# Patient Record
Sex: MALE | Race: WHITE | NOT HISPANIC OR LATINO | Employment: OTHER | URBAN - NONMETROPOLITAN AREA
[De-identification: names, ages, dates, MRNs, and addresses within clinical notes are randomized per-mention and may not be internally consistent; named-entity substitution may affect disease eponyms.]

---

## 2019-07-05 ENCOUNTER — OFFICE VISIT (OUTPATIENT)
Dept: CARDIOLOGY | Facility: CLINIC | Age: 84
End: 2019-07-05

## 2019-07-05 ENCOUNTER — DOCUMENTATION (OUTPATIENT)
Dept: CARDIOLOGY | Facility: CLINIC | Age: 84
End: 2019-07-05

## 2019-07-05 VITALS
SYSTOLIC BLOOD PRESSURE: 126 MMHG | HEART RATE: 82 BPM | WEIGHT: 154 LBS | DIASTOLIC BLOOD PRESSURE: 78 MMHG | HEIGHT: 72 IN | BODY MASS INDEX: 20.86 KG/M2

## 2019-07-05 DIAGNOSIS — I25.10 CORONARY ARTERY DISEASE INVOLVING NATIVE CORONARY ARTERY OF NATIVE HEART WITHOUT ANGINA PECTORIS: Primary | ICD-10-CM

## 2019-07-05 DIAGNOSIS — Z82.49 FAMILY HISTORY OF CABG: ICD-10-CM

## 2019-07-05 DIAGNOSIS — R55 VASOVAGAL SYNCOPE: ICD-10-CM

## 2019-07-05 DIAGNOSIS — I45.2 RBBB (RIGHT BUNDLE BRANCH BLOCK WITH LEFT ANTERIOR FASCICULAR BLOCK): ICD-10-CM

## 2019-07-05 DIAGNOSIS — R94.31 ABNORMAL EKG: Primary | ICD-10-CM

## 2019-07-05 DIAGNOSIS — I10 ESSENTIAL HYPERTENSION: ICD-10-CM

## 2019-07-05 PROCEDURE — 93000 ELECTROCARDIOGRAM COMPLETE: CPT | Performed by: INTERNAL MEDICINE

## 2019-07-05 PROCEDURE — 99204 OFFICE O/P NEW MOD 45 MIN: CPT | Performed by: INTERNAL MEDICINE

## 2019-07-05 RX ORDER — GABAPENTIN 600 MG/1
TABLET ORAL
Refills: 2 | COMMUNITY
Start: 2019-06-27 | End: 2020-07-23 | Stop reason: SDUPTHER

## 2019-07-05 RX ORDER — ASPIRIN 81 MG/1
81 TABLET ORAL DAILY
Qty: 30 TABLET | Refills: 3 | Status: SHIPPED | OUTPATIENT
Start: 2019-07-05

## 2019-07-05 RX ORDER — DICLOFENAC SODIUM 75 MG/1
1 TABLET, DELAYED RELEASE ORAL EVERY 12 HOURS
COMMUNITY
End: 2019-07-05 | Stop reason: ALTCHOICE

## 2019-07-05 RX ORDER — ROPINIROLE 0.5 MG/1
0.5 TABLET, FILM COATED ORAL DAILY
Refills: 2 | COMMUNITY
Start: 2019-05-03

## 2019-07-05 RX ORDER — ALBUTEROL SULFATE 90 UG/1
AEROSOL, METERED RESPIRATORY (INHALATION)
Refills: 3 | COMMUNITY
Start: 2019-04-08

## 2019-07-05 RX ORDER — MELOXICAM 15 MG/1
15 TABLET ORAL DAILY
Refills: 1 | COMMUNITY
Start: 2019-06-27 | End: 2020-07-22 | Stop reason: HOSPADM

## 2019-07-05 RX ORDER — ISOSORBIDE MONONITRATE 30 MG/1
30 TABLET, EXTENDED RELEASE ORAL EVERY MORNING
COMMUNITY
Start: 2016-07-19 | End: 2019-07-05 | Stop reason: ALTCHOICE

## 2019-07-05 RX ORDER — TOLTERODINE TARTRATE 2 MG/1
2 TABLET, EXTENDED RELEASE ORAL 2 TIMES DAILY
COMMUNITY

## 2019-07-05 RX ORDER — AMLODIPINE BESYLATE 10 MG/1
5 TABLET ORAL DAILY
Refills: 11 | COMMUNITY
Start: 2019-06-17 | End: 2019-07-16

## 2019-07-05 RX ORDER — ATORVASTATIN CALCIUM 20 MG/1
1 TABLET, FILM COATED ORAL
COMMUNITY

## 2019-07-05 RX ORDER — AMITRIPTYLINE HYDROCHLORIDE 100 MG/1
100 TABLET, FILM COATED ORAL
Refills: 3 | COMMUNITY
Start: 2019-04-08 | End: 2021-03-30

## 2019-07-05 RX ORDER — OMEPRAZOLE 40 MG/1
40 CAPSULE, DELAYED RELEASE ORAL EVERY MORNING
Refills: 2 | COMMUNITY
Start: 2019-06-27

## 2019-07-05 RX ORDER — ATORVASTATIN CALCIUM 20 MG/1
20 TABLET, FILM COATED ORAL DAILY
Refills: 3 | COMMUNITY
Start: 2019-04-19 | End: 2019-07-05 | Stop reason: SDUPTHER

## 2019-07-05 RX ORDER — COLCHICINE 0.6 MG/1
0.6 TABLET ORAL DAILY
COMMUNITY
Start: 2019-06-03 | End: 2019-07-05 | Stop reason: ALTCHOICE

## 2019-07-05 NOTE — PROGRESS NOTES
Jose Dacosta  84 y.o. male    2019  1. Coronary artery disease involving native coronary artery of native heart without angina pectoris    2. Vasovagal syncope    3. Essential hypertension    4. RBBB (right bundle branch block with left anterior fascicular block)    5. Family history of CABG        History of Present Illness:  Patient's Body mass index is 20.89 kg/m². BMI is within normal parameters. No follow-up required.   .  84 years old patient hard of hearing with a long-standing history of postural dizziness documented right bundle branch and left anterior axis by EKG in 2013, history of CAD and CABG more than 10 years ago, right carotid endarterectomy, mild abdominal aortic aneurysm infrarenal on abdominal ultrasound in 2018, hypertension with hypertensive heart disease, hyperlipidemia, history of BPH who referred for evaluations of syncope happened more than a month ago when he stood up from the chair with a long-standing history of postural dizziness.  No chest pain no orthopnea no PND no palpitation reported prior to the event.  No recent cardiac evaluation noted in the medical record.  Record of bypass surgery is not available.  He is a physically active and fortunately stopped smoking more than 30 years ago        SUBJECTIVE:    Allergies   Allergen Reactions   • Hydrochlorothiazide Swelling     TONGUE SWELLING         Past Medical History:   Diagnosis Date   • Abnormal ECG          Past Surgical History:   Procedure Laterality Date   • CORONARY STENT PLACEMENT           Family History   Problem Relation Age of Onset   • Heart failure Mother    • Heart failure Father          Social History     Socioeconomic History   • Marital status:      Spouse name: Not on file   • Number of children: Not on file   • Years of education: Not on file   • Highest education level: Not on file   Tobacco Use   • Smoking status: Former Smoker     Last attempt to quit: 1990     Years since quittin.5    • Smokeless tobacco: Never Used   Substance and Sexual Activity   • Alcohol use: No     Frequency: Never   • Drug use: No         Current Outpatient Medications   Medication Sig Dispense Refill   • atorvastatin (LIPITOR) 20 MG tablet Take 1 tablet by mouth.     • gabapentin (NEURONTIN) 600 MG tablet TAKE 1 TABLET BY MOUTH 3 (THREE) TIMES DAILY  2   • meloxicam (MOBIC) 15 MG tablet Take 15 mg by mouth Daily.  1   • omeprazole (priLOSEC) 40 MG capsule Take 40 mg by mouth Every Morning.  2   • rOPINIRole (REQUIP) 0.5 MG tablet Take 0.5 mg by mouth Daily.  2   • tolterodine (DETROL) 2 MG tablet Take 2 mg by mouth 2 (Two) Times a Day.     • albuterol sulfate  (90 Base) MCG/ACT inhaler INHALE 2 PUFFS INTO THE LUNGS EVERY 6 (SIX) HOURS AS NEEDED FOR WHEEZING  3   • amitriptyline (ELAVIL) 100 MG tablet Take 100 mg by mouth every night at bedtime.  3   • amLODIPine (NORVASC) 10 MG tablet Take 10 mg by mouth Daily.  11   • aspirin 81 MG EC tablet Take 1 tablet by mouth Daily. 30 tablet 3     No current facility-administered medications for this visit.            Review of Systems:     Constitutional:  Denies recent weight loss, weight gain, fever or chills, no change in exercise tolerance.     HENT:  Denies any hearing loss, epistaxis, hoarseness, or difficulty speaking.     Eyes: No blurring    Respiratory:  Denies dyspnea with exertion,no cough, wheezing, or hemoptysis.     Cardiovascular: See H&P.     Gastrointestinal:  Denies change in bowel habits, dyspepsia, ulcer disease, hematochezia, or melena.     Endocrine: Negative for cold intolerance, heat intolerance, polydipsia, polyphagia and polyuria. Denies any history of weight change, polydipsia, polyuria.     Genitourinary: Negative.      Musculoskeletal: Osteoarthritis    Skin:  Denies any change in hair or nails, rashes, or skin lesions.     Allergic/Immunologic: Negative.  Negative for environmental allergies, food allergies and immunocompromised state.  "    Neurological:  Denies any history of recurrent headaches, strokes, TIA, or seizure disorder.     Hematological: Denies any food allergies, seasonal allergies, bleeding disorders, or lymphadenopathy.     Psychiatric/Behavioral: Denies any history of depression, substance abuse, or change in cognitive function.       OBJECTIVE:    /78   Pulse 82   Ht 182.9 cm (72\")   Wt 69.9 kg (154 lb)   BMI 20.89 kg/m²       Physical Exam:     Constitutional: Cooperative, alert and oriented, well-developed, well-nourished, in no acute distress.     HENT:   Head: Normocephalic, normal hair patterns, no masses or tenderness.  Ears, Nose, and Throat: No gross abnormalities. No pallor or cyanosis. Dentition good.   Eyes: EOMS intact, PERRL, conjunctivae and lids unremarkable. Fundoscopic exam and visual fields not performed.   Neck: No palpable masses or adenopathy, no thyromegaly, no JVD, carotid pulses are full and equal bilaterally and without  Bruits.     Cardiovascular: Regular rhythm, S1 and S2 normal, no S3 or S4. Apical impulse not displaced. No murmurs, gallops, or rubs detected.     Pulmonary/Chest: Chest: normal symmetry, no tenderness to palpation, normal respiratory excursion, no intercostal retraction, no use of accessory muscles. Pulmonary: Normal breath sounds. No rales or rhonchi.    Abdominal: Abdomen soft, bowel sounds normoactive, no masses, no hepatosplenomegaly, non-tender, no bruits.     Musculoskeletal: No deformities, clubbing, cyanosis, erythema, or edema observed. There are no spinal abnormalities noted. Normal muscle strength and tone. Pulses full and equal in all extremities, no bruits auscultated.     Neurological: No gross motor or sensory deficits noted, affect appropriate, oriented to time, person, place.     Skin: Warm and dry to the touch, no apparent skin lesions or masses noted.     Psychiatric: He has a normal mood and affect. His behavior is normal. Judgment and thought content " normal.         Procedures      Lab Results   Component Value Date    WBC 9.6 06/03/2019    HGB 11.6 (L) 06/03/2019    HCT 35.6 (L) 06/03/2019    MCV 96 (H) 06/03/2019     06/03/2019     Lab Results   Component Value Date    BUN 19 (H) 06/03/2019    CREATININE 1.9 (H) 06/03/2019    CALCIUM 9.3 06/03/2019    ALBUMIN 3.8 06/03/2019    AST 21 06/03/2019    ALT 19 06/03/2019     No results found for: CHOL  No results found for: TRIG  No results found for: HDL  No components found for: LDLCALC  No results found for: LDL  No results found for: HDLLDLRATIO  No components found for: CHOLHDL  No results found for: HGBA1C  No results found for: TSH, C7UFYVS, F9DLOSS, THYROIDAB        ASSESSMENT AND PLAN:  #1 postural syncope with history of postural dizziness #2 CAD status post CABG more than 10 years ago #3 peripheral vascular disease status post carotid endarterectomy #4 hyperlipidemia #5 hypertension #6 history of right bundle leftward axis previously documented in 2013    Clinically, no sign of cardiac decompensation based on the clinical history physical findings.  Patient a background history of CABG, carotid endarterectomy, hypertension, hyperlipidemia, BPH and long-standing history of postural dizziness.  The patient had an episode of syncope from clinical's descriptions and circumstances surrounding the event most likely mechanism seem to be orthostasis or vasovagal.  Right bundle branch block morphology is not a new and present in the EKG in 2013.  Given the history of CAD and CABG more than 10 years ago, hyperlipidemia, carotid under endarterectomy seemed appropriate to risk stratify with Lexiscan Cardiolite to rule out ischemia, 24 to 48-hour monitor to rule out any significant arrhythmia, echocardiographic study to assess the biventricular function and if cardiac work-up remain negative head up tilt test can be contemplated.  The significance of water intake along with the salt palatable to taste discussed  with the patient.  Patient was instructed to take a time in changing the posture and some exercises were demonstrated prior to and in posture to improve the tone and the pressures.  Currently the patient is getting BPH medication, the blood pressure medicine needed if systolic blood pressure on the higher side more than 130 or 140, aspirin with history of CAD and CABG, statin for hyperlipidemia and Prilosec for gastritis.  Further recommendation based on patient clinical condition and cardiac evaluations.  We will see him back in 4-month    Jose was seen today for new patient.    Diagnoses and all orders for this visit:    Coronary artery disease involving native coronary artery of native heart without angina pectoris  -     Adult Transthoracic Echo Complete W/ Cont if Necessary Per Protocol; Future  -     Tilt Table; Future  -     Holter Monitor - 48 Hour; Future  -     Stress Test With Myocardial Perfusion One Day; Future    Vasovagal syncope  -     Adult Transthoracic Echo Complete W/ Cont if Necessary Per Protocol; Future  -     Tilt Table; Future  -     Holter Monitor - 48 Hour; Future  -     Stress Test With Myocardial Perfusion One Day; Future    Essential hypertension    RBBB (right bundle branch block with left anterior fascicular block)    Family history of CABG  -     Adult Transthoracic Echo Complete W/ Cont if Necessary Per Protocol; Future  -     Tilt Table; Future  -     Holter Monitor - 48 Hour; Future  -     Stress Test With Myocardial Perfusion One Day; Future    Other orders  -     aspirin 81 MG EC tablet; Take 1 tablet by mouth Daily.        Júnior Licona MD  7/5/2019  9:30 AM

## 2019-07-08 ENCOUNTER — HOSPITAL ENCOUNTER (OUTPATIENT)
Dept: NUCLEAR MEDICINE | Facility: HOSPITAL | Age: 84
Discharge: HOME | End: 2019-07-08

## 2019-07-08 ENCOUNTER — HOSPITAL ENCOUNTER (OUTPATIENT)
Dept: CARDIOLOGY | Facility: HOSPITAL | Age: 84
Discharge: HOME | End: 2019-07-08

## 2019-07-08 DIAGNOSIS — Z82.49 FAMILY HISTORY OF CABG: ICD-10-CM

## 2019-07-08 DIAGNOSIS — R55 VASOVAGAL SYNCOPE: ICD-10-CM

## 2019-07-08 DIAGNOSIS — I25.10 CORONARY ARTERY DISEASE INVOLVING NATIVE CORONARY ARTERY OF NATIVE HEART WITHOUT ANGINA PECTORIS: ICD-10-CM

## 2019-07-08 LAB
BH CV STRESS BP STAGE 1: NORMAL
BH CV STRESS COMMENTS STAGE 1: NORMAL
BH CV STRESS DOSE REGADENOSON STAGE 1: 0.4
BH CV STRESS DURATION MIN STAGE 1: 0
BH CV STRESS DURATION SEC STAGE 1: 10
BH CV STRESS HR STAGE 1: 77
BH CV STRESS PROTOCOL 1: NORMAL
BH CV STRESS RECOVERY BP: NORMAL MMHG
BH CV STRESS RECOVERY HR: 81 BPM
BH CV STRESS STAGE 1: 1
LV EF NUC BP: 26 %
MAXIMAL PREDICTED HEART RATE: 137 BPM
PERCENT MAX PREDICTED HR: 64.96 %
STRESS BASELINE BP: NORMAL MMHG
STRESS BASELINE HR: 77 BPM
STRESS PERCENT HR: 76 %
STRESS POST ESTIMATED WORKLOAD: 1 METS
STRESS POST PEAK BP: NORMAL MMHG
STRESS POST PEAK HR: 89 BPM
STRESS TARGET HR: 116 BPM

## 2019-07-08 PROCEDURE — 0 TECHNETIUM SESTAMIBI: Performed by: INTERNAL MEDICINE

## 2019-07-08 PROCEDURE — A9500 TC99M SESTAMIBI: HCPCS | Performed by: INTERNAL MEDICINE

## 2019-07-08 PROCEDURE — 78452 HT MUSCLE IMAGE SPECT MULT: CPT

## 2019-07-08 PROCEDURE — 93016 CV STRESS TEST SUPVJ ONLY: CPT | Performed by: INTERNAL MEDICINE

## 2019-07-08 PROCEDURE — 25010000002 REGADENOSON 0.4 MG/5ML SOLUTION: Performed by: INTERNAL MEDICINE

## 2019-07-08 PROCEDURE — 78452 HT MUSCLE IMAGE SPECT MULT: CPT | Performed by: INTERNAL MEDICINE

## 2019-07-08 PROCEDURE — 93018 CV STRESS TEST I&R ONLY: CPT | Performed by: INTERNAL MEDICINE

## 2019-07-08 PROCEDURE — 93017 CV STRESS TEST TRACING ONLY: CPT

## 2019-07-08 RX ORDER — 0.9 % SODIUM CHLORIDE 0.9 %
10 VIAL (ML) INJECTION ONCE
Status: COMPLETED | OUTPATIENT
Start: 2019-07-08 | End: 2019-07-08

## 2019-07-08 RX ADMIN — REGADENOSON 0.4 MG: 0.08 INJECTION, SOLUTION INTRAVENOUS at 09:19

## 2019-07-08 RX ADMIN — TECHNETIUM TC 99M SESTAMIBI 1 DOSE: 1 INJECTION INTRAVENOUS at 07:45

## 2019-07-08 RX ADMIN — SODIUM CHLORIDE 10 ML: 9 INJECTION INTRAMUSCULAR; INTRAVENOUS; SUBCUTANEOUS at 09:20

## 2019-07-08 RX ADMIN — TECHNETIUM TC 99M SESTAMIBI 1 DOSE: 1 INJECTION INTRAVENOUS at 09:20

## 2019-07-12 ENCOUNTER — DOCUMENTATION (OUTPATIENT)
Dept: CARDIOLOGY | Facility: CLINIC | Age: 84
End: 2019-07-12

## 2019-07-12 NOTE — PROGRESS NOTES
Spoke with patient daughter about echo and stress results. Daughter is aware of need to see Dr Licona on Tuesday.

## 2019-07-16 ENCOUNTER — OFFICE VISIT (OUTPATIENT)
Dept: CARDIOLOGY | Facility: CLINIC | Age: 84
End: 2019-07-16

## 2019-07-16 VITALS
DIASTOLIC BLOOD PRESSURE: 74 MMHG | HEART RATE: 74 BPM | BODY MASS INDEX: 20.99 KG/M2 | HEIGHT: 72 IN | OXYGEN SATURATION: 97 % | WEIGHT: 155 LBS | SYSTOLIC BLOOD PRESSURE: 112 MMHG

## 2019-07-16 DIAGNOSIS — I25.10 CORONARY ARTERY DISEASE INVOLVING NATIVE CORONARY ARTERY OF NATIVE HEART WITHOUT ANGINA PECTORIS: ICD-10-CM

## 2019-07-16 DIAGNOSIS — R55 VASOVAGAL SYNCOPE: ICD-10-CM

## 2019-07-16 DIAGNOSIS — I25.5 ISCHEMIC CARDIOMYOPATHY: Primary | ICD-10-CM

## 2019-07-16 DIAGNOSIS — I25.2 CORONARY ARTERY DISEASE WITH HISTORY OF MYOCARDIAL INFARCTION WITHOUT HISTORY OF CABG: ICD-10-CM

## 2019-07-16 DIAGNOSIS — I45.2 RBBB (RIGHT BUNDLE BRANCH BLOCK WITH LEFT ANTERIOR FASCICULAR BLOCK): ICD-10-CM

## 2019-07-16 DIAGNOSIS — I25.10 CORONARY ARTERY DISEASE WITH HISTORY OF MYOCARDIAL INFARCTION WITHOUT HISTORY OF CABG: ICD-10-CM

## 2019-07-16 DIAGNOSIS — I10 ESSENTIAL HYPERTENSION: ICD-10-CM

## 2019-07-16 PROCEDURE — 99214 OFFICE O/P EST MOD 30 MIN: CPT | Performed by: INTERNAL MEDICINE

## 2019-07-16 RX ORDER — LOSARTAN POTASSIUM 25 MG/1
25 TABLET ORAL DAILY
Qty: 30 TABLET | Refills: 5 | Status: SHIPPED | OUTPATIENT
Start: 2019-07-16 | End: 2019-12-30

## 2019-07-16 RX ORDER — COLCHICINE 0.6 MG/1
0.6 TABLET, FILM COATED ORAL DAILY
Refills: 11 | COMMUNITY
Start: 2019-07-05 | End: 2020-11-03

## 2019-07-16 RX ORDER — METOPROLOL SUCCINATE 25 MG/1
25 TABLET, EXTENDED RELEASE ORAL DAILY
Qty: 30 TABLET | Refills: 5 | Status: SHIPPED | OUTPATIENT
Start: 2019-07-16 | End: 2019-12-14 | Stop reason: SDUPTHER

## 2019-07-16 NOTE — PROGRESS NOTES
Jose Dacosta  85 y.o. male    07/16/2019  1. Ischemic cardiomyopathy    2. Essential hypertension    3. RBBB (right bundle branch block with left anterior fascicular block)    4. Vasovagal syncope    5. Coronary artery disease involving native coronary artery of native heart without angina pectoris    6. Coronary artery disease with history of myocardial infarction without history of CABG        History of Present Illness:      84 years old patient recently evaluated with echo stress test and Holter monitor.  Echo and stress test ejection fraction range of 20% fixed defect with no evidence of reversible ischemia and monitor sinus rhythm without any significant bradyarrhythmia and runs of nonsustained ventricular tachycardia and supraventricular arrhythmia.  Patient presented to discuss the finding of cardiac evaluation.  The mechanism of syncope from clinical description possibility vasovagal orthostasis with arrhythmias underlying mechanism cannot be excluded and patient was scheduled to have had a tilt test but given the history of ischemia, LV dysfunction, congestive heart failure and documented nonsustained ventricular tachycardia ICD was recommended.  Patient initially interested in ICD at the time of evaluation and if he changes his mind he will  let us know if he want to proceed forward.  Hard of hearing with a long-standing history of postural dizziness documented right bundle branch and left anterior axis by EKG in 2013, history of CAD and CABG more than 10 years ago, right carotid endarterectomy, mild abdominal aortic aneurysm infrarenal on abdominal ultrasound in 2018, hypertension with hypertensive heart disease, hyperlipidemia, history of BPH who referred for evaluations of syncope happened more than a month ago when he stood up from the chair with a long-standing history of postural dizziness.  No chest pain no orthopnea no PND no palpitation reported prior to the event.  No recent cardiac  evaluation noted in the medical record.  Record of bypass surgery is not available.  He is a physically active and fortunately stopped smoking more than 30 years ago      HOLTER  #1 sinus rhythm with average heart rate of 75 minimum 55 and maximum 110 bpm with underlying intraventricular conduction delay  2 frequent premature ventricular complex with full runs of nonsustained wide QRS complex tachycardia longest is 3 beat with heart rate range from 130 to 150 bpm  #3 rare premature supraventricular ectopic beat with rare nonsustained supraventricular tachycardia longest is 4 beat with maximum heart rate 160 but beats per minute   #4 no significant bradyarrhythmia or pause noted    Echo  7/2019  · The left ventricular cavity is moderately dilated.  · Estimated EF = 25%.  · Left ventricular diastolic dysfunction (grade I) consistent with impaired relaxation.  · Mild mitral valve regurgitation is present  · Mild tricuspid valve regurgitation is present.  · Mild pulmonic valve regurgitation is present.  · Estimated EF appears to be in the range of 21 - 25%. Estimated EF = 25%. Normal left ventricular wall thickness noted. There is left ventricular global hypokinesis noted. The left ventricular cavity is moderately dilated. Left ventricular diastolic dysfunction is noted (grade I) consistent with impaired relaxation.    STRESS TEST 7/2019  · Findings consistent with an equivocal ECG stress test.  · Left ventricular ejection fraction is moderately reduced (Calculated EF = 26%).  · Moderately fixed inferolateral defect with no reversibility, LV dilated  SUBJECTIVE:    Allergies   Allergen Reactions   • Hydrochlorothiazide Swelling     TONGUE SWELLING         Past Medical History:   Diagnosis Date   • Abnormal ECG          Past Surgical History:   Procedure Laterality Date   • CORONARY STENT PLACEMENT           Family History   Problem Relation Age of Onset   • Heart failure Mother    • Heart failure Father          Social  History     Socioeconomic History   • Marital status:      Spouse name: Not on file   • Number of children: Not on file   • Years of education: Not on file   • Highest education level: Not on file   Tobacco Use   • Smoking status: Former Smoker     Last attempt to quit: 1990     Years since quittin.5   • Smokeless tobacco: Never Used   Substance and Sexual Activity   • Alcohol use: No     Frequency: Never   • Drug use: No         No current facility-administered medications for this visit.      No current outpatient medications on file.     Facility-Administered Medications Ordered in Other Visits   Medication Dose Route Frequency Provider Last Rate Last Dose   • acetaminophen (TYLENOL) tablet 1,000 mg  1,000 mg Oral 4x Daily Lakhwinder Kumar APRN       • albuterol (PROVENTIL) nebulizer solution 0.083% 2.5 mg/3mL  2.5 mg Nebulization Q4H PRN Misha Parada MD       • aspirin EC tablet 81 mg  81 mg Oral Daily Misha Parada MD       • atorvastatin (LIPITOR) tablet 20 mg  20 mg Oral Daily Misha Parada MD       • heparin (porcine) 5000 UNIT/ML injection 5,000 Units  5,000 Units Subcutaneous Q8H Misha Parada MD   5,000 Units at 20 0600   • ipratropium-albuterol (DUO-NEB) nebulizer solution 3 mL  3 mL Nebulization Once Esequiel Sweeney MD       • ketorolac (TORADOL) injection 15 mg  15 mg Intravenous Q6H PRN Lakhwinder Kumar APRN       • losartan (COZAAR) tablet 25 mg  25 mg Oral Daily Misha Parada MD       • meloxicam (MOBIC) tablet 15 mg  15 mg Oral Daily Misha Parada MD       • metoprolol succinate XL (TOPROL-XL) 24 hr tablet 25 mg  25 mg Oral Daily Misha Parada MD       • naloxone (NARCAN) injection 0.4 mg  0.4 mg Intravenous Q5 Min PRN Lakhwinder Kumar APRN   0.4 mg at 20 0841   • ondansetron (ZOFRAN) injection 4 mg  4 mg Intravenous Q6H PRN Misha Parada MD       • oxybutynin (DITROPAN) half tablet 2.5 mg  2.5 mg Oral BID  "Misha Parada MD   2.5 mg at 07/08/20 2333   • pantoprazole (PROTONIX) EC tablet 40 mg  40 mg Oral QAM Misha Parada MD       • rOPINIRole (REQUIP) tablet 0.5 mg  0.5 mg Oral Daily iMsha Parada MD       • sodium chloride 0.9 % flush 10 mL  10 mL Intravenous PRN Ozor, Jordan Khan MD       • sodium chloride 0.9 % flush 10 mL  10 mL Intravenous Q12H Misha Parada MD   10 mL at 07/09/20 0815   • sodium chloride 0.9 % flush 10 mL  10 mL Intravenous PRN Misha Parada MD               Review of Systems:     Constitutional:  Denies recent weight loss, weight gain, fever or chills, no change in exercise tolerance.     HENT:  Denies any hearing loss, epistaxis, hoarseness, or difficulty speaking.     Eyes: Wears eyeglasses or contact lenses.    Respiratory:  Denies dyspnea with exertion,no cough, wheezing, or hemoptysis.     Cardiovascular: Negative for palpations, chest pain, orthopnea, PND, peripheral edema, syncope, or claudication.     Gastrointestinal:  Denies change in bowel habits, dyspepsia, ulcer disease, hematochezia, or melena.     Endocrine: Negative for cold intolerance, heat intolerance, polydipsia, polyphagia and polyuria. Denies any history of weight change, polydipsia, polyuria.     Genitourinary: Negative.      Musculoskeletal: Denies any history of arthritic symptoms or back problems.     Skin:  Denies any change in hair or nails, rashes, or skin lesions.     Allergic/Immunologic: Negative.  Negative for environmental allergies, food allergies and immunocompromised state.     Neurological:  Denies any history of recurrent headaches, strokes, TIA, or seizure disorder.     Hematological: Denies any food allergies, seasonal allergies, bleeding disorders, or lymphadenopathy.     Psychiatric/Behavioral: Denies any history of depression, substance abuse, or change in cognitive function.       OBJECTIVE:    /74   Pulse 74   Ht 182.9 cm (72\")   Wt 70.3 kg (155 lb)   SpO2 97% "   BMI 21.02 kg/m²       Physical Exam:     Constitutional: Cooperative, alert and oriented, well-developed, well-nourished, in no acute distress.     HENT:   Head: Normocephalic, normal hair patterns, no masses or tenderness.  Ears, Nose, and Throat: No gross abnormalities. No pallor or cyanosis. Dentition good.   Eyes: EOMS intact, PERRL, conjunctivae and lids unremarkable. Fundoscopic exam and visual fields not performed.   Neck: No palpable masses or adenopathy, no thyromegaly, no JVD, carotid pulses are full and equal bilaterally and without  Bruits.     Cardiovascular: Regular rhythm, S1 and S2 normal, no S3 or S4. Apical impulse not displaced. No murmurs, gallops, or rubs detected.     Pulmonary/Chest: Chest: normal symmetry, no tenderness to palpation, normal respiratory excursion, no intercostal retraction, no use of accessory muscles. Pulmonary: Normal breath sounds. No rales or rhonchi.    Abdominal: Abdomen soft, bowel sounds normoactive, no masses, no hepatosplenomegaly, non-tender, no bruits.     Musculoskeletal: No deformities, clubbing, cyanosis, erythema, or edema observed. There are no spinal abnormalities noted. Normal muscle strength and tone. Pulses full and equal in all extremities, no bruits auscultated.     Neurological: No gross motor or sensory deficits noted, affect appropriate, oriented to time, person, place.     Skin: Warm and dry to the touch, no apparent skin lesions or masses noted.     Psychiatric: He has a normal mood and affect. His behavior is normal. Judgment and thought content normal.         Procedures      Lab Results   Component Value Date    WBC 11.28 (H) 07/09/2020    HGB 12.2 (L) 07/09/2020    HCT 35.9 (L) 07/09/2020    MCV 92.5 07/09/2020     07/09/2020     Lab Results   Component Value Date    GLUCOSE 109 (H) 07/09/2020    BUN 16 07/09/2020    CREATININE 0.89 07/09/2020    EGFRIFNONA 81 07/09/2020    BCR 18.0 07/09/2020    CO2 24.0 07/09/2020    CALCIUM 9.1  07/09/2020    ALBUMIN 4.10 07/08/2020    AST 32 07/08/2020    ALT 22 07/08/2020     No results found for: CHOL  No results found for: TRIG  No results found for: HDL  No components found for: LDLCALC  No results found for: LDL  No results found for: HDLLDLRATIO  No components found for: CHOLHDL  No results found for: HGBA1C  No results found for: TSH, K4VERDG, O6SECGM, THYROIDAB        ASSESSMENT AND PLAN:  #1 postural syncope with history of postural dizziness #2 CAD status post CABG more than 10 years ago #3 peripheral vascular disease status post carotid endarterectomy #4 hyperlipidemia #5 hypertension #6 history of right bundle leftward axis previously documented in 2013    84 years old gentleman with a background history of hypertension, hyperlipidemia, CAD status post CABG had episode of postural dizziness and syncope no further recurrence evaluated by his stress test echo and Holter monitor.  Significant LV dysfunction with a fixed defect clinically not in heart failure and monitor revealed nonsustained ventricular tachycardia and nonsustained supraventricular arrhythmia.  Findings discussed with the patient and family.  ICD was recommended given the ischemic cardiomyopathy nonsustained ventricular arrhythmia.  However they decided to wait.  I will discontinue amlodipine and start the patient on metoprolol starting a dose of 25 mg and low-dose losartan.  He will continue aspirin and atorvastatin.  They will let us know if they decided to proceed .    Jose was seen today for follow-up.    Diagnoses and all orders for this visit:    Ischemic cardiomyopathy    Essential hypertension    RBBB (right bundle branch block with left anterior fascicular block)    Vasovagal syncope    Coronary artery disease involving native coronary artery of native heart without angina pectoris    Coronary artery disease with history of myocardial infarction without history of CABG    Other orders  -     Discontinue: metoprolol  succinate XL (TOPROL-XL) 25 MG 24 hr tablet; Take 1 tablet by mouth Daily.  -     Discontinue: losartan (COZAAR) 25 MG tablet; Take 1 tablet by mouth Daily.        Júnior Licona MD  7/9/2020  09:01

## 2019-07-24 ENCOUNTER — APPOINTMENT (OUTPATIENT)
Dept: CARDIOLOGY | Facility: HOSPITAL | Age: 84
End: 2019-07-24

## 2019-12-16 RX ORDER — METOPROLOL SUCCINATE 25 MG/1
TABLET, EXTENDED RELEASE ORAL
Qty: 30 TABLET | Refills: 5 | Status: SHIPPED | OUTPATIENT
Start: 2019-12-16

## 2019-12-30 RX ORDER — LOSARTAN POTASSIUM 25 MG/1
TABLET ORAL
Qty: 30 TABLET | Refills: 5 | Status: SHIPPED | OUTPATIENT
Start: 2019-12-30

## 2020-07-08 ENCOUNTER — APPOINTMENT (OUTPATIENT)
Dept: CT IMAGING | Facility: HOSPITAL | Age: 85
End: 2020-07-08

## 2020-07-08 ENCOUNTER — APPOINTMENT (OUTPATIENT)
Dept: GENERAL RADIOLOGY | Facility: HOSPITAL | Age: 85
End: 2020-07-08

## 2020-07-08 ENCOUNTER — HOSPITAL ENCOUNTER (INPATIENT)
Facility: HOSPITAL | Age: 85
LOS: 14 days | Discharge: NURSING, REHAB OR LONG-TERM CARE | End: 2020-07-22
Attending: FAMILY MEDICINE | Admitting: INTERNAL MEDICINE

## 2020-07-08 DIAGNOSIS — Z78.9 IMPAIRED MOBILITY AND ADLS: ICD-10-CM

## 2020-07-08 DIAGNOSIS — S72.002A CLOSED FRACTURE OF NECK OF LEFT FEMUR, INITIAL ENCOUNTER (HCC): ICD-10-CM

## 2020-07-08 DIAGNOSIS — S72.002A CLOSED FRACTURE OF LEFT HIP, INITIAL ENCOUNTER (HCC): ICD-10-CM

## 2020-07-08 DIAGNOSIS — Z74.09 IMPAIRED FUNCTIONAL MOBILITY, BALANCE, GAIT, AND ENDURANCE: ICD-10-CM

## 2020-07-08 DIAGNOSIS — R55 SYNCOPE AND COLLAPSE: Primary | ICD-10-CM

## 2020-07-08 DIAGNOSIS — Z74.09 IMPAIRED MOBILITY AND ADLS: ICD-10-CM

## 2020-07-08 DIAGNOSIS — S09.90XA CLOSED HEAD INJURY, INITIAL ENCOUNTER: ICD-10-CM
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
## 2020-07-08 DIAGNOSIS — S01.81XA FACIAL LACERATION, INITIAL ENCOUNTER: ICD-10-CM

## 2020-07-08 LAB
ALBUMIN SERPL-MCNC: 4.1 G/DL (ref 3.5–5.2)
ALBUMIN/GLOB SERPL: 1.4 G/DL
ALP SERPL-CCNC: 108 U/L (ref 39–117)
ALT SERPL W P-5'-P-CCNC: 22 U/L (ref 1–41)
ANION GAP SERPL CALCULATED.3IONS-SCNC: 12 MMOL/L (ref 5–15)
AST SERPL-CCNC: 32 U/L (ref 1–40)
BASOPHILS # BLD AUTO: 0.07 10*3/MM3 (ref 0–0.2)
BASOPHILS NFR BLD AUTO: 0.7 % (ref 0–1.5)
BILIRUB SERPL-MCNC: 0.2 MG/DL (ref 0–1.2)
BUN SERPL-MCNC: 17 MG/DL (ref 8–23)
BUN/CREAT SERPL: 15.9 (ref 7–25)
CALCIUM SPEC-SCNC: 9.3 MG/DL (ref 8.6–10.5)
CHLORIDE SERPL-SCNC: 104 MMOL/L (ref 98–107)
CO2 SERPL-SCNC: 23 MMOL/L (ref 22–29)
CREAT SERPL-MCNC: 1.07 MG/DL (ref 0.76–1.27)
DEPRECATED RDW RBC AUTO: 50.3 FL (ref 37–54)
EOSINOPHIL # BLD AUTO: 0.29 10*3/MM3 (ref 0–0.4)
EOSINOPHIL NFR BLD AUTO: 2.8 % (ref 0.3–6.2)
ERYTHROCYTE [DISTWIDTH] IN BLOOD BY AUTOMATED COUNT: 14.6 % (ref 12.3–15.4)
GFR SERPL CREATININE-BSD FRML MDRD: 66 ML/MIN/1.73
GLOBULIN UR ELPH-MCNC: 3 GM/DL
GLUCOSE SERPL-MCNC: 89 MG/DL (ref 65–99)
HCT VFR BLD AUTO: 36.8 % (ref 37.5–51)
HGB BLD-MCNC: 12.4 G/DL (ref 13–17.7)
HOLD SPECIMEN: NORMAL
HOLD SPECIMEN: NORMAL
IMM GRANULOCYTES # BLD AUTO: 0.04 10*3/MM3 (ref 0–0.05)
IMM GRANULOCYTES NFR BLD AUTO: 0.4 % (ref 0–0.5)
LYMPHOCYTES # BLD AUTO: 1.86 10*3/MM3 (ref 0.7–3.1)
LYMPHOCYTES NFR BLD AUTO: 17.9 % (ref 19.6–45.3)
MCH RBC QN AUTO: 31.3 PG (ref 26.6–33)
MCHC RBC AUTO-ENTMCNC: 33.7 G/DL (ref 31.5–35.7)
MCV RBC AUTO: 92.9 FL (ref 79–97)
MONOCYTES # BLD AUTO: 0.41 10*3/MM3 (ref 0.1–0.9)
MONOCYTES NFR BLD AUTO: 3.9 % (ref 5–12)
NEUTROPHILS NFR BLD AUTO: 7.73 10*3/MM3 (ref 1.7–7)
NEUTROPHILS NFR BLD AUTO: 74.3 % (ref 42.7–76)
NRBC BLD AUTO-RTO: 0 /100 WBC (ref 0–0.2)
NT-PROBNP SERPL-MCNC: 1741 PG/ML (ref 0–1800)
PLATELET # BLD AUTO: 203 10*3/MM3 (ref 140–450)
PMV BLD AUTO: 10 FL (ref 6–12)
POTASSIUM SERPL-SCNC: 3.5 MMOL/L (ref 3.5–5.2)
PROT SERPL-MCNC: 7.1 G/DL (ref 6–8.5)
RBC # BLD AUTO: 3.96 10*6/MM3 (ref 4.14–5.8)
SODIUM SERPL-SCNC: 139 MMOL/L (ref 136–145)
TROPONIN T SERPL-MCNC: <0.01 NG/ML (ref 0–0.03)
WBC # BLD AUTO: 10.4 10*3/MM3 (ref 3.4–10.8)
WHOLE BLOOD HOLD SPECIMEN: NORMAL
WHOLE BLOOD HOLD SPECIMEN: NORMAL

## 2020-07-08 PROCEDURE — 93005 ELECTROCARDIOGRAM TRACING: CPT | Performed by: FAMILY MEDICINE

## 2020-07-08 PROCEDURE — 71045 X-RAY EXAM CHEST 1 VIEW: CPT

## 2020-07-08 PROCEDURE — 25010000002 MORPHINE PER 10 MG: Performed by: INTERNAL MEDICINE

## 2020-07-08 PROCEDURE — 73502 X-RAY EXAM HIP UNI 2-3 VIEWS: CPT

## 2020-07-08 PROCEDURE — 72125 CT NECK SPINE W/O DYE: CPT

## 2020-07-08 PROCEDURE — 94799 UNLISTED PULMONARY SVC/PX: CPT

## 2020-07-08 PROCEDURE — 25010000002 HEPARIN (PORCINE) PER 1000 UNITS: Performed by: INTERNAL MEDICINE

## 2020-07-08 PROCEDURE — 0HQ1XZZ REPAIR FACE SKIN, EXTERNAL APPROACH: ICD-10-PCS | Performed by: EMERGENCY MEDICINE

## 2020-07-08 PROCEDURE — 94760 N-INVAS EAR/PLS OXIMETRY 1: CPT

## 2020-07-08 PROCEDURE — 80053 COMPREHEN METABOLIC PANEL: CPT | Performed by: FAMILY MEDICINE

## 2020-07-08 PROCEDURE — 93010 ELECTROCARDIOGRAM REPORT: CPT | Performed by: INTERNAL MEDICINE

## 2020-07-08 PROCEDURE — 25010000002 MORPHINE PER 10 MG: Performed by: EMERGENCY MEDICINE

## 2020-07-08 PROCEDURE — 85025 COMPLETE CBC W/AUTO DIFF WBC: CPT | Performed by: FAMILY MEDICINE

## 2020-07-08 PROCEDURE — 70450 CT HEAD/BRAIN W/O DYE: CPT

## 2020-07-08 PROCEDURE — 83880 ASSAY OF NATRIURETIC PEPTIDE: CPT | Performed by: FAMILY MEDICINE

## 2020-07-08 PROCEDURE — 99285 EMERGENCY DEPT VISIT HI MDM: CPT

## 2020-07-08 PROCEDURE — 84484 ASSAY OF TROPONIN QUANT: CPT | Performed by: FAMILY MEDICINE

## 2020-07-08 RX ORDER — SODIUM CHLORIDE 0.9 % (FLUSH) 0.9 %
10 SYRINGE (ML) INJECTION AS NEEDED
Status: DISCONTINUED | OUTPATIENT
Start: 2020-07-08 | End: 2020-07-22 | Stop reason: HOSPADM

## 2020-07-08 RX ORDER — ALBUTEROL SULFATE 2.5 MG/3ML
2.5 SOLUTION RESPIRATORY (INHALATION) EVERY 4 HOURS PRN
Status: DISCONTINUED | OUTPATIENT
Start: 2020-07-08 | End: 2020-07-22 | Stop reason: HOSPADM

## 2020-07-08 RX ORDER — ONDANSETRON 2 MG/ML
4 INJECTION INTRAMUSCULAR; INTRAVENOUS EVERY 6 HOURS PRN
Status: DISCONTINUED | OUTPATIENT
Start: 2020-07-08 | End: 2020-07-22 | Stop reason: HOSPADM

## 2020-07-08 RX ORDER — NALOXONE HCL 0.4 MG/ML
0.4 VIAL (ML) INJECTION
Status: DISCONTINUED | OUTPATIENT
Start: 2020-07-08 | End: 2020-07-09

## 2020-07-08 RX ORDER — OXYBUTYNIN CHLORIDE 5 MG/1
2.5 TABLET ORAL 2 TIMES DAILY
Status: DISCONTINUED | OUTPATIENT
Start: 2020-07-08 | End: 2020-07-22 | Stop reason: HOSPADM

## 2020-07-08 RX ORDER — HEPARIN SODIUM 5000 [USP'U]/ML
5000 INJECTION, SOLUTION INTRAVENOUS; SUBCUTANEOUS EVERY 8 HOURS SCHEDULED
Status: DISCONTINUED | OUTPATIENT
Start: 2020-07-08 | End: 2020-07-09

## 2020-07-08 RX ORDER — ROPINIROLE 0.5 MG/1
0.5 TABLET, FILM COATED ORAL DAILY
Status: DISCONTINUED | OUTPATIENT
Start: 2020-07-09 | End: 2020-07-22 | Stop reason: HOSPADM

## 2020-07-08 RX ORDER — SODIUM CHLORIDE 0.9 % (FLUSH) 0.9 %
10 SYRINGE (ML) INJECTION EVERY 12 HOURS SCHEDULED
Status: DISCONTINUED | OUTPATIENT
Start: 2020-07-08 | End: 2020-07-22 | Stop reason: HOSPADM

## 2020-07-08 RX ORDER — MORPHINE SULFATE 2 MG/ML
1 INJECTION, SOLUTION INTRAMUSCULAR; INTRAVENOUS EVERY 4 HOURS PRN
Status: DISCONTINUED | OUTPATIENT
Start: 2020-07-08 | End: 2020-07-09

## 2020-07-08 RX ORDER — NALOXONE HCL 0.4 MG/ML
0.4 VIAL (ML) INJECTION
Status: DISCONTINUED | OUTPATIENT
Start: 2020-07-08 | End: 2020-07-08 | Stop reason: SDUPTHER

## 2020-07-08 RX ORDER — LIDOCAINE HYDROCHLORIDE AND EPINEPHRINE 10; 10 MG/ML; UG/ML
10 INJECTION, SOLUTION INFILTRATION; PERINEURAL ONCE
Status: COMPLETED | OUTPATIENT
Start: 2020-07-08 | End: 2020-07-08

## 2020-07-08 RX ORDER — MORPHINE SULFATE 2 MG/ML
1 INJECTION, SOLUTION INTRAMUSCULAR; INTRAVENOUS EVERY 4 HOURS PRN
Status: DISCONTINUED | OUTPATIENT
Start: 2020-07-08 | End: 2020-07-08 | Stop reason: SDUPTHER

## 2020-07-08 RX ORDER — ASPIRIN 81 MG/1
81 TABLET ORAL DAILY
Status: DISCONTINUED | OUTPATIENT
Start: 2020-07-09 | End: 2020-07-22 | Stop reason: HOSPADM

## 2020-07-08 RX ORDER — METOPROLOL SUCCINATE 25 MG/1
25 TABLET, EXTENDED RELEASE ORAL DAILY
Status: DISCONTINUED | OUTPATIENT
Start: 2020-07-09 | End: 2020-07-22 | Stop reason: HOSPADM

## 2020-07-08 RX ORDER — MELOXICAM 15 MG/1
15 TABLET ORAL DAILY
Status: DISCONTINUED | OUTPATIENT
Start: 2020-07-09 | End: 2020-07-09

## 2020-07-08 RX ORDER — AMITRIPTYLINE HYDROCHLORIDE 50 MG/1
100 TABLET, FILM COATED ORAL NIGHTLY
Status: DISCONTINUED | OUTPATIENT
Start: 2020-07-08 | End: 2020-07-09

## 2020-07-08 RX ORDER — PANTOPRAZOLE SODIUM 40 MG/1
40 TABLET, DELAYED RELEASE ORAL EVERY MORNING
Status: DISCONTINUED | OUTPATIENT
Start: 2020-07-09 | End: 2020-07-22 | Stop reason: HOSPADM

## 2020-07-08 RX ORDER — IPRATROPIUM BROMIDE AND ALBUTEROL SULFATE 2.5; .5 MG/3ML; MG/3ML
3 SOLUTION RESPIRATORY (INHALATION) ONCE
Status: DISCONTINUED | OUTPATIENT
Start: 2020-07-08 | End: 2020-07-22 | Stop reason: HOSPADM

## 2020-07-08 RX ORDER — GABAPENTIN 400 MG/1
400 CAPSULE ORAL EVERY 8 HOURS SCHEDULED
Status: DISCONTINUED | OUTPATIENT
Start: 2020-07-08 | End: 2020-07-09

## 2020-07-08 RX ORDER — MORPHINE SULFATE 2 MG/ML
1 INJECTION, SOLUTION INTRAMUSCULAR; INTRAVENOUS EVERY 4 HOURS PRN
Status: DISCONTINUED | OUTPATIENT
Start: 2020-07-08 | End: 2020-07-08

## 2020-07-08 RX ORDER — LOSARTAN POTASSIUM 25 MG/1
25 TABLET ORAL DAILY
Status: DISCONTINUED | OUTPATIENT
Start: 2020-07-09 | End: 2020-07-22 | Stop reason: HOSPADM

## 2020-07-08 RX ORDER — ATORVASTATIN CALCIUM 20 MG/1
20 TABLET, FILM COATED ORAL DAILY
Status: DISCONTINUED | OUTPATIENT
Start: 2020-07-09 | End: 2020-07-22 | Stop reason: HOSPADM

## 2020-07-08 RX ORDER — NALOXONE HCL 0.4 MG/ML
0.4 VIAL (ML) INJECTION
Status: DISCONTINUED | OUTPATIENT
Start: 2020-07-08 | End: 2020-07-08

## 2020-07-08 RX ADMIN — SODIUM CHLORIDE, PRESERVATIVE FREE 10 ML: 5 INJECTION INTRAVENOUS at 23:34

## 2020-07-08 RX ADMIN — MORPHINE SULFATE 1 MG: 2 INJECTION, SOLUTION INTRAMUSCULAR; INTRAVENOUS at 23:33

## 2020-07-08 RX ADMIN — Medication 2.5 MG: at 23:33

## 2020-07-08 RX ADMIN — GABAPENTIN 400 MG: 400 CAPSULE ORAL at 23:33

## 2020-07-08 RX ADMIN — AMITRIPTYLINE HYDROCHLORIDE 100 MG: 50 TABLET, FILM COATED ORAL at 23:33

## 2020-07-08 RX ADMIN — LIDOCAINE HYDROCHLORIDE,EPINEPHRINE BITARTRATE 10 ML: 10; .01 INJECTION, SOLUTION INFILTRATION; PERINEURAL at 18:30

## 2020-07-08 RX ADMIN — HEPARIN SODIUM 5000 UNITS: 5000 INJECTION INTRAVENOUS; SUBCUTANEOUS at 23:33

## 2020-07-08 RX ADMIN — MORPHINE SULFATE 4 MG: 4 INJECTION, SOLUTION INTRAMUSCULAR; INTRAVENOUS at 20:51

## 2020-07-08 NOTE — ED PROVIDER NOTES
Subjective     History provided by:  Patient   used: No    Patient is 85 years old male who has past medical history of coronary artery disease present today because of syncope episode which happened about 1 hour ago.  Patient fell and hit his head.  Also complained having some left hip pain.  Denies any chest pain or shortness of breath.    Review of Systems   All other systems reviewed and are negative.      Past Medical History:   Diagnosis Date   • Abnormal ECG        Allergies   Allergen Reactions   • Hydrochlorothiazide Swelling     TONGUE SWELLING       Past Surgical History:   Procedure Laterality Date   • CORONARY STENT PLACEMENT         Family History   Problem Relation Age of Onset   • Heart failure Mother    • Heart failure Father        Social History     Socioeconomic History   • Marital status:      Spouse name: Not on file   • Number of children: Not on file   • Years of education: Not on file   • Highest education level: Not on file   Tobacco Use   • Smoking status: Former Smoker     Last attempt to quit:      Years since quittin.5   • Smokeless tobacco: Never Used   Substance and Sexual Activity   • Alcohol use: No     Frequency: Never   • Drug use: No           Objective   Physical Exam   Constitutional: He is oriented to person, place, and time. He appears well-developed and well-nourished.   HENT:   Head: Normocephalic and atraumatic.   Right Ear: External ear normal.   Left Ear: External ear normal.   Nose: Nose normal.   Mouth/Throat: Oropharynx is clear and moist.   Neck: Normal range of motion. Neck supple.   Cardiovascular: Normal rate, regular rhythm, normal heart sounds and intact distal pulses.   Pulmonary/Chest: Effort normal and breath sounds normal.   Abdominal: Soft. Bowel sounds are normal.   Neurological: He is alert and oriented to person, place, and time.   Skin: Skin is warm. Capillary refill takes less than 2 seconds. Laceration noted.         Nursing note and vitals reviewed.      Laceration Repair  Date/Time: 7/8/2020 6:27 PM  Performed by: Jordan Aguero MD  Authorized by: Jordan Aguero MD     Consent:     Consent obtained:  Verbal    Consent given by:  Patient    Risks discussed:  Pain, retained foreign body, poor cosmetic result, tendon damage and vascular damage    Alternatives discussed:  Delayed treatment  Anesthesia (see MAR for exact dosages):     Anesthesia method:  Local infiltration    Local anesthetic:  Lidocaine 1% WITH epi  Laceration details:     Location:  Face    Face location:  L eyebrow    Length (cm):  2    Depth (mm):  0.5  Pre-procedure details:     Preparation:  Patient was prepped and draped in usual sterile fashion  Exploration:     Hemostasis achieved with:  Direct pressure    Wound exploration: entire depth of wound probed and visualized      Contaminated: no    Treatment:     Area cleansed with:  Hibiclens and saline    Amount of cleaning:  Extensive    Irrigation method:  Syringe    Visualized foreign bodies/material removed: no    Skin repair:     Repair method:  Sutures    Suture size:  5-0    Suture technique:  Simple interrupted    Number of sutures:  5  Approximation:     Approximation:  Close  Post-procedure details:     Dressing:  Antibiotic ointment    Patient tolerance of procedure:  Tolerated well, no immediate complications               ED Course                                 Labs Reviewed   CBC WITH AUTO DIFFERENTIAL - Abnormal; Notable for the following components:       Result Value    RBC 3.96 (*)     Hemoglobin 12.4 (*)     Hematocrit 36.8 (*)     Lymphocyte % 17.9 (*)     Monocyte % 3.9 (*)     Neutrophils, Absolute 7.73 (*)     All other components within normal limits   TROPONIN (IN-HOUSE) - Normal    Narrative:     Troponin T Reference Range:  <= 0.03 ng/mL-   Negative for AMI  >0.03 ng/mL-     Abnormal for myocardial necrosis.  Clinicians would have to utilize clinical acumen, EKG,  Troponin and serial changes to determine if it is an Acute Myocardial Infarction or myocardial injury due to an underlying chronic condition.       Results may be falsely decreased if patient taking Biotin.     BNP (IN-HOUSE) - Normal    Narrative:     Among patients with dyspnea, NT-proBNP is highly sensitive for the detection of acute congestive heart failure. In addition NT-proBNP of <300 pg/ml effectively rules out acute congestive heart failure with 99% negative predictive value.    Results may be falsely decreased if patient taking Biotin.     COMPREHENSIVE METABOLIC PANEL    Narrative:     GFR Normal >60  Chronic Kidney Disease <60  Kidney Failure <15     RAINBOW DRAW    Narrative:     The following orders were created for panel order [place] Draw.  Procedure                               Abnormality         Status                     ---------                               -----------         ------                     Light Blue Top[[id number]]                                   In process                 Green Top (Gel)[[id number]]                                  In process                 Lavender Top[[id number]]                                     In process                 Gold Top - SST[[id number]]                                   In process                   Please view results for these tests on the individual orders.   TROPONIN (IN-HOUSE)   CBC AND DIFFERENTIAL    Narrative:     The following orders were created for panel order CBC & Differential.  Procedure                               Abnormality         Status                     ---------                               -----------         ------                     CBC Auto Differential[[id number]]        Abnormal            Final result                 Please view results for these tests on the individual orders.   LIGHT BLUE TOP   GREEN TOP   LAVENDER TOP   GOLD TOP - SST       CT Head Without Contrast   Final Result   CONCLUSION:   No  intracranial injury or acute process.   Cerebral and cerebellar atrophy.   Old lacunar infarcts in the basal ganglia.   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   Moderate small vessel disease.      [id number]      Electronically signed by:  Emmanuel Burris MD  7/8/2020 7:30 PM CDT   Workstation: [id number]      CT Cervical Spine Without Contrast   Final Result   CONCLUSION:   No cervical fracture.   Multilevel facet arthropathy with resultant minimal   retrolisthesis of C3 on C4 and minimal anterolisthesis of C4 on   C5 and C5 on C6.   Degenerative disc disease C6-7.   Chronic obstructive pulmonary disease.   5 mm right upper lobe pulmonary nodule.   No follow-up recommended as below.   Sternotomy.         2017 Fleischner Society Recommendations for Single Solid Lung   Nodule Follow-Up based on size (average of long- and short-axis   diameters)      <6 mm Low-Risk Patient: No routine follow-up    <6 mm High-Risk Patient: Optional CT at 12 months      6-8 mm Low-Risk Patient: CT at 6-12 months then consider CT at   18-24 months   6-8 mm High-Risk Patient: CT at 6-12 months then CT at 18-24   months      >8 mm Low-Risk Patient: Consider CT, PET/CT or tissue sampling at   3 months   >8 mm High-Risk Patient: Same as for low-risk patient       [id number]      Electronically signed by:  Emmanuel Burris MD  7/8/2020 7:36 PM CDT   Workstation: [id number]      XR Chest 1 View   Final Result   CONCLUSION:   Bilateral linear atelectasis or scar.   Sternotomy.      [id number]      Electronically signed by:  Emmanuel Burris MD  7/8/2020 7:12 PM CDT   Workstation: [id number]      XR Hip With or Without Pelvis 2 - 3 View Left   Final Result   CONCLUSION:   Comminuted minimally displaced transcervical fracture left hip.      [id number]      Electronically signed by:  Emmanuel Burris MD  7/8/2020 7:14 PM CDT   Workstation: [id number]        Patient with EKG unchanged from July 2019.  No bradycardia or acute dysrhythmia noted.  Patient with significant history of syncope  lately, was recommended purportedly for pacemaker placement by his cardiologist.  Patient now with a left transcervical hip fracture.  Case discussed with orthopedics.  Patient be admitted for cardiology consultation preop and surgical management.          MDM    Final diagnoses:   Syncope and collapse   Facial laceration, initial encounter   Closed fracture of left hip, initial encounter (CMS/[facility])   Fall, initial encounter   Closed head injury, initial encounter            Esequiel Sweeney MD  07/08/20 2008

## 2020-07-08 NOTE — ED NOTES
Pt presents to ED via EMS from home after a syncopal episode when standing. Pt C/O left hip pain when moving and has laceration above left eyebrow.     Josephine Elias RN  07/08/20 8773

## 2020-07-09 ENCOUNTER — ANESTHESIA (OUTPATIENT)
Dept: PERIOP | Facility: HOSPITAL | Age: 85
End: 2020-07-09

## 2020-07-09 ENCOUNTER — APPOINTMENT (OUTPATIENT)
Dept: CARDIOLOGY | Facility: HOSPITAL | Age: 85
End: 2020-07-09

## 2020-07-09 ENCOUNTER — ANESTHESIA EVENT (OUTPATIENT)
Dept: PERIOP | Facility: HOSPITAL | Age: 85
End: 2020-07-09

## 2020-07-09 PROBLEM — Z82.49 FAMILY HISTORY OF CABG: Status: RESOLVED | Noted: 2019-07-05 | Resolved: 2020-07-09

## 2020-07-09 PROBLEM — S72.002A CLOSED FRACTURE OF NECK OF LEFT FEMUR (HCC): Status: ACTIVE | Noted: 2020-07-09

## 2020-07-09 PROBLEM — I50.20 HFREF (HEART FAILURE WITH REDUCED EJECTION FRACTION) (HCC): Status: ACTIVE | Noted: 2020-07-09

## 2020-07-09 PROBLEM — S01.81XA FACIAL LACERATION: Status: ACTIVE | Noted: 2020-07-09

## 2020-07-09 PROBLEM — I25.10 CORONARY ARTERY DISEASE WITH HISTORY OF MYOCARDIAL INFARCTION WITHOUT HISTORY OF CABG: Status: ACTIVE | Noted: 2020-07-09

## 2020-07-09 PROBLEM — I25.2 CORONARY ARTERY DISEASE WITH HISTORY OF MYOCARDIAL INFARCTION WITHOUT HISTORY OF CABG: Status: ACTIVE | Noted: 2020-07-09

## 2020-07-09 LAB
ANION GAP SERPL CALCULATED.3IONS-SCNC: 11 MMOL/L (ref 5–15)
ARTERIAL PATENCY WRIST A: NEGATIVE
ATMOSPHERIC PRESS: 745 MMHG
BASE EXCESS BLDA CALC-SCNC: 0 MMOL/L (ref 0–2)
BASOPHILS # BLD AUTO: 0.11 10*3/MM3 (ref 0–0.2)
BASOPHILS NFR BLD AUTO: 1 % (ref 0–1.5)
BDY SITE: ABNORMAL
BH CV ECHO MEAS - ACS: 1.8 CM
BH CV ECHO MEAS - AI DEC SLOPE: 108 CM/SEC^2
BH CV ECHO MEAS - AI MAX PG: 46.8 MMHG
BH CV ECHO MEAS - AI MAX VEL: 342 CM/SEC
BH CV ECHO MEAS - AI P1/2T: 927.5 MSEC
BH CV ECHO MEAS - AO MAX PG (FULL): 2.9 MMHG
BH CV ECHO MEAS - AO MAX PG: 8.6 MMHG
BH CV ECHO MEAS - AO MEAN PG (FULL): 2 MMHG
BH CV ECHO MEAS - AO MEAN PG: 5 MMHG
BH CV ECHO MEAS - AO ROOT AREA (BSA CORRECTED): 1.8
BH CV ECHO MEAS - AO ROOT AREA: 8.6 CM^2
BH CV ECHO MEAS - AO ROOT DIAM: 3.3 CM
BH CV ECHO MEAS - AO V2 MAX: 147 CM/SEC
BH CV ECHO MEAS - AO V2 MEAN: 110 CM/SEC
BH CV ECHO MEAS - AO V2 VTI: 24.7 CM
BH CV ECHO MEAS - AVA(I,A): 2.7 CM^2
BH CV ECHO MEAS - AVA(I,D): 2.7 CM^2
BH CV ECHO MEAS - AVA(V,A): 3.1 CM^2
BH CV ECHO MEAS - AVA(V,D): 3.1 CM^2
BH CV ECHO MEAS - BSA(HAYCOCK): 1.8 M^2
BH CV ECHO MEAS - BSA: 1.9 M^2
BH CV ECHO MEAS - BZI_BMI: 19.7 KILOGRAMS/M^2
BH CV ECHO MEAS - BZI_METRIC_HEIGHT: 182.9 CM
BH CV ECHO MEAS - BZI_METRIC_WEIGHT: 65.8 KG
BH CV ECHO MEAS - EDV(CUBED): 130.3 ML
BH CV ECHO MEAS - EDV(MOD-SP2): 92.9 ML
BH CV ECHO MEAS - EDV(MOD-SP4): 81.3 ML
BH CV ECHO MEAS - EDV(TEICH): 122.1 ML
BH CV ECHO MEAS - EF(CUBED): 31.9 %
BH CV ECHO MEAS - EF(MOD-SP2): 14.7 %
BH CV ECHO MEAS - EF(MOD-SP4): 23.9 %
BH CV ECHO MEAS - EF(TEICH): 25.9 %
BH CV ECHO MEAS - ESV(CUBED): 88.7 ML
BH CV ECHO MEAS - ESV(MOD-SP2): 79.2 ML
BH CV ECHO MEAS - ESV(MOD-SP4): 61.9 ML
BH CV ECHO MEAS - ESV(TEICH): 90.5 ML
BH CV ECHO MEAS - FS: 12 %
BH CV ECHO MEAS - IVS/LVPW: 1.3
BH CV ECHO MEAS - IVSD: 1.2 CM
BH CV ECHO MEAS - LA DIMENSION: 3.8 CM
BH CV ECHO MEAS - LA/AO: 1.2
BH CV ECHO MEAS - LV DIASTOLIC VOL/BSA (35-75): 43.8 ML/M^2
BH CV ECHO MEAS - LV MASS(C)D: 199.4 GRAMS
BH CV ECHO MEAS - LV MASS(C)DI: 107.3 GRAMS/M^2
BH CV ECHO MEAS - LV MAX PG: 5.8 MMHG
BH CV ECHO MEAS - LV MEAN PG: 3 MMHG
BH CV ECHO MEAS - LV SYSTOLIC VOL/BSA (12-30): 33.3 ML/M^2
BH CV ECHO MEAS - LV V1 MAX: 120 CM/SEC
BH CV ECHO MEAS - LV V1 MEAN: 72.9 CM/SEC
BH CV ECHO MEAS - LV V1 VTI: 17.6 CM
BH CV ECHO MEAS - LVIDD: 5.1 CM
BH CV ECHO MEAS - LVIDS: 4.5 CM
BH CV ECHO MEAS - LVLD AP2: 7.9 CM
BH CV ECHO MEAS - LVLD AP4: 8 CM
BH CV ECHO MEAS - LVLS AP2: 7.3 CM
BH CV ECHO MEAS - LVLS AP4: 7.3 CM
BH CV ECHO MEAS - LVOT AREA (M): 3.8 CM^2
BH CV ECHO MEAS - LVOT AREA: 3.8 CM^2
BH CV ECHO MEAS - LVOT DIAM: 2.2 CM
BH CV ECHO MEAS - LVPWD: 0.92 CM
BH CV ECHO MEAS - MR MAX PG: 106.1 MMHG
BH CV ECHO MEAS - MR MAX VEL: 515 CM/SEC
BH CV ECHO MEAS - MV DEC SLOPE: 1050 CM/SEC^2
BH CV ECHO MEAS - MV E MAX VEL: 145 CM/SEC
BH CV ECHO MEAS - MV P1/2T MAX VEL: 181 CM/SEC
BH CV ECHO MEAS - MV P1/2T: 50.5 MSEC
BH CV ECHO MEAS - MVA P1/2T LCG: 1.2 CM^2
BH CV ECHO MEAS - MVA(P1/2T): 4.4 CM^2
BH CV ECHO MEAS - PA MAX PG (FULL): 2.3 MMHG
BH CV ECHO MEAS - PA MAX PG: 4.4 MMHG
BH CV ECHO MEAS - PA V2 MAX: 105 CM/SEC
BH CV ECHO MEAS - RAP SYSTOLE: 5 MMHG
BH CV ECHO MEAS - RV MAX PG: 2.1 MMHG
BH CV ECHO MEAS - RV MEAN PG: 1 MMHG
BH CV ECHO MEAS - RV V1 MAX: 73.2 CM/SEC
BH CV ECHO MEAS - RV V1 MEAN: 49.2 CM/SEC
BH CV ECHO MEAS - RV V1 VTI: 14.3 CM
BH CV ECHO MEAS - RVDD: 3 CM
BH CV ECHO MEAS - RVSP: 40.8 MMHG
BH CV ECHO MEAS - SI(AO): 113.7 ML/M^2
BH CV ECHO MEAS - SI(CUBED): 22.4 ML/M^2
BH CV ECHO MEAS - SI(LVOT): 36 ML/M^2
BH CV ECHO MEAS - SI(MOD-SP2): 7.4 ML/M^2
BH CV ECHO MEAS - SI(MOD-SP4): 10.4 ML/M^2
BH CV ECHO MEAS - SI(TEICH): 17 ML/M^2
BH CV ECHO MEAS - SV(AO): 211.3 ML
BH CV ECHO MEAS - SV(CUBED): 41.6 ML
BH CV ECHO MEAS - SV(LVOT): 66.9 ML
BH CV ECHO MEAS - SV(MOD-SP2): 13.7 ML
BH CV ECHO MEAS - SV(MOD-SP4): 19.4 ML
BH CV ECHO MEAS - SV(TEICH): 31.6 ML
BH CV ECHO MEAS - TR MAX VEL: 299 CM/SEC
BUN SERPL-MCNC: 16 MG/DL (ref 8–23)
BUN/CREAT SERPL: 18 (ref 7–25)
CALCIUM SPEC-SCNC: 9.1 MG/DL (ref 8.6–10.5)
CHLORIDE SERPL-SCNC: 103 MMOL/L (ref 98–107)
CO2 SERPL-SCNC: 24 MMOL/L (ref 22–29)
CREAT SERPL-MCNC: 0.89 MG/DL (ref 0.76–1.27)
DEPRECATED RDW RBC AUTO: 49.1 FL (ref 37–54)
EOSINOPHIL # BLD AUTO: 0.42 10*3/MM3 (ref 0–0.4)
EOSINOPHIL NFR BLD AUTO: 3.7 % (ref 0.3–6.2)
ERYTHROCYTE [DISTWIDTH] IN BLOOD BY AUTOMATED COUNT: 14.5 % (ref 12.3–15.4)
GAS FLOW AIRWAY: 2 LPM
GFR SERPL CREATININE-BSD FRML MDRD: 81 ML/MIN/1.73
GLUCOSE BLDC GLUCOMTR-MCNC: 119 MG/DL (ref 70–130)
GLUCOSE SERPL-MCNC: 109 MG/DL (ref 65–99)
HCO3 BLDA-SCNC: 23.8 MMOL/L (ref 20–26)
HCT VFR BLD AUTO: 35.9 % (ref 37.5–51)
HGB BLD-MCNC: 12.2 G/DL (ref 13–17.7)
IMM GRANULOCYTES # BLD AUTO: 0.05 10*3/MM3 (ref 0–0.05)
IMM GRANULOCYTES NFR BLD AUTO: 0.4 % (ref 0–0.5)
LV EF 2D ECHO EST: 27 %
LYMPHOCYTES # BLD AUTO: 1.23 10*3/MM3 (ref 0.7–3.1)
LYMPHOCYTES NFR BLD AUTO: 10.9 % (ref 19.6–45.3)
Lab: ABNORMAL
MAXIMAL PREDICTED HEART RATE: 136 BPM
MCH RBC QN AUTO: 31.4 PG (ref 26.6–33)
MCHC RBC AUTO-ENTMCNC: 34 G/DL (ref 31.5–35.7)
MCV RBC AUTO: 92.5 FL (ref 79–97)
MODALITY: ABNORMAL
MONOCYTES # BLD AUTO: 0.68 10*3/MM3 (ref 0.1–0.9)
MONOCYTES NFR BLD AUTO: 6 % (ref 5–12)
NEUTROPHILS NFR BLD AUTO: 78 % (ref 42.7–76)
NEUTROPHILS NFR BLD AUTO: 8.79 10*3/MM3 (ref 1.7–7)
NRBC BLD AUTO-RTO: 0 /100 WBC (ref 0–0.2)
PCO2 BLDA: 35 MM HG (ref 35–45)
PH BLDA: 7.44 PH UNITS (ref 7.35–7.45)
PLATELET # BLD AUTO: 175 10*3/MM3 (ref 140–450)
PMV BLD AUTO: 10.3 FL (ref 6–12)
PO2 BLDA: 60.1 MM HG (ref 83–108)
POTASSIUM SERPL-SCNC: 3.6 MMOL/L (ref 3.5–5.2)
RBC # BLD AUTO: 3.88 10*6/MM3 (ref 4.14–5.8)
SAO2 % BLDCOA: 92.3 % (ref 94–99)
SARS-COV-2 N GENE RESP QL NAA+PROBE: NOT DETECTED
SODIUM SERPL-SCNC: 138 MMOL/L (ref 136–145)
STRESS TARGET HR: 116 BPM
TROPONIN T SERPL-MCNC: <0.01 NG/ML (ref 0–0.03)
TROPONIN T SERPL-MCNC: <0.01 NG/ML (ref 0–0.03)
VENTILATOR MODE: ABNORMAL
WBC # BLD AUTO: 11.28 10*3/MM3 (ref 3.4–10.8)

## 2020-07-09 PROCEDURE — 36415 COLL VENOUS BLD VENIPUNCTURE: CPT | Performed by: EMERGENCY MEDICINE

## 2020-07-09 PROCEDURE — 25010000002 MORPHINE PER 10 MG: Performed by: INTERNAL MEDICINE

## 2020-07-09 PROCEDURE — 87635 SARS-COV-2 COVID-19 AMP PRB: CPT | Performed by: ORTHOPAEDIC SURGERY

## 2020-07-09 PROCEDURE — 84484 ASSAY OF TROPONIN QUANT: CPT | Performed by: EMERGENCY MEDICINE

## 2020-07-09 PROCEDURE — 93306 TTE W/DOPPLER COMPLETE: CPT

## 2020-07-09 PROCEDURE — 99222 1ST HOSP IP/OBS MODERATE 55: CPT | Performed by: INTERNAL MEDICINE

## 2020-07-09 PROCEDURE — 25010000002 PROPOFOL 10 MG/ML EMULSION: Performed by: NURSE ANESTHETIST, CERTIFIED REGISTERED

## 2020-07-09 PROCEDURE — L1830 KO IMMOB CANVAS LONG PRE OTS: HCPCS | Performed by: ORTHOPAEDIC SURGERY

## 2020-07-09 PROCEDURE — 93306 TTE W/DOPPLER COMPLETE: CPT | Performed by: INTERNAL MEDICINE

## 2020-07-09 PROCEDURE — C1776 JOINT DEVICE (IMPLANTABLE): HCPCS | Performed by: ORTHOPAEDIC SURGERY

## 2020-07-09 PROCEDURE — 25010000002 NALOXONE PER 1 MG: Performed by: NURSE PRACTITIONER

## 2020-07-09 PROCEDURE — 0SRS0JA REPLACEMENT OF LEFT HIP JOINT, FEMORAL SURFACE WITH SYNTHETIC SUBSTITUTE, UNCEMENTED, OPEN APPROACH: ICD-10-PCS | Performed by: ORTHOPAEDIC SURGERY

## 2020-07-09 PROCEDURE — 25010000002 HEPARIN (PORCINE) PER 1000 UNITS: Performed by: INTERNAL MEDICINE

## 2020-07-09 PROCEDURE — 88311 DECALCIFY TISSUE: CPT

## 2020-07-09 PROCEDURE — 80048 BASIC METABOLIC PNL TOTAL CA: CPT | Performed by: INTERNAL MEDICINE

## 2020-07-09 PROCEDURE — 88305 TISSUE EXAM BY PATHOLOGIST: CPT

## 2020-07-09 PROCEDURE — 36600 WITHDRAWAL OF ARTERIAL BLOOD: CPT

## 2020-07-09 PROCEDURE — C9803 HOPD COVID-19 SPEC COLLECT: HCPCS | Performed by: ORTHOPAEDIC SURGERY

## 2020-07-09 PROCEDURE — 27236 TREAT THIGH FRACTURE: CPT | Performed by: ORTHOPAEDIC SURGERY

## 2020-07-09 PROCEDURE — 94799 UNLISTED PULMONARY SVC/PX: CPT

## 2020-07-09 PROCEDURE — 99221 1ST HOSP IP/OBS SF/LOW 40: CPT | Performed by: ORTHOPAEDIC SURGERY

## 2020-07-09 PROCEDURE — 82803 BLOOD GASES ANY COMBINATION: CPT

## 2020-07-09 PROCEDURE — 85025 COMPLETE CBC W/AUTO DIFF WBC: CPT | Performed by: INTERNAL MEDICINE

## 2020-07-09 PROCEDURE — 82962 GLUCOSE BLOOD TEST: CPT

## 2020-07-09 PROCEDURE — 25010000002 NALOXONE PER 1 MG: Performed by: INTERNAL MEDICINE

## 2020-07-09 DEVICE — SELF CENTERING BI-POLAR HEAD 28MM ID 52MM OD
Type: IMPLANTABLE DEVICE | Site: HIP | Status: FUNCTIONAL
Brand: SELF CENTERING

## 2020-07-09 DEVICE — IMPLANTABLE DEVICE: Type: IMPLANTABLE DEVICE | Status: FUNCTIONAL

## 2020-07-09 DEVICE — SUMMIT FEMORAL STEM 12/14 TAPER TAPERED W/DUOFIX HA SIZE 6 STD 150MM
Type: IMPLANTABLE DEVICE | Site: HIP | Status: FUNCTIONAL
Brand: SUMMIT

## 2020-07-09 DEVICE — ARTICUL/EZE FEMORAL HEAD DIAMETER 28MM +1.5 12/14 TAPER
Type: IMPLANTABLE DEVICE | Site: HIP | Status: FUNCTIONAL
Brand: ARTICUL/EZE

## 2020-07-09 RX ORDER — MORPHINE SULFATE 2 MG/ML
1 INJECTION, SOLUTION INTRAMUSCULAR; INTRAVENOUS EVERY 4 HOURS PRN
Status: ACTIVE | OUTPATIENT
Start: 2020-07-09 | End: 2020-07-19

## 2020-07-09 RX ORDER — ONDANSETRON 2 MG/ML
4 INJECTION INTRAMUSCULAR; INTRAVENOUS ONCE AS NEEDED
Status: DISCONTINUED | OUTPATIENT
Start: 2020-07-09 | End: 2020-07-09 | Stop reason: HOSPADM

## 2020-07-09 RX ORDER — BUPIVACAINE HCL/0.9 % NACL/PF 0.1 %
2 PLASTIC BAG, INJECTION (ML) EPIDURAL ONCE
Status: DISCONTINUED | OUTPATIENT
Start: 2020-07-09 | End: 2020-07-09 | Stop reason: HOSPADM

## 2020-07-09 RX ORDER — TRAMADOL HYDROCHLORIDE 50 MG/1
25 TABLET ORAL EVERY 6 HOURS PRN
Status: DISPENSED | OUTPATIENT
Start: 2020-07-09 | End: 2020-07-19

## 2020-07-09 RX ORDER — PROMETHAZINE HYDROCHLORIDE 25 MG/1
25 SUPPOSITORY RECTAL ONCE AS NEEDED
Status: DISCONTINUED | OUTPATIENT
Start: 2020-07-09 | End: 2020-07-09 | Stop reason: HOSPADM

## 2020-07-09 RX ORDER — PROMETHAZINE HYDROCHLORIDE 25 MG/ML
12.5 INJECTION, SOLUTION INTRAMUSCULAR; INTRAVENOUS ONCE AS NEEDED
Status: DISCONTINUED | OUTPATIENT
Start: 2020-07-09 | End: 2020-07-09 | Stop reason: HOSPADM

## 2020-07-09 RX ORDER — SODIUM CHLORIDE 9 MG/ML
1000 INJECTION, SOLUTION INTRAVENOUS CONTINUOUS
Status: DISPENSED | OUTPATIENT
Start: 2020-07-09 | End: 2020-07-11

## 2020-07-09 RX ORDER — NALOXONE HCL 0.4 MG/ML
0.4 VIAL (ML) INJECTION
Status: DISCONTINUED | OUTPATIENT
Start: 2020-07-09 | End: 2020-07-22 | Stop reason: HOSPADM

## 2020-07-09 RX ORDER — ACETAMINOPHEN 500 MG
1000 TABLET ORAL 4 TIMES DAILY
Status: DISCONTINUED | OUTPATIENT
Start: 2020-07-09 | End: 2020-07-22 | Stop reason: HOSPADM

## 2020-07-09 RX ORDER — SODIUM CHLORIDE, SODIUM GLUCONATE, SODIUM ACETATE, POTASSIUM CHLORIDE, AND MAGNESIUM CHLORIDE 526; 502; 368; 37; 30 MG/100ML; MG/100ML; MG/100ML; MG/100ML; MG/100ML
INJECTION, SOLUTION INTRAVENOUS CONTINUOUS PRN
Status: DISCONTINUED | OUTPATIENT
Start: 2020-07-09 | End: 2020-07-09 | Stop reason: SURG

## 2020-07-09 RX ORDER — BUPIVACAINE HYDROCHLORIDE 7.5 MG/ML
INJECTION, SOLUTION EPIDURAL; RETROBULBAR
Status: COMPLETED | OUTPATIENT
Start: 2020-07-09 | End: 2020-07-09

## 2020-07-09 RX ORDER — MEPERIDINE HYDROCHLORIDE 25 MG/ML
12.5 INJECTION INTRAMUSCULAR; INTRAVENOUS; SUBCUTANEOUS
Status: DISCONTINUED | OUTPATIENT
Start: 2020-07-09 | End: 2020-07-09 | Stop reason: HOSPADM

## 2020-07-09 RX ORDER — KETOROLAC TROMETHAMINE 15 MG/ML
15 INJECTION, SOLUTION INTRAMUSCULAR; INTRAVENOUS EVERY 6 HOURS PRN
Status: DISCONTINUED | OUTPATIENT
Start: 2020-07-09 | End: 2020-07-09

## 2020-07-09 RX ORDER — SODIUM CHLORIDE 0.9 % (FLUSH) 0.9 %
10 SYRINGE (ML) INJECTION AS NEEDED
Status: DISCONTINUED | OUTPATIENT
Start: 2020-07-09 | End: 2020-07-09 | Stop reason: HOSPADM

## 2020-07-09 RX ORDER — PROMETHAZINE HYDROCHLORIDE 25 MG/1
25 TABLET ORAL ONCE AS NEEDED
Status: DISCONTINUED | OUTPATIENT
Start: 2020-07-09 | End: 2020-07-09 | Stop reason: HOSPADM

## 2020-07-09 RX ADMIN — SODIUM CHLORIDE 1000 ML: 900 INJECTION, SOLUTION INTRAVENOUS at 13:39

## 2020-07-09 RX ADMIN — PROPOFOL 30 MCG/KG/MIN: 10 INJECTION, EMULSION INTRAVENOUS at 14:18

## 2020-07-09 RX ADMIN — ACETAMINOPHEN 1000 MG: 500 TABLET ORAL at 18:26

## 2020-07-09 RX ADMIN — BUPIVACAINE HYDROCHLORIDE 2 ML: 7.5 INJECTION, SOLUTION EPIDURAL; RETROBULBAR at 14:51

## 2020-07-09 RX ADMIN — SODIUM CHLORIDE 1000 ML: 900 INJECTION, SOLUTION INTRAVENOUS at 21:01

## 2020-07-09 RX ADMIN — NALOXONE HYDROCHLORIDE 0.4 MG: 0.4 INJECTION, SOLUTION INTRAMUSCULAR; INTRAVENOUS; SUBCUTANEOUS at 06:20

## 2020-07-09 RX ADMIN — MORPHINE SULFATE 1 MG: 2 INJECTION, SOLUTION INTRAMUSCULAR; INTRAVENOUS at 04:27

## 2020-07-09 RX ADMIN — ACETAMINOPHEN 1000 MG: 500 TABLET ORAL at 20:57

## 2020-07-09 RX ADMIN — SODIUM CHLORIDE, SODIUM GLUCONATE, SODIUM ACETATE, POTASSIUM CHLORIDE, AND MAGNESIUM CHLORIDE: 526; 502; 368; 37; 30 INJECTION, SOLUTION INTRAVENOUS at 15:09

## 2020-07-09 RX ADMIN — Medication 2.5 MG: at 20:57

## 2020-07-09 RX ADMIN — NALOXONE HYDROCHLORIDE 0.4 MG: 0.4 INJECTION, SOLUTION INTRAMUSCULAR; INTRAVENOUS; SUBCUTANEOUS at 08:34

## 2020-07-09 RX ADMIN — HEPARIN SODIUM 5000 UNITS: 5000 INJECTION INTRAVENOUS; SUBCUTANEOUS at 06:00

## 2020-07-09 RX ADMIN — NALOXONE HYDROCHLORIDE 0.4 MG: 0.4 INJECTION, SOLUTION INTRAMUSCULAR; INTRAVENOUS; SUBCUTANEOUS at 08:41

## 2020-07-09 RX ADMIN — SODIUM CHLORIDE, PRESERVATIVE FREE 10 ML: 5 INJECTION INTRAVENOUS at 20:57

## 2020-07-09 RX ADMIN — SODIUM CHLORIDE, PRESERVATIVE FREE 10 ML: 5 INJECTION INTRAVENOUS at 08:15

## 2020-07-09 NOTE — ANESTHESIA POSTPROCEDURE EVALUATION
Patient: Jose Dacosta    Procedure Summary     Date:  07/09/20 Room / Location:  [facility] OR  / [facility] OR    Anesthesia Start:  1353 Anesthesia Stop:  1546    Procedure:  LEFT HIP HEMIARTHROPLASTY (Left Hip) Diagnosis:       Closed fracture of neck of left femur, initial encounter (CMS/[facility])      (Closed fracture of neck of left femur, initial encounter (CMS/[facility]) [S72.002A])    Surgeon:  Jitendra Solis MD Provider:  Misha Reveles MD    Anesthesia Type:  general, spinal, MAC ASA Status:  4 - Emergent          Anesthesia Type: general, spinal, MAC    Vitals  No vitals data found for the desired time range.          Post Anesthesia Care and Evaluation    Patient location during evaluation: PACU  Patient participation: waiting for patient participation  Level of consciousness: responsive to light touch, responsive to noxious stimuli and sleepy but conscious  Pain management: adequate  Airway patency: patent  Anesthetic complications: No anesthetic complications  PONV Status: none  Cardiovascular status: acceptable  Respiratory status: acceptable  Hydration status: acceptable

## 2020-07-09 NOTE — NURSING NOTE
Called to ER for breathing treatment. Upon arrival patients sats had improved and O2 was turned down to 2lpm with sats remaining high. Pt stated his breathing was fine. RN will call if anything changes and Patient needs treatment.

## 2020-07-09 NOTE — PLAN OF CARE
Problem: Patient Care Overview  Goal: Plan of Care Review  Outcome: Ongoing (interventions implemented as appropriate)  Flowsheets (Taken 7/9/2020 0003)  Progress: no change  Plan of Care Reviewed With: patient  Outcome Summary: New admit to the floor. Complains of hip pain, treated with PRN morphine. Vitals stable, will continue to monitor.

## 2020-07-09 NOTE — PROGRESS NOTES
Adult Nutrition  Assessment    Patient Name:  Jose Dacosta  YOB: 1935  MRN: [id number]  Admit Date:  7/8/2020    Assessment Date:  7/9/2020    Comments:  84yo male s/p syncope episode w/ collapse resulting in left hip fracture. H/o CAD. Nurse notes pt received morphine and has been lethargic and difficult to arouse. Ortho has been consulted. Reg diet, no intakes. Per Epic wt 7/2019 155# and currently 145#, BMI 19.6- noted 10# loss/ 1 year. RD to follow hospital course.    Reason for Assessment     Row Name 07/09/20 0940          Reason for Assessment    Reason For Assessment  identified at risk by screening criteria     Diagnosis  trauma     Identified At Risk by Screening Criteria  -- BMI 19.6 w. 10# wt /yr per EPIC         Nutrition/Diet History     Row Name 07/09/20 0940          Nutrition/Diet History    Typical Food/Fluid Intake  Pt not able to awaken to answer RD ?. Nurse states he had dose of morphine and has been lethergic, difficult to arouse.         Anthropometrics     Row Name 07/09/20 0422          Anthropometrics    Weight  65.9 kg (145 lb 3.2 oz)         Labs/Tests/Procedures/Meds     Row Name 07/09/20 0941          Labs/Procedures/Meds    Lab Results Reviewed  reviewed     Lab Results Comments  Glu 89/119, Alb 4.1        Diagnostic Tests/Procedures    Diagnostic Test/Procedure Reviewed  reviewed     Diagnostic Test/Procedures Comments  lt hio fracture- XR, CT head/cervical spince, CXR        Medications    Pertinent Medications Reviewed  reviewed           Estimated/Assessed Needs     Row Name 07/09/20 0918          Calculation Measurements    Weight Used For Calculations  65.8 kg (145 lb) 145#        Estimated/Assessed Needs    Additional Documentation  Fluid Requirements (Group);Protein Requirements (Group);Calorie Requirements (Group);KCAL/KG (Group)        Calorie Requirements    Estimated Calorie Requirement (kcal/day)  1900        KCAL/KG    KCAL/KG  25 Kcal/Kg (kcal);30  Kcal/Kg (kcal)     25 Kcal/Kg (kcal)  1644.3     30 Kcal/Kg (kcal)  1973.16        Protein Requirements    Weight Used For Protein Calculations  65.8 kg (145 lb)     Est Protein Requirement Amount (gms/kg)  1.0 gm protein     Estimated Protein Requirements (gms/day)  65.77        Fluid Requirements    Estimated Fluid Requirements (mL/day)  1800     RDA Method (mL)  1800         Nutrition Prescription Ordered     Row Name 07/09/20 0942          Nutrition Prescription PO    Current PO Diet  Regular         Evaluation of Received Nutrient/Fluid Intake     Row Name 07/09/20 0942          PO Evaluation    Number of Days PO Intake Evaluated  Insufficient Data               Electronically signed by:  Viri Bentley RD  07/09/20 09:43

## 2020-07-09 NOTE — H&P
"      [facility] Medicine Admission      Date of Admission: 7/8/2020      Primary Care Physician: Terry Peterson MD      Chief Complaint: I fell and messed up my face    HPI: Briefly this is a 85-year-old gentleman who presents the ED today trauma apparent with patient presenting caring the following problem    1.  Hypertension  2.  Dyslipidemia  3.  History of gout  4.  Chronic pain  5.  Note anaphylaxis from hydrochlorothiazide with tongue swelling  6.  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  He had no loss of bowel or bladder.  He had no post event confusion and most likely a is a vagal event however patient was told in the past he needs to have a pacemaker is deferred in the past will certainly need to monito.  Regardless with patient's fall he had a trauma event triggered with patient cleared the head until hands reveal a hip fracture therefore we are asked to admit and have medical management.  Of note as above patient was told need to have a pacemaker in the past certainly will monitor.     Concurrent Medical History:  has a past medical history of Abnormal ECG.    Past Surgical History:  has a past surgical history that includes Coronary stent placement.    Family History: family history includes Heart failure in his father and mother.    Social History:  reports that he quit smoking about 30 years ago. He has never used smokeless tobacco. He reports that he does not drink alcohol or use drugs.    Allergies:   Allergies   Allergen Reactions   • Hydrochlorothiazide Swelling     TONGUE SWELLING       Medications:   Prior to Admission medications    Medication Sig Start Date End Date Taking? Authorizing Provider   albuterol sulfate  (90 Base) MCG/ACT inhaler INHALE 2 PUFFS INTO THE " LUNGS EVERY 6 (SIX) HOURS AS NEEDED FOR WHEEZING 4/8/19   Marcial Crain MD   amitriptyline (ELAVIL) 100 MG tablet Take 100 mg by mouth every night at bedtime. 4/8/19   Marcial Crain MD   aspirin 81 MG EC tablet Take 1 tablet by mouth Daily. 7/5/19   Júnior Licona MD   atorvastatin (LIPITOR) 20 MG tablet Take 1 tablet by mouth.    Marcial Crain MD   COLCRYS 0.6 MG tablet Take 0.6 mg by mouth Daily. 7/5/19   Marcial Crain MD   gabapentin (NEURONTIN) 600 MG tablet TAKE 1 TABLET BY MOUTH 3 (THREE) TIMES DAILY 6/27/19   Marcial Crain MD   losartan (COZAAR) 25 MG tablet TAKE 1 TABLET BY MOUTH EVERY DAY 12/30/19   Bessy Duval MD   meloxicam (MOBIC) 15 MG tablet Take 15 mg by mouth Daily. 6/27/19   Marcial Crain MD   metoprolol succinate XL (TOPROL-XL) 25 MG 24 hr tablet TAKE 1 TABLET BY MOUTH EVERY DAY 12/16/19   Júnior Licona MD   omeprazole (priLOSEC) 40 MG capsule Take 40 mg by mouth Every Morning. 6/27/19   Marcial Crain MD   rOPINIRole (REQUIP) 0.5 MG tablet Take 0.5 mg by mouth Daily. 5/3/19   Marcial Crain MD   tolterodine (DETROL) 2 MG tablet Take 2 mg by mouth 2 (Two) Times a Day.    Marcial Crain MD       Review of Systems:  Review of Systems   Constitutional: Positive for activity change.   HENT: Negative.    Eyes: Negative.    Respiratory: Negative.    Cardiovascular: Negative.    Gastrointestinal: Negative.    Endocrine: Negative.    Genitourinary: Negative.    Musculoskeletal: Positive for back pain.   Skin: Negative.    Allergic/Immunologic: Negative.    Neurological: Negative.         Has chronic nystagmus   Hematological: Negative.    Psychiatric/Behavioral: Negative.       Otherwise complete ROS is negative except as mentioned above.    Physical Exam:   Temp:  [98 °F (36.7 °C)] 98 °F (36.7 °C)  Heart Rate:  [] 104  Resp:  [16-18] 18  BP: (140-194)/(83-95) 140/83  Physical Exam   Constitutional: He is  oriented to person, place, and time. He appears well-developed and well-nourished.   HENT:   Head: Normocephalic and atraumatic.   Eyes: Pupils are equal, round, and reactive to light. EOM are normal.   Neck: Normal range of motion. Neck supple.   Cardiovascular: Normal rate and regular rhythm.   Pulmonary/Chest: Effort normal.   Abdominal: Bowel sounds are normal.   Musculoskeletal: Normal range of motion.   Neurological: He is alert and oriented to person, place, and time.   Has chronic horizontal nystagmus   Skin: Capillary refill takes less than 2 seconds.   Psychiatric: He has a normal mood and affect.   Nursing note and vitals reviewed.        Results Reviewed:  I have personally reviewed current lab, radiology, and data and agree with results.  Lab Results (last 24 hours)     Procedure Component Value Units Date/Time    [place] Draw [[id number]] Collected:  07/08/20 1924    Specimen:  Blood Updated:  07/08/20 2030    Narrative:       The following orders were created for panel order [place] Draw.  Procedure                               Abnormality         Status                     ---------                               -----------         ------                     Light Blue Top[[id number]]                                   Final result               Green Top (Gel)[[id number]]                                  Final result               Lavender Top[[id number]]                                     Final result               Gold Top - SST[[id number]]                                   Final result                 Please view results for these tests on the individual orders.    Light Blue Top [[id number]] Collected:  07/08/20 1924    Specimen:  Blood Updated:  07/08/20 2030     Extra Tube hold for add-on     Comment: Auto resulted       Green Top (Gel) [[id number]] Collected:  07/08/20 1924    Specimen:  Blood Updated:  07/08/20 2030     Extra Tube Hold for add-ons.     Comment: Auto resulted.       Lavender Top  [[id number]] Collected:  07/08/20 1924    Specimen:  Blood Updated:  07/08/20 2030     Extra Tube hold for add-on     Comment: Auto resulted       Gold Top - SST [[id number]] Collected:  07/08/20 1924    Specimen:  Blood Updated:  07/08/20 2030     Extra Tube Hold for add-ons.     Comment: Auto resulted.       Comprehensive Metabolic Panel [[id number]] Collected:  07/08/20 1924    Specimen:  Blood Updated:  07/08/20 1952     Glucose 89 mg/dL      BUN 17 mg/dL      Creatinine 1.07 mg/dL      Sodium 139 mmol/L      Potassium 3.5 mmol/L      Chloride 104 mmol/L      CO2 23.0 mmol/L      Calcium 9.3 mg/dL      Total Protein 7.1 g/dL      Albumin 4.10 g/dL      ALT (SGPT) 22 U/L      AST (SGOT) 32 U/L      Alkaline Phosphatase 108 U/L      Total Bilirubin 0.2 mg/dL      eGFR Non African Amer 66 mL/min/1.73      Globulin 3.0 gm/dL      A/G Ratio 1.4 g/dL      BUN/Creatinine Ratio 15.9     Anion Gap 12.0 mmol/L     Narrative:       GFR Normal >60  Chronic Kidney Disease <60  Kidney Failure <15      Troponin [[id number]]  (Normal) Collected:  07/08/20 1924    Specimen:  Blood Updated:  07/08/20 1949     Troponin T <0.010 ng/mL     Narrative:       Troponin T Reference Range:  <= 0.03 ng/mL-   Negative for AMI  >0.03 ng/mL-     Abnormal for myocardial necrosis.  Clinicians would have to utilize clinical acumen, EKG, Troponin and serial changes to determine if it is an Acute Myocardial Infarction or myocardial injury due to an underlying chronic condition.       Results may be falsely decreased if patient taking Biotin.      BNP [[id number]]  (Normal) Collected:  07/08/20 1924    Specimen:  Blood Updated:  07/08/20 1949     proBNP 1,741.0 pg/mL     Narrative:       Among patients with dyspnea, NT-proBNP is highly sensitive for the detection of acute congestive heart failure. In addition NT-proBNP of <300 pg/ml effectively rules out acute congestive heart failure with 99% negative predictive value.    Results may be falsely  decreased if patient taking Biotin.      CBC & Differential [[id number]] Collected:  07/08/20 1924    Specimen:  Blood Updated:  07/08/20 1930    Narrative:       The following orders were created for panel order CBC & Differential.  Procedure                               Abnormality         Status                     ---------                               -----------         ------                     CBC Auto Differential[[id number]]        Abnormal            Final result                 Please view results for these tests on the individual orders.    CBC Auto Differential [[id number]]  (Abnormal) Collected:  07/08/20 1924    Specimen:  Blood Updated:  07/08/20 1930     WBC 10.40 10*3/mm3      RBC 3.96 10*6/mm3      Hemoglobin 12.4 g/dL      Hematocrit 36.8 %      MCV 92.9 fL      MCH 31.3 pg      MCHC 33.7 g/dL      RDW 14.6 %      RDW-SD 50.3 fl      MPV 10.0 fL      Platelets 203 10*3/mm3      Neutrophil % 74.3 %      Lymphocyte % 17.9 %      Monocyte % 3.9 %      Eosinophil % 2.8 %      Basophil % 0.7 %      Immature Grans % 0.4 %      Neutrophils, Absolute 7.73 10*3/mm3      Lymphocytes, Absolute 1.86 10*3/mm3      Monocytes, Absolute 0.41 10*3/mm3      Eosinophils, Absolute 0.29 10*3/mm3      Basophils, Absolute 0.07 10*3/mm3      Immature Grans, Absolute 0.04 10*3/mm3      nRBC 0.0 /100 WBC         Imaging Results (Last 24 Hours)     Procedure Component Value Units Date/Time    CT Cervical Spine Without Contrast [[id number]] Collected:  07/08/20 1850     Updated:  07/08/20 1937    Narrative:       CT cervical spine without contrast    HISTORY: Fell    Nonenhanced axial scans of the cervical spine were obtained.  Sagittal and coronal reconstructions were performed.    This exam was performed according to our departmental  dose-optimization program, which includes automated exposure  control, adjustment of the mA and/or kV according to patient size  and/or use of iterative reconstruction technique.    CT DLP:  454.30    Findings:  Normal cervical lordosis.   Vertebral height maintained.   No fracture identified.   Multilevel facet arthropathy with resultant minimal  retrolisthesis of C3 on C4 and minimal anterolisthesis of C4 on  C5 and C5 on C6.  Degenerative disc disease C6-7.    Chronic obstructive pulmonary disease.  5 mm right upper lobe pulmonary nodule.  Sternotomy.      Impression:       CONCLUSION:  No cervical fracture.  Multilevel facet arthropathy with resultant minimal  retrolisthesis of C3 on C4 and minimal anterolisthesis of C4 on  C5 and C5 on C6.  Degenerative disc disease C6-7.  Chronic obstructive pulmonary disease.  5 mm right upper lobe pulmonary nodule.  No follow-up recommended as below.  Sternotomy.      2017 Fleischner Society Recommendations for Single Solid Lung  Nodule Follow-Up based on size (average of long- and short-axis  diameters)    <6 mm Low-Risk Patient: No routine follow-up   <6 mm High-Risk Patient: Optional CT at 12 months    6-8 mm Low-Risk Patient: CT at 6-12 months then consider CT at  18-24 months  6-8 mm High-Risk Patient: CT at 6-12 months then CT at 18-24  months    >8 mm Low-Risk Patient: Consider CT, PET/CT or tissue sampling at  3 months  >8 mm High-Risk Patient: Same as for low-risk patient     [id number]    Electronically signed by:  Emmanuel Burris MD  7/8/2020 7:36 PM CDT  Workstation: [id number]    CT Head Without Contrast [[id number]] Collected:  07/08/20 1850     Updated:  07/08/20 1931    Narrative:         CT Head Without Contrast    History: Fell. Laceration left eyebrow.    Axial scans of the brain were obtained without intravenous  contrast.  Coronal and sagital reconstructions were preformed.    This exam was performed according to our departmental  dose-optimization program, which includes automated exposure  control, adjustment of the mA and/or kV according to patient size  and/or use of iterative reconstruction technique.    DLP: 964.00    Comparison:  None    Findings:  Bone windows are unremarkable.  The visualized paranasal sinuses are unremarkable.    No intracranial injury or acute process.  Cerebral and cerebellar atrophy.  Old lacunar infarcts in the basal ganglia.  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  Moderate small vessel disease.   No hemorrhage.  No mass.  No abnormal areas of increased attenuation.  No midline shift.  No abnormal extra-axial fluid collections.      Impression:       CONCLUSION:  No intracranial injury or acute process.  Cerebral and cerebellar atrophy.  Old lacunar infarcts in the basal ganglia.  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  Moderate small vessel disease.    [id number]    Electronically signed by:  Emmanuel Burris MD  7/8/2020 7:30 PM CDT  Workstation: [id number]    XR Hip With or Without Pelvis 2 - 3 View Left [[id number]] Collected:  07/08/20 1840     Updated:  07/08/20 1915    Narrative:         Two view left hip with single view pelvis    HISTORY: Pain after falling. Syncope and collapse.    AP and crosstable lateral lateral views of the left hip and AP  film of the pelvis obtained.    COMPARISON: None    FINDINGS:   Comminuted minimally displaced transcervical fracture left hip.  No dislocation of the femoral head.  Dextroscoliosis lumbar spine.  Multilevel degenerative disc disease lumbar spine..  No other osseous or articular abnormality.    Pelvic phleboliths.  Atherosclerotic calcification iliac and femoral arteries.      Impression:       CONCLUSION:  Comminuted minimally displaced transcervical fracture left hip.    [id number]    Electronically signed by:  Emmanuel Burris MD  7/8/2020 7:14 PM CDT  Workstation: [id number]    XR Chest 1 View [[id number]] Collected:  07/08/20 1840     Updated:  07/08/20 1913    Narrative:         PORTABLE CHEST    HISTORY: Chest pain. Syncope and collapse.    Portable AP supine film of the chest was obtained at 6:45 PM.  COMPARISON: None    FINDINGS:   EKG leads.  Bilateral linear atelectasis or  scar.  Sternotomy.  The heart is not enlarged.  The pulmonary vasculature is not increased.  No pleural effusion.  No pneumothorax.  No acute osseous abnormality.  Degenerative changes are present in the thoracic spine.      Impression:       CONCLUSION:  Bilateral linear atelectasis or scar.  Sternotomy.    [id number]    Electronically signed by:  Emmanuel Burris MD  7/8/2020 7:12 PM CDT  Workstation: [id number]            Assessment:    Active Hospital Problems    Diagnosis   • Syncope and collapse         1.  Syncope and collapse  - Negative trauma work-up except for hip fracture  - Monitor with history of pacemaker deferral  -Negative head CT    2.  Reported arrhythmia versus sick sinus syndrome  -Monitor    3.  Dyslipidemia  -Continue meds    4.  Hypertension  -Check orthostatics    5.  Elevated random blood sugar  -Check A1c      CODE STATUS patient is full code    Prophylaxis  - Subcu Lovenox    Patient with very minimal daily activities not able to tell if he is able to do 3 METS he certainly will need to have cardiology consult with syncopal event and told in the past he needs to have pacemaker certainly cannot rule out bradycardia etc.    Discussed with ED physician as well as ED nursing please see orders cardiology consult in the a.m.            Misha Parada MD

## 2020-07-09 NOTE — OP NOTE
Procedure(s):  LEFT HIP HEMIARTHROPLASTY  Procedure Note    Jose Dacosta  7/9/2020    Pre-op Diagnosis:   Closed fracture of neck of left femur, initial encounter (CMS/[facility]) [S72.002A]    Post-op Diagnosis:     Post-Op Diagnosis Codes:     * Closed fracture of neck of left femur, initial encounter (CMS/[facility]) [S72.002A]    Procedure/CPT® Codes:      Procedure(s):  LEFT HIP HEMIARTHROPLASTY/6 standard stem press-fit/52 mm bipolar head/+1.5 mm 28 mm head    Surgeon(s):  Jitendra Solis MD    Anesthesia: Spinal    Staff:   Circulator: Cathy Cano RN  Scrub Person: Nilo Gudino; Mayela Pereira  Assistant: Mehul Diaz; Margaret Mosquera MA    Estimated Blood Loss: 100ml    Specimens:                ID Type Source Tests Collected by Time   A (Not marked as sent) : Femoral head from left hip Bone Hip, Left TISSUE PATHOLOGY EXAM Jitendra Solis MD 7/9/2020 1519         Drains: * No LDAs found *    Findings: Femoral neck fracture    Complications: None    Dictation: Indications: 85-year-old household ambulator with multiple medical problems including significant cardiovascular issues and bradycardia apparently fainted fracture his left hip was admitted medicine service.  He is cleared by Dr. Licona, his cardiologist as well as the medicine team he is for hemiarthroplasty this time.  I discussed with the patient as well as his daughter the risk benefits of surgery including but not limited to bleeding, blood clot, infection, dislocation, limb length inequality, need for further surgery, and neurologic injury, fracture, medical anesthetic complications, etc.  The understand we will go ahead and proceed as planned.    Procedure:After adequate anesthesia was obtained the patient was placed with the appropriate side up, in a well-padded position.  The affected hip was then prepped and draped usual sterile fashion.  A surgical timeout was performed prior to procedure.    A curvilinear  incision was made over the trochanter, sharp dissection was carried through the skin into the soft tissue, electrocautery was used to control hemostasis. The tensor fascia was then split in line with the skin incision and a Charnley retractor was inserted.     Bleeding points controlled with electrocautery.  Care was taken to avoid neurovascular structures.  The external rotators were identified with the piriformis was taken off of the femoral neck revealing the capsule.  The capsule was then T'd with care to avoid injury to the labrum.  The hip was dislocated and the fracture was identified.  A femoral neck cut was then made 1 fingerbreadth above the lesser trochanter with a saw, the excess bone was removed.  The femur was retracted and the femoral head was removed.  The femoral head was sized and measured on the back table for the appropriate sized bipolar head.    The contents of the fovea were resected and bleeding points controlled with electrocautery.  Trial head ball was then placed into the socket and the appropriate head ball was chosen.    Next attention was paid to the femur, a De Leon retractor was inserted and the soft tissue was taken off the proximal femur and a rongeur was used to lateralize the bone the proximal femur.  Reaming was then taken up 1 mm at a time until we felt chatter in the canal.     Broaching was then taken up to appropriate size, with the broach we obtained good rotational stability.  We did a trial reduction with an appropriate neck and head ball and it was reduced, good stability was noted, good range of motion and length were noted as well.    At that point the prosthesis were chosen and opened on the back table.  The canal was irrigated copious amounts of saline solution and the prosthesis was impacted down to the appropriate length for the broach sat.    The calcar was inspected for fracture no fracture is noted.    The socket was examined  and again the head ball trial was  placed and trial reduction was performed.  Good stability and length were noted.  The head ball was then constructed back table and impacted onto the shaft.  Reduction was then at this point,good range of motion was again obtained.  After copious amounts of irrigation throughout the wound with Pulsavac.  The capsule was then repaired with #1 Ethibond suture interrupted fashion, the tensor fascia was repaired fashion #1 Vicryl, subcutaneous tissues closed interrupted 0 Vicryl and 2-0 Vicryl stitches, skin was closed with staples and a sterile dressing was applied, a knee immobilizer was applied, counts were correct. The patient tolerated the procedure well.    Jitendra Solis MD  07/09/20  15:32

## 2020-07-09 NOTE — CONSULTS
Cardiology at [facility]  Cardiovascular Consultation Note      Jose MCDANIEL OR/NONE  [id number]  1935    DATE OF ADMISSION: 7/8/2020  DATE OF CONSULTATION:  7/9/2020    Terry Peterson MD  Treatment Team:   Attending Provider: Misha Parada MD  Consulting Physician: Jitendra Solis MD  Consulting Physician: Júnior Licona MD  Nurse Practitioner: Lakhwinder Kumar APRN  Admitting Provider: Misha Parada MD    Chief Complaint   Patient presents with   • Syncope   • Head Laceration   • Hip Pain       Chief complaint/ Reason for Consultation; preop evaluations waiting for hip surgery with multiple comorbid condition ischemic cardiomyopathy, severe LV dysfunction with history of congestive heart failure and postural syncope      History of Present Illness    Patient's Body mass index is 19.69 kg/m². BMI is within normal parameters. No follow-up required.     85 years old patient admitted for evaluations of syncope postural mechanism from clinical description with a similar episode in the past.  The fall was complicated with hip fracture echocardiogram study was arranged reported ejection fraction 25 to 30% with global hypokinesis and waiting for hip surgery.  The patient was driving drowsy second to narcotics received Narcan able to communicate and unable to maintain arousal state the nurse was present in the room at the time of evaluation try to contact the family over the phone, unsuccessful the family friend who is a  was contacted through the phone which was listed in his medical record with a background history of echo stress test and Holter monitor.  Echo and stress test ejection fraction range of 20% fixed defect with no evidence of reversible ischemia and monitor sinus rhythm without any significant bradyarrhythmia and runs of nonsustained ventricular tachycardia and supraventricular arrhythmia.  Patient presented to discuss the finding  of cardiac evaluation.  The mechanism of syncope from clinical description possibility vasovagal orthostasis with arrhythmias underlying mechanism cannot be excluded and patient was scheduled to have had a tilt test but given the history of ischemia, LV dysfunction, congestive heart failure and documented nonsustained ventricular tachycardia ICD was recommended.  Patient initially interested in ICD at the time of evaluation and if he changes his mind he will  let us know if he want to proceed forward.  Hard of hearing with a long-standing history of postural dizziness documented right bundle branch and left anterior axis by EKG in 2013, history of CAD and CABG more than 10 years ago, right carotid endarterectomy, mild abdominal aortic aneurysm infrarenal on abdominal ultrasound in 2018, hypertension with hypertensive heart disease, hyperlipidemia, previously evaluated for postural syncope.  No chest pain no orthopnea no PND no palpitation reported prior to the event.  No recent cardiac evaluation noted in the medical record.  Record of bypass surgery is not available.  He is a physically active and fortunately stopped smoking more than 30 years ago        HOLTER  #1 sinus rhythm with average heart rate of 75 minimum 55 and maximum 110 bpm with underlying intraventricular conduction delay  2 frequent premature ventricular complex with full runs of nonsustained wide QRS complex tachycardia longest is 3 beat with heart rate range from 130 to 150 bpm  #3 rare premature supraventricular ectopic beat with rare nonsustained supraventricular tachycardia longest is 4 beat with maximum heart rate 160 but beats per minute   #4 no significant bradyarrhythmia or pause noted     Echo  7/2019  · The left ventricular cavity is moderately dilated.  · Estimated EF = 25%.  · Left ventricular diastolic dysfunction (grade I) consistent with impaired relaxation.  · Mild mitral valve regurgitation is present  · Mild tricuspid valve  regurgitation is present.  · Mild pulmonic valve regurgitation is present.  · Estimated EF appears to be in the range of 21 - 25%. Estimated EF = 25%. Normal left ventricular wall thickness noted. There is left ventricular global hypokinesis noted. The left ventricular cavity is moderately dilated. Left ventricular diastolic dysfunction is noted (grade I) consistent with impaired relaxation.     STRESS TEST 7/2019  · Findings consistent with an equivocal ECG stress test.  · Left ventricular ejection fraction is moderately reduced (Calculated EF = 26%).  Moderately fixed inferolateral defect with no reversibility, LV dilated  Past Medical History:   Diagnosis Date   • Abnormal ECG        Past Surgical History:   Procedure Laterality Date   • CORONARY STENT PLACEMENT         Allergies   Allergen Reactions   • Hydrochlorothiazide Swelling     TONGUE SWELLING       No current facility-administered medications on file prior to encounter.      Current Outpatient Medications on File Prior to Encounter   Medication Sig Dispense Refill   • amitriptyline (ELAVIL) 100 MG tablet Take 100 mg by mouth every night at bedtime.  3   • aspirin 81 MG EC tablet Take 1 tablet by mouth Daily. 30 tablet 3   • atorvastatin (LIPITOR) 20 MG tablet Take 1 tablet by mouth.     • COLCRYS 0.6 MG tablet Take 0.6 mg by mouth Daily.  11   • gabapentin (NEURONTIN) 600 MG tablet TAKE 1 TABLET BY MOUTH 3 (THREE) TIMES DAILY  2   • losartan (COZAAR) 25 MG tablet TAKE 1 TABLET BY MOUTH EVERY DAY 30 tablet 5   • meloxicam (MOBIC) 15 MG tablet Take 15 mg by mouth Daily.  1   • metoprolol succinate XL (TOPROL-XL) 25 MG 24 hr tablet TAKE 1 TABLET BY MOUTH EVERY DAY 30 tablet 5   • omeprazole (priLOSEC) 40 MG capsule Take 40 mg by mouth Every Morning.  2   • rOPINIRole (REQUIP) 0.5 MG tablet Take 0.5 mg by mouth Daily.  2   • tolterodine (DETROL) 2 MG tablet Take 2 mg by mouth 2 (Two) Times a Day.     • albuterol sulfate  (90 Base) MCG/ACT inhaler  "INHALE 2 PUFFS INTO THE LUNGS EVERY 6 (SIX) HOURS AS NEEDED FOR WHEEZING  3       Social History     Socioeconomic History   • Marital status:      Spouse name: Not on file   • Number of children: Not on file   • Years of education: Not on file   • Highest education level: Not on file   Tobacco Use   • Smoking status: Former Smoker     Last attempt to quit:      Years since quittin.5   • Smokeless tobacco: Never Used   Substance and Sexual Activity   • Alcohol use: No     Frequency: Never   • Drug use: No           REVIEW OF SYSTEMS:   ROS  Review of Systems   Constitutional: Positive for activity change. Negative for chills and fever.   HENT: Negative for facial swelling.    Eyes: Negative for photophobia.   Respiratory: Negative for apnea and shortness of breath.    Cardiovascular: Negative for chest pain and leg swelling.  Negative palpitation negative orthopnea or PND  Gastrointestinal: Negative for abdominal pain, nausea and vomiting.   Genitourinary: Negative for dysuria.   Musculoskeletal: Positive for arthralgias and gait problem.   Skin: Negative for color change and rash.   Neurological: Positive for syncope postural mechanism from clinical descriptions with a similar episode in the past  Psychiatric/Behavioral: Negative for behavioral problems and dysphoric mood.      Otherwise complete ROS is negative except as mentioned above     Objective:     Vitals:    20 0741 20 0811 20 0845 20 1116   BP:  115/68 149/83 134/79   BP Location:  Right arm Right arm Right arm   Patient Position:  Lying Lying Lying   Pulse: 95 94 98 100   Resp:  22 25 20   Temp:  97.9 °F (36.6 °C)  97.6 °F (36.4 °C)   TempSrc:  Axillary  Axillary   SpO2:  92% 95% 94%   Weight:       Height:         Body mass index is 19.69 kg/m².  Flowsheet Rows      First Filed Value   Admission Height  182.9 cm (72\") Documented at 2020 1807   Admission Weight  81.6 kg (179 lb 14.4 oz) Documented at " 07/08/2020 1807          Intake/Output Summary (Last 24 hours) at 7/9/2020 1320  Last data filed at 7/9/2020 0422  Gross per 24 hour   Intake --   Output 100 ml   Net -100 ml         Physical Exam   Physical Exam  Constitutional: He is oriented to person, place, and time. He appears well-developed and well-nourished.   HENT:   Head: Normocephalic and atraumatic.   Eyes: Pupils are equal, round, and reactive to light. EOM are normal.   Neck: Normal range of motion. Neck supple.   Cardiovascular: Normal rate and regular rhythm.  No S3 pericardial rub  Pulmonary/Chest: Effort normal.  No rales or wheezing  Abdominal: Bowel sounds are normal.   Musculoskeletal: Normal range of motion.   Neurological: He is alert and oriented to person, place, and time.   Drowsy secondary sedation but arousable    Skin: Capillary refill takes less than 2 seconds.   Psychiatric: He has a normal mood and affect.   Nursing note and vitals reviewed.  Radiology Review    CT Head Without Contrast   Final Result   CONCLUSION:   No intracranial injury or acute process.   Cerebral and cerebellar atrophy.   Old lacunar infarcts in the basal ganglia.   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   Moderate small vessel disease.      [id number]      Electronically signed by:  Emmanuel Burris MD  7/8/2020 7:30 PM CDT   Workstation: [id number]      CT Cervical Spine Without Contrast   Final Result   CONCLUSION:   No cervical fracture.   Multilevel facet arthropathy with resultant minimal   retrolisthesis of C3 on C4 and minimal anterolisthesis of C4 on   C5 and C5 on C6.   Degenerative disc disease C6-7.   Chronic obstructive pulmonary disease.   5 mm right upper lobe pulmonary nodule.   No follow-up recommended as below.   Sternotomy.         2017 Fleischner Society Recommendations for Single Solid Lung   Nodule Follow-Up based on size (average of long- and short-axis   diameters)      <6 mm Low-Risk Patient: No routine follow-up    <6 mm High-Risk Patient: Optional CT  at 12 months      6-8 mm Low-Risk Patient: CT at 6-12 months then consider CT at   18-24 months   6-8 mm High-Risk Patient: CT at 6-12 months then CT at 18-24   months      >8 mm Low-Risk Patient: Consider CT, PET/CT or tissue sampling at   3 months   >8 mm High-Risk Patient: Same as for low-risk patient       [id number]      Electronically signed by:  Emmanuel Burris MD  7/8/2020 7:36 PM CDT   Workstation: [id number]      XR Chest 1 View   Final Result   CONCLUSION:   Bilateral linear atelectasis or scar.   Sternotomy.      [id number]      Electronically signed by:  Emmanuel Burris MD  7/8/2020 7:12 PM CDT   Workstation: [id number]      XR Hip With or Without Pelvis 2 - 3 View Left   Final Result   CONCLUSION:   Comminuted minimally displaced transcervical fracture left hip.      [id number]      Electronically signed by:  Emmanuel Burris MD  7/8/2020 7:14 PM CDT   Workstation: [email or web address]          Lab Results:  Lab Results (last 24 hours)     Procedure Component Value Units Date/Time    Blood Gas, Arterial [[id number]]  (Abnormal) Collected:  07/09/20 0900    Specimen:  Arterial Blood Updated:  07/09/20 0912     Site Left Radial     Anmol's Test Negative     pH, Arterial 7.440 pH units      pCO2, Arterial 35.0 mm Hg      pO2, Arterial 60.1 mm Hg      Comment: 84 Value below reference range        HCO3, Arterial 23.8 mmol/L      Base Excess, Arterial 0.0 mmol/L      O2 Saturation, Arterial 92.3 %      Comment: 84 Value below reference range        Barometric Pressure for Blood Gas 745 mmHg      Modality Nasal Cannula     Flow Rate 2.0 lpm      Ventilator Mode NA     Collected by EJ     Comment: Meter: [id number]     :  [id number]       POC Glucose Once [[id number]]  (Normal) Collected:  07/09/20 0612    Specimen:  Blood Updated:  07/09/20 0724     Glucose 119 mg/dL      Comment: RN NotifiedOperator: [id number] NIKI REBAMeter ID: [id number]       Basic Metabolic Panel [[id number]]  (Abnormal) Collected:  07/09/20 0546    Specimen:   Blood Updated:  07/09/20 0618     Glucose 109 mg/dL      BUN 16 mg/dL      Creatinine 0.89 mg/dL      Sodium 138 mmol/L      Potassium 3.6 mmol/L      Chloride 103 mmol/L      CO2 24.0 mmol/L      Calcium 9.1 mg/dL      eGFR Non African Amer 81 mL/min/1.73      BUN/Creatinine Ratio 18.0     Anion Gap 11.0 mmol/L     Narrative:       GFR Normal >60  Chronic Kidney Disease <60  Kidney Failure <15      Troponin [[id number]]  (Normal) Collected:  07/09/20 0546    Specimen:  Blood Updated:  07/09/20 0617     Troponin T <0.010 ng/mL     Narrative:       Troponin T Reference Range:  <= 0.03 ng/mL-   Negative for AMI  >0.03 ng/mL-     Abnormal for myocardial necrosis.  Clinicians would have to utilize clinical acumen, EKG, Troponin and serial changes to determine if it is an Acute Myocardial Infarction or myocardial injury due to an underlying chronic condition.       Results may be falsely decreased if patient taking Biotin.      CBC & Differential [[id number]] Collected:  07/09/20 0546    Specimen:  Blood Updated:  07/09/20 0600    Narrative:       The following orders were created for panel order CBC & Differential.  Procedure                               Abnormality         Status                     ---------                               -----------         ------                     CBC Auto Differential[[id number]]        Abnormal            Final result                 Please view results for these tests on the individual orders.    CBC Auto Differential [[id number]]  (Abnormal) Collected:  07/09/20 0546    Specimen:  Blood Updated:  07/09/20 0600     WBC 11.28 10*3/mm3      RBC 3.88 10*6/mm3      Hemoglobin 12.2 g/dL      Hematocrit 35.9 %      MCV 92.5 fL      MCH 31.4 pg      MCHC 34.0 g/dL      RDW 14.5 %      RDW-SD 49.1 fl      MPV 10.3 fL      Platelets 175 10*3/mm3      Neutrophil % 78.0 %      Lymphocyte % 10.9 %      Monocyte % 6.0 %      Eosinophil % 3.7 %      Basophil % 1.0 %      Immature Grans % 0.4 %       Neutrophils, Absolute 8.79 10*3/mm3      Lymphocytes, Absolute 1.23 10*3/mm3      Monocytes, Absolute 0.68 10*3/mm3      Eosinophils, Absolute 0.42 10*3/mm3      Basophils, Absolute 0.11 10*3/mm3      Immature Grans, Absolute 0.05 10*3/mm3      nRBC 0.0 /100 WBC     Troponin [[id number]]  (Normal) Collected:  07/09/20 0115    Specimen:  Blood Updated:  07/09/20 0208     Troponin T <0.010 ng/mL     Narrative:       Troponin T Reference Range:  <= 0.03 ng/mL-   Negative for AMI  >0.03 ng/mL-     Abnormal for myocardial necrosis.  Clinicians would have to utilize clinical acumen, EKG, Troponin and serial changes to determine if it is an Acute Myocardial Infarction or myocardial injury due to an underlying chronic condition.       Results may be falsely decreased if patient taking Biotin.      Amite Draw [[id number]] Collected:  07/08/20 1924    Specimen:  Blood Updated:  07/08/20 2030    Narrative:       The following orders were created for panel order Amite Draw.  Procedure                               Abnormality         Status                     ---------                               -----------         ------                     Light Blue Top[[id number]]                                   Final result               Green Top (Gel)[[id number]]                                  Final result               Lavender Top[[id number]]                                     Final result               Gold Top - SST[[id number]]                                   Final result                 Please view results for these tests on the individual orders.    Light Blue Top [[id number]] Collected:  07/08/20 1924    Specimen:  Blood Updated:  07/08/20 2030     Extra Tube hold for add-on     Comment: Auto resulted       Green Top (Gel) [[id number]] Collected:  07/08/20 1924    Specimen:  Blood Updated:  07/08/20 2030     Extra Tube Hold for add-ons.     Comment: Auto resulted.       Lavender Top [[id number]] Collected:  07/08/20 1924     Specimen:  Blood Updated:  07/08/20 2030     Extra Tube hold for add-on     Comment: Auto resulted       Gold Top - SST [[id number]] Collected:  07/08/20 1924    Specimen:  Blood Updated:  07/08/20 2030     Extra Tube Hold for add-ons.     Comment: Auto resulted.       Comprehensive Metabolic Panel [[id number]] Collected:  07/08/20 1924    Specimen:  Blood Updated:  07/08/20 1952     Glucose 89 mg/dL      BUN 17 mg/dL      Creatinine 1.07 mg/dL      Sodium 139 mmol/L      Potassium 3.5 mmol/L      Chloride 104 mmol/L      CO2 23.0 mmol/L      Calcium 9.3 mg/dL      Total Protein 7.1 g/dL      Albumin 4.10 g/dL      ALT (SGPT) 22 U/L      AST (SGOT) 32 U/L      Alkaline Phosphatase 108 U/L      Total Bilirubin 0.2 mg/dL      eGFR Non African Amer 66 mL/min/1.73      Globulin 3.0 gm/dL      A/G Ratio 1.4 g/dL      BUN/Creatinine Ratio 15.9     Anion Gap 12.0 mmol/L     Narrative:       GFR Normal >60  Chronic Kidney Disease <60  Kidney Failure <15      Troponin [[id number]]  (Normal) Collected:  07/08/20 1924    Specimen:  Blood Updated:  07/08/20 1949     Troponin T <0.010 ng/mL     Narrative:       Troponin T Reference Range:  <= 0.03 ng/mL-   Negative for AMI  >0.03 ng/mL-     Abnormal for myocardial necrosis.  Clinicians would have to utilize clinical acumen, EKG, Troponin and serial changes to determine if it is an Acute Myocardial Infarction or myocardial injury due to an underlying chronic condition.       Results may be falsely decreased if patient taking Biotin.      BNP [[id number]]  (Normal) Collected:  07/08/20 1924    Specimen:  Blood Updated:  07/08/20 1949     proBNP 1,741.0 pg/mL     Narrative:       Among patients with dyspnea, NT-proBNP is highly sensitive for the detection of acute congestive heart failure. In addition NT-proBNP of <300 pg/ml effectively rules out acute congestive heart failure with 99% negative predictive value.    Results may be falsely decreased if patient taking Biotin.      CBC  & Differential [[id number]] Collected:  07/08/20 1924    Specimen:  Blood Updated:  07/08/20 1930    Narrative:       The following orders were created for panel order CBC & Differential.  Procedure                               Abnormality         Status                     ---------                               -----------         ------                     CBC Auto Differential[[id number]]        Abnormal            Final result                 Please view results for these tests on the individual orders.    CBC Auto Differential [[id number]]  (Abnormal) Collected:  07/08/20 1924    Specimen:  Blood Updated:  07/08/20 1930     WBC 10.40 10*3/mm3      RBC 3.96 10*6/mm3      Hemoglobin 12.4 g/dL      Hematocrit 36.8 %      MCV 92.9 fL      MCH 31.3 pg      MCHC 33.7 g/dL      RDW 14.6 %      RDW-SD 50.3 fl      MPV 10.0 fL      Platelets 203 10*3/mm3      Neutrophil % 74.3 %      Lymphocyte % 17.9 %      Monocyte % 3.9 %      Eosinophil % 2.8 %      Basophil % 0.7 %      Immature Grans % 0.4 %      Neutrophils, Absolute 7.73 10*3/mm3      Lymphocytes, Absolute 1.86 10*3/mm3      Monocytes, Absolute 0.41 10*3/mm3      Eosinophils, Absolute 0.29 10*3/mm3      Basophils, Absolute 0.07 10*3/mm3      Immature Grans, Absolute 0.04 10*3/mm3      nRBC 0.0 /100 WBC           I personally viewed and interpreted the patient's EKG/Telemetry data       Assessment/Plan:     #1 syncope with history of similar episode in the past and postural dizziness complicated by hip fracture  #2 CAD status post CABG more than 10 years ago   #3 peripheral vascular disease status post carotid endarterectomy   #4 hyperlipidemia   #5 hypertension   #6 preop evaluation waiting for hip surgery    84 years old patient with a background history of ischemic cardiomyopathy, postural dizziness and syncope in the past, nonsustained ventricular tachycardia refused ICD implantation with history of congestive heart failure severe LV dysfunction compensated  and euvolemic state admitted for evaluation after syncopal episode in upright posture.  From clinical description most likely mechanism simply vasovagal orthostasis.  The episode complicated by hip fracture EKG no acute ST-T wave changes.  The patient is drowsy secondary not narcotics but arousable and nurse present in the room.  I discussed with the patient whenever he was awake and the nurse regarding further risk stratification and management.  Given the asymptomatic cardiac status and previous normal stress test no further risk stratification needed at this may not change the outcome of the hip surgery.  The patient moderate to high risk for the procedure post procedure.  Try to contact the family over the phone was unsuccessful.  Of the family member working the Cath Lab and condition was discussed.  Midodrine can be contemplated given the patient previous history of postural dizziness.  The pain is being managed with narcotics and recommend to continue low-dose beta-blocker, losartan and statin.      Thank you for allowing me to participate in the care of Jose Dacosta. Feel free to contact me directly with any further questions or concerns.    Júnior Licona MD  07/09/20  13:20.      Part of this note may be an electronic transcription/translation of spoken language to printed text using the Dragon Dictation system.

## 2020-07-09 NOTE — SIGNIFICANT NOTE
07/09/20 0956   Rehab Time/Intention   Evaluation Not Performed unable to evaluate, medical status change   Rehab Treatment   Discipline physical therapist;occupational therapist   Recommendation   PT - Next Appointment 07/10/20   Recommendation   OT - Next Appointment 07/10/20     Initial PT/OT evaluations attempted.  However, awaiting cardiac consult and surgery for hip fracture.  Evaluations deferred, nurse notified.

## 2020-07-09 NOTE — PLAN OF CARE
Problem: Patient Care Overview  Goal: Plan of Care Review  Outcome: Ongoing (interventions implemented as appropriate)  Flowsheets (Taken 7/9/2020 9862)  Progress: no change  Plan of Care Reviewed With: patient; family  Outcome Summary: vss, two doses of narcan administered at 3409-1966, patient became more arousable, surgery today, stable after surgery, will cont to monitor

## 2020-07-09 NOTE — NURSING NOTE
Upon entering the patients room he was very lethargic and hard to arouse even when doing a sternal rub. Vitals were stable. Patient had received morphine around 0427. Narcan administered. Patient became more alert, still groggy but arouses to voice. MD aware, morphine discontinued.

## 2020-07-09 NOTE — ANESTHESIA PREPROCEDURE EVALUATION
Anesthesia Evaluation     no history of anesthetic complications:  NPO Solid Status: > 8 hours  NPO Liquid Status: > 2 hours           Airway   Mallampati: II  TM distance: >3 FB  Neck ROM: full  No difficulty expected  Dental    (+) edentulous    Pulmonary    (+) asthma,decreased breath sounds,   (-) sleep apnea, not a smoker    ROS comment: snores  Cardiovascular   Exercise tolerance: poor (<4 METS)    ECG reviewed  Patient on routine beta blocker  Rhythm: regular  Rate: normal    (+) hypertension well controlled less than 2 medications, valvular problems/murmurs AI, CAD, dysrhythmias, CHF Systolic <55%, murmur, hyperlipidemia,   (-) angina, cardiac stents, DVT    ROS comment: · The left ventricular cavity is mildly dilated.  · Estimated EF = 27%.  · Left ventricular diastolic dysfunction (grade II) consistent with   pseudonormalization.  · Left atrial cavity size is mildly dilated.  · Mild aortic valve regurgitation is present.  · Mild tricuspid valve regurgitation is present.    Widened QRS, RBBB, LAFB    Cardiac clearance from [facility]    Neuro/Psych  (+) psychiatric history Anxiety,     (-) seizures, TIA, CVA, headaches    ROS Comment: RLS  GI/Hepatic/Renal/Endo    (+)  GERD,    (-) hepatitis, liver disease, no renal disease, diabetes, no thyroid disorder    Musculoskeletal         ROS comment: Left femur fracture  Abdominal    Substance History   (+) alcohol use (beer occ),   (-) drug use     OB/GYN          Other   blood dyscrasia anemia,     (-) history of cancer                  Anesthesia Plan    ASA 4 - emergent     general, spinal and MAC   ([place], patient daughter, aware of risks)  intravenous induction     Anesthetic plan, all risks, benefits, and alternatives have been provided, discussed and informed consent has been obtained with: patient and child.

## 2020-07-09 NOTE — CONSULTS
ORTHOPAEDIC CONSULT     Patient Name:  Jose Dacosta  Admit Date:  7/8/2020  Consult Date:  7/9/2020      Referring Provider: Dr. Parada  Reason for Consultation: Left hip fracture    Patient Care Team:  Terry Peterson MD as PCP - General    Chief complaint: Left hip pain    Subjective .     History of present illness: 85-year-old apparently fell at his home yesterday injuring his left hip.  He is a limited household ambulator at best.  He is admitted by the medicine service.  Does have a fairly significant cardiac history.  Medicine is recommended cardiac clearance prior to surgery.    Review of Systems:  Review of Systems   Constitutional: Positive for activity change. Negative for chills and fever.   HENT: Negative for facial swelling.    Eyes: Negative for photophobia.   Respiratory: Negative for apnea and shortness of breath.    Cardiovascular: Negative for chest pain and leg swelling.   Gastrointestinal: Negative for abdominal pain, nausea and vomiting.   Genitourinary: Negative for dysuria.   Musculoskeletal: Positive for arthralgias and gait problem.   Skin: Negative for color change and rash.   Neurological: Negative for seizures and syncope.   Psychiatric/Behavioral: Negative for behavioral problems and dysphoric mood.      Otherwise complete ROS is negative except as mentioned above.    History:  Allergies   Allergen Reactions   • Hydrochlorothiazide Swelling     TONGUE SWELLING       Prior to Admission medications    Medication Sig Start Date End Date Taking? Authorizing Provider   amitriptyline (ELAVIL) 100 MG tablet Take 100 mg by mouth every night at bedtime. 4/8/19  Yes Marcial Crain MD   aspirin 81 MG EC tablet Take 1 tablet by mouth Daily. 7/5/19  Yes Júnior Licona MD   atorvastatin (LIPITOR) 20 MG tablet Take 1 tablet by mouth.   Yes Marcial Crain MD   COLCRYS 0.6 MG tablet Take 0.6 mg by mouth Daily. 7/5/19  Yes Marcial Crain MD   gabapentin  (NEURONTIN) 600 MG tablet TAKE 1 TABLET BY MOUTH 3 (THREE) TIMES DAILY 19  Yes Marcial Crain MD   losartan (COZAAR) 25 MG tablet TAKE 1 TABLET BY MOUTH EVERY DAY 19  Yes Bessy Duval MD   meloxicam (MOBIC) 15 MG tablet Take 15 mg by mouth Daily. 19  Yes Marcial Crain MD   metoprolol succinate XL (TOPROL-XL) 25 MG 24 hr tablet TAKE 1 TABLET BY MOUTH EVERY DAY 19  Yes Júnior Licona MD   omeprazole (priLOSEC) 40 MG capsule Take 40 mg by mouth Every Morning. 19  Yes Marcial Crain MD   rOPINIRole (REQUIP) 0.5 MG tablet Take 0.5 mg by mouth Daily. 5/3/19  Yes Marcial Crain MD   tolterodine (DETROL) 2 MG tablet Take 2 mg by mouth 2 (Two) Times a Day.   Yes Marcial Crain MD   albuterol sulfate  (90 Base) MCG/ACT inhaler INHALE 2 PUFFS INTO THE LUNGS EVERY 6 (SIX) HOURS AS NEEDED FOR WHEEZING 19   Marcial Crain MD       Past Medical History:   Diagnosis Date   • Abnormal ECG        Past Surgical History:   Procedure Laterality Date   • CORONARY STENT PLACEMENT         Social History     Socioeconomic History   • Marital status:      Spouse name: Not on file   • Number of children: Not on file   • Years of education: Not on file   • Highest education level: Not on file   Tobacco Use   • Smoking status: Former Smoker     Last attempt to quit: 1990     Years since quittin.5   • Smokeless tobacco: Never Used   Substance and Sexual Activity   • Alcohol use: No     Frequency: Never   • Drug use: No       Family History   Problem Relation Age of Onset   • Heart failure Mother    • Heart failure Father        Objective     Vital Signs   Temp:  [98 °F (36.7 °C)-98.6 °F (37 °C)] 98.6 °F (37 °C)  Heart Rate:  [] 90  Resp:  [16-26] 19  BP: (120-194)/(60-95) 127/80    Physical Exam:  Physical Exam   Constitutional: He is oriented to person, place, and time. He appears well-developed. No distress.   HENT:   Head:  Normocephalic and atraumatic.   Eyes: Pupils are equal, round, and reactive to light. EOM are normal.   Neck: Neck supple. No tracheal deviation present.   Pulmonary/Chest: Effort normal.   Musculoskeletal: He exhibits tenderness and deformity. He exhibits no edema.   Neurological: He is alert and oriented to person, place, and time.   Skin: Skin is warm and dry. No erythema.   Psychiatric: He has a normal mood and affect.   Externally rotated hip is flexed.  Moves toes well.  Center exam is intact.    Results Review:    Imaging Results (Last 24 Hours)     Procedure Component Value Units Date/Time    CT Cervical Spine Without Contrast [[id number]] Collected:  07/08/20 1850     Updated:  07/08/20 1937    Narrative:       CT cervical spine without contrast    HISTORY: Fell    Nonenhanced axial scans of the cervical spine were obtained.  Sagittal and coronal reconstructions were performed.    This exam was performed according to our departmental  dose-optimization program, which includes automated exposure  control, adjustment of the mA and/or kV according to patient size  and/or use of iterative reconstruction technique.    CT DLP: 454.30    Findings:  Normal cervical lordosis.   Vertebral height maintained.   No fracture identified.   Multilevel facet arthropathy with resultant minimal  retrolisthesis of C3 on C4 and minimal anterolisthesis of C4 on  C5 and C5 on C6.  Degenerative disc disease C6-7.    Chronic obstructive pulmonary disease.  5 mm right upper lobe pulmonary nodule.  Sternotomy.      Impression:       CONCLUSION:  No cervical fracture.  Multilevel facet arthropathy with resultant minimal  retrolisthesis of C3 on C4 and minimal anterolisthesis of C4 on  C5 and C5 on C6.  Degenerative disc disease C6-7.  Chronic obstructive pulmonary disease.  5 mm right upper lobe pulmonary nodule.  No follow-up recommended as below.  Sternotomy.      2017 Fleischner Society Recommendations for Single Solid Lung  Nodule  Follow-Up based on size (average of long- and short-axis  diameters)    <6 mm Low-Risk Patient: No routine follow-up   <6 mm High-Risk Patient: Optional CT at 12 months    6-8 mm Low-Risk Patient: CT at 6-12 months then consider CT at  18-24 months  6-8 mm High-Risk Patient: CT at 6-12 months then CT at 18-24  months    >8 mm Low-Risk Patient: Consider CT, PET/CT or tissue sampling at  3 months  >8 mm High-Risk Patient: Same as for low-risk patient     [id number]    Electronically signed by:  Emmanuel Burris MD  7/8/2020 7:36 PM CDT  Workstation: [id number]    CT Head Without Contrast [[id number]] Collected:  07/08/20 1850     Updated:  07/08/20 1931    Narrative:         CT Head Without Contrast    History: Fell. Laceration left eyebrow.    Axial scans of the brain were obtained without intravenous  contrast.  Coronal and sagital reconstructions were preformed.    This exam was performed according to our departmental  dose-optimization program, which includes automated exposure  control, adjustment of the mA and/or kV according to patient size  and/or use of iterative reconstruction technique.    DLP: 964.00    Comparison: None    Findings:  Bone windows are unremarkable.  The visualized paranasal sinuses are unremarkable.    No intracranial injury or acute process.  Cerebral and cerebellar atrophy.  Old lacunar infarcts in the basal ganglia.  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  Moderate small vessel disease.   No hemorrhage.  No mass.  No abnormal areas of increased attenuation.  No midline shift.  No abnormal extra-axial fluid collections.      Impression:       CONCLUSION:  No intracranial injury or acute process.  Cerebral and cerebellar atrophy.  Old lacunar infarcts in the basal ganglia.  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  Moderate small vessel disease.    [id number]    Electronically signed by:  Emmanuel Burris MD  7/8/2020 7:30 PM CDT  Workstation: [id number]    XR Hip With or Without Pelvis 2 - 3 View Left [[id number]]  Collected:  07/08/20 1840     Updated:  07/08/20 1915    Narrative:         Two view left hip with single view pelvis    HISTORY: Pain after falling. Syncope and collapse.    AP and crosstable lateral lateral views of the left hip and AP  film of the pelvis obtained.    COMPARISON: None    FINDINGS:   Comminuted minimally displaced transcervical fracture left hip.  No dislocation of the femoral head.  Dextroscoliosis lumbar spine.  Multilevel degenerative disc disease lumbar spine..  No other osseous or articular abnormality.    Pelvic phleboliths.  Atherosclerotic calcification iliac and femoral arteries.      Impression:       CONCLUSION:  Comminuted minimally displaced transcervical fracture left hip.    [id number]    Electronically signed by:  Emmanuel Burris MD  7/8/2020 7:14 PM CDT  Workstation: [id number]    XR Chest 1 View [[id number]] Collected:  07/08/20 1840     Updated:  07/08/20 1913    Narrative:         PORTABLE CHEST    HISTORY: Chest pain. Syncope and collapse.    Portable AP supine film of the chest was obtained at 6:45 PM.  COMPARISON: None    FINDINGS:   EKG leads.  Bilateral linear atelectasis or scar.  Sternotomy.  The heart is not enlarged.  The pulmonary vasculature is not increased.  No pleural effusion.  No pneumothorax.  No acute osseous abnormality.  Degenerative changes are present in the thoracic spine.      Impression:       CONCLUSION:  Bilateral linear atelectasis or scar.  Sternotomy.    [id number]    Electronically signed by:  Emmanuel Burris MD  7/8/2020 7:12 PM CDT  Workstation: [id number]          Lab Results (last 24 hours)     Procedure Component Value Units Date/Time    Basic Metabolic Panel [[id number]]  (Abnormal) Collected:  07/09/20 0546    Specimen:  Blood Updated:  07/09/20 0618     Glucose 109 mg/dL      BUN 16 mg/dL      Creatinine 0.89 mg/dL      Sodium 138 mmol/L      Potassium 3.6 mmol/L      Chloride 103 mmol/L      CO2 24.0 mmol/L      Calcium 9.1 mg/dL      eGFR Non  Amer 81  mL/min/1.73      BUN/Creatinine Ratio 18.0     Anion Gap 11.0 mmol/L     Narrative:       GFR Normal >60  Chronic Kidney Disease <60  Kidney Failure <15      Troponin [[id number]]  (Normal) Collected:  07/09/20 0546    Specimen:  Blood Updated:  07/09/20 0617     Troponin T <0.010 ng/mL     Narrative:       Troponin T Reference Range:  <= 0.03 ng/mL-   Negative for AMI  >0.03 ng/mL-     Abnormal for myocardial necrosis.  Clinicians would have to utilize clinical acumen, EKG, Troponin and serial changes to determine if it is an Acute Myocardial Infarction or myocardial injury due to an underlying chronic condition.       Results may be falsely decreased if patient taking Biotin.      CBC & Differential [[id number]] Collected:  07/09/20 0546    Specimen:  Blood Updated:  07/09/20 0600    Narrative:       The following orders were created for panel order CBC & Differential.  Procedure                               Abnormality         Status                     ---------                               -----------         ------                     CBC Auto Differential[[id number]]        Abnormal            Final result                 Please view results for these tests on the individual orders.    CBC Auto Differential [[id number]]  (Abnormal) Collected:  07/09/20 0546    Specimen:  Blood Updated:  07/09/20 0600     WBC 11.28 10*3/mm3      RBC 3.88 10*6/mm3      Hemoglobin 12.2 g/dL      Hematocrit 35.9 %      MCV 92.5 fL      MCH 31.4 pg      MCHC 34.0 g/dL      RDW 14.5 %      RDW-SD 49.1 fl      MPV 10.3 fL      Platelets 175 10*3/mm3      Neutrophil % 78.0 %      Lymphocyte % 10.9 %      Monocyte % 6.0 %      Eosinophil % 3.7 %      Basophil % 1.0 %      Immature Grans % 0.4 %      Neutrophils, Absolute 8.79 10*3/mm3      Lymphocytes, Absolute 1.23 10*3/mm3      Monocytes, Absolute 0.68 10*3/mm3      Eosinophils, Absolute 0.42 10*3/mm3      Basophils, Absolute 0.11 10*3/mm3      Immature Grans, Absolute 0.05 10*3/mm3       nRBC 0.0 /100 WBC     Troponin [[id number]]  (Normal) Collected:  07/09/20 0115    Specimen:  Blood Updated:  07/09/20 0208     Troponin T <0.010 ng/mL     Narrative:       Troponin T Reference Range:  <= 0.03 ng/mL-   Negative for AMI  >0.03 ng/mL-     Abnormal for myocardial necrosis.  Clinicians would have to utilize clinical acumen, EKG, Troponin and serial changes to determine if it is an Acute Myocardial Infarction or myocardial injury due to an underlying chronic condition.       Results may be falsely decreased if patient taking Biotin.      [place] Draw [[id number]] Collected:  07/08/20 1924    Specimen:  Blood Updated:  07/08/20 2030    Narrative:       The following orders were created for panel order [place] Draw.  Procedure                               Abnormality         Status                     ---------                               -----------         ------                     Light Blue Top[[id number]]                                   Final result               Green Top (Gel)[[id number]]                                  Final result               Lavender Top[[id number]]                                     Final result               Gold Top - SST[[id number]]                                   Final result                 Please view results for these tests on the individual orders.    Light Blue Top [[id number]] Collected:  07/08/20 1924    Specimen:  Blood Updated:  07/08/20 2030     Extra Tube hold for add-on     Comment: Auto resulted       Green Top (Gel) [[id number]] Collected:  07/08/20 1924    Specimen:  Blood Updated:  07/08/20 2030     Extra Tube Hold for add-ons.     Comment: Auto resulted.       Lavender Top [[id number]] Collected:  07/08/20 1924    Specimen:  Blood Updated:  07/08/20 2030     Extra Tube hold for add-on     Comment: Auto resulted       Gold Top - SST [[id number]] Collected:  07/08/20 1924    Specimen:  Blood Updated:  07/08/20 2030     Extra Tube Hold for add-ons.      Comment: Auto resulted.       Comprehensive Metabolic Panel [[id number]] Collected:  07/08/20 1924    Specimen:  Blood Updated:  07/08/20 1952     Glucose 89 mg/dL      BUN 17 mg/dL      Creatinine 1.07 mg/dL      Sodium 139 mmol/L      Potassium 3.5 mmol/L      Chloride 104 mmol/L      CO2 23.0 mmol/L      Calcium 9.3 mg/dL      Total Protein 7.1 g/dL      Albumin 4.10 g/dL      ALT (SGPT) 22 U/L      AST (SGOT) 32 U/L      Alkaline Phosphatase 108 U/L      Total Bilirubin 0.2 mg/dL      eGFR Non African Amer 66 mL/min/1.73      Globulin 3.0 gm/dL      A/G Ratio 1.4 g/dL      BUN/Creatinine Ratio 15.9     Anion Gap 12.0 mmol/L     Narrative:       GFR Normal >60  Chronic Kidney Disease <60  Kidney Failure <15      Troponin [[id number]]  (Normal) Collected:  07/08/20 1924    Specimen:  Blood Updated:  07/08/20 1949     Troponin T <0.010 ng/mL     Narrative:       Troponin T Reference Range:  <= 0.03 ng/mL-   Negative for AMI  >0.03 ng/mL-     Abnormal for myocardial necrosis.  Clinicians would have to utilize clinical acumen, EKG, Troponin and serial changes to determine if it is an Acute Myocardial Infarction or myocardial injury due to an underlying chronic condition.       Results may be falsely decreased if patient taking Biotin.      BNP [[id number]]  (Normal) Collected:  07/08/20 1924    Specimen:  Blood Updated:  07/08/20 1949     proBNP 1,741.0 pg/mL     Narrative:       Among patients with dyspnea, NT-proBNP is highly sensitive for the detection of acute congestive heart failure. In addition NT-proBNP of <300 pg/ml effectively rules out acute congestive heart failure with 99% negative predictive value.    Results may be falsely decreased if patient taking Biotin.      CBC & Differential [[id number]] Collected:  07/08/20 1924    Specimen:  Blood Updated:  07/08/20 1930    Narrative:       The following orders were created for panel order CBC & Differential.  Procedure                               Abnormality          Status                     ---------                               -----------         ------                     CBC Auto Differential[[id number]]        Abnormal            Final result                 Please view results for these tests on the individual orders.    CBC Auto Differential [[id number]]  (Abnormal) Collected:  07/08/20 1924    Specimen:  Blood Updated:  07/08/20 1930     WBC 10.40 10*3/mm3      RBC 3.96 10*6/mm3      Hemoglobin 12.4 g/dL      Hematocrit 36.8 %      MCV 92.9 fL      MCH 31.3 pg      MCHC 33.7 g/dL      RDW 14.6 %      RDW-SD 50.3 fl      MPV 10.0 fL      Platelets 203 10*3/mm3      Neutrophil % 74.3 %      Lymphocyte % 17.9 %      Monocyte % 3.9 %      Eosinophil % 2.8 %      Basophil % 0.7 %      Immature Grans % 0.4 %      Neutrophils, Absolute 7.73 10*3/mm3      Lymphocytes, Absolute 1.86 10*3/mm3      Monocytes, Absolute 0.41 10*3/mm3      Eosinophils, Absolute 0.29 10*3/mm3      Basophils, Absolute 0.07 10*3/mm3      Immature Grans, Absolute 0.04 10*3/mm3      nRBC 0.0 /100 WBC            I reviewed the patient's new clinical results.      Syncope and collapse    Closed fracture of neck of left femur (CMS/[facility])      Assessment:  Assessment/Plan       Plan:  This point patient is been seen by medicine.  They are recommending a cardiac consultation.  Patient will need to have bipolar endoprosthesis.  He is somewhat of a limited ambulator at home.  Certainly is a little medical risk.  He is a little obtunded this morning he received Narcan and is stimulated a responds and answers questions reasonably well this morning.  I have discussed the risk benefits of surgery including but not limited to bleeding, blood clot, infection, dislocation, fracture, need for further surgery, medical anesthetic complications, limb length inequality, prolonged rehab, etc.  Detailed informed consent is given we will go and proceed as planned presuming cardiac clearance is complete.    I discussed the  patients findings and my recommendations with: Nursing staff, patient    Jitendra Solis MD  07/09/20  06:35    Time: 30 minutes

## 2020-07-09 NOTE — ANESTHESIA PROCEDURE NOTES
Spinal Block      Patient reassessed immediately prior to procedure    Patient location during procedure: OR  Indication:at surgeon's request and procedure for pain  Performed By  CRNA: Osiel Ramos CRNA  Preanesthetic Checklist  Completed: patient identified, site marked, surgical consent, pre-op evaluation, timeout performed, IV checked, risks and benefits discussed and monitors and equipment checked  Spinal Block Prep:  Patient Position:left lateral decubitus  Sterile Tech:cap, gloves, mask and sterile barriers  Prep:Betadine  Patient Monitoring:blood pressure monitoring and continuous pulse oximetry  Spinal Block Procedure  Approach:midline  Guidance:landmark technique and palpation technique  Needle Type:Pencan  Needle Gauge:22 G  Placement of Spinal needle event:cerebrospinal fluid aspirated  Paresthesia: no  Fluid Appearance:clear  Medications: bupivacaine PF (MARCAINE) 0.75 % injection, 2 mL   Post Assessment  Patient Tolerance:patient tolerated the procedure well with no apparent complications  Complications no

## 2020-07-09 NOTE — PAYOR COMM NOTE
"Cathy Noyola   [facility]  phone [phone]  fax [phone]    REF#[id number]    Sylvia Dacosta (85 y.o. Male)     Date of Birth Social Security Number Address Home Phone MRN    [id number]  [id number]  [facility] [address] [phone] [id number]    [place] Marital Status          Oriental orthodox        Admission Date Admission Type Admitting Provider Attending Provider Department, Room/Bed    7/8/20 Emergency Misha Parada MD Haigler, Stuart S, MD [address], 411/1    Discharge Date Discharge Disposition Discharge Destination                       Attending Provider:  Misha Parada MD    Allergies:  Hydrochlorothiazide    Isolation:  None   Infection:  None   Code Status:  CPR    Ht:  182.9 cm (72\")   Wt:  65.9 kg (145 lb 3.2 oz)    Admission Cmt:  None   Principal Problem:  None                Active Insurance as of 7/8/2020     Primary Coverage     Payor Plan Insurance Group Employer/Plan Group    AETNA MEDICARE REPLACEMENT AETNA MEDICARE REPLACEMENT [id number]     Payor Plan Address Payor Plan Phone Number Payor Plan Fax Number Effective Dates    [address] [phone]  4/1/2019 - None Entered    [facility] [address]       Subscriber Name Subscriber Birth Date Member ID       SYLVIA DACOSTA 1935 [id number]                 Emergency Contacts      (Rel.) Home Phone Work Phone Mobile Phone    AURELIA BOX (Daughter) [phone] -- [phone]    Carla Dacosta (Spouse) [phone] -- [phone]               History & Physical      Misha Parada MD at 07/08/20 2054                [facility] Medicine Admission      Date of Admission: 7/8/2020      Primary Care Physician: Terry Peterson MD      Chief Complaint: I fell and messed up my face    HPI: Briefly this is a 85-year-old gentleman who presents the ED today trauma apparent with patient " "presenting caring the following problem    1.  Hypertension  2.  Dyslipidemia  3.  History of gout  4.  Chronic pain  5.  Note anaphylaxis from hydrochlorothiazide with tongue swelling  6.  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  He had no loss of bowel or bladder.  He had no post event confusion and most likely a is a vagal event however patient was told in the past he needs to have a pacemaker is deferred in the past will certainly need to monito.  Regardless with patient's fall he had a trauma event triggered with patient cleared the head until hands reveal a hip fracture therefore we are asked to admit and have medical management.  Of note as above patient was told need to have a pacemaker in the past certainly will monitor.     Concurrent Medical History:  has a past medical history of Abnormal ECG.    Past Surgical History:  has a past surgical history that includes Coronary stent placement.    Family History: family history includes Heart failure in his father and mother.    Social History:  reports that he quit smoking about 30 years ago. He has never used smokeless tobacco. He reports that he does not drink alcohol or use drugs.    Allergies:   Allergies   Allergen Reactions   • Hydrochlorothiazide Swelling     TONGUE SWELLING       Medications:   Prior to Admission medications    Medication Sig Start Date End Date Taking? Authorizing Provider   albuterol sulfate  (90 Base) MCG/ACT inhaler INHALE 2 PUFFS INTO THE LUNGS EVERY 6 (SIX) HOURS AS NEEDED FOR WHEEZING 4/8/19   Marcial Crain MD   amitriptyline (ELAVIL) 100 MG tablet Take 100 mg by mouth every night at bedtime. 4/8/19   Marcial Crain MD   aspirin 81 MG EC tablet Take 1 tablet by mouth Daily. 7/5/19   Júnior Licona MD   atorvastatin (LIPITOR) 20 MG " tablet Take 1 tablet by mouth.    Marcial Crain MD   COLCRYS 0.6 MG tablet Take 0.6 mg by mouth Daily. 7/5/19   Marcial Crain MD   gabapentin (NEURONTIN) 600 MG tablet TAKE 1 TABLET BY MOUTH 3 (THREE) TIMES DAILY 6/27/19   Marcial Crain MD   losartan (COZAAR) 25 MG tablet TAKE 1 TABLET BY MOUTH EVERY DAY 12/30/19   Bessy Duval MD   meloxicam (MOBIC) 15 MG tablet Take 15 mg by mouth Daily. 6/27/19   Marcial Crain MD   metoprolol succinate XL (TOPROL-XL) 25 MG 24 hr tablet TAKE 1 TABLET BY MOUTH EVERY DAY 12/16/19   Júnior Licona MD   omeprazole (priLOSEC) 40 MG capsule Take 40 mg by mouth Every Morning. 6/27/19   Marcial Crain MD   rOPINIRole (REQUIP) 0.5 MG tablet Take 0.5 mg by mouth Daily. 5/3/19   Marcial Crain MD   tolterodine (DETROL) 2 MG tablet Take 2 mg by mouth 2 (Two) Times a Day.    Marcial Crain MD       Review of Systems:  Review of Systems   Constitutional: Positive for activity change.   HENT: Negative.    Eyes: Negative.    Respiratory: Negative.    Cardiovascular: Negative.    Gastrointestinal: Negative.    Endocrine: Negative.    Genitourinary: Negative.    Musculoskeletal: Positive for back pain.   Skin: Negative.    Allergic/Immunologic: Negative.    Neurological: Negative.         Has chronic nystagmus   Hematological: Negative.    Psychiatric/Behavioral: Negative.       Otherwise complete ROS is negative except as mentioned above.    Physical Exam:   Temp:  [98 °F (36.7 °C)] 98 °F (36.7 °C)  Heart Rate:  [] 104  Resp:  [16-18] 18  BP: (140-194)/(83-95) 140/83  Physical Exam   Constitutional: He is oriented to person, place, and time. He appears well-developed and well-nourished.   HENT:   Head: Normocephalic and atraumatic.   Eyes: Pupils are equal, round, and reactive to light. EOM are normal.   Neck: Normal range of motion. Neck supple.   Cardiovascular: Normal rate and regular rhythm.   Pulmonary/Chest: Effort  normal.   Abdominal: Bowel sounds are normal.   Musculoskeletal: Normal range of motion.   Neurological: He is alert and oriented to person, place, and time.   Has chronic horizontal nystagmus   Skin: Capillary refill takes less than 2 seconds.   Psychiatric: He has a normal mood and affect.   Nursing note and vitals reviewed.        Results Reviewed:  I have personally reviewed current lab, radiology, and data and agree with results.  Lab Results (last 24 hours)     Procedure Component Value Units Date/Time    [place] Draw [[id number]] Collected:  07/08/20 1924    Specimen:  Blood Updated:  07/08/20 2030    Narrative:       The following orders were created for panel order [place] Draw.  Procedure                               Abnormality         Status                     ---------                               -----------         ------                     Light Blue Top[[id number]]                                   Final result               Green Top (Gel)[[id number]]                                  Final result               Lavender Top[[id number]]                                     Final result               Gold Top - SST[[id number]]                                   Final result                 Please view results for these tests on the individual orders.    Light Blue Top [[id number]] Collected:  07/08/20 1924    Specimen:  Blood Updated:  07/08/20 2030     Extra Tube hold for add-on     Comment: Auto resulted       Green Top (Gel) [[id number]] Collected:  07/08/20 1924    Specimen:  Blood Updated:  07/08/20 2030     Extra Tube Hold for add-ons.     Comment: Auto resulted.       Lavender Top [[id number]] Collected:  07/08/20 1924    Specimen:  Blood Updated:  07/08/20 2030     Extra Tube hold for add-on     Comment: Auto resulted       Gold Top - SST [[id number]] Collected:  07/08/20 1924    Specimen:  Blood Updated:  07/08/20 2030     Extra Tube Hold for add-ons.     Comment: Auto resulted.       Comprehensive  Metabolic Panel [[id number]] Collected:  07/08/20 1924    Specimen:  Blood Updated:  07/08/20 1952     Glucose 89 mg/dL      BUN 17 mg/dL      Creatinine 1.07 mg/dL      Sodium 139 mmol/L      Potassium 3.5 mmol/L      Chloride 104 mmol/L      CO2 23.0 mmol/L      Calcium 9.3 mg/dL      Total Protein 7.1 g/dL      Albumin 4.10 g/dL      ALT (SGPT) 22 U/L      AST (SGOT) 32 U/L      Alkaline Phosphatase 108 U/L      Total Bilirubin 0.2 mg/dL      eGFR Non African Amer 66 mL/min/1.73      Globulin 3.0 gm/dL      A/G Ratio 1.4 g/dL      BUN/Creatinine Ratio 15.9     Anion Gap 12.0 mmol/L     Narrative:       GFR Normal >60  Chronic Kidney Disease <60  Kidney Failure <15      Troponin [[id number]]  (Normal) Collected:  07/08/20 1924    Specimen:  Blood Updated:  07/08/20 1949     Troponin T <0.010 ng/mL     Narrative:       Troponin T Reference Range:  <= 0.03 ng/mL-   Negative for AMI  >0.03 ng/mL-     Abnormal for myocardial necrosis.  Clinicians would have to utilize clinical acumen, EKG, Troponin and serial changes to determine if it is an Acute Myocardial Infarction or myocardial injury due to an underlying chronic condition.       Results may be falsely decreased if patient taking Biotin.      BNP [[id number]]  (Normal) Collected:  07/08/20 1924    Specimen:  Blood Updated:  07/08/20 1949     proBNP 1,741.0 pg/mL     Narrative:       Among patients with dyspnea, NT-proBNP is highly sensitive for the detection of acute congestive heart failure. In addition NT-proBNP of <300 pg/ml effectively rules out acute congestive heart failure with 99% negative predictive value.    Results may be falsely decreased if patient taking Biotin.      CBC & Differential [[id number]] Collected:  07/08/20 1924    Specimen:  Blood Updated:  07/08/20 1930    Narrative:       The following orders were created for panel order CBC & Differential.  Procedure                               Abnormality         Status                     ---------                                -----------         ------                     CBC Auto Differential[[id number]]        Abnormal            Final result                 Please view results for these tests on the individual orders.    CBC Auto Differential [[id number]]  (Abnormal) Collected:  07/08/20 1924    Specimen:  Blood Updated:  07/08/20 1930     WBC 10.40 10*3/mm3      RBC 3.96 10*6/mm3      Hemoglobin 12.4 g/dL      Hematocrit 36.8 %      MCV 92.9 fL      MCH 31.3 pg      MCHC 33.7 g/dL      RDW 14.6 %      RDW-SD 50.3 fl      MPV 10.0 fL      Platelets 203 10*3/mm3      Neutrophil % 74.3 %      Lymphocyte % 17.9 %      Monocyte % 3.9 %      Eosinophil % 2.8 %      Basophil % 0.7 %      Immature Grans % 0.4 %      Neutrophils, Absolute 7.73 10*3/mm3      Lymphocytes, Absolute 1.86 10*3/mm3      Monocytes, Absolute 0.41 10*3/mm3      Eosinophils, Absolute 0.29 10*3/mm3      Basophils, Absolute 0.07 10*3/mm3      Immature Grans, Absolute 0.04 10*3/mm3      nRBC 0.0 /100 WBC         Imaging Results (Last 24 Hours)     Procedure Component Value Units Date/Time    CT Cervical Spine Without Contrast [[id number]] Collected:  07/08/20 1850     Updated:  07/08/20 1937    Narrative:       CT cervical spine without contrast    HISTORY: Fell    Nonenhanced axial scans of the cervical spine were obtained.  Sagittal and coronal reconstructions were performed.    This exam was performed according to our departmental  dose-optimization program, which includes automated exposure  control, adjustment of the mA and/or kV according to patient size  and/or use of iterative reconstruction technique.    CT DLP: 454.30    Findings:  Normal cervical lordosis.   Vertebral height maintained.   No fracture identified.   Multilevel facet arthropathy with resultant minimal  retrolisthesis of C3 on C4 and minimal anterolisthesis of C4 on  C5 and C5 on C6.  Degenerative disc disease C6-7.    Chronic obstructive pulmonary disease.  5 mm right  upper lobe pulmonary nodule.  Sternotomy.      Impression:       CONCLUSION:  No cervical fracture.  Multilevel facet arthropathy with resultant minimal  retrolisthesis of C3 on C4 and minimal anterolisthesis of C4 on  C5 and C5 on C6.  Degenerative disc disease C6-7.  Chronic obstructive pulmonary disease.  5 mm right upper lobe pulmonary nodule.  No follow-up recommended as below.  Sternotomy.      2017 Fleischner Society Recommendations for Single Solid Lung  Nodule Follow-Up based on size (average of long- and short-axis  diameters)    <6 mm Low-Risk Patient: No routine follow-up   <6 mm High-Risk Patient: Optional CT at 12 months    6-8 mm Low-Risk Patient: CT at 6-12 months then consider CT at  18-24 months  6-8 mm High-Risk Patient: CT at 6-12 months then CT at 18-24  months    >8 mm Low-Risk Patient: Consider CT, PET/CT or tissue sampling at  3 months  >8 mm High-Risk Patient: Same as for low-risk patient     [id number]    Electronically signed by:  Emmanuel Burris MD  7/8/2020 7:36 PM CDT  Workstation: [id number]    CT Head Without Contrast [[id number]] Collected:  07/08/20 1850     Updated:  07/08/20 1931    Narrative:         CT Head Without Contrast    History: Fell. Laceration left eyebrow.    Axial scans of the brain were obtained without intravenous  contrast.  Coronal and sagital reconstructions were preformed.    This exam was performed according to our departmental  dose-optimization program, which includes automated exposure  control, adjustment of the mA and/or kV according to patient size  and/or use of iterative reconstruction technique.    DLP: 964.00    Comparison: None    Findings:  Bone windows are unremarkable.  The visualized paranasal sinuses are unremarkable.    No intracranial injury or acute process.  Cerebral and cerebellar atrophy.  Old lacunar infarcts in the basal ganglia.  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  Moderate small vessel disease.   No hemorrhage.  No mass.  No abnormal areas of  increased attenuation.  No midline shift.  No abnormal extra-axial fluid collections.      Impression:       CONCLUSION:  No intracranial injury or acute process.  Cerebral and cerebellar atrophy.  Old lacunar infarcts in the basal ganglia.  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  Moderate small vessel disease.    [id number]    Electronically signed by:  Emmanuel Burris MD  7/8/2020 7:30 PM CDT  Workstation: [id number]    XR Hip With or Without Pelvis 2 - 3 View Left [[id number]] Collected:  07/08/20 1840     Updated:  07/08/20 1915    Narrative:         Two view left hip with single view pelvis    HISTORY: Pain after falling. Syncope and collapse.    AP and crosstable lateral lateral views of the left hip and AP  film of the pelvis obtained.    COMPARISON: None    FINDINGS:   Comminuted minimally displaced transcervical fracture left hip.  No dislocation of the femoral head.  Dextroscoliosis lumbar spine.  Multilevel degenerative disc disease lumbar spine..  No other osseous or articular abnormality.    Pelvic phleboliths.  Atherosclerotic calcification iliac and femoral arteries.      Impression:       CONCLUSION:  Comminuted minimally displaced transcervical fracture left hip.    [id number]    Electronically signed by:  Emmanuel Burris MD  7/8/2020 7:14 PM CDT  Workstation: [id number]    XR Chest 1 View [[id number]] Collected:  07/08/20 1840     Updated:  07/08/20 1913    Narrative:         PORTABLE CHEST    HISTORY: Chest pain. Syncope and collapse.    Portable AP supine film of the chest was obtained at 6:45 PM.  COMPARISON: None    FINDINGS:   EKG leads.  Bilateral linear atelectasis or scar.  Sternotomy.  The heart is not enlarged.  The pulmonary vasculature is not increased.  No pleural effusion.  No pneumothorax.  No acute osseous abnormality.  Degenerative changes are present in the thoracic spine.      Impression:       CONCLUSION:  Bilateral linear atelectasis or scar.  Sternotomy.    [id number]    Electronically signed by:  Emmanuel  Dayton JERNIGAN  7/8/2020 7:12 PM CDT  Workstation: [id number]            Assessment:    Active Hospital Problems    Diagnosis   • Syncope and collapse         1.  Syncope and collapse  - Negative trauma work-up except for hip fracture  - Monitor with history of pacemaker deferral  -Negative head CT    2.  Reported arrhythmia versus sick sinus syndrome  -Monitor    3.  Dyslipidemia  -Continue meds    4.  Hypertension  -Check orthostatics    5.  Elevated random blood sugar  -Check A1c      CODE STATUS patient is full code    Prophylaxis  - Subcu Lovenox    Patient with very minimal daily activities not able to tell if he is able to do 3 METS he certainly will need to have cardiology consult with syncopal event and told in the past he needs to have pacemaker certainly cannot rule out bradycardia etc.    Discussed with ED physician as well as ED nursing please see orders cardiology consult in the a.m.            Misha Parada MD                Electronically signed by Misha Parada MD at 07/09/20 0737          Emergency Department Notes      Esequiel Sweeney MD at 07/08/20 1805      Procedure Orders    1. Laceration Repair [[id number]] ordered by Jordan Aguero MD at 07/08/20 1827                Subjective     History provided by:  Patient   used: No    Patient is 85 years old male who has past medical history of coronary artery disease present today because of syncope episode which happened about 1 hour ago.  Patient fell and hit his head.  Also complained having some left hip pain.  Denies any chest pain or shortness of breath.    Review of Systems   All other systems reviewed and are negative.      Past Medical History:   Diagnosis Date   • Abnormal ECG        Allergies   Allergen Reactions   • Hydrochlorothiazide Swelling     TONGUE SWELLING       Past Surgical History:   Procedure Laterality Date   • CORONARY STENT PLACEMENT         Family History   Problem Relation Age of Onset   •  Heart failure Mother    • Heart failure Father        Social History     Socioeconomic History   • Marital status:      Spouse name: Not on file   • Number of children: Not on file   • Years of education: Not on file   • Highest education level: Not on file   Tobacco Use   • Smoking status: Former Smoker     Last attempt to quit:      Years since quittin.5   • Smokeless tobacco: Never Used   Substance and Sexual Activity   • Alcohol use: No     Frequency: Never   • Drug use: No           Objective   Physical Exam   Constitutional: He is oriented to person, place, and time. He appears well-developed and well-nourished.   HENT:   Head: Normocephalic and atraumatic.   Right Ear: External ear normal.   Left Ear: External ear normal.   Nose: Nose normal.   Mouth/Throat: Oropharynx is clear and moist.   Neck: Normal range of motion. Neck supple.   Cardiovascular: Normal rate, regular rhythm, normal heart sounds and intact distal pulses.   Pulmonary/Chest: Effort normal and breath sounds normal.   Abdominal: Soft. Bowel sounds are normal.   Neurological: He is alert and oriented to person, place, and time.   Skin: Skin is warm. Capillary refill takes less than 2 seconds. Laceration noted.        Nursing note and vitals reviewed.      Laceration Repair  Date/Time: 2020 6:27 PM  Performed by: Jordan Aguero MD  Authorized by: Jordan Aguero MD     Consent:     Consent obtained:  Verbal    Consent given by:  Patient    Risks discussed:  Pain, retained foreign body, poor cosmetic result, tendon damage and vascular damage    Alternatives discussed:  Delayed treatment  Anesthesia (see MAR for exact dosages):     Anesthesia method:  Local infiltration    Local anesthetic:  Lidocaine 1% WITH epi  Laceration details:     Location:  Face    Face location:  L eyebrow    Length (cm):  2    Depth (mm):  0.5  Pre-procedure details:     Preparation:  Patient was prepped and draped in usual sterile  fashion  Exploration:     Hemostasis achieved with:  Direct pressure    Wound exploration: entire depth of wound probed and visualized      Contaminated: no    Treatment:     Area cleansed with:  Hibiclens and saline    Amount of cleaning:  Extensive    Irrigation method:  Syringe    Visualized foreign bodies/material removed: no    Skin repair:     Repair method:  Sutures    Suture size:  5-0    Suture technique:  Simple interrupted    Number of sutures:  5  Approximation:     Approximation:  Close  Post-procedure details:     Dressing:  Antibiotic ointment    Patient tolerance of procedure:  Tolerated well, no immediate complications              ED Course                                 Labs Reviewed   CBC WITH AUTO DIFFERENTIAL - Abnormal; Notable for the following components:       Result Value    RBC 3.96 (*)     Hemoglobin 12.4 (*)     Hematocrit 36.8 (*)     Lymphocyte % 17.9 (*)     Monocyte % 3.9 (*)     Neutrophils, Absolute 7.73 (*)     All other components within normal limits   TROPONIN (IN-HOUSE) - Normal    Narrative:     Troponin T Reference Range:  <= 0.03 ng/mL-   Negative for AMI  >0.03 ng/mL-     Abnormal for myocardial necrosis.  Clinicians would have to utilize clinical acumen, EKG, Troponin and serial changes to determine if it is an Acute Myocardial Infarction or myocardial injury due to an underlying chronic condition.       Results may be falsely decreased if patient taking Biotin.     BNP (IN-HOUSE) - Normal    Narrative:     Among patients with dyspnea, NT-proBNP is highly sensitive for the detection of acute congestive heart failure. In addition NT-proBNP of <300 pg/ml effectively rules out acute congestive heart failure with 99% negative predictive value.    Results may be falsely decreased if patient taking Biotin.     COMPREHENSIVE METABOLIC PANEL    Narrative:     GFR Normal >60  Chronic Kidney Disease <60  Kidney Failure <15     RAINB[place] DRAW    Narrative:     The following orders  were created for panel order [place] Draw.  Procedure                               Abnormality         Status                     ---------                               -----------         ------                     Light Blue Top[[id number]]                                   In process                 Green Top (Gel)[[id number]]                                  In process                 Lavender Top[[id number]]                                     In process                 Gold Top - SST[[id number]]                                   In process                   Please view results for these tests on the individual orders.   TROPONIN (IN-HOUSE)   CBC AND DIFFERENTIAL    Narrative:     The following orders were created for panel order CBC & Differential.  Procedure                               Abnormality         Status                     ---------                               -----------         ------                     CBC Auto Differential[[id number]]        Abnormal            Final result                 Please view results for these tests on the individual orders.   LIGHT BLUE TOP   GREEN TOP   LAVENDER TOP   GOLD TOP - SST       CT Head Without Contrast   Final Result   CONCLUSION:   No intracranial injury or acute process.   Cerebral and cerebellar atrophy.   Old lacunar infarcts in the basal ganglia.   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   Moderate small vessel disease.      [id number]      Electronically signed by:  Emmanuel Burris MD  7/8/2020 7:30 PM CDT   Workstation: [id number]      CT Cervical Spine Without Contrast   Final Result   CONCLUSION:   No cervical fracture.   Multilevel facet arthropathy with resultant minimal   retrolisthesis of C3 on C4 and minimal anterolisthesis of C4 on   C5 and C5 on C6.   Degenerative disc disease C6-7.   Chronic obstructive pulmonary disease.   5 mm right upper lobe pulmonary nodule.   No follow-up recommended as below.   Sternotomy.         2017 Fleischner Society  Recommendations for Single Solid Lung   Nodule Follow-Up based on size (average of long- and short-axis   diameters)      <6 mm Low-Risk Patient: No routine follow-up    <6 mm High-Risk Patient: Optional CT at 12 months      6-8 mm Low-Risk Patient: CT at 6-12 months then consider CT at   18-24 months   6-8 mm High-Risk Patient: CT at 6-12 months then CT at 18-24   months      >8 mm Low-Risk Patient: Consider CT, PET/CT or tissue sampling at   3 months   >8 mm High-Risk Patient: Same as for low-risk patient       [id number]      Electronically signed by:  Emmanuel Burris MD  7/8/2020 7:36 PM CDT   Workstation: [id number]      XR Chest 1 View   Final Result   CONCLUSION:   Bilateral linear atelectasis or scar.   Sternotomy.      [id number]      Electronically signed by:  Emmanuel Burris MD  7/8/2020 7:12 PM CDT   Workstation: [id number]      XR Hip With or Without Pelvis 2 - 3 View Left   Final Result   CONCLUSION:   Comminuted minimally displaced transcervical fracture left hip.      [id number]      Electronically signed by:  Emmanuel Burris MD  7/8/2020 7:14 PM CDT   Workstation: [id number]        Patient with EKG unchanged from July 2019.  No bradycardia or acute dysrhythmia noted.  Patient with significant history of syncope lately, was recommended purportedly for pacemaker placement by his cardiologist.  Patient now with a left transcervical hip fracture.  Case discussed with orthopedics.  Patient be admitted for cardiology consultation preop and surgical management.          MDM    Final diagnoses:   Syncope and collapse   Facial laceration, initial encounter   Closed fracture of left hip, initial encounter (CMS/[facility])   Fall, initial encounter   Closed head injury, initial encounter            Esequiel Sweeney MD  07/08/20 2008      Electronically signed by Esequiel Sweeney MD at 07/08/20 2008     Josephine Elias, RN at 07/08/20 5338        Pt presents to ED via EMS from home after a syncopal episode when standing. Pt C/O left  hip pain when moving and has laceration above left eyebrow.     Josephine Elias RN  07/08/20 1809      Electronically signed by Josephine Elias RN at 07/08/20 1809         Facility-Administered Medications as of 7/9/2020   Medication Dose Route Frequency Provider Last Rate Last Dose   • albuterol (PROVENTIL) nebulizer solution 0.083% 2.5 mg/3mL  2.5 mg Nebulization Q4H PRN Misha Parada MD       • amitriptyline (ELAVIL) tablet 100 mg  100 mg Oral Nightly Misha Parada MD   100 mg at 07/08/20 2333   • aspirin EC tablet 81 mg  81 mg Oral Daily Misha Parada MD       • atorvastatin (LIPITOR) tablet 20 mg  20 mg Oral Daily Misha Parada MD       • gabapentin (NEURONTIN) capsule 400 mg  400 mg Oral Q8H Misha Parada MD   400 mg at 07/08/20 2333   • heparin (porcine) 5000 UNIT/ML injection 5,000 Units  5,000 Units Subcutaneous Q8H Misha Parada MD   5,000 Units at 07/09/20 0600   • ipratropium-albuterol (DUO-NEB) nebulizer solution 3 mL  3 mL Nebulization Once Esequiel Sweeney MD       • [COMPLETED] lidocaine-EPINEPHrine (XYLOCAINE W/EPI) 1 %-1:645203 injection 10 mL  10 mL Injection Once Jordan Aguero MD   10 mL at 07/08/20 1830   • losartan (COZAAR) tablet 25 mg  25 mg Oral Daily Misha Parada MD       • meloxicam (MOBIC) tablet 15 mg  15 mg Oral Daily Misha Parada MD       • metoprolol succinate XL (TOPROL-XL) 24 hr tablet 25 mg  25 mg Oral Daily Misha Parada MD       • [COMPLETED] morphine injection 4 mg  4 mg Intravenous Once Esequiel Sweeney MD   4 mg at 07/08/20 2051   • naloxone (NARCAN) injection 0.4 mg  0.4 mg Intravenous Q5 Min PRN Lakhwinder Kumar APRN       • ondansetron (ZOFRAN) injection 4 mg  4 mg Intravenous Q6H PRN Misha Parada MD       • oxybutynin (DITROPAN) half tablet 2.5 mg  2.5 mg Oral BID Misha Parada MD   2.5 mg at 07/08/20 3359   • pantoprazole (PROTONIX) EC tablet 40 mg  40 mg Oral EDE Parada  Misha FARNSWORTH MD       • rOPINIRole (REQUIP) tablet 0.5 mg  0.5 mg Oral Daily Misha Parada MD       • sodium chloride 0.9 % flush 10 mL  10 mL Intravenous PRN Jordan Aguero MD       • sodium chloride 0.9 % flush 10 mL  10 mL Intravenous Q12H Misha Parada MD   10 mL at 07/09/20 0815   • sodium chloride 0.9 % flush 10 mL  10 mL Intravenous PRN Misha Parada MD           Lab Results (last 24 hours)     Procedure Component Value Units Date/Time    POC Glucose Once [[id number]]  (Normal) Collected:  07/09/20 0612    Specimen:  Blood Updated:  07/09/20 0724     Glucose 119 mg/dL      Comment: RN NotifiedOperator: [id number] NIKI REBAMeter ID: [id number]       Basic Metabolic Panel [[id number]]  (Abnormal) Collected:  07/09/20 0546    Specimen:  Blood Updated:  07/09/20 0618     Glucose 109 mg/dL      BUN 16 mg/dL      Creatinine 0.89 mg/dL      Sodium 138 mmol/L      Potassium 3.6 mmol/L      Chloride 103 mmol/L      CO2 24.0 mmol/L      Calcium 9.1 mg/dL      eGFR Non African Amer 81 mL/min/1.73      BUN/Creatinine Ratio 18.0     Anion Gap 11.0 mmol/L     Narrative:       GFR Normal >60  Chronic Kidney Disease <60  Kidney Failure <15      Troponin [[id number]]  (Normal) Collected:  07/09/20 0546    Specimen:  Blood Updated:  07/09/20 0617     Troponin T <0.010 ng/mL     Narrative:       Troponin T Reference Range:  <= 0.03 ng/mL-   Negative for AMI  >0.03 ng/mL-     Abnormal for myocardial necrosis.  Clinicians would have to utilize clinical acumen, EKG, Troponin and serial changes to determine if it is an Acute Myocardial Infarction or myocardial injury due to an underlying chronic condition.       Results may be falsely decreased if patient taking Biotin.      CBC & Differential [[id number]] Collected:  07/09/20 0546    Specimen:  Blood Updated:  07/09/20 0600    Narrative:       The following orders were created for panel order CBC & Differential.  Procedure                                Abnormality         Status                     ---------                               -----------         ------                     CBC Auto Differential[[id number]]        Abnormal            Final result                 Please view results for these tests on the individual orders.    CBC Auto Differential [[id number]]  (Abnormal) Collected:  07/09/20 0546    Specimen:  Blood Updated:  07/09/20 0600     WBC 11.28 10*3/mm3      RBC 3.88 10*6/mm3      Hemoglobin 12.2 g/dL      Hematocrit 35.9 %      MCV 92.5 fL      MCH 31.4 pg      MCHC 34.0 g/dL      RDW 14.5 %      RDW-SD 49.1 fl      MPV 10.3 fL      Platelets 175 10*3/mm3      Neutrophil % 78.0 %      Lymphocyte % 10.9 %      Monocyte % 6.0 %      Eosinophil % 3.7 %      Basophil % 1.0 %      Immature Grans % 0.4 %      Neutrophils, Absolute 8.79 10*3/mm3      Lymphocytes, Absolute 1.23 10*3/mm3      Monocytes, Absolute 0.68 10*3/mm3      Eosinophils, Absolute 0.42 10*3/mm3      Basophils, Absolute 0.11 10*3/mm3      Immature Grans, Absolute 0.05 10*3/mm3      nRBC 0.0 /100 WBC     Troponin [[id number]]  (Normal) Collected:  07/09/20 0115    Specimen:  Blood Updated:  07/09/20 0208     Troponin T <0.010 ng/mL     Narrative:       Troponin T Reference Range:  <= 0.03 ng/mL-   Negative for AMI  >0.03 ng/mL-     Abnormal for myocardial necrosis.  Clinicians would have to utilize clinical acumen, EKG, Troponin and serial changes to determine if it is an Acute Myocardial Infarction or myocardial injury due to an underlying chronic condition.       Results may be falsely decreased if patient taking Biotin.      [place] Draw [[id number]] Collected:  07/08/20 1924    Specimen:  Blood Updated:  07/08/20 2030    Narrative:       The following orders were created for panel order [place] Draw.  Procedure                               Abnormality         Status                     ---------                               -----------         ------                     Light Blue  Top[[id number]]                                   Final result               Green Top (Gel)[[id number]]                                  Final result               Lavender Top[[id number]]                                     Final result               Gold Top - SST[[id number]]                                   Final result                 Please view results for these tests on the individual orders.    Light Blue Top [[id number]] Collected:  07/08/20 1924    Specimen:  Blood Updated:  07/08/20 2030     Extra Tube hold for add-on     Comment: Auto resulted       Green Top (Gel) [[id number]] Collected:  07/08/20 1924    Specimen:  Blood Updated:  07/08/20 2030     Extra Tube Hold for add-ons.     Comment: Auto resulted.       Lavender Top [[id number]] Collected:  07/08/20 1924    Specimen:  Blood Updated:  07/08/20 2030     Extra Tube hold for add-on     Comment: Auto resulted       Gold Top - SST [[id number]] Collected:  07/08/20 1924    Specimen:  Blood Updated:  07/08/20 2030     Extra Tube Hold for add-ons.     Comment: Auto resulted.       Comprehensive Metabolic Panel [[id number]] Collected:  07/08/20 1924    Specimen:  Blood Updated:  07/08/20 1952     Glucose 89 mg/dL      BUN 17 mg/dL      Creatinine 1.07 mg/dL      Sodium 139 mmol/L      Potassium 3.5 mmol/L      Chloride 104 mmol/L      CO2 23.0 mmol/L      Calcium 9.3 mg/dL      Total Protein 7.1 g/dL      Albumin 4.10 g/dL      ALT (SGPT) 22 U/L      AST (SGOT) 32 U/L      Alkaline Phosphatase 108 U/L      Total Bilirubin 0.2 mg/dL      eGFR Non African Amer 66 mL/min/1.73      Globulin 3.0 gm/dL      A/G Ratio 1.4 g/dL      BUN/Creatinine Ratio 15.9     Anion Gap 12.0 mmol/L     Narrative:       GFR Normal >60  Chronic Kidney Disease <60  Kidney Failure <15      Troponin [[id number]]  (Normal) Collected:  07/08/20 1924    Specimen:  Blood Updated:  07/08/20 1949     Troponin T <0.010 ng/mL     Narrative:       Troponin T Reference Range:  <= 0.03 ng/mL-    Negative for AMI  >0.03 ng/mL-     Abnormal for myocardial necrosis.  Clinicians would have to utilize clinical acumen, EKG, Troponin and serial changes to determine if it is an Acute Myocardial Infarction or myocardial injury due to an underlying chronic condition.       Results may be falsely decreased if patient taking Biotin.      BNP [[id number]]  (Normal) Collected:  07/08/20 1924    Specimen:  Blood Updated:  07/08/20 1949     proBNP 1,741.0 pg/mL     Narrative:       Among patients with dyspnea, NT-proBNP is highly sensitive for the detection of acute congestive heart failure. In addition NT-proBNP of <300 pg/ml effectively rules out acute congestive heart failure with 99% negative predictive value.    Results may be falsely decreased if patient taking Biotin.      CBC & Differential [[id number]] Collected:  07/08/20 1924    Specimen:  Blood Updated:  07/08/20 1930    Narrative:       The following orders were created for panel order CBC & Differential.  Procedure                               Abnormality         Status                     ---------                               -----------         ------                     CBC Auto Differential[[id number]]        Abnormal            Final result                 Please view results for these tests on the individual orders.    CBC Auto Differential [[id number]]  (Abnormal) Collected:  07/08/20 1924    Specimen:  Blood Updated:  07/08/20 1930     WBC 10.40 10*3/mm3      RBC 3.96 10*6/mm3      Hemoglobin 12.4 g/dL      Hematocrit 36.8 %      MCV 92.9 fL      MCH 31.3 pg      MCHC 33.7 g/dL      RDW 14.6 %      RDW-SD 50.3 fl      MPV 10.0 fL      Platelets 203 10*3/mm3      Neutrophil % 74.3 %      Lymphocyte % 17.9 %      Monocyte % 3.9 %      Eosinophil % 2.8 %      Basophil % 0.7 %      Immature Grans % 0.4 %      Neutrophils, Absolute 7.73 10*3/mm3      Lymphocytes, Absolute 1.86 10*3/mm3      Monocytes, Absolute 0.41 10*3/mm3      Eosinophils, Absolute 0.29  10*3/mm3      Basophils, Absolute 0.07 10*3/mm3      Immature Grans, Absolute 0.04 10*3/mm3      nRBC 0.0 /100 WBC         Imaging Results (Last 24 Hours)     Procedure Component Value Units Date/Time    CT Cervical Spine Without Contrast [[id number]] Collected:  07/08/20 1850     Updated:  07/08/20 1937    Narrative:       CT cervical spine without contrast    HISTORY: Fell    Nonenhanced axial scans of the cervical spine were obtained.  Sagittal and coronal reconstructions were performed.    This exam was performed according to our departmental  dose-optimization program, which includes automated exposure  control, adjustment of the mA and/or kV according to patient size  and/or use of iterative reconstruction technique.    CT DLP: 454.30    Findings:  Normal cervical lordosis.   Vertebral height maintained.   No fracture identified.   Multilevel facet arthropathy with resultant minimal  retrolisthesis of C3 on C4 and minimal anterolisthesis of C4 on  C5 and C5 on C6.  Degenerative disc disease C6-7.    Chronic obstructive pulmonary disease.  5 mm right upper lobe pulmonary nodule.  Sternotomy.      Impression:       CONCLUSION:  No cervical fracture.  Multilevel facet arthropathy with resultant minimal  retrolisthesis of C3 on C4 and minimal anterolisthesis of C4 on  C5 and C5 on C6.  Degenerative disc disease C6-7.  Chronic obstructive pulmonary disease.  5 mm right upper lobe pulmonary nodule.  No follow-up recommended as below.  Sternotomy.      2017 Fleischner Society Recommendations for Single Solid Lung  Nodule Follow-Up based on size (average of long- and short-axis  diameters)    <6 mm Low-Risk Patient: No routine follow-up   <6 mm High-Risk Patient: Optional CT at 12 months    6-8 mm Low-Risk Patient: CT at 6-12 months then consider CT at  18-24 months  6-8 mm High-Risk Patient: CT at 6-12 months then CT at 18-24  months    >8 mm Low-Risk Patient: Consider CT, PET/CT or tissue sampling at  3 months  >8 mm  High-Risk Patient: Same as for low-risk patient     [id number]    Electronically signed by:  Emmanuel Burris MD  7/8/2020 7:36 PM CDT  Workstation: [id number]    CT Head Without Contrast [[id number]] Collected:  07/08/20 1850     Updated:  07/08/20 1931    Narrative:         CT Head Without Contrast    History: Fell. Laceration left eyebrow.    Axial scans of the brain were obtained without intravenous  contrast.  Coronal and sagital reconstructions were preformed.    This exam was performed according to our departmental  dose-optimization program, which includes automated exposure  control, adjustment of the mA and/or kV according to patient size  and/or use of iterative reconstruction technique.    DLP: 964.00    Comparison: None    Findings:  Bone windows are unremarkable.  The visualized paranasal sinuses are unremarkable.    No intracranial injury or acute process.  Cerebral and cerebellar atrophy.  Old lacunar infarcts in the basal ganglia.  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  Moderate small vessel disease.   No hemorrhage.  No mass.  No abnormal areas of increased attenuation.  No midline shift.  No abnormal extra-axial fluid collections.      Impression:       CONCLUSION:  No intracranial injury or acute process.  Cerebral and cerebellar atrophy.  Old lacunar infarcts in the basal ganglia.  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  Moderate small vessel disease.    [id number]    Electronically signed by:  Emmanuel Burris MD  7/8/2020 7:30 PM CDT  Workstation: [id number]    XR Hip With or Without Pelvis 2 - 3 View Left [[id number]] Collected:  07/08/20 1840     Updated:  07/08/20 1915    Narrative:         Two view left hip with single view pelvis    HISTORY: Pain after falling. Syncope and collapse.    AP and crosstable lateral lateral views of the left hip and AP  film of the pelvis obtained.    COMPARISON: None    FINDINGS:   Comminuted minimally displaced transcervical fracture left hip.  No dislocation of the femoral  head.  Dextroscoliosis lumbar spine.  Multilevel degenerative disc disease lumbar spine..  No other osseous or articular abnormality.    Pelvic phleboliths.  Atherosclerotic calcification iliac and femoral arteries.      Impression:       CONCLUSION:  Comminuted minimally displaced transcervical fracture left hip.    [id number]    Electronically signed by:  Emmanuel Burris MD  7/8/2020 7:14 PM CDT  Workstation: [id number]    XR Chest 1 View [[id number]] Collected:  07/08/20 1840     Updated:  07/08/20 1913    Narrative:         PORTABLE CHEST    HISTORY: Chest pain. Syncope and collapse.    Portable AP supine film of the chest was obtained at 6:45 PM.  COMPARISON: None    FINDINGS:   EKG leads.  Bilateral linear atelectasis or scar.  Sternotomy.  The heart is not enlarged.  The pulmonary vasculature is not increased.  No pleural effusion.  No pneumothorax.  No acute osseous abnormality.  Degenerative changes are present in the thoracic spine.      Impression:       CONCLUSION:  Bilateral linear atelectasis or scar.  Sternotomy.    [id number]    Electronically signed by:  Emmanuel Burris MD  7/8/2020 7:12 PM CDT  Workstation: [id number]        ECG/EMG Results (last 24 hours)     Procedure Component Value Units Date/Time    ECG 12 Lead [[id number]] Collected:  07/08/20 1810     Updated:  07/08/20 1820    ECG 12 Lead [[id number]] Collected:  07/08/20 2236     Updated:  07/08/20 2236          Physician Progress Notes (last 24 hours) (Notes from 07/08/20 0829 through 07/09/20 0829)    No notes of this type exist for this encounter.            Consult Notes (last 24 hours) (Notes from 07/08/20 0829 through 07/09/20 0829)      Jitendra Solis MD at 07/09/20 0632      Consult Orders    1. Orthopedics (on-call MD unless specified) [[id number]] ordered by Esequiel Sweeney MD at 07/08/20 2004                ORTHOPAEDIC CONSULT     Patient Name:  Jose Dacosta  Admit Date:  7/8/2020  Consult Date:  7/9/2020      Referring Provider:   Karma  Reason for Consultation: Left hip fracture    Patient Care Team:  Terry Peterson MD as PCP - General    Chief complaint: Left hip pain    Subjective .     History of present illness: 85-year-old apparently fell at his home yesterday injuring his left hip.  He is a limited household ambulator at best.  He is admitted by the medicine service.  Does have a fairly significant cardiac history.  Medicine is recommended cardiac clearance prior to surgery.    Review of Systems:  Review of Systems   Constitutional: Positive for activity change. Negative for chills and fever.   HENT: Negative for facial swelling.    Eyes: Negative for photophobia.   Respiratory: Negative for apnea and shortness of breath.    Cardiovascular: Negative for chest pain and leg swelling.   Gastrointestinal: Negative for abdominal pain, nausea and vomiting.   Genitourinary: Negative for dysuria.   Musculoskeletal: Positive for arthralgias and gait problem.   Skin: Negative for color change and rash.   Neurological: Negative for seizures and syncope.   Psychiatric/Behavioral: Negative for behavioral problems and dysphoric mood.      Otherwise complete ROS is negative except as mentioned above.    History:  Allergies   Allergen Reactions   • Hydrochlorothiazide Swelling     TONGUE SWELLING       Prior to Admission medications    Medication Sig Start Date End Date Taking? Authorizing Provider   amitriptyline (ELAVIL) 100 MG tablet Take 100 mg by mouth every night at bedtime. 4/8/19  Yes Marcial Crain MD   aspirin 81 MG EC tablet Take 1 tablet by mouth Daily. 7/5/19  Yes Júnior Licona MD   atorvastatin (LIPITOR) 20 MG tablet Take 1 tablet by mouth.   Yes ProviderMarcial MD   COLCRYS 0.6 MG tablet Take 0.6 mg by mouth Daily. 7/5/19  Yes Marcial Crain MD   gabapentin (NEURONTIN) 600 MG tablet TAKE 1 TABLET BY MOUTH 3 (THREE) TIMES DAILY 6/27/19  Yes Marcial Crain MD   losartan (COZAAR) 25 MG tablet TAKE  1 TABLET BY MOUTH EVERY DAY 19  Yes Bessy Duval MD   meloxicam (MOBIC) 15 MG tablet Take 15 mg by mouth Daily. 19  Yes Marcial Crain MD   metoprolol succinate XL (TOPROL-XL) 25 MG 24 hr tablet TAKE 1 TABLET BY MOUTH EVERY DAY 19  Yes Júnior Licona MD   omeprazole (priLOSEC) 40 MG capsule Take 40 mg by mouth Every Morning. 19  Yes Marcial Crain MD   rOPINIRole (REQUIP) 0.5 MG tablet Take 0.5 mg by mouth Daily. 5/3/19  Yes Marcial Crain MD   tolterodine (DETROL) 2 MG tablet Take 2 mg by mouth 2 (Two) Times a Day.   Yes Marcial Crain MD   albuterol sulfate  (90 Base) MCG/ACT inhaler INHALE 2 PUFFS INTO THE LUNGS EVERY 6 (SIX) HOURS AS NEEDED FOR WHEEZING 19   Marcial Crain MD       Past Medical History:   Diagnosis Date   • Abnormal ECG        Past Surgical History:   Procedure Laterality Date   • CORONARY STENT PLACEMENT         Social History     Socioeconomic History   • Marital status:      Spouse name: Not on file   • Number of children: Not on file   • Years of education: Not on file   • Highest education level: Not on file   Tobacco Use   • Smoking status: Former Smoker     Last attempt to quit:      Years since quittin.5   • Smokeless tobacco: Never Used   Substance and Sexual Activity   • Alcohol use: No     Frequency: Never   • Drug use: No       Family History   Problem Relation Age of Onset   • Heart failure Mother    • Heart failure Father        Objective     Vital Signs   Temp:  [98 °F (36.7 °C)-98.6 °F (37 °C)] 98.6 °F (37 °C)  Heart Rate:  [] 90  Resp:  [16-26] 19  BP: (120-194)/(60-95) 127/80    Physical Exam:  Physical Exam   Constitutional: He is oriented to person, place, and time. He appears well-developed. No distress.   HENT:   Head: Normocephalic and atraumatic.   Eyes: Pupils are equal, round, and reactive to light. EOM are normal.   Neck: Neck supple. No tracheal deviation present.    Pulmonary/Chest: Effort normal.   Musculoskeletal: He exhibits tenderness and deformity. He exhibits no edema.   Neurological: He is alert and oriented to person, place, and time.   Skin: Skin is warm and dry. No erythema.   Psychiatric: He has a normal mood and affect.   Externally rotated hip is flexed.  Moves toes well.  Center exam is intact.    Results Review:    Imaging Results (Last 24 Hours)     Procedure Component Value Units Date/Time    CT Cervical Spine Without Contrast [[id number]] Collected:  07/08/20 1850     Updated:  07/08/20 1937    Narrative:       CT cervical spine without contrast    HISTORY: Fell    Nonenhanced axial scans of the cervical spine were obtained.  Sagittal and coronal reconstructions were performed.    This exam was performed according to our departmental  dose-optimization program, which includes automated exposure  control, adjustment of the mA and/or kV according to patient size  and/or use of iterative reconstruction technique.    CT DLP: 454.30    Findings:  Normal cervical lordosis.   Vertebral height maintained.   No fracture identified.   Multilevel facet arthropathy with resultant minimal  retrolisthesis of C3 on C4 and minimal anterolisthesis of C4 on  C5 and C5 on C6.  Degenerative disc disease C6-7.    Chronic obstructive pulmonary disease.  5 mm right upper lobe pulmonary nodule.  Sternotomy.      Impression:       CONCLUSION:  No cervical fracture.  Multilevel facet arthropathy with resultant minimal  retrolisthesis of C3 on C4 and minimal anterolisthesis of C4 on  C5 and C5 on C6.  Degenerative disc disease C6-7.  Chronic obstructive pulmonary disease.  5 mm right upper lobe pulmonary nodule.  No follow-up recommended as below.  Sternotomy.      2017 Fleischner Society Recommendations for Single Solid Lung  Nodule Follow-Up based on size (average of long- and short-axis  diameters)    <6 mm Low-Risk Patient: No routine follow-up   <6 mm High-Risk Patient: Optional  CT at 12 months    6-8 mm Low-Risk Patient: CT at 6-12 months then consider CT at  18-24 months  6-8 mm High-Risk Patient: CT at 6-12 months then CT at 18-24  months    >8 mm Low-Risk Patient: Consider CT, PET/CT or tissue sampling at  3 months  >8 mm High-Risk Patient: Same as for low-risk patient     [id number]    Electronically signed by:  Emmanuel Burris MD  7/8/2020 7:36 PM CDT  Workstation: [id number]    CT Head Without Contrast [[id number]] Collected:  07/08/20 1850     Updated:  07/08/20 1931    Narrative:         CT Head Without Contrast    History: Fell. Laceration left eyebrow.    Axial scans of the brain were obtained without intravenous  contrast.  Coronal and sagital reconstructions were preformed.    This exam was performed according to our departmental  dose-optimization program, which includes automated exposure  control, adjustment of the mA and/or kV according to patient size  and/or use of iterative reconstruction technique.    DLP: 964.00    Comparison: None    Findings:  Bone windows are unremarkable.  The visualized paranasal sinuses are unremarkable.    No intracranial injury or acute process.  Cerebral and cerebellar atrophy.  Old lacunar infarcts in the basal ganglia.  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  Moderate small vessel disease.   No hemorrhage.  No mass.  No abnormal areas of increased attenuation.  No midline shift.  No abnormal extra-axial fluid collections.      Impression:       CONCLUSION:  No intracranial injury or acute process.  Cerebral and cerebellar atrophy.  Old lacunar infarcts in the basal ganglia.  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  Moderate small vessel disease.    [id number]    Electronically signed by:  Emmanuel Burris MD  7/8/2020 7:30 PM CDT  Workstation: [id number]    XR Hip With or Without Pelvis 2 - 3 View Left [[id number]] Collected:  07/08/20 1840     Updated:  07/08/20 1915    Narrative:         Two view left hip with single view pelvis    HISTORY: Pain after falling. Syncope  and collapse.    AP and crosstable lateral lateral views of the left hip and AP  film of the pelvis obtained.    COMPARISON: None    FINDINGS:   Comminuted minimally displaced transcervical fracture left hip.  No dislocation of the femoral head.  Dextroscoliosis lumbar spine.  Multilevel degenerative disc disease lumbar spine..  No other osseous or articular abnormality.    Pelvic phleboliths.  Atherosclerotic calcification iliac and femoral arteries.      Impression:       CONCLUSION:  Comminuted minimally displaced transcervical fracture left hip.    [id number]    Electronically signed by:  Emmanuel Burris MD  7/8/2020 7:14 PM CDT  Workstation: [id number]    XR Chest 1 View [[id number]] Collected:  07/08/20 1840     Updated:  07/08/20 1913    Narrative:         PORTABLE CHEST    HISTORY: Chest pain. Syncope and collapse.    Portable AP supine film of the chest was obtained at 6:45 PM.  COMPARISON: None    FINDINGS:   EKG leads.  Bilateral linear atelectasis or scar.  Sternotomy.  The heart is not enlarged.  The pulmonary vasculature is not increased.  No pleural effusion.  No pneumothorax.  No acute osseous abnormality.  Degenerative changes are present in the thoracic spine.      Impression:       CONCLUSION:  Bilateral linear atelectasis or scar.  Sternotomy.    [id number]    Electronically signed by:  Emmanuel Burris MD  7/8/2020 7:12 PM CDT  Workstation: [id number]          Lab Results (last 24 hours)     Procedure Component Value Units Date/Time    Basic Metabolic Panel [[id number]]  (Abnormal) Collected:  07/09/20 0546    Specimen:  Blood Updated:  07/09/20 0618     Glucose 109 mg/dL      BUN 16 mg/dL      Creatinine 0.89 mg/dL      Sodium 138 mmol/L      Potassium 3.6 mmol/L      Chloride 103 mmol/L      CO2 24.0 mmol/L      Calcium 9.1 mg/dL      eGFR Non African Amer 81 mL/min/1.73      BUN/Creatinine Ratio 18.0     Anion Gap 11.0 mmol/L     Narrative:       GFR Normal >60  Chronic Kidney Disease <60  Kidney Failure <15       Troponin [[id number]]  (Normal) Collected:  07/09/20 0546    Specimen:  Blood Updated:  07/09/20 0617     Troponin T <0.010 ng/mL     Narrative:       Troponin T Reference Range:  <= 0.03 ng/mL-   Negative for AMI  >0.03 ng/mL-     Abnormal for myocardial necrosis.  Clinicians would have to utilize clinical acumen, EKG, Troponin and serial changes to determine if it is an Acute Myocardial Infarction or myocardial injury due to an underlying chronic condition.       Results may be falsely decreased if patient taking Biotin.      CBC & Differential [[id number]] Collected:  07/09/20 0546    Specimen:  Blood Updated:  07/09/20 0600    Narrative:       The following orders were created for panel order CBC & Differential.  Procedure                               Abnormality         Status                     ---------                               -----------         ------                     CBC Auto Differential[[id number]]        Abnormal            Final result                 Please view results for these tests on the individual orders.    CBC Auto Differential [[id number]]  (Abnormal) Collected:  07/09/20 0546    Specimen:  Blood Updated:  07/09/20 0600     WBC 11.28 10*3/mm3      RBC 3.88 10*6/mm3      Hemoglobin 12.2 g/dL      Hematocrit 35.9 %      MCV 92.5 fL      MCH 31.4 pg      MCHC 34.0 g/dL      RDW 14.5 %      RDW-SD 49.1 fl      MPV 10.3 fL      Platelets 175 10*3/mm3      Neutrophil % 78.0 %      Lymphocyte % 10.9 %      Monocyte % 6.0 %      Eosinophil % 3.7 %      Basophil % 1.0 %      Immature Grans % 0.4 %      Neutrophils, Absolute 8.79 10*3/mm3      Lymphocytes, Absolute 1.23 10*3/mm3      Monocytes, Absolute 0.68 10*3/mm3      Eosinophils, Absolute 0.42 10*3/mm3      Basophils, Absolute 0.11 10*3/mm3      Immature Grans, Absolute 0.05 10*3/mm3      nRBC 0.0 /100 WBC     Troponin [[id number]]  (Normal) Collected:  07/09/20 0115    Specimen:  Blood Updated:  07/09/20 0208     Troponin T <0.010 ng/mL      Narrative:       Troponin T Reference Range:  <= 0.03 ng/mL-   Negative for AMI  >0.03 ng/mL-     Abnormal for myocardial necrosis.  Clinicians would have to utilize clinical acumen, EKG, Troponin and serial changes to determine if it is an Acute Myocardial Infarction or myocardial injury due to an underlying chronic condition.       Results may be falsely decreased if patient taking Biotin.      [place] Draw [[id number]] Collected:  07/08/20 1924    Specimen:  Blood Updated:  07/08/20 2030    Narrative:       The following orders were created for panel order [place] Draw.  Procedure                               Abnormality         Status                     ---------                               -----------         ------                     Light Blue Top[[id number]]                                   Final result               Green Top (Gel)[[id number]]                                  Final result               Lavender Top[[id number]]                                     Final result               Gold Top - SST[[id number]]                                   Final result                 Please view results for these tests on the individual orders.    Light Blue Top [[id number]] Collected:  07/08/20 1924    Specimen:  Blood Updated:  07/08/20 2030     Extra Tube hold for add-on     Comment: Auto resulted       Green Top (Gel) [[id number]] Collected:  07/08/20 1924    Specimen:  Blood Updated:  07/08/20 2030     Extra Tube Hold for add-ons.     Comment: Auto resulted.       Lavender Top [[id number]] Collected:  07/08/20 1924    Specimen:  Blood Updated:  07/08/20 2030     Extra Tube hold for add-on     Comment: Auto resulted       Gold Top - SST [[id number]] Collected:  07/08/20 1924    Specimen:  Blood Updated:  07/08/20 2030     Extra Tube Hold for add-ons.     Comment: Auto resulted.       Comprehensive Metabolic Panel [[id number]] Collected:  07/08/20 1924    Specimen:  Blood Updated:  07/08/20 1952     Glucose 89  mg/dL      BUN 17 mg/dL      Creatinine 1.07 mg/dL      Sodium 139 mmol/L      Potassium 3.5 mmol/L      Chloride 104 mmol/L      CO2 23.0 mmol/L      Calcium 9.3 mg/dL      Total Protein 7.1 g/dL      Albumin 4.10 g/dL      ALT (SGPT) 22 U/L      AST (SGOT) 32 U/L      Alkaline Phosphatase 108 U/L      Total Bilirubin 0.2 mg/dL      eGFR Non African Amer 66 mL/min/1.73      Globulin 3.0 gm/dL      A/G Ratio 1.4 g/dL      BUN/Creatinine Ratio 15.9     Anion Gap 12.0 mmol/L     Narrative:       GFR Normal >60  Chronic Kidney Disease <60  Kidney Failure <15      Troponin [[id number]]  (Normal) Collected:  07/08/20 1924    Specimen:  Blood Updated:  07/08/20 1949     Troponin T <0.010 ng/mL     Narrative:       Troponin T Reference Range:  <= 0.03 ng/mL-   Negative for AMI  >0.03 ng/mL-     Abnormal for myocardial necrosis.  Clinicians would have to utilize clinical acumen, EKG, Troponin and serial changes to determine if it is an Acute Myocardial Infarction or myocardial injury due to an underlying chronic condition.       Results may be falsely decreased if patient taking Biotin.      BNP [[id number]]  (Normal) Collected:  07/08/20 1924    Specimen:  Blood Updated:  07/08/20 1949     proBNP 1,741.0 pg/mL     Narrative:       Among patients with dyspnea, NT-proBNP is highly sensitive for the detection of acute congestive heart failure. In addition NT-proBNP of <300 pg/ml effectively rules out acute congestive heart failure with 99% negative predictive value.    Results may be falsely decreased if patient taking Biotin.      CBC & Differential [[id number]] Collected:  07/08/20 1924    Specimen:  Blood Updated:  07/08/20 1930    Narrative:       The following orders were created for panel order CBC & Differential.  Procedure                               Abnormality         Status                     ---------                               -----------         ------                     CBC Auto Differential[[id number]]         Abnormal            Final result                 Please view results for these tests on the individual orders.    CBC Auto Differential [[id number]]  (Abnormal) Collected:  07/08/20 1924    Specimen:  Blood Updated:  07/08/20 1930     WBC 10.40 10*3/mm3      RBC 3.96 10*6/mm3      Hemoglobin 12.4 g/dL      Hematocrit 36.8 %      MCV 92.9 fL      MCH 31.3 pg      MCHC 33.7 g/dL      RDW 14.6 %      RDW-SD 50.3 fl      MPV 10.0 fL      Platelets 203 10*3/mm3      Neutrophil % 74.3 %      Lymphocyte % 17.9 %      Monocyte % 3.9 %      Eosinophil % 2.8 %      Basophil % 0.7 %      Immature Grans % 0.4 %      Neutrophils, Absolute 7.73 10*3/mm3      Lymphocytes, Absolute 1.86 10*3/mm3      Monocytes, Absolute 0.41 10*3/mm3      Eosinophils, Absolute 0.29 10*3/mm3      Basophils, Absolute 0.07 10*3/mm3      Immature Grans, Absolute 0.04 10*3/mm3      nRBC 0.0 /100 WBC            I reviewed the patient's new clinical results.      Syncope and collapse    Closed fracture of neck of left femur (CMS/[facility])      Assessment:  Assessment/Plan       Plan:  This point patient is been seen by medicine.  They are recommending a cardiac consultation.  Patient will need to have bipolar endoprosthesis.  He is somewhat of a limited ambulator at home.  Certainly is a little medical risk.  He is a little obtunded this morning he received Narcan and is stimulated a responds and answers questions reasonably well this morning.  I have discussed the risk benefits of surgery including but not limited to bleeding, blood clot, infection, dislocation, fracture, need for further surgery, medical anesthetic complications, limb length inequality, prolonged rehab, etc.  Detailed informed consent is given we will go and proceed as planned presuming cardiac clearance is complete.    I discussed the patients findings and my recommendations with: Nursing staff, patient    Jitendra Solis MD  07/09/20  06:35    Time: 30  minutes          Electronically signed by Jitendra Solis MD at 07/09/20 0663

## 2020-07-09 NOTE — PROGRESS NOTES
[facility] Medicine Services  INPATIENT PROGRESS NOTE    Length of Stay: 1  Date of Admission: 7/8/2020  Primary Care Physician: Terry Peterson MD    Subjective   Chief Complaint: Fall  HPI:  85 year old male with a history of ischemic cardiomyopathy, HFrEF, and postural syncope, and HTN who presented to the ED after a syncopal episode which took place after standing from his chair.  He suffered facial trauma and a left hip fracture. CT of the head was negative.  Sutures were placed in the ED to facial laceration.  Orthopedics consulted and recommends hip arthroplasty after cardiac clearance.  He received morphine for pain and required narcan for oversedation.     Review of Systems   Unable to perform ROS: Mental status change        All pertinent negatives and positives are as above. All other systems have been reviewed and are negative unless otherwise stated.     Objective    Temp:  [97.6 °F (36.4 °C)-99 °F (37.2 °C)] 99 °F (37.2 °C)  Heart Rate:  [] 100  Resp:  [16-26] 20  BP: (115-194)/(60-95) 162/88    Physical Exam   Constitutional: He appears well-developed and well-nourished. He appears lethargic. No distress.   HENT:   Head: Normocephalic.   Facial lac   Eyes: Conjunctivae are normal.   Cardiovascular: Normal rate, regular rhythm and intact distal pulses.   Pulmonary/Chest: Effort normal and breath sounds normal. No respiratory distress.   Abdominal: Soft. Bowel sounds are normal. He exhibits no distension.   Musculoskeletal: He exhibits no edema or deformity.   Neurological: He appears lethargic.   Skin: Skin is warm and dry. He is not diaphoretic.   Vitals reviewed.    Results Review:  I have reviewed the labs, radiology results, and diagnostic studies.    Laboratory Data:   Results from last 7 days   Lab Units 07/09/20  0546 07/08/20  1924   SODIUM mmol/L 138 139   POTASSIUM mmol/L 3.6 3.5   CHLORIDE mmol/L 103 104   CO2 mmol/L 24.0 23.0   BUN  mg/dL 16 17   CREATININE mg/dL 0.89 1.07   GLUCOSE mg/dL 109* 89   CALCIUM mg/dL 9.1 9.3   BILIRUBIN mg/dL  --  0.2   ALK PHOS U/L  --  108   ALT (SGPT) U/L  --  22   AST (SGOT) U/L  --  32   ANION GAP mmol/L 11.0 12.0     Estimated Creatinine Clearance: 57.6 mL/min (by C-G formula based on SCr of 0.89 mg/dL).          Results from last 7 days   Lab Units 07/09/20  0546 07/08/20  1924   WBC 10*3/mm3 11.28* 10.40   HEMOGLOBIN g/dL 12.2* 12.4*   HEMATOCRIT % 35.9* 36.8*   PLATELETS 10*3/mm3 175 203           Culture Data:   No results found for: BLOODCX  No results found for: URINECX  No results found for: RESPCX  No results found for: WOUNDCX  No results found for: STOOLCX  No components found for: BODYFLD    Radiology Data:   Imaging Results (Last 24 Hours)     Procedure Component Value Units Date/Time    CT Cervical Spine Without Contrast [[id number]] Collected:  07/08/20 1850     Updated:  07/08/20 1937    Narrative:       CT cervical spine without contrast    HISTORY: Fell    Nonenhanced axial scans of the cervical spine were obtained.  Sagittal and coronal reconstructions were performed.    This exam was performed according to our departmental  dose-optimization program, which includes automated exposure  control, adjustment of the mA and/or kV according to patient size  and/or use of iterative reconstruction technique.    CT DLP: 454.30    Findings:  Normal cervical lordosis.   Vertebral height maintained.   No fracture identified.   Multilevel facet arthropathy with resultant minimal  retrolisthesis of C3 on C4 and minimal anterolisthesis of C4 on  C5 and C5 on C6.  Degenerative disc disease C6-7.    Chronic obstructive pulmonary disease.  5 mm right upper lobe pulmonary nodule.  Sternotomy.      Impression:       CONCLUSION:  No cervical fracture.  Multilevel facet arthropathy with resultant minimal  retrolisthesis of C3 on C4 and minimal anterolisthesis of C4 on  C5 and C5 on C6.  Degenerative disc disease  C6-7.  Chronic obstructive pulmonary disease.  5 mm right upper lobe pulmonary nodule.  No follow-up recommended as below.  Sternotomy.      2017 Fleischner Society Recommendations for Single Solid Lung  Nodule Follow-Up based on size (average of long- and short-axis  diameters)    <6 mm Low-Risk Patient: No routine follow-up   <6 mm High-Risk Patient: Optional CT at 12 months    6-8 mm Low-Risk Patient: CT at 6-12 months then consider CT at  18-24 months  6-8 mm High-Risk Patient: CT at 6-12 months then CT at 18-24  months    >8 mm Low-Risk Patient: Consider CT, PET/CT or tissue sampling at  3 months  >8 mm High-Risk Patient: Same as for low-risk patient     [id number]    Electronically signed by:  Emmanuel Burris MD  7/8/2020 7:36 PM CDT  Workstation: [id number]    CT Head Without Contrast [[id number]] Collected:  07/08/20 1850     Updated:  07/08/20 1931    Narrative:         CT Head Without Contrast    History: Fell. Laceration left eyebrow.    Axial scans of the brain were obtained without intravenous  contrast.  Coronal and sagital reconstructions were preformed.    This exam was performed according to our departmental  dose-optimization program, which includes automated exposure  control, adjustment of the mA and/or kV according to patient size  and/or use of iterative reconstruction technique.    DLP: 964.00    Comparison: None    Findings:  Bone windows are unremarkable.  The visualized paranasal sinuses are unremarkable.    No intracranial injury or acute process.  Cerebral and cerebellar atrophy.  Old lacunar infarcts in the basal ganglia.  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  Moderate small vessel disease.   No hemorrhage.  No mass.  No abnormal areas of increased attenuation.  No midline shift.  No abnormal extra-axial fluid collections.      Impression:       CONCLUSION:  No intracranial injury or acute process.  Cerebral and cerebellar atrophy.  Old lacunar infarcts in the basal ganglia.  Old left thalamic  lacunar infarct.  Moderate small vessel disease.    [id number]    Electronically signed by:  Emmanuel Burris MD  7/8/2020 7:30 PM CDT  Workstation: [id number]    XR Hip With or Without Pelvis 2 - 3 View Left [[id number]] Collected:  07/08/20 1840     Updated:  07/08/20 1915    Narrative:         Two view left hip with single view pelvis    HISTORY: Pain after falling. Syncope and collapse.    AP and crosstable lateral lateral views of the left hip and AP  film of the pelvis obtained.    COMPARISON: None    FINDINGS:   Comminuted minimally displaced transcervical fracture left hip.  No dislocation of the femoral head.  Dextroscoliosis lumbar spine.  Multilevel degenerative disc disease lumbar spine..  No other osseous or articular abnormality.    Pelvic phleboliths.  Atherosclerotic calcification iliac and femoral arteries.      Impression:       CONCLUSION:  Comminuted minimally displaced transcervical fracture left hip.    [id number]    Electronically signed by:  Emmanuel Burris MD  7/8/2020 7:14 PM CDT  Workstation: [id number]    XR Chest 1 View [[id number]] Collected:  07/08/20 1840     Updated:  07/08/20 1913    Narrative:         PORTABLE CHEST    HISTORY: Chest pain. Syncope and collapse.    Portable AP supine film of the chest was obtained at 6:45 PM.  COMPARISON: None    FINDINGS:   EKG leads.  Bilateral linear atelectasis or scar.  Sternotomy.  The heart is not enlarged.  The pulmonary vasculature is not increased.  No pleural effusion.  No pneumothorax.  No acute osseous abnormality.  Degenerative changes are present in the thoracic spine.      Impression:       CONCLUSION:  Bilateral linear atelectasis or scar.  Sternotomy.    [id number]    Electronically signed by:  Emmanuel Burris MD  7/8/2020 7:12 PM CDT  Workstation: [id number]          I have reviewed the patient's current medications.     Assessment/Plan     Active Hospital Problems    Diagnosis   • **Closed fracture of neck of left femur (CMS/HCC)   • Facial laceration   • HFrEF  (heart failure with reduced ejection fraction) (CMS/[facility])   • Syncope and collapse   • Ischemic cardiomyopathy   • Essential hypertension   • Coronary artery disease involving native coronary artery of native heart without angina pectoris       Plan:   Orthopedics consultation appreciated, plan for hip arthorplasty  Cardiology consultation appreciated, recommended proceeding with surgery  Echocardiogram  Pain control: Scheduled tylenol, PRN toradol, d/c narcotics  PRN narcan  Aspirin, statin, cozaar, toprol-xl  Facial suture removal, 5 days  VTE PPx: per orthopedics        The patient was evaluated during the global COVID-19 pandemic, and the diagnosis was suspected/considered upon their initial presentation.  Evaluation, treatment, and testing were consistent with current guidelines for patients who present with complaints or symptoms that may be related to COVID-19.        This document has been electronically signed by JAIME Grewal on July 9, 2020 15:39

## 2020-07-10 ENCOUNTER — APPOINTMENT (OUTPATIENT)
Dept: GENERAL RADIOLOGY | Facility: HOSPITAL | Age: 85
End: 2020-07-10

## 2020-07-10 PROBLEM — D62 POSTOPERATIVE ANEMIA DUE TO ACUTE BLOOD LOSS: Status: ACTIVE | Noted: 2020-07-10

## 2020-07-10 LAB
ANION GAP SERPL CALCULATED.3IONS-SCNC: 8 MMOL/L (ref 5–15)
BASOPHILS # BLD AUTO: 0.1 10*3/MM3 (ref 0–0.2)
BASOPHILS NFR BLD AUTO: 1 % (ref 0–1.5)
BUN SERPL-MCNC: 22 MG/DL (ref 8–23)
BUN/CREAT SERPL: 21.2 (ref 7–25)
CALCIUM SPEC-SCNC: 9.2 MG/DL (ref 8.6–10.5)
CHLORIDE SERPL-SCNC: 102 MMOL/L (ref 98–107)
CO2 SERPL-SCNC: 25 MMOL/L (ref 22–29)
CREAT SERPL-MCNC: 1.04 MG/DL (ref 0.76–1.27)
DEPRECATED RDW RBC AUTO: 48.7 FL (ref 37–54)
EOSINOPHIL # BLD AUTO: 0.49 10*3/MM3 (ref 0–0.4)
EOSINOPHIL NFR BLD AUTO: 4.9 % (ref 0.3–6.2)
ERYTHROCYTE [DISTWIDTH] IN BLOOD BY AUTOMATED COUNT: 14.5 % (ref 12.3–15.4)
GFR SERPL CREATININE-BSD FRML MDRD: 68 ML/MIN/1.73
GLUCOSE SERPL-MCNC: 113 MG/DL (ref 65–99)
HBA1C MFR BLD: 4.8 % (ref 4.8–5.6)
HCT VFR BLD AUTO: 30.4 % (ref 37.5–51)
HGB BLD-MCNC: 10.4 G/DL (ref 13–17.7)
IMM GRANULOCYTES # BLD AUTO: 0.04 10*3/MM3 (ref 0–0.05)
IMM GRANULOCYTES NFR BLD AUTO: 0.4 % (ref 0–0.5)
LYMPHOCYTES # BLD AUTO: 1.15 10*3/MM3 (ref 0.7–3.1)
LYMPHOCYTES NFR BLD AUTO: 11.4 % (ref 19.6–45.3)
MCH RBC QN AUTO: 31.4 PG (ref 26.6–33)
MCHC RBC AUTO-ENTMCNC: 34.2 G/DL (ref 31.5–35.7)
MCV RBC AUTO: 91.8 FL (ref 79–97)
MONOCYTES # BLD AUTO: 0.74 10*3/MM3 (ref 0.1–0.9)
MONOCYTES NFR BLD AUTO: 7.3 % (ref 5–12)
NEUTROPHILS NFR BLD AUTO: 7.56 10*3/MM3 (ref 1.7–7)
NEUTROPHILS NFR BLD AUTO: 75 % (ref 42.7–76)
NRBC BLD AUTO-RTO: 0 /100 WBC (ref 0–0.2)
PLATELET # BLD AUTO: 149 10*3/MM3 (ref 140–450)
PMV BLD AUTO: 10.4 FL (ref 6–12)
POTASSIUM SERPL-SCNC: 4.2 MMOL/L (ref 3.5–5.2)
RBC # BLD AUTO: 3.31 10*6/MM3 (ref 4.14–5.8)
SODIUM SERPL-SCNC: 135 MMOL/L (ref 136–145)
WBC # BLD AUTO: 10.08 10*3/MM3 (ref 3.4–10.8)

## 2020-07-10 PROCEDURE — 97162 PT EVAL MOD COMPLEX 30 MIN: CPT

## 2020-07-10 PROCEDURE — 25010000002 ENOXAPARIN PER 10 MG: Performed by: ORTHOPAEDIC SURGERY

## 2020-07-10 PROCEDURE — 80048 BASIC METABOLIC PNL TOTAL CA: CPT | Performed by: ORTHOPAEDIC SURGERY

## 2020-07-10 PROCEDURE — 99024 POSTOP FOLLOW-UP VISIT: CPT | Performed by: ORTHOPAEDIC SURGERY

## 2020-07-10 PROCEDURE — 85025 COMPLETE CBC W/AUTO DIFF WBC: CPT | Performed by: ORTHOPAEDIC SURGERY

## 2020-07-10 PROCEDURE — 73501 X-RAY EXAM HIP UNI 1 VIEW: CPT

## 2020-07-10 PROCEDURE — 97110 THERAPEUTIC EXERCISES: CPT

## 2020-07-10 PROCEDURE — 97530 THERAPEUTIC ACTIVITIES: CPT

## 2020-07-10 PROCEDURE — 83036 HEMOGLOBIN GLYCOSYLATED A1C: CPT | Performed by: ORTHOPAEDIC SURGERY

## 2020-07-10 PROCEDURE — 97166 OT EVAL MOD COMPLEX 45 MIN: CPT

## 2020-07-10 RX ADMIN — TRAMADOL HYDROCHLORIDE 25 MG: 50 TABLET, FILM COATED ORAL at 08:21

## 2020-07-10 RX ADMIN — METOPROLOL SUCCINATE 25 MG: 25 TABLET, FILM COATED, EXTENDED RELEASE ORAL at 08:21

## 2020-07-10 RX ADMIN — TRAMADOL HYDROCHLORIDE 25 MG: 50 TABLET, FILM COATED ORAL at 02:13

## 2020-07-10 RX ADMIN — ENOXAPARIN SODIUM 30 MG: 30 INJECTION SUBCUTANEOUS at 13:09

## 2020-07-10 RX ADMIN — ACETAMINOPHEN 1000 MG: 500 TABLET ORAL at 08:21

## 2020-07-10 RX ADMIN — PANTOPRAZOLE SODIUM 40 MG: 40 TABLET, DELAYED RELEASE ORAL at 06:01

## 2020-07-10 RX ADMIN — ROPINIROLE HYDROCHLORIDE 0.5 MG: 0.5 TABLET, FILM COATED ORAL at 08:21

## 2020-07-10 RX ADMIN — TRAMADOL HYDROCHLORIDE 25 MG: 50 TABLET, FILM COATED ORAL at 14:31

## 2020-07-10 RX ADMIN — TRAMADOL HYDROCHLORIDE 25 MG: 50 TABLET, FILM COATED ORAL at 20:35

## 2020-07-10 RX ADMIN — ACETAMINOPHEN 1000 MG: 500 TABLET ORAL at 17:55

## 2020-07-10 RX ADMIN — ACETAMINOPHEN 1000 MG: 500 TABLET ORAL at 20:31

## 2020-07-10 RX ADMIN — ATORVASTATIN CALCIUM 20 MG: 20 TABLET, FILM COATED ORAL at 08:21

## 2020-07-10 RX ADMIN — Medication 2.5 MG: at 08:21

## 2020-07-10 RX ADMIN — Medication 2.5 MG: at 20:30

## 2020-07-10 RX ADMIN — ASPIRIN 81 MG: 81 TABLET, COATED ORAL at 08:21

## 2020-07-10 RX ADMIN — ACETAMINOPHEN 1000 MG: 500 TABLET ORAL at 13:06

## 2020-07-10 NOTE — PLAN OF CARE
Problem: Patient Care Overview  Goal: Plan of Care Review  Outcome: Ongoing (interventions implemented as appropriate)  Flowsheets (Taken 7/10/2020 1611)  Progress: improving  Plan of Care Reviewed With: patient  Outcome Summary: pt responded well to PT. pt requires min-mod A for sup-sit-sup and SBA for sitting balance. at times pt will lean far R to get off of Lhip. pt stood x2 w/ min A and ambulated 6 ft w/ RW. pt participated in seated LE ther ex. no new goals met at this time. pt would continue to benefit from PT services.

## 2020-07-10 NOTE — PLAN OF CARE
Problem: Patient Care Overview  Goal: Plan of Care Review  Outcome: Ongoing (interventions implemented as appropriate)  Flowsheets (Taken 7/10/2020 0033)  Progress: no change  Plan of Care Reviewed With: patient  Outcome Summary: Patient has been very lethargic this shift, he is easily arousable and oriented. He has complained of pain which was treated with his scheduled meds. Sleeps between care. VSS, will continue to monitor.

## 2020-07-10 NOTE — PLAN OF CARE
Problem: Patient Care Overview  Goal: Plan of Care Review  Flowsheets  Taken 7/10/2020 1348  Plan of Care Reviewed With: patient  Taken 7/10/2020 1025  Outcome Summary: PT evaluation completed as co-eval with OT. Pt reports he is very sore from fall, but agreeable to therapy. Pt transferred supine to sit with min assistance and sit to supine with mod assistance. Pt transferred sit to stand to sit with moderate asisstance of 1-2. Pt ambulated 5ft x2 with moderate assistance of 2. Pt extremely impulsive and hearing deficit  made difficult for pt to hear instructions at times. Function limited by decreased strength, balance, and tolerance for functional mobility and activities. Pt will benefit from PT to regain lost function. Anticipate need for skilled PT at next level of care. Pt may need additional rehab at discharge prior to return home. If pt discharged home prior to goals being met, PT recommends 24/7 care with  therapy.

## 2020-07-10 NOTE — THERAPY EVALUATION
Acute Care - Occupational Therapy Initial Evaluation  [facility]     Patient Name: Jose Dacosta  : 1935  MRN: [id number]  Today's Date: 7/10/2020  Onset of Illness/Injury or Date of Surgery: 20(surgery yesterday (2020))  Date of Referral to OT: 07/10/20  Referring Physician: DAYANARA Kumar APRN(surgeon: DAJA Solis MD )    Admit Date: 2020       ICD-10-CM ICD-9-CM   1. Syncope and collapse R55 780.2   2. Facial laceration, initial encounter S01.81XA 873.40   3. Closed fracture of left hip, initial encounter (CMS/HCC) S72.002A 820.8   4. Fall, initial encounter W19.XXXA E888.9   5. Closed head injury, initial encounter S09.90XA 959.01   6. Closed fracture of neck of left femur, initial encounter (CMS/HCC) S72.002A 820.8   7. Impaired functional mobility, balance, gait, and endurance Z74.09 V49.89   8. Impaired mobility and ADLs Z74.09 V49.89    Z78.9      Patient Active Problem List   Diagnosis   • Coronary artery disease involving native coronary artery of native heart without angina pectoris   • Vasovagal syncope   • RBBB (right bundle branch block with left anterior fascicular block)   • Essential hypertension   • Ischemic cardiomyopathy   • Syncope and collapse   • Closed fracture of neck of left femur (CMS/[facility])   • Coronary artery disease with history of myocardial infarction without history of CABG   • Facial laceration   • HFrEF (heart failure with reduced ejection fraction) (CMS/[facility])   • Postoperative anemia due to acute blood loss     Past Medical History:   Diagnosis Date   • Abnormal ECG      Past Surgical History:   Procedure Laterality Date   • CORONARY STENT PLACEMENT            OT ASSESSMENT FLOWSHEET (last 12 hours)      Occupational Therapy Evaluation     Row Name 07/10/20 1026                   OT Evaluation Time/Intention    Subjective Information  no complaints  -ME        Document Type  evaluation  -ME        Mode of Treatment  co-treatment;occupational  therapy;physical therapy  -ME        Total Evaluation Minutes, Occupational Therapy  55  -ME        Patient Effort  good  -ME        Comment  pt is very impulsive, is incontinent; hearing deficit   -ME           General Information    Patient Profile Reviewed?  yes  -ME        Onset of Illness/Injury or Date of Surgery  07/08/20 surgery yesterday (07/09/2020)  -ME        Referring Physician  DAYANARA Kumar APRN surgeon: DAJA Solis MD   -ME        Patient Observations  alert;cooperative;agree to therapy  -ME        Patient/Family Observations  no family present   -ME        General Observations of Patient  supine in bed, on 3 LO2, tele, IV   -ME        Prior Level of Function  independent:;all household mobility;ADL's wife cleans;pt states he cooks;  1 x/ week   -ME        Equipment Currently Used at Home  cane, straight;walker, rolling uses the cane when in the community   -ME        Pertinent History of Current Functional Problem  s/p L hip mariama  -ME        Existing Precautions/Restrictions  fall;oxygen therapy device and L/min  -ME        Limitations/Impairments  safety/cognitive  -ME        Equipment Issued to Patient  gait belt  -ME        Risks Reviewed  patient:;nausea/vomiting;LOB;dizziness;increased discomfort;change in vital signs  -ME        Benefits Reviewed  patient:;improve function;increase independence;increase strength;increase balance;decrease pain;decrease risk of DVT  -ME        Barriers to Rehab  hearing deficit  -ME           Relationship/Environment    Lives With  spouse  -ME           Resource/Environmental Concerns    Current Living Arrangements  home/apartment/condo  -ME           Home Main Entrance    Number of Stairs, Main Entrance  four  -ME        Stair Railings, Main Entrance  railing on left side (ascending)  -ME           Stairs Within Home, Primary    Number of Stairs, Within Home, Primary  none  -ME           Cognitive Assessment/Intervention- PT/OT    Orientation Status  (Cognition)  oriented x 4  -ME        Follows Commands (Cognition)  follows one step commands;50-74% accuracy;delayed response/completion;increased processing time needed;initiation impaired;physical/tactile prompts required;repetition of directions required;verbal cues/prompting required  -ME        Safety Deficit (Cognitive)  moderate deficit;ability to follow commands;at risk behavior observed;awareness of need for assistance;impulsivity;insight into deficits/self awareness;problem solving;safety precautions awareness;safety precautions follow-through/compliance;judgment  -ME           Safety Issues, Functional Mobility    Safety Issues Affecting Function (Mobility)  ability to follow commands;at risk behavior observed;awareness of need for assistance;impulsivity;insight into deficits/self awareness;judgment;problem solving;safety precaution awareness;safety precautions follow-through/compliance;positioning of assistive device  -ME        Impairments Affecting Function (Mobility)  balance;endurance/activity tolerance;range of motion (ROM);strength  -ME           Mobility Assessment/Treatment    Extremity Weight-bearing Status  left lower extremity  -ME        Left Lower Extremity (Weight-bearing Status)  weight-bearing as tolerated (WBAT)  -ME           Bed Mobility Assessment/Treatment    Bed Mobility Assessment/Treatment  supine-sit;sit-supine  -ME        Supine-Sit Sac (Bed Mobility)  minimum assist (75% patient effort);1 person assist;verbal cues  -ME        Sit-Supine Sac (Bed Mobility)  moderate assist (50% patient effort);2 person assist;verbal cues  -ME        Assistive Device (Bed Mobility)  bed rails;head of bed elevated  -ME           Functional Mobility    Functional Mobility- Ind. Level  moderate assist (50% patient effort);2 person assist required;verbal cues required;nonverbal cues required (demo/gesture)  -ME        Functional Mobility- Device  rolling walker  -ME         Functional Mobility- Comment  ambulated from bed to bathroom and back to bed; difficulty managing RW without assistance; very impulsive  -ME           Transfer Assessment/Treatment    Transfer Assessment/Treatment  sit-stand transfer;stand-sit transfer;toilet transfer  -ME        Comment (Transfers)  very impulsive with transfers   -ME           Sit-Stand Transfer    Sit-Stand [place] (Transfers)  moderate assist (50% patient effort);2 person assist;verbal cues;nonverbal cues (demo/gesture)  -ME        Assistive Device (Sit-Stand Transfers)  walker, front-wheeled  -ME           Stand-Sit Transfer    Stand-Sit [place] (Transfers)  moderate assist (50% patient effort);2 person assist;nonverbal cues (demo/gesture);verbal cues  -ME        Assistive Device (Stand-Sit Transfers)  walker, front-wheeled  -ME           Toilet Transfer    Type (Toilet Transfer)  sit-stand;stand-sit  -ME        [place] Level (Toilet Transfer)  moderate assist (50% patient effort);2 person assist;verbal cues;nonverbal cues (demo/gesture)  -ME        Assistive Device (Toilet Transfer)  grab bars/safety frame;walker, front-wheeled;other (see comments) BSC over commode   -ME           ADL Assessment/Intervention    BADL Assessment/Intervention  lower body dressing;toileting;feeding  -ME           Lower Body Dressing Assessment/Training    Lower Body Dressing [place] Level  doff;don;socks;dependent (less than 25% patient effort)  -ME        Lower Body Dressing Position  long sitting  -ME        Comment (Lower Body Dressing)  pt is unable to don/doff socks at this time;OT completed all   -ME           Self-Feeding Assessment/Training    [place] Level (Feeding)  feeding skills;set up  -ME        Position (Self-Feeding)  sitting up in bed  -ME        Comment (Feeding)  left sitting up in bed with lunch in front of him at end of session; required OT to open soda can   -ME           Toileting Assessment/Training    [place]  Level (Toileting)  adjust/manage clothing;perform perineal hygiene;maximum assist (25% patient effort);dependent (less than 25% patient effort);verbal cues;nonverbal cues (demo/gesture)  -ME        Assistive Devices (Toileting)  grab bar/safety frame;other (see comments) BSC over toilet   -ME        Toileting Position  supported standing  -ME        Comment (Toileting)  pt attempted to complete hygiene following voiding, but pt is unable   -ME           BADL Safety/Performance    Impairments, BADL Safety/Performance  balance;cognition;endurance/activity tolerance;range of motion;strength;pain;trunk/postural control  -ME        Cognitive Impairments, BADL Safety/Performance  attention;awareness, need for assistance;impulsivity;insight into deficits/self awareness;judgment;problem solving/reasoning;safety precaution awareness;safety precaution follow-through  -ME           General ROM    GENERAL ROM COMMENTS  BUE ROM WFL grossly; opposition completed with fair accuracy; difficulty following commands for completion with multiple verbal and tactile cues required   -ME           MMT (Manual Muscle Testing)    General MMT Comments  L  strength: 4/5 grossly; R  strength: 4-/5 grossly: pt states that he uses the R hand to write but the L hand for throwing a ball and other tasks   -ME           Motor Assessment/Interventions    Additional Documentation  Balance (Group)  -ME           Balance    Balance  static sitting balance;static standing balance  -ME           Static Sitting Balance    Level of [place] (Unsupported Sitting, Static Balance)  standby assist  -ME        Sitting Position (Unsupported Sitting, Static Balance)  sitting on edge of bed  -ME        Time Able to Maintain Position (Unsupported Sitting, Static Balance)  more than 5 minutes  -ME           Static Standing Balance    Level of [place] (Supported Standing, Static Balance)  moderate assist, 50 to 74% patient effort  -ME        Time Able  to Maintain Position (Supported Standing, Static Balance)  45 to 60 seconds  -ME        Assistive Device Utilized (Supported Standing, Static Balance)  walker, rolling  -ME           Sensory Assessment/Intervention    Sensory General Assessment  no sensation deficits identified;other (see comments) light touch testing   -ME        Additional Documentation  Hearing Assessment (Group);Vision Assessment/Intervention (Group)  -ME           Hearing Assessment    Hearing Status  hearing impairment, bilaterally no hearing aids   -ME           Vision Assessment/Intervention    Visual Impairment/Limitations  other (see comments) 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;doesnt wear glasses   -ME           Positioning and Restraints    Pre-Treatment Position  in bed  -ME        Post Treatment Position  bed  -ME        In Bed  sitting;call light within reach;encouraged to call for assist;exit alarm on sitting up in bed eating lunch   -ME           Pain Assessment    Additional Documentation  Pain Scale: Numbers Pre/Post-Treatment (Group)  -ME           Pain Scale: Numbers Pre/Post-Treatment    Pain Scale: Numbers, Pretreatment  8/10  -ME        Pain Scale: Numbers, Post-Treatment  8/10  -ME        Pain Location - Side  Left  -ME        Pain Location  hip and knee   -ME        Pre/Post Treatment Pain Comment  reports that he had pain medicine earlier   -ME        Pain Intervention(s)  Medication (See MAR);Repositioned;Ambulation/increased activity;Distraction  -ME           Wound 07/08/20 1810 Left upper eyebrow Laceration    Wound - Properties Group Date first assessed: 07/08/20  -TM Time first assessed: 1810  -TM Present on Hospital Admission: Y  -TM Side: Left  -TM Orientation: upper  -TM Location: eyebrow  -TM Primary Wound Type: Laceration  -TM       Wound 07/09/20 1515 Left posterior hip Incision    Wound - Properties Group Date first assessed: 07/09/20  -AQ Time first assessed: 1515  -AQ Present on Hospital Admission: N  -AQ Side:  Left  -AQ Orientation: posterior  -AQ Location: hip  -AQ Primary Wound Type: Incision  -AQ       Plan of Care Review    Plan of Care Reviewed With  patient  -ME           Clinical Impression (OT)    Date of Referral to OT  07/10/20  -ME        OT Diagnosis  impaired mobility and ADLs   -ME        Prognosis (OT Eval)  fair   -ME        Functional Level at Time of Evaluation (OT Eval)  decreased strength and endurance, increased pain, hearing deficit, safety awareness   -ME        Patient/Family Goals Statement (OT Eval)  to return home   -ME        Criteria for Skilled Therapeutic Interventions Met (OT Eval)  yes;treatment indicated  -ME        Rehab Potential (OT Eval)  fair, will monitor progress closely  -ME        Therapy Frequency (OT Eval)  daily  -ME        Predicted Duration of Therapy Intervention (Therapy Eval)  until d/c   -ME        Care Plan Review (OT)  evaluation/treatment results reviewed;care plan/treatment goals reviewed;risks/benefits reviewed;current/potential barriers reviewed  -ME        Anticipated Equipment Needs at Discharge (OT)  shower chair;bedside commode  -ME        Anticipated Discharge Disposition (OT)  home with 24/7 care;skilled nursing facility;anticipate therapy at next level of care  -ME           Vital Signs    Pre Systolic BP Rehab  125  -ME        Pre Treatment Diastolic BP  75  -ME        Post Systolic BP Rehab  128  -ME        Post Treatment Diastolic BP  68  -ME        Pretreatment Heart Rate (beats/min)  99  -ME        Intratreatment Heart Rate (beats/min)  98  -ME        Pre SpO2 (%)  92  -ME        O2 Delivery Pre Treatment  nasal cannula on 3 L  -ME        Intra SpO2 (%)  99  -ME        O2 Delivery Intra Treatment  nasal cannula  -ME        Post SpO2 (%)  97  -ME        O2 Delivery Post Treatment  nasal cannula  -ME        Pre Patient Position  Supine  -ME        Intra Patient Position  Sitting  -ME        Post Patient Position  Supine  -ME           Planned OT  Interventions    Planned Therapy Interventions (OT Eval)  activity tolerance training;adaptive equipment training;BADL retraining;functional balance retraining;IADL retraining;neuromuscular control/coordination retraining;occupation/activity based interventions;passive ROM/stretching;patient/caregiver education/training;ROM/therapeutic exercise;strengthening exercise;transfer/mobility retraining  -ME           OT Goals    Transfer Goal Selection (OT)  transfer, OT goal 1  -ME        Bathing Goal Selection (OT)  bathing, OT goal 1  -ME        Dressing Goal Selection (OT)  dressing, OT goal 1  -ME        Toileting Goal Selection (OT)  toileting, OT goal 1  -ME        Endurance Goal Selection (OT)  endurance, OT goal 1  -ME        Safety Awareness Goal Selection (OT)  safety awareness, OT goal 1  -ME        Additional Documentation  Endurance Goal Selection (OT) (Row);Safety Awareness Goal Selection (OT) (Row)  -ME           Transfer Goal 1 (OT)    Activity/Assistive Device (Transfer Goal 1, OT)  toilet  -ME        [place] Level/Cues Needed (Transfer Goal 1, OT)  contact guard assist  -ME        Time Frame (Transfer Goal 1, OT)  long term goal (LTG);by discharge  -ME        Barriers (Transfers Goal 1, OT)  hearing deficit;safety deficit   -ME        Progress/Outcome (Transfer Goal 1, OT)  goal not met  -ME           Bathing Goal 1 (OT)    Activity/Assistive Device (Bathing Goal 1, OT)  bathing skills, all  -ME        [place] Level/Cues Needed (Bathing Goal 1, OT)  minimum assist (75% or more patient effort)  -ME        Time Frame (Bathing Goal 1, OT)  long term goal (LTG);by discharge  -ME        Progress/Outcomes (Bathing Goal 1, OT)  goal not met  -ME           Dressing Goal 1 (OT)    Activity/Assistive Device (Dressing Goal 1, OT)  dressing skills, all  -ME        [place]/Cues Needed (Dressing Goal 1, OT)  minimum assist (75% or more patient effort)  -ME        Time Frame (Dressing Goal 1, OT)  long  term goal (LTG);by discharge  -ME        Progress/Outcome (Dressing Goal 1, OT)  goal not met  -ME           Toileting Goal 1 (OT)    Activity/Device (Toileting Goal 1, OT)  toileting skills, all  -ME        [place] Level/Cues Needed (Toileting Goal 1, OT)  minimum assist (75% or more patient effort)  -ME        Time Frame (Toileting Goal 1, OT)  long term goal (LTG);by discharge  -ME        Progress/Outcome (Toileting Goal 1, OT)  goal not met  -ME            Endurance Goal 1 (OT)    Endurance Goal 1 (OT)  15 min functional activity with O2 remaining above 90%  -ME        Time Frame (Endurance Goal 1, OT)  long term goal (LTG);by discharge  -ME        Progress/Outcome (Endurance Goal 1, OT)  goal not met  -ME           Safety Awareness Goal 1 (OT)    Activity (Safety Awareness Goal 1, OT)  awareness of need for assistance;impulse control;insight into deficits/self awareness;judgment;safe use of assistive device/equipment;follow through of safety precautions;demonstrates compliance;demonstrates understanding;demonstration of safe behaviors  -ME        [place]/Cues/Accuracy (Safety Awareness Goal 1, OT)  with minimum;verbal cues/redirection;with repetition of directions;with 90% accuracy  -ME        Time Frame (Safety Awareness Goal 1, OT)  long term goal (LTG);by discharge  -ME        Progress/Outcome (Safety Awareness Goal 1, OT)  goal not met  -ME           Living Environment    Home Accessibility  stairs to enter home  -ME          User Key  (r) = Recorded By, (t) = Taken By, (c) = Cosigned By    Initials Name Effective Dates    Cathy Gil RN 10/17/16 -     TM Josephine Elias RN 07/24/18 -     ME Eitan Haynes OT 09/10/19 -          Occupational Therapy Education                 Title: PT OT SLP Therapies (In Progress)     Topic: Occupational Therapy (In Progress)     Point: ADL training (In Progress)     Description:   Instruct learner(s) on proper safety adaptation and  remediation techniques during self care or transfers.   Instruct in proper use of assistive devices.              Learning Progress Summary           Patient Acceptance, E, NR by ME at 7/10/2020 1503    Comment:  Educated on OT and POC. Educated to call for assistance. Educated on safety precautions. Educated on proper body mechanics for transfers, bed mobility, ADLs, and funcitonal mobility.                   Point: Home exercise program (Not Started)     Description:   Instruct learner(s) on appropriate technique for monitoring, assisting and/or progressing therapeutic exercises/activities.              Learner Progress:   Not documented in this visit.          Point: Precautions (In Progress)     Description:   Instruct learner(s) on prescribed precautions during self-care and functional transfers.              Learning Progress Summary           Patient Acceptance, E, NR by ME at 7/10/2020 1503    Comment:  Educated on OT and POC. Educated to call for assistance. Educated on safety precautions. Educated on proper body mechanics for transfers, bed mobility, ADLs, and funcitonal mobility.                   Point: Body mechanics (In Progress)     Description:   Instruct learner(s) on proper positioning and spine alignment during self-care, functional mobility activities and/or exercises.              Learning Progress Summary           Patient Acceptance, E, NR by ME at 7/10/2020 1503    Comment:  Educated on OT and POC. Educated to call for assistance. Educated on safety precautions. Educated on proper body mechanics for transfers, bed mobility, ADLs, and funcitonal mobility.                               User Key     Initials Effective Dates Name Provider Type Discipline    ME 09/10/19 -  Eitan Haynes OT Occupational Therapist OT                  OT Recommendation and Plan  Outcome Summary/Treatment Plan (OT)  Anticipated Equipment Needs at Discharge (OT): shower chair, bedside commode  Anticipated Discharge  Disposition (OT): home with 24/7 care, skilled nursing facility, anticipate therapy at next level of care  Planned Therapy Interventions (OT Eval): activity tolerance training, adaptive equipment training, BADL retraining, functional balance retraining, IADL retraining, neuromuscular control/coordination retraining, occupation/activity based interventions, passive ROM/stretching, patient/caregiver education/training, ROM/therapeutic exercise, strengthening exercise, transfer/mobility retraining  Therapy Frequency (OT Eval): daily  Plan of Care Review  Plan of Care Reviewed With: patient  Plan of Care Reviewed With: patient  Outcome Summary: initial OT evaluation complete; co-eval with PT. Pt was agreeable to eval, but does complain of soreness all over secondary to his fall. pt is very hard of hearing, and has a moderate to severe safety deficit, which are large limiting factors at this time. transferred sup to sit with min A x 1 person, but completed sit to sup with mod A x 2 persons. sit to stand and stand to sit transfers with RW and mod A x 2 persons with large amounts of verbal and tactile cues. difficulty positioning and using the RW. very impulsive with transfers. functional mobility with mod A x 2 persons and RW. toilet transfer to [facility] [place] toilet with mod A x 2 and both verbal and tactile cues. max A-dependent for hygiene and clothing management for toileting. donning/doffing of socks dependent at this time. deficits include: safety deficit, decreased strength and endurance, decreased balance, and hearing deficit. pt would benefit from further skilled OT services. at time of evaluation, OT recommends pt get additional rehab at SNF at d/c, if pt is to return home he will required 24/7 assistance. goals established.    Outcome Measures     Row Name 07/10/20 4764             How much help from another is currently needed...    Putting on and taking off regular lower body clothing?  1  -ME      Bathing  (including washing, rinsing, and drying)  2  -ME      Toileting (which includes using toilet bed pan or urinal)  1  -ME      Putting on and taking off regular upper body clothing  2  -ME      Taking care of personal grooming (such as brushing teeth)  3  -ME      Eating meals  3  -ME      AM-PAC 6 Clicks Score (OT)  12  -ME         Functional Assessment    Outcome Measure Options  AM-PAC 6 Clicks Daily Activity (OT)  -ME        User Key  (r) = Recorded By, (t) = Taken By, (c) = Cosigned By    Initials Name Provider Type    Eitan Bobo OT Occupational Therapist          Time Calculation:   Time Calculation- OT     Row Name 07/10/20 1504             Time Calculation- OT    OT Start Time  1026  -ME      OT Stop Time  1121  -ME      OT Time Calculation (min)  55 min  -ME      OT Received On  07/10/20  -ME      OT Goal Re-Cert Due Date  07/23/20  -ME        User Key  (r) = Recorded By, (t) = Taken By, (c) = Cosigned By    Initials Name Provider Type    Eitan Bobo OT Occupational Therapist        Therapy Charges for Today     Code Description Service Date Service Provider Modifiers Qty    [id number]  OT EVAL MOD COMPLEXITY 4 7/10/2020 Eitan Haynes OT GO 1               Eitan Haynes OT  7/10/2020

## 2020-07-10 NOTE — PAYOR COMM NOTE
"Cathy Noyola   [facility]  phone [phone]  fax [phone]    REF# [id number]    Sylvia Dacosta (85 y.o. Male)     Date of Birth Social Security Number Address Home Phone MRN    1935  [address]  [facility] [address] [phone] [id number]    Druze Marital Status          Restorationism        Admission Date Admission Type Admitting Provider Attending Provider Department, Room/Bed    7/8/20 Emergency Misha Parada MD Haigler, Stuart S, MD [address], 411/1    Discharge Date Discharge Disposition Discharge Destination                       Attending Provider:  Misha Parada MD    Allergies:  Hydrochlorothiazide    Isolation:  None   Infection:  None   Code Status:  CPR    Ht:  182.9 cm (72\")   Wt:  66.4 kg (146 lb 4.8 oz)    Admission Cmt:  None   Principal Problem:  Closed fracture of neck of left femur (CMS/[facility]) [S72.002A]                 Active Insurance as of 7/8/2020     Primary Coverage     Payor Plan Insurance Group Employer/Plan Group    AETNA MEDICARE REPLACEMENT AETNA MEDICARE REPLACEMENT [id number]     Payor Plan Address Payor Plan Phone Number Payor Plan Fax Number Effective Dates    [address] [phone]  4/1/2019 - None Entered    [facility] [id number]       Subscriber Name Subscriber Birth Date Member ID       SYLVIA DACOSTA 1935 [id number]                 Emergency Contacts      (Rel.) Home Phone Work Phone Mobile Phone    AURELIA BOX (Daughter) [phone] -- [phone]    Carla Dacosta (Spouse) [phone] -- [phone]               Operative/Procedure Notes (last 24 hours) (Notes from 07/09/20 0820 through 07/10/20 0820)      Jitendra Solis MD at 07/09/20 1342          Procedure(s):  LEFT HIP HEMIARTHROPLASTY  Procedure Note    Sylvia Dacosta  7/9/2020    Pre-op Diagnosis:   Closed fracture of neck of left femur, initial encounter (CMS/[facility]) " [S72.002A]    Post-op Diagnosis:     Post-Op Diagnosis Codes:     * Closed fracture of neck of left femur, initial encounter (CMS/[facility]) [S72.002A]    Procedure/CPT® Codes:      Procedure(s):  LEFT HIP HEMIARTHROPLASTY/6 standard stem press-fit/52 mm bipolar head/+1.5 mm 28 mm head    Surgeon(s):  Jitendra Solis MD    Anesthesia: Spinal    Staff:   Circulator: Cathy Cano RN  Scrub Person: Nilo Gudino; Mayela Pereira  Assistant: Mehul Diaz; Margaret Mosquera MA    Estimated Blood Loss: 100ml    Specimens:                ID Type Source Tests Collected by Time   A (Not marked as sent) : Femoral head from left hip Bone Hip, Left TISSUE PATHOLOGY EXAM Jitendra Solis MD 7/9/2020 1519         Drains: * No LDAs found *    Findings: Femoral neck fracture    Complications: None    Dictation: Indications: 85-year-old household ambulator with multiple medical problems including significant cardiovascular issues and bradycardia apparently fainted fracture his left hip was admitted medicine service.  He is cleared by Dr. Licona, his cardiologist as well as the medicine team he is for hemiarthroplasty this time.  I discussed with the patient as well as his daughter the risk benefits of surgery including but not limited to bleeding, blood clot, infection, dislocation, limb length inequality, need for further surgery, and neurologic injury, fracture, medical anesthetic complications, etc.  The understand we will go ahead and proceed as planned.    Procedure:After adequate anesthesia was obtained the patient was placed with the appropriate side up, in a well-padded position.  The affected hip was then prepped and draped usual sterile fashion.  A surgical timeout was performed prior to procedure.    A curvilinear incision was made over the trochanter, sharp dissection was carried through the skin into the soft tissue, electrocautery was used to control hemostasis. The tensor fascia was then  split in line with the skin incision and a Charnley retractor was inserted.     Bleeding points controlled with electrocautery.  Care was taken to avoid neurovascular structures.  The external rotators were identified with the piriformis was taken off of the femoral neck revealing the capsule.  The capsule was then T'd with care to avoid injury to the labrum.  The hip was dislocated and the fracture was identified.  A femoral neck cut was then made 1 fingerbreadth above the lesser trochanter with a saw, the excess bone was removed.  The femur was retracted and the femoral head was removed.  The femoral head was sized and measured on the back table for the appropriate sized bipolar head.    The contents of the fovea were resected and bleeding points controlled with electrocautery.  Trial head ball was then placed into the socket and the appropriate head ball was chosen.    Next attention was paid to the femur, a De Leon retractor was inserted and the soft tissue was taken off the proximal femur and a rongeur was used to lateralize the bone the proximal femur.  Reaming was then taken up 1 mm at a time until we felt chatter in the canal.     Broaching was then taken up to appropriate size, with the broach we obtained good rotational stability.  We did a trial reduction with an appropriate neck and head ball and it was reduced, good stability was noted, good range of motion and length were noted as well.    At that point the prosthesis were chosen and opened on the back table.  The canal was irrigated copious amounts of saline solution and the prosthesis was impacted down to the appropriate length for the broach sat.    The calcar was inspected for fracture no fracture is noted.    The socket was examined  and again the head ball trial was placed and trial reduction was performed.  Good stability and length were noted.  The head ball was then constructed back table and impacted onto the shaft.  Reduction was then at  this point,good range of motion was again obtained.  After copious amounts of irrigation throughout the wound with Pulsavac.  The capsule was then repaired with #1 Ethibond suture interrupted fashion, the tensor fascia was repaired fashion #1 Vicryl, subcutaneous tissues closed interrupted 0 Vicryl and 2-0 Vicryl stitches, skin was closed with staples and a sterile dressing was applied, a knee immobilizer was applied, counts were correct. The patient tolerated the procedure well.    Jitendra Solis MD  07/09/20  15:32          Electronically signed by Jitendra Solis MD at 07/09/20 1534          Physician Progress Notes (last 24 hours) (Notes from 07/09/20 0821 through 07/10/20 0821)      Lakhwinder Kumar APRN at 07/09/20 1515              [facility] Medicine Services  INPATIENT PROGRESS NOTE    Length of Stay: 1  Date of Admission: 7/8/2020  Primary Care Physician: Terry Peterson MD    Subjective   Chief Complaint: Fall  HPI:  85 year old male with a history of ischemic cardiomyopathy, HFrEF, and postural syncope, and HTN who presented to the ED after a syncopal episode which took place after standing from his chair.  He suffered facial trauma and a left hip fracture. CT of the head was negative.  Sutures were placed in the ED to facial laceration.  Orthopedics consulted and recommends hip arthroplasty after cardiac clearance.  He received morphine for pain and required narcan for oversedation.     Review of Systems   Unable to perform ROS: Mental status change        All pertinent negatives and positives are as above. All other systems have been reviewed and are negative unless otherwise stated.     Objective    Temp:  [97.6 °F (36.4 °C)-99 °F (37.2 °C)] 99 °F (37.2 °C)  Heart Rate:  [] 100  Resp:  [16-26] 20  BP: (115-194)/(60-95) 162/88    Physical Exam   Constitutional: He appears well-developed and well-nourished. He appears lethargic.  No distress.   HENT:   Head: Normocephalic.   Facial lac   Eyes: Conjunctivae are normal.   Cardiovascular: Normal rate, regular rhythm and intact distal pulses.   Pulmonary/Chest: Effort normal and breath sounds normal. No respiratory distress.   Abdominal: Soft. Bowel sounds are normal. He exhibits no distension.   Musculoskeletal: He exhibits no edema or deformity.   Neurological: He appears lethargic.   Skin: Skin is warm and dry. He is not diaphoretic.   Vitals reviewed.    Results Review:  I have reviewed the labs, radiology results, and diagnostic studies.    Laboratory Data:   Results from last 7 days   Lab Units 07/09/20  0546 07/08/20 1924   SODIUM mmol/L 138 139   POTASSIUM mmol/L 3.6 3.5   CHLORIDE mmol/L 103 104   CO2 mmol/L 24.0 23.0   BUN mg/dL 16 17   CREATININE mg/dL 0.89 1.07   GLUCOSE mg/dL 109* 89   CALCIUM mg/dL 9.1 9.3   BILIRUBIN mg/dL  --  0.2   ALK PHOS U/L  --  108   ALT (SGPT) U/L  --  22   AST (SGOT) U/L  --  32   ANION GAP mmol/L 11.0 12.0     Estimated Creatinine Clearance: 57.6 mL/min (by C-G formula based on SCr of 0.89 mg/dL).          Results from last 7 days   Lab Units 07/09/20  0546 07/08/20 1924   WBC 10*3/mm3 11.28* 10.40   HEMOGLOBIN g/dL 12.2* 12.4*   HEMATOCRIT % 35.9* 36.8*   PLATELETS 10*3/mm3 175 203           Culture Data:   No results found for: BLOODCX  No results found for: URINECX  No results found for: RESPCX  No results found for: WOUNDCX  No results found for: STOOLCX  No components found for: BODYFLD    Radiology Data:   Imaging Results (Last 24 Hours)     Procedure Component Value Units Date/Time    CT Cervical Spine Without Contrast [[id number]] Collected:  07/08/20 1850     Updated:  07/08/20 1937    Narrative:       CT cervical spine without contrast    HISTORY: Fell    Nonenhanced axial scans of the cervical spine were obtained.  Sagittal and coronal reconstructions were performed.    This exam was performed according to our departmental  dose-optimization  program, which includes automated exposure  control, adjustment of the mA and/or kV according to patient size  and/or use of iterative reconstruction technique.    CT DLP: 454.30    Findings:  Normal cervical lordosis.   Vertebral height maintained.   No fracture identified.   Multilevel facet arthropathy with resultant minimal  retrolisthesis of C3 on C4 and minimal anterolisthesis of C4 on  C5 and C5 on C6.  Degenerative disc disease C6-7.    Chronic obstructive pulmonary disease.  5 mm right upper lobe pulmonary nodule.  Sternotomy.      Impression:       CONCLUSION:  No cervical fracture.  Multilevel facet arthropathy with resultant minimal  retrolisthesis of C3 on C4 and minimal anterolisthesis of C4 on  C5 and C5 on C6.  Degenerative disc disease C6-7.  Chronic obstructive pulmonary disease.  5 mm right upper lobe pulmonary nodule.  No follow-up recommended as below.  Sternotomy.      2017 Fleischner Society Recommendations for Single Solid Lung  Nodule Follow-Up based on size (average of long- and short-axis  diameters)    <6 mm Low-Risk Patient: No routine follow-up   <6 mm High-Risk Patient: Optional CT at 12 months    6-8 mm Low-Risk Patient: CT at 6-12 months then consider CT at  18-24 months  6-8 mm High-Risk Patient: CT at 6-12 months then CT at 18-24  months    >8 mm Low-Risk Patient: Consider CT, PET/CT or tissue sampling at  3 months  >8 mm High-Risk Patient: Same as for low-risk patient     [id number]    Electronically signed by:  Emmanuel Burris MD  7/8/2020 7:36 PM CDT  Workstation: [id number]    CT Head Without Contrast [[id number]] Collected:  07/08/20 1850     Updated:  07/08/20 1931    Narrative:         CT Head Without Contrast    History: Fell. Laceration left eyebrow.    Axial scans of the brain were obtained without intravenous  contrast.  Coronal and sagital reconstructions were preformed.    This exam was performed according to our departmental  dose-optimization program, which includes automated  exposure  control, adjustment of the mA and/or kV according to patient size  and/or use of iterative reconstruction technique.    DLP: 964.00    Comparison: None    Findings:  Bone windows are unremarkable.  The visualized paranasal sinuses are unremarkable.    No intracranial injury or acute process.  Cerebral and cerebellar atrophy.  Old lacunar infarcts in the basal ganglia.  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  Moderate small vessel disease.   No hemorrhage.  No mass.  No abnormal areas of increased attenuation.  No midline shift.  No abnormal extra-axial fluid collections.      Impression:       CONCLUSION:  No intracranial injury or acute process.  Cerebral and cerebellar atrophy.  Old lacunar infarcts in the basal ganglia.  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  Moderate small vessel disease.    [id number]    Electronically signed by:  Emmanuel Burris MD  7/8/2020 7:30 PM CDT  Workstation: [id number]    XR Hip With or Without Pelvis 2 - 3 View Left [[id number]] Collected:  07/08/20 1840     Updated:  07/08/20 1915    Narrative:         Two view left hip with single view pelvis    HISTORY: Pain after falling. Syncope and collapse.    AP and crosstable lateral lateral views of the left hip and AP  film of the pelvis obtained.    COMPARISON: None    FINDINGS:   Comminuted minimally displaced transcervical fracture left hip.  No dislocation of the femoral head.  Dextroscoliosis lumbar spine.  Multilevel degenerative disc disease lumbar spine..  No other osseous or articular abnormality.    Pelvic phleboliths.  Atherosclerotic calcification iliac and femoral arteries.      Impression:       CONCLUSION:  Comminuted minimally displaced transcervical fracture left hip.    [id number]    Electronically signed by:  Emmanuel Burris MD  7/8/2020 7:14 PM CDT  Workstation: [id number]    XR Chest 1 View [[id number]] Collected:  07/08/20 1840     Updated:  07/08/20 1913    Narrative:         PORTABLE CHEST    HISTORY: Chest pain. Syncope and  collapse.    Portable AP supine film of the chest was obtained at 6:45 PM.  COMPARISON: None    FINDINGS:   EKG leads.  Bilateral linear atelectasis or scar.  Sternotomy.  The heart is not enlarged.  The pulmonary vasculature is not increased.  No pleural effusion.  No pneumothorax.  No acute osseous abnormality.  Degenerative changes are present in the thoracic spine.      Impression:       CONCLUSION:  Bilateral linear atelectasis or scar.  Sternotomy.    [id number]    Electronically signed by:  Emmanuel Burris MD  7/8/2020 7:12 PM CDT  Workstation: [id number]          I have reviewed the patient's current medications.     Assessment/Plan     Active Hospital Problems    Diagnosis   • **Closed fracture of neck of left femur (CMS/HCC)   • Facial laceration   • HFrEF (heart failure with reduced ejection fraction) (CMS/HCC)   • Syncope and collapse   • Ischemic cardiomyopathy   • Essential hypertension   • Coronary artery disease involving native coronary artery of native heart without angina pectoris       Plan:   Orthopedics consultation appreciated, plan for hip arthorplasty  Cardiology consultation appreciated, recommended proceeding with surgery  Echocardiogram  Pain control: Scheduled tylenol, PRN toradol, d/c narcotics  PRN narcan  Aspirin, statin, cozaar, toprol-xl  Facial suture removal, 5 days  VTE PPx: per orthopedics        The patient was evaluated during the global COVID-19 pandemic, and the diagnosis was suspected/considered upon their initial presentation.  Evaluation, treatment, and testing were consistent with current guidelines for patients who present with complaints or symptoms that may be related to COVID-19.        This document has been electronically signed by JAIME Grewal on July 9, 2020 15:39        Electronically signed by Lakhwinder Kumar APRN at 07/09/20 1539          Consult Notes (last 24 hours) (Notes from 07/09/20 0821 through 07/10/20 0821)      Júnior Licona MD at  07/09/20 1319           Cardiology at [facility]  Cardiovascular Consultation Note      Jose Dacosta  [facility] OR/NONE  [id number]  1935    DATE OF ADMISSION: 7/8/2020  DATE OF CONSULTATION:  7/9/2020    Terry Peterson MD  Treatment Team:   Attending Provider: Misha Parada MD  Consulting Physician: Jitendra oSlis MD  Consulting Physician: Júnior Licona MD  Nurse Practitioner: Lakhwinder Kumar APRN  Admitting Provider: Misha Parada MD    Chief Complaint   Patient presents with   • Syncope   • Head Laceration   • Hip Pain       Chief complaint/ Reason for Consultation; preop evaluations waiting for hip surgery with multiple comorbid condition ischemic cardiomyopathy, severe LV dysfunction with history of congestive heart failure and postural syncope      History of Present Illness    Patient's Body mass index is 19.69 kg/m². BMI is within normal parameters. No follow-up required.     85 years old patient admitted for evaluations of syncope postural mechanism from clinical description with a similar episode in the past.  The fall was complicated with hip fracture echocardiogram study was arranged reported ejection fraction 25 to 30% with global hypokinesis and waiting for hip surgery.  The patient was driving drowsy second to narcotics received Narcan able to communicate and unable to maintain arousal state the nurse was present in the room at the time of evaluation try to contact the family over the phone, unsuccessful the family friend who is a  was contacted through the phone which was listed in his medical record with a background history of echo stress test and Holter monitor.  Echo and stress test ejection fraction range of 20% fixed defect with no evidence of reversible ischemia and monitor sinus rhythm without any significant bradyarrhythmia and runs of nonsustained ventricular tachycardia and supraventricular arrhythmia.  Patient presented  to discuss the finding of cardiac evaluation.  The mechanism of syncope from clinical description possibility vasovagal orthostasis with arrhythmias underlying mechanism cannot be excluded and patient was scheduled to have had a tilt test but given the history of ischemia, LV dysfunction, congestive heart failure and documented nonsustained ventricular tachycardia ICD was recommended.  Patient initially interested in ICD at the time of evaluation and if he changes his mind he will  let us know if he want to proceed forward.  Hard of hearing with a long-standing history of postural dizziness documented right bundle branch and left anterior axis by EKG in 2013, history of CAD and CABG more than 10 years ago, right carotid endarterectomy, mild abdominal aortic aneurysm infrarenal on abdominal ultrasound in 2018, hypertension with hypertensive heart disease, hyperlipidemia, previously evaluated for postural syncope.  No chest pain no orthopnea no PND no palpitation reported prior to the event.  No recent cardiac evaluation noted in the medical record.  Record of bypass surgery is not available.  He is a physically active and fortunately stopped smoking more than 30 years ago        HOLTER  #1 sinus rhythm with average heart rate of 75 minimum 55 and maximum 110 bpm with underlying intraventricular conduction delay  2 frequent premature ventricular complex with full runs of nonsustained wide QRS complex tachycardia longest is 3 beat with heart rate range from 130 to 150 bpm  #3 rare premature supraventricular ectopic beat with rare nonsustained supraventricular tachycardia longest is 4 beat with maximum heart rate 160 but beats per minute   #4 no significant bradyarrhythmia or pause noted     Echo  7/2019  · The left ventricular cavity is moderately dilated.  · Estimated EF = 25%.  · Left ventricular diastolic dysfunction (grade I) consistent with impaired relaxation.  · Mild mitral valve regurgitation is present  · Mild  tricuspid valve regurgitation is present.  · Mild pulmonic valve regurgitation is present.  · Estimated EF appears to be in the range of 21 - 25%. Estimated EF = 25%. Normal left ventricular wall thickness noted. There is left ventricular global hypokinesis noted. The left ventricular cavity is moderately dilated. Left ventricular diastolic dysfunction is noted (grade I) consistent with impaired relaxation.     STRESS TEST 7/2019  · Findings consistent with an equivocal ECG stress test.  · Left ventricular ejection fraction is moderately reduced (Calculated EF = 26%).  Moderately fixed inferolateral defect with no reversibility, LV dilated  Past Medical History:   Diagnosis Date   • Abnormal ECG        Past Surgical History:   Procedure Laterality Date   • CORONARY STENT PLACEMENT         Allergies   Allergen Reactions   • Hydrochlorothiazide Swelling     TONGUE SWELLING       No current facility-administered medications on file prior to encounter.      Current Outpatient Medications on File Prior to Encounter   Medication Sig Dispense Refill   • amitriptyline (ELAVIL) 100 MG tablet Take 100 mg by mouth every night at bedtime.  3   • aspirin 81 MG EC tablet Take 1 tablet by mouth Daily. 30 tablet 3   • atorvastatin (LIPITOR) 20 MG tablet Take 1 tablet by mouth.     • COLCRYS 0.6 MG tablet Take 0.6 mg by mouth Daily.  11   • gabapentin (NEURONTIN) 600 MG tablet TAKE 1 TABLET BY MOUTH 3 (THREE) TIMES DAILY  2   • losartan (COZAAR) 25 MG tablet TAKE 1 TABLET BY MOUTH EVERY DAY 30 tablet 5   • meloxicam (MOBIC) 15 MG tablet Take 15 mg by mouth Daily.  1   • metoprolol succinate XL (TOPROL-XL) 25 MG 24 hr tablet TAKE 1 TABLET BY MOUTH EVERY DAY 30 tablet 5   • omeprazole (priLOSEC) 40 MG capsule Take 40 mg by mouth Every Morning.  2   • rOPINIRole (REQUIP) 0.5 MG tablet Take 0.5 mg by mouth Daily.  2   • tolterodine (DETROL) 2 MG tablet Take 2 mg by mouth 2 (Two) Times a Day.     • albuterol sulfate  (90 Base)  "MCG/ACT inhaler INHALE 2 PUFFS INTO THE LUNGS EVERY 6 (SIX) HOURS AS NEEDED FOR WHEEZING  3       Social History     Socioeconomic History   • Marital status:      Spouse name: Not on file   • Number of children: Not on file   • Years of education: Not on file   • Highest education level: Not on file   Tobacco Use   • Smoking status: Former Smoker     Last attempt to quit:      Years since quittin.5   • Smokeless tobacco: Never Used   Substance and Sexual Activity   • Alcohol use: No     Frequency: Never   • Drug use: No           REVIEW OF SYSTEMS:   ROS  Review of Systems   Constitutional: Positive for activity change. Negative for chills and fever.   HENT: Negative for facial swelling.    Eyes: Negative for photophobia.   Respiratory: Negative for apnea and shortness of breath.    Cardiovascular: Negative for chest pain and leg swelling.  Negative palpitation negative orthopnea or PND  Gastrointestinal: Negative for abdominal pain, nausea and vomiting.   Genitourinary: Negative for dysuria.   Musculoskeletal: Positive for arthralgias and gait problem.   Skin: Negative for color change and rash.   Neurological: Positive for syncope postural mechanism from clinical descriptions with a similar episode in the past  Psychiatric/Behavioral: Negative for behavioral problems and dysphoric mood.      Otherwise complete ROS is negative except as mentioned above     Objective:     Vitals:    20 0741 20 0811 20 0845 20 1116   BP:  115/68 149/83 134/79   BP Location:  Right arm Right arm Right arm   Patient Position:  Lying Lying Lying   Pulse: 95 94 98 100   Resp:  22 25 20   Temp:  97.9 °F (36.6 °C)  97.6 °F (36.4 °C)   TempSrc:  Axillary  Axillary   SpO2:  92% 95% 94%   Weight:       Height:         Body mass index is 19.69 kg/m².  Flowsheet Rows      First Filed Value   Admission Height  182.9 cm (72\") Documented at 2020 1807   Admission Weight  81.6 kg (179 lb 14.4 oz) " Documented at 07/08/2020 1807          Intake/Output Summary (Last 24 hours) at 7/9/2020 1320  Last data filed at 7/9/2020 0422  Gross per 24 hour   Intake --   Output 100 ml   Net -100 ml         Physical Exam   Physical Exam  Constitutional: He is oriented to person, place, and time. He appears well-developed and well-nourished.   HENT:   Head: Normocephalic and atraumatic.   Eyes: Pupils are equal, round, and reactive to light. EOM are normal.   Neck: Normal range of motion. Neck supple.   Cardiovascular: Normal rate and regular rhythm.  No S3 pericardial rub  Pulmonary/Chest: Effort normal.  No rales or wheezing  Abdominal: Bowel sounds are normal.   Musculoskeletal: Normal range of motion.   Neurological: He is alert and oriented to person, place, and time.    Drowsy secondary sedation but arousable    Skin: Capillary refill takes less than 2 seconds.   Psychiatric: He has a normal mood and affect.   Nursing note and vitals reviewed.  Radiology Review    CT Head Without Contrast   Final Result   CONCLUSION:   No intracranial injury or acute process.   Cerebral and cerebellar atrophy.   Old lacunar infarcts in the basal ganglia.   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   Moderate small vessel disease.      [id number]      Electronically signed by:  Emmanuel Burris MD  7/8/2020 7:30 PM CDT   Workstation: [id number]      CT Cervical Spine Without Contrast   Final Result   CONCLUSION:   No cervical fracture.   Multilevel facet arthropathy with resultant minimal   retrolisthesis of C3 on C4 and minimal anterolisthesis of C4 on   C5 and C5 on C6.   Degenerative disc disease C6-7.   Chronic obstructive pulmonary disease.   5 mm right upper lobe pulmonary nodule.   No follow-up recommended as below.   Sternotomy.         2017 Fleischner Society Recommendations for Single Solid Lung   Nodule Follow-Up based on size (average of long- and short-axis   diameters)      <6 mm Low-Risk Patient: No routine follow-up    <6 mm High-Risk  Patient: Optional CT at 12 months      6-8 mm Low-Risk Patient: CT at 6-12 months then consider CT at   18-24 months   6-8 mm High-Risk Patient: CT at 6-12 months then CT at 18-24   months      >8 mm Low-Risk Patient: Consider CT, PET/CT or tissue sampling at   3 months   >8 mm High-Risk Patient: Same as for low-risk patient       [id number]      Electronically signed by:  Emmanuel Burris MD  7/8/2020 7:36 PM CDT   Workstation: [id number]      XR Chest 1 View   Final Result   CONCLUSION:   Bilateral linear atelectasis or scar.   Sternotomy.      [id number]      Electronically signed by:  Emmanuel Burris MD  7/8/2020 7:12 PM CDT   Workstation: [id number]      XR Hip With or Without Pelvis 2 - 3 View Left   Final Result   CONCLUSION:   Comminuted minimally displaced transcervical fracture left hip.      [id number]      Electronically signed by:  Emmanuel Burris MD  7/8/2020 7:14 PM CDT   Workstation: [email or web address]          Lab Results:  Lab Results (last 24 hours)     Procedure Component Value Units Date/Time    Blood Gas, Arterial [[id number]]  (Abnormal) Collected:  07/09/20 0900    Specimen:  Arterial Blood Updated:  07/09/20 0912     Site Left Radial     Anmol's Test Negative     pH, Arterial 7.440 pH units      pCO2, Arterial 35.0 mm Hg      pO2, Arterial 60.1 mm Hg      Comment: 84 Value below reference range        HCO3, Arterial 23.8 mmol/L      Base Excess, Arterial 0.0 mmol/L      O2 Saturation, Arterial 92.3 %      Comment: 84 Value below reference range        Barometric Pressure for Blood Gas 745 mmHg      Modality Nasal Cannula     Flow Rate 2.0 lpm      Ventilator Mode NA     Collected by EJ     Comment: Meter: [id number]     :  [id number]       POC Glucose Once [[id number]]  (Normal) Collected:  07/09/20 0612    Specimen:  Blood Updated:  07/09/20 0724     Glucose 119 mg/dL      Comment: RN NotifiedOperator: [id number] NIKI REBAMeter ID: [id number]       Basic Metabolic Panel [[id number]]  (Abnormal) Collected:  07/09/20  0546    Specimen:  Blood Updated:  07/09/20 0618     Glucose 109 mg/dL      BUN 16 mg/dL      Creatinine 0.89 mg/dL      Sodium 138 mmol/L      Potassium 3.6 mmol/L      Chloride 103 mmol/L      CO2 24.0 mmol/L      Calcium 9.1 mg/dL      eGFR Non African Amer 81 mL/min/1.73      BUN/Creatinine Ratio 18.0     Anion Gap 11.0 mmol/L     Narrative:       GFR Normal >60  Chronic Kidney Disease <60  Kidney Failure <15      Troponin [[id number]]  (Normal) Collected:  07/09/20 0546    Specimen:  Blood Updated:  07/09/20 0617     Troponin T <0.010 ng/mL     Narrative:       Troponin T Reference Range:  <= 0.03 ng/mL-   Negative for AMI  >0.03 ng/mL-     Abnormal for myocardial necrosis.  Clinicians would have to utilize clinical acumen, EKG, Troponin and serial changes to determine if it is an Acute Myocardial Infarction or myocardial injury due to an underlying chronic condition.       Results may be falsely decreased if patient taking Biotin.      CBC & Differential [[id number]] Collected:  07/09/20 0546    Specimen:  Blood Updated:  07/09/20 0600    Narrative:       The following orders were created for panel order CBC & Differential.  Procedure                               Abnormality         Status                     ---------                               -----------         ------                     CBC Auto Differential[[id number]]        Abnormal            Final result                 Please view results for these tests on the individual orders.    CBC Auto Differential [[id number]]  (Abnormal) Collected:  07/09/20 0546    Specimen:  Blood Updated:  07/09/20 0600     WBC 11.28 10*3/mm3      RBC 3.88 10*6/mm3      Hemoglobin 12.2 g/dL      Hematocrit 35.9 %      MCV 92.5 fL      MCH 31.4 pg      MCHC 34.0 g/dL      RDW 14.5 %      RDW-SD 49.1 fl      MPV 10.3 fL      Platelets 175 10*3/mm3      Neutrophil % 78.0 %      Lymphocyte % 10.9 %      Monocyte % 6.0 %      Eosinophil % 3.7 %      Basophil % 1.0 %       Immature Grans % 0.4 %      Neutrophils, Absolute 8.79 10*3/mm3      Lymphocytes, Absolute 1.23 10*3/mm3      Monocytes, Absolute 0.68 10*3/mm3      Eosinophils, Absolute 0.42 10*3/mm3      Basophils, Absolute 0.11 10*3/mm3      Immature Grans, Absolute 0.05 10*3/mm3      nRBC 0.0 /100 WBC     Troponin [[id number]]  (Normal) Collected:  07/09/20 0115    Specimen:  Blood Updated:  07/09/20 0208     Troponin T <0.010 ng/mL     Narrative:       Troponin T Reference Range:  <= 0.03 ng/mL-   Negative for AMI  >0.03 ng/mL-     Abnormal for myocardial necrosis.  Clinicians would have to utilize clinical acumen, EKG, Troponin and serial changes to determine if it is an Acute Myocardial Infarction or myocardial injury due to an underlying chronic condition.       Results may be falsely decreased if patient taking Biotin.      [place] Draw [[id number]] Collected:  07/08/20 1924    Specimen:  Blood Updated:  07/08/20 2030    Narrative:       The following orders were created for panel order [place] Draw.  Procedure                               Abnormality         Status                     ---------                               -----------         ------                     Light Blue Top[[id number]]                                   Final result               Green Top (Gel)[[id number]]                                  Final result               Lavender Top[[id number]]                                     Final result               Gold Top - SST[[id number]]                                   Final result                 Please view results for these tests on the individual orders.    Light Blue Top [[id number]] Collected:  07/08/20 1924    Specimen:  Blood Updated:  07/08/20 2030     Extra Tube hold for add-on     Comment: Auto resulted       Green Top (Gel) [[id number]] Collected:  07/08/20 1924    Specimen:  Blood Updated:  07/08/20 2030     Extra Tube Hold for add-ons.     Comment: Auto resulted.       Lavender Top [[id number]]  Collected:  07/08/20 1924    Specimen:  Blood Updated:  07/08/20 2030     Extra Tube hold for add-on     Comment: Auto resulted       Gold Top - SST [[id number]] Collected:  07/08/20 1924    Specimen:  Blood Updated:  07/08/20 2030     Extra Tube Hold for add-ons.     Comment: Auto resulted.       Comprehensive Metabolic Panel [[id number]] Collected:  07/08/20 1924    Specimen:  Blood Updated:  07/08/20 1952     Glucose 89 mg/dL      BUN 17 mg/dL      Creatinine 1.07 mg/dL      Sodium 139 mmol/L      Potassium 3.5 mmol/L      Chloride 104 mmol/L      CO2 23.0 mmol/L      Calcium 9.3 mg/dL      Total Protein 7.1 g/dL      Albumin 4.10 g/dL      ALT (SGPT) 22 U/L      AST (SGOT) 32 U/L      Alkaline Phosphatase 108 U/L      Total Bilirubin 0.2 mg/dL      eGFR Non African Amer 66 mL/min/1.73      Globulin 3.0 gm/dL      A/G Ratio 1.4 g/dL      BUN/Creatinine Ratio 15.9     Anion Gap 12.0 mmol/L     Narrative:       GFR Normal >60  Chronic Kidney Disease <60  Kidney Failure <15      Troponin [[id number]]  (Normal) Collected:  07/08/20 1924    Specimen:  Blood Updated:  07/08/20 1949     Troponin T <0.010 ng/mL     Narrative:       Troponin T Reference Range:  <= 0.03 ng/mL-   Negative for AMI  >0.03 ng/mL-     Abnormal for myocardial necrosis.  Clinicians would have to utilize clinical acumen, EKG, Troponin and serial changes to determine if it is an Acute Myocardial Infarction or myocardial injury due to an underlying chronic condition.       Results may be falsely decreased if patient taking Biotin.      BNP [[id number]]  (Normal) Collected:  07/08/20 1924    Specimen:  Blood Updated:  07/08/20 1949     proBNP 1,741.0 pg/mL     Narrative:       Among patients with dyspnea, NT-proBNP is highly sensitive for the detection of acute congestive heart failure. In addition NT-proBNP of <300 pg/ml effectively rules out acute congestive heart failure with 99% negative predictive value.    Results may be falsely decreased if  patient taking Biotin.      CBC & Differential [[id number]] Collected:  07/08/20 1924    Specimen:  Blood Updated:  07/08/20 1930    Narrative:       The following orders were created for panel order CBC & Differential.  Procedure                               Abnormality         Status                     ---------                               -----------         ------                     CBC Auto Differential[[id number]]        Abnormal            Final result                 Please view results for these tests on the individual orders.    CBC Auto Differential [[id number]]  (Abnormal) Collected:  07/08/20 1924    Specimen:  Blood Updated:  07/08/20 1930     WBC 10.40 10*3/mm3      RBC 3.96 10*6/mm3      Hemoglobin 12.4 g/dL      Hematocrit 36.8 %      MCV 92.9 fL      MCH 31.3 pg      MCHC 33.7 g/dL      RDW 14.6 %      RDW-SD 50.3 fl      MPV 10.0 fL      Platelets 203 10*3/mm3      Neutrophil % 74.3 %      Lymphocyte % 17.9 %      Monocyte % 3.9 %      Eosinophil % 2.8 %      Basophil % 0.7 %      Immature Grans % 0.4 %      Neutrophils, Absolute 7.73 10*3/mm3      Lymphocytes, Absolute 1.86 10*3/mm3      Monocytes, Absolute 0.41 10*3/mm3      Eosinophils, Absolute 0.29 10*3/mm3      Basophils, Absolute 0.07 10*3/mm3      Immature Grans, Absolute 0.04 10*3/mm3      nRBC 0.0 /100 WBC           I personally viewed and interpreted the patient's EKG/Telemetry data       Assessment/Plan:     #1 syncope with history of similar episode in the past and postural dizziness complicated by hip fracture  #2 CAD status post CABG more than 10 years ago   #3 peripheral vascular disease status post carotid endarterectomy   #4 hyperlipidemia   #5 hypertension   #6 preop evaluation waiting for hip surgery    84 years old patient with a background history of ischemic cardiomyopathy, postural dizziness and syncope in the past, nonsustained ventricular tachycardia refused ICD implantation with history of congestive heart failure  severe LV dysfunction compensated and euvolemic state admitted for evaluation after syncopal episode in upright posture.  From clinical description most likely mechanism simply vasovagal orthostasis.  The episode complicated by hip fracture EKG no acute ST-T wave changes.  The patient is drowsy secondary not narcotics but arousable and nurse present in the room.  I discussed with the patient whenever he was awake and the nurse regarding further risk stratification and management.  Given the asymptomatic cardiac status and previous normal stress test no further risk stratification needed at this may not change the outcome of the hip surgery.  The patient moderate to high risk for the procedure post procedure.  Try to contact the family over the phone was unsuccessful.  Of the family member working the Cath Lab and condition was discussed.  Midodrine can be contemplated given the patient previous history of postural dizziness.  The pain is being managed with narcotics and recommend to continue low-dose beta-blocker, losartan and statin.      Thank you for allowing me to participate in the care of Jose Dacosta. Feel free to contact me directly with any further questions or concerns.    Júnior Licona MD  07/09/20  13:20.      Part of this note may be an electronic transcription/translation of spoken language to printed text using the Dragon Dictation system.        Electronically signed by Júnior Licona MD at 07/09/20 4293

## 2020-07-10 NOTE — THERAPY EVALUATION
Patient Name: Jose Dacosta  : 1935    MRN: [id number]                              Today's Date: 7/10/2020       Admit Date: 2020    Visit Dx:     ICD-10-CM ICD-9-CM   1. Syncope and collapse R55 780.2   2. Facial laceration, initial encounter S01.81XA 873.40   3. Closed fracture of left hip, initial encounter (CMS/HCC) S72.002A 820.8   4. Fall, initial encounter W19.XXXA E888.9   5. Closed head injury, initial encounter S09.90XA 959.01   6. Closed fracture of neck of left femur, initial encounter (CMS/HCC) S72.002A 820.8   7. Impaired functional mobility, balance, gait, and endurance Z74.09 V49.89     Patient Active Problem List   Diagnosis   • Coronary artery disease involving native coronary artery of native heart without angina pectoris   • Vasovagal syncope   • RBBB (right bundle branch block with left anterior fascicular block)   • Essential hypertension   • Ischemic cardiomyopathy   • Syncope and collapse   • Closed fracture of neck of left femur (CMS/[facility])   • Coronary artery disease with history of myocardial infarction without history of CABG   • Facial laceration   • HFrEF (heart failure with reduced ejection fraction) (CMS/HCC)   • Postoperative anemia due to acute blood loss     Past Medical History:   Diagnosis Date   • Abnormal ECG      Past Surgical History:   Procedure Laterality Date   • CORONARY STENT PLACEMENT       General Information     Row Name 07/10/20 1025          PT Evaluation Time/Intention    Document Type  evaluation  -     Mode of Treatment  co-treatment;physical therapy;occupational therapy  -     Row Name 07/10/20 1025          General Information    Patient Profile Reviewed?  yes  -ILLA     Prior Level of Function  independent:;all household mobility;ADL's pt cooks, wife does laundry, dtr drives;person 1xwk for cleaning  -LILA     Existing Precautions/Restrictions  fall  -LILA     Row Name 07/10/20 1025          Relationship/Environment    Lives With  spouse  -LILA      [facility] Name 07/10/20 1025          Resource/Environmental Concerns    Current Living Arrangements  home/apartment/condo 1 level house  -[facility] Name 07/10/20 1025          Home Main Entrance    Number of Stairs, Main Entrance  four  -     Stair Railings, Main Entrance  railing on left side (ascending)  -[facility] Name 07/10/20 1025          Stairs Within Home, Primary    Number of Stairs, Within Home, Primary  none  -LILA     Row Name 07/10/20 1025          Cognitive Assessment/Intervention- PT/OT    Orientation Status (Cognition)  oriented x 4  -[facility] Name 07/10/20 1025          Safety Issues, Functional Mobility    Safety Issues Affecting Function (Mobility)  awareness of need for assistance;impulsivity;insight into deficits/self awareness;positioning of assistive device;safety precaution awareness;safety precautions follow-through/compliance  -     Impairments Affecting Function (Mobility)  balance;endurance/activity tolerance;range of motion (ROM);strength  -       User Key  (r) = Recorded By, (t) = Taken By, (c) = Cosigned By    Initials Name Provider Type     Angelica Torres PT Physical Therapist        Mobility     [facility] Name 07/10/20 1025          Bed Mobility Assessment/Treatment    Bed Mobility Assessment/Treatment  supine-sit;sit-supine  -     Supine-Sit [place] (Bed Mobility)  minimum assist (75% patient effort)  -     Sit-Supine [place] (Bed Mobility)  moderate assist (50% patient effort);2 person assist  -     Assistive Device (Bed Mobility)  bed rails;head of bed elevated  -LILA     Row Name 07/10/20 1025          Bed-Chair Transfer    Bed-Chair [place] (Transfers)  moderate assist (50% patient effort);2 person assist;nonverbal cues (demo/gesture);verbal cues bed to toilet  -     Assistive Device (Bed-Chair Transfers)  walker, front-wheeled  -[facility] Name 07/10/20 1025          Sit-Stand Transfer    Sit-Stand [place] (Transfers)  moderate assist (50% patient  "effort);2 person assist  -     Assistive Device (Sit-Stand Transfers)  walker, front-wheeled  -     Row Name 07/10/20 1025          Gait/Stairs Assessment/Training    Gait/Stairs Assessment/Training  gait/ambulation assistive device  -     [place] Level (Gait)  moderate assist (50% patient effort);2 person assist;verbal cues;nonverbal cues (demo/gesture)  -     Assistive Device (Gait)  walker, front-wheeled  -     Distance in Feet (Gait)  5ftx2 to/from bathroom  -     Comment (Gait/Stairs)  Pt impulsive with ambulation attempting twice to leave walker behind when trying to get to toilet.   -       User Key  (r) = Recorded By, (t) = Taken By, (c) = Cosigned By    Initials Name Provider Type    Angelica Liu, PT Physical Therapist        Obj/Interventions     [facility] Name 07/10/20 1025          General ROM    GENERAL ROM COMMENTS  BLE: RLE AROM WFL; LLE: AROM WFL ankle/foot( noted bruising lateral ankle/foot, pt reports ankle is 'sore\" but does not have any difficulty moving ankle), AAROM WFL knee, hip  -[facility] Name 07/10/20 1025          MMT (Manual Muscle Testing)    General MMT Comments  BLE: RLE: 4/5 ankle/foot, knee, hip,LLE: 3+/5 ankle/foot, 2+/5 knee, hip  -[facility] Name 07/10/20 1025          Static Sitting Balance    Level of [place] (Unsupported Sitting, Static Balance)  standby assist  -     Sitting Position (Unsupported Sitting, Static Balance)  sitting on edge of bed  -     Time Able to Maintain Position (Unsupported Sitting, Static Balance)  more than 5 minutes  -     Row Name 07/10/20 1025          Dynamic Sitting Balance    Level of [place], Reaches Outside Midline (Sitting, Dynamic Balance)  standby assist  -     Sitting Position, Reaches Outside Midline (Sitting, Dynamic Balance)  sitting on edge of bed  -[facility] Name 07/10/20 1025          Static Standing Balance    Level of [place] (Supported Standing, Static Balance)  moderate assist, 50 to 74% " patient effort  -LILA     Time Able to Maintain Position (Supported Standing, Static Balance)  45 to 60 seconds  -     Assistive Device Utilized (Supported Standing, Static Balance)  walker, rolling  -LILA     Row Name 07/10/20 1025          Dynamic Standing Balance    Level of [place], Reaches Outside Midline (Standing, Dynamic Balance)  moderate assist, 50 to 74% patient effort;2 person assist  -     Time Able to Maintain Position, Reaches Outside Midline (Standing, Dynamic Balance)  2 to 3 minutes  -     Assistive Device Utilized (Supported Standing, Dynamic Balance)  walker, rolling  -LILA     Row Name 07/10/20 1025          Sensory Assessment/Intervention    Sensory General Assessment  no sensation deficits identified to light touch  -       User Key  (r) = Recorded By, (t) = Taken By, (c) = Cosigned By    Initials Name Provider Type    Angelica Liu, PT Physical Therapist        Goals/Plan     Row Name 07/10/20 1025          Bed Mobility Goal 1 (PT)    Activity/Assistive Device (Bed Mobility Goal 1, PT)  sit to supine;supine to sit  -     [place] Level/Cues Needed (Bed Mobility Goal 1, PT)  independent  -LILA     Time Frame (Bed Mobility Goal 1, PT)  by discharge  -LILA     Progress/Outcomes (Bed Mobility Goal 1, PT)  goal not met  -     Row Name 07/10/20 1025          Transfer Goal 1 (PT)    Activity/Assistive Device (Transfer Goal 1, PT)  sit-to-stand/stand-to-sit;bed-to-chair/chair-to-bed;toilet  -     [place] Level/Cues Needed (Transfer Goal 1, PT)  conditional independence  -LILA     Time Frame (Transfer Goal 1, PT)  2 - 3 days  -LILA     Progress/Outcome (Transfer Goal 1, PT)  goal not met  -     Row Name 07/10/20 1025          Gait Training Goal 1 (PT)    Activity/Assistive Device (Gait Training Goal 1, PT)  gait (walking locomotion);assistive device use;walker, rolling  -     [place] Level (Gait Training Goal 1, PT)  conditional independence  -LILA     Distance (Gait Goal 1,  PT)  434cad1  -     Time Frame (Gait Training Goal 1, PT)  by discharge  -     Progress/Outcome (Gait Training Goal 1, PT)  goal not met  -     Row Name 07/10/20 1025          Stairs Goal 1 (PT)    Activity/Assistive Device (Stairs Goal 1, PT)  ascending stairs;descending stairs;using handrail, left;assistive device use  -     [place] Level/Cues Needed (Stairs Goal 1, PT)  conditional independence  -LILA     Time Frame (Stairs Goal 1, PT)  by discharge  -LILA     Progress/Outcome (Stairs Goal 1, PT)  goal not met  -       User Key  (r) = Recorded By, (t) = Taken By, (c) = Cosigned By    Initials Name Provider Type    Angelica Liu, PT Physical Therapist        Clinical Impression     Row Name 07/10/20 1025          Pain Assessment    Additional Documentation  Pain Scale: Numbers Pre/Post-Treatment (Group)  -     Row Name 07/10/20 1025          Pain Scale: Numbers Pre/Post-Treatment    Pain Scale: Numbers, Pretreatment  8/10  [facility]     Pain Scale: Numbers, Post-Treatment  8/10  [facility]     Pain Location - Side  Left  -     Pain Location  hip and knee  -     Pre/Post Treatment Pain Comment  Pt reports had pain med earlier  -     Pain Intervention(s)  Medication (See MAR);Repositioned;Ambulation/increased activity;Rest  -     Row Name 07/10/20 1025          Plan of Care Review    Plan of Care Reviewed With  patient  -     Outcome Summary  PT evaluation completed as co-eval with OT. Pt reports he is very sore from fall, but agreeable to therapy. Pt transferred supine to sit with min assistance and sit to supine with mod assistance. Pt transferred sit to stand to sit with moderate asisstance of 1-2. Pt ambulated 5ft x2 with moderate assistance of 2. Pt extremely impulsive and hearing deficit  made difficult for pt to hear instructions at times. Function limited by decreased strength, balance, and tolerance for functional mobility and activities. Pt will benefit from PT to regain lost function.  Anticipate need for skilled PT at next level of care. Pt may need additional rehab at discharge prior to return home. If pt discharged home prior to goals being met, PT recommends 24/7 care with  therapy.   -     Row Name 07/10/20 1025          Physical Therapy Clinical Impression    Patient/Family Goals Statement (PT Clinical Impression)  return to PLOF; decrease fall risk  -     Criteria for Skilled Interventions Met (PT Clinical Impression)  yes;treatment indicated  -     Rehab Potential (PT Clinical Summary)  good, to achieve stated therapy goals  -     Predicted Duration of Therapy (PT)  until discharge or goals met  -     Row Name 07/10/20 1025          Vital Signs    Pre Systolic BP Rehab  125  -LILA     Pre Treatment Diastolic BP  75  -LILA     Post Systolic BP Rehab  128  -LILA     Post Treatment Diastolic BP  68  -LILA     Pretreatment Heart Rate (beats/min)  99  -LILA     Intratreatment Heart Rate (beats/min)  98  -LILA     Posttreatment Heart Rate (beats/min)  97  -LILA     Pre SpO2 (%)  92  -LILA     O2 Delivery Pre Treatment  supplemental O2 3lpm  -LILA     Intra SpO2 (%)  99  -LILA     O2 Delivery Intra Treatment  supplemental O2  -LILA     Post SpO2 (%)  97  -LILA     O2 Delivery Post Treatment  supplemental O2  -LILA     Pre Patient Position  Supine  -LILA     Intra Patient Position  Sitting  -LILA     Post Patient Position  Supine  -LILA     Row Name 07/10/20 1025          Positioning and Restraints    Pre-Treatment Position  in bed  -LILA     Post Treatment Position  bed  -LILA     In Bed  sitting;call light within reach;encouraged to call for assist;exit alarm on pt sitting up in bed eating lunch  -       User Key  (r) = Recorded By, (t) = Taken By, (c) = Cosigned By    Initials Name Provider Type    Angelica Liu, PT Physical Therapist        Outcome Measures     Row Name 07/10/20 1025          How much help from another person do you currently need...    Turning from your back to your side while in flat bed  without using bedrails?  3  -LILA     Moving from lying on back to sitting on the side of a flat bed without bedrails?  3  -LILA     Moving to and from a bed to a chair (including a wheelchair)?  2  -LILA     Standing up from a chair using your arms (e.g., wheelchair, bedside chair)?  2  -LILA     Climbing 3-5 steps with a railing?  1  -LILA     To walk in hospital room?  2  -LILA     AM-PAC 6 Clicks Score (PT)  13  -LILA     Row Name 07/10/20 1025          Functional Assessment    Outcome Measure Options  AM-PAC 6 Clicks Basic Mobility (PT)  -       User Key  (r) = Recorded By, (t) = Taken By, (c) = Cosigned By    Initials Name Provider Type    Angelica Liu PT Physical Therapist        Physical Therapy Education                 Title: PT OT SLP Therapies (In Progress)     Topic: Physical Therapy (In Progress)     Point: Mobility training (In Progress)     Description:   Instruct learner(s) on safety and technique for assisting patient out of bed, chair or wheelchair.  Instruct in the proper use of assistive devices, such as walker, crutches, cane or brace.              Patient Friendly Description:   It's important to get you on your feet again, but we need to do so in a way that is safe for you. Falling has serious consequences, and your personal safety is the most important thing of all.        When it's time to get out of bed, one of us or a family member will sit next to you on the bed to give you support.     If your doctor or nurse tells you to use a walker, crutches, a cane, or a brace, be sure you use it every time you get out of bed, even if you think you don't need it.    Learning Progress Summary           Patient Acceptance, E, NR by LILA at 7/10/2020 5474                   Point: Home exercise program (Not Started)     Description:   Instruct learner(s) on appropriate technique for monitoring, assisting and/or progressing patient with therapeutic exercises and activities.              Learner Progress:   Not  documented in this visit.          Point: Body mechanics (In Progress)     Description:   Instruct learner(s) on proper positioning and spine alignment for patient and/or caregiver during mobility tasks and/or exercises.              Learning Progress Summary           Patient Acceptance, E, NR by LILA at 7/10/2020 1348                   Point: Precautions (In Progress)     Description:   Instruct learner(s) on prescribed precautions during mobility and gait tasks              Learning Progress Summary           Patient Acceptance, E, NR by LILA at 7/10/2020 1348                               User Key     Initials Effective Dates Name Provider Type Discipline    LILA 04/03/18 -  Angelica Torres, PT Physical Therapist PT              PT Recommendation and Plan  Planned Therapy Interventions (PT Eval): balance training, bed mobility training, gait training, home exercise program, patient/family education, ROM (range of motion), strengthening, stair training, transfer training  Outcome Summary/Treatment Plan (PT)  Anticipated Discharge Disposition (PT): anticipate therapy at next level of care  Plan of Care Reviewed With: patient  Outcome Summary: PT evaluation completed as co-eval with OT. Pt reports he is very sore from fall, but agreeable to therapy. Pt transferred supine to sit with min assistance and sit to supine with mod assistance. Pt transferred sit to stand to sit with moderate asisstance of 1-2. Pt ambulated 5ft x2 with moderate assistance of 2. Pt extremely impulsive and hearing deficit  made difficult for pt to hear instructions at times. Function limited by decreased strength, balance, and tolerance for functional mobility and activities. Pt will benefit from PT to regain lost function. Anticipate need for skilled PT at next level of care. Pt may need additional rehab at discharge prior to return home. If pt discharged home prior to goals being met, PT recommends 24/7 care with  therapy.      Time Calculation:    PT Charges     Row Name 07/10/20 1350             Time Calculation    Start Time  1026  -LILA      Stop Time  1121  -LILA      Time Calculation (min)  55 min  -LILA      PT Received On  07/10/20  -      PT Goal Re-Cert Due Date  07/23/20  -         Time Calculation- PT    Total Timed Code Minutes- PT  0 minute(s)  -        User Key  (r) = Recorded By, (t) = Taken By, (c) = Cosigned By    Initials Name Provider Type     Angelica Torres, PT Physical Therapist        Therapy Charges for Today     Code Description Service Date Service Provider Modifiers Qty    20604630243 HC PT EVAL MOD COMPLEXITY 4 7/10/2020 Angelica Torres, PT GP 1          PT G-Codes  Outcome Measure Options: AM-PAC 6 Clicks Basic Mobility (PT)  AM-PAC 6 Clicks Score (PT): 13    Angelica Torres PT  7/10/2020

## 2020-07-10 NOTE — PROGRESS NOTES
[facility] Medicine Services  INPATIENT PROGRESS NOTE    Length of Stay: 2  Date of Admission: 7/8/2020  Primary Care Physician: Terry Peterson MD    Subjective   Chief Complaint: Fall  HPI:  85 year old male with a history of ischemic cardiomyopathy, HFrEF, and postural syncope, and HTN who presented to the ED after a syncopal episode which took place after standing from his chair.  He suffered facial trauma and a left hip fracture. CT of the head was negative.  Sutures were placed in the ED to facial laceration.  Orthopedics consulted and recommends hip arthroplasty after cardiac clearance. Cardiology cleared for surgery. He underwent left hip hemiarthroplasty.  He is more awake today and notes post-operative soreness.      Review of Systems   Constitutional: Negative for chills and fever.   Respiratory: Negative for shortness of breath.    Cardiovascular: Negative for chest pain.   Gastrointestinal: Negative for abdominal pain, nausea and vomiting.   Musculoskeletal: Positive for arthralgias.        All pertinent negatives and positives are as above. All other systems have been reviewed and are negative unless otherwise stated.     Objective    Temp:  [96.9 °F (36.1 °C)-99.4 °F (37.4 °C)] 98.8 °F (37.1 °C)  Heart Rate:  [] 95  Resp:  [14-20] 18  BP: (102-162)/(62-97) 128/68    Physical Exam   Constitutional: He appears well-developed and well-nourished. No distress.   HENT:   Head: Normocephalic and atraumatic.   Facial lac   Eyes: Conjunctivae are normal.   Cardiovascular: Normal rate, regular rhythm and intact distal pulses.   Pulmonary/Chest: Effort normal and breath sounds normal. No respiratory distress.   Abdominal: Soft. Bowel sounds are normal. He exhibits no distension.   Musculoskeletal: He exhibits no edema or deformity.   Neurological: He is alert.   Skin: Skin is warm and dry. He is not diaphoretic.   Vitals reviewed.    Results Review:  I  have reviewed the labs, radiology results, and diagnostic studies.    Laboratory Data:   Results from last 7 days   Lab Units 07/10/20  0550 07/09/20  0546 07/08/20 1924   SODIUM mmol/L 135* 138 139   POTASSIUM mmol/L 4.2 3.6 3.5   CHLORIDE mmol/L 102 103 104   CO2 mmol/L 25.0 24.0 23.0   BUN mg/dL 22 16 17   CREATININE mg/dL 1.04 0.89 1.07   GLUCOSE mg/dL 113* 109* 89   CALCIUM mg/dL 9.2 9.1 9.3   BILIRUBIN mg/dL  --   --  0.2   ALK PHOS U/L  --   --  108   ALT (SGPT) U/L  --   --  22   AST (SGOT) U/L  --   --  32   ANION GAP mmol/L 8.0 11.0 12.0     Estimated Creatinine Clearance: 49.7 mL/min (by C-G formula based on SCr of 1.04 mg/dL).          Results from last 7 days   Lab Units 07/10/20  0550 07/09/20  0546 07/08/20 1924   WBC 10*3/mm3 10.08 11.28* 10.40   HEMOGLOBIN g/dL 10.4* 12.2* 12.4*   HEMATOCRIT % 30.4* 35.9* 36.8*   PLATELETS 10*3/mm3 149 175 203           Culture Data:   No results found for: BLOODCX  No results found for: URINECX  No results found for: RESPCX  No results found for: WOUNDCX  No results found for: STOOLCX  No components found for: BODYFLD    Radiology Data:   Imaging Results (Last 24 Hours)     Procedure Component Value Units Date/Time    XR Hip With or Without Pelvis 1 View Left [[id number]] Resulted:  07/10/20 1308     Updated:  07/10/20 1308          I have reviewed the patient's current medications.     Assessment/Plan     Active Hospital Problems    Diagnosis   • **Closed fracture of neck of left femur (CMS/HCC)   • Postoperative anemia due to acute blood loss     Not unexpected     • Facial laceration   • HFrEF (heart failure with reduced ejection fraction) (CMS/HCC)   • Syncope and collapse   • Ischemic cardiomyopathy   • Essential hypertension   • Coronary artery disease involving native coronary artery of native heart without angina pectoris       Plan:   S/P Left hip hemiarthroplasty POD #1  PT/OT evaluation  Orthopedics consultation appreciated  Cardiology consultation  appreciated, recommended proceeding with surgery  Echocardiogram reviewed, EF 27%, stable over the last year  Pain control: Scheduled tylenol, PRN ultram, PRN morphine 1 mg q 4 hours   PRN narcan  Aspirin, statin, cozaar, toprol-xl  Facial suture removal, 4 days  VTE PPx: lovenox        The patient was evaluated during the global COVID-19 pandemic, and the diagnosis was suspected/considered upon their initial presentation.  Evaluation, treatment, and testing were consistent with current guidelines for patients who present with complaints or symptoms that may be related to COVID-19.        This document has been electronically signed by JAIME Grewal on July 10, 2020 13:17

## 2020-07-10 NOTE — THERAPY TREATMENT NOTE
Acute Care - Physical Therapy Treatment Note  [facility]     Patient Name: Jose Dacosta  : 1935  MRN: [id number]  Today's Date: 7/10/2020  Onset of Illness/Injury or Date of Surgery: 20(surgery yesterday (2020))     Referring Physician: DAYANARA Kumar APRN(surgeon: DAJA Solis MD )    Admit Date: 2020    Visit Dx:    ICD-10-CM ICD-9-CM   1. Syncope and collapse R55 780.2   2. Facial laceration, initial encounter S01.81XA 873.40   3. Closed fracture of left hip, initial encounter (CMS/HCC) S72.002A 820.8   4. Fall, initial encounter W19.XXXA E888.9   5. Closed head injury, initial encounter S09.90XA 959.01   6. Closed fracture of neck of left femur, initial encounter (CMS/HCC) S72.002A 820.8   7. Impaired functional mobility, balance, gait, and endurance Z74.09 V49.89   8. Impaired mobility and ADLs Z74.09 V49.89    Z78.9      Patient Active Problem List   Diagnosis   • Coronary artery disease involving native coronary artery of native heart without angina pectoris   • Vasovagal syncope   • RBBB (right bundle branch block with left anterior fascicular block)   • Essential hypertension   • Ischemic cardiomyopathy   • Syncope and collapse   • Closed fracture of neck of left femur (CMS/[facility])   • Coronary artery disease with history of myocardial infarction without history of CABG   • Facial laceration   • HFrEF (heart failure with reduced ejection fraction) (CMS/[facility])   • Postoperative anemia due to acute blood loss       Therapy Treatment    Rehabilitation Treatment Summary     Row Name 07/10/20 1413             Treatment Time/Intention    Discipline  physical therapy assistant  -LILA      Document Type  therapy note (daily note)  -LILA      Mode of Treatment  physical therapy;individual therapy  -LILA      Therapy Frequency (PT Clinical Impression)  daily  -LILA      Existing Precautions/Restrictions  hip, posterior L hip precautions  -JC2      Recorded by [LILA] Vikash Walters PTA  07/10/20 1430  [JC2] Vikash Walters, PTA 07/10/20 1453      Row Name 07/10/20 1413             Vital Signs    Pre Systolic BP Rehab  121  -LILA      Pre Treatment Diastolic BP  75  -LILA      Intra Systolic BP Rehab  112  -JC2      Intra Treatment Diastolic BP  76  -JC2      Post Systolic BP Rehab  133  -JC2      Post Treatment Diastolic BP  67  -JC2      Pretreatment Heart Rate (beats/min)  92  -LILA      Intratreatment Heart Rate (beats/min)  95  -JC2      Posttreatment Heart Rate (beats/min)  96  -JC2      Pre SpO2 (%)  95  -LILA      O2 Delivery Pre Treatment  supplemental O2  -LILA      Intra SpO2 (%)  -- unable to obtain  -JC2      Post SpO2 (%)  -- unable to obtain.  -JC2      Pre Patient Position  Supine  -JC2      Post Patient Position  Supine  -JC2      Recorded by [LILA] Vikash Walters, PTA 07/10/20 1430  [JC2] Vikash Walters, PTA 07/10/20 1453      Row Name 07/10/20 1413             Cognitive Assessment/Intervention- PT/OT    Orientation Status (Cognition)  oriented x 4  -LILA      Recorded by [LILA] Vikash Walters, PTA 07/10/20 1430      Row Name 07/10/20 1413             Safety Issues, Functional Mobility    Impairments Affecting Function (Mobility)  balance;endurance/activity tolerance;range of motion (ROM);strength  -LILA      Recorded by [LILA] Vikash Walters, PTA 07/10/20 1430      Row Name 07/10/20 1413             Mobility Assessment/Intervention    Extremity Weight-bearing Status  left lower extremity  -LILA      Left Lower Extremity (Weight-bearing Status)  weight-bearing as tolerated (WBAT)  -LILA      Recorded by [LILA] Vikash Walters PTA 07/10/20 1453      Row Name 07/10/20 1413             Bed Mobility Assessment/Treatment    Bed Mobility Assessment/Treatment  supine-sit;sit-supine  -LILA      Supine-Sit [place] (Bed Mobility)  minimum assist (75% patient effort)  -LILA      Sit-Supine [place] (Bed Mobility)  moderate assist (50% patient effort)  -JC2      Assistive Device (Bed Mobility)  bed  rails;head of bed elevated  -LILA      Comment (Bed Mobility)  SBA for sitting balance other than pt tries to lean R to get off L hip  -JC3      Recorded by [LILA] Vikash Walters, PTA 07/10/20 1430  [JC2] Vikash Walters, PTA 07/10/20 1453  [JC3] Vikash Walters, PTA 07/10/20 1611      Row Name 07/10/20 1413             Transfer Assessment/Treatment    Transfer Assessment/Treatment  sit-stand transfer;stand-sit transfer  -LILA      Comment (Transfers)  pt stood x2. used urinal during 1st stance  -JC2      Recorded by [LILA] Vikash Walters, PTA 07/10/20 1453  [JC2] Vikash Walters, PTA 07/10/20 1611      Row Name 07/10/20 1413             Bed-Chair Transfer    Bed-Chair [place] (Transfers)  --  -LILA      Assistive Device (Bed-Chair Transfers)  --  -LILA      Recorded by [LILA] Vikash Walters, PTA 07/10/20 1453      Row Name 07/10/20 1413             Sit-Stand Transfer    Sit-Stand [place] (Transfers)  minimum assist (75% patient effort)  -LILA      Assistive Device (Sit-Stand Transfers)  walker, front-wheeled  -JC2      Recorded by [LILA] Vikash Walters, PTA 07/10/20 1453  [JC2] Vikash Walters, PTA 07/10/20 1430      Row Name 07/10/20 1413             Stand-Sit Transfer    Stand-Sit [place] (Transfers)  minimum assist (75% patient effort)  -LILA      Assistive Device (Stand-Sit Transfers)  walker, front-wheeled  -LILA      Recorded by [LILA] Vikash Walters, PTA 07/10/20 1453      Row Name 07/10/20 1413             Gait/Stairs Assessment/Training    Gait/Stairs Assessment/Training  gait/ambulation assistive device  -LILA      [place] Level (Gait)  minimum assist (75% patient effort);nonverbal cues (demo/gesture);verbal cues  -JC2      Assistive Device (Gait)  walker, front-wheeled  -LILA      Distance in Feet (Gait)  6  -JC2      Pattern (Gait)  3-point  -JC2      Deviations/Abnormal Patterns (Gait)  antalgic  -JC2      Bilateral Gait Deviations  -- cueing required for correct gait sequence  -JC2      Recorded  by [LILA] Vikash Walters, PTA 07/10/20 1430  [JC2] Vikash Walters, PTA 07/10/20 1453      Row Name 07/10/20 1413             Therapeutic Exercise    Therapeutic Exercise  seated, lower extremities  -LILA      Recorded by [LILA] Vikash Walters, PTA 07/10/20 1611      Row Name 07/10/20 1413             Lower Extremity Seated Therapeutic Exercise    Performed, Seated Lower Extremity (Therapeutic Exercise)  ankle dorsiflexion/plantarflexion;LAQ (long arc quad), knee extension;hip flexion/extension;hip abduction/adduction  -LILA      Exercise Type, Seated Lower Extremity (Therapeutic Exercise)  AROM (active range of motion);AAROM (active assistive range of motion) self assisted w/ hip flexion  -LILA      Sets/Reps Detail, Seated Lower Extremity (Therapeutic Exercise)  10x1 veda  -LILA      Recorded by [LILA] Vikash Walters, PTA 07/10/20 1611      Row Name 07/10/20 1413             Positioning and Restraints    Pre-Treatment Position  in bed  -LILA      Post Treatment Position  bed  -LILA      In Bed  fowlers;call light within reach;encouraged to call for assist;exit alarm on  -LILA      Recorded by [LILA] Vikash Walters, PTA 07/10/20 1611      Row Name 07/10/20 1413             Pain Scale: Numbers Pre/Post-Treatment    Pain Scale: Numbers, Pretreatment  6/10  -LILA      Pain Scale: Numbers, Post-Treatment  6/10  -JC2      Pain Location - Side  Left  -      Pain Location  knee and hip  -2      Pain Intervention(s)  Repositioned;Medication (See MAR)  -JC2      Recorded by [LILA] Vikash Walters, PTA 07/10/20 1430  [JC2] Vikash Walters, PTA 07/10/20 1611      Row Name                Wound 07/08/20 1810 Left upper eyebrow Laceration    Wound - Properties Group Date first assessed: 07/08/20 [TM] Time first assessed: 1810 [TM] Present on Hospital Admission: Y [TM] Side: Left [TM] Orientation: upper [TM] Location: eyebrow [TM] Primary Wound Type: Laceration [TM] Recorded by:  [TM] Josephine Elias RN 07/08/20 1811    Row Name                 Wound 07/09/20 1515 Left posterior hip Incision    Wound - Properties Group Date first assessed: 07/09/20 [AQ] Time first assessed: 1515 [AQ] Present on Hospital Admission: N [AQ] Side: Left [AQ] Orientation: posterior [AQ] Location: hip [AQ] Primary Wound Type: Incision [AQ] Recorded by:  [AQ] Cathy Cano RN 07/09/20 1515    Row Name 07/10/20 1413             Outcome Summary/Treatment Plan (PT)    Daily Summary of Progress (PT)  progress toward functional goals as expected  -LILA      Plan for Continued Treatment (PT)  continue  -LILA      Anticipated Discharge Disposition (PT)  anticipate therapy at next level of care  -JC2      Recorded by [LILA] Vikash Walters, PTA 07/10/20 1611  [JC2] Vikash Walters, PTA 07/10/20 1430        User Key  (r) = Recorded By, (t) = Taken By, (c) = Cosigned By    Initials Name Effective Dates Discipline    AQ Cathy Cano RN 10/17/16 -  Nurse    LILA Vikash Walters PTA 03/07/18 -  PT    TM Josephine Elias RN 07/24/18 -  Nurse          Wound 07/08/20 1810 Left upper eyebrow Laceration (Active)   Dressing Appearance open to air 7/10/2020  8:21 AM   Closure Sutures 7/10/2020  8:21 AM   Base maroon/purple 7/10/2020  8:21 AM   Periwound Temperature warm 7/9/2020  5:30 PM   Periwound Skin Turgor firm 7/9/2020  5:30 PM   Drainage Amount none 7/10/2020  8:21 AM       Wound 07/09/20 1515 Left posterior hip Incision (Active)   Dressing Appearance dry;intact 7/10/2020  8:21 AM   Closure JOSE 7/10/2020  8:21 AM   Base dressing in place, unable to visualize 7/10/2020  8:21 AM   Drainage Amount none 7/10/2020  8:21 AM       Rehab Goal Summary     Row Name 07/10/20 1413 07/10/20 1026 07/10/20 1025       Bed Mobility Goal 1 (PT)    Activity/Assistive Device (Bed Mobility Goal 1, PT)  sit to supine;supine to sit  -LILA  --  sit to supine;supine to sit  -JCA    [place] Level/Cues Needed (Bed Mobility Goal 1, PT)  independent  -LILA  --  independent  -JCA    Time  Frame (Bed Mobility Goal 1, PT)  by discharge  -LILA  --  by discharge  -JCA    Progress/Outcomes (Bed Mobility Goal 1, PT)  goal not met  -LILA  --  goal not met  -JCA       Transfer Goal 1 (PT)    Activity/Assistive Device (Transfer Goal 1, PT)  sit-to-stand/stand-to-sit;bed-to-chair/chair-to-bed;toilet  -LILA  --  sit-to-stand/stand-to-sit;bed-to-chair/chair-to-bed;toilet  -JCA    [place] Level/Cues Needed (Transfer Goal 1, PT)  conditional independence  -LILA  --  conditional independence  -JCA    Time Frame (Transfer Goal 1, PT)  2 - 3 days  -LILA  --  2 - 3 days  -JCA    Progress/Outcome (Transfer Goal 1, PT)  goal not met  -LILA  --  goal not met  -JCA       Gait Training Goal 1 (PT)    Activity/Assistive Device (Gait Training Goal 1, PT)  gait (walking locomotion);assistive device use;walker, rolling  -LILA  --  gait (walking locomotion);assistive device use;walker, rolling  -JCA    [place] Level (Gait Training Goal 1, PT)  conditional independence  -LILA  --  conditional independence  -JCA    Distance (Gait Goal 1, PT)  910qxi2  -LILA  --  462wpe7  -JCA    Time Frame (Gait Training Goal 1, PT)  by discharge  -LILA  --  by discharge  -JCA    Progress/Outcome (Gait Training Goal 1, PT)  goal not met  -LILA  --  goal not met  -JCA       Stairs Goal 1 (PT)    Activity/Assistive Device (Stairs Goal 1, PT)  ascending stairs;descending stairs;using handrail, left;assistive device use  -LILA  --  ascending stairs;descending stairs;using handrail, left;assistive device use  -JCA    [place] Level/Cues Needed (Stairs Goal 1, PT)  conditional independence  -LILA  --  conditional independence  -JCA    Time Frame (Stairs Goal 1, PT)  by discharge  -LILA  --  by discharge  -JCA    Progress/Outcome (Stairs Goal 1, PT)  goal not met  -LILA  --  goal not met  -JCA       Occupational Therapy Goals    Transfer Goal Selection (OT)  --  transfer, OT goal 1  -ME  --    Bathing Goal Selection (OT)  --  bathing, OT goal 1  -ME  --    Dressing  Goal Selection (OT)  --  dressing, OT goal 1  -ME  --    Toileting Goal Selection (OT)  --  toileting, OT goal 1  -ME  --    Endurance Goal Selection (OT)  --  endurance, OT goal 1  -ME  --    Safety Awareness Goal Selection (OT)  --  safety awareness, OT goal 1  -ME  --       Transfer Goal 1 (OT)    Activity/Assistive Device (Transfer Goal 1, OT)  --  toilet  -ME  --    [place] Level/Cues Needed (Transfer Goal 1, OT)  --  contact guard assist  -ME  --    Time Frame (Transfer Goal 1, OT)  --  long term goal (LTG);by discharge  -ME  --    Barriers (Transfers Goal 1, OT)  --  hearing deficit;safety deficit   -ME  --    Progress/Outcome (Transfer Goal 1, OT)  --  goal not met  -ME  --       Bathing Goal 1 (OT)    Activity/Assistive Device (Bathing Goal 1, OT)  --  bathing skills, all  -ME  --    [place] Level/Cues Needed (Bathing Goal 1, OT)  --  minimum assist (75% or more patient effort)  -ME  --    Time Frame (Bathing Goal 1, OT)  --  long term goal (LTG);by discharge  -ME  --    Progress/Outcomes (Bathing Goal 1, OT)  --  goal not met  -ME  --       Dressing Goal 1 (OT)    Activity/Assistive Device (Dressing Goal 1, OT)  --  dressing skills, all  -ME  --    [place]/Cues Needed (Dressing Goal 1, OT)  --  minimum assist (75% or more patient effort)  -ME  --    Time Frame (Dressing Goal 1, OT)  --  long term goal (LTG);by discharge  -ME  --    Progress/Outcome (Dressing Goal 1, OT)  --  goal not met  -ME  --       Toileting Goal 1 (OT)    Activity/Device (Toileting Goal 1, OT)  --  toileting skills, all  -ME  --    [place] Level/Cues Needed (Toileting Goal 1, OT)  --  minimum assist (75% or more patient effort)  -ME  --    Time Frame (Toileting Goal 1, OT)  --  long term goal (LTG);by discharge  -ME  --    Progress/Outcome (Toileting Goal 1, OT)  --  goal not met  -ME  --        Endurance Goal 1 (OT)    Endurance Goal 1 (OT)  --  15 min functional activity with O2 remaining above 90%  -ME  --     Time Frame (Endurance Goal 1, OT)  --  long term goal (LTG);by discharge  -ME  --    Progress/Outcome (Endurance Goal 1, OT)  --  goal not met  -ME  --       Safety Awareness Goal 1 (OT)    Activity (Safety Awareness Goal 1, OT)  --  awareness of need for assistance;impulse control;insight into deficits/self awareness;judgment;safe use of assistive device/equipment;follow through of safety precautions;demonstrates compliance;demonstrates understanding;demonstration of safe behaviors  -ME  --    [place]/Cues/Accuracy (Safety Awareness Goal 1, OT)  --  with minimum;verbal cues/redirection;with repetition of directions;with 90% accuracy  -ME  --    Time Frame (Safety Awareness Goal 1, OT)  --  long term goal (LTG);by discharge  -ME  --    Progress/Outcome (Safety Awareness Goal 1, OT)  --  goal not met  -ME  --      User Key  (r) = Recorded By, (t) = Taken By, (c) = Cosigned By    Initials Name Provider Type Discipline    Angelica Vu, PT Physical Therapist PT    Vikash Whatley, PTA Physical Therapy Assistant PT    Eitan Bobo, OT Occupational Therapist OT          Physical Therapy Education                 Title: PT OT SLP Therapies (In Progress)     Topic: Physical Therapy (In Progress)     Point: Mobility training (In Progress)     Description:   Instruct learner(s) on safety and technique for assisting patient out of bed, chair or wheelchair.  Instruct in the proper use of assistive devices, such as walker, crutches, cane or brace.              Patient Friendly Description:   It's important to get you on your feet again, but we need to do so in a way that is safe for you. Falling has serious consequences, and your personal safety is the most important thing of all.        When it's time to get out of bed, one of us or a family member will sit next to you on the bed to give you support.     If your doctor or nurse tells you to use a walker, crutches, a cane, or a brace, be sure you use it every  time you get out of bed, even if you think you don't need it.    Learning Progress Summary           Patient Acceptance, E, NR by LILA at 7/10/2020 1348                   Point: Home exercise program (Not Started)     Description:   Instruct learner(s) on appropriate technique for monitoring, assisting and/or progressing patient with therapeutic exercises and activities.              Learner Progress:   Not documented in this visit.          Point: Body mechanics (In Progress)     Description:   Instruct learner(s) on proper positioning and spine alignment for patient and/or caregiver during mobility tasks and/or exercises.              Learning Progress Summary           Patient Acceptance, E, NR by LILA at 7/10/2020 1348                   Point: Precautions (In Progress)     Description:   Instruct learner(s) on prescribed precautions during mobility and gait tasks              Learning Progress Summary           Patient Acceptance, E, NR by LILA at 7/10/2020 1348                               User Key     Initials Effective Dates Name Provider Type Discipline    LILA 04/03/18 -  Angelica Torres, PT Physical Therapist PT                PT Recommendation and Plan  Anticipated Discharge Disposition (PT): anticipate therapy at next level of care  Therapy Frequency (PT Clinical Impression): daily  Outcome Summary/Treatment Plan (PT)  Daily Summary of Progress (PT): progress toward functional goals as expected  Plan for Continued Treatment (PT): continue  Anticipated Discharge Disposition (PT): anticipate therapy at next level of care  Plan of Care Reviewed With: patient  Progress: improving  Outcome Summary: pt responded well to PT. pt requires min-mod A for sup-sit-sup and SBA for sitting balance. at times pt will lean far R to get off of Lhip. pt stood x2 w/ min A and ambulated 6 ft w/ RW. pt participated in seated LE ther ex. no new goals met at this time. pt would continue to benefit from PT services.  Outcome Measures      Row Name 07/10/20 1026             How much help from another is currently needed...    Putting on and taking off regular lower body clothing?  1  -ME      Bathing (including washing, rinsing, and drying)  2  -ME      Toileting (which includes using toilet bed pan or urinal)  1  -ME      Putting on and taking off regular upper body clothing  2  -ME      Taking care of personal grooming (such as brushing teeth)  3  -ME      Eating meals  3  -ME      AM-PAC 6 Clicks Score (OT)  12  -ME         Functional Assessment    Outcome Measure Options  AM-PAC 6 Clicks Daily Activity (OT)  -ME        User Key  (r) = Recorded By, (t) = Taken By, (c) = Cosigned By    Initials Name Provider Type    ME Eitan Haynes, OT Occupational Therapist         Time Calculation:   PT Charges     Row Name 07/10/20 1613 07/10/20 1350          Time Calculation    Start Time  1413  -LILA  1026  -JCA     Stop Time  1453  -LILA  1121  -JCA     Time Calculation (min)  40 min  -LILA  55 min  -JCA     PT Received On  --  07/10/20  -JCA     PT Goal Re-Cert Due Date  --  07/23/20  -JCA        Time Calculation- PT    Total Timed Code Minutes- PT  40 minute(s)  -LILA  0 minute(s)  -JCA       User Key  (r) = Recorded By, (t) = Taken By, (c) = Cosigned By    Initials Name Provider Type    Angelica Vu, PT Physical Therapist    Vikash Whatley PTA Physical Therapy Assistant        Therapy Charges for Today     Code Description Service Date Service Provider Modifiers Qty    [id number]  PT THERAPEUTIC ACT EA 15 MIN 7/10/2020 Vikash Walters PTA GP 2    [id number] HC PT THER PROC EA 15 MIN 7/10/2020 Vikash Walters PTA GP 1          PT G-Codes  Outcome Measure Options: AM-PAC 6 Clicks Daily Activity (OT)  AM-PAC 6 Clicks Score (PT): 13  AM-PAC 6 Clicks Score (OT): 12    Vikash Walters PTA  7/10/2020        Eyeglasses/Dentures

## 2020-07-10 NOTE — PROGRESS NOTES
ORTHOPEDIC PROGRESS NOTE:    Name:  Jose Dacosta  Date:    7/10/2020  Date of admission:  7/8/2020    Post op day:  1 Day Post-Op  Procedure:    Procedure(s) (LRB):  LEFT HIP HEMIARTHROPLASTY (Left)    Subjective: Doing fairly well.  More alert than he was yesterday.    Vitals:    Vitals:    07/10/20 1105   BP: 128/68   Pulse: 95   Resp: 18   Temp: 98.8 °F (37.1 °C)   SpO2: 97%       Exam: Calf is negative moving his toes well    Lab Results (last 24 hours)     Procedure Component Value Units Date/Time    Tissue Pathology Exam [[id number]] Collected:  07/09/20 1519    Specimen:  Bone from Hip, Left Updated:  07/10/20 0650    Hemoglobin A1c [[id number]]  (Normal) Collected:  07/10/20 0550    Specimen:  Blood Updated:  07/10/20 0629     Hemoglobin A1C 4.80 %     Narrative:       Hemoglobin A1C Ranges:    Increased Risk for Diabetes  5.7% to 6.4%  Diabetes                     >= 6.5%  Diabetic Goal                < 7.0%    Basic Metabolic Panel [[id number]]  (Abnormal) Collected:  07/10/20 0550    Specimen:  Blood Updated:  07/10/20 0622     Glucose 113 mg/dL      BUN 22 mg/dL      Creatinine 1.04 mg/dL      Sodium 135 mmol/L      Potassium 4.2 mmol/L      Chloride 102 mmol/L      CO2 25.0 mmol/L      Calcium 9.2 mg/dL      eGFR Non African Amer 68 mL/min/1.73      BUN/Creatinine Ratio 21.2     Anion Gap 8.0 mmol/L     Narrative:       GFR Normal >60  Chronic Kidney Disease <60  Kidney Failure <15      CBC & Differential [[id number]] Collected:  07/10/20 0550    Specimen:  Blood Updated:  07/10/20 0602    Narrative:       The following orders were created for panel order CBC & Differential.  Procedure                               Abnormality         Status                     ---------                               -----------         ------                     CBC Auto Differential[[id number]]        Abnormal            Final result                 Please view results for these tests on the individual orders.     CBC Auto Differential [[id number]]  (Abnormal) Collected:  07/10/20 0550    Specimen:  Blood Updated:  07/10/20 0602     WBC 10.08 10*3/mm3      RBC 3.31 10*6/mm3      Hemoglobin 10.4 g/dL      Hematocrit 30.4 %      MCV 91.8 fL      MCH 31.4 pg      MCHC 34.2 g/dL      RDW 14.5 %      RDW-SD 48.7 fl      MPV 10.4 fL      Platelets 149 10*3/mm3      Neutrophil % 75.0 %      Lymphocyte % 11.4 %      Monocyte % 7.3 %      Eosinophil % 4.9 %      Basophil % 1.0 %      Immature Grans % 0.4 %      Neutrophils, Absolute 7.56 10*3/mm3      Lymphocytes, Absolute 1.15 10*3/mm3      Monocytes, Absolute 0.74 10*3/mm3      Eosinophils, Absolute 0.49 10*3/mm3      Basophils, Absolute 0.10 10*3/mm3      Immature Grans, Absolute 0.04 10*3/mm3      nRBC 0.0 /100 WBC     COVID PRE-OP / PRE-PROCEDURE SCREENING ORDER (NO ISOLATION) - Swab, Nasopharynx [[id number]] Collected:  07/09/20 1334    Specimen:  Swab from Nasopharynx Updated:  07/09/20 1946    Narrative:       The following orders were created for panel order COVID PRE-OP / PRE-PROCEDURE SCREENING ORDER (NO ISOLATION) - Swab, Nasopharynx.  Procedure                               Abnormality         Status                     ---------                               -----------         ------                     COVID-19, [facility]IA MCDANIEL IN-HOUS...[[id number]]  Normal              Final result                 Please view results for these tests on the individual orders.    COVID-19, RUBIA MCDANIEL IN-HOUSE, NP SWAB IN TRANSPORT MEDIA 8-10 HR TAT - Swab, Nasopharynx [[id number]]  (Normal) Collected:  07/09/20 1334    Specimen:  Swab from Nasopharynx Updated:  07/09/20 1946     COVID19 Not Detected    Narrative:       Testing performed by Real Time RT-PCR  This test has not been approved by the [facility] but is authorized under the Emergency Use Act (EUA)    Fact sheet for providers: https://www.fda.gov/media/738734/download    Fact sheet for patients: https://www.fda.gov/media/893575/download               ASSESSMENT:  Principal Problem:    Closed fracture of neck of left femur (CMS/[facility])  Active Problems:    Coronary artery disease involving native coronary artery of native heart without angina pectoris    Essential hypertension    Ischemic cardiomyopathy    Syncope and collapse    Facial laceration    HFrEF (heart failure with reduced ejection fraction) (CMS/[facility])        PLAN: Doing pretty well at this point.  Seems stable postop with blood work looks good.  He can weight-bear as tolerated on his leg start physical therapy.  We will obtain a postop x-ray      Jitendra Solis MD  07/10/20  12:39

## 2020-07-10 NOTE — DISCHARGE PLACEMENT REQUEST
"Sylvia Dacosta (85 y.o. Male)     Date of Birth Social Security Number Address Home Phone MRN    1935  [id number] [facility] [address] [phone] [id number]    Confucianist Marital Status          Christianity        Admission Date Admission Type Admitting Provider Attending Provider Department, Room/Bed    7/8/20 Emergency Misha Parada MD Haigler, Stuart S, MD [address], 411/1    Discharge Date Discharge Disposition Discharge Destination                       Attending Provider:  Misha Parada MD    Allergies:  Hydrochlorothiazide    Isolation:  None   Infection:  None   Code Status:  CPR    Ht:  182.9 cm (72\")   Wt:  66.4 kg (146 lb 4.8 oz)    Admission Cmt:  None   Principal Problem:  Closed fracture of neck of left femur (CMS/[facility]) [S72.002A]                 Active Insurance as of 7/8/2020     Primary Coverage     Payor Plan Insurance Group Employer/Plan Group    AETNA MEDICARE REPLACEMENT AETNA MEDICARE REPLACEMENT [id number]     Payor Plan Address Payor Plan Phone Number Payor Plan Fax Number Effective Dates    [address] [phone]  4/1/2019 - None Entered    [place] [address]       Subscriber Name Subscriber Birth Date Member ID       SYLVIA DACOSTA 1935 [id number]                 Emergency Contacts      (Rel.) Home Phone Work Phone Mobile Phone    AURELIA BOX (Daughter) [phone] -- [phone]    OlesyaCarla gomez (Spouse) [phone] -- [phone]    jefffiordaliza (Grandchild) -- -- [phone]        Milady Heath RN [facility]  [phone] phone  [phone] fax        "

## 2020-07-10 NOTE — PLAN OF CARE
Problem: Patient Care Overview  Goal: Plan of Care Review  Outcome: Ongoing (interventions implemented as appropriate)  Flowsheets (Taken 7/10/2020 9173)  Plan of Care Reviewed With: patient  Outcome Summary: initial OT evaluation complete; co-eval with PT. Pt was agreeable to eval, but does complain of soreness all over secondary to his fall. pt is very hard of hearing, and has a moderate to severe safety deficit, which are large limiting factors at this time. transferred sup to sit with min A x 1 person, but completed sit to sup with mod A x 2 persons. sit to stand and stand to sit transfers with RW and mod A x 2 persons with large amounts of verbal and tactile cues. difficulty positioning and using the RW. very impulsive with transfers. functional mobility with mod A x 2 persons and RW. toilet [place] [facility] [place] with mod A x 2 and both verbal and tactile cues. max A-dependent for hygiene and clothing management for toileting. donning/doffing of socks dependent at this time. deficits include: safety deficit, decreased strength and endurance, decreased balance, and hearing deficit. pt would benefit from further skilled OT services. at time of evaluation, OT recommends pt get additional rehab at SNF at d/c, if pt is to return home he will required 24/7 assistance. goals established.

## 2020-07-11 LAB
ANION GAP SERPL CALCULATED.3IONS-SCNC: 9 MMOL/L (ref 5–15)
BASOPHILS # BLD AUTO: 0.09 10*3/MM3 (ref 0–0.2)
BASOPHILS NFR BLD AUTO: 0.8 % (ref 0–1.5)
BUN SERPL-MCNC: 20 MG/DL (ref 8–23)
BUN/CREAT SERPL: 22 (ref 7–25)
CALCIUM SPEC-SCNC: 9.3 MG/DL (ref 8.6–10.5)
CHLORIDE SERPL-SCNC: 104 MMOL/L (ref 98–107)
CO2 SERPL-SCNC: 24 MMOL/L (ref 22–29)
CREAT SERPL-MCNC: 0.91 MG/DL (ref 0.76–1.27)
DEPRECATED RDW RBC AUTO: 49.1 FL (ref 37–54)
EOSINOPHIL # BLD AUTO: 0.62 10*3/MM3 (ref 0–0.4)
EOSINOPHIL NFR BLD AUTO: 5.4 % (ref 0.3–6.2)
ERYTHROCYTE [DISTWIDTH] IN BLOOD BY AUTOMATED COUNT: 14.4 % (ref 12.3–15.4)
GFR SERPL CREATININE-BSD FRML MDRD: 79 ML/MIN/1.73
GLUCOSE SERPL-MCNC: 92 MG/DL (ref 65–99)
HCT VFR BLD AUTO: 29.2 % (ref 37.5–51)
HGB BLD-MCNC: 9.8 G/DL (ref 13–17.7)
IMM GRANULOCYTES # BLD AUTO: 0.07 10*3/MM3 (ref 0–0.05)
IMM GRANULOCYTES NFR BLD AUTO: 0.6 % (ref 0–0.5)
LYMPHOCYTES # BLD AUTO: 1.36 10*3/MM3 (ref 0.7–3.1)
LYMPHOCYTES NFR BLD AUTO: 11.8 % (ref 19.6–45.3)
MCH RBC QN AUTO: 30.8 PG (ref 26.6–33)
MCHC RBC AUTO-ENTMCNC: 33.6 G/DL (ref 31.5–35.7)
MCV RBC AUTO: 91.8 FL (ref 79–97)
MONOCYTES # BLD AUTO: 1.03 10*3/MM3 (ref 0.1–0.9)
MONOCYTES NFR BLD AUTO: 9 % (ref 5–12)
NEUTROPHILS NFR BLD AUTO: 72.4 % (ref 42.7–76)
NEUTROPHILS NFR BLD AUTO: 8.32 10*3/MM3 (ref 1.7–7)
NRBC BLD AUTO-RTO: 0 /100 WBC (ref 0–0.2)
PLATELET # BLD AUTO: 144 10*3/MM3 (ref 140–450)
PMV BLD AUTO: 11.3 FL (ref 6–12)
POTASSIUM SERPL-SCNC: 4.2 MMOL/L (ref 3.5–5.2)
RBC # BLD AUTO: 3.18 10*6/MM3 (ref 4.14–5.8)
SODIUM SERPL-SCNC: 137 MMOL/L (ref 136–145)
WBC # BLD AUTO: 11.49 10*3/MM3 (ref 3.4–10.8)

## 2020-07-11 PROCEDURE — 97110 THERAPEUTIC EXERCISES: CPT

## 2020-07-11 PROCEDURE — 97116 GAIT TRAINING THERAPY: CPT

## 2020-07-11 PROCEDURE — 25010000002 ENOXAPARIN PER 10 MG: Performed by: ORTHOPAEDIC SURGERY

## 2020-07-11 PROCEDURE — 97535 SELF CARE MNGMENT TRAINING: CPT

## 2020-07-11 PROCEDURE — 97530 THERAPEUTIC ACTIVITIES: CPT

## 2020-07-11 PROCEDURE — 85025 COMPLETE CBC W/AUTO DIFF WBC: CPT | Performed by: ORTHOPAEDIC SURGERY

## 2020-07-11 PROCEDURE — 80048 BASIC METABOLIC PNL TOTAL CA: CPT | Performed by: ORTHOPAEDIC SURGERY

## 2020-07-11 PROCEDURE — 99024 POSTOP FOLLOW-UP VISIT: CPT | Performed by: ORTHOPAEDIC SURGERY

## 2020-07-11 RX ADMIN — ACETAMINOPHEN 1000 MG: 500 TABLET ORAL at 21:05

## 2020-07-11 RX ADMIN — METOPROLOL SUCCINATE 25 MG: 25 TABLET, FILM COATED, EXTENDED RELEASE ORAL at 08:59

## 2020-07-11 RX ADMIN — ROPINIROLE HYDROCHLORIDE 0.5 MG: 0.5 TABLET, FILM COATED ORAL at 08:59

## 2020-07-11 RX ADMIN — Medication 2.5 MG: at 08:59

## 2020-07-11 RX ADMIN — TRAMADOL HYDROCHLORIDE 25 MG: 50 TABLET, FILM COATED ORAL at 14:21

## 2020-07-11 RX ADMIN — SODIUM CHLORIDE, PRESERVATIVE FREE 10 ML: 5 INJECTION INTRAVENOUS at 21:05

## 2020-07-11 RX ADMIN — Medication 2.5 MG: at 21:05

## 2020-07-11 RX ADMIN — ACETAMINOPHEN 1000 MG: 500 TABLET ORAL at 08:58

## 2020-07-11 RX ADMIN — LOSARTAN POTASSIUM 25 MG: 25 TABLET, FILM COATED ORAL at 08:59

## 2020-07-11 RX ADMIN — ASPIRIN 81 MG: 81 TABLET, COATED ORAL at 08:59

## 2020-07-11 RX ADMIN — ATORVASTATIN CALCIUM 20 MG: 20 TABLET, FILM COATED ORAL at 08:59

## 2020-07-11 RX ADMIN — ACETAMINOPHEN 1000 MG: 500 TABLET ORAL at 12:07

## 2020-07-11 RX ADMIN — ACETAMINOPHEN 1000 MG: 500 TABLET ORAL at 17:06

## 2020-07-11 RX ADMIN — TRAMADOL HYDROCHLORIDE 25 MG: 50 TABLET, FILM COATED ORAL at 08:59

## 2020-07-11 RX ADMIN — ENOXAPARIN SODIUM 30 MG: 30 INJECTION SUBCUTANEOUS at 14:20

## 2020-07-11 NOTE — THERAPY TREATMENT NOTE
Acute Care - Occupational Therapy Treatment Note  [facility]     Patient Name: Jose Dacosta  : 1935  MRN: [id number]  Today's Date: 2020  Onset of Illness/Injury or Date of Surgery: 20(surgery yesterday (2020))  Date of Referral to OT: 07/10/20  Referring Physician: DAYANARA Kumar APRN(surgeon: DAJA Solis MD )    Admit Date: 2020       ICD-10-CM ICD-9-CM   1. Syncope and collapse R55 780.2   2. Facial laceration, initial encounter S01.81XA 873.40   3. Closed fracture of left hip, initial encounter (CMS/HCC) S72.002A 820.8   4. Fall, initial encounter W19.XXXA E888.9   5. Closed head injury, initial encounter S09.90XA 959.01   6. Closed fracture of neck of left femur, initial encounter (CMS/HCC) S72.002A 820.8   7. Impaired functional mobility, balance, gait, and endurance Z74.09 V49.89   8. Impaired mobility and ADLs Z74.09 V49.89    Z78.9      Patient Active Problem List   Diagnosis   • Coronary artery disease involving native coronary artery of native heart without angina pectoris   • Vasovagal syncope   • RBBB (right bundle branch block with left anterior fascicular block)   • Essential hypertension   • Ischemic cardiomyopathy   • Syncope and collapse   • Closed fracture of neck of left femur (CMS/[facility])   • Coronary artery disease with history of myocardial infarction without history of CABG   • Facial laceration   • HFrEF (heart failure with reduced ejection fraction) (CMS/[facility])   • Postoperative anemia due to acute blood loss     Past Medical History:   Diagnosis Date   • Abnormal ECG      Past Surgical History:   Procedure Laterality Date   • CORONARY STENT PLACEMENT         Therapy Treatment    Rehabilitation Treatment Summary     Row Name 20 1245 20 1002          Treatment Time/Intention    Discipline  occupational therapy assistant  -RC  physical therapy assistant  -LILA     Document Type  therapy note (daily note)  -KANDY  therapy note (daily note)  -LILA      Subjective Information  complains of;fatigue  -RC  --     Mode of Treatment  occupational therapy;individual therapy  -RC  physical therapy;individual therapy  -LILA     Patient/Family Observations  1 visitor entered at end of tx   -RC  --     Therapy Frequency (PT Clinical Impression)  --  daily  -LILA     Total Minutes, Occupational Therapy Treatment  30  -RC  --     Therapy Frequency (OT Eval)  daily  -RC  --     Patient Effort  --  good  -LILA     Existing Precautions/Restrictions  hip, posterior;fall  -RC  hip, posterior L hip precautions  -LILA     Recorded by [RC] Evelyn Dodson AGUILAR/L 07/11/20 1428 [LILA] Vikash Walters, PTA 07/11/20 1017     Row Name 07/11/20 1245 07/11/20 1002          Vital Signs    Pre Systolic BP Rehab  131  -RC  130  -LILA     Pre Treatment Diastolic BP  64  -RC  60  -LILA     Post Systolic BP Rehab  --  130  -JC2     Post Treatment Diastolic BP  --  67  -JC2     Pretreatment Heart Rate (beats/min)  --  82  -LILA     Posttreatment Heart Rate (beats/min)  --  82  -JC2     Pre SpO2 (%)  --  95  -LILA     O2 Delivery Pre Treatment  --  supplemental O2  -LILA     Post SpO2 (%)  --  95  -JC2     O2 Delivery Post Treatment  --  supplemental O2  -JC2     Pre Patient Position  --  Supine  -JC3     Post Patient Position  --  Sitting  -JC3     Recorded by [RC] Evelyn Dodson AGUILAR/L 07/11/20 1428 [LILA] Vikash Walters, PTA 07/11/20 1017  [JC2] Vikash Walters, PTA 07/11/20 1057  [JC3] Vikash Walters, PTA 07/11/20 1046     Row Name 07/11/20 1245 07/11/20 1002          Cognitive Assessment/Intervention- PT/OT    Orientation Status (Cognition)  oriented x 4  -RC  oriented x 4  -LILA     Follows Commands (Cognition)  --  follows one step commands;physical/tactile prompts required;repetition of directions required  -JC2     Recorded by [RC] Evelyn Dodson AGUILAR/L 07/11/20 1428 [LILA] Vikash Walters, PTA 07/11/20 1017  [JC2] Vikash Walters, PTA 07/11/20 1046     Row Name 07/11/20 1002             Safety  Issues, Functional Mobility    Impairments Affecting Function (Mobility)  balance;endurance/activity tolerance;range of motion (ROM);strength  -LILA      Recorded by [LILA] Vikash Walters, PTA 07/11/20 1017      Row Name 07/11/20 1002             Mobility Assessment/Intervention    Extremity Weight-bearing Status  left lower extremity  -LILA      Left Lower Extremity (Weight-bearing Status)  weight-bearing as tolerated (WBAT)  -LILA      Recorded by [LILA] Vikash Walters, PTA 07/11/20 1017      Row Name 07/11/20 1002             Bed Mobility Assessment/Treatment    Bed Mobility Assessment/Treatment  supine-sit  -LILA      Supine-Sit [place] (Bed Mobility)  minimum assist (75% patient effort)  -JC2      Sit-Supine [place] (Bed Mobility)  --  -LILA      Assistive Device (Bed Mobility)  bed rails;head of bed elevated  -JC2      Recorded by [LILA] Vikash Walters, PTA 07/11/20 1046  [JC2] Vikash Walters, PTA 07/11/20 1017      Row Name 07/11/20 1002             Transfer Assessment/Treatment    Transfer Assessment/Treatment  sit-stand transfer;stand-sit transfer;bed-chair transfer;toilet transfer  -LILA      Recorded by [LILA] Vikash Walters, PTA 07/11/20 1046      Row Name 07/11/20 1002             Bed-Chair Transfer    Bed-Chair [place] (Transfers)  contact guard;verbal cues  -LILA      Assistive Device (Bed-Chair Transfers)  walker, front-wheeled  -LIAL      Recorded by [LILA] Vikash Walters, PTA 07/11/20 1046      Row Name 07/11/20 1002             Sit-Stand Transfer    Sit-Stand [place] (Transfers)  contact guard;verbal cues  -LILA      Assistive Device (Sit-Stand Transfers)  walker, front-wheeled  -JC2      Recorded by [LILA] Vikash Walters, PTA 07/11/20 1046  [JC2] Vikash Walters, PTA 07/11/20 1017      Row Name 07/11/20 1002             Stand-Sit Transfer    Stand-Sit [place] (Transfers)  contact guard;verbal cues  -LILA      Assistive Device (Stand-Sit Transfers)  walker, front-wheeled  -JC2       Recorded by [LILA] Vikash Walters, PTA 07/11/20 1046  [JC2] Vikash Walters, [facility] 07/11/20 1017      Row Name 07/11/20 1002             Toilet Transfer    Type (Toilet Transfer)  sit-stand;stand-sit  -LILA      [place] Level (Toilet Transfer)  contact guard;nonverbal cues (demo/gesture)  -LILA      Recorded by [LILA] Vikash Walters, PTA 07/11/20 1046      Row Name 07/11/20 1002             Gait/Stairs Assessment/Training    Gait/Stairs Assessment/Training  gait/ambulation assistive device  -LILA      [place] Level (Gait)  contact guard;verbal cues  -JC2      Assistive Device (Gait)  walker, front-wheeled  -LILA      Distance in Feet (Gait)  5, 16  -JC2      Pattern (Gait)  3-point  -LILA      Deviations/Abnormal Patterns (Gait)  antalgic  -LILA      Bilateral Gait Deviations  -- cueing for correct walker use.   -JC2      Recorded by [LILA] Vikash Walters, PTA 07/11/20 1017  [JC2] Vikash Walters, [facility] 07/11/20 1046      Row Name 07/11/20 1245             Lower Body Dressing Assessment/Training    Comment (Lower Body Dressing)  introduced and demo use of AE for lb dressing  -RC      Recorded by [RC] Evelyn Dodson COTA/L 07/11/20 1428      Row Name 07/11/20 1002             Therapeutic Exercise    Therapeutic Exercise  supine, lower extremities  -LILA      Recorded by [LILA] Vikash Walters, PTA 07/11/20 1046      Row Name 07/11/20 1002             Lower Extremity Supine Therapeutic Exercise    Performed, Supine Lower Extremity (Therapeutic Exercise)  ankle dorsiflexion/plantarflexion;hip abduction/adduction;heel slides;SAQ (short arc quad) over bolster;quadriceps sets;gluteal sets  -LILA      Exercise Type, Supine Lower Extremity (Therapeutic Exercise)  AROM (active range of motion)  -LILA      Sets/Reps Detail, Supine Lower Extremity (Therapeutic Exercise)  20x1 veda  -LILA      Comment, Supine Lower Extremity (Therapeutic Exercise)  careful not to break hip prec  -JC2      Recorded by [LILA] Vikash Walters, PTA 07/11/20  1046  [JC2] Vikash Walters, PTA 07/11/20 1057      Row Name 07/11/20 1245 07/11/20 1002          Positioning and Restraints    Pre-Treatment Position  sitting in chair/recliner  -RC  in bed  -LILA     Post Treatment Position  chair  -RC  chair  -LILA     In Bed  --  fowlers;call light within reach;encouraged to call for assist;exit alarm on all needs met  -LILA     In Chair  R knee immobilizer;call light within reach;encouraged to call for assist;exit alarm on  -RC  --     Recorded by [RC] Evelyn Dodson AGUILAR/L 07/11/20 1428 [LILA] Vikash Walters, PTA 07/11/20 1046     Row Name 07/11/20 1245 07/11/20 1002          Pain Scale: Numbers Pre/Post-Treatment    Pain Scale: Numbers, Pretreatment  7/10  -RC  9/10  -LILA     Pain Scale: Numbers, Post-Treatment  7/10  -RC  7/10  -JC2     Pain Location - Side  Left  -RC  Left  -LILA     Pain Location  hip  -RC  hip and hip  -LILA     Pain Intervention(s)  Repositioned  -RC  Repositioned  -LILA     Recorded by [RC] Evelyn Dodson AGUILAR/L 07/11/20 1428 [LILA] Vikash Walters, PTA 07/11/20 1017  [JC2] Vikash Walters, PTA 07/11/20 1057     Row Name                Wound 07/08/20 1810 Left upper eyebrow Laceration    Wound - Properties Group Date first assessed: 07/08/20 [TM] Time first assessed: 1810 [TM] Present on Hospital Admission: Y [TM] Side: Left [TM] Orientation: upper [TM] Location: eyebrow [TM] Primary Wound Type: Laceration [TM] Recorded by:  [TM] Josephine Elias RN 07/08/20 1811    Row Name                Wound 07/09/20 1515 Left posterior hip Incision    Wound - Properties Group Date first assessed: 07/09/20 [AQ] Time first assessed: 1515 [AQ] Present on Hospital Admission: N [AQ] Side: Left [AQ] Orientation: posterior [AQ] Location: hip [AQ] Primary Wound Type: Incision [AQ] Recorded by:  [AQ] Cathy Cano RN 07/09/20 1515    Row Name 07/11/20 1245             Outcome Summary/Treatment Plan (OT)    Daily Summary of Progress (OT)  progress toward functional  goals as expected  -      Plan for Continued Treatment (OT)  cont poc   -RC      Recorded by [RC] West, Evelyn G, AGUILAR/L 07/11/20 1428      Row Name 07/11/20 1002             Outcome Summary/Treatment Plan (PT)    Daily Summary of Progress (PT)  progress toward functional goals as expected  -LILA      Plan for Continued Treatment (PT)  continue  -JC2      Anticipated Discharge Disposition (PT)  anticipate therapy at next level of care  -LILA      Recorded by [LILA] Vikash Walters, PTA 07/11/20 1017  [JC2] Vikash Walters, PTA 07/11/20 1057        User Key  (r) = Recorded By, (t) = Taken By, (c) = Cosigned By    Initials Name Effective Dates Discipline    AQ Cathy Cano, RN 10/17/16 -  Nurse    LILA Vikash Walters, PTA 03/07/18 -  PT    RC Evelyn Dodson, AGUILAR/L 03/07/18 -  OT    TM Josephine Elias, RN 07/24/18 -  Nurse        Wound 07/08/20 1810 Left upper eyebrow Laceration (Active)   Dressing Appearance open to air 7/11/2020  7:43 AM   Closure Sutures 7/11/2020  7:43 AM   Base maroon/purple 7/11/2020  7:43 AM   Drainage Amount none 7/11/2020  7:43 AM       Wound 07/09/20 1515 Left posterior hip Incision (Active)   Dressing Appearance dry;intact 7/11/2020  7:43 AM   Closure JOSE 7/11/2020  7:43 AM   Base dressing in place, unable to visualize 7/11/2020  7:43 AM   Drainage Amount none 7/11/2020  7:43 AM     Rehab Goal Summary     Row Name 07/11/20 1245 07/11/20 1002          Bed Mobility Goal 1 (PT)    Activity/Assistive Device (Bed Mobility Goal 1, PT)  --  sit to supine;supine to sit  -     [place] Level/Cues Needed (Bed Mobility Goal 1, PT)  --  independent  -LILA     Time Frame (Bed Mobility Goal 1, PT)  --  by discharge  -LILA     Progress/Outcomes (Bed Mobility Goal 1, PT)  --  goal not met  -LILA        Transfer Goal 1 (PT)    Activity/Assistive Device (Transfer Goal 1, PT)  --  sit-to-stand/stand-to-sit;bed-to-chair/chair-to-bed;toilet  -LILA     [place] Level/Cues Needed (Transfer Goal  1, PT)  --  conditional independence  -LILA     Time Frame (Transfer Goal 1, PT)  --  2 - 3 days  -LILA     Progress/Outcome (Transfer Goal 1, PT)  --  goal not met  -LILA        Gait Training Goal 1 (PT)    Activity/Assistive Device (Gait Training Goal 1, PT)  --  gait (walking locomotion);assistive device use;walker, rolling  -LILA     [place] Level (Gait Training Goal 1, PT)  --  conditional independence  -LILA     Distance (Gait Goal 1, PT)  --  517hsm3  -LILA     Time Frame (Gait Training Goal 1, PT)  --  by discharge  -LILA     Progress/Outcome (Gait Training Goal 1, PT)  --  goal not met  -LILA        Stairs Goal 1 (PT)    Activity/Assistive Device (Stairs Goal 1, PT)  --  ascending stairs;descending stairs;using handrail, left;assistive device use  -LILA     [place] Level/Cues Needed (Stairs Goal 1, PT)  --  conditional independence  -LILA     Time Frame (Stairs Goal 1, PT)  --  by discharge  -LILA     Progress/Outcome (Stairs Goal 1, PT)  --  goal not met  -LILA        Occupational Therapy Goals    Transfer Goal Selection (OT)  transfer, OT goal 1  -RC  --     Bathing Goal Selection (OT)  bathing, OT goal 1  -RC  --     Dressing Goal Selection (OT)  dressing, OT goal 1  -RC  --     Toileting Goal Selection (OT)  toileting, OT goal 1  -RC  --     Endurance Goal Selection (OT)  endurance, OT goal 1  -RC  --     Safety Awareness Goal Selection (OT)  safety awareness, OT goal 1  -RC  --        Transfer Goal 1 (OT)    Activity/Assistive Device (Transfer Goal 1, OT)  toilet  -RC  --     [place] Level/Cues Needed (Transfer Goal 1, OT)  contact guard assist  -RC  --     Time Frame (Transfer Goal 1, OT)  long term goal (LTG);by discharge  -RC  --     Barriers (Transfers Goal 1, OT)  hearing deficit;safety deficit   -RC  --     Progress/Outcome (Transfer Goal 1, OT)  goal not met  -RC  --        Bathing Goal 1 (OT)    Activity/Assistive Device (Bathing Goal 1, OT)  bathing skills, all  -RC  --     [place] Level/Cues  Needed (Bathing Goal 1, OT)  minimum assist (75% or more patient effort)  -RC  --     Time Frame (Bathing Goal 1, OT)  long term goal (LTG);by discharge  -RC  --     Progress/Outcomes (Bathing Goal 1, OT)  goal not met  -RC  --        Dressing Goal 1 (OT)    Activity/Assistive Device (Dressing Goal 1, OT)  dressing skills, all  -RC  --     Divide/Cues Needed (Dressing Goal 1, OT)  minimum assist (75% or more patient effort)  -RC  --     Time Frame (Dressing Goal 1, OT)  long term goal (LTG);by discharge  -RC  --     Progress/Outcome (Dressing Goal 1, OT)  goal not met  -RC  --        Toileting Goal 1 (OT)    Activity/Device (Toileting Goal 1, OT)  toileting skills, all  -RC  --     [place] Level/Cues Needed (Toileting Goal 1, OT)  minimum assist (75% or more patient effort)  -RC  --     Time Frame (Toileting Goal 1, OT)  long term goal (LTG);by discharge  -RC  --     Progress/Outcome (Toileting Goal 1, OT)  goal not met  -RC  --         Endurance Goal 1 (OT)    Progress/Outcome (Endurance Goal 1, OT)  goal not met  -RC  --        Safety Awareness Goal 1 (OT)    Activity (Safety Awareness Goal 1, OT)  awareness of need for assistance;impulse control;insight into deficits/self awareness;judgment;safe use of assistive device/equipment;follow through of safety precautions;demonstrates compliance;demonstrates understanding;demonstration of safe behaviors  -RC  --     Divide/Cues/Accuracy (Safety Awareness Goal 1, OT)  with minimum;verbal cues/redirection;with repetition of directions;with 90% accuracy  -RC  --     Time Frame (Safety Awareness Goal 1, OT)  long term goal (LTG);by discharge  -RC  --     Progress/Outcome (Safety Awareness Goal 1, OT)  goal not met  -RC  --       User Key  (r) = Recorded By, (t) = Taken By, (c) = Cosigned By    Initials Name Provider Type Discipline    Vikash Whatley, PTA Physical Therapy Assistant PT    RC Evelyn Dodson COTA/L Occupational Therapy Assistant OT         Occupational Therapy Education                 Title: PT OT SLP Therapies (In Progress)     Topic: Occupational Therapy (In Progress)     Point: ADL training (In Progress)     Description:   Instruct learner(s) on proper safety adaptation and remediation techniques during self care or transfers.   Instruct in proper use of assistive devices.              Learning Progress Summary           Patient Acceptance, E,D,H, NR by  at 7/11/2020 1429    Comment:  much review and issued handout for HIP precautions, edu on use of lh ae will need reinforcement.    Acceptance, E, NR by ME at 7/10/2020 1503    Comment:  Educated on OT and POC. Educated to call for assistance. Educated on safety precautions. Educated on proper body mechanics for transfers, bed mobility, ADLs, and funcitonal mobility.                   Point: Home exercise program (Not Started)     Description:   Instruct learner(s) on appropriate technique for monitoring, assisting and/or progressing therapeutic exercises/activities.              Learner Progress:   Not documented in this visit.          Point: Precautions (In Progress)     Description:   Instruct learner(s) on prescribed precautions during self-care and functional transfers.              Learning Progress Summary           Patient Acceptance, E,D,H, NR by  at 7/11/2020 1429    Comment:  much review and issued handout for HIP precautions, edu on use of lh ae will need reinforcement.    Acceptance, E, NR by ME at 7/10/2020 1503    Comment:  Educated on OT and POC. Educated to call for assistance. Educated on safety precautions. Educated on proper body mechanics for transfers, bed mobility, ADLs, and funcitonal mobility.                   Point: Body mechanics (In Progress)     Description:   Instruct learner(s) on proper positioning and spine alignment during self-care, functional mobility activities and/or exercises.              Learning Progress Summary           Patient Acceptance,  E,D,H, NR by  at 7/11/2020 1420    Comment:  much review and issued handout for HIP precautions, edu on use of lh ae will need reinforcement.    Acceptance, E, NR by ME at 7/10/2020 1503    Comment:  Educated on OT and POC. Educated to call for assistance. Educated on safety precautions. Educated on proper body mechanics for transfers, bed mobility, ADLs, and funcitonal mobility.                               User Key     Initials Effective Dates Name Provider Type Discipline     03/07/18 -  Evelyn Dodson COTA/L Occupational Therapy Assistant OT    ME 09/10/19 -  Eitan Hyanes OT Occupational Therapist OT                OT Recommendation and Plan  Outcome Summary/Treatment Plan (OT)  Daily Summary of Progress (OT): progress toward functional goals as expected  Plan for Continued Treatment (OT): cont poc   Therapy Frequency (OT Eval): daily  Daily Summary of Progress (OT): progress toward functional goals as expected  Plan of Care Review  Plan of Care Reviewed With: patient  Plan of Care Reviewed With: patient  Outcome Summary: pt up in chair educated pt on hip precautions and use of ae for lb dressing.  will need reinforcement.  cont poc  Outcome Measures     Row Name 07/11/20 1245 07/11/20 1002 07/10/20 1026       How much help from another person do you currently need...    Turning from your back to your side while in flat bed without using bedrails?  --  3  -LILA  --    Moving from lying on back to sitting on the side of a flat bed without bedrails?  --  3  -LILA  --    Moving to and from a bed to a chair (including a wheelchair)?  --  3  -LILA  --    Standing up from a chair using your arms (e.g., wheelchair, bedside chair)?  --  3  -LILA  --    Climbing 3-5 steps with a railing?  --  3  -LILA  --    To walk in hospital room?  --  3  -LILA  --    AM-PAC 6 Clicks Score (PT)  --  18  -LILA  --       How much help from another is currently needed...    Putting on and taking off regular lower body clothing?  1  -  --   1  -ME    Bathing (including washing, rinsing, and drying)  2  -RC  --  2  -ME    Toileting (which includes using toilet bed pan or urinal)  1  -RC  --  1  -ME    Putting on and taking off regular upper body clothing  2  -RC  --  2  -ME    Taking care of personal grooming (such as brushing teeth)  3  -RC  --  3  -ME    Eating meals  3  -RC  --  3  -ME    AM-PAC 6 Clicks Score (OT)  12  -RC  --  12  -ME       Functional Assessment    Outcome Measure Options  --  AM-PAC 6 Clicks Basic Mobility (PT)  -  AM-[facility] 6 Clicks Daily Activity (OT)  -ME      User Key  (r) = Recorded By, (t) = Taken By, (c) = Cosigned By    Initials Name Provider Type    LILA Vikash Walters, PTA Physical Therapy Assistant     Evelyn Dodson COTA/L Occupational Therapy Assistant    ME Eitan Haynes, OT Occupational Therapist           Time Calculation:   Time Calculation- OT     Row Name 07/11/20 1432             Time Calculation- OT    OT Start Time  1245  -      OT Stop Time  1315  -      OT Time Calculation (min)  30 min  -      Total Timed Code Minutes- OT  30 minute(s)  -      OT Received On  07/11/20  -        User Key  (r) = Recorded By, (t) = Taken By, (c) = Cosigned By    Initials Name Provider Type     Evelyn Dodson COTA/L Occupational Therapy Assistant        Therapy Charges for Today     Code Description Service Date Service Provider Modifiers Qty    [id number] HC OT SELF CARE/MGMT/TRAIN EA 15 MIN 7/11/2020 Evelyn Dodson COTA/JAZZY GO 2               MICK Lane  7/11/2020

## 2020-07-11 NOTE — PROGRESS NOTES
ORTHOPEDIC PROGRESS NOTE:    Name:  Jose Dacosta  Date:    7/11/2020  Date of admission:  7/8/2020    Post op day:  2 Days Post-Op  Procedure:    Procedure(s) (LRB):  LEFT HIP HEMIARTHROPLASTY (Left)    Subjective: Doing better sitting up in bed, conversant today.  Does complain of some difficulty swallowing    Vitals:    Vitals:    07/11/20 0310   BP: 140/62   Pulse: 82   Resp: 18   Temp: 96.8 °F (36 °C)   SpO2: 97%       Exam: Neurologically intact, calf negative    Lab Results (last 24 hours)     Procedure Component Value Units Date/Time    Basic Metabolic Panel [[id number]]  (Normal) Collected:  07/11/20 0538    Specimen:  Blood Updated:  07/11/20 0714     Glucose 92 mg/dL      BUN 20 mg/dL      Creatinine 0.91 mg/dL      Sodium 137 mmol/L      Potassium 4.2 mmol/L      Chloride 104 mmol/L      CO2 24.0 mmol/L      Calcium 9.3 mg/dL      eGFR Non African Amer 79 mL/min/1.73      BUN/Creatinine Ratio 22.0     Anion Gap 9.0 mmol/L     Narrative:       GFR Normal >60  Chronic Kidney Disease <60  Kidney Failure <15      CBC & Differential [[id number]] Collected:  07/11/20 0538    Specimen:  Blood Updated:  07/11/20 0653    Narrative:       The following orders were created for panel order CBC & Differential.  Procedure                               Abnormality         Status                     ---------                               -----------         ------                     CBC Auto Differential[[id number]]        Abnormal            Final result                 Please view results for these tests on the individual orders.    CBC Auto Differential [[id number]]  (Abnormal) Collected:  07/11/20 0538    Specimen:  Blood Updated:  07/11/20 0653     WBC 11.49 10*3/mm3      RBC 3.18 10*6/mm3      Hemoglobin 9.8 g/dL      Hematocrit 29.2 %      MCV 91.8 fL      MCH 30.8 pg      MCHC 33.6 g/dL      RDW 14.4 %      RDW-SD 49.1 fl      MPV 11.3 fL      Platelets 144 10*3/mm3      Neutrophil % 72.4 %      Lymphocyte  % 11.8 %      Monocyte % 9.0 %      Eosinophil % 5.4 %      Basophil % 0.8 %      Immature Grans % 0.6 %      Neutrophils, Absolute 8.32 10*3/mm3      Lymphocytes, Absolute 1.36 10*3/mm3      Monocytes, Absolute 1.03 10*3/mm3      Eosinophils, Absolute 0.62 10*3/mm3      Basophils, Absolute 0.09 10*3/mm3      Immature Grans, Absolute 0.07 10*3/mm3      nRBC 0.0 /100 WBC           ASSESSMENT:  Principal Problem:    Closed fracture of neck of left femur (CMS/[facility])  Active Problems:    Coronary artery disease involving native coronary artery of native heart without angina pectoris    Essential hypertension    Ischemic cardiomyopathy    Syncope and collapse    Facial laceration    HFrEF (heart failure with reduced ejection fraction) (CMS/[facility])    Postoperative anemia due to acute blood loss        PLAN: Fairly slow with physical therapy at this point.  May weight-bear as tolerated, continue to mobilize.  X-rays look good.      Jitendra Solis MD  07/11/20  08:39

## 2020-07-11 NOTE — PLAN OF CARE
Problem: Patient Care Overview  Goal: Plan of Care Review  Outcome: Ongoing (interventions implemented as appropriate)  Flowsheets (Taken 7/11/2020 1050)  Progress: improving  Plan of Care Reviewed With: patient  Outcome Summary: pt responded well to PT w/ increased gait to 5, 16 ft CGA w/ RW. pt participated in LE ther ex. pt requires min A for bed mobility and CGA for t/fs. pt edu on hip precautions. no new goals met at this time. pt would continue to benefit from PT services.

## 2020-07-11 NOTE — PLAN OF CARE
Problem: Patient Care Overview  Goal: Plan of Care Review  Outcome: Ongoing (interventions implemented as appropriate)  Flowsheets (Taken 7/11/2020 0220)  Progress: improving  Plan of Care Reviewed With: patient  Outcome Summary: VSS, pain controlled, ambulating to bathroom, sleeping between care, continue to monitor.

## 2020-07-11 NOTE — PROGRESS NOTES
[facility] Medicine Services  INPATIENT PROGRESS NOTE    Length of Stay: 3  Date of Admission: 7/8/2020  Primary Care Physician: Terry Peterson MD    Subjective   Chief Complaint: Fall  HPI:  85 year old male with a history of ischemic cardiomyopathy, HFrEF, and postural syncope, and HTN who presented to the ED after a syncopal episode which took place after standing from his chair.  He suffered facial trauma and a left hip fracture. CT of the head was negative.  Sutures were placed in the ED to facial laceration.  Orthopedics consulted and recommends hip arthroplasty after cardiac clearance. Cardiology cleared for surgery. He underwent left hip hemiarthroplasty.  Pain is better today.  He walked 6 feet with a walker yesterday.    Review of Systems   Constitutional: Negative for chills and fever.   Respiratory: Negative for shortness of breath.    Cardiovascular: Negative for chest pain.   Gastrointestinal: Negative for abdominal pain, nausea and vomiting.   Musculoskeletal: Positive for arthralgias.        All pertinent negatives and positives are as above. All other systems have been reviewed and are negative unless otherwise stated.     Objective    Temp:  [96.8 °F (36 °C)-98.7 °F (37.1 °C)] 97.3 °F (36.3 °C)  Heart Rate:  [77-96] 86  Resp:  [18] 18  BP: (110-141)/(54-67) 141/65    Physical Exam   Constitutional: He appears well-developed and well-nourished. No distress.   HENT:   Head: Normocephalic and atraumatic.   Facial lac   Eyes: Conjunctivae are normal.   Cardiovascular: Normal rate, regular rhythm and intact distal pulses.   Pulmonary/Chest: Effort normal and breath sounds normal. No respiratory distress.   Abdominal: Soft. Bowel sounds are normal. He exhibits no distension.   Musculoskeletal: He exhibits no edema or deformity.   Neurological: He is alert.   Skin: Skin is warm and dry. He is not diaphoretic.   Vitals reviewed.    Results Review:  I  have reviewed the labs, radiology results, and diagnostic studies.    Laboratory Data:   Results from last 7 days   Lab Units 07/11/20 0538 07/10/20  0550 07/09/20 0546 07/08/20 1924   SODIUM mmol/L 137 135* 138 139   POTASSIUM mmol/L 4.2 4.2 3.6 3.5   CHLORIDE mmol/L 104 102 103 104   CO2 mmol/L 24.0 25.0 24.0 23.0   BUN mg/dL 20 22 16 17   CREATININE mg/dL 0.91 1.04 0.89 1.07   GLUCOSE mg/dL 92 113* 109* 89   CALCIUM mg/dL 9.3 9.2 9.1 9.3   BILIRUBIN mg/dL  --   --   --  0.2   ALK PHOS U/L  --   --   --  108   ALT (SGPT) U/L  --   --   --  22   AST (SGOT) U/L  --   --   --  32   ANION GAP mmol/L 9.0 8.0 11.0 12.0     Estimated Creatinine Clearance: 58 mL/min (by C-G formula based on SCr of 0.91 mg/dL).          Results from last 7 days   Lab Units 07/11/20  0538 07/10/20  0550 07/09/20 0546 07/08/20 1924   WBC 10*3/mm3 11.49* 10.08 11.28* 10.40   HEMOGLOBIN g/dL 9.8* 10.4* 12.2* 12.4*   HEMATOCRIT % 29.2* 30.4* 35.9* 36.8*   PLATELETS 10*3/mm3 144 149 175 203           Culture Data:   No results found for: BLOODCX  No results found for: URINECX  No results found for: RESPCX  No results found for: WOUNDCX  No results found for: STOOLCX  No components found for: BODYFLD    Radiology Data:   Imaging Results (Last 24 Hours)     Procedure Component Value Units Date/Time    XR Hip With or Without Pelvis 1 View Left [[id number]] Collected:  07/10/20 1308     Updated:  07/10/20 1430    Narrative:         PROCEDURE:Left  Hip AP view     REASON FOR EXAM: Postop, R55 Syncope and collapse S01.81XA  Laceration without foreign body of other part of head, initial  encounter S72.002A Fracture of unspecified part of neck of left  femur, initial encounter for closed fracture W19.XXXA Unspecified  fall, initial encounter S09.90XA Unspecified injury of head,  initial encounter S72.002A Fracture of unspecified part of neck  of left femur, initial encounter for sean    FINDINGS: Comparison exam dated July 8, 2020. There is  been  interval resection of the left femoral head and femoral neck  region. Left bipolar hip prostheses placement with prostheses  appearing appropriately positioned. Otherwise the bony structures  of the left hip are unremarkable. Mild postsurgical soft tissue  swelling and soft tissue air within the postsurgical bed.  Remainder of left hip exam unremarkable.      Impression:       1.  There is been interval resection of the left femoral head and  femoral neck region. Left bipolar hip prostheses placement with  prostheses appearing appropriately positioned.   2.  Mild postsurgical soft tissue swelling and soft tissue air  within the postsurgical bed.  3.  Remainder of left hip exam is unremarkable.    Electronically signed by:  Mauri Noriega MD  7/10/2020 1:42 PM CDT  Workstation: [id number]          I have reviewed the patient's current medications.     Assessment/Plan     Active Hospital Problems    Diagnosis   • **Closed fracture of neck of left femur (CMS/HCC)   • Postoperative anemia due to acute blood loss     Not unexpected     • Facial laceration   • HFrEF (heart failure with reduced ejection fraction) (CMS/[facility])   • Syncope and collapse   • Ischemic cardiomyopathy   • Essential hypertension   • Coronary artery disease involving native coronary artery of native heart without angina pectoris       Plan:   S/P Left hip hemiarthroplasty POD #2  PT/OT   Orthopedics consultation appreciated  Cardiology consultation appreciated, recommended proceeding with surgery  Echocardiogram reviewed, EF 27%, stable over the last year  Pain control: Scheduled tylenol, PRN ultram, PRN morphine 1 mg q 4 hours   PRN narcan  Aspirin, statin, cozaar, toprol-xl  Facial suture removal, 3-4 days  VTE PPx: lovenox  Discharge planning: anticipate SNF      The patient was evaluated during the global COVID-19 pandemic, and the diagnosis was suspected/considered upon their initial presentation.  Evaluation, treatment, and testing were  consistent with current guidelines for patients who present with complaints or symptoms that may be related to COVID-19.        This document has been electronically signed by JAIME Grewal on July 11, 2020 11:25

## 2020-07-11 NOTE — THERAPY TREATMENT NOTE
Acute Care - Physical Therapy Treatment Note  [facility]     Patient Name: Jose Dacosta  : 1935  MRN: [id number]  Today's Date: 2020  Onset of Illness/Injury or Date of Surgery: 20(surgery yesterday (2020))     Referring Physician: DAYANARA Kumar APRN(surgeon: DAJA Solis MD )    Admit Date: 2020    Visit Dx:    ICD-10-CM ICD-9-CM   1. Syncope and collapse R55 780.2   2. Facial laceration, initial encounter S01.81XA 873.40   3. Closed fracture of left hip, initial encounter (CMS/HCC) S72.002A 820.8   4. Fall, initial encounter W19.XXXA E888.9   5. Closed head injury, initial encounter S09.90XA 959.01   6. Closed fracture of neck of left femur, initial encounter (CMS/HCC) S72.002A 820.8   7. Impaired functional mobility, balance, gait, and endurance Z74.09 V49.89   8. Impaired mobility and ADLs Z74.09 V49.89    Z78.9      Patient Active Problem List   Diagnosis   • Coronary artery disease involving native coronary artery of native heart without angina pectoris   • Vasovagal syncope   • RBBB (right bundle branch block with left anterior fascicular block)   • Essential hypertension   • Ischemic cardiomyopathy   • Syncope and collapse   • Closed fracture of neck of left femur (CMS/[facility])   • Coronary artery disease with history of myocardial infarction without history of CABG   • Facial laceration   • HFrEF (heart failure with reduced ejection fraction) (CMS/[facility])   • Postoperative anemia due to acute blood loss       Therapy Treatment    Rehabilitation Treatment Summary     Row Name 20 1002             Treatment Time/Intention    Discipline  physical therapy assistant  -LILA      Document Type  therapy note (daily note)  -LILA      Mode of Treatment  physical therapy;individual therapy  -LILA      Therapy Frequency (PT Clinical Impression)  daily  -LILA      Patient Effort  good  -LILA      Existing Precautions/Restrictions  hip, posterior L hip precautions  -LILA      Recorded by  [LILA] Vikash Walters, PTA 07/11/20 1017      Row Name 07/11/20 1002             Vital Signs    Pre Systolic BP Rehab  130  -LILA      Pre Treatment Diastolic BP  60  -LILA      Post Systolic BP Rehab  130  -JC2      Post Treatment Diastolic BP  67  -JC2      Pretreatment Heart Rate (beats/min)  82  -LILA      Posttreatment Heart Rate (beats/min)  82  -JC2      Pre SpO2 (%)  95  -LILA      O2 Delivery Pre Treatment  supplemental O2  -LILA      Post SpO2 (%)  95  -JC2      O2 Delivery Post Treatment  supplemental O2  -JC2      Pre Patient Position  Supine  -JC3      Post Patient Position  Sitting  -JC3      Recorded by [LILA] Vikash Walters, PTA 07/11/20 1017  [JC2] Vikash Walters, [facility] 07/11/20 1057  [JC3] Vikash Walters, [facility] 07/11/20 1046      Row Name 07/11/20 1002             Cognitive Assessment/Intervention- PT/OT    Orientation Status (Cognition)  oriented x 4  -LILA      Follows Commands (Cognition)  follows one step commands;physical/tactile prompts required;repetition of directions required  -JC2      Recorded by [LILA] Vikash Walters, PTA 07/11/20 1017  [JC2] Vikash Walters, PTA 07/11/20 1046      Row Name 07/11/20 1002             Safety Issues, Functional Mobility    Impairments Affecting Function (Mobility)  balance;endurance/activity tolerance;range of motion (ROM);strength  -LILA      Recorded by [LILA] Vikash Walters, PTA 07/11/20 1017      Row Name 07/11/20 1002             Mobility Assessment/Intervention    Extremity Weight-bearing Status  left lower extremity  -LILA      Left Lower Extremity (Weight-bearing Status)  weight-bearing as tolerated (WBAT)  -LILA      Recorded by [LILA] Vikash Walters, PTA 07/11/20 1017      Row Name 07/11/20 1002             Bed Mobility Assessment/Treatment    Bed Mobility Assessment/Treatment  supine-sit  -LILA      Supine-Sit [place] (Bed Mobility)  minimum assist (75% patient effort)  -JC2      Sit-Supine [place] (Bed Mobility)  --  -LILA      Assistive Device (Bed  Mobility)  bed rails;head of bed elevated  -JC2      Recorded by [LILA] Vikash Walters, PTA 07/11/20 1046  [JC2] Vikash Walters, PTA 07/11/20 1017      Row Name 07/11/20 1002             Transfer Assessment/Treatment    Transfer Assessment/Treatment  sit-stand transfer;stand-sit transfer;bed-chair transfer;toilet transfer  -LILA      Recorded by [LILA] Vikash Walters, PTA 07/11/20 1046      Row Name 07/11/20 1002             Bed-Chair Transfer    Bed-Chair [place] (Transfers)  contact guard;verbal cues  -LILA      Assistive Device (Bed-Chair Transfers)  walker, front-wheeled  -LILA      Recorded by [LILA] Vikash Walters, PTA 07/11/20 1046      Row Name 07/11/20 1002             Sit-Stand Transfer    Sit-Stand [place] (Transfers)  contact guard;verbal cues  -LILA      Assistive Device (Sit-Stand Transfers)  walker, front-wheeled  -JC2      Recorded by [LILA] Vikash Walters, PTA 07/11/20 1046  [JC2] Vikash Walters, PTA 07/11/20 1017      Row Name 07/11/20 1002             Stand-Sit Transfer    Stand-Sit [place] (Transfers)  contact guard;verbal cues  -LILA      Assistive Device (Stand-Sit Transfers)  walker, front-wheeled  -JC2      Recorded by [LILA] Vikash Walters, PTA 07/11/20 1046  [JC2] Vikash Walters, PTA 07/11/20 1017      Row Name 07/11/20 1002             Toilet Transfer    Type (Toilet Transfer)  sit-stand;stand-sit  -LILA      [place] Level (Toilet Transfer)  contact guard;nonverbal cues (demo/gesture)  -LILA      Recorded by [LILA] Vikash Walters, PTA 07/11/20 1046      Row Name 07/11/20 1002             Gait/Stairs Assessment/Training    Gait/Stairs Assessment/Training  gait/ambulation assistive device  -LILA      [place] Level (Gait)  contact guard;verbal cues  -JC2      Assistive Device (Gait)  walker, front-wheeled  -LILA      Distance in Feet (Gait)  5, 16  -JC2      Pattern (Gait)  3-point  -LILA      Deviations/Abnormal Patterns (Gait)  antalgic  -LILA      Bilateral Gait Deviations  --  cueing for correct walker use.   -JC2      Recorded by [LILA] Vikash Walters, PTA 07/11/20 1017  [JC2] Vikash Walters, PTA 07/11/20 1046      Row Name 07/11/20 1002             Therapeutic Exercise    Therapeutic Exercise  supine, lower extremities  -LILA      Recorded by [LIAL] Vikash Walters, PTA 07/11/20 1046      Row Name 07/11/20 1002             Lower Extremity Supine Therapeutic Exercise    Performed, Supine Lower Extremity (Therapeutic Exercise)  ankle dorsiflexion/plantarflexion;hip abduction/adduction;heel slides;SAQ (short arc quad) over bolster;quadriceps sets;gluteal sets  -LILA      Exercise Type, Supine Lower Extremity (Therapeutic Exercise)  AROM (active range of motion)  -LILA      Sets/Reps Detail, Supine Lower Extremity (Therapeutic Exercise)  20x1 veda  -LILA      Comment, Supine Lower Extremity (Therapeutic Exercise)  careful not to break hip prec  -JC2      Recorded by [LILA] Vikash Walters, PTA 07/11/20 1046  [JC2] Vikash Walters, PTA 07/11/20 1057      Row Name 07/11/20 1002             Positioning and Restraints    Pre-Treatment Position  in bed  -LILA      Post Treatment Position  chair  -LILA      In Bed  fowlers;call light within reach;encouraged to call for assist;exit alarm on all needs met  -LILA      Recorded by [LILA] Vikash Walters, PTA 07/11/20 1046      Row Name 07/11/20 1002             Pain Scale: Numbers Pre/Post-Treatment    Pain Scale: Numbers, Pretreatment  9/10  -LILA      Pain Scale: Numbers, Post-Treatment  7/10  -JC2      Pain Location - Side  Left  -LILA      Pain Location  hip and hip  -LILA      Pain Intervention(s)  Repositioned  -LILA      Recorded by [LILA] Vikash Walters, PTA 07/11/20 1017  [JC2] Vikash Walters, PTA 07/11/20 1057      Row Name                Wound 07/08/20 1810 Left upper eyebrow Laceration    Wound - Properties Group Date first assessed: 07/08/20 [TM] Time first assessed: 1810 [TM] Present on Hospital Admission: Y [TM] Side: Left [TM] Orientation: upper [TM]  Location: eyebrow [TM] Primary Wound Type: Laceration [TM] Recorded by:  [TM] Josephine Elias, RN 07/08/20 1811    Row Name                Wound 07/09/20 1515 Left posterior hip Incision    Wound - Properties Group Date first assessed: 07/09/20 [AQ] Time first assessed: 1515 [AQ] Present on Hospital Admission: N [AQ] Side: Left [AQ] Orientation: posterior [AQ] Location: hip [AQ] Primary Wound Type: Incision [AQ] Recorded by:  [AQ] Cathy Cano RN 07/09/20 1515    Row Name 07/11/20 1002             Outcome Summary/Treatment Plan (PT)    Daily Summary of Progress (PT)  progress toward functional goals as expected  -LILA      Plan for Continued Treatment (PT)  continue  -JC2      Anticipated Discharge Disposition (PT)  anticipate therapy at next level of care  -LILA      Recorded by [LILA] Vikash Walters, PTA 07/11/20 1017  [JC2] Vikash Walters, PTA 07/11/20 1057        User Key  (r) = Recorded By, (t) = Taken By, (c) = Cosigned By    Initials Name Effective Dates Discipline    AQ Cathy Cano RN 10/17/16 -  Nurse    LILA Vikash Walters, PTA 03/07/18 -  PT    TM Josephine Elias, RN 07/24/18 -  Nurse          Wound 07/08/20 1810 Left upper eyebrow Laceration (Active)   Dressing Appearance open to air 7/11/2020  7:43 AM   Closure Sutures 7/11/2020  7:43 AM   Base maroon/purple 7/11/2020  7:43 AM   Drainage Amount none 7/11/2020  7:43 AM       Wound 07/09/20 1515 Left posterior hip Incision (Active)   Dressing Appearance dry;intact 7/11/2020  7:43 AM   Closure JOSE 7/11/2020  7:43 AM   Base dressing in place, unable to visualize 7/11/2020  7:43 AM   Drainage Amount none 7/11/2020  7:43 AM       Rehab Goal Summary     Row Name 07/11/20 1002             Bed Mobility Goal 1 (PT)    Activity/Assistive Device (Bed Mobility Goal 1, PT)  sit to supine;supine to sit  -LILA      [place] Level/Cues Needed (Bed Mobility Goal 1, PT)  independent  -LILA      Time Frame (Bed Mobility Goal 1, PT)  by  discharge  -LILA      Progress/Outcomes (Bed Mobility Goal 1, PT)  goal not met  -LILA         Transfer Goal 1 (PT)    Activity/Assistive Device (Transfer Goal 1, PT)  sit-to-stand/stand-to-sit;bed-to-chair/chair-to-bed;toilet  -LILA      [place] Level/Cues Needed (Transfer Goal 1, PT)  conditional independence  -LILA      Time Frame (Transfer Goal 1, PT)  2 - 3 days  -LILA      Progress/Outcome (Transfer Goal 1, PT)  goal not met  -LILA         Gait Training Goal 1 (PT)    Activity/Assistive Device (Gait Training Goal 1, PT)  gait (walking locomotion);assistive device use;walker, rolling  -LILA      [place] Level (Gait Training Goal 1, PT)  conditional independence  -LILA      Distance (Gait Goal 1, PT)  [id number]  -LILA      Time Frame (Gait Training Goal 1, PT)  by discharge  -LILA      Progress/Outcome (Gait Training Goal 1, PT)  goal not met  -LILA         Stairs Goal 1 (PT)    Activity/Assistive Device (Stairs Goal 1, PT)  ascending stairs;descending stairs;using handrail, left;assistive device use  -LILA      [place] Level/Cues Needed (Stairs Goal 1, PT)  conditional independence  -LILA      Time Frame (Stairs Goal 1, PT)  by discharge  -LILA      Progress/Outcome (Stairs Goal 1, PT)  goal not met  -LILA        User Key  (r) = Recorded By, (t) = Taken By, (c) = Cosigned By    Initials Name Provider Type Discipline    Vikash Whatley, PTA Physical Therapy Assistant PT          Physical Therapy Education                 Title: PT OT SLP Therapies (In Progress)     Topic: Physical Therapy (In Progress)     Point: Mobility training (In Progress)     Description:   Instruct learner(s) on safety and technique for assisting patient out of bed, chair or wheelchair.  Instruct in the proper use of assistive devices, such as walker, crutches, cane or brace.              Patient Friendly Description:   It's important to get you on your feet again, but we need to do so in a way that is safe for you. Falling has serious consequences,  and your personal safety is the most important thing of all.        When it's time to get out of bed, one of us or a family member will sit next to you on the bed to give you support.     If your doctor or nurse tells you to use a walker, crutches, a cane, or a brace, be sure you use it every time you get out of bed, even if you think you don't need it.    Learning Progress Summary           Patient Acceptance, E, NR by  at 7/11/2020 1047    Comment:  [facility] on hip dislocation precautions.    Acceptance, E, NR by [facility] at 7/10/2020 1348                   Point: Home exercise program (Not Started)     Description:   Instruct learner(s) on appropriate technique for monitoring, assisting and/or progressing patient with therapeutic exercises and activities.              Learner Progress:   Not documented in this visit.          Point: Body mechanics (In Progress)     Description:   Instruct learner(s) on proper positioning and spine alignment for patient and/or caregiver during mobility tasks and/or exercises.              Learning Progress Summary           Patient Acceptance, E, NR by [facility] at 7/10/2020 1348                   Point: Precautions (In Progress)     Description:   Instruct learner(s) on prescribed precautions during mobility and gait tasks              Learning Progress Summary           Patient Acceptance, E, NR by  at 7/11/2020 1047    Comment:  [facility] on hip dislocation precautions.    Acceptance, E, NR by [facility] at 7/10/2020 1348                               User Key     Initials Effective Dates Name Provider Type Discipline    [facility] 04/03/18 -  Angelica Torres, PT Physical Therapist PT     03/07/18 -  Vikash Walters, PTA Physical Therapy Assistant PT                PT Recommendation and Plan  Anticipated Discharge Disposition (PT): anticipate therapy at next level of care  Therapy Frequency (PT Clinical Impression): daily  Outcome Summary/Treatment Plan (PT)  Daily Summary of Progress (PT): progress toward  functional goals as expected  Plan for Continued Treatment (PT): continue  Anticipated Discharge Disposition (PT): anticipate therapy at next level of care  Plan of Care Reviewed With: patient  Progress: improving  Outcome Summary: pt responded well to PT w/ increased gait to 5, 16 ft CGA w/ RW. pt participated in LE ther ex. pt requires min A for bed mobility and CGA for t/fs. pt edu on hip precautions. no new goals met at this time. pt would continue to benefit from PT services.  Outcome Measures     Row Name 07/11/20 1002 07/10/20 1026          How much help from another person do you currently need...    Turning from your back to your side while in flat bed without using bedrails?  3  -LILA  --     Moving from lying on back to sitting on the side of a flat bed without bedrails?  3  -LILA  --     Moving to and from a bed to a chair (including a wheelchair)?  3  -LILA  --     Standing up from a chair using your arms (e.g., wheelchair, bedside chair)?  3  -LILA  --     Climbing 3-5 steps with a railing?  3  -LILA  --     To walk in hospital room?  3  -LILA  --     AM-PAC 6 Clicks Score (PT)  18  -LILA  --        How much help from another is currently needed...    Putting on and taking off regular lower body clothing?  --  1  -ME     Bathing (including washing, rinsing, and drying)  --  2  -ME     Toileting (which includes using toilet bed pan or urinal)  --  1  -ME     Putting on and taking off regular upper body clothing  --  2  -ME     Taking care of personal grooming (such as brushing teeth)  --  3  -ME     Eating meals  --  3  -ME     AM-PAC 6 Clicks Score (OT)  --  12  -ME        Functional Assessment    Outcome Measure Options  AM-PAC 6 Clicks Basic Mobility (PT)  -LILA  AM-PAC 6 Clicks Daily Activity (OT)  -ME       User Key  (r) = Recorded By, (t) = Taken By, (c) = Cosigned By    Initials Name Provider Type    Vikash Whatley PTA Physical Therapy Assistant    ME Eitan Haynes, OT Occupational Therapist         Time  Calculation:   PT Charges     Row Name 07/11/20 1057             Time Calculation    Start Time  1002  -LILA      Stop Time  1057  -LILA      Time Calculation (min)  55 min  -LILA         Time Calculation- PT    Total Timed Code Minutes- PT  55 minute(s)  -LILA        User Key  (r) = Recorded By, (t) = Taken By, (c) = Cosigned By    Initials Name Provider Type    LILA Vikash Walters PTA Physical Therapy Assistant        Therapy Charges for Today     Code Description Service Date Service Provider Modifiers Qty    45029520652 HC PT THERAPEUTIC ACT EA 15 MIN 7/10/2020 Vikash Walters PTA GP 2    03309242858 HC PT THER PROC EA 15 MIN 7/10/2020 Vikash Walters, PTA GP 1    48548311246 HC GAIT TRAINING EA 15 MIN 7/11/2020 Vikash Walters, PTA GP 1    54153437589 HC PT THER PROC EA 15 MIN 7/11/2020 Vikash Walters, PTA GP 2    43729506932 HC PT THERAPEUTIC ACT EA 15 MIN 7/11/2020 Vikash Walters, PTA GP 1          PT G-Codes  Outcome Measure Options: AM-PAC 6 Clicks Basic Mobility (PT)  AM-PAC 6 Clicks Score (PT): 18  AM-PAC 6 Clicks Score (OT): 12    Vikash Walters PTA  7/11/2020

## 2020-07-11 NOTE — PLAN OF CARE
Problem: Patient Care Overview  Goal: Plan of Care Review  Outcome: Ongoing (interventions implemented as appropriate)  Flowsheets  Taken 7/11/2020 1354 by Kandis Mercedes RN  Progress: improving  Taken 7/11/2020 1430 by Evelyn Dodson COTA/L  Plan of Care Reviewed With: patient  Outcome Summary: pt up in chair educated pt on hip precautions and use of ae for lb dressing.  will need reinforcement.  cont poc

## 2020-07-12 PROCEDURE — 97110 THERAPEUTIC EXERCISES: CPT

## 2020-07-12 PROCEDURE — 25010000002 ENOXAPARIN PER 10 MG: Performed by: ORTHOPAEDIC SURGERY

## 2020-07-12 PROCEDURE — 97530 THERAPEUTIC ACTIVITIES: CPT

## 2020-07-12 PROCEDURE — 97116 GAIT TRAINING THERAPY: CPT

## 2020-07-12 PROCEDURE — 97535 SELF CARE MNGMENT TRAINING: CPT

## 2020-07-12 PROCEDURE — 99024 POSTOP FOLLOW-UP VISIT: CPT | Performed by: ORTHOPAEDIC SURGERY

## 2020-07-12 RX ADMIN — METOPROLOL SUCCINATE 25 MG: 25 TABLET, FILM COATED, EXTENDED RELEASE ORAL at 07:57

## 2020-07-12 RX ADMIN — TRAMADOL HYDROCHLORIDE 25 MG: 50 TABLET, FILM COATED ORAL at 17:15

## 2020-07-12 RX ADMIN — ATORVASTATIN CALCIUM 20 MG: 20 TABLET, FILM COATED ORAL at 07:57

## 2020-07-12 RX ADMIN — ENOXAPARIN SODIUM 30 MG: 30 INJECTION SUBCUTANEOUS at 13:13

## 2020-07-12 RX ADMIN — ACETAMINOPHEN 1000 MG: 500 TABLET ORAL at 20:09

## 2020-07-12 RX ADMIN — Medication 2.5 MG: at 20:09

## 2020-07-12 RX ADMIN — SODIUM CHLORIDE, PRESERVATIVE FREE 10 ML: 5 INJECTION INTRAVENOUS at 20:09

## 2020-07-12 RX ADMIN — Medication 2.5 MG: at 07:57

## 2020-07-12 RX ADMIN — LOSARTAN POTASSIUM 25 MG: 25 TABLET, FILM COATED ORAL at 07:57

## 2020-07-12 RX ADMIN — ROPINIROLE HYDROCHLORIDE 0.5 MG: 0.5 TABLET, FILM COATED ORAL at 07:57

## 2020-07-12 RX ADMIN — ACETAMINOPHEN 1000 MG: 500 TABLET ORAL at 17:15

## 2020-07-12 RX ADMIN — ASPIRIN 81 MG: 81 TABLET, COATED ORAL at 07:57

## 2020-07-12 RX ADMIN — TRAMADOL HYDROCHLORIDE 25 MG: 50 TABLET, FILM COATED ORAL at 07:55

## 2020-07-12 RX ADMIN — ACETAMINOPHEN 1000 MG: 500 TABLET ORAL at 11:11

## 2020-07-12 NOTE — PROGRESS NOTES
[facility] Medicine Services  INPATIENT PROGRESS NOTE    Length of Stay: 4  Date of Admission: 7/8/2020  Primary Care Physician: Terry Peterson MD    Subjective   Chief Complaint: Fall  HPI:  85 year old male with a history of ischemic cardiomyopathy, HFrEF, and postural syncope, and HTN who presented to the ED after a syncopal episode which took place after standing from his chair.  He suffered facial trauma and a left hip fracture. CT of the head was negative.  Sutures were placed in the ED to facial laceration.  Orthopedics consulted and recommends hip arthroplasty after cardiac clearance. Cardiology cleared for surgery. He underwent left hip hemiarthroplasty.  Pain is controlled.  No new complaints.     Review of Systems   Constitutional: Negative for chills and fever.   Respiratory: Negative for shortness of breath.    Cardiovascular: Negative for chest pain.   Gastrointestinal: Negative for abdominal pain, nausea and vomiting.   Musculoskeletal: Positive for arthralgias.        All pertinent negatives and positives are as above. All other systems have been reviewed and are negative unless otherwise stated.     Objective    Temp:  [96.5 °F (35.8 °C)-99.8 °F (37.7 °C)] 99.8 °F (37.7 °C)  Heart Rate:  [69-79] 79  Resp:  [18] 18  BP: (116-157)/(59-74) 157/74    Physical Exam   Constitutional: He appears well-developed and well-nourished. No distress.   HENT:   Head: Normocephalic and atraumatic.   Facial lac   Eyes: Conjunctivae are normal.   Cardiovascular: Normal rate, regular rhythm and intact distal pulses.   Pulmonary/Chest: Effort normal and breath sounds normal. No respiratory distress.   Abdominal: Soft. Bowel sounds are normal. He exhibits no distension.   Musculoskeletal: He exhibits no edema or deformity.   Neurological: He is alert.   Skin: Skin is warm and dry. He is not diaphoretic.   Vitals reviewed.    Results Review:  I have reviewed the labs,  radiology results, and diagnostic studies.    Laboratory Data:   Results from last 7 days   Lab Units 07/11/20  0538 07/10/20  0550 07/09/20  0546 07/08/20 1924   SODIUM mmol/L 137 135* 138 139   POTASSIUM mmol/L 4.2 4.2 3.6 3.5   CHLORIDE mmol/L 104 102 103 104   CO2 mmol/L 24.0 25.0 24.0 23.0   BUN mg/dL 20 22 16 17   CREATININE mg/dL 0.91 1.04 0.89 1.07   GLUCOSE mg/dL 92 113* 109* 89   CALCIUM mg/dL 9.3 9.2 9.1 9.3   BILIRUBIN mg/dL  --   --   --  0.2   ALK PHOS U/L  --   --   --  108   ALT (SGPT) U/L  --   --   --  22   AST (SGOT) U/L  --   --   --  32   ANION GAP mmol/L 9.0 8.0 11.0 12.0     Estimated Creatinine Clearance: 57.9 mL/min (by C-G formula based on SCr of 0.91 mg/dL).          Results from last 7 days   Lab Units 07/11/20  0538 07/10/20  0550 07/09/20  0546 07/08/20 1924   WBC 10*3/mm3 11.49* 10.08 11.28* 10.40   HEMOGLOBIN g/dL 9.8* 10.4* 12.2* 12.4*   HEMATOCRIT % 29.2* 30.4* 35.9* 36.8*   PLATELETS 10*3/mm3 144 149 175 203           Culture Data:   No results found for: BLOODCX  No results found for: URINECX  No results found for: RESPCX  No results found for: WOUNDCX  No results found for: STOOLCX  No components found for: BODYFLD    Radiology Data:   Imaging Results (Last 24 Hours)     ** No results found for the last 24 hours. **          I have reviewed the patient's current medications.     Assessment/Plan     Active Hospital Problems    Diagnosis   • **Closed fracture of neck of left femur (CMS/[facility])   • Postoperative anemia due to acute blood loss     Not unexpected     • Facial laceration   • HFrEF (heart failure with reduced ejection fraction) (CMS/[facility])   • Syncope and collapse   • Ischemic cardiomyopathy   • Essential hypertension   • Coronary artery disease involving native coronary artery of native heart without angina pectoris       Plan:   S/P Left hip hemiarthroplasty POD #3  PT/OT   Orthopedics consultation appreciated  Cardiology consultation appreciated, recommended proceeding  with surgery  Echocardiogram reviewed, EF 27%, stable over the last year  Pain control: Scheduled tylenol, PRN ultram, PRN morphine 1 mg q 4 hours   PRN narcan  Aspirin, statin, cozaar, toprol-xl  Facial suture removal, 2-3 days  VTE PPx: lovenox  Discharge planning: anticipate SNF      The patient was evaluated during the global COVID-19 pandemic, and the diagnosis was suspected/considered upon their initial presentation.  Evaluation, treatment, and testing were consistent with current guidelines for patients who present with complaints or symptoms that may be related to COVID-19.        This document has been electronically signed by JAIME Grewal on July 12, 2020 10:56

## 2020-07-12 NOTE — PLAN OF CARE
Problem: Patient Care Overview  Goal: Plan of Care Review  Outcome: Ongoing (interventions implemented as appropriate)  Flowsheets (Taken 7/12/2020 1501)  Progress: improving  Plan of Care Reviewed With: patient  Outcome Summary: pt up in chair, multi review of hip precautions [place] roll to inside of calf to prevent internal rotation. ue ex as maryuri.  cont poc

## 2020-07-12 NOTE — THERAPY TREATMENT NOTE
Acute Care - Physical Therapy Treatment Note  [facility]     Patient Name: Jose Dacosta  : 1935  MRN: [id number]  Today's Date: 2020  Onset of Illness/Injury or Date of Surgery: 20(surgery yesterday (2020))     Referring Physician: DAYANARA Kumar APRN(surgeon: DAJA Solis MD )    Admit Date: 2020    Visit Dx:    ICD-10-CM ICD-9-CM   1. Syncope and collapse R55 780.2   2. Facial laceration, initial encounter S01.81XA 873.40   3. Closed fracture of left hip, initial encounter (CMS/HCC) S72.002A 820.8   4. Fall, initial encounter W19.XXXA E888.9   5. Closed head injury, initial encounter S09.90XA 959.01   6. Closed fracture of neck of left femur, initial encounter (CMS/HCC) S72.002A 820.8   7. Impaired functional mobility, balance, gait, and endurance Z74.09 V49.89   8. Impaired mobility and ADLs Z74.09 V49.89    Z78.9      Patient Active Problem List   Diagnosis   • Coronary artery disease involving native coronary artery of native heart without angina pectoris   • Vasovagal syncope   • RBBB (right bundle branch block with left anterior fascicular block)   • Essential hypertension   • Ischemic cardiomyopathy   • Syncope and collapse   • Closed fracture of neck of left femur (CMS/[facility])   • Coronary artery disease with history of myocardial infarction without history of CABG   • Facial laceration   • HFrEF (heart failure with reduced ejection fraction) (CMS/[facility])   • Postoperative anemia due to acute blood loss       Therapy Treatment    Rehabilitation Treatment Summary     Row Name 20 0747             Treatment Time/Intention    Discipline  physical therapy assistant  -LILA      Document Type  therapy note (daily note)  -LILA      Mode of Treatment  physical therapy;individual therapy  -LILA      Therapy Frequency (PT Clinical Impression)  daily  -LILA      Patient Effort  good  -LILA      Existing Precautions/Restrictions  hip, posterior L hip precautions  -LILA      Recorded by  [LILA] Vikash Walters, PTA 07/12/20 0754      Row Name 07/12/20 0747             Vital Signs    Pre Systolic BP Rehab  155  -LILA      Pre Treatment Diastolic BP  70  -LILA      Post Systolic BP Rehab  147  -JC2      Post Treatment Diastolic BP  70  -JC2      Pretreatment Heart Rate (beats/min)  67  -LILA      Intratreatment Heart Rate (beats/min)  88  -JC3      Posttreatment Heart Rate (beats/min)  76  -JC2      Pre SpO2 (%)  94  -LILA      O2 Delivery Pre Treatment  room air  -LILA      Intra SpO2 (%)  97  -JC3      O2 Delivery Intra Treatment  room air  -JC3      Post SpO2 (%)  94  -JC2      O2 Delivery Post Treatment  room air  -JC2      Pre Patient Position  Supine  -LILA      Post Patient Position  Sitting  -LILA      Recorded by [LILA] Vikash Walters, PTA 07/12/20 0754  [JC2] Vikash Walters, PTA 07/12/20 0825  [JC3] Vikash Walters, PTA 07/12/20 0813      Row Name 07/12/20 0747             Cognitive Assessment/Intervention- PT/OT    Orientation Status (Cognition)  oriented x 4  -LILA      Follows Commands (Cognition)  follows one step commands;physical/tactile prompts required;repetition of directions required  -LILA      Recorded by [LILA] Vikash Walters, PTA 07/12/20 0754      Row Name 07/12/20 0747             Safety Issues, Functional Mobility    Impairments Affecting Function (Mobility)  balance;endurance/activity tolerance;range of motion (ROM);strength  -LILA      Recorded by [LILA] Vikash Walters, PTA 07/12/20 0754      Row Name 07/12/20 0747             Mobility Assessment/Intervention    Extremity Weight-bearing Status  left lower extremity  -LILA      Left Lower Extremity (Weight-bearing Status)  weight-bearing as tolerated (WBAT)  -LILA      Recorded by [LILA] Vikash Walters, PTA 07/12/20 0754      Row Name 07/12/20 0747             Bed Mobility Assessment/Treatment    Bed Mobility Assessment/Treatment  supine-sit  -LILA      Supine-Sit [place] (Bed Mobility)  minimum assist (75% patient effort)  -LIAL      Assistive  Device (Bed Mobility)  bed rails;head of bed elevated  -LILA      Recorded by [LILA] Vikash Walters, PTA 07/12/20 0754      Row Name 07/12/20 0747             Transfer Assessment/Treatment    Transfer Assessment/Treatment  sit-stand transfer;stand-sit transfer;bed-chair transfer  -LILA      Recorded by [LILA] Vikash Walters, PTA 07/12/20 0825      Row Name 07/12/20 0747             Bed-Chair Transfer    Bed-Chair [place] (Transfers)  contact guard;verbal cues  -LILA      Assistive Device (Bed-Chair Transfers)  walker, front-wheeled  -LILA      Recorded by [LILA] Vikash Walters, PTA 07/12/20 0754      Row Name 07/12/20 0747             Sit-Stand Transfer    Sit-Stand [place] (Transfers)  contact guard;verbal cues  -LILA      Assistive Device (Sit-Stand Transfers)  walker, front-wheeled  -LILA      Recorded by [LILA] Vikash Walters, PTA 07/12/20 0754      Row Name 07/12/20 0747             Stand-Sit Transfer    Stand-Sit [place] (Transfers)  contact guard;verbal cues  -LILA      Assistive Device (Stand-Sit Transfers)  walker, front-wheeled  -LILA      Recorded by [LILA] Vikash Walters, PTA 07/12/20 0754      Row Name 07/12/20 0747             Toilet Transfer    Type (Toilet Transfer)  --  -LILA      [place] Level (Toilet Transfer)  --  -LILA      Recorded by [LILA] Vikash Walters, PTA 07/12/20 0825      Row Name 07/12/20 0747             Gait/Stairs Assessment/Training    Gait/Stairs Assessment/Training  gait/ambulation assistive device  -LILA      [place] Level (Gait)  contact guard;verbal cues  -LILA      Assistive Device (Gait)  walker, front-wheeled  -LILA      Distance in Feet (Gait)  15, 64  -JC2      Pattern (Gait)  3-point  -LILA      Deviations/Abnormal Patterns (Gait)  antalgic  -LILA      Bilateral Gait Deviations  -- cueing for correct walker use.   -LILA      Recorded by [LILA] Vikash Walters, PTA 07/12/20 0754  [JC2] Vikash Walters, PTA 07/12/20 0813      Row Name 07/12/20 0747             Lower Extremity  Supine Therapeutic Exercise    Performed, Supine Lower Extremity (Therapeutic Exercise)  ankle dorsiflexion/plantarflexion;hip abduction/adduction;heel slides;SAQ (short arc quad) over bolster  -LILA      Exercise Type, Supine Lower Extremity (Therapeutic Exercise)  AROM (active range of motion)  -LILA      Sets/Reps Detail, Supine Lower Extremity (Therapeutic Exercise)  20x1 veda  -LILA      Comment, Supine Lower Extremity (Therapeutic Exercise)  careful not to break hip prec  -JC2      Recorded by [LILA] Vikash Walters, PTA 07/12/20 0813  [JC2] Vikash Walters, PTA 07/12/20 0825      Row Name 07/12/20 0747             Pain Assessment    Additional Documentation  Pain Scale: Numbers Pre/Post-Treatment (Group)  -LILA      Recorded by [LILA] Vikash Walters, PTA 07/12/20 0813      Row Name 07/12/20 0747             Pain Scale: Numbers Pre/Post-Treatment    Pain Scale: Numbers, Pretreatment  8/10  -LILA      Pain Scale: Numbers, Post-Treatment  --  -LILA      Pain Location - Side  Left  -JC2      Pain Location  hip and hip  -JC2      Recorded by [LILA] Vikash Walters, PTA 07/12/20 0813  [JC2] Vikash Walters, PTA 07/12/20 0754      Row Name                Wound 07/08/20 1810 Left upper eyebrow Laceration    Wound - Properties Group Date first assessed: 07/08/20 [TM] Time first assessed: 1810 [TM] Present on Hospital Admission: Y [TM] Side: Left [TM] Orientation: upper [TM] Location: eyebrow [TM] Primary Wound Type: Laceration [TM] Recorded by:  [TM] Josephine Elias RN 07/08/20 1811    Row Name                Wound 07/09/20 1515 Left posterior hip Incision    Wound - Properties Group Date first assessed: 07/09/20 [AQ] Time first assessed: 1515 [AQ] Present on Hospital Admission: N [AQ] Side: Left [AQ] Orientation: posterior [AQ] Location: hip [AQ] Primary Wound Type: Incision [AQ] Recorded by:  [AQ] Cathy Cano RN 07/09/20 1515    Row Name 07/12/20 0747             Outcome Summary/Treatment Plan (PT)    Daily  Summary of Progress (PT)  progress toward functional goals as expected  -      Plan for Continued Treatment (PT)  continue  -LILA      Anticipated Discharge Disposition (PT)  anticipate therapy at next level of care  -JC2      Recorded by [LILA] Vikash Walters, PTA 07/12/20 0813  [JC2] Vikash Walters, PTA 07/12/20 0754        User Key  (r) = Recorded By, (t) = Taken By, (c) = Cosigned By    Initials Name Effective Dates Discipline    AQ Cathy Cano, RN 10/17/16 -  Nurse    LILA Vikash aWlters, PTA 03/07/18 -  PT    TM Josephine Elias, RN 07/24/18 -  Nurse          Wound 07/08/20 1810 Left upper eyebrow Laceration (Active)   Dressing Appearance open to air 7/11/2020  8:50 PM   Closure Sutures 7/11/2020  8:50 PM   Base maroon/purple 7/11/2020  8:50 PM   Drainage Amount none 7/11/2020  8:50 PM       Wound 07/09/20 1515 Left posterior hip Incision (Active)   Dressing Appearance dry;intact 7/11/2020  8:50 PM   Closure JOSE 7/11/2020  8:50 PM   Base dressing in place, unable to visualize 7/11/2020  8:50 PM   Drainage Amount none 7/11/2020  8:50 PM       Rehab Goal Summary     Row Name 07/12/20 0747             Bed Mobility Goal 1 (PT)    Activity/Assistive Device (Bed Mobility Goal 1, PT)  sit to supine;supine to sit  -      [place] Level/Cues Needed (Bed Mobility Goal 1, PT)  independent  -LILA      Time Frame (Bed Mobility Goal 1, PT)  by discharge  -LILA      Progress/Outcomes (Bed Mobility Goal 1, PT)  goal not met  -LILA         Transfer Goal 1 (PT)    Activity/Assistive Device (Transfer Goal 1, PT)  sit-to-stand/stand-to-sit;bed-to-chair/chair-to-bed;toilet  -      [place] Level/Cues Needed (Transfer Goal 1, PT)  conditional independence  -LILA      Time Frame (Transfer Goal 1, PT)  2 - 3 days  -LILA      Progress/Outcome (Transfer Goal 1, PT)  goal not met  -LILA         Gait Training Goal 1 (PT)    Activity/Assistive Device (Gait Training Goal 1, PT)  gait (walking locomotion);assistive device  use;walker, rolling  -LILA      [place] Level (Gait Training Goal 1, PT)  conditional independence  -LILA      Distance (Gait Goal 1, PT)  [id number]  -LILA      Time Frame (Gait Training Goal 1, PT)  by discharge  -LILA      Progress/Outcome (Gait Training Goal 1, PT)  goal not met  -LILA         Stairs Goal 1 (PT)    Activity/Assistive Device (Stairs Goal 1, PT)  ascending stairs;descending stairs;using handrail, left;assistive device use  -LILA      [place] Level/Cues Needed (Stairs Goal 1, PT)  conditional independence  -LILA      Time Frame (Stairs Goal 1, PT)  by discharge  -LILA      Progress/Outcome (Stairs Goal 1, PT)  goal not met  -LILA        User Key  (r) = Recorded By, (t) = Taken By, (c) = Cosigned By    Initials Name Provider Type Discipline    Vikash Whatley, PTA Physical Therapy Assistant PT          Physical Therapy Education                 Title: PT OT SLP Therapies (In Progress)     Topic: Physical Therapy (In Progress)     Point: Mobility training (In Progress)     Description:   Instruct learner(s) on safety and technique for assisting patient out of bed, chair or wheelchair.  Instruct in the proper use of assistive devices, such as walker, crutches, cane or brace.              Patient Friendly Description:   It's important to get you on your feet again, but we need to do so in a way that is safe for you. Falling has serious consequences, and your personal safety is the most important thing of all.        When it's time to get out of bed, one of us or a family member will sit next to you on the bed to give you support.     If your doctor or nurse tells you to use a walker, crutches, a cane, or a brace, be sure you use it every time you get out of bed, even if you think you don't need it.    Learning Progress Summary           Patient Acceptance, E, NR by LILA at 7/11/2020 1047    Comment:  edu on hip dislocation precautions.    Acceptance, E, NR by LILA1 at 7/10/2020 1345                   Point: Home  exercise program (Not Started)     Description:   Instruct learner(s) on appropriate technique for monitoring, assisting and/or progressing patient with therapeutic exercises and activities.              Learner Progress:   Not documented in this visit.          Point: Body mechanics (In Progress)     Description:   Instruct learner(s) on proper positioning and spine alignment for patient and/or caregiver during mobility tasks and/or exercises.              Learning Progress Summary           Patient Acceptance, E, NR by [facility] at 7/10/2020 1348                   Point: Precautions (In Progress)     Description:   Instruct learner(s) on prescribed precautions during mobility and gait tasks              Learning Progress Summary           Patient Acceptance, E, NR by  at 7/11/2020 1047    Comment:  edu on hip dislocation precautions.    Acceptance, E, NR by [facility] at 7/10/2020 1348                               User Key     Initials Effective Dates Name Provider Type Discipline    [facility] 04/03/18 -  Angelica Torres, PT Physical Therapist PT     03/07/18 -  Vikash Walters, PTA Physical Therapy Assistant PT                PT Recommendation and Plan  Anticipated Discharge Disposition (PT): anticipate therapy at next level of care  Therapy Frequency (PT Clinical Impression): daily  Outcome Summary/Treatment Plan (PT)  Daily Summary of Progress (PT): progress toward functional goals as expected  Plan for Continued Treatment (PT): continue  Anticipated Discharge Disposition (PT): anticipate therapy at next level of care  Plan of Care Reviewed With: patient  Progress: improving  Outcome Summary: pt responded well to PT w/ increased gait to 64 ft CGA w/ RW. SpO2 remained in 90s throughout tx on RA. pt t/f OOB to recliuner CGA and completed LE ther ex. no new goal smet at this time. pt would continue to benefit from PT services. pt needs cueing to maintain hip precautions.  Outcome Measures     Row Name 07/12/20 0747 07/11/20  1245 07/11/20 1002       How much help from another person do you currently need...    Turning from your back to your side while in flat bed without using bedrails?  3  -LILA  --  3  -LILA    Moving from lying on back to sitting on the side of a flat bed without bedrails?  3  -LILA  --  3  -LILA    Moving to and from a bed to a chair (including a wheelchair)?  3  -LILA  --  3  -LILA    Standing up from a chair using your arms (e.g., wheelchair, bedside chair)?  3  -LILA  --  3  -LILA    Climbing 3-5 steps with a railing?  3  -LILA  --  3  -LILA    To walk in hospital room?  3  -LILA  --  3  -LILA    AM-PAC 6 Clicks Score (PT)  18  -LILA  --  18  -LILA       How much help from another is currently needed...    Putting on and taking off regular lower body clothing?  --  1  -RC  --    Bathing (including washing, rinsing, and drying)  --  2  -RC  --    Toileting (which includes using toilet bed pan or urinal)  --  1  -RC  --    Putting on and taking off regular upper body clothing  --  2  -RC  --    Taking care of personal grooming (such as brushing teeth)  --  3  -RC  --    Eating meals  --  3  -RC  --    AM-PAC 6 Clicks Score (OT)  --  12  -RC  --       Functional Assessment    Outcome Measure Options  AM-PAC 6 Clicks Basic Mobility (PT)  -  --  AM-[facility] 6 Clicks Basic Mobility (PT)  -    Row Name 07/10/20 1026             How much help from another is currently needed...    Putting on and taking off regular lower body clothing?  1  -ME      Bathing (including washing, rinsing, and drying)  2  -ME      Toileting (which includes using toilet bed pan or urinal)  1  -ME      Putting on and taking off regular upper body clothing  2  -ME      Taking care of personal grooming (such as brushing teeth)  3  -ME      Eating meals  3  -ME      AM-PAC 6 Clicks Score (OT)  12  -ME         Functional Assessment    Outcome Measure Options  AM-PAC 6 Clicks Daily Activity (OT)  -ME        User Key  (r) = Recorded By, (t) = Taken By, (c) = Cosigned By     Initials Name Provider Type    Vikash Whatley PTA Physical Therapy Assistant    Evelyn Farley, AGUILAR/L Occupational Therapy Assistant    Eitan Bobo, OT Occupational Therapist         Time Calculation:   PT Charges     Row Name 07/12/20 0818             Time Calculation    Start Time  0747  -LILA      Stop Time  0825  -LILA      Time Calculation (min)  38 min  -LILA         Time Calculation- PT    Total Timed Code Minutes- PT  38 minute(s)  -LILA        User Key  (r) = Recorded By, (t) = Taken By, (c) = Cosigned By    Initials Name Provider Type    Vikash Whatley PTA Physical Therapy Assistant        Therapy Charges for Today     Code Description Service Date Service Provider Modifiers Qty    43771202712 HC GAIT TRAINING EA 15 MIN 7/11/2020 Vikash Walters, PTA GP 1    98884820846 HC PT THER PROC EA 15 MIN 7/11/2020 Vikash Walters, PTA GP 2    57471335743 HC PT THERAPEUTIC ACT EA 15 MIN 7/11/2020 Vikash Walters, PTA GP 1    71036882657 HC GAIT TRAINING EA 15 MIN 7/12/2020 Vikash Walters, PTA GP 1    14717899888 HC PT THER PROC EA 15 MIN 7/12/2020 Vikash Walters, PTA GP 1    13782918240 HC PT THERAPEUTIC ACT EA 15 MIN 7/12/2020 Vikash Walters, PTA GP 1          PT G-Codes  Outcome Measure Options: AM-PAC 6 Clicks Basic Mobility (PT)  AM-PAC 6 Clicks Score (PT): 18  AM-PAC 6 Clicks Score (OT): 12    Vikash Walters PTA  7/12/2020

## 2020-07-12 NOTE — PLAN OF CARE
Problem: Patient Care Overview  Goal: Plan of Care Review  Outcome: Ongoing (interventions implemented as appropriate)  Flowsheets (Taken 7/12/2020 0227)  Progress: improving  Plan of Care Reviewed With: patient  Outcome Summary: VSS, pain controlled, pt wanting to be DC tomorrown, sleeping between care, continue to monitor.

## 2020-07-12 NOTE — THERAPY TREATMENT NOTE
Acute Care - Occupational Therapy Treatment Note  [facility]     Patient Name: Jose Dacosta  : 1935  MRN: [id number]  Today's Date: 2020  Onset of Illness/Injury or Date of Surgery: 20(surgery yesterday (2020))  Date of Referral to OT: 07/10/20  Referring Physician: DAYANARA Kumar APRN(surgeon: DAJA Solis MD )    Admit Date: 2020       ICD-10-CM ICD-9-CM   1. Syncope and collapse R55 780.2   2. Facial laceration, initial encounter S01.81XA 873.40   3. Closed fracture of left hip, initial encounter (CMS/HCC) S72.002A 820.8   4. Fall, initial encounter W19.XXXA E888.9   5. Closed head injury, initial encounter S09.90XA 959.01   6. Closed fracture of neck of left femur, initial encounter (CMS/HCC) S72.002A 820.8   7. Impaired functional mobility, balance, gait, and endurance Z74.09 V49.89   8. Impaired mobility and ADLs Z74.09 V49.89    Z78.9      Patient Active Problem List   Diagnosis   • Coronary artery disease involving native coronary artery of native heart without angina pectoris   • Vasovagal syncope   • RBBB (right bundle branch block with left anterior fascicular block)   • Essential hypertension   • Ischemic cardiomyopathy   • Syncope and collapse   • Closed fracture of neck of left femur (CMS/[facility])   • Coronary artery disease with history of myocardial infarction without history of CABG   • Facial laceration   • HFrEF (heart failure with reduced ejection fraction) (CMS/[facility])   • Postoperative anemia due to acute blood loss     Past Medical History:   Diagnosis Date   • Abnormal ECG      Past Surgical History:   Procedure Laterality Date   • CORONARY STENT PLACEMENT         Therapy Treatment    Rehabilitation Treatment Summary     Row Name 20 0920 20 0747          Treatment Time/Intention    Discipline  occupational therapy assistant  -RC  physical therapy assistant  -LILA     Document Type  therapy note (daily note)  -KANDY  therapy note (daily note)  -LILA      Subjective Information  complains of;pain  -  --     Mode of Treatment  occupational therapy;individual therapy  -RC  physical therapy;individual therapy  -LILA     Patient/Family Observations  no visitors in room   -  --     Therapy Frequency (PT Clinical Impression)  --  daily  -LILA     Total Minutes, Occupational Therapy Treatment  23  -RC  --     Therapy Frequency (OT Eval)  daily  -  --     Patient Effort  good  -RC  good  -LILA     Existing Precautions/Restrictions  hip;hip, posterior  -RC  hip, posterior L hip precautions  -LILA     Recorded by [RC] Evelyn Dodson AGUILAR/L 07/12/20 1502 [LILA] Vikash Watlers, PTA 07/12/20 0754     Row Name 07/12/20 0920 07/12/20 0747          Vital Signs    Pre Systolic BP Rehab  --  155  -LILA     Pre Treatment Diastolic BP  --  70  -LILA     Post Systolic BP Rehab  143  -RC  147  -JC2     Post Treatment Diastolic BP  68  -RC  70  -JC2     Pretreatment Heart Rate (beats/min)  --  67  -LILA     Intratreatment Heart Rate (beats/min)  --  88  -JC3     Posttreatment Heart Rate (beats/min)  77  -RC  76  -JC2     Pre SpO2 (%)  94  -RC  94  -LILA     O2 Delivery Pre Treatment  room air  -RC  room air  -LILA     Intra SpO2 (%)  --  97  -JC3     O2 Delivery Intra Treatment  --  room air  -JC3     Post SpO2 (%)  --  94  -JC2     O2 Delivery Post Treatment  --  room air  -JC2     Pre Patient Position  --  Supine  -LILA     Post Patient Position  --  Sitting  -LILA     Recorded by [RC] Evelyn Dodson AGUILAR/L 07/12/20 1502 [LILA] Vikash Walters, PTA 07/12/20 0754  [JC2] Vikash Walters, PTA 07/12/20 0825  [JC3] Vikash Walters, PTA 07/12/20 0813     Row Name 07/12/20 0747             Cognitive Assessment/Intervention- PT/OT    Orientation Status (Cognition)  oriented x 4  -LILA      Follows Commands (Cognition)  follows one step commands;physical/tactile prompts required;repetition of directions required  -LILA      Recorded by [LILA] Vikash Walters, PTA 07/12/20 0754      Row Name 07/12/20 0747              Safety Issues, Functional Mobility    Impairments Affecting Function (Mobility)  balance;endurance/activity tolerance;range of motion (ROM);strength  -LILA      Recorded by [LILA] Vikash Walters, PTA 07/12/20 0754      Row Name 07/12/20 07             Mobility Assessment/Intervention    Extremity Weight-bearing Status  left lower extremity  -LILA      Left Lower Extremity (Weight-bearing Status)  weight-bearing as tolerated (WBAT)  -LILA      Recorded by [LILA] Vikash Walters, PTA 07/12/20 0754      Row Name 07/12/20 07             Bed Mobility Assessment/Treatment    Bed Mobility Assessment/Treatment  supine-sit  -LILA      Supine-Sit [place] (Bed Mobility)  minimum assist (75% patient effort)  -LILA      Assistive Device (Bed Mobility)  bed rails;head of bed elevated  -LILA      Recorded by [LILA] Vikash Walters, PTA 07/12/20 0754      Row Name 07/12/20 07             Transfer Assessment/Treatment    Transfer Assessment/Treatment  sit-stand transfer;stand-sit transfer;bed-chair transfer  -LILA      Recorded by [LILA] Vikash Walters PTA 07/12/20 0825      Row Name 07/12/20 07             Bed-Chair Transfer    Bed-Chair [place] (Transfers)  contact guard;verbal cues  -LILA      Assistive Device (Bed-Chair Transfers)  walker, front-wheeled  -LILA      Recorded by [LILA] Vikash Walters PTA 07/12/20 0754      Row Name 07/12/20 0747             Sit-Stand Transfer    Sit-Stand [place] (Transfers)  contact guard;verbal cues  -LILA      Assistive Device (Sit-Stand Transfers)  walker, front-wheeled  -LILA      Recorded by [LILA] Vikash Walters, PTA 07/12/20 0754      Row Name 07/12/20 0747             Stand-Sit Transfer    Stand-Sit [place] (Transfers)  contact guard;verbal cues  -LILA      Assistive Device (Stand-Sit Transfers)  walker, front-wheeled  -LILA      Recorded by [LILA] Vikash Walters, PTA 07/12/20 0754      Row Name 07/12/20 0747             Toilet Transfer    Type (Toilet Transfer)  --  -LILA       "[place] Level (Toilet Transfer)  --  -LILA      Recorded by [LILA] Vikash Walters, PTA 07/12/20 0825      Row Name 07/12/20 0747             Gait/Stairs Assessment/Training    Gait/Stairs Assessment/Training  gait/ambulation assistive device  -LILA      [place] Level (Gait)  contact guard;verbal cues  -LILA      Assistive Device (Gait)  walker, front-wheeled  -LILA      Distance in Feet (Gait)  15, 64  -JC2      Pattern (Gait)  3-point  -LILA      Deviations/Abnormal Patterns (Gait)  antalgic  -LILA      Bilateral Gait Deviations  -- cueing for correct walker use.   -LILA      Recorded by [LILA] Vikash Walters, PTA 07/12/20 0754  [JC2] Vikash Walters, PTA 07/12/20 0813      Row Name 07/12/20 0920             Upper Extremity Seated Therapeutic Exercise    Performed, Seated Upper Extremity (Therapeutic Exercise)  shoulder flexion/extension;shoulder external/internal rotation;elbow flexion/extension  -      Device, Seated Upper Extremity (Therapeutic Exercise)  -- 2# wt as maryuri   -      Exercise Type, Seated Upper Extremity (Therapeutic Exercise)  resistive exercise  -      Expected Outcomes, Seated Upper Extremity (Therapeutic Exercise)  improve functional tolerance, self-care activity;improve performance, BADLs  -      Sets/Reps Detail, Seated Upper Extremity (Therapeutic Exercise)  10-15x2  -      Comment, Seated Upper Extremity (Therapeutic Exercise)  pt reports l ue \"sore\"  -      Recorded by [RC] Evelyn Dodson COTA/L 07/12/20 1502      Row Name 07/12/20 0747             Lower Extremity Supine Therapeutic Exercise    Performed, Supine Lower Extremity (Therapeutic Exercise)  ankle dorsiflexion/plantarflexion;hip abduction/adduction;heel slides;SAQ (short arc quad) over bolster  -      Exercise Type, Supine Lower Extremity (Therapeutic Exercise)  AROM (active range of motion)  -      Sets/Reps Detail, Supine Lower Extremity (Therapeutic Exercise)  20x1 veda  -      Comment, Supine Lower Extremity " (Therapeutic Exercise)  careful not to break hip prec  -JC2      Recorded by [LILA] Vikash Walters, PTA 07/12/20 0813  [JC2] Vikash Walters, PTA 07/12/20 0825      Row Name 07/12/20 0920             Positioning and Restraints    Pre-Treatment Position  sitting in chair/recliner  -RC      Post Treatment Position  chair  -RC      In Chair  call light within reach;encouraged to call for assist;exit alarm on  -RC      Recorded by [RC] Evelyn Dodson, AGUILAR/L 07/12/20 1502      Row Name 07/12/20 0747             Pain Assessment    Additional Documentation  Pain Scale: Numbers Pre/Post-Treatment (Group)  -LILA      Recorded by [LILA] Vikash Walters, PTA 07/12/20 0813      Row Name 07/12/20 0920 07/12/20 0747          Pain Scale: Numbers Pre/Post-Treatment    Pain Scale: Numbers, Pretreatment  6/10  -RC  8/10  -LILA     Pain Scale: Numbers, Post-Treatment  6/10  -RC2  --  -LILA     Pain Location - Side  Left  -RC  Left  -JC2     Pain Location  back  -RC  hip and hip  -JC2     Pain Intervention(s)  Medication (See MAR)  -RC2  --     Recorded by [RC] Evelyn Dodson, AGUILAR/L 07/12/20 0924  [RC2] Evelyn Dodson, AGUILAR/L 07/12/20 1502 [LILA] Vikash Walters, PTA 07/12/20 0813  [JC2] Vikash Walters, PTA 07/12/20 0754     Row Name                Wound 07/08/20 1810 Left upper eyebrow Laceration    Wound - Properties Group Date first assessed: 07/08/20 [TM] Time first assessed: 1810 [TM] Present on Hospital Admission: Y [TM] Side: Left [TM] Orientation: upper [TM] Location: eyebrow [TM] Primary Wound Type: Laceration [TM] Recorded by:  [TM] oJsephine Elias RN 07/08/20 1811    Row Name                Wound 07/09/20 1515 Left posterior hip Incision    Wound - Properties Group Date first assessed: 07/09/20 [AQ] Time first assessed: 1515 [AQ] Present on Hospital Admission: N [AQ] Side: Left [AQ] Orientation: posterior [AQ] Location: hip [AQ] Primary Wound Type: Incision [AQ] Recorded by:  [AQ] Cathy Cano RN 07/09/20  1515    Row Name 07/12/20 0920             Outcome Summary/Treatment Plan (OT)    Daily Summary of Progress (OT)  progress toward functional goals as expected  -      Plan for Continued Treatment (OT)  cont poc   -RC      Recorded by [RC] Evelyn Dodson, AGUILAR/L 07/12/20 1502      Row Name 07/12/20 0747             Outcome Summary/Treatment Plan (PT)    Daily Summary of Progress (PT)  progress toward functional goals as expected  -LILA      Plan for Continued Treatment (PT)  continue  -LILA      Anticipated Discharge Disposition (PT)  anticipate therapy at next level of care  -JC2      Recorded by [LILA] Vikash Walters, PTA 07/12/20 0813  [JC2] Vikash Walters, PTA 07/12/20 0754        User Key  (r) = Recorded By, (t) = Taken By, (c) = Cosigned By    Initials Name Effective Dates Discipline    AQ Cathy Cano, RN 10/17/16 -  Nurse    LILA Vikash Walters, PTA 03/07/18 -  PT    RC Evelyn Dodson AGUILAR/L 03/07/18 -  OT    TM Josephine Elias, RN 07/24/18 -  Nurse        Wound 07/08/20 1810 Left upper eyebrow Laceration (Active)   Dressing Appearance open to air 7/12/2020  8:30 AM   Closure Sutures 7/12/2020  8:30 AM   Base maroon/purple 7/12/2020  8:30 AM   Drainage Amount none 7/12/2020  8:30 AM       Wound 07/09/20 1515 Left posterior hip Incision (Active)   Dressing Appearance dry;intact 7/12/2020  8:30 AM   Closure JOSE 7/12/2020  8:30 AM   Base dressing in place, unable to visualize 7/12/2020  8:30 AM   Drainage Amount none 7/12/2020  8:30 AM     Rehab Goal Summary     Row Name 07/12/20 0920 07/12/20 0747          Bed Mobility Goal 1 (PT)    Activity/Assistive Device (Bed Mobility Goal 1, PT)  --  sit to supine;supine to sit  -     [place] Level/Cues Needed (Bed Mobility Goal 1, PT)  --  independent  -LILA     Time Frame (Bed Mobility Goal 1, PT)  --  by discharge  -LILA     Progress/Outcomes (Bed Mobility Goal 1, PT)  --  goal not met  -LILA        Transfer Goal 1 (PT)    Activity/Assistive Device  (Transfer Goal 1, PT)  --  sit-to-stand/stand-to-sit;bed-to-chair/chair-to-bed;toilet  -LILA     [place] Level/Cues Needed (Transfer Goal 1, PT)  --  conditional independence  -LILA     Time Frame (Transfer Goal 1, PT)  --  2 - 3 days  -LILA     Progress/Outcome (Transfer Goal 1, PT)  --  goal not met  -LILA        Gait Training Goal 1 (PT)    Activity/Assistive Device (Gait Training Goal 1, PT)  --  gait (walking locomotion);assistive device use;walker, rolling  -LILA     [place] Level (Gait Training Goal 1, PT)  --  conditional independence  -LILA     Distance (Gait Goal 1, PT)  --  [id number]  -LILA     Time Frame (Gait Training Goal 1, PT)  --  by discharge  -LILA     Progress/Outcome (Gait Training Goal 1, PT)  --  goal not met  -LILA        Stairs Goal 1 (PT)    Activity/Assistive Device (Stairs Goal 1, PT)  --  ascending stairs;descending stairs;using handrail, left;assistive device use  -LILA     [place] Level/Cues Needed (Stairs Goal 1, PT)  --  conditional independence  -LILA     Time Frame (Stairs Goal 1, PT)  --  by discharge  -LILA     Progress/Outcome (Stairs Goal 1, PT)  --  goal not met  -        Occupational Therapy Goals    Transfer Goal Selection (OT)  transfer, OT goal 1  -RC  --     Bathing Goal Selection (OT)  bathing, OT goal 1  -RC  --     Dressing Goal Selection (OT)  dressing, OT goal 1  -RC  --     Toileting Goal Selection (OT)  toileting, OT goal 1  -RC  --     Endurance Goal Selection (OT)  endurance, OT goal 1  -RC  --     Safety Awareness Goal Selection (OT)  safety awareness, OT goal 1  -RC  --        Transfer Goal 1 (OT)    Activity/Assistive Device (Transfer Goal 1, OT)  toilet  -RC  --     [place] Level/Cues Needed (Transfer Goal 1, OT)  contact guard assist  -RC  --     Time Frame (Transfer Goal 1, OT)  long term goal (LTG);by discharge  -RC  --     Barriers (Transfers Goal 1, OT)  hearing deficit;safety deficit   -RC  --     Progress/Outcome (Transfer Goal 1, OT)  goal not met  -   --        Bathing Goal 1 (OT)    Activity/Assistive Device (Bathing Goal 1, OT)  bathing skills, all  -RC  --     [place] Level/Cues Needed (Bathing Goal 1, OT)  minimum assist (75% or more patient effort)  -RC  --     Time Frame (Bathing Goal 1, OT)  long term goal (LTG);by discharge  -RC  --     Progress/Outcomes (Bathing Goal 1, OT)  goal not met  -RC  --        Dressing Goal 1 (OT)    Activity/Assistive Device (Dressing Goal 1, OT)  dressing skills, all  -RC  --     Joiner/Cues Needed (Dressing Goal 1, OT)  minimum assist (75% or more patient effort)  -RC  --     Time Frame (Dressing Goal 1, OT)  long term goal (LTG);by discharge  -RC  --     Progress/Outcome (Dressing Goal 1, OT)  goal not met  -RC  --        Toileting Goal 1 (OT)    Activity/Device (Toileting Goal 1, OT)  toileting skills, all  -RC  --     [place] Level/Cues Needed (Toileting Goal 1, OT)  minimum assist (75% or more patient effort)  -RC  --     Time Frame (Toileting Goal 1, OT)  long term goal (LTG);by discharge  -RC  --     Progress/Outcome (Toileting Goal 1, OT)  goal not met  -RC  --         Endurance Goal 1 (OT)    Progress/Outcome (Endurance Goal 1, OT)  goal not met  -RC  --        Safety Awareness Goal 1 (OT)    Activity (Safety Awareness Goal 1, OT)  awareness of need for assistance;impulse control;insight into deficits/self awareness;judgment;safe use of assistive device/equipment;follow through of safety precautions;demonstrates compliance;demonstrates understanding;demonstration of safe behaviors  -RC  --     Joiner/Cues/Accuracy (Safety Awareness Goal 1, OT)  with minimum;verbal cues/redirection;with repetition of directions;with 90% accuracy  -RC  --     Time Frame (Safety Awareness Goal 1, OT)  long term goal (LTG);by discharge  -RC  --     Progress/Outcome (Safety Awareness Goal 1, OT)  goal not met  -RC  --       User Key  (r) = Recorded By, (t) = Taken By, (c) = Cosigned By    Initials Name Provider Type  Discipline    LILA Vikash Walters, PTA Physical Therapy Assistant PT    Evelyn Farley, AGUILAR/L Occupational Therapy Assistant OT        Occupational Therapy Education                 Title: PT OT SLP Therapies (In Progress)     Topic: Occupational Therapy (In Progress)     Point: ADL training (In Progress)     Description:   Instruct learner(s) on proper safety adaptation and remediation techniques during self care or transfers.   Instruct in proper use of assistive devices.              Learning Progress Summary           Patient Acceptance, E,D,H, NR by  at 7/11/2020 1429    Comment:  much review and issued handout for HIP precautions, edu on use of lh ae will need reinforcement.    Acceptance, E, NR by ME at 7/10/2020 1503    Comment:  Educated on OT and POC. Educated to call for assistance. Educated on safety precautions. Educated on proper body mechanics for transfers, bed mobility, ADLs, and funcitonal mobility.                   Point: Home exercise program (Not Started)     Description:   Instruct learner(s) on appropriate technique for monitoring, assisting and/or progressing therapeutic exercises/activities.              Learner Progress:   Not documented in this visit.          Point: Precautions (In Progress)     Description:   Instruct learner(s) on prescribed precautions during self-care and functional transfers.              Learning Progress Summary           Patient Acceptance, E, NR by  at 7/12/2020 1503    Comment:  multi review of hip precautions    Acceptance, E,D,H, NR by  at 7/11/2020 1429    Comment:  much review and issued handout for HIP precautions, edu on use of lh ae will need reinforcement.    Acceptance, E, NR by ME at 7/10/2020 1503    Comment:  Educated on OT and POC. Educated to call for assistance. Educated on safety precautions. Educated on proper body mechanics for transfers, bed mobility, ADLs, and funcitonal mobility.                   Point: Body mechanics (In  Progress)     Description:   Instruct learner(s) on proper positioning and spine alignment during self-care, functional mobility activities and/or exercises.              Learning Progress Summary           Patient Acceptance, E,D,H, NR by  at 7/11/2020 1429    Comment:  much review and issued handout for HIP precautions, edu on use of lh ae will need reinforcement.    Acceptance, E, NR by ME at 7/10/2020 150    Comment:  Educated on OT and POC. Educated to call for assistance. Educated on safety precautions. Educated on proper body mechanics for transfers, bed mobility, ADLs, and funcitonal mobility.                               User Key     Initials Effective Dates Name Provider Type Discipline     03/07/18 -  Evelyn Dodson COTA/L Occupational Therapy Assistant OT    ME 09/10/19 -  Eitan Haynes OT Occupational Therapist OT                OT Recommendation and Plan  Outcome Summary/Treatment Plan (OT)  Daily Summary of Progress (OT): progress toward functional goals as expected  Plan for Continued Treatment (OT): cont poc   Therapy Frequency (OT Eval): daily  Daily Summary of Progress (OT): progress toward functional goals as expected  Plan of Care Review  Plan of Care Reviewed With: patient  Plan of Care Reviewed With: patient  Outcome Summary: pt up in chair, multi review of hip precautions towell roll to inside fol calf to prevent internal rotation. ue ex as maryuri.  cont poc  Outcome Measures     Row Name 07/12/20 0920 07/12/20 0747 07/11/20 1245       How much help from another person do you currently need...    Turning from your back to your side while in flat bed without using bedrails?  --  3  -LILA  --    Moving from lying on back to sitting on the side of a flat bed without bedrails?  --  3  -LILA  --    Moving to and from a bed to a chair (including a wheelchair)?  --  3  -LILA  --    Standing up from a chair using your arms (e.g., wheelchair, bedside chair)?  --  3  -LILA  --    Climbing 3-5 steps  with a railing?  --  3  -LILA  --    To walk in hospital room?  --  3  -LILA  --    AM-PAC 6 Clicks Score (PT)  --  18  -LILA  --       How much help from another is currently needed...    Putting on and taking off regular lower body clothing?  1  -RC  --  1  -RC    Bathing (including washing, rinsing, and drying)  2  -RC  --  2  -RC    Toileting (which includes using toilet bed pan or urinal)  1  -RC  --  1  -RC    Putting on and taking off regular upper body clothing  2  -RC  --  2  -RC    Taking care of personal grooming (such as brushing teeth)  3  -RC  --  3  -RC    Eating meals  3  -RC  --  3  -RC    AM-PAC 6 Clicks Score (OT)  12  -RC  --  12  -RC       Functional Assessment    Outcome Measure Options  --  AM-PAC 6 Clicks Basic Mobility (PT)  -LILA  --    Row Name 07/11/20 1002 07/10/20 1026          How much help from another person do you currently need...    Turning from your back to your side while in flat bed without using bedrails?  3  -LILA  --     Moving from lying on back to sitting on the side of a flat bed without bedrails?  3  -LILA  --     Moving to and from a bed to a chair (including a wheelchair)?  3  -LILA  --     Standing up from a chair using your arms (e.g., wheelchair, bedside chair)?  3  -LILA  --     Climbing 3-5 steps with a railing?  3  -LILA  --     To walk in hospital room?  3  -LILA  --     AM-PAC 6 Clicks Score (PT)  18  -LILA  --        How much help from another is currently needed...    Putting on and taking off regular lower body clothing?  --  1  -ME     Bathing (including washing, rinsing, and drying)  --  2  -ME     Toileting (which includes using toilet bed pan or urinal)  --  1  -ME     Putting on and taking off regular upper body clothing  --  2  -ME     Taking care of personal grooming (such as brushing teeth)  --  3  -ME     Eating meals  --  3  -ME     AM-PAC 6 Clicks Score (OT)  --  12  -ME        Functional Assessment    Outcome Measure Options  AMPAC 6 Clicks Basic Mobility (PT)  -LILA   AM-PAC 6 Clicks Daily Activity (OT)  -ME       User Key  (r) = Recorded By, (t) = Taken By, (c) = Cosigned By    Initials Name Provider Type    Vikash Whatley, PTA Physical Therapy Assistant    RC Evelyn Dodson, AGUILAR/L Occupational Therapy Assistant    Eitan Bobo OT Occupational Therapist           Time Calculation:   Time Calculation- OT     Row Name 07/12/20 1504             Time Calculation- OT    OT Start Time  0920  -RC      OT Stop Time  0943  -      OT Time Calculation (min)  23 min  -      Total Timed Code Minutes- OT  23 minute(s)  -      OT Received On  07/12/20  -        User Key  (r) = Recorded By, (t) = Taken By, (c) = Cosigned By    Initials Name Provider Type     Evelyn Dodson, AGUILAR/L Occupational Therapy Assistant        Therapy Charges for Today     Code Description Service Date Service Provider Modifiers Qty    15293468941 HC OT SELF CARE/MGMT/TRAIN EA 15 MIN 7/11/2020 Evelyn Dodson, AGUILAR/L GO 2    81105040405 HC OT SELF CARE/MGMT/TRAIN EA 15 MIN 7/12/2020 Evelyn Dodson, AGUILAR/L GO 1    70359621192 HC OT THER PROC EA 15 MIN 7/12/2020 Evelyn Dodson, AGUILAR/L GO 1               Evelyn Dodson AGUILAR/L  7/12/2020

## 2020-07-12 NOTE — PROGRESS NOTES
ORTHOPEDIC PROGRESS NOTE:    Name:  Jose Dacosta  Date:    7/12/2020  Date of admission:  7/8/2020    Post op day:  3 Days Post-Op  Procedure:    Procedure(s) (LRB):  LEFT HIP HEMIARTHROPLASTY (Left)    Subjective: Really seems much more alert today.  Is joking a bit.    Vitals:    Vitals:    07/12/20 0832   BP:    Pulse: 73   Resp:    Temp:    SpO2:        Exam: Calf negative.  Dressing dry    Lab Results (last 24 hours)     ** No results found for the last 24 hours. **          ASSESSMENT:  Principal Problem:    Closed fracture of neck of left femur (CMS/HCC)  Active Problems:    Coronary artery disease involving native coronary artery of native heart without angina pectoris    Essential hypertension    Ischemic cardiomyopathy    Syncope and collapse    Facial laceration    HFrEF (heart failure with reduced ejection fraction) (CMS/[facility])    Postoperative anemia due to acute blood loss        PLAN: Slow with therapy.  DC planning.  Okay to go from orthopedic standpoint whenever DC disposition is made      Jitendra Solis MD  07/12/20  09:42

## 2020-07-12 NOTE — THERAPY TREATMENT NOTE
Acute Care - Occupational Therapy Treatment Note  [facility]     Patient Name: Jose Dacosta  : 1935  MRN: [id number]  Today's Date: 2020  Onset of Illness/Injury or Date of Surgery: 20(surgery yesterday (2020))  Date of Referral to OT: 07/10/20  Referring Physician: DAYANARA Kumar APRN(surgeon: DAJA Solis MD )    Admit Date: 2020       ICD-10-CM ICD-9-CM   1. Syncope and collapse R55 780.2   2. Facial laceration, initial encounter S01.81XA 873.40   3. Closed fracture of left hip, initial encounter (CMS/HCC) S72.002A 820.8   4. Fall, initial encounter W19.XXXA E888.9   5. Closed head injury, initial encounter S09.90XA 959.01   6. Closed fracture of neck of left femur, initial encounter (CMS/HCC) S72.002A 820.8   7. Impaired functional mobility, balance, gait, and endurance Z74.09 V49.89   8. Impaired mobility and ADLs Z74.09 V49.89    Z78.9      Patient Active Problem List   Diagnosis   • Coronary artery disease involving native coronary artery of native heart without angina pectoris   • Vasovagal syncope   • RBBB (right bundle branch block with left anterior fascicular block)   • Essential hypertension   • Ischemic cardiomyopathy   • Syncope and collapse   • Closed fracture of neck of left femur (CMS/[facility])   • Coronary artery disease with history of myocardial infarction without history of CABG   • Facial laceration   • HFrEF (heart failure with reduced ejection fraction) (CMS/[facility])   • Postoperative anemia due to acute blood loss     Past Medical History:   Diagnosis Date   • Abnormal ECG      Past Surgical History:   Procedure Laterality Date   • CORONARY STENT PLACEMENT         Therapy Treatment    Rehabilitation Treatment Summary     Row Name 20 0920 20 0747          Treatment Time/Intention    Discipline  occupational therapy assistant  -RC  physical therapy assistant  -LILA     Document Type  therapy note (daily note)  -KANDY  therapy note (daily note)  -LILA      Subjective Information  complains of;pain  -  --     Mode of Treatment  occupational therapy;individual therapy  -RC  physical therapy;individual therapy  -LILA     Patient/Family Observations  no visitors in room   -  --     Therapy Frequency (PT Clinical Impression)  --  daily  -LILA     Total Minutes, Occupational Therapy Treatment  23  -RC  --     Therapy Frequency (OT Eval)  daily  -  --     Patient Effort  good  -RC  good  -LILA     Existing Precautions/Restrictions  hip;hip, posterior  -RC  hip, posterior L hip precautions  -LILA     Recorded by [RC] Evelyn Dodson AGUILAR/L 07/12/20 1502 [LILA] Vikash Walters, PTA 07/12/20 0754     Row Name 07/12/20 0920 07/12/20 0747          Vital Signs    Pre Systolic BP Rehab  --  155  -LILA     Pre Treatment Diastolic BP  --  70  -LILA     Post Systolic BP Rehab  143  -RC  147  -JC2     Post Treatment Diastolic BP  68  -RC  70  -JC2     Pretreatment Heart Rate (beats/min)  --  67  -LILA     Intratreatment Heart Rate (beats/min)  --  88  -JC3     Posttreatment Heart Rate (beats/min)  77  -RC  76  -JC2     Pre SpO2 (%)  94  -RC  94  -LILA     O2 Delivery Pre Treatment  room air  -RC  room air  -LILA     Intra SpO2 (%)  --  97  -JC3     O2 Delivery Intra Treatment  --  room air  -JC3     Post SpO2 (%)  --  94  -JC2     O2 Delivery Post Treatment  --  room air  -JC2     Pre Patient Position  --  Supine  -LILA     Post Patient Position  --  Sitting  -LILA     Recorded by [RC] Evelyn Dodson AGUILAR/L 07/12/20 1502 [LILA] Vikash Walters, PTA 07/12/20 0754  [JC2] Vikash Walters, PTA 07/12/20 0825  [JC3] Vikash Walters, PTA 07/12/20 0813     Row Name 07/12/20 0747             Cognitive Assessment/Intervention- PT/OT    Orientation Status (Cognition)  oriented x 4  -LILA      Follows Commands (Cognition)  follows one step commands;physical/tactile prompts required;repetition of directions required  -LILA      Recorded by [LILA] Vikash Walters, PTA 07/12/20 0754      Row Name 07/12/20 0747              Safety Issues, Functional Mobility    Impairments Affecting Function (Mobility)  balance;endurance/activity tolerance;range of motion (ROM);strength  -LILA      Recorded by [LILA] Vikash Walters, PTA 07/12/20 0754      Row Name 07/12/20 07             Mobility Assessment/Intervention    Extremity Weight-bearing Status  left lower extremity  -LILA      Left Lower Extremity (Weight-bearing Status)  weight-bearing as tolerated (WBAT)  -LILA      Recorded by [LILA] Vikash Walters, PTA 07/12/20 0754      Row Name 07/12/20 07             Bed Mobility Assessment/Treatment    Bed Mobility Assessment/Treatment  supine-sit  -LILA      Supine-Sit [place] (Bed Mobility)  minimum assist (75% patient effort)  -LILA      Assistive Device (Bed Mobility)  bed rails;head of bed elevated  -LILA      Recorded by [LILA] Vikash Walters, PTA 07/12/20 0754      Row Name 07/12/20 07             Transfer Assessment/Treatment    Transfer Assessment/Treatment  sit-stand transfer;stand-sit transfer;bed-chair transfer  -LILA      Recorded by [LILA] Vikash Walters PTA 07/12/20 0825      Row Name 07/12/20 07             Bed-Chair Transfer    Bed-Chair [place] (Transfers)  contact guard;verbal cues  -LILA      Assistive Device (Bed-Chair Transfers)  walker, front-wheeled  -LILA      Recorded by [LILA] Vikash Walters PTA 07/12/20 0754      Row Name 07/12/20 0747             Sit-Stand Transfer    Sit-Stand [place] (Transfers)  contact guard;verbal cues  -LILA      Assistive Device (Sit-Stand Transfers)  walker, front-wheeled  -LILA      Recorded by [LILA] Vikash Walters, PTA 07/12/20 0754      Row Name 07/12/20 0747             Stand-Sit Transfer    Stand-Sit [place] (Transfers)  contact guard;verbal cues  -LILA      Assistive Device (Stand-Sit Transfers)  walker, front-wheeled  -LILA      Recorded by [LILA] Vikash Walters, PTA 07/12/20 0754      Row Name 07/12/20 0747             Toilet Transfer    Type (Toilet Transfer)  --  -LILA       "[place] Level (Toilet Transfer)  --  -LILA      Recorded by [LILA] Vikash Walters, PTA 07/12/20 0825      Row Name 07/12/20 0747             Gait/Stairs Assessment/Training    Gait/Stairs Assessment/Training  gait/ambulation assistive device  -LILA      [place] Level (Gait)  contact guard;verbal cues  -LILA      Assistive Device (Gait)  walker, front-wheeled  -LILA      Distance in Feet (Gait)  15, 64  -JC2      Pattern (Gait)  3-point  -LILA      Deviations/Abnormal Patterns (Gait)  antalgic  -LILA      Bilateral Gait Deviations  -- cueing for correct walker use.   -LILA      Recorded by [LILA] Vikash Walters, PTA 07/12/20 0754  [JC2] Vikash Walters, PTA 07/12/20 0813      Row Name 07/12/20 0920             Upper Extremity Seated Therapeutic Exercise    Performed, Seated Upper Extremity (Therapeutic Exercise)  shoulder flexion/extension;shoulder external/internal rotation;elbow flexion/extension  -      Device, Seated Upper Extremity (Therapeutic Exercise)  -- 2# wt as maryuri   -      Exercise Type, Seated Upper Extremity (Therapeutic Exercise)  resistive exercise  -      Expected Outcomes, Seated Upper Extremity (Therapeutic Exercise)  improve functional tolerance, self-care activity;improve performance, BADLs  -      Sets/Reps Detail, Seated Upper Extremity (Therapeutic Exercise)  10-15x2  -      Comment, Seated Upper Extremity (Therapeutic Exercise)  pt reports l ue \"sore\"  -      Recorded by [RC] Evelyn Dodson COTA/L 07/12/20 1502      Row Name 07/12/20 0747             Lower Extremity Supine Therapeutic Exercise    Performed, Supine Lower Extremity (Therapeutic Exercise)  ankle dorsiflexion/plantarflexion;hip abduction/adduction;heel slides;SAQ (short arc quad) over bolster  -      Exercise Type, Supine Lower Extremity (Therapeutic Exercise)  AROM (active range of motion)  -      Sets/Reps Detail, Supine Lower Extremity (Therapeutic Exercise)  20x1 veda  -      Comment, Supine Lower Extremity " (Therapeutic Exercise)  careful not to break hip prec  -JC2      Recorded by [LILA] Vikash Walters, PTA 07/12/20 0813  [JC2] Vikash Walters, PTA 07/12/20 0825      Row Name 07/12/20 0920             Positioning and Restraints    Pre-Treatment Position  sitting in chair/recliner  -RC      Post Treatment Position  chair  -RC      In Chair  call light within reach;encouraged to call for assist;exit alarm on  -RC      Recorded by [RC] Evelyn Dodson, AGUILAR/L 07/12/20 1502      Row Name 07/12/20 0747             Pain Assessment    Additional Documentation  Pain Scale: Numbers Pre/Post-Treatment (Group)  -LILA      Recorded by [LILA] Vikash Walters, PTA 07/12/20 0813      Row Name 07/12/20 0920 07/12/20 0747          Pain Scale: Numbers Pre/Post-Treatment    Pain Scale: Numbers, Pretreatment  6/10  -RC  8/10  -LILA     Pain Scale: Numbers, Post-Treatment  6/10  -RC2  --  -LILA     Pain Location - Side  Left  -RC  Left  -JC2     Pain Location  back  -RC  hip and hip  -JC2     Pain Intervention(s)  Medication (See MAR)  -RC2  --     Recorded by [RC] Evelyn Dodson, AGUILAR/L 07/12/20 0924  [RC2] Evelyn Dodson, AGUILAR/L 07/12/20 1502 [LILA] Vikash Walters, PTA 07/12/20 0813  [JC2] Vikash Walters, PTA 07/12/20 0754     Row Name                Wound 07/08/20 1810 Left upper eyebrow Laceration    Wound - Properties Group Date first assessed: 07/08/20 [TM] Time first assessed: 1810 [TM] Present on Hospital Admission: Y [TM] Side: Left [TM] Orientation: upper [TM] Location: eyebrow [TM] Primary Wound Type: Laceration [TM] Recorded by:  [TM] Josephine Elias RN 07/08/20 1811    Row Name                Wound 07/09/20 1515 Left posterior hip Incision    Wound - Properties Group Date first assessed: 07/09/20 [AQ] Time first assessed: 1515 [AQ] Present on Hospital Admission: N [AQ] Side: Left [AQ] Orientation: posterior [AQ] Location: hip [AQ] Primary Wound Type: Incision [AQ] Recorded by:  [AQ] Cathy Cano RN 07/09/20  1515    Row Name 07/12/20 0920             Outcome Summary/Treatment Plan (OT)    Daily Summary of Progress (OT)  progress toward functional goals as expected  -      Plan for Continued Treatment (OT)  cont poc   -RC      Recorded by [RC] Evelyn Dodson, AGUILAR/L 07/12/20 1502      Row Name 07/12/20 0747             Outcome Summary/Treatment Plan (PT)    Daily Summary of Progress (PT)  progress toward functional goals as expected  -LILA      Plan for Continued Treatment (PT)  continue  -LILA      Anticipated Discharge Disposition (PT)  anticipate therapy at next level of care  -JC2      Recorded by [LILA] Vikash Walters, PTA 07/12/20 0813  [JC2] Vikash Walters, PTA 07/12/20 0754        User Key  (r) = Recorded By, (t) = Taken By, (c) = Cosigned By    Initials Name Effective Dates Discipline    AQ Cathy Cano, RN 10/17/16 -  Nurse    LILA Vikash Walters, PTA 03/07/18 -  PT    RC Evelyn Dodson AGUILAR/L 03/07/18 -  OT    TM Josephine Elias, RN 07/24/18 -  Nurse        Wound 07/08/20 1810 Left upper eyebrow Laceration (Active)   Dressing Appearance open to air 7/12/2020  8:30 AM   Closure Sutures 7/12/2020  8:30 AM   Base maroon/purple 7/12/2020  8:30 AM   Drainage Amount none 7/12/2020  8:30 AM       Wound 07/09/20 1515 Left posterior hip Incision (Active)   Dressing Appearance dry;intact 7/12/2020  8:30 AM   Closure JOSE 7/12/2020  8:30 AM   Base dressing in place, unable to visualize 7/12/2020  8:30 AM   Drainage Amount none 7/12/2020  8:30 AM     Rehab Goal Summary     Row Name 07/12/20 0920 07/12/20 0747          Bed Mobility Goal 1 (PT)    Activity/Assistive Device (Bed Mobility Goal 1, PT)  --  sit to supine;supine to sit  -     [place] Level/Cues Needed (Bed Mobility Goal 1, PT)  --  independent  -LILA     Time Frame (Bed Mobility Goal 1, PT)  --  by discharge  -LILA     Progress/Outcomes (Bed Mobility Goal 1, PT)  --  goal not met  -LILA        Transfer Goal 1 (PT)    Activity/Assistive Device  (Transfer Goal 1, PT)  --  sit-to-stand/stand-to-sit;bed-to-chair/chair-to-bed;toilet  -LILA     [place] Level/Cues Needed (Transfer Goal 1, PT)  --  conditional independence  -LILA     Time Frame (Transfer Goal 1, PT)  --  2 - 3 days  -LILA     Progress/Outcome (Transfer Goal 1, PT)  --  goal not met  -LILA        Gait Training Goal 1 (PT)    Activity/Assistive Device (Gait Training Goal 1, PT)  --  gait (walking locomotion);assistive device use;walker, rolling  -LILA     [place] Level (Gait Training Goal 1, PT)  --  conditional independence  -LILA     Distance (Gait Goal 1, PT)  --  162xxj2  -LILA     Time Frame (Gait Training Goal 1, PT)  --  by discharge  -LILA     Progress/Outcome (Gait Training Goal 1, PT)  --  goal not met  -LILA        Stairs Goal 1 (PT)    Activity/Assistive Device (Stairs Goal 1, PT)  --  ascending stairs;descending stairs;using handrail, left;assistive device use  -LILA     [place] Level/Cues Needed (Stairs Goal 1, PT)  --  conditional independence  -LILA     Time Frame (Stairs Goal 1, PT)  --  by discharge  -LILA     Progress/Outcome (Stairs Goal 1, PT)  --  goal not met  -        Occupational Therapy Goals    Transfer Goal Selection (OT)  transfer, OT goal 1  -RC  --     Bathing Goal Selection (OT)  bathing, OT goal 1  -RC  --     Dressing Goal Selection (OT)  dressing, OT goal 1  -RC  --     Toileting Goal Selection (OT)  toileting, OT goal 1  -RC  --     Endurance Goal Selection (OT)  endurance, OT goal 1  -RC  --     Safety Awareness Goal Selection (OT)  safety awareness, OT goal 1  -RC  --        Transfer Goal 1 (OT)    Activity/Assistive Device (Transfer Goal 1, OT)  toilet  -RC  --     [place] Level/Cues Needed (Transfer Goal 1, OT)  contact guard assist  -RC  --     Time Frame (Transfer Goal 1, OT)  long term goal (LTG);by discharge  -RC  --     Barriers (Transfers Goal 1, OT)  hearing deficit;safety deficit   -RC  --     Progress/Outcome (Transfer Goal 1, OT)  goal not met  -   --        Bathing Goal 1 (OT)    Activity/Assistive Device (Bathing Goal 1, OT)  bathing skills, all  -RC  --     [place] Level/Cues Needed (Bathing Goal 1, OT)  minimum assist (75% or more patient effort)  -RC  --     Time Frame (Bathing Goal 1, OT)  long term goal (LTG);by discharge  -RC  --     Progress/Outcomes (Bathing Goal 1, OT)  goal not met  -RC  --        Dressing Goal 1 (OT)    Activity/Assistive Device (Dressing Goal 1, OT)  dressing skills, all  -RC  --     [place]/Cues Needed (Dressing Goal 1, OT)  minimum assist (75% or more patient effort)  -RC  --     Time Frame (Dressing Goal 1, OT)  long term goal (LTG);by discharge  -RC  --     Progress/Outcome (Dressing Goal 1, OT)  goal not met  -RC  --        Toileting Goal 1 (OT)    Activity/Device (Toileting Goal 1, OT)  toileting skills, all  -RC  --     [place] Level/Cues Needed (Toileting Goal 1, OT)  minimum assist (75% or more patient effort)  -RC  --     Time Frame (Toileting Goal 1, OT)  long term goal (LTG);by discharge  -RC  --     Progress/Outcome (Toileting Goal 1, OT)  goal not met  -RC  --         Endurance Goal 1 (OT)    Progress/Outcome (Endurance Goal 1, OT)  goal not met  -RC  --        Safety Awareness Goal 1 (OT)    Activity (Safety Awareness Goal 1, OT)  awareness of need for assistance;impulse control;insight into deficits/self awareness;judgment;safe use of assistive device/equipment;follow through of safety precautions;demonstrates compliance;demonstrates understanding;demonstration of safe behaviors  -RC  --     [place]/Cues/Accuracy (Safety Awareness Goal 1, OT)  with minimum;verbal cues/redirection;with repetition of directions;with 90% accuracy  -RC  --     Time Frame (Safety Awareness Goal 1, OT)  long term goal (LTG);by discharge  -RC  --     Progress/Outcome (Safety Awareness Goal 1, OT)  goal not met  -RC  --       User Key  (r) = Recorded By, (t) = Taken By, (c) = Cosigned By    Initials Name Provider Type  Discipline    LILA Vikash Walters, PTA Physical Therapy Assistant PT    Evelyn Farley, AGUILAR/L Occupational Therapy Assistant OT        Occupational Therapy Education                 Title: PT OT SLP Therapies (In Progress)     Topic: Occupational Therapy (In Progress)     Point: ADL training (In Progress)     Description:   Instruct learner(s) on proper safety adaptation and remediation techniques during self care or transfers.   Instruct in proper use of assistive devices.              Learning Progress Summary           Patient Acceptance, E,D,H, NR by  at 7/11/2020 1429    Comment:  much review and issued handout for HIP precautions, edu on use of lh ae will need reinforcement.    Acceptance, E, NR by ME at 7/10/2020 1503    Comment:  Educated on OT and POC. Educated to call for assistance. Educated on safety precautions. Educated on proper body mechanics for transfers, bed mobility, ADLs, and funcitonal mobility.                   Point: Home exercise program (Not Started)     Description:   Instruct learner(s) on appropriate technique for monitoring, assisting and/or progressing therapeutic exercises/activities.              Learner Progress:   Not documented in this visit.          Point: Precautions (In Progress)     Description:   Instruct learner(s) on prescribed precautions during self-care and functional transfers.              Learning Progress Summary           Patient Acceptance, E, NR by  at 7/12/2020 1503    Comment:  multi review of hip precautions    Acceptance, E,D,H, NR by  at 7/11/2020 1429    Comment:  much review and issued handout for HIP precautions, edu on use of lh ae will need reinforcement.    Acceptance, E, NR by ME at 7/10/2020 1503    Comment:  Educated on OT and POC. Educated to call for assistance. Educated on safety precautions. Educated on proper body mechanics for transfers, bed mobility, ADLs, and funcitonal mobility.                   Point: Body mechanics (In  Progress)     Description:   Instruct learner(s) on proper positioning and spine alignment during self-care, functional mobility activities and/or exercises.              Learning Progress Summary           Patient Acceptance, E,D,H, NR by  at 7/11/2020 1429    Comment:  much review and issued handout for HIP precautions, edu on use of lh ae will need reinforcement.    Acceptance, E, NR by ME at 7/10/2020 1503    Comment:  Educated on OT and POC. Educated to call for assistance. Educated on safety precautions. Educated on proper body mechanics for transfers, bed mobility, ADLs, and funcitonal mobility.                               User Key     Initials Effective Dates Name Provider Type Discipline     03/07/18 -  Evelyn Dodson COTA/L Occupational Therapy Assistant OT    ME 09/10/19 -  Eitan Haynes OT Occupational Therapist OT                OT Recommendation and Plan  Outcome Summary/Treatment Plan (OT)  Daily Summary of Progress (OT): progress toward functional goals as expected  Plan for Continued Treatment (OT): cont poc   Therapy Frequency (OT Eval): daily  Daily Summary of Progress (OT): progress toward functional goals as expected  Plan of Care Review  Plan of Care Reviewed With: patient  Plan of Care Reviewed With: patient  Outcome Summary: pt up in chair educated pt on hip precautions and use of ae for lb dressing.  will need reinforcement.  cont poc  Outcome Measures     Row Name 07/12/20 0920 07/12/20 0747 07/11/20 1245       How much help from another person do you currently need...    Turning from your back to your side while in flat bed without using bedrails?  --  3  -LILA  --    Moving from lying on back to sitting on the side of a flat bed without bedrails?  --  3  -LILA  --    Moving to and from a bed to a chair (including a wheelchair)?  --  3  -LILA  --    Standing up from a chair using your arms (e.g., wheelchair, bedside chair)?  --  3  -LILA  --    Climbing 3-5 steps with a railing?  --  3   -LILA  --    To walk in hospital room?  --  3  -LILA  --    AM-PAC 6 Clicks Score (PT)  --  18  -LILA  --       How much help from another is currently needed...    Putting on and taking off regular lower body clothing?  1  -RC  --  1  -RC    Bathing (including washing, rinsing, and drying)  2  -RC  --  2  -RC    Toileting (which includes using toilet bed pan or urinal)  1  -RC  --  1  -RC    Putting on and taking off regular upper body clothing  2  -RC  --  2  -RC    Taking care of personal grooming (such as brushing teeth)  3  -RC  --  3  -RC    Eating meals  3  -RC  --  3  -RC    AM-PAC 6 Clicks Score (OT)  12  -RC  --  12  -RC       Functional Assessment    Outcome Measure Options  --  AM-PAC 6 Clicks Basic Mobility (PT)  -LILA  --    Row Name 07/11/20 1002 07/10/20 1026          How much help from another person do you currently need...    Turning from your back to your side while in flat bed without using bedrails?  3  -LILA  --     Moving from lying on back to sitting on the side of a flat bed without bedrails?  3  -LILA  --     Moving to and from a bed to a chair (including a wheelchair)?  3  -LILA  --     Standing up from a chair using your arms (e.g., wheelchair, bedside chair)?  3  -LILA  --     Climbing 3-5 steps with a railing?  3  -LILA  --     To walk in hospital room?  3  -LILA  --     AM-PAC 6 Clicks Score (PT)  18  -LILA  --        How much help from another is currently needed...    Putting on and taking off regular lower body clothing?  --  1  -ME     Bathing (including washing, rinsing, and drying)  --  2  -ME     Toileting (which includes using toilet bed pan or urinal)  --  1  -ME     Putting on and taking off regular upper body clothing  --  2  -ME     Taking care of personal grooming (such as brushing teeth)  --  3  -ME     Eating meals  --  3  -ME     AM-PAC 6 Clicks Score (OT)  --  12  -ME        Functional Assessment    Outcome Measure Options  AM-PAC 6 Clicks Basic Mobility (PT)  -LILA  AM-PAC 6 Clicks Daily  Activity (OT)  -ME       User Key  (r) = Recorded By, (t) = Taken By, (c) = Cosigned By    Initials Name Provider Type    LILA Vikash Walters, PTA Physical Therapy Assistant    RC Evelyn Dodson, AGUILAR/L Occupational Therapy Assistant    ME Eitan Haynes OT Occupational Therapist           Time Calculation:   Time Calculation- OT     Row Name 07/12/20 1504             Time Calculation- OT    OT Start Time  0920  -RC      OT Stop Time  0943  -RC      OT Time Calculation (min)  23 min  -      Total Timed Code Minutes- OT  23 minute(s)  -      OT Received On  07/12/20  -        User Key  (r) = Recorded By, (t) = Taken By, (c) = Cosigned By    Initials Name Provider Type     Evelyn Dodson, AGUILAR/L Occupational Therapy Assistant        Therapy Charges for Today     Code Description Service Date Service Provider Modifiers Qty    10019589827 HC OT SELF CARE/MGMT/TRAIN EA 15 MIN 7/11/2020 West, Evelyn G, AGUILAR/L GO 2    62286598847 HC OT SELF CARE/MGMT/TRAIN EA 15 MIN 7/12/2020 West, Evelyn G, AGUILAR/L GO 1    60069579116 HC OT THER PROC EA 15 MIN 7/12/2020 West, Evelyn G, AGUILAR/L GO 1               Evelyn G West, AGUILAR/L  7/12/2020

## 2020-07-12 NOTE — PLAN OF CARE
Problem: Patient Care Overview  Goal: Plan of Care Review  Outcome: Ongoing (interventions implemented as appropriate)  Flowsheets (Taken 7/12/2020 0816)  Progress: improving  Plan of Care Reviewed With: patient  Outcome Summary: pt responded well to PT w/ increased gait to 64 ft CGA w/ RW. SpO2 remained in 90s throughout tx on RA. pt t/f OOB to recliner CGA and completed LE ther ex. no new goal smet at this time. pt would continue to benefit from PT services. pt needs cueing to maintain hip precautions.

## 2020-07-13 LAB
LAB AP CASE REPORT: NORMAL
PATH REPORT.FINAL DX SPEC: NORMAL
SARS-COV-2 N GENE RESP QL NAA+PROBE: NOT DETECTED

## 2020-07-13 PROCEDURE — 97110 THERAPEUTIC EXERCISES: CPT

## 2020-07-13 PROCEDURE — 97116 GAIT TRAINING THERAPY: CPT

## 2020-07-13 PROCEDURE — 97535 SELF CARE MNGMENT TRAINING: CPT

## 2020-07-13 PROCEDURE — 97530 THERAPEUTIC ACTIVITIES: CPT

## 2020-07-13 PROCEDURE — 25010000002 ENOXAPARIN PER 10 MG: Performed by: ORTHOPAEDIC SURGERY

## 2020-07-13 PROCEDURE — 87635 SARS-COV-2 COVID-19 AMP PRB: CPT | Performed by: INTERNAL MEDICINE

## 2020-07-13 RX ORDER — NICOTINE 21 MG/24HR
1 PATCH, TRANSDERMAL 24 HOURS TRANSDERMAL
Status: DISCONTINUED | OUTPATIENT
Start: 2020-07-13 | End: 2020-07-16

## 2020-07-13 RX ADMIN — TRAMADOL HYDROCHLORIDE 25 MG: 50 TABLET, FILM COATED ORAL at 05:23

## 2020-07-13 RX ADMIN — METOPROLOL SUCCINATE 25 MG: 25 TABLET, FILM COATED, EXTENDED RELEASE ORAL at 09:36

## 2020-07-13 RX ADMIN — ASPIRIN 81 MG: 81 TABLET, COATED ORAL at 09:36

## 2020-07-13 RX ADMIN — ACETAMINOPHEN 1000 MG: 500 TABLET ORAL at 21:14

## 2020-07-13 RX ADMIN — Medication 2.5 MG: at 09:36

## 2020-07-13 RX ADMIN — TRAMADOL HYDROCHLORIDE 25 MG: 50 TABLET, FILM COATED ORAL at 21:13

## 2020-07-13 RX ADMIN — ACETAMINOPHEN 1000 MG: 500 TABLET ORAL at 12:13

## 2020-07-13 RX ADMIN — ROPINIROLE HYDROCHLORIDE 0.5 MG: 0.5 TABLET, FILM COATED ORAL at 09:36

## 2020-07-13 RX ADMIN — SODIUM CHLORIDE, PRESERVATIVE FREE 10 ML: 5 INJECTION INTRAVENOUS at 21:14

## 2020-07-13 RX ADMIN — ACETAMINOPHEN 1000 MG: 500 TABLET ORAL at 09:36

## 2020-07-13 RX ADMIN — ACETAMINOPHEN 1000 MG: 500 TABLET ORAL at 17:20

## 2020-07-13 RX ADMIN — ATORVASTATIN CALCIUM 20 MG: 20 TABLET, FILM COATED ORAL at 09:36

## 2020-07-13 RX ADMIN — LOSARTAN POTASSIUM 25 MG: 25 TABLET, FILM COATED ORAL at 09:36

## 2020-07-13 RX ADMIN — Medication 2.5 MG: at 21:13

## 2020-07-13 RX ADMIN — PANTOPRAZOLE SODIUM 40 MG: 40 TABLET, DELAYED RELEASE ORAL at 05:23

## 2020-07-13 RX ADMIN — TRAMADOL HYDROCHLORIDE 25 MG: 50 TABLET, FILM COATED ORAL at 12:17

## 2020-07-13 RX ADMIN — ENOXAPARIN SODIUM 30 MG: 30 INJECTION SUBCUTANEOUS at 14:58

## 2020-07-13 NOTE — PLAN OF CARE
Problem: Patient Care Overview  Goal: Plan of Care Review  Outcome: Ongoing (interventions implemented as appropriate)  Flowsheets (Taken 7/13/2020 8972)  Progress: improving  Plan of Care Reviewed With: patient  Outcome Summary: Pt participated in BID tx this date. Pt showing improvment with t/f's and gait. Pt with L LE immobilizer on but able to assist well with L LE during sup to sit to sup t/f's. Pt performed sit to stand with CGA and amb 110ft in am and 170ft in pm with 1 standing rest break. Pt performed B LE ther ex in supine each tx and verb less pain today than previous date. Pt would cont to benefit from therapy upon DC.

## 2020-07-13 NOTE — THERAPY TREATMENT NOTE
Acute Care - Physical Therapy Treatment Note  [facility]     Patient Name: Jose Dacosta  : 1935  MRN: [id number]  Today's Date: 2020  Onset of Illness/Injury or Date of Surgery: 20(surgery yesterday (2020))     Referring Physician: DAYANARA Kumar APRN(surgeon: DAJA Solis MD )    Admit Date: 2020    Visit Dx:    ICD-10-CM ICD-9-CM   1. Syncope and collapse R55 780.2   2. Facial laceration, initial encounter S01.81XA 873.40   3. Closed fracture of left hip, initial encounter (CMS/HCC) S72.002A 820.8   4. Fall, initial encounter W19.XXXA E888.9   5. Closed head injury, initial encounter S09.90XA 959.01   6. Closed fracture of neck of left femur, initial encounter (CMS/HCC) S72.002A 820.8   7. Impaired functional mobility, balance, gait, and endurance Z74.09 V49.89   8. Impaired mobility and ADLs Z74.09 V49.89    Z78.9      Patient Active Problem List   Diagnosis   • Coronary artery disease involving native coronary artery of native heart without angina pectoris   • Vasovagal syncope   • RBBB (right bundle branch block with left anterior fascicular block)   • Essential hypertension   • Ischemic cardiomyopathy   • Syncope and collapse   • Closed fracture of neck of left femur (CMS/[facility])   • Coronary artery disease with history of myocardial infarction without history of CABG   • Facial laceration   • HFrEF (heart failure with reduced ejection fraction) (CMS/[facility])   • Postoperative anemia due to acute blood loss       Therapy Treatment    Rehabilitation Treatment Summary     Row Name 20 1357 20 0945 20 0855       Treatment Time/Intention    Discipline  physical therapy assistant  -TW  physical therapy assistant  -TW  occupational therapy assistant  -RC    Document Type  therapy note (daily note)  -TW  therapy note (daily note)  -TW  therapy note (daily note)  -RC2    Subjective Information  complains of;pain  -TW  complains of;pain  -TW  complains of;pain  -RC2     Mode of Treatment  physical therapy;individual therapy  -TW  physical therapy;individual therapy  -TW  occupational therapy;individual therapy  -RC2    Patient/Family Observations  No visitors persent.  -TW  No visitors present.  -TW  in recliner   -RC2    Total Minutes, Occupational Therapy Treatment  --  --  38  -RC2    Therapy Frequency (OT Eval)  --  --  daily  -RC2    Patient Effort  good  -TW  good  -TW  good  -RC2    Comment  --  --  pt is impulsive needs reminders for hip precautions   -RC2    Existing Precautions/Restrictions  hip, posterior  -TW  hip, posterior  -TW  hip, posterior;fall  -RC2    Recorded by [TW] Juan C Leyva, PTA 07/13/20 1532 [TW] Juan C Leyva, PTA 07/13/20 1528 [RC] Evelyn Dodson AGUILAR/L 07/13/20 0903  [RC2] Evelyn Dodson AGUILAR/L 07/13/20 1527    Row Name 07/13/20 1357 07/13/20 0945 07/13/20 0855       Vital Signs    Pre Systolic BP Rehab  --  --  143  -RC    Pre Treatment Diastolic BP  --  --  69  -RC    Intra Systolic BP Rehab  --  159  -TW  --    Intra Treatment Diastolic BP  --  73  -TW  --    Pretreatment Heart Rate (beats/min)  76  -TW  78  -TW  --    Intratreatment Heart Rate (beats/min)  84  -TW  75  -TW  --    Posttreatment Heart Rate (beats/min)  78  -TW  78  -TW  --    Pre SpO2 (%)  93  -TW  98  -TW  96  -RC    O2 Delivery Pre Treatment  room air  -TW  room air  -TW  room air  -RC    Intra SpO2 (%)  96  -TW  93  -TW  --    Post SpO2 (%)  97  -TW  96  -TW  --    Pre Patient Position  Supine  -TW  Supine  -TW  --    Intra Patient Position  Standing  -TW  Standing  -TW  --    Post Patient Position  Supine  -TW  Sitting  -TW  --    Recorded by [TW] Juan C Leyva, PTA 07/13/20 1532 [TW] Juan C Leyva, PTA 07/13/20 1528 [RC] Evelyn Dodson AGUILAR/L 07/13/20 0903    Row Name 07/13/20 1357 07/13/20 0945 07/13/20 0855       Cognitive Assessment/Intervention- PT/OT    Orientation Status (Cognition)  oriented x 4  -TW  oriented x 4  -TW   person;place;situation  -RC    Follows Commands (Cognition)  follows one step commands;over 90% accuracy  -TW  follows one step commands;over 90% accuracy  -TW  --    Safety Deficit (Cognitive)  --  --  insight into deficits/self awareness;impulsivity;safety precautions follow-through/compliance  -RC    Recorded by [TW] Juan C Leyva, PTA 07/13/20 1532 [TW] Juan C Leyva, [facility] 07/13/20 1528 [RC] Evelyn Dodson COTA/L 07/13/20 1527    Row Name 07/13/20 1357 07/13/20 0945          Safety Issues, Functional Mobility    Impairments Affecting Function (Mobility)  balance;endurance/activity tolerance;pain;range of motion (ROM);strength  -TW  balance;endurance/activity tolerance;pain;range of motion (ROM);strength  -TW     Recorded by [TW] Juan C Leyva, PTA 07/13/20 1532 [TW] Juan C Leyva, [facility] 07/13/20 1528     Row Name 07/13/20 1357 07/13/20 0945          Mobility Assessment/Intervention    Left Lower Extremity (Weight-bearing Status)  weight-bearing as tolerated (WBAT)  -TW  weight-bearing as tolerated (WBAT)  -TW     Recorded by [TW] Juan C Leyva, PTA 07/13/20 1532 [TW] Juan C Leyva, [facility] 07/13/20 1528     Row Name 07/13/20 1357 07/13/20 0945 07/13/20 0855       Bed Mobility Assessment/Treatment    Supine-Sit [place] (Bed Mobility)  contact guard  -TW  contact guard  -TW  --    Sit-Supine [place] (Bed Mobility)  contact guard  -TW  not tested  -TW  minimum assist (75% patient effort);verbal cues  -RC    Assistive Device (Bed Mobility)  bed rails;head of bed elevated  -TW  bed rails;head of bed elevated  -TW  --    Recorded by [TW] Juan C Leyva, PTA 07/13/20 1532 [TW] Juan C Leyva, [facility] 07/13/20 1528 [RC] Evelyn Dodson AGUILAR/L 07/13/20 1527    Row Name 07/13/20 0855             Chair-Bed Transfer    Chair-Bed [place] (Transfers)  minimum assist (75% patient effort);verbal cues  -RC      Assistive Device (Chair-Bed Transfers)  walker, front-wheeled   -RC      Recorded by [RC] Evelyn Dodson AGUILAR/L 07/13/20 1527      Row Name 07/13/20 1357 07/13/20 0945 07/13/20 0855       Sit-Stand Transfer    Sit-Stand [place] (Transfers)  contact guard;verbal cues  -TW  contact guard;verbal cues  -TW  verbal cues;contact guard  -RC    Assistive Device (Sit-Stand Transfers)  walker, front-wheeled  -TW  walker, front-wheeled  -TW  walker, front-wheeled  -RC    Recorded by [TW] Juan C Leyva, PTA 07/13/20 1532 [TW] Juan C Leyva, PTA 07/13/20 1528 [RC] Evelyn Dodson AGUILAR/L 07/13/20 1527    Row Name 07/13/20 1357 07/13/20 0945 07/13/20 0855       Stand-Sit Transfer    Stand-Sit [place] (Transfers)  contact guard  -TW  contact guard  -TW  contact guard;verbal cues  -RC    Assistive Device (Stand-Sit Transfers)  walker, front-wheeled  -TW  walker, front-wheeled  -TW  walker, front-wheeled  -RC    Recorded by [TW] Juan C Leyva, PTA 07/13/20 1532 [TW] Juan C Leyva, PTA 07/13/20 1528 [RC] Evelyn Dodson AGUILAR/L 07/13/20 1527    Row Name 07/13/20 1357 07/13/20 0945          Gait/Stairs Assessment/Training    Gait/Stairs Assessment/Training  gait/ambulation assistive device  -TW  gait/ambulation assistive device  -TW     [place] Level (Gait)  verbal cues;contact guard  -TW  verbal cues;contact guard  -TW     Assistive Device (Gait)  walker, front-wheeled  -TW  walker, front-wheeled  -TW     Distance in Feet (Gait)  170ft  -TW  110ft  -TW     Pattern (Gait)  3-point  -TW  3-point  -TW     Deviations/Abnormal Patterns (Gait)  antalgic  -TW  antalgic  -TW     Recorded by [TW] Juan C Leyva, PTA 07/13/20 1532 [TW] Juan C Leyva, PTA 07/13/20 1528     Row Name 07/13/20 1528             Grooming Assessment/Training    [place] Level (Grooming)  set up;wash face, hands  -RC      Grooming Position  supported sitting  -RC      Recorded by [RC] Evelyn Dodson COTA/L 07/13/20 1533      Row Name 07/13/20 1357 07/13/20 0945 07/13/20  0855       Lower Extremity Supine Therapeutic Exercise    Performed, Supine Lower Extremity (Therapeutic Exercise)  quadriceps sets;gluteal sets;ankle pumps;heel slides  -TW  quadriceps sets;gluteal sets;ankle pumps;heel slides  -TW  -- out front, punch out, rowing,   -RC    Device, Supine Lower Extremity (Therapeutic Exercise)  --  --  elastic bands/tubing  -    Exercise Type, Supine Lower Extremity (Therapeutic Exercise)  AROM (active range of motion);AAROM (active assistive range of motion)  -TW  AROM (active range of motion);AAROM (active assistive range of motion)  -TW  resistive exercise  -    Expected Outcomes, Supine Lower Extremity (Therapeutic Exercise)  --  --  improve functional tolerance, self-care activity;improve performance, BADLs  -RC    Sets/Reps Detail, Supine Lower Extremity (Therapeutic Exercise)  1/20  -TW  1/15-20  -TW  15x2  -RC    Comment, Supine Lower Extremity (Therapeutic Exercise)  --  Pt L Immobilizer removed to perform LE heel slides.  -TW  --    Recorded by [TW] Juan C Leyva, PTA 07/13/20 1532 [TW] Juan C Leyva, PTA 07/13/20 1528 [RC] Evelyn Dodson COTA/L 07/13/20 1527    Row Name 07/13/20 0945             Static Sitting Balance    Level of [place] (Unsupported Sitting, Static Balance)  supervision  -TW      Sitting Position (Unsupported Sitting, Static Balance)  sitting on edge of bed  -TW      Time Able to Maintain Position (Unsupported Sitting, Static Balance)  3 to 4 minutes  -TW      Recorded by [TW] Juan C Leyva PTA 07/13/20 1528      Row Name 07/13/20 0945             Static Standing Balance    Level of [place] (Supported Standing, Static Balance)  contact guard assist;standby assist  -TW      Time Able to Maintain Position (Supported Standing, Static Balance)  3 to 4 minutes  -TW      Assistive Device Utilized (Supported Standing, Static Balance)  walker, rolling  -TW      Recorded by [TW] Juan C Leyva, PTA 07/13/20 1528      Row  Name 07/13/20 0945             Dynamic Standing Balance    Level of [place], Reaches Outside Midline (Standing, Dynamic Balance)  contact guard assist  -TW      Time Able to Maintain Position, Reaches Outside Midline (Standing, Dynamic Balance)  more than 5 minutes  -TW      Assistive Device Utilized (Supported Standing, Dynamic Balance)  walker, rolling  -TW      Recorded by [TW] Juan C Leyva, PTA 07/13/20 1528      Row Name 07/13/20 1357 07/13/20 0945 07/13/20 0855       Positioning and Restraints    Pre-Treatment Position  in bed  -TW  in bed  -TW  sitting in chair/recliner  -RC    Post Treatment Position  bed  -TW  chair  -TW  bed  -RC    In Bed  supine;call light within reach;encouraged to call for assist;exit alarm on  -TW  --  call light within reach;encouraged to call for assist;exit alarm on  -RC    In Chair  --  reclined;call light within reach;encouraged to call for assist;exit alarm on  -TW  --    Recorded by [TW] Juan C Leyva, DANIE 07/13/20 1532 [TW] Juan C Leyva, PTA 07/13/20 1528 [RC] Evelyn Dodson AGUILAR/L 07/13/20 1527    Row Name 07/13/20 1357 07/13/20 0945 07/13/20 0855       Pain Scale: Numbers Pre/Post-Treatment    Pain Scale: Numbers, Pretreatment  3/10  -TW  3/10  -TW  0/10 - no pain  -RC    Pain Scale: Numbers, Post-Treatment  4/10  -TW  4/10  -TW  0/10 - no pain  -RC    Pain Location - Side  Left  -TW  Left  -TW  --    Pain Location  hip  -TW  hip  -TW  --    Recorded by [TW] Juan C Leyva, PTA 07/13/20 1532 [TW] Juan C Leyva, PTA 07/13/20 1528 [RC] Evelyn Dodson AGUILAR/L 07/13/20 1527    Row Name                Wound 07/08/20 1810 Left upper eyebrow Laceration    Wound - Properties Group Date first assessed: 07/08/20 [TM] Time first assessed: 1810 [TM] Present on Hospital Admission: Y [TM] Side: Left [TM] Orientation: upper [TM] Location: eyebrow [TM] Primary Wound Type: Laceration [TM] Recorded by:  [TM] Josephine Elias RN 07/08/20 6491     Row Name                Wound 07/09/20 1515 Left posterior hip Incision    Wound - Properties Group Date first assessed: 07/09/20 [AQ] Time first assessed: 1515 [AQ] Present on Hospital Admission: N [AQ] Side: Left [AQ] Orientation: posterior [AQ] Location: hip [AQ] Primary Wound Type: Incision [AQ] Recorded by:  [AQ] Cathy Cano RN 07/09/20 1515    Row Name 07/13/20 0855             Outcome Summary/Treatment Plan (OT)    Daily Summary of Progress (OT)  progress toward functional goals as expected  -RC      Plan for Continued Treatment (OT)  cont poc   -RC      Recorded by [RC] Evelyn Dodson AGUILAR/L 07/13/20 1527      Row Name 07/13/20 1357 07/13/20 0945          Outcome Summary/Treatment Plan (PT)    Daily Summary of Progress (PT)  progress toward functional goals is good  -TW  progress toward functional goals is good  -TW     Plan for Continued Treatment (PT)  Cont  -TW  Cont  -TW     Anticipated Discharge Disposition (PT)  anticipate therapy at next level of care  -TW  anticipate therapy at next level of care  -TW     Recorded by [TW] Juan C Leyva, PTA 07/13/20 1532 [TW] Juan C Leyva, PTA 07/13/20 1528       User Key  (r) = Recorded By, (t) = Taken By, (c) = Cosigned By    Initials Name Effective Dates Discipline    AQ Cathy Cano RN 10/17/16 -  Nurse    TW Juan C Leyva, PTA 03/07/18 -  PT    RC Evelyn Dodson AGUILAR/L 03/07/18 -  OT    TM Josephine Elias, RN 07/24/18 -  Nurse          Wound 07/08/20 1810 Left upper eyebrow Laceration (Active)   Dressing Appearance open to air 7/13/2020  9:36 AM   Closure Sutures 7/13/2020  9:36 AM   Base maroon/purple 7/13/2020  9:36 AM   Drainage Amount none 7/13/2020  9:36 AM       Wound 07/09/20 1515 Left posterior hip Incision (Active)   Dressing Appearance intact;dry 7/13/2020  9:36 AM   Closure JOSE 7/13/2020  9:36 AM   Base dressing in place, unable to visualize 7/13/2020  9:36 AM   Drainage Amount none 7/13/2020  9:36 AM        Rehab Goal Summary     Row Name 07/13/20 1357 07/13/20 0855          Bed Mobility Goal 1 (PT)    Activity/Assistive Device (Bed Mobility Goal 1, PT)  sit to supine;supine to sit  -TW  --     [place] Level/Cues Needed (Bed Mobility Goal 1, PT)  independent  -TW  --     Time Frame (Bed Mobility Goal 1, PT)  by discharge  -TW  --     Progress/Outcomes (Bed Mobility Goal 1, PT)  goal not met  -TW  --        Transfer Goal 1 (PT)    Activity/Assistive Device (Transfer Goal 1, PT)  sit-to-stand/stand-to-sit;bed-to-chair/chair-to-bed;toilet  -TW  --     [place] Level/Cues Needed (Transfer Goal 1, PT)  conditional independence  -TW  --     Time Frame (Transfer Goal 1, PT)  2 - 3 days  -TW  --     Progress/Outcome (Transfer Goal 1, PT)  goal not met  -TW  --        Gait Training Goal 1 (PT)    Activity/Assistive Device (Gait Training Goal 1, PT)  gait (walking locomotion);assistive device use;walker, rolling  -TW  --     [place] Level (Gait Training Goal 1, PT)  conditional independence  -TW  --     Distance (Gait Goal 1, PT)  [id number]  -TW  --     Time Frame (Gait Training Goal 1, PT)  by discharge  -TW  --     Progress/Outcome (Gait Training Goal 1, PT)  goal not met  -TW  --        Stairs Goal 1 (PT)    Activity/Assistive Device (Stairs Goal 1, PT)  ascending stairs;descending stairs;using handrail, left;assistive device use  -TW  --     [place] Level/Cues Needed (Stairs Goal 1, PT)  conditional independence  -TW  --     Time Frame (Stairs Goal 1, PT)  by discharge  -TW  --     Progress/Outcome (Stairs Goal 1, PT)  goal not met  -TW  --        Occupational Therapy Goals    Transfer Goal Selection (OT)  --  transfer, OT goal 1  -RC     Bathing Goal Selection (OT)  --  bathing, OT goal 1  -RC     Dressing Goal Selection (OT)  --  dressing, OT goal 1  -RC     Toileting Goal Selection (OT)  --  toileting, OT goal 1  -RC     Endurance Goal Selection (OT)  --  endurance, OT goal 1  -RC     Safety  Awareness Goal Selection (OT)  --  safety awareness, OT goal 1  -RC        Transfer Goal 1 (OT)    Activity/Assistive Device (Transfer Goal 1, OT)  --  toilet  -RC     [place] Level/Cues Needed (Transfer Goal 1, OT)  --  contact guard assist  -RC     Time Frame (Transfer Goal 1, OT)  --  long term goal (LTG);by discharge  -RC     Barriers (Transfers Goal 1, OT)  --  hearing deficit;safety deficit   -RC     Progress/Outcome (Transfer Goal 1, OT)  --  goal not met  -RC        Bathing Goal 1 (OT)    Activity/Assistive Device (Bathing Goal 1, OT)  --  bathing skills, all  -RC     [place] Level/Cues Needed (Bathing Goal 1, OT)  --  minimum assist (75% or more patient effort)  -RC     Time Frame (Bathing Goal 1, OT)  --  long term goal (LTG);by discharge  -RC     Progress/Outcomes (Bathing Goal 1, OT)  --  goal not met  -RC        Dressing Goal 1 (OT)    Activity/Assistive Device (Dressing Goal 1, OT)  --  dressing skills, all  -RC     [place]/Cues Needed (Dressing Goal 1, OT)  --  minimum assist (75% or more patient effort)  -RC     Time Frame (Dressing Goal 1, OT)  --  long term goal (LTG);by discharge  -RC     Progress/Outcome (Dressing Goal 1, OT)  --  goal not met  -RC        Toileting Goal 1 (OT)    Activity/Device (Toileting Goal 1, OT)  --  toileting skills, all  -RC     [place] Level/Cues Needed (Toileting Goal 1, OT)  --  minimum assist (75% or more patient effort)  -RC     Time Frame (Toileting Goal 1, OT)  --  long term goal (LTG);by discharge  -RC     Progress/Outcome (Toileting Goal 1, OT)  --  goal not met  -RC         Endurance Goal 1 (OT)    Progress/Outcome (Endurance Goal 1, OT)  --  goal not met  -RC        Safety Awareness Goal 1 (OT)    Activity (Safety Awareness Goal 1, OT)  --  awareness of need for assistance;impulse control;insight into deficits/self awareness;judgment;safe use of assistive device/equipment;follow through of safety precautions;demonstrates  compliance;demonstrates understanding;demonstration of safe behaviors  -RC     [place]/Cues/Accuracy (Safety Awareness Goal 1, OT)  --  with minimum;verbal cues/redirection;with repetition of directions;with 90% accuracy  -RC     Time Frame (Safety Awareness Goal 1, OT)  --  long term goal (LTG);by discharge  -RC     Progress/Outcome (Safety Awareness Goal 1, OT)  --  goal not met  -RC       User Key  (r) = Recorded By, (t) = Taken By, (c) = Cosigned By    Initials Name Provider Type Discipline    TW Juan C Leyva, DANIE Physical Therapy Assistant PT    RC Evelyn Dodson COTA/L Occupational Therapy Assistant OT          Physical Therapy Education                 Title: PT OT SLP Therapies (In Progress)     Topic: Physical Therapy (In Progress)     Point: Mobility training (In Progress)     Description:   Instruct learner(s) on safety and technique for assisting patient out of bed, chair or wheelchair.  Instruct in the proper use of assistive devices, such as walker, crutches, cane or brace.              Patient Friendly Description:   It's important to get you on your feet again, but we need to do so in a way that is safe for you. Falling has serious consequences, and your personal safety is the most important thing of all.        When it's time to get out of bed, one of us or a family member will sit next to you on the bed to give you support.     If your doctor or nurse tells you to use a walker, crutches, a cane, or a brace, be sure you use it every time you get out of bed, even if you think you don't need it.    Learning Progress Summary           Patient Acceptance, E, NR by LILA at 7/11/2020 1047    Comment:  edu on hip dislocation precautions.    Acceptance, E, NR by LILA1 at 7/10/2020 1341                   Point: Home exercise program (Not Started)     Description:   Instruct learner(s) on appropriate technique for monitoring, assisting and/or progressing patient with therapeutic exercises and  activities.              Learner Progress:   Not documented in this visit.          Point: Body mechanics (In Progress)     Description:   Instruct learner(s) on proper positioning and spine alignment for patient and/or caregiver during mobility tasks and/or exercises.              Learning Progress Summary           Patient Acceptance, E, NR by [facility] at 7/10/2020 1348                   Point: Precautions (In Progress)     Description:   Instruct learner(s) on prescribed precautions during mobility and gait tasks              Learning Progress Summary           Patient Acceptance, E, NR by LILA at 7/11/2020 1047    Comment:  edu on hip dislocation precautions.    Acceptance, E, NR by [facility] at 7/10/2020 1348                               User Key     Initials Effective Dates Name Provider Type Discipline    [facility] 04/03/18 -  Angelica Torres, PT Physical Therapist PT     03/07/18 -  Vikash Walters, PTA Physical Therapy Assistant PT                PT Recommendation and Plan  Anticipated Discharge Disposition (PT): anticipate therapy at next level of care  Outcome Summary/Treatment Plan (PT)  Daily Summary of Progress (PT): progress toward functional goals is good  Plan for Continued Treatment (PT): Cont  Anticipated Discharge Disposition (PT): anticipate therapy at next level of care  Plan of Care Reviewed With: patient  Progress: improving  Outcome Summary: Pt participated in BID tx this date. Pt showing improvment with t/f's and gait. Pt with L LE immobilizer on but able to assist well with L LE during sup to sit to sup t/f's. Pt performed sit to stand with CGA and amb 110ft in am and 170ft in pm with 1 standing rest break. Pt performed B LE ther ex in supine each tx and verb less pain today than previous date. Pt would cont to benefit from therapy upon DC.  Outcome Measures     Row Name 07/13/20 1357 07/13/20 0855 07/12/20 0920       How much help from another person do you currently need...    Turning from your back to  your side while in flat bed without using bedrails?  3  -TW  --  --    Moving from lying on back to sitting on the side of a flat bed without bedrails?  3  -TW  --  --    Moving to and from a bed to a chair (including a wheelchair)?  3  -TW  --  --    Standing up from a chair using your arms (e.g., wheelchair, bedside chair)?  3  -TW  --  --    Climbing 3-5 steps with a railing?  3  -TW  --  --    To walk in hospital room?  3  -TW  --  --    AM-PAC 6 Clicks Score (PT)  18  -TW  --  --       How much help from another is currently needed...    Putting on and taking off regular lower body clothing?  --  1  -RC  1  -RC    Bathing (including washing, rinsing, and drying)  --  2  -RC  2  -RC    Toileting (which includes using toilet bed pan or urinal)  --  1  -RC  1  -RC    Putting on and taking off regular upper body clothing  --  2  -RC  2  -RC    Taking care of personal grooming (such as brushing teeth)  --  3  -RC  3  -RC    Eating meals  --  3  -RC  3  -RC    AM-PAC 6 Clicks Score (OT)  --  12  -RC  12  -RC    Row Name 07/12/20 0747 07/11/20 1245 07/11/20 1002       How much help from another person do you currently need...    Turning from your back to your side while in flat bed without using bedrails?  3  -LILA  --  3  -LILA    Moving from lying on back to sitting on the side of a flat bed without bedrails?  3  -LILA  --  3  -LILA    Moving to and from a bed to a chair (including a wheelchair)?  3  -LILA  --  3  -LILA    Standing up from a chair using your arms (e.g., wheelchair, bedside chair)?  3  -LILA  --  3  -LILA    Climbing 3-5 steps with a railing?  3  -LILA  --  3  -LILA    To walk in hospital room?  3  -LILA  --  3  -LILA    AM-PAC 6 Clicks Score (PT)  18  -LILA  --  18  -LILA       How much help from another is currently needed...    Putting on and taking off regular lower body clothing?  --  1  -RC  --    Bathing (including washing, rinsing, and drying)  --  2  -RC  --    Toileting (which includes using toilet bed pan or urinal)   --  1  -RC  --    Putting on and taking off regular upper body clothing  --  2  -RC  --    Taking care of personal grooming (such as brushing teeth)  --  3  -RC  --    Eating meals  --  3  -RC  --    AM-PAC 6 Clicks Score (OT)  --  12  -RC  --       Functional Assessment    Outcome Measure Options  AM-PAC 6 Clicks Basic Mobility (PT)  -LILA  --  AM-PAC 6 Clicks Basic Mobility (PT)  -LILA      User Key  (r) = Recorded By, (t) = Taken By, (c) = Cosigned By    Initials Name Provider Type    Vikash Whatley PTA Physical Therapy Assistant    TW Juan C Leyva, DANIE Physical Therapy Assistant    RC Evelyn Dodson COTA/L Occupational Therapy Assistant         Time Calculation:   PT Charges     Row Name 07/13/20 1535 07/13/20 1528          Time Calculation    Start Time  1357  -TW  0945  -TW     Stop Time  1437  -TW  1025  -TW     Time Calculation (min)  40 min  -TW  40 min  -TW     PT Received On  07/13/20  -TW  07/13/20  -TW     PT Goal Re-Cert Due Date  07/23/20  -TW  07/23/20  -TW        Time Calculation- PT    Total Timed Code Minutes- PT  40 minute(s)  -TW  40 minute(s)  -TW       User Key  (r) = Recorded By, (t) = Taken By, (c) = Cosigned By    Initials Name Provider Type    Juan C Hawk PTA Physical Therapy Assistant        Therapy Charges for Today     Code Description Service Date Service Provider Modifiers Qty    30412519423 HC GAIT TRAINING EA 15 MIN 7/13/2020 Juan C Leyva, PTA GP 1    48965472117 HC PT THERAPEUTIC ACT EA 15 MIN 7/13/2020 Juan C Leyva, PTA GP 1    88835078303 HC PT THER PROC EA 15 MIN 7/13/2020 Juan C Leyva, PTA GP 1    75659630886 HC GAIT TRAINING EA 15 MIN 7/13/2020 Juan C Leyva, PTA GP 1    41207334022 HC PT THERAPEUTIC ACT EA 15 MIN 7/13/2020 Juan C Leyva, PTA GP 1    31006540286 HC PT THER PROC EA 15 MIN 7/13/2020 Juan C Leyva, PTA GP 1          PT G-Codes  Outcome Measure Options: AM-PAC 6 Clicks Basic Mobility (PT)  AM-PAC 6  Clicks Score (PT): 18  AM-PAC 6 Clicks Score (OT): 12    Juan C Leyva, PTA  7/13/2020

## 2020-07-13 NOTE — PLAN OF CARE
Problem: Patient Care Overview  Goal: Plan of Care Review  Outcome: Ongoing (interventions implemented as appropriate)  Flowsheets (Taken 7/13/2020 0311)  Progress: improving  Plan of Care Reviewed With: patient  Outcome Summary: VSS, pain controlled with meds, sleeping between care, continue to monitor.

## 2020-07-13 NOTE — PLAN OF CARE
Problem: Patient Care Overview  Goal: Plan of Care Review  Outcome: Ongoing (interventions implemented as appropriate)  Flowsheets (Taken 7/13/2020 3082)  Progress: improving  Plan of Care Reviewed With: patient  Outcome Summary: pt needs reminders for hip precautions and safety, he needed min @ for sit>supine, cga for sit<>stand . he is implulive, and high fall risk.  cont poc

## 2020-07-13 NOTE — PROGRESS NOTES
[facility] Medicine Services  INPATIENT PROGRESS NOTE    Length of Stay: 5  Date of Admission: 7/8/2020  Primary Care Physician: Terry Peterson MD    Subjective   Chief Complaint: Fall  HPI:  85 year old male with a history of ischemic cardiomyopathy, HFrEF, and postural syncope, and HTN who presented to the ED after a syncopal episode which took place after standing from his chair.  He suffered facial trauma and a left hip fracture. CT of the head was negative.  Sutures were placed in the ED to facial laceration.  Orthopedics consulted and recommends hip arthroplasty after cardiac clearance. Cardiology cleared for surgery. He underwent left hip hemiarthroplasty.  Pain is controlled.  No new complaints. Placement for rehab is pending.     Review of Systems   Constitutional: Negative for chills and fever.   Respiratory: Negative for shortness of breath.    Cardiovascular: Negative for chest pain.   Gastrointestinal: Negative for abdominal pain, nausea and vomiting.   Musculoskeletal: Positive for arthralgias (improved).        All pertinent negatives and positives are as above. All other systems have been reviewed and are negative unless otherwise stated.     Objective    Temp:  [97.6 °F (36.4 °C)-97.8 °F (36.6 °C)] 97.7 °F (36.5 °C)  Heart Rate:  [71-80] 75  Resp:  [16-18] 18  BP: (121-157)/(64-85) 143/69    Physical Exam   Constitutional: He appears well-developed and well-nourished. No distress.   HENT:   Head: Normocephalic and atraumatic.   Facial lac   Eyes: Conjunctivae are normal.   Cardiovascular: Normal rate, regular rhythm and intact distal pulses.   Pulmonary/Chest: Effort normal and breath sounds normal. No respiratory distress.   Abdominal: Soft. Bowel sounds are normal. He exhibits no distension.   Musculoskeletal: He exhibits no edema or deformity.   Neurological: He is alert.   Skin: Skin is warm and dry. He is not diaphoretic.   Vitals  reviewed.    Results Review:  I have reviewed the labs, radiology results, and diagnostic studies.    Laboratory Data:   Results from last 7 days   Lab Units 07/11/20  0538 07/10/20  0550 07/09/20  0546 07/08/20 1924   SODIUM mmol/L 137 135* 138 139   POTASSIUM mmol/L 4.2 4.2 3.6 3.5   CHLORIDE mmol/L 104 102 103 104   CO2 mmol/L 24.0 25.0 24.0 23.0   BUN mg/dL 20 22 16 17   CREATININE mg/dL 0.91 1.04 0.89 1.07   GLUCOSE mg/dL 92 113* 109* 89   CALCIUM mg/dL 9.3 9.2 9.1 9.3   BILIRUBIN mg/dL  --   --   --  0.2   ALK PHOS U/L  --   --   --  108   ALT (SGPT) U/L  --   --   --  22   AST (SGOT) U/L  --   --   --  32   ANION GAP mmol/L 9.0 8.0 11.0 12.0     Estimated Creatinine Clearance: 61.3 mL/min (by C-G formula based on SCr of 0.91 mg/dL).          Results from last 7 days   Lab Units 07/11/20  0538 07/10/20  0550 07/09/20  0546 07/08/20 1924   WBC 10*3/mm3 11.49* 10.08 11.28* 10.40   HEMOGLOBIN g/dL 9.8* 10.4* 12.2* 12.4*   HEMATOCRIT % 29.2* 30.4* 35.9* 36.8*   PLATELETS 10*3/mm3 144 149 175 203           Culture Data:   No results found for: BLOODCX  No results found for: URINECX  No results found for: RESPCX  No results found for: WOUNDCX  No results found for: STOOLCX  No components found for: BODYFLD    Radiology Data:   Imaging Results (Last 24 Hours)     ** No results found for the last 24 hours. **          I have reviewed the patient's current medications.     Assessment/Plan     Active Hospital Problems    Diagnosis   • **Closed fracture of neck of left femur (CMS/HCC)   • Postoperative anemia due to acute blood loss     Not unexpected     • Facial laceration   • HFrEF (heart failure with reduced ejection fraction) (CMS/HCC)   • Syncope and collapse   • Ischemic cardiomyopathy   • Essential hypertension   • Coronary artery disease involving native coronary artery of native heart without angina pectoris       Plan:   S/P Left hip hemiarthroplasty POD #4  PT/OT   Orthopedics consultation  appreciated  Cardiology consultation appreciated, recommended proceeding with surgery  Echocardiogram reviewed, EF 27%, stable over the last year  Pain control: Scheduled tylenol, PRN ultram, PRN morphine 1 mg q 4 hours   PRN narcan  Aspirin, statin, cozaar, toprol-xl  Facial suture removal, 1-2 days  VTE PPx: lovenox  Discharge planning: SNF placement pending for rehab to home      The patient was evaluated during the global COVID-19 pandemic, and the diagnosis was suspected/considered upon their initial presentation.  Evaluation, treatment, and testing were consistent with current guidelines for patients who present with complaints or symptoms that may be related to COVID-19.        This document has been electronically signed by JAIME Grewal on July 13, 2020 11:00

## 2020-07-13 NOTE — THERAPY TREATMENT NOTE
Acute Care - Occupational Therapy Treatment Note  [facility]     Patient Name: Jose Dacosta  : 1935  MRN: [id number]  Today's Date: 2020  Onset of Illness/Injury or Date of Surgery: 20(surgery yesterday (2020))  Date of Referral to OT: 07/10/20  Referring Physician: DAYANARA Kumar APRN(surgeon: DAJA Solis MD )    Admit Date: 2020       ICD-10-CM ICD-9-CM   1. Syncope and collapse R55 780.2   2. Facial laceration, initial encounter S01.81XA 873.40   3. Closed fracture of left hip, initial encounter (CMS/HCC) S72.002A 820.8   4. Fall, initial encounter W19.XXXA E888.9   5. Closed head injury, initial encounter S09.90XA 959.01   6. Closed fracture of neck of left femur, initial encounter (CMS/HCC) S72.002A 820.8   7. Impaired functional mobility, balance, gait, and endurance Z74.09 V49.89   8. Impaired mobility and ADLs Z74.09 V49.89    Z78.9      Patient Active Problem List   Diagnosis   • Coronary artery disease involving native coronary artery of native heart without angina pectoris   • Vasovagal syncope   • RBBB (right bundle branch block with left anterior fascicular block)   • Essential hypertension   • Ischemic cardiomyopathy   • Syncope and collapse   • Closed fracture of neck of left femur (CMS/[facility])   • Coronary artery disease with history of myocardial infarction without history of CABG   • Facial laceration   • HFrEF (heart failure with reduced ejection fraction) (CMS/HCC)   • Postoperative anemia due to acute blood loss     Past Medical History:   Diagnosis Date   • Abnormal ECG      Past Surgical History:   Procedure Laterality Date   • CORONARY STENT PLACEMENT         Therapy Treatment    Rehabilitation Treatment Summary     Row Name 20 1357 20 0945 20 0855       Treatment Time/Intention    Discipline  physical therapy assistant  -TW  physical therapy assistant  -TW  occupational therapy assistant  -    Document Type  therapy note (daily  note)  -TW  therapy note (daily note)  -TW  therapy note (daily note)  -RC2    Subjective Information  complains of;pain  -TW  complains of;pain  -TW  complains of;pain  -RC2    Mode of Treatment  physical therapy;individual therapy  -TW  physical therapy;individual therapy  -TW  occupational therapy;individual therapy  -RC2    Patient/Family Observations  No visitors persent.  -TW  No visitors present.  -TW  in recliner   -RC2    Total Minutes, Occupational Therapy Treatment  --  --  38  -RC2    Therapy Frequency (OT Eval)  --  --  daily  -RC2    Patient Effort  good  -TW  good  -TW  good  -RC2    Comment  --  --  pt is impulsive needs reminders for hip precautions   -RC2    Existing Precautions/Restrictions  hip, posterior  -TW  hip, posterior  -TW  hip, posterior;fall  -RC2    Recorded by [TW] Juan C Leyva, PTA 07/13/20 1532 [TW] Juan C Leyva PTA 07/13/20 1528 [RC] Evelyn Dodson COTA/L 07/13/20 0903  [RC2] Evelyn Dodson AGUILAR/L 07/13/20 1527    Row Name 07/13/20 1357 07/13/20 0945 07/13/20 0855       Vital Signs    Pre Systolic BP Rehab  --  --  143  -RC    Pre Treatment Diastolic BP  --  --  69  -RC    Intra Systolic BP Rehab  --  159  -TW  --    Intra Treatment Diastolic BP  --  73  -TW  --    Pretreatment Heart Rate (beats/min)  76  -TW  78  -TW  --    Intratreatment Heart Rate (beats/min)  84  -TW  75  -TW  --    Posttreatment Heart Rate (beats/min)  78  -TW  78  -TW  --    Pre SpO2 (%)  93  -TW  98  -TW  96  -RC    O2 Delivery Pre Treatment  room air  -TW  room air  -TW  room air  -RC    Intra SpO2 (%)  96  -TW  93  -TW  --    Post SpO2 (%)  97  -TW  96  -TW  --    Pre Patient Position  Supine  -TW  Supine  -TW  --    Intra Patient Position  Standing  -TW  Standing  -TW  --    Post Patient Position  Supine  -TW  Sitting  -TW  --    Recorded by [TW] Juan C Leyva PTA 07/13/20 1532 [TW] Juan C Leyva PTA 07/13/20 1528 [RC] Evelyn Dodson COTA/JAZZY 07/13/20 0903    Row Name  07/13/20 1357 07/13/20 0945 07/13/20 0855       Cognitive Assessment/Intervention- PT/OT    Orientation Status (Cognition)  oriented x 4  -TW  oriented x 4  -TW  person;place;situation  -RC    Follows Commands (Cognition)  follows one step commands;over 90% accuracy  -TW  follows one step commands;over 90% accuracy  -TW  --    Safety Deficit (Cognitive)  --  --  insight into deficits/self awareness;impulsivity;safety precautions follow-through/compliance  -RC    Recorded by [TW] Juan C Leyva, PTA 07/13/20 1532 [TW] Juan C Leyva, PTA 07/13/20 1528 [RC] Evelyn Dodson COTA/L 07/13/20 1527    Row Name 07/13/20 1357 07/13/20 0945          Safety Issues, Functional Mobility    Impairments Affecting Function (Mobility)  balance;endurance/activity tolerance;pain;range of motion (ROM);strength  -TW  balance;endurance/activity tolerance;pain;range of motion (ROM);strength  -TW     Recorded by [TW] Juan C Leyva, PTA 07/13/20 1532 [TW] Juan C Leyva, PTA 07/13/20 1528     Row Name 07/13/20 1357 07/13/20 0945          Mobility Assessment/Intervention    Left Lower Extremity (Weight-bearing Status)  weight-bearing as tolerated (WBAT)  -TW  weight-bearing as tolerated (WBAT)  -TW     Recorded by [TW] Juan C Leyva, PTA 07/13/20 1532 [TW] Juan C Leyva, PTA 07/13/20 1528     Row Name 07/13/20 1357 07/13/20 0945 07/13/20 0855       Bed Mobility Assessment/Treatment    Supine-Sit [place] (Bed Mobility)  contact guard  -TW  contact guard  -TW  --    Sit-Supine [place] (Bed Mobility)  contact guard  -TW  not tested  -TW  minimum assist (75% patient effort);verbal cues  -RC    Assistive Device (Bed Mobility)  bed rails;head of bed elevated  -TW  bed rails;head of bed elevated  -TW  --    Recorded by [TW] Juan C Leyva, PTA 07/13/20 1532 [TW] Juan C Leyva, PTA 07/13/20 1528 [RC] Evelyn Dodson JEFF/L 07/13/20 1527    Row Name 07/13/20 0855             Chair-Bed Transfer     Chair-Bed [place] (Transfers)  minimum assist (75% patient effort);verbal cues  -RC      Assistive Device (Chair-Bed Transfers)  walker, front-wheeled  -RC      Recorded by [RC] Evelyn Dodson AGUILAR/L 07/13/20 1527      Row Name 07/13/20 1357 07/13/20 0945 07/13/20 0855       Sit-Stand Transfer    Sit-Stand [place] (Transfers)  contact guard;verbal cues  -TW  contact guard;verbal cues  -TW  verbal cues;contact guard  -RC    Assistive Device (Sit-Stand Transfers)  walker, front-wheeled  -TW  walker, front-wheeled  -TW  walker, front-wheeled  -RC    Recorded by [TW] Juan C Leyva, PTA 07/13/20 1532 [TW] Juan C Leyva, PTA 07/13/20 1528 [RC] Evelyn Dodson AGUILAR/L 07/13/20 1527    Row Name 07/13/20 1357 07/13/20 0945 07/13/20 0855       Stand-Sit Transfer    Stand-Sit [place] (Transfers)  contact guard  -TW  contact guard  -TW  contact guard;verbal cues  -RC    Assistive Device (Stand-Sit Transfers)  walker, front-wheeled  -TW  walker, front-wheeled  -TW  walker, front-wheeled  -RC    Recorded by [TW] Juan C Leyva, PTA 07/13/20 1532 [TW] Juan C Leyva, PTA 07/13/20 1528 [RC] Evelyn Dodson AGUILAR/L 07/13/20 1527    Row Name 07/13/20 1357 07/13/20 0945          Gait/Stairs Assessment/Training    Gait/Stairs Assessment/Training  gait/ambulation assistive device  -TW  gait/ambulation assistive device  -TW     [place] Level (Gait)  verbal cues;contact guard  -TW  verbal cues;contact guard  -TW     Assistive Device (Gait)  walker, front-wheeled  -TW  walker, front-wheeled  -TW     Distance in Feet (Gait)  170ft  -TW  110ft  -TW     Pattern (Gait)  3-point  -TW  3-point  -TW     Deviations/Abnormal Patterns (Gait)  antalgic  -TW  antalgic  -TW     Recorded by [TW] Juan C Leyva, PTA 07/13/20 1532 [TW] Juan C Leyva, PTA 07/13/20 1528     Row Name 07/13/20 1528             Grooming Assessment/Training    [place] Level (Grooming)  set up;wash face, hands   -RC      Grooming Position  supported sitting  -RC      Recorded by [RC] Evelyn Dodson COTA/L 07/13/20 1533      Row Name 07/13/20 1357 07/13/20 0945 07/13/20 0855       Lower Extremity Supine Therapeutic Exercise    Performed, Supine Lower Extremity (Therapeutic Exercise)  quadriceps sets;gluteal sets;ankle pumps;heel slides  -TW  quadriceps sets;gluteal sets;ankle pumps;heel slides  -TW  -- out front, punch out, rowing,   -RC    Device, Supine Lower Extremity (Therapeutic Exercise)  --  --  elastic bands/tubing  -RC    Exercise Type, Supine Lower Extremity (Therapeutic Exercise)  AROM (active range of motion);AAROM (active assistive range of motion)  -TW  AROM (active range of motion);AAROM (active assistive range of motion)  -TW  resistive exercise  -RC    Expected Outcomes, Supine Lower Extremity (Therapeutic Exercise)  --  --  improve functional tolerance, self-care activity;improve performance, BADLs  -RC    Sets/Reps Detail, Supine Lower Extremity (Therapeutic Exercise)  1/20  -TW  1/15-20  -TW  15x2  -RC    Comment, Supine Lower Extremity (Therapeutic Exercise)  --  Pt L Immobilizer removed to perform LE heel slides.  -TW  --    Recorded by [TW] Juan C Leyva, PTA 07/13/20 1532 [TW] Juan C Leyva, PTA 07/13/20 1528 [RC] Evelyn Dodson COTA/L 07/13/20 1527    Row Name 07/13/20 0945             Static Sitting Balance    Level of [place] (Unsupported Sitting, Static Balance)  supervision  -TW      Sitting Position (Unsupported Sitting, Static Balance)  sitting on edge of bed  -TW      Time Able to Maintain Position (Unsupported Sitting, Static Balance)  3 to 4 minutes  -TW      Recorded by [TW] Juan C Leyva, PTA 07/13/20 1528      Row Name 07/13/20 0945             Static Standing Balance    Level of [place] (Supported Standing, Static Balance)  contact guard assist;standby assist  -TW      Time Able to Maintain Position (Supported Standing, Static Balance)  3 to 4 minutes   -TW      Assistive Device Utilized (Supported Standing, Static Balance)  walker, rolling  -TW      Recorded by [TW] Juan C Leyva, PTA 07/13/20 1528      Row Name 07/13/20 0945             Dynamic Standing Balance    Level of [place], Reaches Outside Midline (Standing, Dynamic Balance)  contact guard assist  -TW      Time Able to Maintain Position, Reaches Outside Midline (Standing, Dynamic Balance)  more than 5 minutes  -TW      Assistive Device Utilized (Supported Standing, Dynamic Balance)  walker, rolling  -TW      Recorded by [TW] Juan C Leyva, PTA 07/13/20 1528      Row Name 07/13/20 1357 07/13/20 0945 07/13/20 0855       Positioning and Restraints    Pre-Treatment Position  in bed  -TW  in bed  -TW  sitting in chair/recliner  -RC    Post Treatment Position  bed  -TW  chair  -TW  bed  -RC    In Bed  supine;call light within reach;encouraged to call for assist;exit alarm on  -TW  --  call light within reach;encouraged to call for assist;exit alarm on  -RC    In Chair  --  reclined;call light within reach;encouraged to call for assist;exit alarm on  -TW  --    Recorded by [TW] Juan C Leyva, PTA 07/13/20 1532 [TW] Juan C Leyva, PTA 07/13/20 1528 [RC] Evelyn Dodson AGUILAR/L 07/13/20 1527    Row Name 07/13/20 1357 07/13/20 0945 07/13/20 0855       Pain Scale: Numbers Pre/Post-Treatment    Pain Scale: Numbers, Pretreatment  3/10  -TW  3/10  -TW  0/10 - no pain  -    Pain Scale: Numbers, Post-Treatment  4/10  -TW  4/10  -TW  0/10 - no pain  -RC    Pain Location - Side  Left  -TW  Left  -TW  --    Pain Location  hip  -TW  hip  -TW  --    Recorded by [TW] Juan C Leyva, PTA 07/13/20 1532 [TW] Juan C Leyva, PTA 07/13/20 1528 [RC] Evelyn Dodson AGUILAR/L 07/13/20 1527    Row Name                Wound 07/08/20 1810 Left upper eyebrow Laceration    Wound - Properties Group Date first assessed: 07/08/20 [TM] Time first assessed: 1810 [TM] Present on Hospital Admission: Y  [TM] Side: Left [TM] Orientation: upper [TM] Location: eyebrow [TM] Primary Wound Type: Laceration [TM] Recorded by:  [TM] Josephine Elias RN 07/08/20 1811    Row Name                Wound 07/09/20 1515 Left posterior hip Incision    Wound - Properties Group Date first assessed: 07/09/20 [AQ] Time first assessed: 1515 [AQ] Present on Hospital Admission: N [AQ] Side: Left [AQ] Orientation: posterior [AQ] Location: hip [AQ] Primary Wound Type: Incision [AQ] Recorded by:  [AQ] Cathy Cano RN 07/09/20 1515    Row Name 07/13/20 0855             Outcome Summary/Treatment Plan (OT)    Daily Summary of Progress (OT)  progress toward functional goals as expected  -RC      Plan for Continued Treatment (OT)  cont poc   -RC      Recorded by [RC] Evelyn Dodson AGUILAR/L 07/13/20 1527      Row Name 07/13/20 1357 07/13/20 0945          Outcome Summary/Treatment Plan (PT)    Daily Summary of Progress (PT)  progress toward functional goals is good  -TW  progress toward functional goals is good  -TW     Plan for Continued Treatment (PT)  Cont  -TW  Cont  -TW     Anticipated Discharge Disposition (PT)  anticipate therapy at next level of care  -TW  anticipate therapy at next level of care  -TW     Recorded by [TW] Juan C Leyva, PTA 07/13/20 1532 [TW] Juan C Leyva, PTA 07/13/20 1528       User Key  (r) = Recorded By, (t) = Taken By, (c) = Cosigned By    Initials Name Effective Dates Discipline    AQ Cathy Cano RN 10/17/16 -  Nurse    TW Juan C Leyva, PTA 03/07/18 -  PT    RC Evelyn Dodson AGUILAR/L 03/07/18 -  OT    TM Josephine Elias RN 07/24/18 -  Nurse        Wound 07/08/20 1810 Left upper eyebrow Laceration (Active)   Dressing Appearance open to air 7/13/2020  9:36 AM   Closure Sutures 7/13/2020  9:36 AM   Base maroon/purple 7/13/2020  9:36 AM   Drainage Amount none 7/13/2020  9:36 AM       Wound 07/09/20 1515 Left posterior hip Incision (Active)   Dressing Appearance  intact;dry 7/13/2020  9:36 AM   Closure JOSE 7/13/2020  9:36 AM   Base dressing in place, unable to visualize 7/13/2020  9:36 AM   Drainage Amount none 7/13/2020  9:36 AM     Rehab Goal Summary     Row Name 07/13/20 1357 07/13/20 0855          Bed Mobility Goal 1 (PT)    Activity/Assistive Device (Bed Mobility Goal 1, PT)  sit to supine;supine to sit  -TW  --     [place] Level/Cues Needed (Bed Mobility Goal 1, PT)  independent  -TW  --     Time Frame (Bed Mobility Goal 1, PT)  by discharge  -TW  --     Progress/Outcomes (Bed Mobility Goal 1, PT)  goal not met  -TW  --        Transfer Goal 1 (PT)    Activity/Assistive Device (Transfer Goal 1, PT)  sit-to-stand/stand-to-sit;bed-to-chair/chair-to-bed;toilet  -TW  --     [place] Level/Cues Needed (Transfer Goal 1, PT)  conditional independence  -TW  --     Time Frame (Transfer Goal 1, PT)  2 - 3 days  -TW  --     Progress/Outcome (Transfer Goal 1, PT)  goal not met  -TW  --        Gait Training Goal 1 (PT)    Activity/Assistive Device (Gait Training Goal 1, PT)  gait (walking locomotion);assistive device use;walker, rolling  -TW  --     [place] Level (Gait Training Goal 1, PT)  conditional independence  -TW  --     Distance (Gait Goal 1, PT)  [id number]  -TW  --     Time Frame (Gait Training Goal 1, PT)  by discharge  -TW  --     Progress/Outcome (Gait Training Goal 1, PT)  goal not met  -TW  --        Stairs Goal 1 (PT)    Activity/Assistive Device (Stairs Goal 1, PT)  ascending stairs;descending stairs;using handrail, left;assistive device use  -TW  --     [place] Level/Cues Needed (Stairs Goal 1, PT)  conditional independence  -TW  --     Time Frame (Stairs Goal 1, PT)  by discharge  -TW  --     Progress/Outcome (Stairs Goal 1, PT)  goal not met  -TW  --        Occupational Therapy Goals    Transfer Goal Selection (OT)  --  transfer, OT goal 1  -RC     Bathing Goal Selection (OT)  --  bathing, OT goal 1  -RC     Dressing Goal Selection (OT)  --   dressing, OT goal 1  -RC     Toileting Goal Selection (OT)  --  toileting, OT goal 1  -RC     Endurance Goal Selection (OT)  --  endurance, OT goal 1  -RC     Safety Awareness Goal Selection (OT)  --  safety awareness, OT goal 1  -RC        Transfer Goal 1 (OT)    Activity/Assistive Device (Transfer Goal 1, OT)  --  toilet  -RC     [place] Level/Cues Needed (Transfer Goal 1, OT)  --  contact guard assist  -RC     Time Frame (Transfer Goal 1, OT)  --  long term goal (LTG);by discharge  -RC     Barriers (Transfers Goal 1, OT)  --  hearing deficit;safety deficit   -RC     Progress/Outcome (Transfer Goal 1, OT)  --  goal not met  -RC        Bathing Goal 1 (OT)    Activity/Assistive Device (Bathing Goal 1, OT)  --  bathing skills, all  -RC     [place] Level/Cues Needed (Bathing Goal 1, OT)  --  minimum assist (75% or more patient effort)  -RC     Time Frame (Bathing Goal 1, OT)  --  long term goal (LTG);by discharge  -RC     Progress/Outcomes (Bathing Goal 1, OT)  --  goal not met  -RC        Dressing Goal 1 (OT)    Activity/Assistive Device (Dressing Goal 1, OT)  --  dressing skills, all  -RC     [place]/Cues Needed (Dressing Goal 1, OT)  --  minimum assist (75% or more patient effort)  -RC     Time Frame (Dressing Goal 1, OT)  --  long term goal (LTG);by discharge  -RC     Progress/Outcome (Dressing Goal 1, OT)  --  goal not met  -RC        Toileting Goal 1 (OT)    Activity/Device (Toileting Goal 1, OT)  --  toileting skills, all  -RC     [place] Level/Cues Needed (Toileting Goal 1, OT)  --  minimum assist (75% or more patient effort)  -RC     Time Frame (Toileting Goal 1, OT)  --  long term goal (LTG);by discharge  -RC     Progress/Outcome (Toileting Goal 1, OT)  --  goal not met  -RC         Endurance Goal 1 (OT)    Progress/Outcome (Endurance Goal 1, OT)  --  goal not met  -RC        Safety Awareness Goal 1 (OT)    Activity (Safety Awareness Goal 1, OT)  --  awareness of need for  assistance;impulse control;insight into deficits/self awareness;judgment;safe use of assistive device/equipment;follow through of safety precautions;demonstrates compliance;demonstrates understanding;demonstration of safe behaviors  -     [place]/Cues/Accuracy (Safety Awareness Goal 1, OT)  --  with minimum;verbal cues/redirection;with repetition of directions;with 90% accuracy  -     Time Frame (Safety Awareness Goal 1, OT)  --  long term goal (LTG);by discharge  -     Progress/Outcome (Safety Awareness Goal 1, OT)  --  goal not met  -       User Key  (r) = Recorded By, (t) = Taken By, (c) = Cosigned By    Initials Name Provider Type Discipline    TW Juan C Leyva, PTA Physical Therapy Assistant PT    Evelyn Farley COTA/L Occupational Therapy Assistant OT        Occupational Therapy Education                 Title: PT OT SLP Therapies (In Progress)     Topic: Occupational Therapy (In Progress)     Point: ADL training (In Progress)     Description:   Instruct learner(s) on proper safety adaptation and remediation techniques during self care or transfers.   Instruct in proper use of assistive devices.              Learning Progress Summary           Patient Acceptance, E,D,H, NR by  at 7/11/2020 1429    Comment:  much review and issued handout for HIP precautions, edu on use of lh ae will need reinforcement.    Acceptance, E, NR by ME at 7/10/2020 1503    Comment:  Educated on OT and POC. Educated to call for assistance. Educated on safety precautions. Educated on proper body mechanics for transfers, bed mobility, ADLs, and funcitonal mobility.                   Point: Home exercise program (Not Started)     Description:   Instruct learner(s) on appropriate technique for monitoring, assisting and/or progressing therapeutic exercises/activities.              Learner Progress:   Not documented in this visit.          Point: Precautions (In Progress)     Description:   Instruct learner(s) on  prescribed precautions during self-care and functional transfers.              Learning Progress Summary           Patient Acceptance, E, NR by  at 7/13/2020 1528    Acceptance, E, NR by  at 7/12/2020 1503    Comment:  multi review of hip precautions    Acceptance, E,D,H, NR by  at 7/11/2020 1429    Comment:  much review and issued handout for HIP precautions, edu on use of lh ae will need reinforcement.    Acceptance, E, NR by ME at 7/10/2020 1503    Comment:  Educated on OT and POC. Educated to call for assistance. Educated on safety precautions. Educated on proper body mechanics for transfers, bed mobility, ADLs, and funcitonal mobility.                   Point: Body mechanics (In Progress)     Description:   Instruct learner(s) on proper positioning and spine alignment during self-care, functional mobility activities and/or exercises.              Learning Progress Summary           Patient Acceptance, E, NR by  at 7/13/2020 1528    Acceptance, E,D,H, NR by  at 7/11/2020 1429    Comment:  much review and issued handout for HIP precautions, edu on use of lh ae will need reinforcement.    Acceptance, E, NR by ME at 7/10/2020 1503    Comment:  Educated on OT and POC. Educated to call for assistance. Educated on safety precautions. Educated on proper body mechanics for transfers, bed mobility, ADLs, and funcitonal mobility.                               User Key     Initials Effective Dates Name Provider Type Discipline     03/07/18 -  Evelyn Dodson COTA/L Occupational Therapy Assistant OT    ME 09/10/19 -  Eitan Haynes OT Occupational Therapist OT                OT Recommendation and Plan  Outcome Summary/Treatment Plan (OT)  Daily Summary of Progress (OT): progress toward functional goals as expected  Plan for Continued Treatment (OT): cont poc   Therapy Frequency (OT Eval): daily  Daily Summary of Progress (OT): progress toward functional goals as expected  Plan of Care Review  Plan of Care  Reviewed With: patient  Plan of Care Reviewed With: patient  Outcome Summary: pt needs reminders for hip precautions and safety, he needed min @ for sit>supine, cga for sit<>stand . he is implulive, and high fall risk.  cont poc  Outcome Measures     Row Name 07/13/20 1357 07/13/20 0855 07/12/20 0920       How much help from another person do you currently need...    Turning from your back to your side while in flat bed without using bedrails?  3  -TW  --  --    Moving from lying on back to sitting on the side of a flat bed without bedrails?  3  -TW  --  --    Moving to and from a bed to a chair (including a wheelchair)?  3  -TW  --  --    Standing up from a chair using your arms (e.g., wheelchair, bedside chair)?  3  -TW  --  --    Climbing 3-5 steps with a railing?  3  -TW  --  --    To walk in hospital room?  3  -TW  --  --    AM-PAC 6 Clicks Score (PT)  18  -TW  --  --       How much help from another is currently needed...    Putting on and taking off regular lower body clothing?  --  1  -RC  1  -RC    Bathing (including washing, rinsing, and drying)  --  2  -RC  2  -RC    Toileting (which includes using toilet bed pan or urinal)  --  1  -RC  1  -RC    Putting on and taking off regular upper body clothing  --  2  -RC  2  -RC    Taking care of personal grooming (such as brushing teeth)  --  3  -RC  3  -RC    Eating meals  --  3  -RC  3  -RC    AM-PAC 6 Clicks Score (OT)  --  12  -RC  12  -RC    Row Name 07/12/20 0747 07/11/20 1245 07/11/20 1002       How much help from another person do you currently need...    Turning from your back to your side while in flat bed without using bedrails?  3  -LILA  --  3  -LILA    Moving from lying on back to sitting on the side of a flat bed without bedrails?  3  -LILA  --  3  -LILA    Moving to and from a bed to a chair (including a wheelchair)?  3  -LILA  --  3  -LILA    Standing up from a chair using your arms (e.g., wheelchair, bedside chair)?  3  -LILA  --  3  -LILA    Climbing 3-5 steps  with a railing?  3  -LILA  --  3  -LILA    To walk in hospital room?  3  -LILA  --  3  -LILA    AM-PAC 6 Clicks Score (PT)  18  -LILA  --  18  -LILA       How much help from another is currently needed...    Putting on and taking off regular lower body clothing?  --  1  -RC  --    Bathing (including washing, rinsing, and drying)  --  2  -RC  --    Toileting (which includes using toilet bed pan or urinal)  --  1  -RC  --    Putting on and taking off regular upper body clothing  --  2  -RC  --    Taking care of personal grooming (such as brushing teeth)  --  3  -RC  --    Eating meals  --  3  -RC  --    AM-PAC 6 Clicks Score (OT)  --  12  -RC  --       Functional Assessment    Outcome Measure Options  AM-PAC 6 Clicks Basic Mobility (PT)  -  --  AM-PAC 6 Clicks Basic Mobility (PT)  -      User Key  (r) = Recorded By, (t) = Taken By, (c) = Cosigned By    Initials Name Provider Type    LILA Vikash Walters, PTA Physical Therapy Assistant     Juan C Leyva, DANIE Physical Therapy Assistant    RC Evelyn Dodson, AGUILAR/L Occupational Therapy Assistant           Time Calculation:   Time Calculation- OT     Row Name 07/13/20 1530             Time Calculation- OT    OT Start Time  0855  -RC      OT Stop Time  0933  -      OT Time Calculation (min)  38 min  -      Total Timed Code Minutes- OT  38 minute(s)  -      OT Received On  07/13/20  -        User Key  (r) = Recorded By, (t) = Taken By, (c) = Cosigned By    Initials Name Provider Type     Evelyn Dodson, AGUILAR/L Occupational Therapy Assistant        Therapy Charges for Today     Code Description Service Date Service Provider Modifiers Qty    46612343231 HC OT SELF CARE/MGMT/TRAIN EA 15 MIN 7/12/2020 Evelyn Dodson, AGUILAR/L GO 1    04713592416 HC OT THER PROC EA 15 MIN 7/12/2020 Evelyn Dodson, AGUILAR/L GO 1    88655159050 HC OT SELF CARE/MGMT/TRAIN EA 15 MIN 7/13/2020 Evelyn Dodson, AGUILAR/L GO 1    41659959601 HC OT THER PROC EA 15 MIN 7/13/2020 Evelyn Dodson  DIVINE AGUILAR/L GO 1    [id number]  OT THERAPEUTIC ACT EA 15 MIN 7/13/2020 Evelyn Dodson, AGUILAR/L GO 1               Evelyn Dodson AGUILAR/L  7/13/2020

## 2020-07-14 PROCEDURE — 99024 POSTOP FOLLOW-UP VISIT: CPT | Performed by: ORTHOPAEDIC SURGERY

## 2020-07-14 PROCEDURE — 97110 THERAPEUTIC EXERCISES: CPT

## 2020-07-14 PROCEDURE — 97116 GAIT TRAINING THERAPY: CPT

## 2020-07-14 PROCEDURE — 97530 THERAPEUTIC ACTIVITIES: CPT

## 2020-07-14 PROCEDURE — 25010000002 ENOXAPARIN PER 10 MG: Performed by: ORTHOPAEDIC SURGERY

## 2020-07-14 RX ADMIN — LOSARTAN POTASSIUM 25 MG: 25 TABLET, FILM COATED ORAL at 08:46

## 2020-07-14 RX ADMIN — ACETAMINOPHEN 1000 MG: 500 TABLET ORAL at 20:17

## 2020-07-14 RX ADMIN — Medication 2.5 MG: at 08:51

## 2020-07-14 RX ADMIN — SODIUM CHLORIDE, PRESERVATIVE FREE 10 ML: 5 INJECTION INTRAVENOUS at 08:52

## 2020-07-14 RX ADMIN — ENOXAPARIN SODIUM 30 MG: 30 INJECTION SUBCUTANEOUS at 16:34

## 2020-07-14 RX ADMIN — ROPINIROLE HYDROCHLORIDE 0.5 MG: 0.5 TABLET, FILM COATED ORAL at 08:51

## 2020-07-14 RX ADMIN — Medication 2.5 MG: at 20:17

## 2020-07-14 RX ADMIN — ASPIRIN 81 MG: 81 TABLET, COATED ORAL at 08:46

## 2020-07-14 RX ADMIN — TRAMADOL HYDROCHLORIDE 25 MG: 50 TABLET, FILM COATED ORAL at 20:17

## 2020-07-14 RX ADMIN — SODIUM CHLORIDE, PRESERVATIVE FREE 10 ML: 5 INJECTION INTRAVENOUS at 20:17

## 2020-07-14 RX ADMIN — ATORVASTATIN CALCIUM 20 MG: 20 TABLET, FILM COATED ORAL at 08:46

## 2020-07-14 RX ADMIN — METOPROLOL SUCCINATE 25 MG: 25 TABLET, FILM COATED, EXTENDED RELEASE ORAL at 08:46

## 2020-07-14 RX ADMIN — PANTOPRAZOLE SODIUM 40 MG: 40 TABLET, DELAYED RELEASE ORAL at 06:14

## 2020-07-14 NOTE — PROGRESS NOTES
[facility] Medicine Services  INPATIENT PROGRESS NOTE    Length of Stay: 6  Date of Admission: 7/8/2020  Primary Care Physician: Terry Peterson MD    Subjective   Chief Complaint: Fall  HPI:  85 year old male with a history of ischemic cardiomyopathy, HFrEF, and postural syncope, and HTN who presented to the ED after a syncopal episode which took place after standing from his chair.  He suffered facial trauma and a left hip fracture. CT of the head was negative.  Sutures were placed in the ED to facial laceration.  Orthopedics consulted and recommends hip arthroplasty after cardiac clearance. Cardiology cleared for surgery. He underwent left hip hemiarthroplasty.  Pain is controlled.  No new complaints. Placement for rehab is pending.     Review of Systems   Constitutional: Negative for chills and fever.   Respiratory: Negative for shortness of breath.    Cardiovascular: Negative for chest pain.   Gastrointestinal: Negative for abdominal pain, nausea and vomiting.   Musculoskeletal: Positive for arthralgias (improved).        All pertinent negatives and positives are as above. All other systems have been reviewed and are negative unless otherwise stated.     Objective    Temp:  [96.1 °F (35.6 °C)-98.5 °F (36.9 °C)] 96.1 °F (35.6 °C)  Heart Rate:  [68-80] 80  Resp:  [16-18] 16  BP: (126-148)/(65-79) 126/78    Physical Exam   Constitutional: He appears well-developed and well-nourished. No distress.   HENT:   Head: Normocephalic and atraumatic.   Facial lac   Eyes: Conjunctivae are normal.   Cardiovascular: Normal rate, regular rhythm and intact distal pulses.   Pulmonary/Chest: Effort normal and breath sounds normal. No respiratory distress.   Abdominal: Soft. Bowel sounds are normal. He exhibits no distension.   Musculoskeletal: He exhibits no edema or deformity.   Neurological: He is alert.   Skin: Skin is warm and dry. He is not diaphoretic.   Vitals  reviewed.    Results Review:  I have reviewed the labs, radiology results, and diagnostic studies.    Laboratory Data:   Results from last 7 days   Lab Units 07/11/20  0538 07/10/20  0550 07/09/20  0546 07/08/20 1924   SODIUM mmol/L 137 135* 138 139   POTASSIUM mmol/L 4.2 4.2 3.6 3.5   CHLORIDE mmol/L 104 102 103 104   CO2 mmol/L 24.0 25.0 24.0 23.0   BUN mg/dL 20 22 16 17   CREATININE mg/dL 0.91 1.04 0.89 1.07   GLUCOSE mg/dL 92 113* 109* 89   CALCIUM mg/dL 9.3 9.2 9.1 9.3   BILIRUBIN mg/dL  --   --   --  0.2   ALK PHOS U/L  --   --   --  108   ALT (SGPT) U/L  --   --   --  22   AST (SGOT) U/L  --   --   --  32   ANION GAP mmol/L 9.0 8.0 11.0 12.0     Estimated Creatinine Clearance: 59.4 mL/min (by C-G formula based on SCr of 0.91 mg/dL).          Results from last 7 days   Lab Units 07/11/20  0538 07/10/20  0550 07/09/20  0546 07/08/20 1924   WBC 10*3/mm3 11.49* 10.08 11.28* 10.40   HEMOGLOBIN g/dL 9.8* 10.4* 12.2* 12.4*   HEMATOCRIT % 29.2* 30.4* 35.9* 36.8*   PLATELETS 10*3/mm3 144 149 175 203           Culture Data:   No results found for: BLOODCX  No results found for: URINECX  No results found for: RESPCX  No results found for: WOUNDCX  No results found for: STOOLCX  No components found for: BODYFLD    Radiology Data:   Imaging Results (Last 24 Hours)     ** No results found for the last 24 hours. **          I have reviewed the patient's current medications.     Assessment/Plan     Active Hospital Problems    Diagnosis   • **Closed fracture of neck of left femur (CMS/HCC)   • Postoperative anemia due to acute blood loss     Not unexpected     • Facial laceration   • HFrEF (heart failure with reduced ejection fraction) (CMS/HCC)   • Syncope and collapse   • Ischemic cardiomyopathy   • Essential hypertension   • Coronary artery disease involving native coronary artery of native heart without angina pectoris       Plan:   S/P Left hip hemiarthroplasty POD #5  PT/OT   Orthopedics consultation  appreciated  Cardiology consultation appreciated, recommended proceeding with surgery  Echocardiogram reviewed, EF 27%, stable over the last year  Pain control: Scheduled tylenol, PRN ultram, PRN morphine 1 mg q 4 hours   PRN narcan  Aspirin, statin, cozaar, toprol-xl  Facial suture removal, likely tomorrow  VTE PPx: lovenox  Discharge planning: SNF placement pending for rehab to home      The patient was evaluated during the global COVID-19 pandemic, and the diagnosis was suspected/considered upon their initial presentation.  Evaluation, treatment, and testing were consistent with current guidelines for patients who present with complaints or symptoms that may be related to COVID-19.        This document has been electronically signed by JAIME Grewal on July 14, 2020 09:58

## 2020-07-14 NOTE — THERAPY TREATMENT NOTE
Acute Care - Occupational Therapy Treatment Note  [facility]     Patient Name: Jose Dacosta  : 1935  MRN: [id number]  Today's Date: 2020  Onset of Illness/Injury or Date of Surgery: 20(surgery yesterday (2020))  Date of Referral to OT: 07/10/20  Referring Physician: DAYANARA Kumar APRN(surgeon: DAJA Solis MD )    Admit Date: 2020       ICD-10-CM ICD-9-CM   1. Syncope and collapse R55 780.2   2. Facial laceration, initial encounter S01.81XA 873.40   3. Closed fracture of left hip, initial encounter (CMS/HCC) S72.002A 820.8   4. Fall, initial encounter W19.XXXA E888.9   5. Closed head injury, initial encounter S09.90XA 959.01   6. Closed fracture of neck of left femur, initial encounter (CMS/HCC) S72.002A 820.8   7. Impaired functional mobility, balance, gait, and endurance Z74.09 V49.89   8. Impaired mobility and ADLs Z74.09 V49.89    Z78.9      Patient Active Problem List   Diagnosis   • Coronary artery disease involving native coronary artery of native heart without angina pectoris   • Vasovagal syncope   • RBBB (right bundle branch block with left anterior fascicular block)   • Essential hypertension   • Ischemic cardiomyopathy   • Syncope and collapse   • Closed fracture of neck of left femur (CMS/HCC)   • Coronary artery disease with history of myocardial infarction without history of CABG   • Facial laceration   • HFrEF (heart failure with reduced ejection fraction) (CMS/HCC)   • Postoperative anemia due to acute blood loss     Past Medical History:   Diagnosis Date   • Abnormal ECG      Past Surgical History:   Procedure Laterality Date   • CORONARY STENT PLACEMENT     • HIP HEMIARTHROPLASTY Left 2020    Procedure: LEFT HIP HEMIARTHROPLASTY;  Surgeon: Jitendra Solis MD;  Location: [facility];  Service: Orthopedics;  Laterality: Left;       Therapy Treatment    Rehabilitation Treatment Summary     Row Name 20 1101 20 1017          Treatment  Time/Intention    Discipline  occupational therapy assistant  -LM  physical therapy assistant  -TW     Document Type  therapy note (daily note)  -LM  therapy note (daily note)  -TW     Subjective Information  no complaints  -LM  complains of;pain  -TW     Mode of Treatment  individual therapy  -LM  physical therapy;individual therapy  -TW     Patient/Family Observations  --  No visitors present.  -TW     Patient Effort  --  good  -TW     Existing Precautions/Restrictions  --  hip, posterior  -TW     Recorded by [LM] Alise Mora COTA/L 07/14/20 1437 [TW] Juan C Leyva PTA 07/14/20 1330     Row Name 07/14/20 1017             Vital Signs    Pre Systolic BP Rehab  142  -TW      Pre Treatment Diastolic BP  67  -TW      Post Systolic BP Rehab  148  -TW      Post Treatment Diastolic BP  70  -TW      Pretreatment Heart Rate (beats/min)  81  -TW      Posttreatment Heart Rate (beats/min)  88  -TW      Pre SpO2 (%)  96  -TW      O2 Delivery Pre Treatment  room air  -TW      Post SpO2 (%)  98  -TW      Pre Patient Position  Supine  -TW      Intra Patient Position  Standing  -TW      Post Patient Position  Sitting  -TW      Recorded by [TW] Juan C Leyva PTA 07/14/20 1330      Row Name 07/14/20 1101 07/14/20 1017          Cognitive Assessment/Intervention- PT/OT    Orientation Status (Cognition)  oriented x 4  -LM  oriented x 4  -TW     Follows Commands (Cognition)  --  follows one step commands;over 90% accuracy  -TW     Personal Safety Interventions  --  fall prevention program maintained;gait belt;nonskid shoes/slippers when out of bed  -TW     Recorded by [LM] Alise Mora COTA/L 07/14/20 1437 [TW] Juan C Leyva PTA 07/14/20 1330     Row Name 07/14/20 1017             Safety Issues, Functional Mobility    Impairments Affecting Function (Mobility)  balance;endurance/activity tolerance;pain;range of motion (ROM);strength  -TW      Recorded by [TW] Juan C Leyva PTA 07/14/20 1330       Row Name 07/14/20 1017             Mobility Assessment/Intervention    Left Lower Extremity (Weight-bearing Status)  weight-bearing as tolerated (WBAT)  -TW      Recorded by [TW] Juan C Leyva, PTA 07/14/20 1330      Row Name 07/14/20 1017             Bed Mobility Assessment/Treatment    Supine-Sit [place] (Bed Mobility)  contact guard  -TW      Sit-Supine [place] (Bed Mobility)  not tested  -TW      Assistive Device (Bed Mobility)  bed rails;head of bed elevated  -TW      Recorded by [TW] Juan C Leyva, PTA 07/14/20 1330      Row Name 07/14/20 1101             Transfer Assessment/Treatment    Transfer Assessment/Treatment  sit-stand transfer;stand-sit transfer  -LM      Recorded by [LM] Alise Mora AGUILAR/L 07/14/20 1437      Row Name 07/14/20 1101 07/14/20 1017          Sit-Stand Transfer    Sit-Stand [place] (Transfers)  contact guard  -LM  contact guard;verbal cues  -TW     Assistive Device (Sit-Stand Transfers)  walker, front-wheeled  -LM  walker, front-wheeled  -TW     Recorded by [LM] Alise Mora AGUILAR/JAZZY 07/14/20 1437 [TW] Juan C Leyva, PTA 07/14/20 1330     Row Name 07/14/20 1101 07/14/20 1017          Stand-Sit Transfer    Stand-Sit [place] (Transfers)  contact guard  -LM  contact guard  -TW     Assistive Device (Stand-Sit Transfers)  walker, front-wheeled  -LM  walker, front-wheeled  -TW     Recorded by [LM] Alise Mora AGUILAR/L 07/14/20 1437 [TW] Juan C Leyva, PTA 07/14/20 1330     Row Name 07/14/20 1101             Toilet Transfer    Type (Toilet Transfer)  sit-stand;stand-sit  -LM      [place] Level (Toilet Transfer)  contact guard  -LM      Recorded by [LM] Alise Mora AGUILAR/L 07/14/20 1437      Row Name 07/14/20 1017             Gait/Stairs Assessment/Training    Gait/Stairs Assessment/Training  gait/ambulation assistive device  -TW      [place] Level (Gait)  verbal cues;contact guard  -TW      Assistive Device (Gait)   walker, front-wheeled  -TW      Distance in Feet (Gait)  170ft  -TW      Pattern (Gait)  3-point  -TW      Deviations/Abnormal Patterns (Gait)  antalgic  -TW      Recorded by [TW] Juan C Leyva PTA 07/14/20 1330      Row Name 07/14/20 1101             General ROM    GENERAL ROM COMMENTS  -- rom in recliner b ue   -LM      Recorded by [LM] Alise Mora COTA/L 07/14/20 1437      Row Name 07/14/20 1101             Therapeutic Exercise    Therapeutic Exercise  seated, upper extremities  -LM      Recorded by [LM] Alise Mora COTA/L 07/14/20 1437      [facility] Name 07/14/20 1017             Lower Extremity Supine Therapeutic Exercise    Performed, Supine Lower Extremity (Therapeutic Exercise)  quadriceps sets;gluteal sets;ankle pumps;heel slides  -TW      Exercise Type, Supine Lower Extremity (Therapeutic Exercise)  AROM (active range of motion);AAROM (active assistive range of motion)  -TW      Sets/Reps Detail, Supine Lower Extremity (Therapeutic Exercise)  1/20  -TW      Recorded by [TW] Juan C Leyva PTA 07/14/20 1330      Row Name 07/14/20 1101             Static Sitting Balance    Level of [place] (Unsupported Sitting, Static Balance)  independent  -LM      Recorded by [LM] Alise Mora COTA/L 07/14/20 1437      Row Name 07/14/20 1101             Dynamic Sitting Balance    Level of [place], Reaches Outside Midline (Sitting, Dynamic Balance)  supervision  -LM      Recorded by [LM] Alise Mora COTA/L 07/14/20 1437      [facility] Name 07/14/20 1101             Static Standing Balance    Level of [place] (Supported Standing, Static Balance)  contact guard assist  -LM      Time Able to Maintain Position (Supported Standing, Static Balance)  more than 5 minutes  -LM      Recorded by [LM] Alise Mora COTA/L 07/14/20 1437      Row Name 07/14/20 1101             Dynamic Standing Balance    Level of [place], Reaches Outside Midline (Standing, Dynamic Balance)   contact guard assist  -LM      Time Able to Maintain Position, Reaches Outside Midline (Standing, Dynamic Balance)  more than 5 minutes  -LM      Recorded by [LM] Alise Mora COTA/L 07/14/20 1437      Row Name 07/14/20 1017             Positioning and Restraints    Pre-Treatment Position  in bed  -TW      Post Treatment Position  chair  -TW      In Chair  reclined;call light within reach;encouraged to call for assist;exit alarm on  -TW      Recorded by [TW] Juan C Leyva PTA 07/14/20 1330      Row Name 07/14/20 1017             Pain Scale: Numbers Pre/Post-Treatment    Pain Scale: Numbers, Pretreatment  3/10  -TW      Pain Scale: Numbers, Post-Treatment  4/10  -TW      Pain Location - Side  Left  -TW      Pain Location  hip  -TW      Recorded by [TW] Juan C Leyva PTA 07/14/20 1330      Row Name                Wound 07/08/20 1810 Left upper eyebrow Laceration    Wound - Properties Group Date first assessed: 07/08/20 [TM] Time first assessed: 1810 [TM] Present on Hospital Admission: Y [TM] Side: Left [TM] Orientation: upper [TM] Location: eyebrow [TM] Primary Wound Type: Laceration [TM] Recorded by:  [TM] Josephine Elias RN 07/08/20 1811    Row Name                Wound 07/09/20 1515 Left posterior hip Incision    Wound - Properties Group Date first assessed: 07/09/20 [AQ] Time first assessed: 1515 [AQ] Present on Hospital Admission: N [AQ] Side: Left [AQ] Orientation: posterior [AQ] Location: hip [AQ] Primary Wound Type: Incision [AQ] Recorded by:  [AQ] Cathy Cano RN 07/09/20 1515    Row Name 07/14/20 1101             Plan of Care Review    Plan of Care Reviewed With  patient  -LM      Recorded by [LM] Alise Mora COTA/L 07/14/20 1437      Row Name 07/14/20 1101             Outcome Summary/Treatment Plan (OT)    Daily Summary of Progress (OT)  progress toward functional goals is good  -LM      Recorded by [LM] Alise Mora COTA/L 07/14/20 1437      Row Name  07/14/20 1017             Outcome Summary/Treatment Plan (PT)    Daily Summary of Progress (PT)  progress toward functional goals is good  -TW      Plan for Continued Treatment (PT)  Cont  -TW      Anticipated Discharge Disposition (PT)  anticipate therapy at next level of care  -TW      Recorded by [TW] Juan C Leyva, PTA 07/14/20 1330        User Key  (r) = Recorded By, (t) = Taken By, (c) = Cosigned By    Initials Name Effective Dates Discipline    AQ Cathy Cano, RN 10/17/16 -  Nurse    TW Juan C Leyva, PTA 03/07/18 -  PT    LM Alise Mora, AGUILAR/L 03/07/18 -  OT    TM Josephine Elias, RN 07/24/18 -  Nurse        Wound 07/08/20 1810 Left upper eyebrow Laceration (Active)   Dressing Appearance open to air 7/14/2020 10:54 AM   Closure Sutures 7/14/2020 10:54 AM   Base maroon/purple 7/14/2020 10:54 AM   Periwound Temperature warm 7/14/2020 10:54 AM   [place]iwound Skin Turgor firm 7/14/2020 10:54 AM   Drainage Amount none 7/14/2020 10:54 AM       Wound 07/09/20 1515 Left posterior hip Incision (Active)   Dressing Appearance no drainage;open to air 7/14/2020 10:54 AM   Closure Staples 7/14/2020 10:54 AM   Base dressing in place, unable to visualize 7/13/2020  8:00 PM   Drainage Amount none 7/14/2020 10:54 AM     Rehab Goal Summary     Row Name 07/14/20 1437             Occupational Therapy Goals    Transfer Goal Selection (OT)  transfer, OT goal 1  -LM      Bathing Goal Selection (OT)  bathing, OT goal 1  -LM      Dressing Goal Selection (OT)  dressing, OT goal 1  -LM      Toileting Goal Selection (OT)  toileting, OT goal 1  -LM      Endurance Goal Selection (OT)  endurance, OT goal 1  -LM      Safety Awareness Goal Selection (OT)  safety awareness, OT goal 1  -LM         Transfer Goal 1 (OT)    Activity/Assistive Device (Transfer Goal 1, OT)  toilet  -LM      [place] Level/Cues Needed (Transfer Goal 1, OT)  contact guard assist  -LM      Time Frame (Transfer Goal 1, OT)  long term  goal (LTG);by discharge  -LM      Barriers (Transfers Goal 1, OT)  hearing deficit;safety deficit   -LM      Progress/Outcome (Transfer Goal 1, OT)  goal not met  -LM         Bathing Goal 1 (OT)    Activity/Assistive Device (Bathing Goal 1, OT)  bathing skills, all  -LM      [place] Level/Cues Needed (Bathing Goal 1, OT)  minimum assist (75% or more patient effort)  -LM      Time Frame (Bathing Goal 1, OT)  long term goal (LTG);by discharge  -LM      Progress/Outcomes (Bathing Goal 1, OT)  goal not met  -LM         Dressing Goal 1 (OT)    Activity/Assistive Device (Dressing Goal 1, OT)  dressing skills, all  -LM      [place]/Cues Needed (Dressing Goal 1, OT)  minimum assist (75% or more patient effort)  -LM      Time Frame (Dressing Goal 1, OT)  long term goal (LTG);by discharge  -LM      Progress/Outcome (Dressing Goal 1, OT)  goal not met  -LM         Toileting Goal 1 (OT)    Activity/Device (Toileting Goal 1, OT)  toileting skills, all  -LM      [place] Level/Cues Needed (Toileting Goal 1, OT)  minimum assist (75% or more patient effort)  -LM      Time Frame (Toileting Goal 1, OT)  long term goal (LTG);by discharge  -LM      Progress/Outcome (Toileting Goal 1, OT)  goal not met  -LM          Endurance Goal 1 (OT)    Progress/Outcome (Endurance Goal 1, OT)  goal not met  -LM         Safety Awareness Goal 1 (OT)    Activity (Safety Awareness Goal 1, OT)  awareness of need for assistance;impulse control;insight into deficits/self awareness;judgment;safe use of assistive device/equipment;follow through of safety precautions;demonstrates compliance;demonstrates understanding;demonstration of safe behaviors  -LM      [place]/Cues/Accuracy (Safety Awareness Goal 1, OT)  with minimum;verbal cues/redirection;with repetition of directions;with 90% accuracy  -LM      Time Frame (Safety Awareness Goal 1, OT)  long term goal (LTG);by discharge  -LM      Progress/Outcome (Safety Awareness Goal 1, OT)  goal  not met  -LM        User Key  (r) = Recorded By, (t) = Taken By, (c) = Cosigned By    Initials Name Provider Type Discipline    LM Alise Mora, JEFF/L Occupational Therapy Assistant OT        Occupational Therapy Education                 Title: PT OT SLP Therapies (In Progress)     Topic: Occupational Therapy (In Progress)     Point: ADL training (In Progress)     Description:   Instruct learner(s) on proper safety adaptation and remediation techniques during self care or transfers.   Instruct in proper use of assistive devices.              Learning Progress Summary           Patient Acceptance, E,D,H, NR by  at 7/11/2020 1429    Comment:  much review and issued handout for HIP precautions, edu on use of lh ae will need reinforcement.    Acceptance, E, NR by ME at 7/10/2020 1503    Comment:  Educated on OT and POC. Educated to call for assistance. Educated on safety precautions. Educated on proper body mechanics for transfers, bed mobility, ADLs, and funcitonal mobility.                   Point: Home exercise program (Not Started)     Description:   Instruct learner(s) on appropriate technique for monitoring, assisting and/or progressing therapeutic exercises/activities.              Learner Progress:   Not documented in this visit.          Point: Precautions (In Progress)     Description:   Instruct learner(s) on prescribed precautions during self-care and functional transfers.              Learning Progress Summary           Patient Acceptance, E, NR by  at 7/13/2020 1528    Acceptance, E, NR by  at 7/12/2020 1503    Comment:  multi review of hip precautions    Acceptance, E,D,H, NR by  at 7/11/2020 1429    Comment:  much review and issued handout for HIP precautions, edu on use of lh ae will need reinforcement.    Acceptance, E, NR by ME at 7/10/2020 1503    Comment:  Educated on OT and POC. Educated to call for assistance. Educated on safety precautions. Educated on proper body mechanics for  transfers, bed mobility, ADLs, and funcitonal mobility.                   Point: Body mechanics (In Progress)     Description:   Instruct learner(s) on proper positioning and spine alignment during self-care, functional mobility activities and/or exercises.              Learning Progress Summary           Patient Acceptance, E, NR by  at 7/13/2020 1528    Acceptance, E,D,H, NR by  at 7/11/2020 1429    Comment:  much review and issued handout for HIP precautions, edu on use of lh ae will need reinforcement.    Acceptance, E, NR by ME at 7/10/2020 1503    Comment:  Educated on OT and POC. Educated to call for assistance. Educated on safety precautions. Educated on proper body mechanics for transfers, bed mobility, ADLs, and funcitonal mobility.                               User Key     Initials Effective Dates Name Provider Type Discipline     03/07/18 -  Evelyn Dodson COTA/L Occupational Therapy Assistant OT    ME 09/10/19 -  Eitan Haynes OT Occupational Therapist OT                OT Recommendation and Plan  Outcome Summary/Treatment Plan (OT)  Daily Summary of Progress (OT): progress toward functional goals is good  Daily Summary of Progress (OT): progress toward functional goals is good  Plan of Care Review  Plan of Care Reviewed With: patient  Plan of Care Reviewed With: patient  Outcome Measures     Row Name 07/13/20 1357 07/13/20 0855 07/12/20 0920       How much help from another person do you currently need...    Turning from your back to your side while in flat bed without using bedrails?  3  -TW  --  --    Moving from lying on back to sitting on the side of a flat bed without bedrails?  3  -TW  --  --    Moving to and from a bed to a chair (including a wheelchair)?  3  -TW  --  --    Standing up from a chair using your arms (e.g., wheelchair, bedside chair)?  3  -TW  --  --    Climbing 3-5 steps with a railing?  3  -TW  --  --    To walk in hospital room?  3  -TW  --  --    AM-PAC 6 Clicks  Score (PT)  18  -TW  --  --       How much help from another is currently needed...    Putting on and taking off regular lower body clothing?  --  1  -RC  1  -RC    Bathing (including washing, rinsing, and drying)  --  2  -RC  2  -RC    Toileting (which includes using toilet bed pan or urinal)  --  1  -RC  1  -RC    Putting on and taking off regular upper body clothing  --  2  -RC  2  -RC    Taking care of personal grooming (such as brushing teeth)  --  3  -RC  3  -RC    Eating meals  --  3  -RC  3  -RC    AM-PAC 6 Clicks Score (OT)  --  12  -RC  12  -RC    Row Name 07/12/20 0747             How much help from another person do you currently need...    Turning from your back to your side while in flat bed without using bedrails?  3  -LILA      Moving from lying on back to sitting on the side of a flat bed without bedrails?  3  -LILA      Moving to and from a bed to a chair (including a wheelchair)?  3  -LILA      Standing up from a chair using your arms (e.g., wheelchair, bedside chair)?  3  -LILA      Climbing 3-5 steps with a railing?  3  -LILA      To walk in hospital room?  3  -LILA      AM-PAC 6 Clicks Score (PT)  18  -LILA         Functional Assessment    Outcome Measure Options  AM-PAC 6 Clicks Basic Mobility (PT)  -LILA        User Key  (r) = Recorded By, (t) = Taken By, (c) = Cosigned By    Initials Name Provider Type    LILA Vikash Walters, PTA Physical Therapy Assistant    TW Juan C Leyva PTA Physical Therapy Assistant    RC Evelyn Dodson COTA/L Occupational Therapy Assistant           Time Calculation:   Time Calculation- OT     Row Name 07/14/20 1433             Time Calculation- OT    OT Start Time  1101  -LM      OT Stop Time  1124  -LM      OT Time Calculation (min)  23 min  -LM      Total Timed Code Minutes- OT  23 minute(s)  -LM      OT Received On  07/14/20  -LM        User Key  (r) = Recorded By, (t) = Taken By, (c) = Cosigned By    Initials Name Provider Type    LM Alise Mora, JEFF/L  Occupational Therapy Assistant        Therapy Charges for Today     Code Description Service Date Service Provider Modifiers Qty    [id number]  OT THERAPEUTIC ACT EA 15 MIN 7/14/2020 Alise Mora, JEFF/L  2               MICK Wallace  7/14/2020

## 2020-07-14 NOTE — PROGRESS NOTES
Adult Nutrition  Assessment    Patient Name:  Jose Dacosta  YOB: 1935  MRN: [id number]  Admit Date:  7/8/2020    Assessment Date:  7/14/2020    Comments:  POD 5 s/p repair left hip fracture. Noted facial laceration w/ staples placed in ER. Reg diet, intakes >75% most meals x3days. Per Epic wt 7/2019 155# and 7/9 145#. Today wt 153# BMI 20.7. Pt very sleepy- unable to speak w/ RD at visit. Attached diet information to print at d/c re: nutritionally dense foods to assist with maintaining/improving weight.  RD to follow hospital course. D/c planning in progress, likely to SNF for rehab.    Reason for Assessment     Row Name 07/14/20 1419          Reason for Assessment    Reason For Assessment  follow-up protocol     Diagnosis  trauma         Nutrition/Diet History     Row Name 07/14/20 1422          Nutrition/Diet History    Typical Food/Fluid Intake  Pt very sleepy- unable to speak w/ RD           Labs/Tests/Procedures/Meds     Row Name 07/14/20 1419          Labs/Procedures/Meds    Lab Results Reviewed  reviewed        Diagnostic Tests/Procedures    Diagnostic Test/Procedure Reviewed  reviewed     Diagnostic Test/Procedures Comments  POD 5 s/p hip repair        Medications    Pertinent Medications Reviewed  reviewed             Nutrition Prescription Ordered     Row Name 07/14/20 1420          Nutrition Prescription PO    Current PO Diet  Regular         Evaluation of Received Nutrient/Fluid Intake     Row Name 07/14/20 1420          PO Evaluation    Number of Days PO Intake Evaluated  3 days     Number of Meals  6     % PO Intake  50x1, 75x1, 100x3               Electronically signed by:  Viri Bentley RD  07/14/20 14:22

## 2020-07-14 NOTE — THERAPY TREATMENT NOTE
Acute Care - Physical Therapy Treatment Note  [facility]     Patient Name: Jose Dacosta  : 1935  MRN: [id number]  Today's Date: 2020  Onset of Illness/Injury or Date of Surgery: 20(surgery yesterday (2020))     Referring Physician: DAYANARA Kumar APRN(surgeon: DAJA Solis MD )    Admit Date: 2020    Visit Dx:    ICD-10-CM ICD-9-CM   1. Syncope and collapse R55 780.2   2. Facial laceration, initial encounter S01.81XA 873.40   3. Closed fracture of left hip, initial encounter (CMS/HCC) S72.002A 820.8   4. Fall, initial encounter W19.XXXA E888.9   5. Closed head injury, initial encounter S09.90XA 959.01   6. Closed fracture of neck of left femur, initial encounter (CMS/HCC) S72.002A 820.8   7. Impaired functional mobility, balance, gait, and endurance Z74.09 V49.89   8. Impaired mobility and ADLs Z74.09 V49.89    Z78.9      Patient Active Problem List   Diagnosis   • Coronary artery disease involving native coronary artery of native heart without angina pectoris   • Vasovagal syncope   • RBBB (right bundle branch block with left anterior fascicular block)   • Essential hypertension   • Ischemic cardiomyopathy   • Syncope and collapse   • Closed fracture of neck of left femur (CMS/[facility])   • Coronary artery disease with history of myocardial infarction without history of CABG   • Facial laceration   • HFrEF (heart failure with reduced ejection fraction) (CMS/[facility])   • Postoperative anemia due to acute blood loss       Therapy Treatment    Rehabilitation Treatment Summary     Row Name 20 1338 20 1101 20 1017       Treatment Time/Intention    Discipline  physical therapy assistant  -TW  occupational therapy assistant  -LM  physical therapy assistant  -TW    Document Type  therapy note (daily note)  -TW  therapy note (daily note)  -LM  therapy note (daily note)  -TW    Subjective Information  no complaints  -TW  no complaints  -LM  complains of;pain  -TW    Mode  of Treatment  physical therapy;individual therapy  -TW  individual therapy  -LM  physical therapy;individual therapy  -TW    Patient/Family Observations  Pt's wife arrived mid tx.  -TW  --  No visitors present.  -TW    Patient Effort  good  -TW  --  good  -TW    Existing Precautions/Restrictions  hip, posterior  -TW  --  hip, posterior  -TW    Recorded by [TW] Juan C Leyva, PTA 07/14/20 1619 [LM] Alise Mora COTA/JAZZY 07/14/20 1437 [TW] Juan C Leyva, PTA 07/14/20 1330    Row Name 07/14/20 1338 07/14/20 1017          Vital Signs    Pre Systolic BP Rehab  149  -TW  142  -TW     Pre Treatment Diastolic BP  72  -TW  67  -TW     Post Systolic BP Rehab  --  148  -TW     Post Treatment Diastolic BP  --  70  -TW     Pretreatment Heart Rate (beats/min)  75  -TW  81  -TW     Posttreatment Heart Rate (beats/min)  80  -TW  88  -TW     Pre SpO2 (%)  97  -TW  96  -TW     O2 Delivery Pre Treatment  room air  -TW  room air  -TW     Post SpO2 (%)  98  -TW  98  -TW     Pre Patient Position  Supine  -TW  Supine  -TW     Intra Patient Position  Standing  -TW  Standing  -TW     Post Patient Position  Supine  -TW  Sitting  -TW     Recorded by [TW] Juan C Leyva PTA 07/14/20 1619 [TW] Juan C Leyva, PTA 07/14/20 1330     Row Name 07/14/20 1338 07/14/20 1101 07/14/20 1017       Cognitive Assessment/Intervention- PT/OT    Orientation Status (Cognition)  oriented x 4  -TW  oriented x 4  -LM  oriented x 4  -TW    Follows Commands (Cognition)  follows one step commands;over 90% accuracy  -TW  --  follows one step commands;over 90% accuracy  -TW    Personal Safety Interventions  fall prevention program maintained;gait belt;nonskid shoes/slippers when out of bed  -TW  --  fall prevention program maintained;gait belt;nonskid shoes/slippers when out of bed  -TW    Recorded by [TW] Juan C Leyva, PTA 07/14/20 1619 [LM] Alise Mora COTA/JAZZY 07/14/20 1437 [TW] Juan C Leyva, PTA 07/14/20 1330    Row  Name 07/14/20 1338 07/14/20 1017          Safety Issues, Functional Mobility    Impairments Affecting Function (Mobility)  balance;endurance/activity tolerance;pain;range of motion (ROM);strength  -TW  balance;endurance/activity tolerance;pain;range of motion (ROM);strength  -TW     Recorded by [TW] Juan C Leyva, PTA 07/14/20 1619 [TW] Juan C Leyva, PTA 07/14/20 1330     Row Name 07/14/20 1338 07/14/20 1017          Mobility Assessment/Intervention    Left Lower Extremity (Weight-bearing Status)  weight-bearing as tolerated (WBAT)  -TW  weight-bearing as tolerated (WBAT)  -TW     Recorded by [TW] Juan C Leyva, PTA 07/14/20 1619 [TW] Juan C Leyva, PTA 07/14/20 1330     Row Name 07/14/20 1338 07/14/20 1017          Bed Mobility Assessment/Treatment    Supine-Sit [place] (Bed Mobility)  contact guard  -TW  contact guard  -TW     Sit-Supine [place] (Bed Mobility)  contact guard  -TW  not tested  -TW     Bed Mobility, Safety Issues  decreased use of legs for bridging/pushing  -TW  --     Assistive Device (Bed Mobility)  bed rails;head of bed elevated  -TW  bed rails;head of bed elevated  -TW     Recorded by [TW] Juan C Leyva, PTA 07/14/20 1619 [TW] Juan C Leyva, PTA 07/14/20 1330     Row Name 07/14/20 1101             Transfer Assessment/Treatment    Transfer Assessment/Treatment  sit-stand transfer;stand-sit transfer  -LM      Recorded by [LM] Alise Mora COTA/L 07/14/20 1437      Row Name 07/14/20 1338 07/14/20 1101 07/14/20 1017       Sit-Stand Transfer    Sit-Stand [place] (Transfers)  contact guard;verbal cues  -TW  contact guard  -LM  contact guard;verbal cues  -TW    Assistive Device (Sit-Stand Transfers)  walker, front-wheeled  -TW  walker, front-wheeled  -LM  walker, front-wheeled  -TW    Recorded by [TW] Juan C Leyva, PTA 07/14/20 1619 [LM] Alise Mora AGUILAR/JAZZY 07/14/20 1437 [TW] Juan C Leyva, PTA 07/14/20 1330    Row Name  07/14/20 1338 07/14/20 1101 07/14/20 1017       Stand-Sit Transfer    Stand-Sit [place] (Transfers)  contact guard  -TW  contact guard  -LM  contact guard  -TW    Assistive Device (Stand-Sit Transfers)  walker, front-wheeled  -TW  walker, front-wheeled  -LM  walker, front-wheeled  -TW    Recorded by [TW] Juan C Leyva, PTA 07/14/20 1619 [LM] Alise Mora AGUILAR/L 07/14/20 1437 [TW] Juan C Leyva, PTA 07/14/20 1330    Row Name 07/14/20 1101             Toilet Transfer    Type (Toilet Transfer)  sit-stand;stand-sit  -LM      [place] Level (Toilet Transfer)  contact guard  -LM      Recorded by [LM] Alise Mora COTA/L 07/14/20 1437      Row Name 07/14/20 1338 07/14/20 1017          Gait/Stairs Assessment/Training    Gait/Stairs Assessment/Training  gait/ambulation assistive device  -TW  gait/ambulation assistive device  -TW     [place] Level (Gait)  verbal cues;contact guard  -TW  verbal cues;contact guard  -TW     Assistive Device (Gait)  walker, front-wheeled  -TW  walker, front-wheeled  -TW     Distance in Feet (Gait)  170ft  -TW  170ft  -TW     Pattern (Gait)  3-point  -TW  3-point  -TW     Deviations/Abnormal Patterns (Gait)  antalgic  -TW  antalgic  -TW     Recorded by [TW] Juan C Leyva PTA 07/14/20 1619 [TW] Juan C Leyva, PTA 07/14/20 1330     Row Name 07/14/20 1101             General ROM    GENERAL ROM COMMENTS  -- rom in recliner b ue   -LM      Recorded by [LM] Alise Mora COTA/L 07/14/20 1437      Row Name 07/14/20 1101             Therapeutic Exercise    Therapeutic Exercise  seated, upper extremities  -LM      Recorded by [LM] Alise Mora AGUILAR/L 07/14/20 1437      Row Name 07/14/20 1017             Lower Extremity Supine Therapeutic Exercise    Performed, Supine Lower Extremity (Therapeutic Exercise)  quadriceps sets;gluteal sets;ankle pumps;heel slides  -TW      Exercise Type, Supine Lower Extremity (Therapeutic Exercise)  AROM (active  range of motion);AAROM (active assistive range of motion)  -TW      Sets/Reps Detail, Supine Lower Extremity (Therapeutic Exercise)  1/20  -TW      Recorded by [TW] Juan C Leyva, PTA 07/14/20 1330      Row Name 07/14/20 1338 07/14/20 1101          Static Sitting Balance    Level of [place] (Unsupported Sitting, Static Balance)  independent  -TW  independent  -LM     Sitting Position (Unsupported Sitting, Static Balance)  sitting on edge of bed  -TW  --     Time Able to Maintain Position (Unsupported Sitting, Static Balance)  more than 5 minutes  -TW  --     Recorded by [TW] Juan C Leyva, PTA 07/14/20 1619 [LM] Alise Mora AGUILAR/L 07/14/20 1437     Row Name 07/14/20 1338 07/14/20 1101          Dynamic Sitting Balance    Level of [place], Reaches Outside Midline (Sitting, Dynamic Balance)  supervision  -TW  supervision  -LM     Sitting Position, Reaches Outside Midline (Sitting, Dynamic Balance)  sitting on edge of bed  -TW  --     Recorded by [TW] Juan C Leyva PTA 07/14/20 1619 [LM] Alise Mora AGUILAR/L 07/14/20 1437     Row Name 07/14/20 1338 07/14/20 1101          Static Standing Balance    Level of [place] (Supported Standing, Static Balance)  contact guard assist  -TW  contact guard assist  -LM     Time Able to Maintain Position (Supported Standing, Static Balance)  more than 5 minutes  -TW  more than 5 minutes  -LM     Assistive Device Utilized (Supported Standing, Static Balance)  walker, rolling  -TW  --     Recorded by [TW] Juan C Leyva PTA 07/14/20 1619 [LM] Alise Mora AGUILAR/L 07/14/20 1437     Row Name 07/14/20 1338 07/14/20 1101          Dynamic Standing Balance    Level of [place], Reaches Outside Midline (Standing, Dynamic Balance)  contact guard assist  -TW  contact guard assist  -LM     Time Able to Maintain Position, Reaches Outside Midline (Standing, Dynamic Balance)  more than 5 minutes  -TW  more than 5 minutes  -LM     Recorded  by [TW] Juan C Leyva, PTA 07/14/20 1619 [LM] Alise Mora COTA/L 07/14/20 1437     Row Name 07/14/20 1338 07/14/20 1017          Positioning and Restraints    Pre-Treatment Position  in bed  -TW  in bed  -TW     Post Treatment Position  bed  -TW  chair  -TW     In Bed  supine;call light within reach;encouraged to call for assist;exit alarm on  -TW  --     In Chair  --  reclined;call light within reach;encouraged to call for assist;exit alarm on  -TW     Recorded by [TW] Juan C Leyva, PTA 07/14/20 1619 [TW] Juan C Leyva, PTA 07/14/20 1330     Row Name 07/14/20 1338 07/14/20 1017          Pain Scale: Numbers Pre/Post-Treatment    Pain Scale: Numbers, Pretreatment  0/10 - no pain  -TW  3/10  -TW     Pain Scale: Numbers, Post-Treatment  0/10 - no pain  -TW  4/10  -TW     Pain Location - Side  --  Left  -TW     Pain Location  --  hip  -TW     Recorded by [TW] Juan C Leyva, PTA 07/14/20 1619 [TW] Juan C Leyva, PTA 07/14/20 1330     Row Name                Wound 07/08/20 1810 Left upper eyebrow Laceration    Wound - Properties Group Date first assessed: 07/08/20 [TM] Time first assessed: 1810 [TM] Present on Hospital Admission: Y [TM] Side: Left [TM] Orientation: upper [TM] Location: eyebrow [TM] Primary Wound Type: Laceration [TM] Recorded by:  [TM] Josephine Elias RN 07/08/20 1811    Row Name                Wound 07/09/20 1515 Left posterior hip Incision    Wound - Properties Group Date first assessed: 07/09/20 [AQ] Time first assessed: 1515 [AQ] Present on Hospital Admission: N [AQ] Side: Left [AQ] Orientation: posterior [AQ] Location: hip [AQ] Primary Wound Type: Incision [AQ] Recorded by:  [AQ] Cathy Cano RN 07/09/20 1515    Row Name 07/14/20 1101             Plan of Care Review    Plan of Care Reviewed With  patient  -LM      Recorded by [LM] Alise Mora COTA/L 07/14/20 1437      Row Name 07/14/20 110             Outcome Summary/Treatment Plan (OT)     Daily Summary of Progress (OT)  progress toward functional goals is good  -LM      Recorded by [LM] Alise Mora AGUILAR/L 07/14/20 1437      Row Name 07/14/20 1338 07/14/20 1017          Outcome Summary/Treatment Plan (PT)    Daily Summary of Progress (PT)  progress toward functional goals is good  -TW  progress toward functional goals is good  -TW     Plan for Continued Treatment (PT)  Cont  -TW  Cont  -TW     Anticipated Discharge Disposition (PT)  anticipate therapy at next level of care  -TW  anticipate therapy at next level of care  -TW     Recorded by [TW] Juan C Leyva, PTA 07/14/20 1619 [TW] Juan C Leyva, PTA 07/14/20 1330       User Key  (r) = Recorded By, (t) = Taken By, (c) = Cosigned By    Initials Name Effective Dates Discipline    AQ Cathy Cano, RN 10/17/16 -  Nurse    TW Juan C Leyva, PTA 03/07/18 -  PT    LM Alise Mora AGUILAR/L 03/07/18 -  OT    TM Josephine Elias, RN 07/24/18 -  Nurse          Wound 07/08/20 1810 Left upper eyebrow Laceration (Active)   Dressing Appearance open to air 7/14/2020 10:54 AM   Closure Sutures 7/14/2020 10:54 AM   Base maroon/purple 7/14/2020 10:54 AM   [place] Temperature warm 7/14/2020 10:54 AM   [place] Skin Turgor firm 7/14/2020 10:54 AM   Drainage Amount none 7/14/2020 10:54 AM       Wound 07/09/20 1515 Left posterior hip Incision (Active)   Dressing Appearance no drainage;open to air 7/14/2020 10:54 AM   Closure [place] 7/14/2020 10:54 AM   Base dressing in place, unable to visualize 7/13/2020  8:00 PM   Drainage Amount none 7/14/2020 10:54 AM       Rehab Goal Summary     Row Name 07/14/20 1437 07/14/20 1338          Bed Mobility Goal 1 (PT)    Activity/Assistive Device (Bed Mobility Goal 1, PT)  --  sit to supine;supine to sit  -TW     [place] Level/Cues Needed (Bed Mobility Goal 1, PT)  --  independent  -TW     Time Frame (Bed Mobility Goal 1, PT)  --  by discharge  -TW     Progress/Outcomes (Bed Mobility  Goal 1, PT)  --  goal not met  -TW        Transfer Goal 1 (PT)    Activity/Assistive Device (Transfer Goal 1, PT)  --  sit-to-stand/stand-to-sit;bed-to-chair/chair-to-bed;toilet  -TW     [place] Level/Cues Needed (Transfer Goal 1, PT)  --  conditional independence  -TW     Time Frame (Transfer Goal 1, PT)  --  2 - 3 days  -TW     Progress/Outcome (Transfer Goal 1, PT)  --  goal not met  -TW        Gait Training Goal 1 (PT)    Activity/Assistive Device (Gait Training Goal 1, PT)  --  gait (walking locomotion);assistive device use;walker, rolling  -TW     [place] Level (Gait Training Goal 1, PT)  --  conditional independence  -TW     Distance (Gait Goal 1, PT)  --  [id number]  -TW     Time Frame (Gait Training Goal 1, PT)  --  by discharge  -TW     Progress/Outcome (Gait Training Goal 1, PT)  --  goal not met  -TW        Stairs Goal 1 (PT)    Activity/Assistive Device (Stairs Goal 1, PT)  --  ascending stairs;descending stairs;using handrail, left;assistive device use  -TW     [place] Level/Cues Needed (Stairs Goal 1, PT)  --  conditional independence  -TW     Time Frame (Stairs Goal 1, PT)  --  by discharge  -TW     Progress/Outcome (Stairs Goal 1, PT)  --  goal not met  -TW        Occupational Therapy Goals    Transfer Goal Selection (OT)  transfer, OT goal 1  -LM  --     Bathing Goal Selection (OT)  bathing, OT goal 1  -LM  --     Dressing Goal Selection (OT)  dressing, OT goal 1  -LM  --     Toileting Goal Selection (OT)  toileting, OT goal 1  -LM  --     Endurance Goal Selection (OT)  endurance, OT goal 1  -LM  --     Safety Awareness Goal Selection (OT)  safety awareness, OT goal 1  -LM  --        Transfer Goal 1 (OT)    Activity/Assistive Device (Transfer Goal 1, OT)  toilet  -LM  --     [place] Level/Cues Needed (Transfer Goal 1, OT)  contact guard assist  -LM  --     Time Frame (Transfer Goal 1, OT)  long term goal (LTG);by discharge  -LM  --     Barriers (Transfers Goal 1, OT)  hearing  deficit;safety deficit   -LM  --     Progress/Outcome (Transfer Goal 1, OT)  goal not met  -LM  --        Bathing Goal 1 (OT)    Activity/Assistive Device (Bathing Goal 1, OT)  bathing skills, all  -LM  --     [place] Level/Cues Needed (Bathing Goal 1, OT)  minimum assist (75% or more patient effort)  -LM  --     Time Frame (Bathing Goal 1, OT)  long term goal (LTG);by discharge  -LM  --     Progress/Outcomes (Bathing Goal 1, OT)  goal not met  -LM  --        Dressing Goal 1 (OT)    Activity/Assistive Device (Dressing Goal 1, OT)  dressing skills, all  -LM  --     [place]/Cues Needed (Dressing Goal 1, OT)  minimum assist (75% or more patient effort)  -LM  --     Time Frame (Dressing Goal 1, OT)  long term goal (LTG);by discharge  -LM  --     Progress/Outcome (Dressing Goal 1, OT)  goal not met  -LM  --        Toileting Goal 1 (OT)    Activity/Device (Toileting Goal 1, OT)  toileting skills, all  -LM  --     [place] Level/Cues Needed (Toileting Goal 1, OT)  minimum assist (75% or more patient effort)  -LM  --     Time Frame (Toileting Goal 1, OT)  long term goal (LTG);by discharge  -LM  --     Progress/Outcome (Toileting Goal 1, OT)  goal not met  -LM  --         Endurance Goal 1 (OT)    Progress/Outcome (Endurance Goal 1, OT)  goal not met  -LM  --        Safety Awareness Goal 1 (OT)    Activity (Safety Awareness Goal 1, OT)  awareness of need for assistance;impulse control;insight into deficits/self awareness;judgment;safe use of assistive device/equipment;follow through of safety precautions;demonstrates compliance;demonstrates understanding;demonstration of safe behaviors  -LM  --     [place]/Cues/Accuracy (Safety Awareness Goal 1, OT)  with minimum;verbal cues/redirection;with repetition of directions;with 90% accuracy  -LM  --     Time Frame (Safety Awareness Goal 1, OT)  long term goal (LTG);by discharge  -LM  --     Progress/Outcome (Safety Awareness Goal 1, OT)  goal not met  -LM  --        User Key  (r) = Recorded By, (t) = Taken By, (c) = Cosigned By    Initials Name Provider Type Discipline    TW Juan C Leyva, PTA Physical Therapy Assistant PT    Alise Arguelles, JEFF/L Occupational Therapy Assistant OT          Physical Therapy Education                 Title: PT OT SLP Therapies (In Progress)     Topic: Physical Therapy (In Progress)     Point: Mobility training (In Progress)     Description:   Instruct learner(s) on safety and technique for assisting patient out of bed, chair or wheelchair.  Instruct in the proper use of assistive devices, such as walker, crutches, cane or brace.              Patient Friendly Description:   It's important to get you on your feet again, but we need to do so in a way that is safe for you. Falling has serious consequences, and your personal safety is the most important thing of all.        When it's time to get out of bed, one of us or a family member will sit next to you on the bed to give you support.     If your doctor or nurse tells you to use a walker, crutches, a cane, or a brace, be sure you use it every time you get out of bed, even if you think you don't need it.    Learning Progress Summary           Patient Acceptance, E, NR by LILA at 7/11/2020 1047    Comment:  edu on hip dislocation precautions.    Acceptance, E, NR by JC1 at 7/10/2020 1348                   Point: Home exercise program (Not Started)     Description:   Instruct learner(s) on appropriate technique for monitoring, assisting and/or progressing patient with therapeutic exercises and activities.              Learner Progress:   Not documented in this visit.          Point: Body mechanics (In Progress)     Description:   Instruct learner(s) on proper positioning and spine alignment for patient and/or caregiver during mobility tasks and/or exercises.              Learning Progress Summary           Patient Acceptance, E, NR by [email or web address] at 7/10/2020 1348                   Point:  Precautions (In Progress)     Description:   Instruct learner(s) on prescribed precautions during mobility and gait tasks              Learning Progress Summary           Patient Acceptance, E, NR by LILA at 7/11/2020 1047    Comment:  edu on hip dislocation precautions.    Acceptance, E, NR by JC1 at 7/10/2020 1348                               User Key     Initials Effective Dates Name Provider Type Discipline    [facility] 04/03/18 -  Angelica Torres, PT Physical Therapist PT    [facility] 03/07/18 -  Vikash Walters, PTA Physical Therapy Assistant PT                PT Recommendation and Plan  Anticipated Discharge Disposition (PT): anticipate therapy at next level of care  Outcome Summary/Treatment Plan (PT)  Daily Summary of Progress (PT): progress toward functional goals is good  Plan for Continued Treatment (PT): Cont  Anticipated Discharge Disposition (PT): anticipate therapy at next level of care  Plan of Care Reviewed With: patient  Progress: improving  Outcome Summary: Pt participated in BID tx this date with good tolerance to activity but somewhat impulsive. Pt t/f sup to sit with CGA and VCs for safety and proper tech. Pt still with L knee immobilizer donned and worn throughout gait and t/f's but removed for L heel slides in am. Pt amb each session for 170ft with improvment in steppage but requires VCs for postural control. Pt declined ex's in pm due to wife being present and pt wanting to visit with wife. Pt would cont to benefit from therapy upon DC.  Outcome Measures     Row Name 07/14/20 1338 07/13/20 1357 07/13/20 0855       How much help from another person do you currently need...    Turning from your back to your side while in flat bed without using bedrails?  3  -TW  3  -TW  --    Moving from lying on back to sitting on the side of a flat bed without bedrails?  3  -TW  3  -TW  --    Moving to and from a bed to a chair (including a wheelchair)?  3  -TW  3  -TW  --    Standing up from a chair using your arms  (e.g., wheelchair, bedside chair)?  3  -TW  3  -TW  --    Climbing 3-5 steps with a railing?  3  -TW  3  -TW  --    To walk in hospital room?  3  -TW  3  -TW  --    AM-PAC 6 Clicks Score (PT)  18  -TW  18  -TW  --       How much help from another is currently needed...    Putting on and taking off regular lower body clothing?  --  --  1  -RC    Bathing (including washing, rinsing, and drying)  --  --  2  -RC    Toileting (which includes using toilet bed pan or urinal)  --  --  1  -RC    Putting on and taking off regular upper body clothing  --  --  2  -RC    Taking care of personal grooming (such as brushing teeth)  --  --  3  -RC    Eating meals  --  --  3  -RC    AM-[facility] 6 Clicks Score (OT)  --  --  12  -RC    Row Name 07/12/20 0920 07/12/20 0747          How much help from another person do you currently need...    Turning from your back to your side while in flat bed without using bedrails?  --  3  -LILA     Moving from lying on back to sitting on the side of a flat bed without bedrails?  --  3  -LILA     Moving to and from a bed to a chair (including a wheelchair)?  --  3  -LILA     Standing up from a chair using your arms (e.g., wheelchair, bedside chair)?  --  3  -LILA     Climbing 3-5 steps with a railing?  --  3  -LILA     To walk in hospital room?  --  3  -LILA     AM-PAC 6 Clicks Score (PT)  --  18  -LILA        How much help from another is currently needed...    Putting on and taking off regular lower body clothing?  1  -RC  --     Bathing (including washing, rinsing, and drying)  2  -RC  --     Toileting (which includes using toilet bed pan or urinal)  1  -RC  --     Putting on and taking off regular upper body clothing  2  -RC  --     Taking care of personal grooming (such as brushing teeth)  3  -RC  --     Eating meals  3  -RC  --     AM-PAC 6 Clicks Score (OT)  12  -RC  --        Functional Assessment    Outcome Measure Options  --  AM-PAC 6 Clicks Basic Mobility (PT)  -LILA       User Key  (r) = Recorded By, (t) =  Taken By, (c) = Cosigned By    Initials Name Provider Type    Vikash Whatley, DANIE Physical Therapy Assistant    TW Juan C Leyva, DANIE Physical Therapy Assistant    Evelyn Farley, JEFF/L Occupational Therapy Assistant         Time Calculation:   PT Charges     Row Name 07/14/20 1623 07/14/20 1331          Time Calculation    Start Time  1338  -TW  1017  -TW     Stop Time  1417  -TW  1057  -TW     Time Calculation (min)  39 min  -TW  40 min  -TW     PT Received On  07/14/20  -TW  07/14/20  -TW     PT Goal Re-Cert Due Date  07/23/20  -TW  07/23/20  -TW        Time Calculation- PT    Total Timed Code Minutes- PT  39 minute(s)  -TW  40 minute(s)  -TW       User Key  (r) = Recorded By, (t) = Taken By, (c) = Cosigned By    Initials Name Provider Type    TW Juan C Leyva PTA Physical Therapy Assistant        Therapy Charges for Today     Code Description Service Date Service Provider Modifiers Qty    [id number] HC GAIT TRAINING EA 15 MIN 7/13/2020 Juan C Leyva, PTA GP 1    [id number] HC PT THERAPEUTIC ACT EA 15 MIN 7/13/2020 Juan C Leyva, PTA GP 1    [id number] HC PT THER PROC EA 15 MIN 7/13/2020 Juan C Leyva, PTA GP 1    [id number] HC GAIT TRAINING EA 15 MIN 7/13/2020 Juan C Leyva, PTA GP 1    [id number] HC PT THERAPEUTIC ACT EA 15 MIN 7/13/2020 Juan C Leyva, PTA GP 1    [id number] HC PT THER PROC EA 15 MIN 7/13/2020 Juan C Leyva, PTA GP 1    [id number] HC GAIT TRAINING EA 15 MIN 7/14/2020 Juan C Leyva, PTA GP 1    [id number] HC PT THERAPEUTIC ACT EA 15 MIN 7/14/2020 Juan C Leyva, PTA GP 1    [id number] HC PT THER PROC EA 15 MIN 7/14/2020 Juan C Leyva, PTA GP 1    [id number] HC GAIT TRAINING EA 15 MIN 7/14/2020 Juan C Leyva, PTA GP 1    [id number] HC PT THERAPEUTIC ACT EA 15 MIN 7/14/2020 Gordon, Richmond L, PTA GP 2          PT G-Codes  Outcome Measure Options: AM-PAC 6 Clicks Basic Mobility (PT)  AM-PAC 6 Clicks  Score (PT): 18  AM-PAC 6 Clicks Score (OT): 12    Juan C Leyva, PTA  7/14/2020

## 2020-07-14 NOTE — PLAN OF CARE
Problem: Patient Care Overview  Goal: Plan of Care Review  Outcome: Ongoing (interventions implemented as appropriate)  Flowsheets (Taken 7/14/2020 0254)  Progress: no change  Plan of Care Reviewed With: patient  Outcome Summary: VSS, pain controlled. No acute changes this shift. Will continue to monitor.

## 2020-07-14 NOTE — PROGRESS NOTES
Patient is doing much better.  Awaiting placement.  He can go to rehab anytime from my standpoint.  He continue weightbearing as tolerated.  We will do DVT prophylaxis for a month postoperatively.  Staple move with Steri-Strips 10 to 12 days postoperatively.  I will follow him up in the office in 1 month

## 2020-07-14 NOTE — PLAN OF CARE
Problem: Patient Care Overview  Goal: Plan of Care Review  7/14/2020 1619 by Juan C Leyva, DANIE  Outcome: Ongoing (interventions implemented as appropriate)  Flowsheets (Taken 7/14/2020 1338)  Progress: improving  Plan of Care Reviewed With: patient  Outcome Summary: Pt participated in BID tx this date with good tolerance to activity but somewhat impulsive. Pt t/f sup to sit with CGA and VCs for safety and proper tech. Pt still with L knee immobilizer donned and worn throughout gait and t/f's but removed for L heel slides in am. Pt amb each session for 170ft with improvment in steppage but requires VCs for postural control. Pt declined ex's in pm due to wife being present and pt wanting to visit with wife. Pt would cont to benefit from therapy upon DC.

## 2020-07-15 PROCEDURE — 97110 THERAPEUTIC EXERCISES: CPT

## 2020-07-15 PROCEDURE — 97530 THERAPEUTIC ACTIVITIES: CPT

## 2020-07-15 PROCEDURE — 25010000002 ENOXAPARIN PER 10 MG: Performed by: ORTHOPAEDIC SURGERY

## 2020-07-15 PROCEDURE — 97116 GAIT TRAINING THERAPY: CPT

## 2020-07-15 PROCEDURE — 94640 AIRWAY INHALATION TREATMENT: CPT

## 2020-07-15 RX ADMIN — ACETAMINOPHEN 1000 MG: 500 TABLET ORAL at 11:35

## 2020-07-15 RX ADMIN — METOPROLOL SUCCINATE 25 MG: 25 TABLET, FILM COATED, EXTENDED RELEASE ORAL at 09:31

## 2020-07-15 RX ADMIN — SODIUM CHLORIDE, PRESERVATIVE FREE 10 ML: 5 INJECTION INTRAVENOUS at 09:32

## 2020-07-15 RX ADMIN — ENOXAPARIN SODIUM 30 MG: 30 INJECTION SUBCUTANEOUS at 14:24

## 2020-07-15 RX ADMIN — ACETAMINOPHEN 1000 MG: 500 TABLET ORAL at 17:22

## 2020-07-15 RX ADMIN — PANTOPRAZOLE SODIUM 40 MG: 40 TABLET, DELAYED RELEASE ORAL at 06:08

## 2020-07-15 RX ADMIN — Medication 2.5 MG: at 20:04

## 2020-07-15 RX ADMIN — TRAMADOL HYDROCHLORIDE 25 MG: 50 TABLET, FILM COATED ORAL at 20:14

## 2020-07-15 RX ADMIN — ACETAMINOPHEN 1000 MG: 500 TABLET ORAL at 09:31

## 2020-07-15 RX ADMIN — Medication 2.5 MG: at 09:31

## 2020-07-15 RX ADMIN — ATORVASTATIN CALCIUM 20 MG: 20 TABLET, FILM COATED ORAL at 09:31

## 2020-07-15 RX ADMIN — ROPINIROLE HYDROCHLORIDE 0.5 MG: 0.5 TABLET, FILM COATED ORAL at 09:31

## 2020-07-15 RX ADMIN — SODIUM CHLORIDE, PRESERVATIVE FREE 10 ML: 5 INJECTION INTRAVENOUS at 20:04

## 2020-07-15 RX ADMIN — ASPIRIN 81 MG: 81 TABLET, COATED ORAL at 09:30

## 2020-07-15 RX ADMIN — ACETAMINOPHEN 1000 MG: 500 TABLET ORAL at 20:04

## 2020-07-15 RX ADMIN — ALBUTEROL SULFATE 2.5 MG: 2.5 SOLUTION RESPIRATORY (INHALATION) at 10:10

## 2020-07-15 RX ADMIN — LOSARTAN POTASSIUM 25 MG: 25 TABLET, FILM COATED ORAL at 09:31

## 2020-07-15 RX ADMIN — TRAMADOL HYDROCHLORIDE 25 MG: 50 TABLET, FILM COATED ORAL at 06:09

## 2020-07-15 NOTE — PLAN OF CARE
Problem: Patient Care Overview  Goal: Plan of Care Review  Outcome: Ongoing (interventions implemented as appropriate)  Flowsheets (Taken 7/15/2020 1510)  Progress: no change  Plan of Care Reviewed With: patient  Outcome Summary: Pt participated in BID tx this date. Pt amb 188ft in am but c/o dizziness in pm and described dizziness with t/f to EOB that took 5-7 minutes to begin to subside. Pt returned to supine in pm tx and performed LE ther  ex as described. Pt would cont to benefit from tehrapy upon DC.

## 2020-07-15 NOTE — PLAN OF CARE
Problem: Patient Care Overview  Goal: Plan of Care Review  Outcome: Ongoing (interventions implemented as appropriate)  Flowsheets (Taken 7/15/2020 0406)  Progress: improving  Plan of Care Reviewed With: patient  Outcome Summary: VSS, pain controlled. No acute changes this shift. Awaiting SNF placement for discharge. Will continue to monitor.

## 2020-07-15 NOTE — PLAN OF CARE
Problem: Syncope (Adult)  Goal: Identify Related Risk Factors and Signs and Symptoms  Outcome: Ongoing (interventions implemented as appropriate)  Goal: Physical Safety/Health Maintenance  Outcome: Ongoing (interventions implemented as appropriate)  Goal: Optimal Emotional/Functional [place]  Outcome: Ongoing (interventions implemented as appropriate)     Problem: Syncope (Adult)  Goal: Identify Related Risk Factors and Signs and Symptoms  Outcome: Ongoing (interventions implemented as appropriate)  Goal: Physical Safety/Health Maintenance  Outcome: Ongoing (interventions implemented as appropriate)  Goal: Optimal Emotional/Functional [place]  Outcome: Ongoing (interventions implemented as appropriate)

## 2020-07-15 NOTE — THERAPY TREATMENT NOTE
Acute Care - Physical Therapy Treatment Note  [facility]     Patient Name: Jose Dacosta  : 1935  MRN: [id number]  Today's Date: 7/15/2020  Onset of Illness/Injury or Date of Surgery: 20(surgery yesterday (2020))     Referring Physician: DAYANARA Kumar APRN(surgeon: DAJA Solis MD )    Admit Date: 2020    Visit Dx:    ICD-10-CM ICD-9-CM   1. Syncope and collapse R55 780.2   2. Facial laceration, initial encounter S01.81XA 873.40   3. Closed fracture of left hip, initial encounter (CMS/HCC) S72.002A 820.8   4. Fall, initial encounter W19.XXXA E888.9   5. Closed head injury, initial encounter S09.90XA 959.01   6. Closed fracture of neck of left femur, initial encounter (CMS/HCC) S72.002A 820.8   7. Impaired functional mobility, balance, gait, and endurance Z74.09 V49.89   8. Impaired mobility and ADLs Z74.09 V49.89    Z78.9      Patient Active Problem List   Diagnosis   • Coronary artery disease involving native coronary artery of native heart without angina pectoris   • Vasovagal syncope   • RBBB (right bundle branch block with left anterior fascicular block)   • Essential hypertension   • Ischemic cardiomyopathy   • Syncope and collapse   • Closed fracture of neck of left femur (CMS/[facility])   • Coronary artery disease with history of myocardial infarction without history of CABG   • Facial laceration   • HFrEF (heart failure with reduced ejection fraction) (CMS/[facility])   • Postoperative anemia due to acute blood loss       Therapy Treatment    Rehabilitation Treatment Summary     Row Name 07/15/20 1510 07/15/20 0918          Treatment Time/Intention    Discipline  physical therapy assistant  -TW  physical therapy assistant  -TW     Document Type  therapy note (daily note)  -TW  therapy note (daily note)  -TW     Subjective Information  complains of;fatigue;weakness;dizziness  -TW  complains of;pain  -TW     Mode of Treatment  physical therapy;individual therapy  -TW  physical  therapy;individual therapy  -TW     Patient/Family Observations  Pt's wife present during tx.  -TW  No visitors present.  -TW     Patient Effort  good  -TW  good  -TW     Existing Precautions/Restrictions  hip, posterior;fall  -TW  hip, posterior  -TW     Recorded by [TW] Juan C Leyva, DANIE 07/15/20 1549 [TW] Juan C Leyva, PTA 07/15/20 1320     Row Name 07/15/20 1510 07/15/20 0918          Vital Signs    Pre Systolic BP Rehab  140  -TW  143  -TW     Pre Treatment Diastolic BP  64  -TW  77  -TW     Intra Systolic BP Rehab  --  152  -TW     Intra Treatment Diastolic BP  --  88  -TW     Post Systolic BP Rehab  143  -TW  151  -TW     Post Treatment Diastolic BP  72  -TW  85  -TW     Pretreatment Heart Rate (beats/min)  42  -TW  74  -TW     Intratreatment Heart Rate (beats/min)  78  -TW  82  -TW     Posttreatment Heart Rate (beats/min)  64  -TW  72  -TW     Pre SpO2 (%)  97  -TW  94  -TW     O2 Delivery Pre Treatment  room air  -TW  room air  -TW     Intra SpO2 (%)  99  -TW  96  -TW     Post SpO2 (%)  98  -TW  95  -TW     Pre Patient Position  Supine  -TW  Supine  -TW     Intra Patient Position  Standing  -TW  Standing  -TW     Post Patient Position  Supine  -TW  Supine  -TW     Recorded by [TW] Juan C Leyva, DANIE 07/15/20 1549 [TW] Juan C Leyva, PTA 07/15/20 1320     Row Name 07/15/20 1510 07/15/20 0918          Cognitive Assessment/Intervention- PT/OT    Orientation Status (Cognition)  oriented x 4  -TW  oriented x 4  -TW     Follows Commands (Cognition)  follows one step commands  -TW  follows one step commands;over 90% accuracy  -TW     Safety Deficit (Cognitive)  --  insight into deficits/self awareness  -TW     Personal Safety Interventions  fall prevention program maintained;gait belt;nonskid shoes/slippers when out of bed  -TW  fall prevention program maintained;gait belt;nonskid shoes/slippers when out of bed  -TW     Recorded by [TW] Juan C Leyva PTA 07/15/20 1549 [TW]  "Juan C Leyva, PTA 07/15/20 1320     Row Name 07/15/20 1510 07/15/20 0918          Safety Issues, Functional Mobility    Impairments Affecting Function (Mobility)  balance;endurance/activity tolerance;pain;strength;postural/trunk control  -TW  balance;endurance/activity tolerance;pain;range of motion (ROM);strength  -TW     Recorded by [TW] Juan C Leyva, PTA 07/15/20 1549 [TW] Juan C Leyva, PTA 07/15/20 1320     Row Name 07/15/20 1510 07/15/20 0918          Mobility Assessment/Intervention    Left Lower Extremity (Weight-bearing Status)  weight-bearing as tolerated (WBAT)  -TW  weight-bearing as tolerated (WBAT)  -TW     Recorded by [TW] Juan C Leyva, PTA 07/15/20 1549 [TW] Juan C Leyva, PTA 07/15/20 1320     Row Name 07/15/20 1510 07/15/20 0918          Bed Mobility Assessment/Treatment    Supine-Sit [place] (Bed Mobility)  supervision  -TW  contact guard  -TW     Sit-Supine [place] (Bed Mobility)  supervision  -TW  contact guard  -TW     Bed Mobility, Safety Issues  decreased use of legs for bridging/pushing  -TW  decreased use of legs for bridging/pushing  -TW     Assistive Device (Bed Mobility)  bed rails  -TW  bed rails;head of bed elevated  -TW     Comment (Bed Mobility)  Pt became dizzy upon t/f to EOB and HR was 42. Pt remained EOB for 5-7 minutes with HR slowly rising. Pt became uneasy and requested to return to supine.  -TW  Pt declined to remain in recliner due to \"It's not comfortable at all.\"  -TW     Recorded by [TW] Juan C Leyva, PTA 07/15/20 1549 [TW] Juan C Leyva, PTA 07/15/20 1320     Row Name 07/15/20 1510 07/15/20 0918          Sit-Stand Transfer    Sit-Stand [place] (Transfers)  not tested  -TW  contact guard;verbal cues  -TW     Assistive Device (Sit-Stand Transfers)  --  walker, front-wheeled  -TW     Recorded by [TW] Juan C Leyva, PTA 07/15/20 1549 [TW] Juan C Leyva, PTA 07/15/20 1320     Row Name 07/15/20 0918       "       Stand-Sit Transfer    Stand-Sit [place] (Transfers)  contact guard  -TW      Assistive Device (Stand-Sit Transfers)  walker, front-wheeled  -TW      Recorded by [TW] Juan C Leyva, PTA 07/15/20 1320      Row Name 07/15/20 0918             Toilet Transfer    Type (Toilet Transfer)  sit-stand;stand-sit  -TW      [place] Level (Toilet Transfer)  contact guard Pt requires VCs not to abandon RW.  -TW      Assistive Device (Toilet Transfer)  grab bars/safety frame;walker, front-wheeled  -TW      Recorded by [TW] Juan C Leyva, PTA 07/15/20 1320      Row Name 07/15/20 1510 07/15/20 0918          Gait/Stairs Assessment/Training    Gait/Stairs Assessment/Training  --  gait/ambulation assistive device  -TW     [place] Level (Gait)  not tested  -TW  verbal cues;contact guard  -TW     Assistive Device (Gait)  --  walker, front-wheeled  -TW     Distance in Feet (Gait)  --  188ft and 6 ft to bed from BR.  -TW     Pattern (Gait)  --  3-point  -TW     Deviations/Abnormal Patterns (Gait)  --  antalgic  -TW     Comment (Gait/Stairs)  Pt did not t/f to standing nor performed gait this afternoon due to bradycardia with symptoms. Nsg made aware.   -TW  --     Recorded by [TW] Juan C Leyva, PTA 07/15/20 1549 [TW] Juan C Leyva, PTA 07/15/20 1320     Row Name 07/15/20 0918             Lower Extremity Seated Therapeutic Exercise    Performed, Seated Lower Extremity (Therapeutic Exercise)  hip flexion/extension;hip abduction/adduction;ankle dorsiflexion/plantarflexion;LAQ (long arc quad), knee extension  -TW      Exercise Type, Seated Lower Extremity (Therapeutic Exercise)  AROM (active range of motion)  -TW      Sets/Reps Detail, Seated Lower Extremity (Therapeutic Exercise)  1/10  -TW      Recorded by [TW] Juan C Leyva, PTA 07/15/20 1320      Row Name 07/15/20 1510 07/15/20 0918          Lower Extremity Supine Therapeutic Exercise    Performed, Supine Lower Extremity (Therapeutic  Exercise)  hip abduction/adduction;hip external/internal rotation;ankle dorsiflexion/plantarflexion;quadriceps sets;gluteal sets;ankle pumps;heel slides Hip IR/ER, abd/add performed with R LE only.   -TW  ankle pumps;heel slides;gluteal sets;quadriceps sets  -TW     Exercise Type, Supine Lower Extremity (Therapeutic Exercise)  AROM (active range of motion);AAROM (active assistive range of motion)  -TW  AROM (active range of motion)  -TW     Sets/Reps Detail, Supine Lower Extremity (Therapeutic Exercise)  1/20  -TW  1/20  -TW     Recorded by [TW] Juan C Leyva, PTA 07/15/20 1549 [TW] Juan C Leyva, PTA 07/15/20 1320     Row Name 07/15/20 0918             Static Sitting Balance    Level of [place] (Unsupported Sitting, Static Balance)  independent  -TW      Sitting Position (Unsupported Sitting, Static Balance)  sitting on edge of bed  -TW      Time Able to Maintain Position (Unsupported Sitting, Static Balance)  more than 5 minutes  -TW      Recorded by [TW] Juan C Leyva, PTA 07/15/20 1320      Row Name 07/15/20 0918             Dynamic Sitting Balance    Level of [place], Reaches Outside Midline (Sitting, Dynamic Balance)  supervision  -TW      Sitting Position, Reaches Outside Midline (Sitting, Dynamic Balance)  sitting on edge of bed  -TW      Recorded by [TW] Juan C Leyva, PTA 07/15/20 1320      Row Name 07/15/20 0918             Static Standing Balance    Level of [place] (Supported Standing, Static Balance)  contact guard assist  -TW      Time Able to Maintain Position (Supported Standing, Static Balance)  3 to 4 minutes  -TW      Assistive Device Utilized (Supported Standing, Static Balance)  walker, rolling  -TW      Recorded by [TW] Juan C Leyva, PTA 07/15/20 1320      Row Name 07/15/20 0918             Dynamic Standing Balance    Level of [place], Reaches Outside Midline (Standing, Dynamic Balance)  contact guard assist  -TW      Time Able to Maintain  Position, Reaches Outside Midline (Standing, Dynamic Balance)  more than 5 minutes  -TW      Assistive Device Utilized (Supported Standing, Dynamic Balance)  walker, rolling  -TW      Recorded by [TW] Juan C Leyva, PTA 07/15/20 1320      Row Name 07/15/20 1510 07/15/20 0918          Positioning and Restraints    Pre-Treatment Position  in bed  -TW  in bed  -TW     Post Treatment Position  bed  -TW  bed  -TW     In Bed  supine;call light within reach;encouraged to call for assist;exit alarm on;with family/caregiver  -TW  supine;call light within reach;encouraged to call for assist;exit alarm on  -TW     Recorded by [TW] Juan C Leyva, PTA 07/15/20 1549 [TW] Juan C Leyva, PTA 07/15/20 1320     Row Name 07/15/20 1510 07/15/20 0918          Pain Scale: Numbers Pre/Post-Treatment    Pain Scale: Numbers, Pretreatment  0/10 - no pain  -TW  0/10 - no pain  -TW     Pain Scale: Numbers, Post-Treatment  0/10 - no pain  -TW  0/10 - no pain  -TW     Recorded by [TW] Juan C Leyva, PTA 07/15/20 1549 [TW] Juan C Leyva, PTA 07/15/20 1320     Row Name                Wound 07/08/20 1810 Left upper eyebrow Laceration    Wound - Properties Group Date first assessed: 07/08/20 [TM] Time first assessed: 1810 [TM] Present on Hospital Admission: Y [TM] Side: Left [TM] Orientation: upper [TM] Location: eyebrow [TM] Primary Wound Type: Laceration [TM] Recorded by:  [TM] Josephine Elias RN 07/08/20 1811    Row Name                Wound 07/09/20 1515 Left posterior hip Incision    Wound - Properties Group Date first assessed: 07/09/20 [AQ] Time first assessed: 1515 [AQ] Present on Hospital Admission: N [AQ] Side: Left [AQ] Orientation: posterior [AQ] Location: hip [AQ] Primary Wound Type: Incision [AQ] Recorded by:  [AQ] Cathy Cano RN 07/09/20 1515    Row Name 07/15/20 1510 07/15/20 0918          Outcome Summary/Treatment Plan (PT)    Daily Summary of Progress (PT)  progress toward functional  goals is good  -TW  progress toward functional goals is good  -TW     Barriers to Overall Progress (PT)  bradycardia  -TW  --     Plan for Continued Treatment (PT)  Cont as pt able. monitor hr.  -TW  Cont with safety edu during tx.  -TW     Anticipated Discharge Disposition (PT)  anticipate therapy at next level of care  -TW  anticipate therapy at next level of care  -TW     Recorded by [TW] Juan C Leyva, PTA 07/15/20 1549 [TW] Juan C Leyva, PTA 07/15/20 1320       User Key  (r) = Recorded By, (t) = Taken By, (c) = Cosigned By    Initials Name Effective Dates Discipline    AQ Cathy Cano RN 10/17/16 -  Nurse    TW Juan C Leyva PTA 03/07/18 -  PT    TM Josephine Elias, RN 07/24/18 -  Nurse          Wound 07/08/20 1810 Left upper eyebrow Laceration (Active)   Dressing Appearance open to air 7/15/2020 10:10 AM   Closure Sutures 7/15/2020 10:10 AM   Base maroon/purple 7/14/2020  8:17 PM   Periwound Temperature warm 7/15/2020  3:45 PM   Drainage Amount none 7/15/2020  3:45 PM   Number of Sutures Removed 3 7/15/2020  3:45 PM       Wound 07/09/20 1515 Left posterior hip Incision (Active)   Dressing Appearance no drainage 7/15/2020 10:10 AM   Closure [place]ples 7/15/2020 10:10 AM   Drainage Amount none 7/15/2020 10:10 AM       Rehab Goal Summary     Row Name 07/15/20 1510             Bed Mobility Goal 1 (PT)    Activity/Assistive Device (Bed Mobility Goal 1, PT)  sit to supine;supine to sit  -TW      Jud Level/Cues Needed (Bed Mobility Goal 1, PT)  independent  -TW      Time Frame (Bed Mobility Goal 1, PT)  by discharge  -TW      Progress/Outcomes (Bed Mobility Goal 1, PT)  goal not met  -TW         Transfer Goal 1 (PT)    Activity/Assistive Device (Transfer Goal 1, PT)  sit-to-stand/stand-to-sit;bed-to-chair/chair-to-bed;toilet  -TW      Jud Level/Cues Needed (Transfer Goal 1, PT)  conditional independence  -TW      Time Frame (Transfer Goal 1, PT)  2 - 3 days  -TW       Progress/Outcome (Transfer Goal 1, PT)  goal not met  -TW         Gait Training Goal 1 (PT)    Activity/Assistive Device (Gait Training Goal 1, PT)  gait (walking locomotion);assistive device use;walker, rolling  -TW      [place] Level (Gait Training Goal 1, PT)  conditional independence  -TW      Distance (Gait Goal 1, PT)  372wyr3  -TW      Time Frame (Gait Training Goal 1, PT)  by discharge  -TW      Progress/Outcome (Gait Training Goal 1, PT)  goal not met  -TW         Stairs Goal 1 (PT)    Activity/Assistive Device (Stairs Goal 1, PT)  ascending stairs;descending stairs;using handrail, left;assistive device use  -TW      [place] Level/Cues Needed (Stairs Goal 1, PT)  conditional independence  -TW      Time Frame (Stairs Goal 1, PT)  by discharge  -TW      Progress/Outcome (Stairs Goal 1, PT)  goal not met  -TW        User Key  (r) = Recorded By, (t) = Taken By, (c) = Cosigned By    Initials Name Provider Type Discipline    TW Juan C Leyva PTA Physical Therapy Assistant PT          Physical Therapy Education                 Title: PT OT SLP Therapies (In Progress)     Topic: Physical Therapy (In Progress)     Point: Mobility training (In Progress)     Description:   Instruct learner(s) on safety and technique for assisting patient out of bed, chair or wheelchair.  Instruct in the proper use of assistive devices, such as walker, crutches, cane or brace.              Patient Friendly Description:   It's important to get you on your feet again, but we need to do so in a way that is safe for you. Falling has serious consequences, and your personal safety is the most important thing of all.        When it's time to get out of bed, one of us or a family member will sit next to you on the bed to give you support.     If your doctor or nurse tells you to use a walker, crutches, a cane, or a brace, be sure you use it every time you get out of bed, even if you think you don't need it.    Learning  Progress Summary           Patient Acceptance, E, NR by  at 7/11/2020 1047    Comment:  edu on hip dislocation precautions.    Acceptance, E, NR by [facility] at 7/10/2020 1348                   Point: Home exercise program (Not Started)     Description:   Instruct learner(s) on appropriate technique for monitoring, assisting and/or progressing patient with therapeutic exercises and activities.              Learner Progress:   Not documented in this visit.          Point: Body mechanics (In Progress)     Description:   Instruct learner(s) on proper positioning and spine alignment for patient and/or caregiver during mobility tasks and/or exercises.              Learning Progress Summary           Patient Acceptance, E, NR by [facility] at 7/10/2020 1348                   Point: Precautions (In Progress)     Description:   Instruct learner(s) on prescribed precautions during mobility and gait tasks              Learning Progress Summary           Patient Acceptance, E, NR by  at 7/11/2020 1047    Comment:  edu on hip dislocation precautions.    Acceptance, E, NR by [facility] at 7/10/2020 1348                               User Key     Initials Effective Dates Name Provider Type Discipline    [facility] 04/03/18 -  Angelica Torres, PT Physical Therapist PT     03/07/18 -  Vikash Walters PTA Physical Therapy Assistant PT                PT Recommendation and Plan  Anticipated Discharge Disposition (PT): anticipate therapy at next level of care  Outcome Summary/Treatment Plan (PT)  Daily Summary of Progress (PT): progress toward functional goals is good  Barriers to Overall Progress (PT): bradycardia  Plan for Continued Treatment (PT): Cont as pt able. monitor hr.  Anticipated Discharge Disposition (PT): anticipate therapy at next level of care  Plan of Care Reviewed With: patient  Progress: no change  Outcome Summary: Pt participated in BID tx this date. Pt amb 188ft in am but c/o dizziness in pm and described dizziness with t/f to EOB  that took 5-7 minutes to begin to subside. Pt returned to supine in pm tx and performed LE ther  ex as described. Pt would cont to benefit from tehrapy upon DC.  Outcome Measures     Row Name 07/15/20 1510 07/14/20 1338 07/13/20 1357       How much help from another person do you currently need...    Turning from your back to your side while in flat bed without using bedrails?  3  -TW  3  -TW  3  -TW    Moving from lying on back to sitting on the side of a flat bed without bedrails?  3  -TW  3  -TW  3  -TW    Moving to and from a bed to a chair (including a wheelchair)?  3  -TW  3  -TW  3  -TW    Standing up from a chair using your arms (e.g., wheelchair, bedside chair)?  3  -TW  3  -TW  3  -TW    Climbing 3-5 steps with a railing?  3  -TW  3  -TW  3  -TW    To walk in hospital room?  3  -TW  3  -TW  3  -TW    AM-PAC 6 Clicks Score (PT)  18  -TW  18  -TW  18  -TW    Row Name 07/13/20 0855             How much help from another is currently needed...    Putting on and taking off regular lower body clothing?  1  -RC      Bathing (including washing, rinsing, and drying)  2  -RC      Toileting (which includes using toilet bed pan or urinal)  1  -RC      Putting on and taking off regular upper body clothing  2  -RC      Taking care of personal grooming (such as brushing teeth)  3  -RC      Eating meals  3  -RC      AM-PAC 6 Clicks Score (OT)  12  -RC        User Key  (r) = Recorded By, (t) = Taken By, (c) = Cosigned By    Initials Name Provider Type    TW Juan C Leyva, PTA Physical Therapy Assistant    Evelyn Farley COTA/L Occupational Therapy Assistant         Time Calculation:   PT Charges     Row Name 07/15/20 1552 07/15/20 1320          Time Calculation    Start Time  1510  -TW  0918  -TW     Stop Time  1538  -TW  1000  -TW     Time Calculation (min)  28 min  -TW  42 min  -TW     PT Received On  07/15/20  -TW  07/15/20  -TW     PT Goal Re-Cert Due Date  07/23/20  -TW  07/23/20  -TW        Time  Calculation- PT    Total Timed Code Minutes- PT  28 minute(s)  -TW  42 minute(s)  -TW       User Key  (r) = Recorded By, (t) = Taken By, (c) = Cosigned By    Initials Name Provider Type    TW Juan C Leyva, DANIE Physical Therapy Assistant        Therapy Charges for Today     Code Description Service Date Service Provider Modifiers Qty    06398940259 HC GAIT TRAINING EA 15 MIN 7/14/2020 Juan C Leyva, PTA GP 1    91659147557 HC PT THERAPEUTIC ACT EA 15 MIN 7/14/2020 Juan C Leyva, PTA GP 1    62700713003 HC PT THER PROC EA 15 MIN 7/14/2020 Juan C Leyva, PTA GP 1    54406535058 HC GAIT TRAINING EA 15 MIN 7/14/2020 Juan C Leyva, PTA GP 1    03401603195 HC PT THERAPEUTIC ACT EA 15 MIN 7/14/2020 Juan C Leyva, PTA GP 2    64448556917 HC GAIT TRAINING EA 15 MIN 7/15/2020 Juan C Leyva, PTA GP 1    07678769672 HC PT THERAPEUTIC ACT EA 15 MIN 7/15/2020 Juan C Leyva, PTA GP 1    41012877539 HC PT THER PROC EA 15 MIN 7/15/2020 Juan C Leyva, PTA GP 1    43861928032 HC PT THERAPEUTIC ACT EA 15 MIN 7/15/2020 Juan C Leyva, PTA GP 1    81700205129 HC PT THER PROC EA 15 MIN 7/15/2020 Juan C Leyva, PTA GP 1          PT G-Codes  Outcome Measure Options: AM-PAC 6 Clicks Basic Mobility (PT)  AM-PAC 6 Clicks Score (PT): 18  AM-PAC 6 Clicks Score (OT): 12    Juan C Leyva PTA  7/15/2020

## 2020-07-15 NOTE — PROGRESS NOTES
[facility] Medicine Services  INPATIENT PROGRESS NOTE    Length of Stay: 7  Date of Admission: 7/8/2020  Primary Care Physician: Terry Peterson MD    Subjective   Chief Complaint: Fall  HPI:  85 year old male with a history of ischemic cardiomyopathy, HFrEF, and postural syncope, and HTN who presented to the ED after a syncopal episode which took place after standing from his chair.  He suffered facial trauma and a left hip fracture. CT of the head was negative.  Sutures were placed in the ED to facial laceration.  Orthopedics consulted and recommends hip arthroplasty after cardiac clearance. Cardiology cleared for surgery. He underwent left hip hemiarthroplasty.  Pain is controlled.  No new complaints. Placement for rehab is pending.     Review of Systems   Constitutional: Negative for chills and fever.   Respiratory: Negative for shortness of breath.    Cardiovascular: Negative for chest pain.   Gastrointestinal: Negative for abdominal pain, nausea and vomiting.   Musculoskeletal: Positive for arthralgias (improved).        All pertinent negatives and positives are as above. All other systems have been reviewed and are negative unless otherwise stated.     Objective    Temp:  [97.3 °F (36.3 °C)-98.6 °F (37 °C)] 98.2 °F (36.8 °C)  Heart Rate:  [69-82] 71  Resp:  [18] 18  BP: (123-143)/(58-97) 123/58    Physical Exam   Constitutional: He appears well-developed and well-nourished. No distress.   HENT:   Head: Normocephalic and atraumatic.   Facial lac   Eyes: Conjunctivae are normal.   Cardiovascular: Normal rate, regular rhythm and intact distal pulses.   Pulmonary/Chest: Effort normal and breath sounds normal. No respiratory distress.   Abdominal: Soft. Bowel sounds are normal. He exhibits no distension.   Musculoskeletal: He exhibits no edema or deformity.   Neurological: He is alert.   Skin: Skin is warm and dry. He is not diaphoretic.   Vitals  reviewed.    Results Review:  I have reviewed the labs, radiology results, and diagnostic studies.    Laboratory Data:   Results from last 7 days   Lab Units 07/11/20  0538 07/10/20  0550 07/09/20  0546 07/08/20 1924   SODIUM mmol/L 137 135* 138 139   POTASSIUM mmol/L 4.2 4.2 3.6 3.5   CHLORIDE mmol/L 104 102 103 104   CO2 mmol/L 24.0 25.0 24.0 23.0   BUN mg/dL 20 22 16 17   CREATININE mg/dL 0.91 1.04 0.89 1.07   GLUCOSE mg/dL 92 113* 109* 89   CALCIUM mg/dL 9.3 9.2 9.1 9.3   BILIRUBIN mg/dL  --   --   --  0.2   ALK PHOS U/L  --   --   --  108   ALT (SGPT) U/L  --   --   --  22   AST (SGOT) U/L  --   --   --  32   ANION GAP mmol/L 9.0 8.0 11.0 12.0     Estimated Creatinine Clearance: 59.7 mL/min (by C-G formula based on SCr of 0.91 mg/dL).          Results from last 7 days   Lab Units 07/11/20  0538 07/10/20  0550 07/09/20  0546 07/08/20 1924   WBC 10*3/mm3 11.49* 10.08 11.28* 10.40   HEMOGLOBIN g/dL 9.8* 10.4* 12.2* 12.4*   HEMATOCRIT % 29.2* 30.4* 35.9* 36.8*   PLATELETS 10*3/mm3 144 149 175 203           Culture Data:   No results found for: BLOODCX  No results found for: URINECX  No results found for: RESPCX  No results found for: WOUNDCX  No results found for: STOOLCX  No components found for: BODYFLD    Radiology Data:   Imaging Results (Last 24 Hours)     ** No results found for the last 24 hours. **          I have reviewed the patient's current medications.     Assessment/Plan     Active Hospital Problems    Diagnosis   • **Closed fracture of neck of left femur (CMS/HCC)   • Postoperative anemia due to acute blood loss     Not unexpected     • Facial laceration   • HFrEF (heart failure with reduced ejection fraction) (CMS/HCC)   • Syncope and collapse   • Ischemic cardiomyopathy   • Essential hypertension   • Coronary artery disease involving native coronary artery of native heart without angina pectoris       Plan:   S/P Left hip hemiarthroplasty POD #6  PT/OT   Orthopedics consultation  appreciated  Cardiology consultation appreciated, recommended proceeding with surgery  Echocardiogram reviewed, EF 27%, stable over the last year  Pain control: Scheduled tylenol, PRN ultram, PRN morphine 1 mg q 4 hours   PRN narcan  Aspirin, statin, cozaar, toprol-xl  Facial suture removal today, place steri-strips   VTE PPx: lovenox  Discharge planning: SNF placement pending for rehab to home      The patient was evaluated during the global COVID-19 pandemic, and the diagnosis was suspected/considered upon their initial presentation.  Evaluation, treatment, and testing were consistent with current guidelines for patients who present with complaints or symptoms that may be related to COVID-19.        This document has been electronically signed by JAIME Grewal on July 15, 2020 11:39

## 2020-07-15 NOTE — SIGNIFICANT NOTE
07/15/20 1628   Rehab Treatment   Discipline occupational therapy assistant   Reason Treatment Not Performed other (see comments)   Pt denied OT this date secondary to feeling too tired

## 2020-07-15 NOTE — PAYOR COMM NOTE
"Camilla Ann RN [facility]  [phone]     Phone  [phone]      Fax  Cont stay review          Sylvia Dacosta (85 y.o. Male)     Date of Birth Social Security Number Address Home Phone MRN    [id number]  [address]  [facility] [address] [phone] [id number]    Latter day Marital Status          Oriental orthodox        Admission Date Admission Type Admitting Provider Attending Provider Department, Room/Bed    7/8/20 Emergency Misha Parada MD Haigler, Stuart S, MD [facility] [address] [place], 411/1    Discharge Date Discharge Disposition Discharge Destination                       Attending Provider:  Misha Parada MD    Allergies:  Hydrochlorothiazide    Isolation:  None   Infection:  None   Code Status:  CPR    Ht:  182.9 cm (72\")   Wt:  69.8 kg (153 lb 12.8 oz)    Admission Cmt:  None   Principal Problem:  Closed fracture of neck of left femur (CMS/[facility]) [S72.002A]                 Active Insurance as of 7/8/2020     Primary Coverage     Payor Plan Insurance Group Employer/Plan Group    AETNA MEDICARE REPLACEMENT AETNA MEDICARE REPLACEMENT [id number]     Payor Plan Address Payor Plan Phone Number Payor Plan Fax Number Effective Dates    [address] [phone]  4/1/2019 - None Entered    [facility] [id number]       Subscriber Name Subscriber Birth Date Member ID       SYLVIA DACOSTA [id number]                 Emergency Contacts      (Rel.) Home Phone Work Phone Mobile Phone    AURELIA BOX (Daughter) [phone] -- [phone]    Carla Dacosta (Spouse) [phone] -- [phone]    jefffiordaliza (Grandchild) -- -- [phone]            Vital Signs (last day)     Date/Time   Temp   Temp src   Pulse   Resp   BP   Patient Position   SpO2    07/15/20 1019   --   --   71   18   --   --   --    07/15/20 1010   --   --   69   18   --   --   92    07/15/20 0930   98.6 (37)   Oral   74   18   143/77   Lying   " 96    07/15/20 0338   97.6 (36.4)   Oral   71   18   134/64   Lying   94    07/14/20 2010   97.3 (36.3)   Oral   72   18   142/70   Lying   96    07/14/20 1500   97.4 (36.3)   Oral   82   18   128/97   Lying   94    07/14/20 1111   97.2 (36.2)   Oral   78   18   127/70   Sitting   95    07/14/20 0857   96.1 (35.6)   Infrared   80   16   126/78   Lying   94    07/14/20 0312   98.5 (36.9)   Oral   73   17   142/75   Lying   95              Current Facility-Administered Medications   Medication Dose Route Frequency Provider Last Rate Last Dose   • acetaminophen (TYLENOL) tablet 1,000 mg  1,000 mg Oral 4x Daily Jitendra Solis MD   1,000 mg at 07/15/20 0931   • albuterol (PROVENTIL) nebulizer solution 0.083% 2.5 mg/3mL  2.5 mg Nebulization Q4H PRN Jitendra Solis MD   2.5 mg at 07/15/20 1010   • aspirin EC tablet 81 mg  81 mg Oral Daily Jitendra Solis MD   81 mg at 07/15/20 0930   • atorvastatin (LIPITOR) tablet 20 mg  20 mg Oral Daily Jitendra Solis MD   20 mg at 07/15/20 0931   • enoxaparin (LOVENOX) syringe 30 mg  30 mg Subcutaneous Q24H Jitendra Solis MD   30 mg at 07/14/20 1634   • ipratropium-albuterol (DUO-NEB) nebulizer solution 3 mL  3 mL Nebulization Once Jitendra Solis MD       • losartan (COZAAR) tablet 25 mg  25 mg Oral Daily Jitenrda Solis MD   25 mg at 07/15/20 0931   • metoprolol succinate XL (TOPROL-XL) 24 hr tablet 25 mg  25 mg Oral Daily Jitendra Solis MD   25 mg at 07/15/20 0931   • morphine injection 1 mg  1 mg Intravenous Q4H PRN Jitendra Solis MD       • naloxone (NARCAN) injection 0.4 mg  0.4 mg Intravenous Q5 Min PRN Jitendra Solis MD   0.4 mg at 07/09/20 0841   • nicotine (NICODERM CQ) 21 MG/24HR patch 1 patch  1 patch Transdermal Q24H Lakhwinder Kumar APRN       • ondansetron (ZOFRAN) injection 4 mg  4 mg Intravenous Q6H PRN Jitendra Solis MD       • oxybutynin (DITROPAN) half tablet 2.5 mg  2.5 mg Oral BID  Jitendra Solis MD   2.5 mg at 07/15/20 0931   • pantoprazole (PROTONIX) EC tablet 40 mg  40 mg Oral QAM Jitendra Solis MD   40 mg at 07/15/20 0608   • rOPINIRole (REQUIP) tablet 0.5 mg  0.5 mg Oral Daily Jitendra Solis MD   0.5 mg at 07/15/20 0931   • sodium chloride 0.9 % flush 10 mL  10 mL Intravenous PRN Jitendra Solis MD       • sodium chloride 0.9 % flush 10 mL  10 mL Intravenous Q12H Jitendra Solis MD   10 mL at 07/15/20 0932   • sodium chloride 0.9 % flush 10 mL  10 mL Intravenous PRN Jitendra Solis MD       • traMADol (ULTRAM) tablet 25 mg  25 mg Oral Q6H PRN Jitendra Solis MD   25 mg at 07/15/20 0609     Operative/Procedure Notes (last 24 hours) (Notes from 07/14/20 1050 through 07/15/20 1050)    No notes of this type exist for this encounter.            Physician Progress Notes (last 24 hours) (Notes from 07/14/20 1050 through 07/15/20 1050)      Jitendra Solis MD at 07/14/20 1229        Patient is doing much better.  Awaiting placement.  He can go to rehab anytime from my standpoint.  He continue weightbearing as tolerated.  We will do DVT prophylaxis for a month postoperatively.  Staple move with Steri-Strips 10 to 12 days postoperatively.  I will follow him up in the office in 1 month    Electronically signed by Jitendra Solis MD at 07/14/20 1229       Medical Student Notes (last 24 hours) (Notes from 07/14/20 1050 through 07/15/20 1050)    No notes of this type exist for this encounter.         Consult Notes (last 24 hours) (Notes from 07/14/20 1050 through 07/15/20 1050)    No notes of this type exist for this encounter.

## 2020-07-16 PROCEDURE — 97116 GAIT TRAINING THERAPY: CPT

## 2020-07-16 PROCEDURE — 25010000002 ENOXAPARIN PER 10 MG: Performed by: ORTHOPAEDIC SURGERY

## 2020-07-16 PROCEDURE — 97530 THERAPEUTIC ACTIVITIES: CPT

## 2020-07-16 PROCEDURE — 97535 SELF CARE MNGMENT TRAINING: CPT

## 2020-07-16 RX ADMIN — TRAMADOL HYDROCHLORIDE 25 MG: 50 TABLET, FILM COATED ORAL at 08:19

## 2020-07-16 RX ADMIN — ATORVASTATIN CALCIUM 20 MG: 20 TABLET, FILM COATED ORAL at 08:12

## 2020-07-16 RX ADMIN — SODIUM CHLORIDE, PRESERVATIVE FREE 10 ML: 5 INJECTION INTRAVENOUS at 08:13

## 2020-07-16 RX ADMIN — ASPIRIN 81 MG: 81 TABLET, COATED ORAL at 08:12

## 2020-07-16 RX ADMIN — ROPINIROLE HYDROCHLORIDE 0.5 MG: 0.5 TABLET, FILM COATED ORAL at 08:12

## 2020-07-16 RX ADMIN — ACETAMINOPHEN 1000 MG: 500 TABLET ORAL at 11:41

## 2020-07-16 RX ADMIN — ENOXAPARIN SODIUM 30 MG: 30 INJECTION SUBCUTANEOUS at 14:01

## 2020-07-16 RX ADMIN — Medication 2.5 MG: at 08:12

## 2020-07-16 RX ADMIN — SODIUM CHLORIDE, PRESERVATIVE FREE 10 ML: 5 INJECTION INTRAVENOUS at 20:01

## 2020-07-16 RX ADMIN — LOSARTAN POTASSIUM 25 MG: 25 TABLET, FILM COATED ORAL at 08:12

## 2020-07-16 RX ADMIN — ACETAMINOPHEN 1000 MG: 500 TABLET ORAL at 17:14

## 2020-07-16 RX ADMIN — ACETAMINOPHEN 1000 MG: 500 TABLET ORAL at 21:35

## 2020-07-16 RX ADMIN — TRAMADOL HYDROCHLORIDE 25 MG: 50 TABLET, FILM COATED ORAL at 20:00

## 2020-07-16 RX ADMIN — Medication 2.5 MG: at 20:00

## 2020-07-16 RX ADMIN — METOPROLOL SUCCINATE 25 MG: 25 TABLET, FILM COATED, EXTENDED RELEASE ORAL at 08:12

## 2020-07-16 NOTE — PROGRESS NOTES
[facility] Medicine Services  INPATIENT PROGRESS NOTE    Length of Stay: 8  Date of Admission: 7/8/2020  Primary Care Physician: Terry Peterson MD    Subjective   Chief Complaint: fall with hip fracture  HPI:  85 year old male with a history of ischemic cardiomyopathy, HFrEF, and postural syncope, and HTN who presented to the ED after a syncopal episode which took place after standing from his chair.  He suffered facial trauma and a left hip fracture. CT of the head was negative.  Sutures were placed in the ED to facial laceration.  Orthopedics consulted and recommends hip arthroplasty after cardiac clearance. Cardiology cleared for surgery. He underwent left hip hemiarthroplasty.  Pain is controlled.  No new complaints. Placement for rehab is pending.     Review of Systems   Denies fever chills soa, chest pain, abdominal pain, nausea and emesis  Complains of dry eyes and did not like food today  Reports left hip and leg pain after working with physical therapy and chronically throughout the day    All pertinent negatives and positives are as above. All other systems have been reviewed and are negative unless otherwise stated.     Objective    Temp:  [96.2 °F (35.7 °C)-98.1 °F (36.7 °C)] 96.2 °F (35.7 °C)  Heart Rate:  [59-76] 71  Resp:  [18] 18  BP: (121-159)/(58-77) 136/66    Physical Exam  Awake alert nad  Normocephalic   Clear conjunctiva EOEMI  +breath sounds b no wheezes rales or rhonchi symmetric chest expansion  rrr no mrg  Soft +bs x 4 quadrants no guarding or rebound  No edema, spontaneously moves all 4 extremities with some discomfort upon moving left leg  CN2-12 grossly intact  Skin c/d/i warm  Vitals noted above      Results Review:  I have reviewed the labs, radiology results, and diagnostic studies.    Laboratory Data:   Results from last 7 days   Lab Units 07/11/20  0538 07/10/20  0550   SODIUM mmol/L 137 135*   POTASSIUM mmol/L 4.2 4.2   CHLORIDE  mmol/L 104 102   CO2 mmol/L 24.0 25.0   BUN mg/dL 20 22   CREATININE mg/dL 0.91 1.04   GLUCOSE mg/dL 92 113*   CALCIUM mg/dL 9.3 9.2   ANION GAP mmol/L 9.0 8.0     Estimated Creatinine Clearance: 59.1 mL/min (by C-G formula based on SCr of 0.91 mg/dL).          Results from last 7 days   Lab Units 07/11/20  0538 07/10/20  0550   WBC 10*3/mm3 11.49* 10.08   HEMOGLOBIN g/dL 9.8* 10.4*   HEMATOCRIT % 29.2* 30.4*   PLATELETS 10*3/mm3 144 149           Culture Data:   No results found for: BLOODCX  No results found for: URINECX  No results found for: RESPCX  No results found for: WOUNDCX  No results found for: STOOLCX  No components found for: BODYFLD    Radiology Data:   Imaging Results (Last 24 Hours)     ** No results found for the last 24 hours. **          I have reviewed the patient's current medications.     Assessment/Plan     Active Hospital Problems    Diagnosis   • **Closed fracture of neck of left femur (CMS/[facility])   • Postoperative anemia due to acute blood loss     Not unexpected     • Facial laceration   • HFrEF (heart failure with reduced ejection fraction) (CMS/[facility])   • Syncope and collapse   • Ischemic cardiomyopathy   • Essential hypertension   • Coronary artery disease involving native coronary artery of native heart without angina pectoris       Plan:   POD#7 Left hip hemiarthroplasty  Continue current pain control  Awaiting rehab placement will discuss with Case Management  Will order normal saline drops        The patient was evaluated during the global COVID-19 pandemic, and the diagnosis was suspected/considered upon their initial presentation.  Evaluation, treatment, and testing were consistent with current guidelines for patients who present with complaints or symptoms that may be related to COVID-19.    Discharge Planning: I expect patient to be discharged to rehab when bed is available    @me@

## 2020-07-16 NOTE — THERAPY TREATMENT NOTE
Acute Care - Physical Therapy Treatment Note  [facility]     Patient Name: Jose Dacosta  : 1935  MRN: [id number]  Today's Date: 2020  Onset of Illness/Injury or Date of Surgery: 20(surgery yesterday (2020))     Referring Physician: DAYANARA Kumar APRN(surgeon: DAJA Solis MD )    Admit Date: 2020    Visit Dx:    ICD-10-CM ICD-9-CM   1. Syncope and collapse R55 780.2   2. Facial laceration, initial encounter S01.81XA 873.40   3. Closed fracture of left hip, initial encounter (CMS/HCC) S72.002A 820.8   4. Fall, initial encounter W19.XXXA E888.9   5. Closed head injury, initial encounter S09.90XA 959.01   6. Closed fracture of neck of left femur, initial encounter (CMS/HCC) S72.002A 820.8   7. Impaired functional mobility, balance, gait, and endurance Z74.09 V49.89   8. Impaired mobility and ADLs Z74.09 V49.89    Z78.9      Patient Active Problem List   Diagnosis   • Coronary artery disease involving native coronary artery of native heart without angina pectoris   • Vasovagal syncope   • RBBB (right bundle branch block with left anterior fascicular block)   • Essential hypertension   • Ischemic cardiomyopathy   • Syncope and collapse   • Closed fracture of neck of left femur (CMS/[facility])   • Coronary artery disease with history of myocardial infarction without history of CABG   • Facial laceration   • HFrEF (heart failure with reduced ejection fraction) (CMS/[facility])   • Postoperative anemia due to acute blood loss       Therapy Treatment    Rehabilitation Treatment Summary     Row Name 20 1454 20 1049 20 0900       Treatment Time/Intention    Discipline  physical therapy assistant  -TW  occupational therapy assistant  -LM  physical therapy assistant  -TW    Document Type  therapy note (daily note)  -TW  therapy note (daily note)  -LM  therapy note (daily note)  -TW    Subjective Information  complains of;dizziness States he thinks his eyes are making him dizzy.   -TW  --  complains of;dizziness  -TW    Mode of Treatment  physical therapy;individual therapy  -TW  individual therapy;occupational therapy  -LM  physical therapy;individual therapy  -TW    Patient/Family Observations  --  --  No visitors present.  -TW    Patient Effort  good  -TW  good  -LM  good  -TW    Existing Precautions/Restrictions  hip, posterior;fall  -TW  fall  -LM  hip, posterior  -TW    Recorded by [TW] Juan C Leyva, DANIE 07/16/20 1603 [LM] Alise Mora COTA/JAZZY 07/16/20 1418 [TW] Juan C Leyva, PTA 07/16/20 1350    Row Name 07/16/20 1454 07/16/20 0900          Vital Signs    Pre Systolic BP Rehab  120  -TW  132  -TW     Pre Treatment Diastolic BP  60  -TW  65  -TW     Post Systolic BP Rehab  --  143  -TW     Post Treatment Diastolic BP  --  65  -TW     Pretreatment Heart Rate (beats/min)  70  -TW  77  -TW     Posttreatment Heart Rate (beats/min)  76  -TW  86  -TW     Pre SpO2 (%)  97  -TW  97  -TW     O2 Delivery Pre Treatment  room air  -TW  room air  -TW     Post SpO2 (%)  98  -TW  98  -TW     Pre Patient Position  Supine  -TW  Supine  -TW     Intra Patient Position  Standing  -TW  Standing  -TW     Post Patient Position  Supine  -TW  Supine  -TW     Recorded by [TW] Juan C Leyva, PTA 07/16/20 1603 [TW] Juan C Leyva, PTA 07/16/20 1350     Row Name 07/16/20 1454 07/16/20 1049 07/16/20 0900       Cognitive Assessment/Intervention- PT/OT    Orientation Status (Cognition)  oriented x 4  -TW  oriented x 4  -LM  oriented x 4  -TW    Follows Commands (Cognition)  follows one step commands;over 90% accuracy  -TW  follows one step commands  -LM  follows one step commands;over 90% accuracy  -TW    Personal Safety Interventions  fall prevention program maintained;gait belt;nonskid shoes/slippers when out of bed  -TW  fall prevention program maintained  -LM  fall prevention program maintained;gait belt;nonskid shoes/slippers when out of bed  -TW    Recorded by [TW] Gordon  Juan C PURCELL, PTA 07/16/20 1603 [LM] Alise Mora AGUILAR/JAZZY 07/16/20 1418 [TW] Juan C Leyva, PTA 07/16/20 1350    Row Name 07/16/20 1454 07/16/20 0900          Safety Issues, Functional Mobility    Impairments Affecting Function (Mobility)  balance;endurance/activity tolerance;pain;range of motion (ROM);strength  -TW  balance;endurance/activity tolerance;pain;range of motion (ROM);strength  -TW     Recorded by [TW] Juan C Leyva, PTA 07/16/20 1603 [TW] Juan C Leyva, PTA 07/16/20 1350     Row Name 07/16/20 1454 07/16/20 0900          Mobility Assessment/Intervention    Left Lower Extremity (Weight-bearing Status)  weight-bearing as tolerated (WBAT)  -TW  weight-bearing as tolerated (WBAT)  -TW     Recorded by [TW] Juan C Leyva PTA 07/16/20 1603 [TW] Juan C Leyva, PTA 07/16/20 1350     Row Name 07/16/20 1454 07/16/20 1049 07/16/20 0900       Bed Mobility Assessment/Treatment    Bed Mobility Assessment/Treatment  --  supine-sit;sit-supine  -LM  --    Supine-Sit [place] (Bed Mobility)  supervision;verbal cues  -TW  contact guard  -LM  contact guard  -TW    Sit-Supine [place] (Bed Mobility)  supervision  -TW  contact guard  -LM  contact guard  -TW    Bed Mobility, Safety Issues  decreased use of legs for bridging/pushing  -TW  --  decreased use of legs for bridging/pushing  -TW    Assistive Device (Bed Mobility)  bed rails;head of bed elevated  -TW  --  bed rails;head of bed elevated  -TW    Recorded by [TW] Juan C Leyva, PTA 07/16/20 1603 [LM] Alise Mora AGUILAR/JAZZY 07/16/20 1418 [TW] Juan C Leyva, PTA 07/16/20 1350    Row Name 07/16/20 1049             Functional Mobility    Functional Mobility- Ind. Level  moderate assist (50% patient effort)  -LM      Functional Mobility- Device  rolling walker  -LM      Recorded by [LM] Alise Mora AGUILAR/L 07/16/20 1418      Row Name 07/16/20 1454 07/16/20 1049 07/16/20 0900       Sit-Stand Transfer    Sit-Stand  [place] (Transfers)  contact guard;verbal cues  -TW  contact guard  -LM  contact guard;verbal cues  -TW    Assistive Device (Sit-Stand Transfers)  walker, front-wheeled  -TW  walker, front-wheeled  -LM  walker, front-wheeled  -TW    Recorded by [TW] Juan C Leyva, PTA 07/16/20 1603 [LM] Alies Mora AGUILAR/JAZZY 07/16/20 1418 [TW] Juan C eLyva, PTA 07/16/20 1350    Row Name 07/16/20 1454 07/16/20 1049 07/16/20 0900       Stand-Sit Transfer    Stand-Sit [place] (Transfers)  contact guard  -TW  contact guard  -LM  contact guard  -TW    Assistive Device (Stand-Sit Transfers)  walker, front-wheeled  -TW  walker, front-wheeled  -LM  walker, front-wheeled  -TW    Recorded by [TW] Juan C Leyva PTA 07/16/20 1603 [LM] Alise Mora AGUILAR/JAZZY 07/16/20 1418 [TW] Juan C Leyva, PTA 07/16/20 1350    Row Name 07/16/20 1049             Toilet Transfer    Type (Toilet Transfer)  sit-stand;stand-sit  -LM      [place] Level (Toilet Transfer)  contact guard  -LM      Recorded by [LM] Alise Mora COTA/L 07/16/20 1418      Row Name 07/16/20 1454 07/16/20 0900          Gait/Stairs Assessment/Training    Gait/Stairs Assessment/Training  gait/ambulation assistive device  -TW  gait/ambulation assistive device  -TW     [place] Level (Gait)  verbal cues;contact guard  -TW  verbal cues;contact guard  -TW     Assistive Device (Gait)  walker, front-wheeled  -TW  walker, front-wheeled  -TW     Distance in Feet (Gait)  224ft  -TW  224ft  -TW     Pattern (Gait)  --  3-point  -TW     Deviations/Abnormal Patterns (Gait)  antalgic  -TW  antalgic  -TW     Bilateral Gait Deviations  forward flexed posture  -TW  --     Comment (Gait/Stairs)  Less dizziness this tx with gait.  -TW  Pt c/o dizziness throughout gait but VSS throughout.  -TW     Recorded by [TW] Juan C Leyva, PTA 07/16/20 1603 [TW] Juan C Leyva, PTA 07/16/20 1350     Row Name 07/16/20 1049             Bathing  Assessment/Intervention    Bathing [place] Level  bathing skills;lower body;upper body;contact guard assist  -LM      Recorded by [LM] Alise Mora AGUILAR/L 07/16/20 1418      Row Name 07/16/20 1049             Upper Body Dressing Assessment/Training    Upper Body Dressing [place] Level  upper body dressing skills;doff;don;conditional independence  -LM      Recorded by [LM] Alise Mora AGUILAR/L 07/16/20 1418      Row Name 07/16/20 1049             Lower Body Dressing Assessment/Training    Lower Body Dressing [place] Level  --  -LM      Lower Body Dressing Position  --  -LM      Recorded by [LM] Alise Mora AGUILAR/L 07/16/20 1418      Row Name 07/16/20 1049             Grooming Assessment/Training    [place] Level (Grooming)  grooming skills;hair care, combing/brushing;set up  -LM      Recorded by [LM] Alise Mora AGUILAR/L 07/16/20 1418      Row Name 07/16/20 1454 07/16/20 0900          Positioning and Restraints    Pre-Treatment Position  in bed  -TW  in bed  -TW     Post Treatment Position  bed  -TW  bed  -TW     In Bed  supine;call light within reach;encouraged to call for assist;exit alarm on  -TW  supine;call light within reach;encouraged to call for assist;exit alarm on  -TW     Recorded by [TW] Juan C Leyva, PTA 07/16/20 1603 [TW] Juan C Leyva, PTA 07/16/20 1350     Row Name 07/16/20 1454 07/16/20 0900          Pain Scale: Numbers Pre/Post-Treatment    Pain Scale: Numbers, Pretreatment  0/10 - no pain  -TW  0/10 - no pain  -TW     Pain Scale: Numbers, Post-Treatment  0/10 - no pain  -TW  0/10 - no pain  -TW     Recorded by [TW] Juan C Leyva PTA 07/16/20 1603 [TW] Juan C Leyva, PTA 07/16/20 1350     Row Name                Wound 07/08/20 1810 Left upper eyebrow Laceration    Wound - Properties Group Date first assessed: 07/08/20 [TM] Time first assessed: 1810 [TM] Present on Hospital Admission: Y [TM] Side: Left [TM] Orientation: upper  [TM] Location: eyebrow [TM] Primary Wound Type: Laceration [TM] Recorded by:  [TM] Josephine Elias, RN 07/08/20 1811    Row Name                Wound 07/09/20 1515 Left posterior hip Incision    Wound - Properties Group Date first assessed: 07/09/20 [AQ] Time first assessed: 1515 [AQ] Present on Hospital Admission: N [AQ] Side: Left [AQ] Orientation: posterior [AQ] Location: hip [AQ] Primary Wound Type: Incision [AQ] Recorded by:  [AQ] Cathy Cano RN 07/09/20 1515    Row Name 07/16/20 1049             Outcome Summary/Treatment Plan (OT)    Daily Summary of Progress (OT)  progress towards functional goals is fair  -LM      Recorded by [LM] Alise Mora COTA/L 07/16/20 1418      Row Name 07/16/20 1454 07/16/20 0900          Outcome Summary/Treatment Plan (PT)    Daily Summary of Progress (PT)  progress toward functional goals is good  -TW  progress toward functional goals is good  -TW     Plan for Continued Treatment (PT)  Cont  -TW  Cont  -TW     Anticipated Discharge Disposition (PT)  anticipate therapy at next level of care  -TW  anticipate therapy at next level of care  -TW     Recorded by [TW] Juan C Leyva, PTA 07/16/20 1603 [TW] Juan C Leyva, PTA 07/16/20 1350       User Key  (r) = Recorded By, (t) = Taken By, (c) = Cosigned By    Initials Name Effective Dates Discipline    AQ Cathy Cano RN 10/17/16 -  Nurse    TW Juan C Leyva PTA 03/07/18 -  PT    LM Alise Mora AGUILAR/L 03/07/18 -  OT    TM Josephine Elias, RN 07/24/18 -  Nurse          Wound 07/08/20 1810 Left upper eyebrow Laceration (Active)   Dressing Appearance open to air 7/16/2020  8:49 AM   Closure Adhesive closure strips;Approximated 7/15/2020  8:14 PM   Base maroon/purple 7/15/2020  8:14 PM   Drainage Amount none 7/15/2020  8:14 PM       Wound 07/09/20 1515 Left posterior hip Incision (Active)   Dressing Appearance open to air 7/16/2020  8:49 AM   Closure [place] 7/16/2020  8:49 AM    Base dry;clean 7/16/2020  8:49 AM   Drainage Amount none 7/15/2020  8:14 PM       Rehab Goal Summary     Row Name 07/16/20 1454 07/16/20 1419          Bed Mobility Goal 1 (PT)    Activity/Assistive Device (Bed Mobility Goal 1, PT)  sit to supine;supine to sit  -TW  --     [place] Level/Cues Needed (Bed Mobility Goal 1, PT)  independent  -TW  --     Time Frame (Bed Mobility Goal 1, PT)  by discharge  -TW  --     Progress/Outcomes (Bed Mobility Goal 1, PT)  goal not met  -TW  --        Transfer Goal 1 (PT)    Activity/Assistive Device (Transfer Goal 1, PT)  sit-to-stand/stand-to-sit;bed-to-chair/chair-to-bed;toilet  -TW  --     [place] Level/Cues Needed (Transfer Goal 1, PT)  conditional independence  -TW  --     Time Frame (Transfer Goal 1, PT)  2 - 3 days  -TW  --     Progress/Outcome (Transfer Goal 1, PT)  goal not met  -TW  --        Gait Training Goal 1 (PT)    Activity/Assistive Device (Gait Training Goal 1, PT)  gait (walking locomotion);assistive device use;walker, rolling  -TW  --     [place] Level (Gait Training Goal 1, PT)  conditional independence  -TW  --     Distance (Gait Goal 1, PT)  [id number]  -TW  --     Time Frame (Gait Training Goal 1, PT)  by discharge  -TW  --     Progress/Outcome (Gait Training Goal 1, PT)  goal not met  -TW  --        Stairs Goal 1 (PT)    Activity/Assistive Device (Stairs Goal 1, PT)  ascending stairs;descending stairs;using handrail, left;assistive device use  -TW  --     [place] Level/Cues Needed (Stairs Goal 1, PT)  conditional independence  -TW  --     Time Frame (Stairs Goal 1, PT)  by discharge  -TW  --     Progress/Outcome (Stairs Goal 1, PT)  goal not met  -TW  --        Occupational Therapy Goals    Transfer Goal Selection (OT)  --  transfer, OT goal 1  -LM     Bathing Goal Selection (OT)  --  bathing, OT goal 1  -LM     Dressing Goal Selection (OT)  --  dressing, OT goal 1  -LM     Toileting Goal Selection (OT)  --  toileting, OT goal 1  -LM      Endurance Goal Selection (OT)  --  endurance, OT goal 1  -LM     Safety Awareness Goal Selection (OT)  --  safety awareness, OT goal 1  -LM        Transfer Goal 1 (OT)    Activity/Assistive Device (Transfer Goal 1, OT)  --  toilet  -LM     [place] Level/Cues Needed (Transfer Goal 1, OT)  --  contact guard assist  -LM     Time Frame (Transfer Goal 1, OT)  --  long term goal (LTG);by discharge  -LM     Barriers (Transfers Goal 1, OT)  --  hearing deficit;safety deficit   -LM     Progress/Outcome (Transfer Goal 1, OT)  --  goal not met  -LM        Bathing Goal 1 (OT)    Activity/Assistive Device (Bathing Goal 1, OT)  --  bathing skills, all  -LM     [place] Level/Cues Needed (Bathing Goal 1, OT)  --  minimum assist (75% or more patient effort)  -LM     Time Frame (Bathing Goal 1, OT)  --  long term goal (LTG);by discharge  -LM     Progress/Outcomes (Bathing Goal 1, OT)  --  goal not met  -LM        Dressing Goal 1 (OT)    Activity/Assistive Device (Dressing Goal 1, OT)  --  dressing skills, all  -LM     [place]/Cues Needed (Dressing Goal 1, OT)  --  minimum assist (75% or more patient effort)  -LM     Time Frame (Dressing Goal 1, OT)  --  long term goal (LTG);by discharge  -LM     Progress/Outcome (Dressing Goal 1, OT)  --  goal not met  -LM        Toileting Goal 1 (OT)    Activity/Device (Toileting Goal 1, OT)  --  toileting skills, all  -LM     [place] Level/Cues Needed (Toileting Goal 1, OT)  --  minimum assist (75% or more patient effort)  -LM     Time Frame (Toileting Goal 1, OT)  --  long term goal (LTG);by discharge  -LM     Progress/Outcome (Toileting Goal 1, OT)  --  goal not met  -LM         Endurance Goal 1 (OT)    Progress/Outcome (Endurance Goal 1, OT)  --  goal not met  -LM        Safety Awareness Goal 1 (OT)    Activity (Safety Awareness Goal 1, OT)  --  awareness of need for assistance;impulse control;insight into deficits/self awareness;judgment;safe use of assistive  device/equipment;follow through of safety precautions;demonstrates compliance;demonstrates understanding;demonstration of safe behaviors  -LM     [place]/Cues/Accuracy (Safety Awareness Goal 1, OT)  --  with minimum;verbal cues/redirection;with repetition of directions;with 90% accuracy  -LM     Time Frame (Safety Awareness Goal 1, OT)  --  long term goal (LTG);by discharge  -LM     Progress/Outcome (Safety Awareness Goal 1, OT)  --  goal not met  -LM       User Key  (r) = Recorded By, (t) = Taken By, (c) = Cosigned By    Initials Name Provider Type Discipline    TW Juan C Leyva, PTA Physical Therapy Assistant PT    LM Alise Mora, JEFF/L Occupational Therapy Assistant OT          Physical Therapy Education                 Title: PT OT SLP Therapies (In Progress)     Topic: Physical Therapy (In Progress)     Point: Mobility training (In Progress)     Description:   Instruct learner(s) on safety and technique for assisting patient out of bed, chair or wheelchair.  Instruct in the proper use of assistive devices, such as walker, crutches, cane or brace.              Patient Friendly Description:   It's important to get you on your feet again, but we need to do so in a way that is safe for you. Falling has serious consequences, and your personal safety is the most important thing of all.        When it's time to get out of bed, one of us or a family member will sit next to you on the bed to give you support.     If your doctor or nurse tells you to use a walker, crutches, a cane, or a brace, be sure you use it every time you get out of bed, even if you think you don't need it.    Learning Progress Summary           Patient Acceptance, E, NR by LILA at 7/11/2020 1047    Comment:  edu on hip dislocation precautions.    Acceptance, E, NR by LILA1 at 7/10/2020 1348                   Point: Home exercise program (Not Started)     Description:   Instruct learner(s) on appropriate technique for monitoring,  assisting and/or progressing patient with therapeutic exercises and activities.              Learner Progress:   Not documented in this visit.          Point: Body mechanics (In Progress)     Description:   Instruct learner(s) on proper positioning and spine alignment for patient and/or caregiver during mobility tasks and/or exercises.              Learning Progress Summary           Patient Acceptance, E, NR by [facility] at 7/10/2020 1348                   Point: Precautions (In Progress)     Description:   Instruct learner(s) on prescribed precautions during mobility and gait tasks              Learning Progress Summary           Patient Acceptance, E, NR by  at 7/11/2020 1047    Comment:  edu on hip dislocation precautions.    Acceptance, E, NR by [facility] at 7/10/2020 1348                               User Key     Initials Effective Dates Name Provider Type Discipline    [facility] 04/03/18 -  Angelica Torres, PT Physical Therapist PT     03/07/18 -  Vikash Walters PTA Physical Therapy Assistant PT                PT Recommendation and Plan  Anticipated Discharge Disposition (PT): anticipate therapy at next level of care  Outcome Summary/Treatment Plan (PT)  Daily Summary of Progress (PT): progress toward functional goals is good  Barriers to Overall Progress (PT): bradycardia  Plan for Continued Treatment (PT): Cont  Anticipated Discharge Disposition (PT): anticipate therapy at next level of care  Plan of Care Reviewed With: patient  Progress: improving  Outcome Summary: Pt participated in BID tx this date. Pt able to stand with CGA/SBA and amb with RW and CGA with VCs needed for walker placement and postural control. Pt amb 224ft each session with no c/o  pain noted after. Pt does get impulsive at times butresponds well with VC and redirection. Pt would cont to benefit from tehrapy upon DC.  Outcome Measures     Row Name 07/16/20 1454 07/15/20 1510 07/14/20 1338       How much help from another person do you currently  need...    Turning from your back to your side while in flat bed without using bedrails?  3  -TW  3  -TW  3  -TW    Moving from lying on back to sitting on the side of a flat bed without bedrails?  3  -TW  3  -TW  3  -TW    Moving to and from a bed to a chair (including a wheelchair)?  3  -TW  3  -TW  3  -TW    Standing up from a chair using your arms (e.g., wheelchair, bedside chair)?  3  -TW  3  -TW  3  -TW    Climbing 3-5 steps with a railing?  3  -TW  3  -TW  3  -TW    To walk in hospital room?  3  -TW  3  -TW  3  -TW    AM-PAC 6 Clicks Score (PT)  18  -TW  18  -TW  18  -TW      User Key  (r) = Recorded By, (t) = Taken By, (c) = Cosigned By    Initials Name Provider Type    Juan C Hawk PTA Physical Therapy Assistant         Time Calculation:   PT Charges     Row Name 07/16/20 1606 07/16/20 1350          Time Calculation    Start Time  1454  -TW  0900  -TW     Stop Time  1520  -TW  0929  -TW     Time Calculation (min)  26 min  -TW  29 min  -TW     PT Received On  07/16/20  -TW  07/16/20  -TW     PT Goal Re-Cert Due Date  07/23/20  -TW  07/23/20  -TW        Time Calculation- PT    Total Timed Code Minutes- PT  26 minute(s)  -TW  29 minute(s)  -TW       User Key  (r) = Recorded By, (t) = Taken By, (c) = Cosigned By    Initials Name Provider Type    Juan C Hawk PTA Physical Therapy Assistant        Therapy Charges for Today     Code Description Service Date Service Provider Modifiers Qty    18686869530 HC GAIT TRAINING EA 15 MIN 7/15/2020 Juan C Leyva, PTA GP 1    05129626143 HC PT THERAPEUTIC ACT EA 15 MIN 7/15/2020 Juan C Leyva, PTA GP 1    48775313208 HC PT THER PROC EA 15 MIN 7/15/2020 Juan C Leyva, PTA GP 1    76917002384 HC PT THERAPEUTIC ACT EA 15 MIN 7/15/2020 Juan C Leyva, PTA GP 1    89972482425 HC PT THER PROC EA 15 MIN 7/15/2020 Juan C Leyva, PTA GP 1    49050591055 HC GAIT TRAINING EA 15 MIN 7/16/2020 Juan C Leyva, PTA GP 1    57396092831  HC PT THERAPEUTIC ACT EA 15 MIN 7/16/2020 Juan C Leyva, PTA GP 1    35261122002 HC GAIT TRAINING EA 15 MIN 7/16/2020 Juan C Leyva, PTA GP 1    63816928884 HC PT THERAPEUTIC ACT EA 15 MIN 7/16/2020 Juan C Leyva, PTA GP 1          PT G-Codes  Outcome Measure Options: AM-PAC 6 Clicks Basic Mobility (PT)  AM-PAC 6 Clicks Score (PT): 18  AM-PAC 6 Clicks Score (OT): 12    Juan C Leyva PTA  7/16/2020

## 2020-07-16 NOTE — PLAN OF CARE
Problem: Patient Care Overview  Goal: Plan of Care Review  Outcome: Ongoing (interventions implemented as appropriate)  Flowsheets (Taken 7/16/2020 1841)  Progress: improving  Plan of Care Reviewed With: patient  Outcome Summary: Pt participated in BID tx this date. Pt able to stand with CGA/SBA and amb with RW and CGA with VCs needed for walker placement and postural control. Pt amb 224ft each session with no c/o  pain noted after. Pt does get impulsive at times butresponds well with VC and redirection. Pt would cont to benefit from tehrapy upon DC.

## 2020-07-16 NOTE — PLAN OF CARE
Problem: Patient Care Overview  Goal: Plan of Care Review  Outcome: Ongoing (interventions implemented as appropriate)  Flowsheets (Taken 7/16/2020 6141)  Progress: improving  Plan of Care Reviewed With: patient  Outcome Summary: pt perf well wih OT  pt perf adl with Ot  pt perf well with OT cont toward all goals adl bathing [place].min a

## 2020-07-16 NOTE — THERAPY TREATMENT NOTE
Acute Care - Occupational Therapy Treatment Note  [facility]     Patient Name: Jose Dacosta  : 1935  MRN: [id number]  Today's Date: 2020  Onset of Illness/Injury or Date of Surgery: 20(surgery yesterday (2020))  Date of Referral to OT: 07/10/20  Referring Physician: DAYANARA Kumar APRN(surgeon: DAJA Solis MD )    Admit Date: 2020       ICD-10-CM ICD-9-CM   1. Syncope and collapse R55 780.2   2. Facial laceration, initial encounter S01.81XA 873.40   3. Closed fracture of left hip, initial encounter (CMS/HCC) S72.002A 820.8   4. Fall, initial encounter W19.XXXA E888.9   5. Closed head injury, initial encounter S09.90XA 959.01   6. Closed fracture of neck of left femur, initial encounter (CMS/HCC) S72.002A 820.8   7. Impaired functional mobility, balance, gait, and endurance Z74.09 V49.89   8. Impaired mobility and ADLs Z74.09 V49.89    Z78.9      Patient Active Problem List   Diagnosis   • Coronary artery disease involving native coronary artery of native heart without angina pectoris   • Vasovagal syncope   • RBBB (right bundle branch block with left anterior fascicular block)   • Essential hypertension   • Ischemic cardiomyopathy   • Syncope and collapse   • Closed fracture of neck of left femur (CMS/HCC)   • Coronary artery disease with history of myocardial infarction without history of CABG   • Facial laceration   • HFrEF (heart failure with reduced ejection fraction) (CMS/HCC)   • Postoperative anemia due to acute blood loss     Past Medical History:   Diagnosis Date   • Abnormal ECG      Past Surgical History:   Procedure Laterality Date   • CORONARY STENT PLACEMENT     • HIP HEMIARTHROPLASTY Left 2020    Procedure: LEFT HIP HEMIARTHROPLASTY;  Surgeon: Jitendra Solis MD;  Location: [facility];  Service: Orthopedics;  Laterality: Left;       Therapy Treatment    Rehabilitation Treatment Summary     Row Name 20 1049 20 0900          Treatment  Time/Intention    Discipline  occupational therapy assistant  -LM  physical therapy assistant  -TW     Document Type  therapy note (daily note)  -LM  therapy note (daily note)  -TW     Subjective Information  --  complains of;dizziness  -TW     Mode of Treatment  individual therapy;occupational therapy  -LM  physical therapy;individual therapy  -TW     Patient/Family Observations  --  No visitors present.  -TW     Patient Effort  good  -LM  good  -TW     Existing Precautions/Restrictions  fall  -LM  hip, posterior  -TW     Recorded by [LM] Alise Mora COTA/L 07/16/20 1418 [TW] Juan C Leyva, DANIE 07/16/20 1350     Row Name 07/16/20 0900             Vital Signs    Pre Systolic BP Rehab  132  -TW      Pre Treatment Diastolic BP  65  -TW      Post Systolic BP Rehab  143  -TW      Post Treatment Diastolic BP  65  -TW      Pretreatment Heart Rate (beats/min)  77  -TW      Posttreatment Heart Rate (beats/min)  86  -TW      Pre SpO2 (%)  97  -TW      O2 Delivery Pre Treatment  room air  -TW      Post SpO2 (%)  98  -TW      Pre Patient Position  Supine  -TW      Intra Patient Position  Standing  -TW      Post Patient Position  Supine  -TW      Recorded by [TW] Juan C Leyva PTA 07/16/20 1350      Row Name 07/16/20 1049 07/16/20 0900          Cognitive Assessment/Intervention- PT/OT    Orientation Status (Cognition)  oriented x 4  -LM  oriented x 4  -TW     Follows Commands (Cognition)  follows one step commands  -LM  follows one step commands;over 90% accuracy  -TW     Personal Safety Interventions  fall prevention program maintained  -LM  fall prevention program maintained;gait belt;nonskid shoes/slippers when out of bed  -TW     Recorded by [LM] Alise Mora COTA/L 07/16/20 1418 [TW] Juan C Leyva PTA 07/16/20 1350     Row Name 07/16/20 0900             Safety Issues, Functional Mobility    Impairments Affecting Function (Mobility)  balance;endurance/activity tolerance;pain;range of  motion (ROM);strength  -TW      Recorded by [TW] Juan C Leyva, PTA 07/16/20 1350      Row Name 07/16/20 0900             Mobility Assessment/Intervention    Left Lower Extremity (Weight-bearing Status)  weight-bearing as tolerated (WBAT)  -TW      Recorded by [TW] Juan C Leyva, PTA 07/16/20 1350      Row Name 07/16/20 1049 07/16/20 0900          Bed Mobility Assessment/Treatment    Bed Mobility Assessment/Treatment  supine-sit;sit-supine  -LM  --     Supine-Sit [place] (Bed Mobility)  contact guard  -LM  contact guard  -TW     Sit-Supine [place] (Bed Mobility)  contact guard  -LM  contact guard  -TW     Bed Mobility, Safety Issues  --  decreased use of legs for bridging/pushing  -TW     Assistive Device (Bed Mobility)  --  bed rails;head of bed elevated  -TW     Recorded by [LM] Alise Mora AGUILAR/L 07/16/20 1418 [TW] Juan C Leyva, PTA 07/16/20 1350     Row Name 07/16/20 1049             Functional Mobility    Functional Mobility- Ind. Level  moderate assist (50% patient effort)  -LM      Functional Mobility- Device  rolling walker  -LM      Recorded by [LM] Alise Mora AGUILAR/L 07/16/20 1418      Row Name 07/16/20 1049 07/16/20 0900          Sit-Stand Transfer    Sit-Stand [place] (Transfers)  contact guard  -LM  contact guard;verbal cues  -TW     Assistive Device (Sit-Stand Transfers)  walker, front-wheeled  -LM  walker, front-wheeled  -TW     Recorded by [LM] Alise Mora AGUILAR/L 07/16/20 1418 [TW] Juan C Leyva, PTA 07/16/20 1350     Row Name 07/16/20 1049 07/16/20 0900          Stand-Sit Transfer    Stand-Sit [place] (Transfers)  contact guard  -LM  contact guard  -TW     Assistive Device (Stand-Sit Transfers)  walker, front-wheeled  -LM  walker, front-wheeled  -TW     Recorded by [LM] Alise Mora AGUILAR/L 07/16/20 1418 [TW] Juan C Leyva PTA 07/16/20 1350     Row Name 07/16/20 1049             Toilet Transfer    Type (Toilet  Transfer)  sit-stand;stand-sit  -LM      [place] Level (Toilet Transfer)  contact guard  -LM      Recorded by [LM] Alise Mora AGUILAR/L 07/16/20 1418      Row Name 07/16/20 0900             Gait/Stairs Assessment/Training    Gait/Stairs Assessment/Training  gait/ambulation assistive device  -TW      [place] Level (Gait)  verbal cues;contact guard  -TW      Assistive Device (Gait)  walker, front-wheeled  -TW      Distance in Feet (Gait)  224ft  -TW      Pattern (Gait)  3-point  -TW      Deviations/Abnormal Patterns (Gait)  antalgic  -TW      Comment (Gait/Stairs)  Pt c/o dizziness throughout gait but VSS throughout.  -TW      Recorded by [TW] Juan C Leyva, PTA 07/16/20 1350      Row Name 07/16/20 1049             Bathing Assessment/Intervention    Bathing [place] Level  bathing skills;lower body;upper body;contact guard assist  -LM      Recorded by [LM] Alise Mora AGUILAR/L 07/16/20 1418      Row Name 07/16/20 1049             Upper Body Dressing Assessment/Training    Upper Body Dressing [place] Level  upper body dressing skills;doff;don;conditional independence  -LM      Recorded by [LM] Alise Mora AGUILAR/L 07/16/20 1418      Row Name 07/16/20 1049             Lower Body Dressing Assessment/Training    Lower Body Dressing [place] Level  --  -LM      Lower Body Dressing Position  --  -LM      Recorded by [LM] Alise Mora AGUILAR/L 07/16/20 1418      Row Name 07/16/20 1049             Grooming Assessment/Training    [place] Level (Grooming)  grooming skills;hair care, combing/brushing;set up  -LM      Recorded by [LM] Alise Mora AGUILAR/L 07/16/20 1418      Row Name 07/16/20 0900             Positioning and Restraints    Pre-Treatment Position  in bed  -TW      Post Treatment Position  bed  -TW      In Bed  supine;call light within reach;encouraged to call for assist;exit alarm on  -TW      Recorded by [TW] Juan C Leyva, PTA 07/16/20 1356       Row Name 07/16/20 0900             Pain Scale: Numbers Pre/Post-Treatment    Pain Scale: Numbers, Pretreatment  0/10 - no pain  -TW      Pain Scale: Numbers, Post-Treatment  0/10 - no pain  -TW      Recorded by [TW] Juan C Leyva PTA 07/16/20 1350      Row Name                Wound 07/08/20 1810 Left upper eyebrow Laceration    Wound - Properties Group Date first assessed: 07/08/20 [TM] Time first assessed: 1810 [TM] Present on Hospital Admission: Y [TM] Side: Left [TM] Orientation: upper [TM] Location: eyebrow [TM] Primary Wound Type: Laceration [TM] Recorded by:  [TM] Josephine Elias RN 07/08/20 1811    Row Name                Wound 07/09/20 1515 Left posterior hip Incision    Wound - Properties Group Date first assessed: 07/09/20 [AQ] Time first assessed: 1515 [AQ] Present on Hospital Admission: N [AQ] Side: Left [AQ] Orientation: posterior [AQ] Location: hip [AQ] Primary Wound Type: Incision [AQ] Recorded by:  [AQ] Cathy Cano RN 07/09/20 1515    Row Name 07/16/20 1049             Outcome Summary/Treatment Plan (OT)    Daily Summary of Progress (OT)  progress towards functional goals is fair  -LM      Recorded by [LM] Alise Mora COTA/L 07/16/20 1418      Row Name 07/16/20 0900             Outcome Summary/Treatment Plan (PT)    Daily Summary of Progress (PT)  progress toward functional goals is good  -TW      Plan for Continued Treatment (PT)  Cont  -TW      Anticipated Discharge Disposition (PT)  anticipate therapy at next level of care  -TW      Recorded by [TW] Juan C Leyva PTA 07/16/20 1350        User Key  (r) = Recorded By, (t) = Taken By, (c) = Cosigned By    Initials Name Effective Dates Discipline    AQ Cathy Cano RN 10/17/16 -  Nurse    TW Juan C Leyva PTA 03/07/18 -  PT    LM Alise Mora AGUILAR/L 03/07/18 -  OT    TM Josephine Elias RN 07/24/18 -  Nurse        Wound 07/08/20 1810 Left upper eyebrow Laceration (Active)   Dressing  Appearance open to air 7/16/2020  8:49 AM   Closure Adhesive closure strips;Approximated 7/15/2020  8:14 PM   Base maroon/purple 7/15/2020  8:14 PM   Periwound Temperature warm 7/15/2020  3:45 PM   Drainage Amount none 7/15/2020  8:14 PM   Number of Sutures Removed 3 7/15/2020  3:45 PM       Wound 07/09/20 1515 Left posterior hip Incision (Active)   Dressing Appearance open to air 7/16/2020  8:49 AM   Closure Staples 7/16/2020  8:49 AM   Base dry;clean 7/16/2020  8:49 AM   Drainage Amount none 7/15/2020  8:14 PM     Rehab Goal Summary     Row Name 07/16/20 1419             Occupational Therapy Goals    Transfer Goal Selection (OT)  transfer, OT goal 1  -LM      Bathing Goal Selection (OT)  bathing, OT goal 1  -LM      Dressing Goal Selection (OT)  dressing, OT goal 1  -LM      Toileting Goal Selection (OT)  toileting, OT goal 1  -LM      Endurance Goal Selection (OT)  endurance, OT goal 1  -LM      Safety Awareness Goal Selection (OT)  safety awareness, OT goal 1  -LM         Transfer Goal 1 (OT)    Activity/Assistive Device (Transfer Goal 1, OT)  toilet  -LM      [place] Level/Cues Needed (Transfer Goal 1, OT)  contact guard assist  -LM      Time Frame (Transfer Goal 1, OT)  long term goal (LTG);by discharge  -LM      Barriers (Transfers Goal 1, OT)  hearing deficit;safety deficit   -LM      Progress/Outcome (Transfer Goal 1, OT)  goal not met  -LM         Bathing Goal 1 (OT)    Activity/Assistive Device (Bathing Goal 1, OT)  bathing skills, all  -LM      [place] Level/Cues Needed (Bathing Goal 1, OT)  minimum assist (75% or more patient effort)  -LM      Time Frame (Bathing Goal 1, OT)  long term goal (LTG);by discharge  -LM      Progress/Outcomes (Bathing Goal 1, OT)  goal not met  -LM         Dressing Goal 1 (OT)    Activity/Assistive Device (Dressing Goal 1, OT)  dressing skills, all  -LM      [place]/Cues Needed (Dressing Goal 1, OT)  minimum assist (75% or more patient effort)  -LM      Time  Frame (Dressing Goal 1, OT)  long term goal (LTG);by discharge  -LM      Progress/Outcome (Dressing Goal 1, OT)  goal not met  -LM         Toileting Goal 1 (OT)    Activity/Device (Toileting Goal 1, OT)  toileting skills, all  -LM      [place] Level/Cues Needed (Toileting Goal 1, OT)  minimum assist (75% or more patient effort)  -LM      Time Frame (Toileting Goal 1, OT)  long term goal (LTG);by discharge  -LM      Progress/Outcome (Toileting Goal 1, OT)  goal not met  -LM          Endurance Goal 1 (OT)    Progress/Outcome (Endurance Goal 1, OT)  goal not met  -LM         Safety Awareness Goal 1 (OT)    Activity (Safety Awareness Goal 1, OT)  awareness of need for assistance;impulse control;insight into deficits/self awareness;judgment;safe use of assistive device/equipment;follow through of safety precautions;demonstrates compliance;demonstrates understanding;demonstration of safe behaviors  -LM      [place]/Cues/Accuracy (Safety Awareness Goal 1, OT)  with minimum;verbal cues/redirection;with repetition of directions;with 90% accuracy  -LM      Time Frame (Safety Awareness Goal 1, OT)  long term goal (LTG);by discharge  -LM      Progress/Outcome (Safety Awareness Goal 1, OT)  goal not met  -LM        User Key  (r) = Recorded By, (t) = Taken By, (c) = Cosigned By    Initials Name Provider Type Discipline    LM Alise Mora COTA/L Occupational Therapy Assistant OT        Occupational Therapy Education                 Title: PT OT SLP Therapies (In Progress)     Topic: Occupational Therapy (In Progress)     Point: ADL training (In Progress)     Description:   Instruct learner(s) on proper safety adaptation and remediation techniques during self care or transfers.   Instruct in proper use of assistive devices.              Learning Progress Summary           Patient Acceptance, E,D,H, NR by  at 7/11/2020 7979    Comment:  much review and issued handout for HIP precautions, edu on use of lh ae will  need reinforcement.    Acceptance, E, NR by ME at 7/10/2020 1503    Comment:  Educated on OT and POC. Educated to call for assistance. Educated on safety precautions. Educated on proper body mechanics for transfers, bed mobility, ADLs, and funcitonal mobility.                   Point: Home exercise program (Not Started)     Description:   Instruct learner(s) on appropriate technique for monitoring, assisting and/or progressing therapeutic exercises/activities.              Learner Progress:   Not documented in this visit.          Point: Precautions (In Progress)     Description:   Instruct learner(s) on prescribed precautions during self-care and functional transfers.              Learning Progress Summary           Patient Acceptance, E, NR by RC at 7/13/2020 1528    Acceptance, E, NR by RC at 7/12/2020 1503    Comment:  multi review of hip precautions    Acceptance, E,D,H, NR by  at 7/11/2020 1429    Comment:  much review and issued handout for HIP precautions, edu on use of lh ae will need reinforcement.    Acceptance, E, NR by ME at 7/10/2020 1503    Comment:  Educated on OT and POC. Educated to call for assistance. Educated on safety precautions. Educated on proper body mechanics for transfers, bed mobility, ADLs, and funcitonal mobility.                   Point: Body mechanics (In Progress)     Description:   Instruct learner(s) on proper positioning and spine alignment during self-care, functional mobility activities and/or exercises.              Learning Progress Summary           Patient Acceptance, E, NR by RC at 7/13/2020 1528    Acceptance, E,D,H, NR by  at 7/11/2020 1429    Comment:  much review and issued handout for HIP precautions, edu on use of lh ae will need reinforcement.    Acceptance, E, NR by ME at 7/10/2020 1503    Comment:  Educated on OT and POC. Educated to call for assistance. Educated on safety precautions. Educated on proper body mechanics for transfers, bed mobility, ADLs, and  funcitonal mobility.                               User Key     Initials Effective Dates Name Provider Type Discipline     03/07/18 -  Evelyn Dodson COTA/L Occupational Therapy Assistant OT    ME 09/10/19 -  Eitan Haynes OT Occupational Therapist OT                OT Recommendation and Plan  Outcome Summary/Treatment Plan (OT)  Daily Summary of Progress (OT): progress towards functional goals is fair  Daily Summary of Progress (OT): progress towards functional goals is fair  Plan of Care Review  Plan of Care Reviewed With: patient  Plan of Care Reviewed With: patient  Outcome Summary: pt perf well wih OT  pt perf adl with Ot  pt perf well with OT cont toward all goals adl bathing meek.min a  Outcome Measures     Row Name 07/15/20 1510 07/14/20 1338          How much help from another person do you currently need...    Turning from your back to your side while in flat bed without using bedrails?  3  -TW  3  -TW     Moving from lying on back to sitting on the side of a flat bed without bedrails?  3  -TW  3  -TW     Moving to and from a bed to a chair (including a wheelchair)?  3  -TW  3  -TW     Standing up from a chair using your arms (e.g., wheelchair, bedside chair)?  3  -TW  3  -TW     Climbing 3-5 steps with a railing?  3  -TW  3  -TW     To walk in hospital room?  3  -TW  3  -TW     AM-PAC 6 Clicks Score (PT)  18  -TW  18  -TW       User Key  (r) = Recorded By, (t) = Taken By, (c) = Cosigned By    Initials Name Provider Type    TW Juan C Leyva, PTA Physical Therapy Assistant           Time Calculation:   Time Calculation- OT     Row Name 07/16/20 1400             Time Calculation- OT    OT Start Time  1049  -LM      OT Stop Time  1130  -LM      OT Time Calculation (min)  41 min  -LM      Total Timed Code Minutes- OT  41 minute(s)  -LM      OT Received On  07/16/20  -LM        User Key  (r) = Recorded By, (t) = Taken By, (c) = Cosigned By    Initials Name Provider Type    LM Alise Mora,  AGUILAR/L Occupational Therapy Assistant        Therapy Charges for Today     Code Description Service Date Service Provider Modifiers Qty    86904904492 HC OT THER SUPP EA 15 MIN 7/15/2020 Alise Mora AGUILAR/L GO 1    26869557176 HC OT THERAPEUTIC ACT EA 15 MIN 7/16/2020 lAise Mora COTA/L GO 1    03402181988 HC OT SELF CARE/MGMT/TRAIN EA 15 MIN 7/16/2020 Alise Mora AGUILAR/L GO 2               JEFF Wallace/JAZZY  7/16/2020

## 2020-07-16 NOTE — PLAN OF CARE
Problem: Patient Care Overview  Goal: Plan of Care Review  Outcome: Ongoing (interventions implemented as appropriate)  Flowsheets (Taken 7/16/2020 0230)  Progress: improving  Plan of Care Reviewed With: patient  Outcome Summary: VSS, ambulating well, sleeping between care, continue to monitor.

## 2020-07-17 PROCEDURE — 25010000002 ENOXAPARIN PER 10 MG: Performed by: ORTHOPAEDIC SURGERY

## 2020-07-17 PROCEDURE — 97530 THERAPEUTIC ACTIVITIES: CPT

## 2020-07-17 PROCEDURE — 97535 SELF CARE MNGMENT TRAINING: CPT

## 2020-07-17 PROCEDURE — 97116 GAIT TRAINING THERAPY: CPT

## 2020-07-17 RX ADMIN — ROPINIROLE HYDROCHLORIDE 0.5 MG: 0.5 TABLET, FILM COATED ORAL at 10:31

## 2020-07-17 RX ADMIN — ACETAMINOPHEN 1000 MG: 500 TABLET ORAL at 17:58

## 2020-07-17 RX ADMIN — TRAMADOL HYDROCHLORIDE 25 MG: 50 TABLET, FILM COATED ORAL at 07:58

## 2020-07-17 RX ADMIN — ACETAMINOPHEN 1000 MG: 500 TABLET ORAL at 07:19

## 2020-07-17 RX ADMIN — METOPROLOL SUCCINATE 25 MG: 25 TABLET, FILM COATED, EXTENDED RELEASE ORAL at 10:31

## 2020-07-17 RX ADMIN — ATORVASTATIN CALCIUM 20 MG: 20 TABLET, FILM COATED ORAL at 10:30

## 2020-07-17 RX ADMIN — SODIUM CHLORIDE, PRESERVATIVE FREE 10 ML: 5 INJECTION INTRAVENOUS at 10:31

## 2020-07-17 RX ADMIN — Medication 2.5 MG: at 10:31

## 2020-07-17 RX ADMIN — Medication 2.5 MG: at 20:57

## 2020-07-17 RX ADMIN — TRAMADOL HYDROCHLORIDE 25 MG: 50 TABLET, FILM COATED ORAL at 02:01

## 2020-07-17 RX ADMIN — ASPIRIN 81 MG: 81 TABLET, COATED ORAL at 10:31

## 2020-07-17 RX ADMIN — LOSARTAN POTASSIUM 25 MG: 25 TABLET, FILM COATED ORAL at 10:31

## 2020-07-17 RX ADMIN — TRAMADOL HYDROCHLORIDE 25 MG: 50 TABLET, FILM COATED ORAL at 15:31

## 2020-07-17 RX ADMIN — ACETAMINOPHEN 1000 MG: 500 TABLET ORAL at 12:37

## 2020-07-17 RX ADMIN — ENOXAPARIN SODIUM 30 MG: 30 INJECTION SUBCUTANEOUS at 15:35

## 2020-07-17 NOTE — PLAN OF CARE
Problem: Patient Care Overview  Goal: Plan of Care Review  Outcome: Ongoing (interventions implemented as appropriate)  Flowsheets (Taken 7/17/2020 1704)  Progress: improving  Plan of Care Reviewed With: patient  Outcome Summary: Pt maryuri tx well with VSS throughout tx, however, c/o increasing dizziness as gt prolonged-VSS. Pt t/f sup-sit with Min Ax1, sit-sup with SBAx1. Pt amb 250' with FWRW with SBA/CGAx1. Pt will benefit from home with HHPT and assist and possible OP PT as indicated after HHPT. PT plan to work on bed mobility and stair training in AM. No goals met this tx.

## 2020-07-17 NOTE — THERAPY TREATMENT NOTE
Acute Care - Physical Therapy Treatment Note  [facility]     Patient Name: Jose Dacosta  : 1935  MRN: [id number]  Today's Date: 2020  Onset of Illness/Injury or Date of Surgery: 20(surgery yesterday (2020))     Referring Physician: DAYANARA Kumar APRN(surgeon: DAJA Solis MD )    Admit Date: 2020    Visit Dx:    ICD-10-CM ICD-9-CM   1. Syncope and collapse R55 780.2   2. Facial laceration, initial encounter S01.81XA 873.40   3. Closed fracture of left hip, initial encounter (CMS/HCC) S72.002A 820.8   4. Fall, initial encounter W19.XXXA E888.9   5. Closed head injury, initial encounter S09.90XA 959.01   6. Closed fracture of neck of left femur, initial encounter (CMS/HCC) S72.002A 820.8   7. Impaired functional mobility, balance, gait, and endurance Z74.09 V49.89   8. Impaired mobility and ADLs Z74.09 V49.89    Z78.9      Patient Active Problem List   Diagnosis   • Coronary artery disease involving native coronary artery of native heart without angina pectoris   • Vasovagal syncope   • RBBB (right bundle branch block with left anterior fascicular block)   • Essential hypertension   • Ischemic cardiomyopathy   • Syncope and collapse   • Closed fracture of neck of left femur (CMS/[facility])   • Coronary artery disease with history of myocardial infarction without history of CABG   • Facial laceration   • HFrEF (heart failure with reduced ejection fraction) (CMS/[facility])   • Postoperative anemia due to acute blood loss       Therapy Treatment    Rehabilitation Treatment Summary     Row Name 20 1500 20 0939          Treatment Time/Intention    Discipline  physical therapy assistant  -EM  occupational therapy assistant  -RC     Document Type  therapy note (daily note)  -EM  therapy note (daily note)  -RC     Subjective Information  no complaints  -EM  no complaints  -RC     Mode of Treatment  individual therapy;physical therapy  -EM  occupational therapy;individual therapy   -RC     Patient/Family Observations  --  no visitor present   -RC     Care Plan Review  --  patient/other agree to care plan  -RC     Therapy Frequency (PT Clinical Impression)  daily  -EM  --     Total Minutes, Occupational Therapy Treatment  --  40  -RC     Therapy Frequency (OT Eval)  --  daily  -RC     Patient Effort  good  -EM  good  -RC     Existing Precautions/Restrictions  hip, posterior;fall  -EM  hip, posterior;fall  -RC     Treatment Considerations/Comments  Pt very [place]  -EM  --     Recorded by [EM] Tee Burris, PTA 07/17/20 1657 [RC] Evelyn Dodson AGUILAR/L 07/17/20 1159     Row Name 07/17/20 1500 07/17/20 0939          Vital Signs    Pre Systolic BP Rehab  129  -EM  120  -RC     Pre Treatment Diastolic BP  59  -EM  60  -RC     Post Systolic BP Rehab  138  -EM  129  -RC     Post Treatment Diastolic BP  65  -EM  66  -RC     Pretreatment Heart Rate (beats/min)  71  -EM  --     Posttreatment Heart Rate (beats/min)  74  -EM  --     Pre SpO2 (%)  97  -EM  98  -RC     O2 Delivery Pre Treatment  room air  -EM  room air  -RC     Post SpO2 (%)  98  -EM  --     O2 Delivery Post Treatment  room air  -EM  --     Pre Patient Position  Supine  -EM  --     Intra Patient Position  Sitting  -EM  --     Post Patient Position  Supine  -EM  --     Recorded by [EM] Tee Burris, PTA 07/17/20 1657 [RC] Evelyn Dodson AGUILAR/L 07/17/20 1159     Row Name 07/17/20 1500 07/17/20 0939          Cognitive Assessment/Intervention- PT/OT    Affect/Mental Status (Cognitive)  WFL  -EM  WFL  -RC     Orientation Status (Cognition)  oriented x 4  -EM  oriented x 4  -RC     Follows Commands (Cognition)  follows one step commands;over 90% accuracy  -EM  --     Cognitive Function (Cognitive)  executive function deficit  -EM  executive function deficit  -RC     Personal Safety Interventions  fall prevention program maintained;nonskid shoes/slippers when out of bed;supervised activity;gait belt  -EM  --     Recorded by [EM] Tee Burris  AVE, PTA 07/17/20 1657 [RC] Evelyn Dodson AGUILAR/L 07/17/20 1159     Row Name 07/17/20 1500 07/17/20 0939          Bed Mobility Assessment/Treatment    Supine-Sit [place] (Bed Mobility)  minimum assist (75% patient effort)  -EM  --     Sit-Supine [place] (Bed Mobility)  supervision  -EM  supervision  -RC     Bed Mobility, Safety Issues  decreased use of legs for bridging/pushing  -EM  --     Assistive Device (Bed Mobility)  bed rails;head of bed elevated  -EM  --     Recorded by [EM] Tee Burris, PTA 07/17/20 1657 [RC] Evelyn Dodson AGUILAR/L 07/17/20 1159     Row Name 07/17/20 0939             Functional Mobility    Functional Mobility- Ind. Level  contact guard assist  -RC      Functional Mobility- Device  rolling walker  -RC      Functional Mobility-Distance (Feet)  15  -RC      Recorded by [RC] Evelyn Dodson AGUILAR/L 07/17/20 1159      Row Name 07/17/20 1500 07/17/20 0939          Sit-Stand Transfer    Sit-Stand [place] (Transfers)  contact guard  -EM  contact guard  -RC     Assistive Device (Sit-Stand Transfers)  walker, front-wheeled  -EM  walker, front-wheeled  -RC     Recorded by [EM] Tee Burris, PTA 07/17/20 1657 [RC] Evelyn Dodson AGUILAR/L 07/17/20 1159     Row Name 07/17/20 1500 07/17/20 0939          Stand-Sit Transfer    Stand-Sit [place] (Transfers)  contact guard  -EM  contact guard  -RC     Assistive Device (Stand-Sit Transfers)  walker, front-wheeled  -EM  walker, front-wheeled  -RC     Recorded by [EM] Tee Burris, PTA 07/17/20 1657 [RC] Evelyn Dodson AGUILAR/L 07/17/20 1159     Row Name 07/17/20 1500             Gait/Stairs Assessment/Training    [place] Level (Gait)  contact guard;supervision  -EM      Assistive Device (Gait)  walker, front-wheeled with knee immobilizer on  -EM      Distance in Feet (Gait)  250'  -EM      Pattern (Gait)  step-through  -EM      Deviations/Abnormal Patterns (Gait)  antalgic  -EM      Bilateral Gait Deviations  forward flexed  posture  -EM      Comment (Gait/Stairs)  Pt c/o increasing dizziness with gt-VSS. RN and MD aware of dizziness  -EM      Recorded by [EM] Tee Burris, PTA 07/17/20 1657      Row Name 07/17/20 0939             Bathing Assessment/Intervention    Bathing [place] Level  set up;supervision;contact guard assist;verbal cues  -RC      Bathing Position  supported sitting;supported standing  -RC      Recorded by [RC] Evelyn Dodson AGUILAR/L 07/17/20 1159      Row Name 07/17/20 0939             Grooming Assessment/Training    [place] Level (Grooming)  grooming skills;set up  -RC      Grooming Position  supported sitting  -RC      Recorded by [RC] Evelyn Dodson AGUILAR/L 07/17/20 1159      Row Name 07/17/20 1500 07/17/20 0939          Positioning and Restraints    Pre-Treatment Position  in bed  -EM  in bed  -RC     Post Treatment Position  bed  -EM  bed  -RC     In Bed  supine;call light within reach;encouraged to call for assist;exit alarm on  -EM  call light within reach;encouraged to call for assist;exit alarm on;with nsg  -RC     Recorded by [EM] Tee Burris, PTA 07/17/20 1657 [RC] Evelyn Dodson AGUILAR/L 07/17/20 1159     Row Name 07/17/20 1500 07/17/20 0939          Pain Scale: Numbers Pre/Post-Treatment    Pain Scale: Numbers, Pretreatment  8/10  -EM  0/10 - no pain  -RC     Pain Scale: Numbers, Post-Treatment  8/10  -EM  0/10 - no pain  -RC     Pain Location - Side  Left  -EM  --     Pain Location  hip  -EM  --     Pain Intervention(s)  Medication (See MAR);Repositioned;Ambulation/increased activity  -EM  --     Recorded by [EM] Tee Burris, PTA 07/17/20 1657 [RC] Evelyn Dodson AGUILAR/L 07/17/20 1159     Row Name                Wound 07/08/20 1810 Left upper eyebrow Laceration    Wound - Properties Group Date first assessed: 07/08/20 [TM] Time first assessed: 1810 [TM] Present on Hospital Admission: Y [TM] Side: Left [TM] Orientation: upper [TM] Location: eyebrow [TM] Primary Wound Type:  Laceration [TM] Recorded by:  [TM] Josephine Elias RN 07/08/20 1811    Row Name                Wound 07/09/20 1515 Left posterior hip Incision    Wound - Properties Group Date first assessed: 07/09/20 [AQ] Time first assessed: 1515 [AQ] Present on Hospital Admission: N [AQ] Side: Left [AQ] Orientation: posterior [AQ] Location: hip [AQ] Primary Wound Type: Incision [AQ] Recorded by:  [AQ] Cathy Cano RN 07/09/20 1515    Row Name 07/17/20 0939             Outcome Summary/Treatment Plan (OT)    Daily Summary of Progress (OT)  progress toward functional goals as expected  -RC      Plan for Continued Treatment (OT)  cont poc   -RC      Recorded by [RC] Evelyn Dodson AGUILAR/L 07/17/20 1159      Row Name 07/17/20 1500             Outcome Summary/Treatment Plan (PT)    Daily Summary of Progress (PT)  progress toward functional goals is good  -EM      Barriers to Overall Progress (PT)  dizziness with gt  -EM      Plan for Continued Treatment (PT)  Continue. Focus on improving  bed mobility and stair training.  Issue HEP.  -EM2      Anticipated Discharge Disposition (PT)  home with home health;anticipate therapy at next level of care OP PT as indicated  -EM      Recorded by [EM] Tee Burris, PTA 07/17/20 1657  [EM2] Tee Burris, PTA 07/17/20 1701        User Key  (r) = Recorded By, (t) = Taken By, (c) = Cosigned By    Initials Name Effective Dates Discipline    EM Tee Burris, PTA 08/11/15 -  PT    AQ Cathy Cano RN 10/17/16 -  Nurse    RC Evelyn Dodson AGUILAR/L 03/07/18 -  OT    TM Josephine Elias, RN 07/24/18 -  Nurse          Wound 07/08/20 1810 Left upper eyebrow Laceration (Active)   Dressing Appearance open to air 7/17/2020  8:00 AM   Closure Adhesive closure strips 7/17/2020  8:00 AM   Base maroon/purple 7/17/2020  8:00 AM   Drainage Amount none 7/17/2020  8:00 AM       Wound 07/09/20 1515 Left posterior hip Incision (Active)   Dressing Appearance open to air 7/17/2020   8:00 AM   Closure [place] 7/17/2020  8:00 AM   Base clean;dry 7/17/2020  8:00 AM   Drainage Amount none 7/17/2020  8:00 AM       Rehab Goal Summary     Row Name 07/17/20 1500 07/17/20 0939          Bed Mobility Goal 1 (PT)    Activity/Assistive Device (Bed Mobility Goal 1, PT)  sit to supine;supine to sit  -EM  --     [place] Level/Cues Needed (Bed Mobility Goal 1, PT)  independent  -EM  --     Time Frame (Bed Mobility Goal 1, PT)  by discharge  -EM  --     Progress/Outcomes (Bed Mobility Goal 1, PT)  goal not met  -EM  --        Transfer Goal 1 (PT)    Activity/Assistive Device (Transfer Goal 1, PT)  sit-to-stand/stand-to-sit;bed-to-chair/chair-to-bed;toilet  -EM  --     [place] Level/Cues Needed (Transfer Goal 1, PT)  conditional independence  -EM  --     Time Frame (Transfer Goal 1, PT)  2 - 3 days  -EM  --     Progress/Outcome (Transfer Goal 1, PT)  goal not met  -EM  --        Gait Training Goal 1 (PT)    Activity/Assistive Device (Gait Training Goal 1, PT)  gait (walking locomotion);assistive device use;walker, rolling  -EM  --     [place] Level (Gait Training Goal 1, PT)  conditional independence  -EM  --     Distance (Gait Goal 1, PT)  [id number]  -EM  --     Time Frame (Gait Training Goal 1, PT)  by discharge  -EM  --     Progress/Outcome (Gait Training Goal 1, PT)  goal not met  -EM  --        Stairs Goal 1 (PT)    Activity/Assistive Device (Stairs Goal 1, PT)  ascending stairs;descending stairs;using handrail, left;assistive device use  -EM  --     [place] Level/Cues Needed (Stairs Goal 1, PT)  conditional independence  -EM  --     Time Frame (Stairs Goal 1, PT)  by discharge  -EM  --     Progress/Outcome (Stairs Goal 1, PT)  goal not met  -EM  --        Occupational Therapy Goals    Transfer Goal Selection (OT)  --  transfer, OT goal 1  -RC     Bathing Goal Selection (OT)  --  bathing, OT goal 1  -RC     Dressing Goal Selection (OT)  --  dressing, OT goal 1  -RC     Toileting Goal  Selection (OT)  --  toileting, OT goal 1  -RC     Endurance Goal Selection (OT)  --  endurance, OT goal 1  -RC     Safety Awareness Goal Selection (OT)  --  safety awareness, OT goal 1  -RC        Transfer Goal 1 (OT)    Activity/Assistive Device (Transfer Goal 1, OT)  --  toilet  -RC     [place] Level/Cues Needed (Transfer Goal 1, OT)  --  contact guard assist  -RC     Time Frame (Transfer Goal 1, OT)  --  long term goal (LTG);by discharge  -RC     Barriers (Transfers Goal 1, OT)  --  hearing deficit;safety deficit   -RC     Progress/Outcome (Transfer Goal 1, OT)  --  goal not met  -RC        Bathing Goal 1 (OT)    Activity/Assistive Device (Bathing Goal 1, OT)  --  bathing skills, all  -RC     [place] Level/Cues Needed (Bathing Goal 1, OT)  --  minimum assist (75% or more patient effort)  -RC     Time Frame (Bathing Goal 1, OT)  --  long term goal (LTG);by discharge  -RC     Progress/Outcomes (Bathing Goal 1, OT)  --  goal not met  -RC        Dressing Goal 1 (OT)    Activity/Assistive Device (Dressing Goal 1, OT)  --  dressing skills, all  -RC     [place]/Cues Needed (Dressing Goal 1, OT)  --  minimum assist (75% or more patient effort)  -RC     Time Frame (Dressing Goal 1, OT)  --  long term goal (LTG);by discharge  -RC     Progress/Outcome (Dressing Goal 1, OT)  --  goal not met  -RC        Toileting Goal 1 (OT)    Activity/Device (Toileting Goal 1, OT)  --  toileting skills, all  -RC     [place] Level/Cues Needed (Toileting Goal 1, OT)  --  minimum assist (75% or more patient effort)  -RC     Time Frame (Toileting Goal 1, OT)  --  long term goal (LTG);by discharge  -RC     Progress/Outcome (Toileting Goal 1, OT)  --  goal not met  -RC         Endurance Goal 1 (OT)    Progress/Outcome (Endurance Goal 1, OT)  --  goal not met  -RC        Safety Awareness Goal 1 (OT)    Activity (Safety Awareness Goal 1, OT)  --  awareness of need for assistance;impulse control;insight into deficits/self  awareness;judgment;safe use of assistive device/equipment;follow through of safety precautions;demonstrates compliance;demonstrates understanding;demonstration of safe behaviors  -RC     [place]/Cues/Accuracy (Safety Awareness Goal 1, OT)  --  with minimum;verbal cues/redirection;with repetition of directions;with 90% accuracy  -     Time Frame (Safety Awareness Goal 1, OT)  --  long term goal (LTG);by discharge  -     Progress/Outcome (Safety Awareness Goal 1, OT)  --  goal not met  -       User Key  (r) = Recorded By, (t) = Taken By, (c) = Cosigned By    Initials Name Provider Type Discipline    EM Tee Burris, PTA Physical Therapy Assistant PT    RC Evelyn Dodson COTA/L Occupational Therapy Assistant OT          Physical Therapy Education                 Title: PT OT SLP Therapies (In Progress)     Topic: Physical Therapy (In Progress)     Point: Mobility training (In Progress)     Description:   Instruct learner(s) on safety and technique for assisting patient out of bed, chair or wheelchair.  Instruct in the proper use of assistive devices, such as walker, crutches, cane or brace.              Patient Friendly Description:   It's important to get you on your feet again, but we need to do so in a way that is safe for you. Falling has serious consequences, and your personal safety is the most important thing of all.        When it's time to get out of bed, one of us or a family member will sit next to you on the bed to give you support.     If your doctor or nurse tells you to use a walker, crutches, a cane, or a brace, be sure you use it every time you get out of bed, even if you think you don't need it.    Learning Progress Summary           Patient Acceptance, E, NR by [facility] at 7/11/2020 1047    Comment:  edu on hip dislocation precautions.    Acceptance, E, NR by LILA1 at 7/10/2020 1348                   Point: Home exercise program (Not Started)     Description:   Instruct learner(s) on  appropriate technique for monitoring, assisting and/or progressing patient with therapeutic exercises and activities.              Learner Progress:   Not documented in this visit.          Point: Body mechanics (In Progress)     Description:   Instruct learner(s) on proper positioning and spine alignment for patient and/or caregiver during mobility tasks and/or exercises.              Learning Progress Summary           Patient Acceptance, E, NR by [facility] at 7/10/2020 1348                   Point: Precautions (In Progress)     Description:   Instruct learner(s) on prescribed precautions during mobility and gait tasks              Learning Progress Summary           Patient Acceptance, E, NR by  at 7/11/2020 1047    Comment:  edu on hip dislocation precautions.    Acceptance, E, NR by [facility] at 7/10/2020 1348                               User Key     Initials Effective Dates Name Provider Type Discipline    [facility] 04/03/18 -  Angelica Torres, PT Physical Therapist PT    [facility] 03/07/18 -  Vikash Walters, PTA Physical Therapy Assistant PT                PT Recommendation and Plan  Anticipated Discharge Disposition (PT): home with home health, anticipate therapy at next level of care(OP PT as indicated)  Therapy Frequency (PT Clinical Impression): daily  Outcome Summary/Treatment Plan (PT)  Daily Summary of Progress (PT): progress toward functional goals is good  Barriers to Overall Progress (PT): dizziness with gt  Plan for Continued Treatment (PT): Continue. Focus on improving  bed mobility and stair training.  Issue HEP.  Anticipated Discharge Disposition (PT): home with home health, anticipate therapy at next level of care(OP PT as indicated)  Plan of Care Reviewed With: patient  Progress: improving  Outcome Summary: Pt maryuri tx well with VSS throughout tx, however, c/o increasing dizziness as gt prolonged-VSS. Pt t/f sup-sit with Min Ax1, sit-sup with SBAx1. Pt amb 200' with FWRW with SBA/CGAx1. Pt will benefit from home  with HHPT and assist and possible OP PT as indicated after HHPT. PT plan to work on bed mobility and stair training in AM.  Outcome Measures     Row Name 07/17/20 1600 07/17/20 0939 07/16/20 8224       How much help from another person do you currently need...    Turning from your back to your side while in flat bed without using bedrails?  3  -EM  --  3  -TW    Moving from lying on back to sitting on the side of a flat bed without bedrails?  3  -EM  --  3  -TW    Moving to and from a bed to a chair (including a wheelchair)?  3  -EM  --  3  -TW    Standing up from a chair using your arms (e.g., wheelchair, bedside chair)?  3  -EM  --  3  -TW    Climbing 3-5 steps with a railing?  3  -EM  --  3  -TW    To walk in hospital room?  3  -EM  --  3  -TW    AM-PAC 6 Clicks Score (PT)  18  -EM  --  18  -TW       How much help from another is currently needed...    Putting on and taking off regular lower body clothing?  --  2  -RC  --    Bathing (including washing, rinsing, and drying)  --  3  -RC  --    Toileting (which includes using toilet bed pan or urinal)  --  2  -RC  --    Putting on and taking off regular upper body clothing  --  3  -RC  --    Taking care of personal grooming (such as brushing teeth)  --  3  -RC  --    Eating meals  --  3  -RC  --    AM-PAC 6 Clicks Score (OT)  --  16  -RC  --       Functional Assessment    Outcome Measure Options  AM-PAC 6 Clicks Basic Mobility (PT)  -EM  --  --    Row Name 07/15/20 1510             How much help from another person do you currently need...    Turning from your back to your side while in flat bed without using bedrails?  3  -TW      Moving from lying on back to sitting on the side of a flat bed without bedrails?  3  -TW      Moving to and from a bed to a chair (including a wheelchair)?  3  -TW      Standing up from a chair using your arms (e.g., wheelchair, bedside chair)?  3  -TW      Climbing 3-5 steps with a railing?  3  -TW      To walk in hospital room?  3  -TW       AM-PAC 6 Clicks Score (PT)  18  -TW        User Key  (r) = Recorded By, (t) = Taken By, (c) = Cosigned By    Initials Name Provider Type    EM Tee Burris, DANIE Physical Therapy Assistant    TW Juan C Leyva, DANIE Physical Therapy Assistant    RC Evelyn Dodson COTA/L Occupational Therapy Assistant         Time Calculation:   PT Charges     Row Name 07/17/20 1702             Time Calculation    Start Time  1500  -EM      Stop Time  1524  -EM      Time Calculation (min)  24 min  -EM         Time Calculation- PT    Total Timed Code Minutes- PT  24 minute(s)  -EM        User Key  (r) = Recorded By, (t) = Taken By, (c) = Cosigned By    Initials Name Provider Type    EM Tee Burris PTA Physical Therapy Assistant        Therapy Charges for Today     Code Description Service Date Service Provider Modifiers Qty    62436657150 HC GAIT TRAINING EA 15 MIN 7/17/2020 Tee Burris PTA GP 1    20368560435 HC PT THERAPEUTIC ACT EA 15 MIN 7/17/2020 Tee Burris PTA GP 1          PT G-Codes  Outcome Measure Options: AM-PAC 6 Clicks Basic Mobility (PT)  AM-PAC 6 Clicks Score (PT): 18  AM-PAC 6 Clicks Score (OT): 16    Tee Burris PTA  7/17/2020

## 2020-07-17 NOTE — THERAPY TREATMENT NOTE
Acute Care - Occupational Therapy Treatment Note  [facility]     Patient Name: Jose Dacosta  : 1935  MRN: [id number]  Today's Date: 2020  Onset of Illness/Injury or Date of Surgery: 20(surgery yesterday (2020))  Date of Referral to OT: 07/10/20  Referring Physician: DAYANARA Kumar APRN(surgeon: DAJA Solis MD )    Admit Date: 2020       ICD-10-CM ICD-9-CM   1. Syncope and collapse R55 780.2   2. Facial laceration, initial encounter S01.81XA 873.40   3. Closed fracture of left hip, initial encounter (CMS/HCC) S72.002A 820.8   4. Fall, initial encounter W19.XXXA E888.9   5. Closed head injury, initial encounter S09.90XA 959.01   6. Closed fracture of neck of left femur, initial encounter (CMS/HCC) S72.002A 820.8   7. Impaired functional mobility, balance, gait, and endurance Z74.09 V49.89   8. Impaired mobility and ADLs Z74.09 V49.89    Z78.9      Patient Active Problem List   Diagnosis   • Coronary artery disease involving native coronary artery of native heart without angina pectoris   • Vasovagal syncope   • RBBB (right bundle branch block with left anterior fascicular block)   • Essential hypertension   • Ischemic cardiomyopathy   • Syncope and collapse   • Closed fracture of neck of left femur (CMS/HCC)   • Coronary artery disease with history of myocardial infarction without history of CABG   • Facial laceration   • HFrEF (heart failure with reduced ejection fraction) (CMS/HCC)   • Postoperative anemia due to acute blood loss     Past Medical History:   Diagnosis Date   • Abnormal ECG      Past Surgical History:   Procedure Laterality Date   • CORONARY STENT PLACEMENT     • HIP HEMIARTHROPLASTY Left 2020    Procedure: LEFT HIP HEMIARTHROPLASTY;  Surgeon: Jitendra Solis MD;  Location: [facility];  Service: Orthopedics;  Laterality: Left;       Therapy Treatment    Rehabilitation Treatment Summary     Row Name 20 0939             Treatment Time/Intention     Discipline  occupational therapy assistant  -      Document Type  therapy note (daily note)  -RC      Subjective Information  no complaints  -RC      Mode of Treatment  occupational therapy;individual therapy  -RC      Patient/Family Observations  no visitor present   -RC      Care Plan Review  patient/other agree to care plan  -RC      Total Minutes, Occupational Therapy Treatment  40  -RC      Therapy Frequency (OT Eval)  daily  -RC      Patient Effort  good  -RC      Existing Precautions/Restrictions  hip, posterior;fall  -RC      Recorded by [RC] Evelyn Dodson COTA/L 07/17/20 1159      Row Name 07/17/20 0939             Vital Signs    Pre Systolic BP Rehab  120  -RC      Pre Treatment Diastolic BP  60  -RC      Post Systolic BP Rehab  129  -RC      Post Treatment Diastolic BP  66  -RC      Pre SpO2 (%)  98  -RC      O2 Delivery Pre Treatment  room air  -RC      Recorded by [RC] Evelyn Dodson COTA/L 07/17/20 1159      Row Name 07/17/20 0939             Cognitive Assessment/Intervention- PT/OT    Affect/Mental Status (Cognitive)  WFL  -RC      Orientation Status (Cognition)  oriented x 4  -RC      Cognitive Function (Cognitive)  executive function deficit  -RC      Recorded by [RC] Evelyn Dodson COTA/L 07/17/20 1159      Row Name 07/17/20 0939             Bed Mobility Assessment/Treatment    Sit-Supine [place] (Bed Mobility)  supervision  -RC      Recorded by [RC] Evelyn Dodson COTA/L 07/17/20 1159      Row Name 07/17/20 0939             Functional Mobility    Functional Mobility- Ind. Level  contact guard assist  -RC      Functional Mobility- Device  rolling walker  -RC      Functional Mobility-Distance (Feet)  15  -RC      Recorded by [RC] Evelyn Dodson COTA/L 07/17/20 1159      Row Name 07/17/20 0939             Sit-Stand Transfer    Sit-Stand [place] (Transfers)  contact guard  -RC      Assistive Device (Sit-Stand Transfers)  walker, front-wheeled  -RC      Recorded by [RC]  Evelyn Dodson AGUILAR/L 07/17/20 1159      Row Name 07/17/20 0939             Stand-Sit Transfer    Stand-Sit [place] (Transfers)  contact guard  -RC      Assistive Device (Stand-Sit Transfers)  walker, front-wheeled  -RC      Recorded by [RC] Evelyn Dodson AGUILAR/L 07/17/20 1159      Row Name 07/17/20 0939             Bathing Assessment/Intervention    Bathing [place] Level  set up;supervision;contact guard assist;verbal cues  -RC      Bathing Position  supported sitting;supported standing  -RC      Recorded by [RC] Evelyn Dodson AGUILAR/L 07/17/20 1159      Row Name 07/17/20 0939             Grooming Assessment/Training    [place] Level (Grooming)  grooming skills;set up  -RC      Grooming Position  supported sitting  -RC      Recorded by [RC] Evelyn Dodson COTA/L 07/17/20 1159      Row Name 07/17/20 0939             Positioning and Restraints    Pre-Treatment Position  in bed  -RC      Post Treatment Position  bed  -RC      In Bed  call light within reach;encouraged to call for assist;exit alarm on;with nsg  -RC      Recorded by [RC] Evelyn Dodson COTA/L 07/17/20 1159      Row Name 07/17/20 0939             Pain Scale: Numbers Pre/Post-Treatment    Pain Scale: Numbers, Pretreatment  0/10 - no pain  -RC      Pain Scale: Numbers, Post-Treatment  0/10 - no pain  -RC      Recorded by [RC] Evelyn Dodson AGUILAR/L 07/17/20 1159      Row Name                Wound 07/08/20 1810 Left upper eyebrow Laceration    Wound - Properties Group Date first assessed: 07/08/20 [TM] Time first assessed: 1810 [TM] Present on Hospital Admission: Y [TM] Side: Left [TM] Orientation: upper [TM] Location: eyebrow [TM] Primary Wound Type: Laceration [TM] Recorded by:  [TM] Josephine Elias RN 07/08/20 1811    Row Name                Wound 07/09/20 1515 Left posterior hip Incision    Wound - Properties Group Date first assessed: 07/09/20 [AQ] Time first assessed: 1515 [AQ] Present on Hospital Admission: N  [AQ] Side: Left [AQ] Orientation: posterior [AQ] Location: hip [AQ] Primary Wound Type: Incision [AQ] Recorded by:  [AQ] Cathy Cano RN 07/09/20 1515    Row Name 07/17/20 0939             Outcome Summary/Treatment Plan (OT)    Daily Summary of Progress (OT)  progress toward functional goals as expected  -RC      Plan for Continued Treatment (OT)  cont poc   -RC      Recorded by [RC] Evelyn Dodson, AGUILAR/L 07/17/20 1159        User Key  (r) = Recorded By, (t) = Taken By, (c) = Cosigned By    Initials Name Effective Dates Discipline    AQ Cathy Cano RN 10/17/16 -  Nurse    RC Evelyn Dodson, AGUILAR/L 03/07/18 -  OT    TM Josephine Elias RN 07/24/18 -  Nurse        Wound 07/08/20 1810 Left upper eyebrow Laceration (Active)   Dressing Appearance open to air 7/16/2020  8:00 PM   Closure Adhesive closure strips 7/16/2020  8:00 PM   Base maroon/purple 7/16/2020  8:00 PM   Drainage Amount none 7/16/2020  8:00 PM       Wound 07/09/20 1515 Left posterior hip Incision (Active)   Dressing Appearance open to air 7/16/2020  8:00 PM   Closure Staples 7/16/2020  8:00 PM   Base clean;dry 7/16/2020  8:00 PM   Drainage Amount none 7/16/2020  8:00 PM     Rehab Goal Summary     Row Name 07/17/20 0939             Occupational Therapy Goals    Transfer Goal Selection (OT)  transfer, OT goal 1  -RC      Bathing Goal Selection (OT)  bathing, OT goal 1  -RC      Dressing Goal Selection (OT)  dressing, OT goal 1  -RC      Toileting Goal Selection (OT)  toileting, OT goal 1  -RC      Endurance Goal Selection (OT)  endurance, OT goal 1  -RC      Safety Awareness Goal Selection (OT)  safety awareness, OT goal 1  -RC         Transfer Goal 1 (OT)    Activity/Assistive Device (Transfer Goal 1, OT)  toilet  -RC      [place] Level/Cues Needed (Transfer Goal 1, OT)  contact guard assist  -RC      Time Frame (Transfer Goal 1, OT)  long term goal (LTG);by discharge  -RC      Barriers (Transfers Goal 1, OT)  hearing  deficit;safety deficit   -RC      Progress/Outcome (Transfer Goal 1, OT)  goal not met  -RC         Bathing Goal 1 (OT)    Activity/Assistive Device (Bathing Goal 1, OT)  bathing skills, all  -RC      [place] Level/Cues Needed (Bathing Goal 1, OT)  minimum assist (75% or more patient effort)  -RC      Time Frame (Bathing Goal 1, OT)  long term goal (LTG);by discharge  -RC      Progress/Outcomes (Bathing Goal 1, OT)  goal not met  -RC         Dressing Goal 1 (OT)    Activity/Assistive Device (Dressing Goal 1, OT)  dressing skills, all  -RC      [place]/Cues Needed (Dressing Goal 1, OT)  minimum assist (75% or more patient effort)  -RC      Time Frame (Dressing Goal 1, OT)  long term goal (LTG);by discharge  -RC      Progress/Outcome (Dressing Goal 1, OT)  goal not met  -RC         Toileting Goal 1 (OT)    Activity/Device (Toileting Goal 1, OT)  toileting skills, all  -RC      [place] Level/Cues Needed (Toileting Goal 1, OT)  minimum assist (75% or more patient effort)  -RC      Time Frame (Toileting Goal 1, OT)  long term goal (LTG);by discharge  -RC      Progress/Outcome (Toileting Goal 1, OT)  goal not met  -RC          Endurance Goal 1 (OT)    Progress/Outcome (Endurance Goal 1, OT)  goal not met  -RC         Safety Awareness Goal 1 (OT)    Activity (Safety Awareness Goal 1, OT)  awareness of need for assistance;impulse control;insight into deficits/self awareness;judgment;safe use of assistive device/equipment;follow through of safety precautions;demonstrates compliance;demonstrates understanding;demonstration of safe behaviors  -RC      [place]/Cues/Accuracy (Safety Awareness Goal 1, OT)  with minimum;verbal cues/redirection;with repetition of directions;with 90% accuracy  -RC      Time Frame (Safety Awareness Goal 1, OT)  long term goal (LTG);by discharge  -RC      Progress/Outcome (Safety Awareness Goal 1, OT)  goal not met  -RC        User Key  (r) = Recorded By, (t) = Taken By, (c) =  Cosigned By    Initials Name Provider Type Discipline     Evelyn Dodson COTA/L Occupational Therapy Assistant OT        Occupational Therapy Education                 Title: PT OT SLP Therapies (In Progress)     Topic: Occupational Therapy (In Progress)     Point: ADL training (In Progress)     Description:   Instruct learner(s) on proper safety adaptation and remediation techniques during self care or transfers.   Instruct in proper use of assistive devices.              Learning Progress Summary           Patient Acceptance, E, NR by  at 7/17/2020 1200    Acceptance, E,D,H, NR by  at 7/11/2020 1429    Comment:  much review and issued handout for HIP precautions, edu on use of lh ae will need reinforcement.    Acceptance, E, NR by ME at 7/10/2020 1503    Comment:  Educated on OT and POC. Educated to call for assistance. Educated on safety precautions. Educated on proper body mechanics for transfers, bed mobility, ADLs, and funcitonal mobility.                   Point: Home exercise program (Not Started)     Description:   Instruct learner(s) on appropriate technique for monitoring, assisting and/or progressing therapeutic exercises/activities.              Learner Progress:   Not documented in this visit.          Point: Precautions (In Progress)     Description:   Instruct learner(s) on prescribed precautions during self-care and functional transfers.              Learning Progress Summary           Patient Acceptance, E, NR by  at 7/17/2020 1200    Acceptance, E, NR by  at 7/13/2020 1528    Acceptance, E, NR by  at 7/12/2020 1503    Comment:  multi review of hip precautions    Acceptance, E,D,H, NR by  at 7/11/2020 1429    Comment:  much review and issued handout for HIP precautions, edu on use of lh ae will need reinforcement.    Acceptance, E, NR by ME at 7/10/2020 1503    Comment:  Educated on OT and POC. Educated to call for assistance. Educated on safety precautions. Educated on proper body  mechanics for transfers, bed mobility, ADLs, and funcitonal mobility.                   Point: Body mechanics (In Progress)     Description:   Instruct learner(s) on proper positioning and spine alignment during self-care, functional mobility activities and/or exercises.              Learning Progress Summary           Patient Acceptance, E, NR by  at 7/17/2020 1200    Acceptance, E, NR by  at 7/13/2020 1528    Acceptance, E,D,H, NR by  at 7/11/2020 1429    Comment:  much review and issued handout for HIP precautions, edu on use of lh ae will need reinforcement.    Acceptance, E, NR by ME at 7/10/2020 1503    Comment:  Educated on OT and POC. Educated to call for assistance. Educated on safety precautions. Educated on proper body mechanics for transfers, bed mobility, ADLs, and funcitonal mobility.                               User Key     Initials Effective Dates Name Provider Type Discipline     03/07/18 -  Evelyn Dodson COTA/L Occupational Therapy Assistant OT    ME 09/10/19 -  Eitan Haynes OT Occupational Therapist OT                OT Recommendation and Plan  Outcome Summary/Treatment Plan (OT)  Daily Summary of Progress (OT): progress toward functional goals as expected  Plan for Continued Treatment (OT): cont poc   Therapy Frequency (OT Eval): daily  Daily Summary of Progress (OT): progress toward functional goals as expected  Plan of Care Review  Plan of Care Reviewed With: patient  Plan of Care Reviewed With: patient  Outcome Summary: pt sitting eob at entry, cga for sit to stand amb aroung bed to recliner w/ cga, later transfered back to bed w/ cga, completed bathing and dressing task w/ meek, supervision vc's for hip precautions. hosptal gown only.   cont poc  Outcome Measures     Row Name 07/17/20 0939 07/16/20 1454 07/15/20 1510       How much help from another person do you currently need...    Turning from your back to your side while in flat bed without using bedrails?  --  3  -TW  3   -TW    Moving from lying on back to sitting on the side of a flat bed without bedrails?  --  3  -TW  3  -TW    Moving to and from a bed to a chair (including a wheelchair)?  --  3  -TW  3  -TW    Standing up from a chair using your arms (e.g., wheelchair, bedside chair)?  --  3  -TW  3  -TW    Climbing 3-5 steps with a railing?  --  3  -TW  3  -TW    To walk in hospital room?  --  3  -TW  3  -TW    AM-PAC 6 Clicks Score (PT)  --  18  -TW  18  -TW       How much help from another is currently needed...    Putting on and taking off regular lower body clothing?  2  -RC  --  --    Bathing (including washing, rinsing, and drying)  3  -RC  --  --    Toileting (which includes using toilet bed pan or urinal)  2  -RC  --  --    Putting on and taking off regular upper body clothing  3  -RC  --  --    Taking care of personal grooming (such as brushing teeth)  3  -RC  --  --    Eating meals  3  -RC  --  --    AM-PAC 6 Clicks Score (OT)  16  -RC  --  --    Row Name 07/14/20 1338             How much help from another person do you currently need...    Turning from your back to your side while in flat bed without using bedrails?  3  -TW      Moving from lying on back to sitting on the side of a flat bed without bedrails?  3  -TW      Moving to and from a bed to a chair (including a wheelchair)?  3  -TW      Standing up from a chair using your arms (e.g., wheelchair, bedside chair)?  3  -TW      Climbing 3-5 steps with a railing?  3  -TW      To walk in hospital room?  3  -TW      AM-PAC 6 Clicks Score (PT)  18  -TW        User Key  (r) = Recorded By, (t) = Taken By, (c) = Cosigned By    Initials Name Provider Type    TW Juan C Leyva, PTA Physical Therapy Assistant    RC Evelyn Dodson COTA/L Occupational Therapy Assistant           Time Calculation:   Time Calculation- OT     Row Name 07/17/20 1153             Time Calculation- OT    OT Start Time  0949  -      OT Stop Time  1029  -      OT Time Calculation (min)   40 min  -      Total Timed Code Minutes- OT  40 minute(s)  -RC      OT Received On  07/17/20  -        User Key  (r) = Recorded By, (t) = Taken By, (c) = Cosigned By    Initials Name Provider Type    RC Evelyn Dodson COTA/L Occupational Therapy Assistant        Therapy Charges for Today     Code Description Service Date Service Provider Modifiers Qty    [id number] HC OT SELF CARE/MGMT/TRAIN EA 15 MIN 7/17/2020 Evelyn Dodson COTA/JAZZY GO 3               JEFF Lane/JAZZY  7/17/2020

## 2020-07-17 NOTE — DISCHARGE PLACEMENT REQUEST
"Sylvia Dacosta (85 y.o. Male)     Date of Birth Social Security Number Address Home Phone MRN    1935  [address] [place] [address] [phone] [id number]    Yazdanism Marital Status          Restorationism        Admission Date Admission Type Admitting Provider Attending Provider Department, Room/Bed    20 Emergency Misha Parada MD Haigler, Stuart S, MD [address], 411/1    Discharge Date Discharge Disposition Discharge Destination                       Attending Provider:  Misha Parada MD    Allergies:  Hydrochlorothiazide    Isolation:  None   Infection:  None   Code Status:  CPR    Ht:  182.9 cm (72\")   Wt:  67.2 kg (148 lb 3.2 oz)    Admission Cmt:  None   Principal Problem:  Closed fracture of neck of left femur (CMS/[facility]) [S72.002A]                 Active Insurance as of 2020     Primary Coverage     Payor Plan Insurance Group Employer/Plan Group    AETNA MEDICARE REPLACEMENT AETNA MEDICARE REPLACEMENT [id number]     Payor Plan Address Payor Plan Phone Number Payor Plan Fax Number Effective Dates    [address] [phone]  2019 - None Entered    [facility] [id number]       Subscriber Name Subscriber Birth Date Member ID       SYLVIA DACOSTA 1935 [id number]                 Emergency Contacts      (Rel.) Home Phone Work Phone Mobile Phone    AURELIA BOX (Daughter) [phone] -- [phone]    Carla Dacosta (Spouse) [phone] -- [phone]    jefffiordaliza abrams (Grandchild) -- -- [phone]        Milady Heath RN [facility]  [phone] phone  [phone] fax    [address] [address]  Dept. Phone:  [phone]  Dept. Fax:   Date Ordered: 2020         Patient:  Sylvia Dacosta MRN:  [id number]   [address] [place] [id number] :  1935  SSN:    Phone: [phone] " "Sex:  M     Weight: 67.2 kg (148 lb 3.2 oz)         Ht Readings from Last 1 Encounters:   20 182.9 cm (72\")         Miscellaneous DME   (Order ID: [id number])    Diagnosis:  Closed fracture of left hip, initial encounter (CMS/[facility]) (S72.002A [ICD-10-CM] 820.8 [ICD-9-CM])   Quantity:  1     Type of DME: bedside commode  Length of Need (99 Months = Lifetime): 99 Months = Lifetime        Authorizing Provider's Phone: [phone]   Authorizing Provider:Medardo Posada MD  Authorizing Provider's NPI: [id number]  Order Entered By: Medardo Posada MD 2020  3:42 PM     Electronically signed by: Medardo Posada MD 2020  3:42 PM        [address] [address]  Dept. Phone:  [phone]  Dept. Fax:   Date Ordered: 2020         Patient:  Jose Dacosta MRN:  [id number]   [address] [place] [address] :  1935  SSN:    Phone: [phone] Sex:  M     Weight: 67.2 kg (148 lb 3.2 oz)         Ht Readings from Last 1 Encounters:   20 182.9 cm (72\")         Miscellaneous DME   (Order ID: [id number])    Diagnosis:  Closed fracture of left hip, initial encounter (CMS/HCC) (S72.002A [ICD-10-CM] 820.8 [ICD-9-CM])   Quantity:  1     Type of DME: fixed wheel walker  Length of Need (99 Months = Lifetime): 99 Months = Lifetime        Authorizing Provider's Phone: [phone]   Authorizing Provider:Medardo Posada MD  Authorizing Provider's NPI: [id number]  Order Entered By: Medardo Posada MD 2020  3:42 PM     Electronically signed by: Medardo Posada MD 2020  3:42 PM               History & Physical      Misha Parada MD at 20                [facility] Medicine Admission      Date of Admission: 2020      Primary " "Care Physician: Terry Peterson MD      Chief Complaint: I fell and messed up my face    HPI: Briefly this is a 85-year-old gentleman who presents the ED today trauma apparent with patient presenting caring the following problem    1.  Hypertension  2.  Dyslipidemia  3.  History of gout  4.  Chronic pain  5.  Note anaphylaxis from hydrochlorothiazide with tongue swelling  6.  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  He had no loss of bowel or bladder.  He had no post event confusion and most likely a is a vagal event however patient was told in the past he needs to have a pacemaker is deferred in the past will certainly need to monito.  Regardless with patient's fall he had a trauma event triggered with patient cleared the head until hands reveal a hip fracture therefore we are asked to admit and have medical management.  Of note as above patient was told need to have a pacemaker in the past certainly will monitor.     Concurrent Medical History:  has a past medical history of Abnormal ECG.    Past Surgical History:  has a past surgical history that includes Coronary stent placement.    Family History: family history includes Heart failure in his father and mother.    Social History:  reports that he quit smoking about 30 years ago. He has never used smokeless tobacco. He reports that he does not drink alcohol or use drugs.    Allergies:   Allergies   Allergen Reactions   • Hydrochlorothiazide Swelling     TONGUE SWELLING       Medications:   Prior to Admission medications    Medication Sig Start Date End Date Taking? Authorizing Provider   albuterol sulfate  (90 Base) MCG/ACT inhaler INHALE 2 PUFFS INTO THE LUNGS EVERY 6 (SIX) HOURS AS NEEDED FOR WHEEZING 4/8/19   Provider, MD Marcial   amitriptyline (ELAVIL) 100 MG tablet " Take 100 mg by mouth every night at bedtime. 4/8/19   Marcial Crain MD   aspirin 81 MG EC tablet Take 1 tablet by mouth Daily. 7/5/19   Júnior Licona MD   atorvastatin (LIPITOR) 20 MG tablet Take 1 tablet by mouth.    Marcial Crain MD   COLCRYS 0.6 MG tablet Take 0.6 mg by mouth Daily. 7/5/19   Marcial Crain MD   gabapentin (NEURONTIN) 600 MG tablet TAKE 1 TABLET BY MOUTH 3 (THREE) TIMES DAILY 6/27/19   Marcial Crain MD   losartan (COZAAR) 25 MG tablet TAKE 1 TABLET BY MOUTH EVERY DAY 12/30/19   Bessy Duval MD   meloxicam (MOBIC) 15 MG tablet Take 15 mg by mouth Daily. 6/27/19   Marcial Crain MD   metoprolol succinate XL (TOPROL-XL) 25 MG 24 hr tablet TAKE 1 TABLET BY MOUTH EVERY DAY 12/16/19   Júnior Licona MD   omeprazole (priLOSEC) 40 MG capsule Take 40 mg by mouth Every Morning. 6/27/19   Marcial Crain MD   rOPINIRole (REQUIP) 0.5 MG tablet Take 0.5 mg by mouth Daily. 5/3/19   Marcial Crain MD   tolterodine (DETROL) 2 MG tablet Take 2 mg by mouth 2 (Two) Times a Day.    Marcial Crain MD       Review of Systems:  Review of Systems   Constitutional: Positive for activity change.   HENT: Negative.    Eyes: Negative.    Respiratory: Negative.    Cardiovascular: Negative.    Gastrointestinal: Negative.    Endocrine: Negative.    Genitourinary: Negative.    Musculoskeletal: Positive for back pain.   Skin: Negative.    Allergic/Immunologic: Negative.    Neurological: Negative.         Has chronic nystagmus   Hematological: Negative.    Psychiatric/Behavioral: Negative.       Otherwise complete ROS is negative except as mentioned above.    Physical Exam:   Temp:  [98 °F (36.7 °C)] 98 °F (36.7 °C)  Heart Rate:  [] 104  Resp:  [16-18] 18  BP: (140-194)/(83-95) 140/83  Physical Exam   Constitutional: He is oriented to person, place, and time. He appears well-developed and well-nourished.   HENT:   Head: Normocephalic and atraumatic.     Eyes: Pupils are equal, round, and reactive to light. EOM are normal.   Neck: Normal range of motion. Neck supple.   Cardiovascular: Normal rate and regular rhythm.   Pulmonary/Chest: Effort normal.   Abdominal: Bowel sounds are normal.   Musculoskeletal: Normal range of motion.   Neurological: He is alert and oriented to person, place, and time.   Has chronic horizontal nystagmus   Skin: Capillary refill takes less than 2 seconds.   Psychiatric: He has a normal mood and affect.   Nursing note and vitals reviewed.        Results Reviewed:  I have personally reviewed current lab, radiology, and data and agree with results.  Lab Results (last 24 hours)     Procedure Component Value Units Date/Time    [place] Draw [[id number]] Collected:  07/08/20 1924    Specimen:  Blood Updated:  07/08/20 2030    Narrative:       The following orders were created for panel order [place] Draw.  Procedure                               Abnormality         Status                     ---------                               -----------         ------                     Light Blue Top[[id number]]                                   Final result               Green Top (Gel)[[id number]]                                  Final result               Lavender Top[[id number]]                                     Final result               Gold Top - SST[[id number]]                                   Final result                 Please view results for these tests on the individual orders.    Light Blue Top [[id number]] Collected:  07/08/20 1924    Specimen:  Blood Updated:  07/08/20 2030     Extra Tube hold for add-on     Comment: Auto resulted       Green Top (Gel) [[id number]] Collected:  07/08/20 1924    Specimen:  Blood Updated:  07/08/20 2030     Extra Tube Hold for add-ons.     Comment: Auto resulted.       Lavender Top [[id number]] Collected:  07/08/20 1924    Specimen:  Blood Updated:  07/08/20 2030     Extra Tube hold for add-on     Comment: Auto  resulted       Gold Top - [facility] [[id number]] Collected:  07/08/20 1924    Specimen:  Blood Updated:  07/08/20 2030     Extra Tube Hold for add-ons.     Comment: Auto resulted.       Comprehensive Metabolic Panel [[id number]] Collected:  07/08/20 1924    Specimen:  Blood Updated:  07/08/20 1952     Glucose 89 mg/dL      BUN 17 mg/dL      Creatinine 1.07 mg/dL      Sodium 139 mmol/L      Potassium 3.5 mmol/L      Chloride 104 mmol/L      CO2 23.0 mmol/L      Calcium 9.3 mg/dL      Total Protein 7.1 g/dL      Albumin 4.10 g/dL      ALT (SGPT) 22 U/L      AST (SGOT) 32 U/L      Alkaline Phosphatase 108 U/L      Total Bilirubin 0.2 mg/dL      eGFR Non African Amer 66 mL/min/1.73      Globulin 3.0 gm/dL      A/G Ratio 1.4 g/dL      BUN/Creatinine Ratio 15.9     Anion Gap 12.0 mmol/L     Narrative:       GFR Normal >60  Chronic Kidney Disease <60  Kidney Failure <15      Troponin [[id number]]  (Normal) Collected:  07/08/20 1924    Specimen:  Blood Updated:  07/08/20 1949     Troponin T <0.010 ng/mL     Narrative:       Troponin T Reference Range:  <= 0.03 ng/mL-   Negative for AMI  >0.03 ng/mL-     Abnormal for myocardial necrosis.  Clinicians would have to utilize clinical acumen, EKG, Troponin and serial changes to determine if it is an Acute Myocardial Infarction or myocardial injury due to an underlying chronic condition.       Results may be falsely decreased if patient taking Biotin.      BNP [[id number]]  (Normal) Collected:  07/08/20 1924    Specimen:  Blood Updated:  07/08/20 1949     proBNP 1,741.0 pg/mL     Narrative:       Among patients with dyspnea, NT-proBNP is highly sensitive for the detection of acute congestive heart failure. In addition NT-proBNP of <300 pg/ml effectively rules out acute congestive heart failure with 99% negative predictive value.    Results may be falsely decreased if patient taking Biotin.      CBC & Differential [[id number]] Collected:  07/08/20 1924    Specimen:  Blood Updated:  07/08/20  1930    Narrative:       The following orders were created for panel order CBC & Differential.  Procedure                               Abnormality         Status                     ---------                               -----------         ------                     CBC Auto Differential[[id number]]        Abnormal            Final result                 Please view results for these tests on the individual orders.    CBC Auto Differential [[id number]]  (Abnormal) Collected:  07/08/20 1924    Specimen:  Blood Updated:  07/08/20 1930     WBC 10.40 10*3/mm3      RBC 3.96 10*6/mm3      Hemoglobin 12.4 g/dL      Hematocrit 36.8 %      MCV 92.9 fL      MCH 31.3 pg      MCHC 33.7 g/dL      RDW 14.6 %      RDW-SD 50.3 fl      MPV 10.0 fL      Platelets 203 10*3/mm3      Neutrophil % 74.3 %      Lymphocyte % 17.9 %      Monocyte % 3.9 %      Eosinophil % 2.8 %      Basophil % 0.7 %      Immature Grans % 0.4 %      Neutrophils, Absolute 7.73 10*3/mm3      Lymphocytes, Absolute 1.86 10*3/mm3      Monocytes, Absolute 0.41 10*3/mm3      Eosinophils, Absolute 0.29 10*3/mm3      Basophils, Absolute 0.07 10*3/mm3      Immature Grans, Absolute 0.04 10*3/mm3      nRBC 0.0 /100 WBC         Imaging Results (Last 24 Hours)     Procedure Component Value Units Date/Time    CT Cervical Spine Without Contrast [[id number]] Collected:  07/08/20 1850     Updated:  07/08/20 1937    Narrative:       CT cervical spine without contrast    HISTORY: Fell    Nonenhanced axial scans of the cervical spine were obtained.  Sagittal and coronal reconstructions were performed.    This exam was performed according to our departmental  dose-optimization program, which includes automated exposure  control, adjustment of the mA and/or kV according to patient size  and/or use of iterative reconstruction technique.    CT DLP: 454.30    Findings:  Normal cervical lordosis.   Vertebral height maintained.   No fracture identified.   Multilevel facet arthropathy  with resultant minimal  retrolisthesis of C3 on C4 and minimal anterolisthesis of C4 on  C5 and C5 on C6.  Degenerative disc disease C6-7.    Chronic obstructive pulmonary disease.  5 mm right upper lobe pulmonary nodule.  Sternotomy.      Impression:       CONCLUSION:  No cervical fracture.  Multilevel facet arthropathy with resultant minimal  retrolisthesis of C3 on C4 and minimal anterolisthesis of C4 on  C5 and C5 on C6.  Degenerative disc disease C6-7.  Chronic obstructive pulmonary disease.  5 mm right upper lobe pulmonary nodule.  No follow-up recommended as below.  Sternotomy.      2017 Fleischner Society Recommendations for Single Solid Lung  Nodule Follow-Up based on size (average of long- and short-axis  diameters)    <6 mm Low-Risk Patient: No routine follow-up   <6 mm High-Risk Patient: Optional CT at 12 months    6-8 mm Low-Risk Patient: CT at 6-12 months then consider CT at  18-24 months  6-8 mm High-Risk Patient: CT at 6-12 months then CT at 18-24  months    >8 mm Low-Risk Patient: Consider CT, PET/CT or tissue sampling at  3 months  >8 mm High-Risk Patient: Same as for low-risk patient     [id number]    Electronically signed by:  Emmanuel Burris MD  7/8/2020 7:36 PM CDT  Workstation: [id number]    CT Head Without Contrast [[id number]] Collected:  07/08/20 1850     Updated:  07/08/20 1931    Narrative:         CT Head Without Contrast    History: Fell. Laceration left eyebrow.    Axial scans of the brain were obtained without intravenous  contrast.  Coronal and sagital reconstructions were preformed.    This exam was performed according to our departmental  dose-optimization program, which includes automated exposure  control, adjustment of the mA and/or kV according to patient size  and/or use of iterative reconstruction technique.    DLP: 964.00    Comparison: None    Findings:  Bone windows are unremarkable.  The visualized paranasal sinuses are unremarkable.    No intracranial injury or acute  process.  Cerebral and cerebellar atrophy.  Old lacunar infarcts in the basal ganglia.  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  Moderate small vessel disease.   No hemorrhage.  No mass.  No abnormal areas of increased attenuation.  No midline shift.  No abnormal extra-axial fluid collections.      Impression:       CONCLUSION:  No intracranial injury or acute process.  Cerebral and cerebellar atrophy.  Old lacunar infarcts in the basal ganglia.  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  Moderate small vessel disease.    [id number]    Electronically signed by:  Emmanuel Burris MD  7/8/2020 7:30 PM CDT  Workstation: [id number]    XR Hip With or Without Pelvis 2 - 3 View Left [[id number]] Collected:  07/08/20 1840     Updated:  07/08/20 1915    Narrative:         Two view left hip with single view pelvis    HISTORY: Pain after falling. Syncope and collapse.    AP and crosstable lateral lateral views of the left hip and AP  film of the pelvis obtained.    COMPARISON: None    FINDINGS:   Comminuted minimally displaced transcervical fracture left hip.  No dislocation of the femoral head.  Dextroscoliosis lumbar spine.  Multilevel degenerative disc disease lumbar spine..  No other osseous or articular abnormality.    Pelvic phleboliths.  Atherosclerotic calcification iliac and femoral arteries.      Impression:       CONCLUSION:  Comminuted minimally displaced transcervical fracture left hip.    [id number]    Electronically signed by:  Emmanuel Burris MD  7/8/2020 7:14 PM CDT  Workstation: [id number]    XR Chest 1 View [[id number]] Collected:  07/08/20 1840     Updated:  07/08/20 1913    Narrative:         PORTABLE CHEST    HISTORY: Chest pain. Syncope and collapse.    Portable AP supine film of the chest was obtained at 6:45 PM.  COMPARISON: None    FINDINGS:   EKG leads.  Bilateral linear atelectasis or scar.  Sternotomy.  The heart is not enlarged.  The pulmonary vasculature is not increased.  No pleural effusion.  No pneumothorax.  No acute osseous  abnormality.  Degenerative changes are present in the thoracic spine.      Impression:       CONCLUSION:  Bilateral linear atelectasis or scar.  Sternotomy.    [id number]    Electronically signed by:  Emmanuel Burris MD  7/8/2020 7:12 PM CDT  Workstation: [id number]            Assessment:    Active Hospital Problems    Diagnosis   • Syncope and collapse         1.  Syncope and collapse  - Negative trauma work-up except for hip fracture  - Monitor with history of pacemaker deferral  -Negative head CT    2.  Reported arrhythmia versus sick sinus syndrome  -Monitor    3.  Dyslipidemia  -Continue meds    4.  Hypertension  -Check orthostatics    5.  Elevated random blood sugar  -Check A1c      CODE STATUS patient is full code    Prophylaxis  - Subcu Lovenox    Patient with very minimal daily activities not able to tell if he is able to do 3 METS he certainly will need to have cardiology consult with syncopal event and told in the past he needs to have pacemaker certainly cannot rule out bradycardia etc.    Discussed with ED physician as well as ED nursing please see orders cardiology consult in the a.m.            Misha Parada MD                Electronically signed by Misha Parada MD at 07/09/20 0737          Operative/Procedure Notes (most recent note)      Jitendra Solis MD at 07/09/20 1342          Procedure(s):  LEFT HIP HEMIARTHROPLASTY  Procedure Note    Jose Dcaosta  7/9/2020    Pre-op Diagnosis:   Closed fracture of neck of left femur, initial encounter (CMS/[facility]) [S72.002A]    Post-op Diagnosis:     Post-Op Diagnosis Codes:     * Closed fracture of neck of left femur, initial encounter (CMS/[facility]) [S72.002A]    Procedure/CPT® Codes:      Procedure(s):  LEFT HIP HEMIARTHROPLASTY/6 standard stem press-fit/52 mm bipolar head/+1.5 mm 28 mm head    Surgeon(s):  Jitendra Solis MD    Anesthesia: Spinal    Staff:   Circulator: Cathy Cano RN  Scrub Person: Nilo Gudino; Mayela Pereira  [place]  Assistant: Mehul Diaz; Margaret Mosquera MA    Estimated Blood Loss: 100ml    Specimens:                ID Type Source Tests Collected by Time   A (Not marked as sent) : Femoral head from left hip Bone Hip, Left TISSUE PATHOLOGY EXAM Jitendra Soils MD 7/9/2020 1519         Drains: * No LDAs found *    Findings: Femoral neck fracture    Complications: None    Dictation: Indications: 85-year-old household ambulator with multiple medical problems including significant cardiovascular issues and bradycardia apparently fainted fracture his left hip was admitted medicine service.  He is cleared by Dr. Licona, his cardiologist as well as the medicine team he is for hemiarthroplasty this time.  I discussed with the patient as well as his daughter the risk benefits of surgery including but not limited to bleeding, blood clot, infection, dislocation, limb length inequality, need for further surgery, and neurologic injury, fracture, medical anesthetic complications, etc.  The understand we will go ahead and proceed as planned.    Procedure:After adequate anesthesia was obtained the patient was placed with the appropriate side up, in a well-padded position.  The affected hip was then prepped and draped usual sterile fashion.  A surgical timeout was performed prior to procedure.    A curvilinear incision was made over the trochanter, sharp dissection was carried through the skin into the soft tissue, electrocautery was used to control hemostasis. The tensor fascia was then split in line with the skin incision and a Charnley retractor was inserted.     Bleeding points controlled with electrocautery.  Care was taken to avoid neurovascular structures.  The external rotators were identified with the piriformis was taken off of the femoral neck revealing the capsule.  The capsule was then T'd with care to avoid injury to the labrum.  The hip was dislocated and the fracture was identified.  A femoral neck cut  was then made 1 fingerbreadth above the lesser trochanter with a saw, the excess bone was removed.  The femur was retracted and the femoral head was removed.  The femoral head was sized and measured on the back table for the appropriate sized bipolar head.    The contents of the fovea were resected and bleeding points controlled with electrocautery.  Trial head ball was then placed into the socket and the appropriate head ball was chosen.    Next attention was paid to the femur, a De Leon retractor was inserted and the soft tissue was taken off the proximal femur and a rongeur was used to lateralize the bone the proximal femur.  Reaming was then taken up 1 mm at a time until we felt chatter in the canal.     Broaching was then taken up to appropriate size, with the broach we obtained good rotational stability.  We did a trial reduction with an appropriate neck and head ball and it was reduced, good stability was noted, good range of motion and length were noted as well.    At that point the prosthesis were chosen and opened on the back table.  The canal was irrigated copious amounts of saline solution and the prosthesis was impacted down to the appropriate length for the broach sat.    The calcar was inspected for fracture no fracture is noted.    The socket was examined  and again the head ball trial was placed and trial reduction was performed.  Good stability and length were noted.  The head ball was then constructed back table and impacted onto the shaft.  Reduction was then at this point,good range of motion was again obtained.  After copious amounts of irrigation throughout the wound with Pulsavac.  The capsule was then repaired with #1 Ethibond suture interrupted fashion, the tensor fascia was repaired fashion #1 Vicryl, subcutaneous tissues closed interrupted 0 Vicryl and 2-0 Vicryl stitches, skin was closed with staples and a sterile dressing was applied, a knee immobilizer was applied, counts were correct.  The patient tolerated the procedure well.    Jitendra Solis MD  07/09/20  15:32          Electronically signed by Jitendra Solis MD at 07/09/20 1534          Physician Progress Notes (most recent note)      Medardo Posada MD at 07/16/20 1139              [facility] Medicine Services  INPATIENT PROGRESS NOTE    Length of Stay: 8  Date of Admission: 7/8/2020  Primary Care Physician: Terry Peterson MD    Subjective   Chief Complaint: fall with hip fracture  HPI:  85 year old male with a history of ischemic cardiomyopathy, HFrEF, and postural syncope, and HTN who presented to the ED after a syncopal episode which took place after standing from his chair.  He suffered facial trauma and a left hip fracture. CT of the head was negative.  Sutures were placed in the ED to facial laceration.  Orthopedics consulted and recommends hip arthroplasty after cardiac clearance. Cardiology cleared for surgery. He underwent left hip hemiarthroplasty.  Pain is controlled.  No new complaints. Placement for rehab is pending.     Review of Systems   Denies fever chills soa, chest pain, abdominal pain, nausea and emesis  Complains of dry eyes and did not like food today  Reports left hip and leg pain after working with physical therapy and chronically throughout the day    All pertinent negatives and positives are as above. All other systems have been reviewed and are negative unless otherwise stated.     Objective    Temp:  [96.2 °F (35.7 °C)-98.1 °F (36.7 °C)] 96.2 °F (35.7 °C)  Heart Rate:  [59-76] 71  Resp:  [18] 18  BP: (121-159)/(58-77) 136/66    Physical Exam  Awake alert nad  Normocephalic   Clear conjunctiva EOEMI  +breath sounds b no wheezes rales or rhonchi symmetric chest expansion  rrr no mrg  Soft +bs x 4 quadrants no guarding or rebound  No edema, spontaneously moves all 4 extremities with some discomfort upon moving left leg  CN2-12  grossly intact  Skin c/d/i warm  Vitals noted above      Results Review:  I have reviewed the labs, radiology results, and diagnostic studies.    Laboratory Data:   Results from last 7 days   Lab Units 07/11/20  0538 07/10/20  0550   SODIUM mmol/L 137 135*   POTASSIUM mmol/L 4.2 4.2   CHLORIDE mmol/L 104 102   CO2 mmol/L 24.0 25.0   BUN mg/dL 20 22   CREATININE mg/dL 0.91 1.04   GLUCOSE mg/dL 92 113*   CALCIUM mg/dL 9.3 9.2   ANION GAP mmol/L 9.0 8.0     Estimated Creatinine Clearance: 59.1 mL/min (by C-G formula based on SCr of 0.91 mg/dL).          Results from last 7 days   Lab Units 07/11/20  0538 07/10/20  0550   WBC 10*3/mm3 11.49* 10.08   HEMOGLOBIN g/dL 9.8* 10.4*   HEMATOCRIT % 29.2* 30.4*   PLATELETS 10*3/mm3 144 149           Culture Data:   No results found for: BLOODCX  No results found for: URINECX  No results found for: RESPCX  No results found for: WOUNDCX  No results found for: STOOLCX  No components found for: BODYFLD    Radiology Data:   Imaging Results (Last 24 Hours)     ** No results found for the last 24 hours. **          I have reviewed the patient's current medications.     Assessment/Plan     Active Hospital Problems    Diagnosis   • **Closed fracture of neck of left femur (CMS/HCC)   • Postoperative anemia due to acute blood loss     Not unexpected     • Facial laceration   • HFrEF (heart failure with reduced ejection fraction) (CMS/[facility])   • Syncope and collapse   • Ischemic cardiomyopathy   • Essential hypertension   • Coronary artery disease involving native coronary artery of native heart without angina pectoris       Plan:   POD#7 Left hip hemiarthroplasty  Continue current pain control  Awaiting rehab placement will discuss with Case Management  Will order normal saline drops        The patient was evaluated during the global COVID-19 pandemic, and the diagnosis was suspected/considered upon their initial presentation.  Evaluation, treatment, and testing were consistent with current  guidelines for patients who present with complaints or symptoms that may be related to COVID-19.    Discharge Planning: I expect patient to be discharged to rehab when bed is available    @me@      Electronically signed by Medardo Posada MD at 20 1154       Medical Student Notes (most recent note)    No notes of this type exist for this encounter.            Physical Therapy Notes (most recent note)      Juan C Leyva, DANIE at 20 1607  Version 1 of 1         Acute Care - Physical Therapy Treatment Note  [facility]     Patient Name: Jose Dacosta  : 1935  MRN: [id number]  Today's Date: 2020  Onset of Illness/Injury or Date of Surgery: 20(surgery yesterday (2020))     Referring Physician: DAYANARA Kumar APRN(surgeon: DAJA Solis MD )    Admit Date: 2020    Visit Dx:    ICD-10-CM ICD-9-CM   1. Syncope and collapse R55 780.2   2. Facial laceration, initial encounter S01.81XA 873.40   3. Closed fracture of left hip, initial encounter (CMS/[facility]) S72.002A 820.8   4. Fall, initial encounter W19.XXXA E888.9   5. Closed head injury, initial encounter S09.90XA 959.01   6. Closed fracture of neck of left femur, initial encounter (CMS/[facility]) S72.002A 820.8   7. Impaired functional mobility, balance, gait, and endurance Z74.09 V49.89   8. Impaired mobility and ADLs Z74.09 V49.89    Z78.9      Patient Active Problem List   Diagnosis   • Coronary artery disease involving native coronary artery of native heart without angina pectoris   • Vasovagal syncope   • RBBB (right bundle branch block with left anterior fascicular block)   • Essential hypertension   • Ischemic cardiomyopathy   • Syncope and collapse   • Closed fracture of neck of left femur (CMS/[facility])   • Coronary artery disease with history of myocardial infarction without history of CABG   • Facial laceration   • HFrEF (heart failure with reduced ejection fraction) (CMS/[facility])   • Postoperative  anemia due to acute blood loss       Therapy Treatment    Rehabilitation Treatment Summary     Row Name 07/16/20 1454 07/16/20 1049 07/16/20 0900       Treatment Time/Intention    Discipline  physical therapy assistant  -TW  occupational therapy assistant  -LM  physical therapy assistant  -TW    Document Type  therapy note (daily note)  -TW  therapy note (daily note)  -LM  therapy note (daily note)  -TW    Subjective Information  complains of;dizziness States he thinks his eyes are making him dizzy.  -TW  --  complains of;dizziness  -TW    Mode of Treatment  physical therapy;individual therapy  -TW  individual therapy;occupational therapy  -LM  physical therapy;individual therapy  -TW    Patient/Family Observations  --  --  No visitors present.  -TW    Patient Effort  good  -TW  good  -LM  good  -TW    Existing Precautions/Restrictions  hip, posterior;fall  -TW  fall  -LM  hip, posterior  -TW    Recorded by [TW] Juan C Leyva PTA 07/16/20 1603 [LM] Alise Mora COTA/JAZZY 07/16/20 1418 [TW] Juan C Leyva PTA 07/16/20 1350    Row Name 07/16/20 1454 07/16/20 0900          Vital Signs    Pre Systolic BP Rehab  120  -TW  132  -TW     Pre Treatment Diastolic BP  60  -TW  65  -TW     Post Systolic BP Rehab  --  143  -TW     Post Treatment Diastolic BP  --  65  -TW     Pretreatment Heart Rate (beats/min)  70  -TW  77  -TW     Posttreatment Heart Rate (beats/min)  76  -TW  86  -TW     Pre SpO2 (%)  97  -TW  97  -TW     O2 Delivery Pre Treatment  room air  -TW  room air  -TW     Post SpO2 (%)  98  -TW  98  -TW     Pre Patient Position  Supine  -TW  Supine  -TW     Intra Patient Position  Standing  -TW  Standing  -TW     Post Patient Position  Supine  -TW  Supine  -TW     Recorded by [TW] Juan C Leyva PTA 07/16/20 1603 [TW] Juan C Leyva PTA 07/16/20 1350     Row Name 07/16/20 1454 07/16/20 1049 07/16/20 0900       Cognitive Assessment/Intervention- PT/OT    Orientation Status (Cognition)   oriented x 4  -TW  oriented x 4  -LM  oriented x 4  -TW    Follows Commands (Cognition)  follows one step commands;over 90% accuracy  -TW  follows one step commands  -LM  follows one step commands;over 90% accuracy  -TW    Personal Safety Interventions  fall prevention program maintained;gait belt;nonskid shoes/slippers when out of bed  -TW  fall prevention program maintained  -LM  fall prevention program maintained;gait belt;nonskid shoes/slippers when out of bed  -TW    Recorded by [TW] Juan C Leyva, DANIE 07/16/20 1603 [LM] Alise Mora COTA/JAZZY 07/16/20 1418 [TW] Juan C Leyva, PTA 07/16/20 1350    Row Name 07/16/20 1454 07/16/20 0900          Safety Issues, Functional Mobility    Impairments Affecting Function (Mobility)  balance;endurance/activity tolerance;pain;range of motion (ROM);strength  -TW  balance;endurance/activity tolerance;pain;range of motion (ROM);strength  -TW     Recorded by [TW] Juan C Leyva, PTA 07/16/20 1603 [TW] Juan C Leyva, PTA 07/16/20 1350     Row Name 07/16/20 1454 07/16/20 0900          Mobility Assessment/Intervention    Left Lower Extremity (Weight-bearing Status)  weight-bearing as tolerated (WBAT)  -TW  weight-bearing as tolerated (WBAT)  -TW     Recorded by [TW] Juan C Leyva, PTA 07/16/20 1603 [TW] Juan C Leyva, PTA 07/16/20 1350     Row Name 07/16/20 1454 07/16/20 1049 07/16/20 0900       Bed Mobility Assessment/Treatment    Bed Mobility Assessment/Treatment  --  supine-sit;sit-supine  -LM  --    Supine-Sit [place] (Bed Mobility)  supervision;verbal cues  -TW  contact guard  -LM  contact guard  -TW    Sit-Supine [place] (Bed Mobility)  supervision  -TW  contact guard  -LM  contact guard  -TW    Bed Mobility, Safety Issues  decreased use of legs for bridging/pushing  -TW  --  decreased use of legs for bridging/pushing  -TW    Assistive Device (Bed Mobility)  bed rails;head of bed elevated  -TW  --  bed rails;head of bed  elevated  -TW    Recorded by [TW] Juan C Leyva, PTA 07/16/20 1603 [LM] Alise Mora AGUILAR/L 07/16/20 1418 [TW] Juan C Leyva, PTA 07/16/20 1350    Row Name 07/16/20 1049             Functional Mobility    Functional Mobility- Ind. Level  moderate assist (50% patient effort)  -LM      Functional Mobility- Device  rolling walker  -LM      Recorded by [LM] Alise Mora AGUILAR/L 07/16/20 1418      Row Name 07/16/20 1454 07/16/20 1049 07/16/20 0900       Sit-Stand Transfer    Sit-Stand [place] (Transfers)  contact guard;verbal cues  -TW  contact guard  -LM  contact guard;verbal cues  -TW    Assistive Device (Sit-Stand Transfers)  walker, front-wheeled  -TW  walker, front-wheeled  -LM  walker, front-wheeled  -TW    Recorded by [TW] Juan C Leyva, PTA 07/16/20 1603 [LM] Alise Mora AGUILAR/L 07/16/20 1418 [TW] Juan C Leyva, PTA 07/16/20 1350    Row Name 07/16/20 1454 07/16/20 1049 07/16/20 0900       Stand-Sit Transfer    Stand-Sit [place] (Transfers)  contact guard  -TW  contact guard  -LM  contact guard  -TW    Assistive Device (Stand-Sit Transfers)  walker, front-wheeled  -TW  walker, front-wheeled  -LM  walker, front-wheeled  -TW    Recorded by [TW] Juan C Leyva, PTA 07/16/20 1603 [LM] Alise Mora AGUILAR/L 07/16/20 1418 [TW] Juan C Leyva, PTA 07/16/20 1350    Row Name 07/16/20 1049             Toilet Transfer    Type (Toilet Transfer)  sit-stand;stand-sit  -LM      [place] Level (Toilet Transfer)  contact guard  -LM      Recorded by [LM] Alise Mora AGUILAR/L 07/16/20 1418      Row Name 07/16/20 1454 07/16/20 0900          Gait/Stairs Assessment/Training    Gait/Stairs Assessment/Training  gait/ambulation assistive device  -TW  gait/ambulation assistive device  -TW     [place] Level (Gait)  verbal cues;contact guard  -TW  verbal cues;contact guard  -TW     Assistive Device (Gait)  walker, front-wheeled  -TW  walker, front-wheeled   -TW     Distance in Feet (Gait)  224ft  -TW  224ft  -TW     Pattern (Gait)  --  3-point  -TW     Deviations/Abnormal Patterns (Gait)  antalgic  -TW  antalgic  -TW     Bilateral Gait Deviations  forward flexed posture  -TW  --     Comment (Gait/Stairs)  Less dizziness this tx with gait.  -TW  Pt c/o dizziness throughout gait but VSS throughout.  -TW     Recorded by [TW] Juan C Leyva, PTA 07/16/20 1603 [TW] Juan C Leyva, PTA 07/16/20 1350     Row Name 07/16/20 1049             Bathing Assessment/Intervention    Bathing [place] Level  bathing skills;lower body;upper body;contact guard assist  -LM      Recorded by [LM] Alise Mora AGUILAR/L 07/16/20 1418      Row Name 07/16/20 1049             Upper Body Dressing Assessment/Training    Upper Body Dressing [place] Level  upper body dressing skills;doff;don;conditional independence  -LM      Recorded by [LM] Alise Mora AGUILAR/L 07/16/20 1418      Row Name 07/16/20 1049             Lower Body Dressing Assessment/Training    Lower Body Dressing [place] Level  --  -LM      Lower Body Dressing Position  --  -LM      Recorded by [LM] Alise Mora AGUILAR/L 07/16/20 1418      Row Name 07/16/20 1049             Grooming Assessment/Training    [place] Level (Grooming)  grooming skills;hair care, combing/brushing;set up  -LM      Recorded by [LM] Alise Mora AGUILAR/L 07/16/20 1418      Row Name 07/16/20 1454 07/16/20 0900          Positioning and Restraints    Pre-Treatment Position  in bed  -TW  in bed  -TW     Post Treatment Position  bed  -TW  bed  -TW     In Bed  supine;call light within reach;encouraged to call for assist;exit alarm on  -TW  supine;call light within reach;encouraged to call for assist;exit alarm on  -TW     Recorded by [TW] Juan C Leyva, PTA 07/16/20 1603 [TW] Juan C Leyva, PTA 07/16/20 1350     Row Name 07/16/20 1454 07/16/20 0900          Pain Scale: Numbers Pre/Post-Treatment    Pain  Scale: Numbers, Pretreatment  0/10 - no pain  -TW  0/10 - no pain  -TW     Pain Scale: Numbers, Post-Treatment  0/10 - no pain  -TW  0/10 - no pain  -TW     Recorded by [TW] Juan C Leyva PTA 07/16/20 1603 [TW] Juan C Leyva, PTA 07/16/20 1350     Row Name                Wound 07/08/20 1810 Left upper eyebrow Laceration    Wound - Properties Group Date first assessed: 07/08/20 [TM] Time first assessed: 1810 [TM] Present on Hospital Admission: Y [TM] Side: Left [TM] Orientation: upper [TM] Location: eyebrow [TM] Primary Wound Type: Laceration [TM] Recorded by:  [TM] Josephine Elias RN 07/08/20 1811    Row Name                Wound 07/09/20 1515 Left posterior hip Incision    Wound - Properties Group Date first assessed: 07/09/20 [AQ] Time first assessed: 1515 [AQ] Present on Hospital Admission: N [AQ] Side: Left [AQ] Orientation: posterior [AQ] Location: hip [AQ] Primary Wound Type: Incision [AQ] Recorded by:  [AQ] Cathy Cano RN 07/09/20 1515    Row Name 07/16/20 1049             Outcome Summary/Treatment Plan (OT)    Daily Summary of Progress (OT)  progress towards functional goals is fair  -LM      Recorded by [LM] Alise Mora COTA/L 07/16/20 1418      Row Name 07/16/20 1454 07/16/20 0900          Outcome Summary/Treatment Plan (PT)    Daily Summary of Progress (PT)  progress toward functional goals is good  -TW  progress toward functional goals is good  -TW     Plan for Continued Treatment (PT)  Cont  -TW  Cont  -TW     Anticipated Discharge Disposition (PT)  anticipate therapy at next level of care  -TW  anticipate therapy at next level of care  -TW     Recorded by [TW] Juan C Leyva PTA 07/16/20 1603 [TW] Juan C Leyva, PTA 07/16/20 1350       User Key  (r) = Recorded By, (t) = Taken By, (c) = Cosigned By    Initials Name Effective Dates Discipline    AQ Cathy Cano RN 10/17/16 -  Nurse    TW Juan C Leyva PTA 03/07/18 -  PT    LM Alise Mora  R, AGUILAR/L 03/07/18 -  OT    TM Josephine Elias RN 07/24/18 -  Nurse          Wound 07/08/20 1810 Left upper eyebrow Laceration (Active)   Dressing Appearance open to air 7/16/2020  8:49 AM   Closure Adhesive closure strips;Approximated 7/15/2020  8:14 PM   Base maroon/purple 7/15/2020  8:14 PM   Drainage Amount none 7/15/2020  8:14 PM       Wound 07/09/20 1515 Left posterior hip Incision (Active)   Dressing Appearance open to air 7/16/2020  8:49 AM   Closure [place] 7/16/2020  8:49 AM   Base dry;clean 7/16/2020  8:49 AM   Drainage Amount none 7/15/2020  8:14 PM       Rehab Goal Summary     Row Name 07/16/20 1454 07/16/20 1419          Bed Mobility Goal 1 (PT)    Activity/Assistive Device (Bed Mobility Goal 1, PT)  sit to supine;supine to sit  -TW  --     [place] Level/Cues Needed (Bed Mobility Goal 1, PT)  independent  -TW  --     Time Frame (Bed Mobility Goal 1, PT)  by discharge  -TW  --     Progress/Outcomes (Bed Mobility Goal 1, PT)  goal not met  -TW  --        Transfer Goal 1 (PT)    Activity/Assistive Device (Transfer Goal 1, PT)  sit-to-stand/stand-to-sit;bed-to-chair/chair-to-bed;toilet  -TW  --     [place] Level/Cues Needed (Transfer Goal 1, PT)  conditional independence  -TW  --     Time Frame (Transfer Goal 1, PT)  2 - 3 days  -TW  --     Progress/Outcome (Transfer Goal 1, PT)  goal not met  -TW  --        Gait Training Goal 1 (PT)    Activity/Assistive Device (Gait Training Goal 1, PT)  gait (walking locomotion);assistive device use;walker, rolling  -TW  --     [place] Level (Gait Training Goal 1, PT)  conditional independence  -TW  --     Distance (Gait Goal 1, PT)  [id number]  -TW  --     Time Frame (Gait Training Goal 1, PT)  by discharge  -TW  --     Progress/Outcome (Gait Training Goal 1, PT)  goal not met  -TW  --        Stairs Goal 1 (PT)    Activity/Assistive Device (Stairs Goal 1, PT)  ascending stairs;descending stairs;using handrail, left;assistive device use  -TW  --      [place] Level/Cues Needed (Stairs Goal 1, PT)  conditional independence  -TW  --     Time Frame (Stairs Goal 1, PT)  by discharge  -TW  --     Progress/Outcome (Stairs Goal 1, PT)  goal not met  -TW  --        Occupational Therapy Goals    Transfer Goal Selection (OT)  --  transfer, OT goal 1  -LM     Bathing Goal Selection (OT)  --  bathing, OT goal 1  -LM     Dressing Goal Selection (OT)  --  dressing, OT goal 1  -LM     Toileting Goal Selection (OT)  --  toileting, OT goal 1  -LM     Endurance Goal Selection (OT)  --  endurance, OT goal 1  -LM     Safety Awareness Goal Selection (OT)  --  safety awareness, OT goal 1  -LM        Transfer Goal 1 (OT)    Activity/Assistive Device (Transfer Goal 1, OT)  --  toilet  -LM     [place] Level/Cues Needed (Transfer Goal 1, OT)  --  contact guard assist  -LM     Time Frame (Transfer Goal 1, OT)  --  long term goal (LTG);by discharge  -LM     Barriers (Transfers Goal 1, OT)  --  hearing deficit;safety deficit   -LM     Progress/Outcome (Transfer Goal 1, OT)  --  goal not met  -LM        Bathing Goal 1 (OT)    Activity/Assistive Device (Bathing Goal 1, OT)  --  bathing skills, all  -LM     [place] Level/Cues Needed (Bathing Goal 1, OT)  --  minimum assist (75% or more patient effort)  -LM     Time Frame (Bathing Goal 1, OT)  --  long term goal (LTG);by discharge  -LM     Progress/Outcomes (Bathing Goal 1, OT)  --  goal not met  -LM        Dressing Goal 1 (OT)    Activity/Assistive Device (Dressing Goal 1, OT)  --  dressing skills, all  -LM     [place]/Cues Needed (Dressing Goal 1, OT)  --  minimum assist (75% or more patient effort)  -LM     Time Frame (Dressing Goal 1, OT)  --  long term goal (LTG);by discharge  -LM     Progress/Outcome (Dressing Goal 1, OT)  --  goal not met  -LM        Toileting Goal 1 (OT)    Activity/Device (Toileting Goal 1, OT)  --  toileting skills, all  -LM     [place] Level/Cues Needed (Toileting Goal 1, OT)  --  minimum  assist (75% or more patient effort)  -LM     Time Frame (Toileting Goal 1, OT)  --  long term goal (LTG);by discharge  -LM     Progress/Outcome (Toileting Goal 1, OT)  --  goal not met  -LM         Endurance Goal 1 (OT)    Progress/Outcome (Endurance Goal 1, OT)  --  goal not met  -LM        Safety Awareness Goal 1 (OT)    Activity (Safety Awareness Goal 1, OT)  --  awareness of need for assistance;impulse control;insight into deficits/self awareness;judgment;safe use of assistive device/equipment;follow through of safety precautions;demonstrates compliance;demonstrates understanding;demonstration of safe behaviors  -LM     [place]/Cues/Accuracy (Safety Awareness Goal 1, OT)  --  with minimum;verbal cues/redirection;with repetition of directions;with 90% accuracy  -LM     Time Frame (Safety Awareness Goal 1, OT)  --  long term goal (LTG);by discharge  -LM     Progress/Outcome (Safety Awareness Goal 1, OT)  --  goal not met  -LM       User Key  (r) = Recorded By, (t) = Taken By, (c) = Cosigned By    Initials Name Provider Type Discipline    TW Juan C Leyva, PTA Physical Therapy Assistant PT    LM Alise Mora COTA/L Occupational Therapy Assistant OT          Physical Therapy Education                 Title: PT OT SLP Therapies (In Progress)     Topic: Physical Therapy (In Progress)     Point: Mobility training (In Progress)     Description:   Instruct learner(s) on safety and technique for assisting patient out of bed, chair or wheelchair.  Instruct in the proper use of assistive devices, such as walker, crutches, cane or brace.              Patient Friendly Description:   It's important to get you on your feet again, but we need to do so in a way that is safe for you. Falling has serious consequences, and your personal safety is the most important thing of all.        When it's time to get out of bed, one of us or a family member will sit next to you on the bed to give you support.     If your  doctor or nurse tells you to use a walker, crutches, a cane, or a brace, be sure you use it every time you get out of bed, even if you think you don't need it.    Learning Progress Summary           Patient Acceptance, E, NR by  at 7/11/2020 1047    Comment:  edu on hip dislocation precautions.    Acceptance, E, NR by [facility] at 7/10/2020 1348                   Point: Home exercise program (Not Started)     Description:   Instruct learner(s) on appropriate technique for monitoring, assisting and/or progressing patient with therapeutic exercises and activities.              Learner Progress:   Not documented in this visit.          Point: Body mechanics (In Progress)     Description:   Instruct learner(s) on proper positioning and spine alignment for patient and/or caregiver during mobility tasks and/or exercises.              Learning Progress Summary           Patient Acceptance, E, NR by [facility] at 7/10/2020 1348                   Point: Precautions (In Progress)     Description:   Instruct learner(s) on prescribed precautions during mobility and gait tasks              Learning Progress Summary           Patient Acceptance, E, NR by  at 7/11/2020 1047    Comment:  edu on hip dislocation precautions.    Acceptance, E, NR by [facility] at 7/10/2020 1348                               User Key     Initials Effective Dates Name Provider Type Discipline    [facility] 04/03/18 -  Angelica Torres, PT Physical Therapist PT     03/07/18 -  Vikash Walters, PTA Physical Therapy Assistant PT                PT Recommendation and Plan  Anticipated Discharge Disposition (PT): anticipate therapy at next level of care  Outcome Summary/Treatment Plan (PT)  Daily Summary of Progress (PT): progress toward functional goals is good  Barriers to Overall Progress (PT): bradycardia  Plan for Continued Treatment (PT): Cont  Anticipated Discharge Disposition (PT): anticipate therapy at next level of care  Plan of Care Reviewed With: patient  Progress:  improving  Outcome Summary: Pt participated in BID tx this date. Pt able to stand with CGA/SBA and amb with RW and CGA with VCs needed for walker placement and postural control. Pt amb 224ft each session with no c/o  pain noted after. Pt does get impulsive at times butresponds well with VC and redirection. Pt would cont to benefit from tehrapy upon DC.  Outcome Measures     Row Name 07/16/20 1454 07/15/20 1510 07/14/20 1338       How much help from another person do you currently need...    Turning from your back to your side while in flat bed without using bedrails?  3  -TW  3  -TW  3  -TW    Moving from lying on back to sitting on the side of a flat bed without bedrails?  3  -TW  3  -TW  3  -TW    Moving to and from a bed to a chair (including a wheelchair)?  3  -TW  3  -TW  3  -TW    Standing up from a chair using your arms (e.g., wheelchair, bedside chair)?  3  -TW  3  -TW  3  -TW    Climbing 3-5 steps with a railing?  3  -TW  3  -TW  3  -TW    To walk in hospital room?  3  -TW  3  -TW  3  -TW    AM-PAC 6 Clicks Score (PT)  18  -TW  18  -TW  18  -TW      User Key  (r) = Recorded By, (t) = Taken By, (c) = Cosigned By    Initials Name Provider Type    Juan C Hawk PTA Physical Therapy Assistant         Time Calculation:   PT Charges     Row Name 07/16/20 1606 07/16/20 1350          Time Calculation    Start Time  1454  -TW  0900  -TW     Stop Time  1520  -TW  0929  -TW     Time Calculation (min)  26 min  -TW  29 min  -TW     PT Received On  07/16/20  -TW  07/16/20  -TW     PT Goal Re-Cert Due Date  07/23/20  -TW  07/23/20  -TW        Time Calculation- PT    Total Timed Code Minutes- PT  26 minute(s)  -TW  29 minute(s)  -TW       User Key  (r) = Recorded By, (t) = Taken By, (c) = Cosigned By    Initials Name Provider Type    Juan C Hawk PTA Physical Therapy Assistant        Therapy Charges for Today     Code Description Service Date Service Provider Modifiers Qty    97370225229 HC GAIT  TRAINING EA 15 MIN 7/15/2020 Juan C Leyva, PTA GP 1    [id number] HC PT THERAPEUTIC ACT EA 15 MIN 7/15/2020 Juan C Leyva, PTA GP 1    [id number] HC PT THER PROC EA 15 MIN 7/15/2020 Juan C Leyva, PTA GP 1    78320410311 HC PT THERAPEUTIC ACT EA 15 MIN 7/15/2020 Juan C Leyva, PTA GP 1    54166416893 HC PT THER PROC EA 15 MIN 7/15/2020 Juan C Leyva, PTA GP 1    37343474225 HC GAIT TRAINING EA 15 MIN 2020 Juan C Leyva, PTA GP 1    35391078494 HC PT THERAPEUTIC ACT EA 15 MIN 2020 Juan C Leyva, PTA GP 1    88804084234 HC GAIT TRAINING EA 15 MIN 2020 Juan C Leyva, PTA GP 1    83478427446 HC PT THERAPEUTIC ACT EA 15 MIN 2020 Juan C Leyva, PTA GP 1          PT G-Codes  Outcome Measure Options: AM-PAC 6 Clicks Basic Mobility (PT)  AM-PAC 6 Clicks Score (PT): 18  AM-PAC 6 Clicks Score (OT): 12    Juan C Leyva PTA  2020         Electronically signed by Juan C Leyva PTA at 20 1607          Occupational Therapy Notes (most recent note)      Evelyn Dodson COTA/L at 20 1203          Acute Care - Occupational Therapy Treatment Note  [facility]     Patient Name: Jose Dacosta  : 1935  MRN: [id number]  Today's Date: 2020  Onset of Illness/Injury or Date of Surgery: 20(surgery yesterday (2020))  Date of Referral to OT: 07/10/20  Referring Physician: DAYANARA Kumar APRN(surgeon: DAJA Solis MD )    Admit Date: 2020       ICD-10-CM ICD-9-CM   1. Syncope and collapse R55 780.2   2. Facial laceration, initial encounter S01.81XA 873.40   3. Closed fracture of left hip, initial encounter (CMS/[facility]) S72.002A 820.8   4. Fall, initial encounter W19.XXXA E888.9   5. Closed head injury, initial encounter S09.90XA 959.01   6. Closed fracture of neck of left femur, initial encounter (CMS/[facility]) S72.002A 820.8   7. Impaired functional mobility, balance, gait, and endurance Z74.09 V49.89    8. Impaired mobility and ADLs Z74.09 V49.89    Z78.9      Patient Active Problem List   Diagnosis   • Coronary artery disease involving native coronary artery of native heart without angina pectoris   • Vasovagal syncope   • RBBB (right bundle branch block with left anterior fascicular block)   • Essential hypertension   • Ischemic cardiomyopathy   • Syncope and collapse   • Closed fracture of neck of left femur (CMS/[facility])   • Coronary artery disease with history of myocardial infarction without history of CABG   • Facial laceration   • HFrEF (heart failure with reduced ejection fraction) (CMS/[facility])   • Postoperative anemia due to acute blood loss     Past Medical History:   Diagnosis Date   • Abnormal ECG      Past Surgical History:   Procedure Laterality Date   • CORONARY STENT PLACEMENT     • HIP HEMIARTHROPLASTY Left 7/9/2020    Procedure: LEFT HIP HEMIARTHROPLASTY;  Surgeon: Jitendra Solis MD;  Location: [facility];  Service: Orthopedics;  Laterality: Left;       Therapy Treatment    Rehabilitation Treatment Summary     Row Name 07/17/20 0939             Treatment Time/Intention    Discipline  occupational therapy assistant  -RC      Document Type  therapy note (daily note)  -RC      Subjective Information  no complaints  -RC      Mode of Treatment  occupational therapy;individual therapy  -RC      Patient/Family Observations  no visitor present   -RC      Care Plan Review  patient/other agree to care plan  -RC      Total Minutes, Occupational Therapy Treatment  40  -RC      Therapy Frequency (OT Eval)  daily  -RC      Patient Effort  good  -RC      Existing Precautions/Restrictions  hip, posterior;fall  -RC      Recorded by [RC] Evelyn Dodson COTA/L 07/17/20 1159      Row Name 07/17/20 0939             Vital Signs    Pre Systolic BP Rehab  120  -RC      Pre Treatment Diastolic BP  60  -RC      Post Systolic BP Rehab  129  -RC      Post Treatment Diastolic BP  66  -RC      Pre SpO2 (%)  98  -RC       O2 Delivery Pre Treatment  room air  -RC      Recorded by [RC] Evelyn Dodson AGUILAR/L 07/17/20 1159      Row Name 07/17/20 0939             Cognitive Assessment/Intervention- PT/OT    Affect/Mental Status (Cognitive)  WFL  -RC      Orientation Status (Cognition)  oriented x 4  -RC      Cognitive Function (Cognitive)  executive function deficit  -RC      Recorded by [RC] Evelyn Dodson AGUILAR/L 07/17/20 1159      Row Name 07/17/20 0939             Bed Mobility Assessment/Treatment    Sit-Supine [place] (Bed Mobility)  supervision  -RC      Recorded by [RC] Evelyn Dodson AGUILAR/L 07/17/20 1159      Row Name 07/17/20 0939             Functional Mobility    Functional Mobility- Ind. Level  contact guard assist  -RC      Functional Mobility- Device  rolling walker  -RC      Functional Mobility-Distance (Feet)  15  -RC      Recorded by [RC] Evelyn Dodson AGUILAR/L 07/17/20 1159      Row Name 07/17/20 0939             Sit-Stand Transfer    Sit-Stand [place] (Transfers)  contact guard  -RC      Assistive Device (Sit-Stand Transfers)  walker, front-wheeled  -RC      Recorded by [RC] Evelyn Dodson AGUILAR/L 07/17/20 1159      Row Name 07/17/20 0939             Stand-Sit Transfer    Stand-Sit [place] (Transfers)  contact guard  -RC      Assistive Device (Stand-Sit Transfers)  walker, front-wheeled  -RC      Recorded by [RC] Evelyn Dodson AGUILAR/L 07/17/20 1159      Row Name 07/17/20 0939             Bathing Assessment/Intervention    Bathing [place] Level  set up;supervision;contact guard assist;verbal cues  -RC      Bathing Position  supported sitting;supported standing  -RC      Recorded by [RC] Evelyn Dodson AGUILAR/L 07/17/20 1159      Row Name 07/17/20 0939             Grooming Assessment/Training    [place] Level (Grooming)  grooming skills;set up  -RC      Grooming Position  supported sitting  -RC      Recorded by [RC] Evelyn Dodson AGUILAR/L 07/17/20 1159      Row Name 07/17/20 0939              Positioning and Restraints    Pre-Treatment Position  in bed  -RC      Post Treatment Position  bed  -RC      In Bed  call light within reach;encouraged to call for assist;exit alarm on;with nsg  -RC      Recorded by [RC] Evelyn Dodson COTA/L 07/17/20 1159      Row Name 07/17/20 0939             Pain Scale: Numbers Pre/Post-Treatment    Pain Scale: Numbers, Pretreatment  0/10 - no pain  -RC      Pain Scale: Numbers, Post-Treatment  0/10 - no pain  -RC      Recorded by [RC] Evelyn Dodson AGUILAR/L 07/17/20 1159      Row Name                Wound 07/08/20 1810 Left upper eyebrow Laceration    Wound - Properties Group Date first assessed: 07/08/20 [TM] Time first assessed: 1810 [TM] Present on Hospital Admission: Y [TM] Side: Left [TM] Orientation: upper [TM] Location: eyebrow [TM] Primary Wound Type: Laceration [TM] Recorded by:  [TM] Josephine Elias RN 07/08/20 1811    Row Name                Wound 07/09/20 1515 Left posterior hip Incision    Wound - Properties Group Date first assessed: 07/09/20 [AQ] Time first assessed: 1515 [AQ] Present on Hospital Admission: N [AQ] Side: Left [AQ] Orientation: posterior [AQ] Location: hip [AQ] Primary Wound Type: Incision [AQ] Recorded by:  [AQ] Cathy Cano RN 07/09/20 1515    Row Name 07/17/20 0939             Outcome Summary/Treatment Plan (OT)    Daily Summary of Progress (OT)  progress toward functional goals as expected  -      Plan for Continued Treatment (OT)  cont poc   -RC      Recorded by [RC] Evelyn Dodson AGUILAR/JAZZY 07/17/20 1159        User Key  (r) = Recorded By, (t) = Taken By, (c) = Cosigned By    Initials Name Effective Dates Discipline    AQ Cathy Cano RN 10/17/16 -  Nurse    RC Evelyn Dodson AGUILAR/L 03/07/18 -  OT    TM Josephine Elias RN 07/24/18 -  Nurse        Wound 07/08/20 1810 Left upper eyebrow Laceration (Active)   Dressing Appearance open to air 7/16/2020  8:00 PM   Closure Adhesive closure  strips 7/16/2020  8:00 PM   Base maroon/purple 7/16/2020  8:00 PM   Drainage Amount none 7/16/2020  8:00 PM       Wound 07/09/20 1515 Left posterior hip Incision (Active)   Dressing Appearance open to air 7/16/2020  8:00 PM   Closure Staples 7/16/2020  8:00 PM   Base clean;dry 7/16/2020  8:00 PM   Drainage Amount none 7/16/2020  8:00 PM     Rehab Goal Summary     Row Name 07/17/20 0939             Occupational Therapy Goals    Transfer Goal Selection (OT)  transfer, OT goal 1  -RC      Bathing Goal Selection (OT)  bathing, OT goal 1  -RC      Dressing Goal Selection (OT)  dressing, OT goal 1  -RC      Toileting Goal Selection (OT)  toileting, OT goal 1  -RC      Endurance Goal Selection (OT)  endurance, OT goal 1  -RC      Safety Awareness Goal Selection (OT)  safety awareness, OT goal 1  -RC         Transfer Goal 1 (OT)    Activity/Assistive Device (Transfer Goal 1, OT)  toilet  -RC      [place] Level/Cues Needed (Transfer Goal 1, OT)  contact guard assist  -RC      Time Frame (Transfer Goal 1, OT)  long term goal (LTG);by discharge  -RC      Barriers (Transfers Goal 1, OT)  hearing deficit;safety deficit   -RC      Progress/Outcome (Transfer Goal 1, OT)  goal not met  -RC         Bathing Goal 1 (OT)    Activity/Assistive Device (Bathing Goal 1, OT)  bathing skills, all  -RC      [place] Level/Cues Needed (Bathing Goal 1, OT)  minimum assist (75% or more patient effort)  -RC      Time Frame (Bathing Goal 1, OT)  long term goal (LTG);by discharge  -RC      Progress/Outcomes (Bathing Goal 1, OT)  goal not met  -RC         Dressing Goal 1 (OT)    Activity/Assistive Device (Dressing Goal 1, OT)  dressing skills, all  -RC      [place]/Cues Needed (Dressing Goal 1, OT)  minimum assist (75% or more patient effort)  -RC      Time Frame (Dressing Goal 1, OT)  long term goal (LTG);by discharge  -RC      Progress/Outcome (Dressing Goal 1, OT)  goal not met  -RC         Toileting Goal 1 (OT)    Activity/Device  (Toileting Goal 1, OT)  toileting skills, all  -RC      Street Level/Cues Needed (Toileting Goal 1, OT)  minimum assist (75% or more patient effort)  -RC      Time Frame (Toileting Goal 1, OT)  long term goal (LTG);by discharge  -RC      Progress/Outcome (Toileting Goal 1, OT)  goal not met  -RC          Endurance Goal 1 (OT)    Progress/Outcome (Endurance Goal 1, OT)  goal not met  -RC         Safety Awareness Goal 1 (OT)    Activity (Safety Awareness Goal 1, OT)  awareness of need for assistance;impulse control;insight into deficits/self awareness;judgment;safe use of assistive device/equipment;follow through of safety precautions;demonstrates compliance;demonstrates understanding;demonstration of safe behaviors  -RC      Street/Cues/Accuracy (Safety Awareness Goal 1, OT)  with minimum;verbal cues/redirection;with repetition of directions;with 90% accuracy  -RC      Time Frame (Safety Awareness Goal 1, OT)  long term goal (LTG);by discharge  -RC      Progress/Outcome (Safety Awareness Goal 1, OT)  goal not met  -RC        User Key  (r) = Recorded By, (t) = Taken By, (c) = Cosigned By    Initials Name Provider Type Discipline    RC Eevlyn Dodson COTA/L Occupational Therapy Assistant OT        Occupational Therapy Education                 Title: PT OT SLP Therapies (In Progress)     Topic: Occupational Therapy (In Progress)     Point: ADL training (In Progress)     Description:   Instruct learner(s) on proper safety adaptation and remediation techniques during self care or transfers.   Instruct in proper use of assistive devices.              Learning Progress Summary           Patient Acceptance, E, NR by  at 7/17/2020 1200    Acceptance, E,D,H, NR by  at 7/11/2020 1429    Comment:  much review and issued handout for HIP precautions, edu on use of lh ae will need reinforcement.    Acceptance, E, NR by ME at 7/10/2020 1503    Comment:  Educated on OT and POC. Educated to call for assistance.  Educated on safety precautions. Educated on proper body mechanics for transfers, bed mobility, ADLs, and funcitonal mobility.                   Point: Home exercise program (Not Started)     Description:   Instruct learner(s) on appropriate technique for monitoring, assisting and/or progressing therapeutic exercises/activities.              Learner Progress:   Not documented in this visit.          Point: Precautions (In Progress)     Description:   Instruct learner(s) on prescribed precautions during self-care and functional transfers.              Learning Progress Summary           Patient Acceptance, E, NR by RC at 7/17/2020 1200    Acceptance, E, NR by RC at 7/13/2020 1528    Acceptance, E, NR by RC at 7/12/2020 1503    Comment:  multi review of hip precautions    Acceptance, E,D,H, NR by RC at 7/11/2020 1429    Comment:  much review and issued handout for HIP precautions, edu on use of lh ae will need reinforcement.    Acceptance, E, NR by ME at 7/10/2020 1503    Comment:  Educated on OT and POC. Educated to call for assistance. Educated on safety precautions. Educated on proper body mechanics for transfers, bed mobility, ADLs, and funcitonal mobility.                   Point: Body mechanics (In Progress)     Description:   Instruct learner(s) on proper positioning and spine alignment during self-care, functional mobility activities and/or exercises.              Learning Progress Summary           Patient Acceptance, E, NR by RC at 7/17/2020 1200    Acceptance, E, NR by RC at 7/13/2020 1528    Acceptance, E,D,H, NR by RC at 7/11/2020 1429    Comment:  much review and issued handout for HIP precautions, edu on use of lh ae will need reinforcement.    Acceptance, E, NR by ME at 7/10/2020 1503    Comment:  Educated on OT and POC. Educated to call for assistance. Educated on safety precautions. Educated on proper body mechanics for transfers, bed mobility, ADLs, and funcitonal mobility.                                User Key     Initials Effective Dates Name Provider Type Discipline     03/07/18 -  Evelyn Dodson COTA/L Occupational Therapy Assistant OT    ME 09/10/19 -  Eitan Haynes OT Occupational Therapist OT                OT Recommendation and Plan  Outcome Summary/Treatment Plan (OT)  Daily Summary of Progress (OT): progress toward functional goals as expected  Plan for Continued Treatment (OT): cont poc   Therapy Frequency (OT Eval): daily  Daily Summary of Progress (OT): progress toward functional goals as expected  Plan of Care Review  Plan of Care Reviewed With: patient  Plan of Care Reviewed With: patient  Outcome Summary: pt sitting eob at entry, cga for sit to stand amb aroung bed to recliner w/ cga, later transfered back to bed w/ cga, completed bathing and dressing task w/ meek, supervision vc's for hip precautions. hosptal gown only.   cont poc  Outcome Measures     Row Name 07/17/20 0939 07/16/20 1454 07/15/20 1510       How much help from another person do you currently need...    Turning from your back to your side while in flat bed without using bedrails?  --  3  -TW  3  -TW    Moving from lying on back to sitting on the side of a flat bed without bedrails?  --  3  -TW  3  -TW    Moving to and from a bed to a chair (including a wheelchair)?  --  3  -TW  3  -TW    Standing up from a chair using your arms (e.g., wheelchair, bedside chair)?  --  3  -TW  3  -TW    Climbing 3-5 steps with a railing?  --  3  -TW  3  -TW    To walk in hospital room?  --  3  -TW  3  -TW    AM-PAC 6 Clicks Score (PT)  --  18  -TW  18  -TW       How much help from another is currently needed...    Putting on and taking off regular lower body clothing?  2  -RC  --  --    Bathing (including washing, rinsing, and drying)  3  -RC  --  --    Toileting (which includes using toilet bed pan or urinal)  2  -RC  --  --    Putting on and taking off regular upper body clothing  3  -RC  --  --    Taking care of personal grooming (such as  brushing teeth)  3  -RC  --  --    Eating meals  3  -RC  --  --    AM-PAC 6 Clicks Score (OT)  16  -RC  --  --    Row Name 07/14/20 1338             How much help from another person do you currently need...    Turning from your back to your side while in flat bed without using bedrails?  3  -TW      Moving from lying on back to sitting on the side of a flat bed without bedrails?  3  -TW      Moving to and from a bed to a chair (including a wheelchair)?  3  -TW      Standing up from a chair using your arms (e.g., wheelchair, bedside chair)?  3  -TW      Climbing 3-5 steps with a railing?  3  -TW      To walk in hospital room?  3  -TW      AM-PAC 6 Clicks Score (PT)  18  -TW        User Key  (r) = Recorded By, (t) = Taken By, (c) = Cosigned By    Initials Name Provider Type    TW Juan C Leyva, PTA Physical Therapy Assistant     Evelyn Dodson COTA/L Occupational Therapy Assistant           Time Calculation:   Time Calculation- OT     Row Name 07/17/20 1153             Time Calculation- OT    OT Start Time  0949  -      OT Stop Time  1029  -      OT Time Calculation (min)  40 min  -      Total Timed Code Minutes- OT  40 minute(s)  -      OT Received On  07/17/20  -        User Key  (r) = Recorded By, (t) = Taken By, (c) = Cosigned By    Initials Name Provider Type     Evelyn Dodson COTA/L Occupational Therapy Assistant        Therapy Charges for Today     Code Description Service Date Service Provider Modifiers Qty    [id number]  OT SELF CARE/MGMT/TRAIN EA 15 MIN 7/17/2020 Evelyn Dodson COTA/JAZZY GO 3               MICK [address]  7/17/2020    Electronically signed by Evelyn Dodson COTA/L at 07/17/20 1204

## 2020-07-17 NOTE — PROGRESS NOTES
"Adult Nutrition  Assessment    Patient Name:  Jose Dacosta  YOB: 1935  MRN: [id number]  Admit Date:  7/8/2020    Assessment Date:  7/17/2020    Comments:  POD 8 s/p repair left hip fracture. Noted facial laceration w/ staples placed in ER. Reg diet, intakes average >75% meals x3days. Per Epic wt 7/2019 155# and today wt 148# BMI 20.1. Pt states he has \"no teeth\" but states he can eat a burger, pork chop etc- he states he eats \"alright\". He thinks his -160#. Reviewed food preferences, he does not want Boost. Agreed to whole milk at meals and milkshakes.  RD to follow hospital course. D/c planning in progress, likely to SNF for rehab.    Reason for Assessment     Row Name 07/17/20 1343          Reason for Assessment    Reason For Assessment  follow-up protocol     Diagnosis  trauma         Nutrition/Diet History     Row Name 07/17/20 1347          Nutrition/Diet History    Typical Food/Fluid Intake  Pt states he has \"no teeth\" but states he can eat a burger, pork chop etc- he states he eats \"alright\". He thinks his -160#.     Food Preferences  whole milk, LOVES fresh fruit and cottage cheese. likes chocolate milkshake     Supplemental Drinks/Foods/Additives  did not want to try           Labs/Tests/Procedures/Meds     Row Name 07/17/20 1353          Labs/Procedures/Meds    Lab Results Reviewed  reviewed        Diagnostic Tests/Procedures    Diagnostic Test/Procedure Reviewed  reviewed        Medications    Pertinent Medications Reviewed  reviewed             Nutrition Prescription Ordered     Row Name 07/17/20 1354          Nutrition Prescription PO    Current PO Diet  Regular         Evaluation of Received Nutrient/Fluid Intake     Row Name 07/17/20 1354          PO Evaluation    Number of Days PO Intake Evaluated  3 days     Number of Meals  9     % PO Intake  25x1, 50x2, 75x2, 100x4               Electronically signed by:  Viri Bentley RD  07/17/20 13:58  "

## 2020-07-17 NOTE — PLAN OF CARE
Problem: Patient Care Overview  Goal: Plan of Care Review  Outcome: Ongoing (interventions implemented as appropriate)  Flowsheets (Taken 7/17/2020 1200)  Progress: improving  Plan of Care Reviewed With: patient  Outcome Summary: pt sitting eob at entry, cga for sit to stand amb aroung bed to recliner w/ cga, later transfered back to bed w/ cga, completed bathing and dressing task w/ meek, supervision vc's for hip precautions. hosptal gown only.   cont poc

## 2020-07-17 NOTE — PLAN OF CARE
Problem: Patient Care Overview  Goal: Plan of Care Review  Outcome: Ongoing (interventions implemented as appropriate)  Flowsheets (Taken 7/17/2020 0223)  Progress: improving  Plan of Care Reviewed With: patient  Outcome Summary: VSS, pain controlled, sleeping between care, waiting on placement, continue to monitor.

## 2020-07-17 NOTE — SIGNIFICANT NOTE
07/17/20 1620   Rehab Treatment   Discipline physical therapy assistant   Reason Treatment Not Performed patient/family declined treatment  (Pt just got lunch tray and req for PT to come back in PM.)

## 2020-07-17 NOTE — PROGRESS NOTES
.      [facility]  INPATIENT PROGRESS NOTE    Length of Stay: 9  Date of Admission: 7/8/2020  Primary Care Physician: Terry Peterson MD    Subjective   Chief Complaint: Fall  HPI:   85 year old male with a history of ischemic cardiomyopathy, HFrEF, and postural syncope, and HTN who presented to the ED after a syncopal episode which took place after standing from his chair.  He suffered facial trauma and a left hip fracture. CT of the head was negative.  Sutures were placed in the ED to facial laceration.  Orthopedics consulted and recommends hip arthroplasty after cardiac clearance. Cardiology cleared for surgery. He underwent left hip hemiarthroplasty.  Pain is controlled.  No new complaints.  Insurance   has denied placement for inpatient rehabilitation.  Patient would like to be discharged home with home health.    Subjective:  Patient seen and evaluated on rounds today.  Patient is sitting up in the bed.  He has been seen by Occupational Therapy.  He is waiting on physical therapy to see him.  He has been denied by the insurance.  But he reports he would rather be going home.  After discussion with the patient he is agreeable to either home health PT versus outpatient PT based on what is recommended.  He states that his pain is better but still does need medication for his pain.  Does have mild dizziness when getting up and this is also reported by the physical therapy    Review of Systems   He reports dizziness denies headache blurry vision nausea vomiting shortness of breath reports pain in the left leg after working with it  All pertinent negatives and positives are as above. All other systems have been reviewed and are negative unless otherwise stated.     Objective    Temp:  [96.3 °F (35.7 °C)-97.6 °F (36.4 °C)] 96.8 °F (36 °C)  Heart Rate:  [60-87] 62  Resp:  [16-18] 18  BP: (129-150)/(65-72) 130/65    Physical Exam  Awake alert  nad  Normocephalic   Clear conjunctiva EOEMI  +breath sounds b no wheezes rales or rhonchi symmetric chest expansion  rrr no mrg  Soft +bs x 4 quadrants no guarding or rebound  No edema, spontaneously moves all 4 extremities with some discomfort upon moving left leg but better today  CN2-12 grossly intact  Skin c/d/i warm        Results Review:  I have reviewed the labs, radiology results, and diagnostic studies.    Laboratory Data:   Results from last 7 days   Lab Units 07/11/20  0538   SODIUM mmol/L 137   POTASSIUM mmol/L 4.2   CHLORIDE mmol/L 104   CO2 mmol/L 24.0   BUN mg/dL 20   CREATININE mg/dL 0.91   GLUCOSE mg/dL 92   CALCIUM mg/dL 9.3   ANION GAP mmol/L 9.0     Estimated Creatinine Clearance: 57.4 mL/min (by C-G formula based on SCr of 0.91 mg/dL).          Results from last 7 days   Lab Units 07/11/20  0538   WBC 10*3/mm3 11.49*   HEMOGLOBIN g/dL 9.8*   HEMATOCRIT % 29.2*   PLATELETS 10*3/mm3 144           Culture Data:   No results found for: BLOODCX  No results found for: URINECX  No results found for: RESPCX  No results found for: WOUNDCX  No results found for: STOOLCX  No components found for: BODYFLD    Radiology Data:   Imaging Results (Last 24 Hours)     ** No results found for the last 24 hours. **          I have reviewed the patient's current medications.     Assessment/Plan     Active Hospital Problems    Diagnosis   • **Closed fracture of neck of left femur (CMS/[facility])   • Postoperative anemia due to acute blood loss     Not unexpected     • Facial laceration   • HFrEF (heart failure with reduced ejection fraction) (CMS/[facility])   • Syncope and collapse   • Ischemic cardiomyopathy   • Essential hypertension   • Coronary artery disease involving native coronary artery of native heart without angina pectoris       Plan:  POD#8 Left hip hemiarthroplasty  Continue current pain control  Patient will not be able to go to inpatient rehab as he has been denied by the insurance.  He is okay with this and  would like to do home health.  After discussion with physical therapy home health physical therapy has been recommended and further evaluation at that time.  He will also have a bedside commode and a fixed walker which has been sent in.  Discal therapy needs an additional session to work with him as he does continue to have some mild dizziness with getting up and would like to make sure that he is safe for discharge home.    We will need to discharge home with some pain medication.    Able for discharge home in the a.m. if the dizziness is better controlled.        The patient was evaluated during the global COVID-19 pandemic, and the diagnosis was suspected/considered upon their initial presentation.  Evaluation, treatment, and testing were consistent with current guidelines for patients who present with complaints or symptoms that may be related to COVID-19.    Discharge Planning: I expect patient to be discharged to home with home health tomorrow    @me@

## 2020-07-18 PROCEDURE — 25010000002 ENOXAPARIN PER 10 MG: Performed by: ORTHOPAEDIC SURGERY

## 2020-07-18 PROCEDURE — 97530 THERAPEUTIC ACTIVITIES: CPT

## 2020-07-18 PROCEDURE — 97535 SELF CARE MNGMENT TRAINING: CPT

## 2020-07-18 PROCEDURE — 97116 GAIT TRAINING THERAPY: CPT

## 2020-07-18 RX ADMIN — TRAMADOL HYDROCHLORIDE 25 MG: 50 TABLET, FILM COATED ORAL at 06:18

## 2020-07-18 RX ADMIN — LOSARTAN POTASSIUM 25 MG: 25 TABLET, FILM COATED ORAL at 10:07

## 2020-07-18 RX ADMIN — METOPROLOL SUCCINATE 25 MG: 25 TABLET, FILM COATED, EXTENDED RELEASE ORAL at 10:08

## 2020-07-18 RX ADMIN — Medication 2.5 MG: at 10:06

## 2020-07-18 RX ADMIN — ENOXAPARIN SODIUM 30 MG: 30 INJECTION SUBCUTANEOUS at 14:38

## 2020-07-18 RX ADMIN — ACETAMINOPHEN 1000 MG: 500 TABLET ORAL at 18:27

## 2020-07-18 RX ADMIN — SODIUM CHLORIDE, PRESERVATIVE FREE 10 ML: 5 INJECTION INTRAVENOUS at 10:08

## 2020-07-18 RX ADMIN — TRAMADOL HYDROCHLORIDE 25 MG: 50 TABLET, FILM COATED ORAL at 18:30

## 2020-07-18 RX ADMIN — ATORVASTATIN CALCIUM 20 MG: 20 TABLET, FILM COATED ORAL at 10:06

## 2020-07-18 RX ADMIN — ASPIRIN 81 MG: 81 TABLET, COATED ORAL at 10:07

## 2020-07-18 RX ADMIN — PANTOPRAZOLE SODIUM 40 MG: 40 TABLET, DELAYED RELEASE ORAL at 10:06

## 2020-07-18 RX ADMIN — ROPINIROLE HYDROCHLORIDE 0.5 MG: 0.5 TABLET, FILM COATED ORAL at 10:07

## 2020-07-18 RX ADMIN — ACETAMINOPHEN 1000 MG: 500 TABLET ORAL at 14:38

## 2020-07-18 RX ADMIN — ACETAMINOPHEN 1000 MG: 500 TABLET ORAL at 10:06

## 2020-07-18 NOTE — PLAN OF CARE
Problem: Patient Care Overview  Goal: Plan of Care Review  Outcome: Ongoing (interventions implemented as appropriate)  Flowsheets (Taken 7/18/2020 1314)  Progress: improving  Plan of Care Reviewed With: patient; grandchild(maikel)  Outcome Summary: Pt agreeable to therapy. Pt t/f's sup to sit with spv but requires lots of cueing for proper positioning of L LE throughout t/f. Pt stood with CGa and amb with CGA and VCs for proper RW placement and usage. Pt is very Nikolai and requires one on one assistance throughout tx due to safety and management of L LE. Pt would cont to benefit from tehrapy upon DC.

## 2020-07-18 NOTE — PROGRESS NOTES
MEDICINE DAILY PROGRESS NOTE  NAME: Jose Dacosta  : 1935  MRN: [id number]     LOS: 10 days     PROVIDER OF SERVICE: Parker Barros MD    Chief Complaint: Closed fracture of neck of left femur (CMS/HCC)    Subjective:   HPI: 85 year old male with a history of ischemic cardiomyopathy, HFrEF, and postural syncope, and HTN who presented to the ED after a syncopal episode which took place after standing from his chair.  He suffered facial trauma and a left hip fracture. CT of the head was negative.  Sutures were placed in the ED to facial laceration.  Orthopedics consulted and recommends hip arthroplasty after cardiac clearance. Cardiology cleared for surgery. He underwent left hip hemiarthroplasty.  Pain is controlled.  No new complaints.      Interval History:  History taken from: patient chart family RN   - Patient working with therapy. No acute complaints. He desires to goto SNF for rehab to home. Will discuss with CM. Insurance initially denied SNF rehab, but patient desires to go there now.    Review of Systems:   Review of Systems   Constitutional: Positive for activity change and fatigue.   Cardiovascular: Negative for chest pain and palpitations.   Gastrointestinal: Negative for abdominal pain and nausea.   Skin: Negative for color change and rash.   Neurological: Positive for dizziness (Improving).   Psychiatric/Behavioral: Negative for behavioral problems and confusion.       Objective:     Vital Signs  Vitals:    20 2055 20 0329 20 1004 20 1121   BP: 122/57 142/80 150/78 116/58   BP Location:  Right arm Left arm Left arm   Patient Position:  Lying Lying Lying   Pulse: 60 81 73 64   Resp:    Temp: 97.3 °F (36.3 °C) 97.1 °F (36.2 °C) 97.6 °F (36.4 °C) 97.6 °F (36.4 °C)   TempSrc:  Oral Oral    SpO2: 97% 98% 97% 95%   Weight:  68.8 kg (151 lb 9.6 oz)     Height:           Physical Exam  Physical Exam   Constitutional: He is oriented to person, place, and time.  He appears well-developed and well-nourished. No distress.   HENT:   Head: Normocephalic and atraumatic.   Right Ear: External ear normal.   Left Ear: External ear normal.   Nose: Nose normal.   Eyes: Pupils are equal, round, and reactive to light. Conjunctivae and EOM are normal.   Neck: Neck supple. No thyromegaly present.   Cardiovascular: Normal rate, regular rhythm and normal heart sounds.   Pulmonary/Chest: Effort normal and breath sounds normal. No respiratory distress. He has no wheezes. He has no rales. He exhibits no tenderness.   Abdominal: Soft. Bowel sounds are normal. He exhibits no distension. There is no tenderness.   Musculoskeletal: He exhibits no edema.   Working with therapy, ambulating.   Neurological: He is alert and oriented to person, place, and time.   Skin: Skin is warm and dry. No rash noted. He is not diaphoretic. No erythema. No pallor.   Psychiatric: He has a normal mood and affect. His behavior is normal.   Nursing note and vitals reviewed.      Medication Review    Current Facility-Administered Medications:   •  acetaminophen (TYLENOL) tablet 1,000 mg, 1,000 mg, Oral, 4x Daily, Jitendra Solis MD, 1,000 mg at 07/18/20 1438  •  albuterol (PROVENTIL) nebulizer solution 0.083% 2.5 mg/3mL, 2.5 mg, Nebulization, Q4H PRN, Jitendra Solis MD, 2.5 mg at 07/15/20 1010  •  aspirin EC tablet 81 mg, 81 mg, Oral, Daily, Jitendra Solis MD, 81 mg at 07/18/20 1007  •  atorvastatin (LIPITOR) tablet 20 mg, 20 mg, Oral, Daily, Jitendra Solis MD, 20 mg at 07/18/20 1006  •  enoxaparin (LOVENOX) syringe 30 mg, 30 mg, Subcutaneous, Q24H, Jitendra Solis MD, 30 mg at 07/18/20 1438  •  ipratropium-albuterol (DUO-NEB) nebulizer solution 3 mL, 3 mL, Nebulization, Once, Jitendra Solis MD  •  losartan (COZAAR) tablet 25 mg, 25 mg, Oral, Daily, Jitendra Solis MD, 25 mg at 07/18/20 1007  •  metoprolol succinate XL (TOPROL-XL) 24 hr tablet 25 mg, 25 mg, Oral, Daily,  Jitendra Solis MD, 25 mg at 07/18/20 1008  •  morphine injection 1 mg, 1 mg, Intravenous, Q4H PRN, Jitendra Solis MD  •  naloxone (NARCAN) injection 0.4 mg, 0.4 mg, Intravenous, Q5 Min PRN, Jitendra Solis MD, 0.4 mg at 07/09/20 0841  •  ondansetron (ZOFRAN) injection 4 mg, 4 mg, Intravenous, Q6H PRN, Jitendra Solis MD  •  oxybutynin (DITROPAN) half tablet 2.5 mg, 2.5 mg, Oral, BID, Jitendra Solis MD, 2.5 mg at 07/18/20 1006  •  pantoprazole (PROTONIX) EC tablet 40 mg, 40 mg, Oral, QAM, Jitendra Solis MD, 40 mg at 07/18/20 1006  •  rOPINIRole (REQUIP) tablet 0.5 mg, 0.5 mg, Oral, Daily, Jitendra Solis MD, 0.5 mg at 07/18/20 1007  •  sodium chloride 0.9 % flush 10 mL, 10 mL, Intravenous, PRN, Jitendra Solis MD  •  sodium chloride 0.9 % flush 10 mL, 10 mL, Intravenous, Q12H, Jitendra Solis MD, 10 mL at 07/18/20 1008  •  sodium chloride 0.9 % flush 10 mL, 10 mL, Intravenous, PRN, Jitendra Solis MD  •  traMADol (ULTRAM) tablet 25 mg, 25 mg, Oral, Q6H PRN, Jitendra Solis MD, 25 mg at 07/18/20 0618     Diagnostic Data    Lab Results (last 24 hours)     ** No results found for the last 24 hours. **          I reviewed the patient's new clinical results.    Assessment/Plan:     Active Hospital Problems    Diagnosis POA   • **Closed fracture of neck of left femur (CMS/HCC) [S72.002A] Yes   • Postoperative anemia due to acute blood loss [D62] Unknown     Not unexpected     • Facial laceration [S01.81XA] Unknown   • HFrEF (heart failure with reduced ejection fraction) (CMS/HCC) [I50.20] Unknown   • Syncope and collapse [R55] Yes   • Ischemic cardiomyopathy [I25.5] Yes   • Essential hypertension [I10] Yes   • Coronary artery disease involving native coronary artery of native heart without angina pectoris [I25.10] Yes       #. Left hip fracture. POD#9. Peer to peer planned. Patient doesn't feel safe going home and desires rehab to home. Am labs  ordered.  #. Pain control. Tramadol.  #. HLD. Statin.  #. HTN. Losartan. Toprol.  #. RLS. Requip.      Will monitor patient's hospital course and adjust treatment as hospital course dictates.    DVT prophylaxis: Lovenox  Code status is   Code Status and Medical Interventions:   Ordered at: 07/08/20 2053     Level Of Support Discussed With:    Patient     Code Status:    CPR     Medical Interventions (Level of Support Prior to Arrest):    Full       Plan for disposition:Where: home or SNF and When:  1-3 days      Time:           This document has been electronically signed by Parker Barros MD on July 18, 2020 15:41      The patient was evaluated during the global COVID-19 pandemic, and the diagnosis was suspected/considered upon their initial presentation.  Evaluation, treatment, and testing were consistent with current guidelines for patients who present with complaints or symptoms that may be related to COVID-19.

## 2020-07-18 NOTE — PLAN OF CARE
Problem: Patient Care Overview  Goal: Plan of Care Review  Outcome: Ongoing (interventions implemented as appropriate)  Flowsheets (Taken 7/18/2020 5117)  Plan of Care Reviewed With: patient  Outcome Summary: OT tx on this date.  Pt transfer to toilet with CGA.  He was able to don / doff socks with sock aide and CGA.  [facility] [place] pants with CGA/min A and reacher.  Partially met transfer goal and LE dressing goal.

## 2020-07-18 NOTE — THERAPY TREATMENT NOTE
Acute Care - Occupational Therapy Treatment Note  [facility]     Patient Name: Jose Dacosta  : 1935  MRN: [id number]  Today's Date: 2020  Onset of Illness/Injury or Date of Surgery: 20(surgery yesterday (2020))  Date of Referral to OT: 07/10/20  Referring Physician: DAYANARA Kumar APRN(surgeon: DAJA Solis MD )    Admit Date: 2020       ICD-10-CM ICD-9-CM   1. Syncope and collapse R55 780.2   2. Facial laceration, initial encounter S01.81XA 873.40   3. Closed fracture of left hip, initial encounter (CMS/HCC) S72.002A 820.8   4. Fall, initial encounter W19.XXXA E888.9   5. Closed head injury, initial encounter S09.90XA 959.01   6. Closed fracture of neck of left femur, initial encounter (CMS/HCC) S72.002A 820.8   7. Impaired functional mobility, balance, gait, and endurance Z74.09 V49.89   8. Impaired mobility and ADLs Z74.09 V49.89    Z78.9      Patient Active Problem List   Diagnosis   • Coronary artery disease involving native coronary artery of native heart without angina pectoris   • Vasovagal syncope   • RBBB (right bundle branch block with left anterior fascicular block)   • Essential hypertension   • Ischemic cardiomyopathy   • Syncope and collapse   • Closed fracture of neck of left femur (CMS/HCC)   • Coronary artery disease with history of myocardial infarction without history of CABG   • Facial laceration   • HFrEF (heart failure with reduced ejection fraction) (CMS/HCC)   • Postoperative anemia due to acute blood loss     Past Medical History:   Diagnosis Date   • Abnormal ECG      Past Surgical History:   Procedure Laterality Date   • CORONARY STENT PLACEMENT     • HIP HEMIARTHROPLASTY Left 2020    Procedure: LEFT HIP HEMIARTHROPLASTY;  Surgeon: Jitendra Solis MD;  Location: [facility];  Service: Orthopedics;  Laterality: Left;       Therapy Treatment    Rehabilitation Treatment Summary     Row Name 20 1403 20 1314          Treatment  Time/Intention    Discipline  occupational therapist  -RB  physical therapy assistant  -TW     Document Type  therapy note (daily note)  -RB  therapy note (daily note)  -TW     Subjective Information  --  complains of;pain;fatigue  -TW     Mode of Treatment  occupational therapy;individual therapy  -RB  physical therapy;individual therapy  -TW     Patient/Family Observations  --  Pt's granddaughter present throughout tx.  -TW     Therapy Frequency (OT Eval)  daily  -RB  --     Patient Effort  good  -RB  good  -TW     Existing Precautions/Restrictions  hip, posterior;fall  -RB  hip, posterior;fall  -TW     Recorded by [RB] Lopez Moss, OT 07/18/20 1415 [TW] Juan C Leyva, [facility] 07/18/20 1558     Row Name 07/18/20 1403 07/18/20 1314          Vital Signs    Pre Systolic BP Rehab  126  -RB  133  -TW     Pre Treatment Diastolic BP  69  -RB  76  -TW     Post Systolic BP Rehab  114  -RB2  --     Post Treatment Diastolic BP  61  -RB2  --     Pretreatment Heart Rate (beats/min)  75  -RB  70  -TW     Posttreatment Heart Rate (beats/min)  --  76  -TW     Pre SpO2 (%)  --  94  -TW     O2 Delivery Pre Treatment  --  room air  -TW     Post SpO2 (%)  --  97  -TW     Pre Patient Position  Supine  -RB2  Supine  -TW     Intra Patient Position  Standing  -RB2  Standing  -TW     Post Patient Position  Supine  -RB2  Supine  -TW     Recorded by [RB] Lopez Moss, OT 07/18/20 1415  [RB2] Lopez Moss, OT 07/18/20 1619 [TW] Juan C Leyva, [facility] 07/18/20 1558     Row Name 07/18/20 1403 07/18/20 1314          Cognitive Assessment/Intervention- PT/OT    Affect/Mental Status (Cognitive)  WFL  -RB  --     Orientation Status (Cognition)  oriented x 4  -RB  oriented x 4  -TW     Follows Commands (Cognition)  --  follows one step commands;over 90% accuracy  -TW     Cognitive Function (Cognitive)  --  -RB2  executive function deficit;safety deficit  -TW     Safety Deficit (Cognitive)  --  impulsivity  -TW     Personal Safety  Interventions  gait belt;nonskid shoes/slippers when out of bed;supervised activity  -RB2  fall prevention program maintained;gait belt;nonskid shoes/slippers when out of bed  -TW     Recorded by [RB] Lopez Moss OT 07/18/20 1415  [RB2] Lopez Moss OT 07/18/20 1619 [TW] Juan C Leyva, PTA 07/18/20 1558     Row Name 07/18/20 1403 07/18/20 1314          Safety Issues, Functional Mobility    Impairments Affecting Function (Mobility)  balance;endurance/activity tolerance;strength  -RB  balance;endurance/activity tolerance;pain;range of motion (ROM);strength  -TW     Recorded by [RB] Lopez Moss OT 07/18/20 1619 [TW] Juan C Leyva, PTA 07/18/20 1558     Row Name 07/18/20 1403 07/18/20 1314          Mobility Assessment/Intervention    Left Lower Extremity (Weight-bearing Status)  weight-bearing as tolerated (WBAT)  -RB  weight-bearing as tolerated (WBAT)  -TW     Recorded by [RB] Lopez Moss OT 07/18/20 1619 [TW] Juan C Leyva, PTA 07/18/20 1558     Row Name 07/18/20 1403 07/18/20 1314          Bed Mobility Assessment/Treatment    Supine-Sit [place] (Bed Mobility)  supervision  -RB  supervision;verbal cues  -TW     Sit-Supine [place] (Bed Mobility)  supervision  -RB2  supervision  -TW     Bed Mobility, Safety Issues  decreased use of legs for bridging/pushing  -RB  decreased use of legs for bridging/pushing  -TW     Assistive Device (Bed Mobility)  bed rails;head of bed elevated  -RB  bed rails;head of bed elevated  -TW     Comment (Bed Mobility)  Issues with lightheadedness ? BP issues with drop in BP when up.    -RB  Pt cont to have issues with dizziness throughout tx. Pt also requires lots of VCs for proper tech with t/fs due to pt IR and ER L hip during sup to sit to sup t/f's.   -TW     Recorded by [RB] Lopez Moss OT 07/18/20 1619  [RB2] Lopez Moss OT 07/18/20 1415 [TW] Juan C Leyva, PTA 07/18/20 1558     Row Name 07/18/20 1403             Functional Mobility     Functional Mobility- Ind. Level  contact guard assist  -RB      Functional Mobility- Device  rolling walker  -RB      Functional Mobility-Distance (Feet)  10  -RB2      Functional Mobility- Safety Issues  step length decreased  -RB2      Recorded by [RB] Lopez Moss, OT 07/18/20 1415  [RB2] Lopez Moss, OT 07/18/20 1619      Row Name 07/18/20 1403             Transfer Assessment/Treatment    Transfer Assessment/Treatment  sit-stand transfer;stand-sit transfer;toilet transfer  -RB      Recorded by [RB] Lopez Moss, OT 07/18/20 1619      Row Name 07/18/20 1403 07/18/20 1314          Sit-Stand Transfer    Sit-Stand [place] (Transfers)  contact guard  -RB  contact guard;verbal cues  -TW     Assistive Device (Sit-Stand Transfers)  walker, front-wheeled  -RB  walker, front-wheeled  -TW     Recorded by [RB] Lopez Moss, OT 07/18/20 1415 [TW] Juan C Leyva, PTA 07/18/20 1558     Row Name 07/18/20 1403 07/18/20 1314          Stand-Sit Transfer    Stand-Sit [place] (Transfers)  contact guard  -RB  contact guard  -TW     Assistive Device (Stand-Sit Transfers)  walker, front-wheeled  -RB  walker, front-wheeled  -TW     Recorded by [RB] Lopez Moss, OT 07/18/20 1415 [TW] Juan C Leyva, PTA 07/18/20 1558     Row Name 07/18/20 1403 07/18/20 1314          Toilet Transfer    Type (Toilet Transfer)  sit-stand;stand-sit  -RB  sit-stand;stand-sit  -TW     [place] Level (Toilet Transfer)  contact guard  -RB  contact guard  -TW     Assistive Device (Toilet Transfer)  raised toilet seat;grab bars/safety frame;walker, front-wheeled  -RB  grab bars/safety frame  -TW     Recorded by [RB] Lopez Moss, OT 07/18/20 1619 [TW] Juan C Leyva, PTA 07/18/20 1558     Row Name 07/18/20 1314             Gait/Stairs Assessment/Training    Gait/Stairs Assessment/Training  gait/ambulation assistive device  -TW      [place] Level (Gait)  verbal cues;contact guard  -TW      Assistive Device (Gait)  walker,  front-wheeled  -TW      Distance in Feet (Gait)  148ft then 6ft  -TW      Deviations/Abnormal Patterns (Gait)  antalgic  -TW      Bilateral Gait Deviations  forward flexed posture  -TW      Comment (Gait/Stairs)  Cues needed for proper use/positioning of RW throughout gt trial.   -TW      Recorded by [TW] Juan C Leyva PTA 07/18/20 1558      Row Name 07/18/20 1403             ADL Assessment/Intervention    BADL Assessment/Intervention  lower body dressing  -RB      Recorded by [RB] Lopez Moss OT 07/18/20 1619      Row Name 07/18/20 1403             Bathing Assessment/Intervention    Bathing [place] Level  --  -RB      Bathing Position  --  -RB      Recorded by [RB] Lopez Moss OT 07/18/20 1619      Row Name 07/18/20 1403             Lower Body Dressing Assessment/Training    Lower Body Dressing [place] Level  lower body dressing skills;doff;don;pants/bottoms;socks;contact guard assist;minimum assist (75% patient effort)  -RB      Assistive Devices (Lower Body Dressing)  dressing stick;sock-aid;reacher  -RB      Lower Body Dressing Position  edge of bed sitting  -RB      Recorded by [RB] Lopez Moss OT 07/18/20 1619      Row Name 07/18/20 1403             Grooming Assessment/Training    [place] Level (Grooming)  grooming skills;set up  -RB      Grooming Position  --  -RB2      Recorded by [RB] Lopez Moss OT 07/18/20 1415  [RB2] Lopez Moss OT 07/18/20 1619      Row Name 07/18/20 1403 07/18/20 1314          Positioning and Restraints    Pre-Treatment Position  in bed  -RB  in bed  -TW     Post Treatment Position  bed  -RB  bed  -TW     In Bed  supine;call light within reach;encouraged to call for assist;exit alarm on  -RB  supine;call light within reach;encouraged to call for assist;exit alarm on  -TW     Recorded by [RB] Lopez Moss OT 07/18/20 1619 [TW] Juan C Leyva PTA 07/18/20 1554     Row Name 07/18/20 1403 07/18/20 1314          Pain Scale: Numbers  Pre/Post-Treatment    Pain Scale: Numbers, Pretreatment  0/10 - no pain  -RB  0/10 - no pain  -TW     Pain Scale: Numbers, Post-Treatment  0/10 - no pain  -RB  0/10 - no pain  -TW     Recorded by [RB] Lopez Moss OT 07/18/20 1415 [TW] Juan C Leyva, PTA 07/18/20 1558     Row Name 07/18/20 1403             Hearing Assessment    Hearing Status  hearing aid, bilateral  -RB      Recorded by [RB] Lopez Moss OT 07/18/20 1619      Row Name 07/18/20 1403             Vision Assessment/Intervention    Visual Impairment/Limitations  other (see comments) blind R eye.  -RB      Recorded by [RB] Lopez Moss OT 07/18/20 1619      Row Name                Wound 07/08/20 1810 Left upper eyebrow Laceration    Wound - Properties Group Date first assessed: 07/08/20 [TM] Time first assessed: 1810 [TM] Present on Hospital Admission: Y [TM] Side: Left [TM] Orientation: upper [TM] Location: eyebrow [TM] Primary Wound Type: Laceration [TM] Recorded by:  [TM] Josephine Elias RN 07/08/20 1811    Row Name                Wound 07/09/20 1515 Left posterior hip Incision    Wound - Properties Group Date first assessed: 07/09/20 [AQ] Time first assessed: 1515 [AQ] Present on Hospital Admission: N [AQ] Side: Left [AQ] Orientation: posterior [AQ] Location: hip [AQ] Primary Wound Type: Incision [AQ] Recorded by:  [AQ] Cathy Cano RN 07/09/20 1515    Row Name 07/18/20 1403             Plan of Care Review    Plan of Care Reviewed With  patient  -RB      Recorded by [RB] Lopez Moss OT 07/18/20 1619      Row Name 07/18/20 1403             Outcome Summary/Treatment Plan (OT)    Daily Summary of Progress (OT)  progress toward functional goals is good  -RB      Plan for Continued Treatment (OT)  Cont OT POC.  -RB      Anticipated Discharge Disposition (OT)  home with 24/7 care;home with assist;home with home health;skilled nursing facility  -RB      Recorded by [RB] Lopez Moss OT 07/18/20 1619      Row Name 07/18/20  1314             Outcome Summary/Treatment Plan (PT)    Daily Summary of Progress (PT)  progress toward functional goals is good  -TW      Barriers to Overall Progress (PT)  Pt cont to have dizziness throughout tx and has diffficulty with maintaining proper positioning of L LE during bed mobility and t/f's.  -TW      Anticipated Discharge Disposition (PT)  anticipate therapy at next level of care  -TW      Recorded by [TW] Juan C Leyva PTA 07/18/20 1558        User Key  (r) = Recorded By, (t) = Taken By, (c) = Cosigned By    Initials Name Effective Dates Discipline    RB Lopez Moss OT 07/24/19 -  OT    AQ Cathy Cano RN 10/17/16 -  Nurse    TW Juan C Leyva PTA 03/07/18 -  PT    TM Josephine Elias, RN 07/24/18 -  Nurse        Wound 07/08/20 1810 Left upper eyebrow Laceration (Active)   Dressing Appearance open to air 7/18/2020 10:04 AM   Closure Adhesive closure strips 7/18/2020 10:04 AM   Base maroon/purple 7/18/2020 10:04 AM   Drainage Amount none 7/18/2020 10:04 AM       Wound 07/09/20 1515 Left posterior hip Incision (Active)   Dressing Appearance open to air 7/18/2020 10:04 AM   Closure [place] 7/18/2020 10:04 AM   Base clean;dry 7/18/2020 10:04 AM   Drainage Amount none 7/18/2020 10:04 AM     Rehab Goal Summary     Row Name 07/18/20 1314             Bed Mobility Goal 1 (PT)    Activity/Assistive Device (Bed Mobility Goal 1, PT)  sit to supine;supine to sit  -TW      [place] Level/Cues Needed (Bed Mobility Goal 1, PT)  independent  -TW      Time Frame (Bed Mobility Goal 1, PT)  by discharge  -TW      Progress/Outcomes (Bed Mobility Goal 1, PT)  goal not met  -TW         Transfer Goal 1 (PT)    Activity/Assistive Device (Transfer Goal 1, PT)  sit-to-stand/stand-to-sit;bed-to-chair/chair-to-bed;toilet  -TW      [place] Level/Cues Needed (Transfer Goal 1, PT)  conditional independence  -TW      Time Frame (Transfer Goal 1, PT)  2 - 3 days  -TW      Progress/Outcome  (Transfer Goal 1, PT)  goal not met  -TW         Gait Training Goal 1 (PT)    Activity/Assistive Device (Gait Training Goal 1, PT)  gait (walking locomotion);assistive device use;walker, rolling  -TW      [place] Level (Gait Training Goal 1, PT)  conditional independence  -TW      Distance (Gait Goal 1, PT)  [id number]  -TW      Time Frame (Gait Training Goal 1, PT)  by discharge  -TW      Progress/Outcome (Gait Training Goal 1, PT)  goal not met  -TW         Stairs Goal 1 (PT)    Activity/Assistive Device (Stairs Goal 1, PT)  ascending stairs;descending stairs;using handrail, left;assistive device use  -TW      [place] Level/Cues Needed (Stairs Goal 1, PT)  conditional independence  -TW      Time Frame (Stairs Goal 1, PT)  by discharge  -TW      Progress/Outcome (Stairs Goal 1, PT)  goal not met  -TW        User Key  (r) = Recorded By, (t) = Taken By, (c) = Cosigned By    Initials Name Provider Type Discipline    TW Juan C Leyva, PTA Physical Therapy Assistant PT        Occupational Therapy Education                 Title: PT OT SLP Therapies (In Progress)     Topic: Occupational Therapy (In Progress)     Point: ADL training (In Progress)     Description:   Instruct learner(s) on proper safety adaptation and remediation techniques during self care or transfers.   Instruct in proper use of assistive devices.              Learning Progress Summary           Patient Acceptance, E, NR by  at 7/17/2020 1200    Acceptance, E,D,H, NR by  at 7/11/2020 1429    Comment:  much review and issued handout for HIP precautions, edu on use of lh ae will need reinforcement.    Acceptance, E, NR by ME at 7/10/2020 1503    Comment:  Educated on OT and POC. Educated to call for assistance. Educated on safety precautions. Educated on proper body mechanics for transfers, bed mobility, ADLs, and funcitonal mobility.                   Point: Home exercise program (Not Started)     Description:   Instruct learner(s) on  appropriate technique for monitoring, assisting and/or progressing therapeutic exercises/activities.              Learner Progress:   Not documented in this visit.          Point: Precautions (Done)     Description:   Instruct learner(s) on prescribed precautions during self-care and functional transfers.              Learning Progress Summary           Patient Acceptance, E, VU,NR by RB at 7/18/2020 1620    Comment:  Edu pt on use of gait belt and non skid socks when OOB and no OOB without assist.    Acceptance, E, NR by RC at 7/17/2020 1200    Acceptance, E, NR by RC at 7/13/2020 1528    Acceptance, E, NR by RC at 7/12/2020 1503    Comment:  multi review of hip precautions    Acceptance, E,D,H, NR by RC at 7/11/2020 1429    Comment:  much review and issued handout for HIP precautions, edu on use of lh ae will need reinforcement.    Acceptance, E, NR by ME at 7/10/2020 1503    Comment:  Educated on OT and POC. Educated to call for assistance. Educated on safety precautions. Educated on proper body mechanics for transfers, bed mobility, ADLs, and funcitonal mobility.                   Point: Body mechanics (In Progress)     Description:   Instruct learner(s) on proper positioning and spine alignment during self-care, functional mobility activities and/or exercises.              Learning Progress Summary           Patient Acceptance, E, NR by RC at 7/17/2020 1200    Acceptance, E, NR by RC at 7/13/2020 1528    Acceptance, E,D,H, NR by RC at 7/11/2020 1429    Comment:  much review and issued handout for HIP precautions, edu on use of lh ae will need reinforcement.    Acceptance, E, NR by ME at 7/10/2020 1503    Comment:  Educated on OT and POC. Educated to call for assistance. Educated on safety precautions. Educated on proper body mechanics for transfers, bed mobility, ADLs, and funcitonal mobility.                               User Key     Initials Effective Dates Name Provider Type Discipline    [place] 07/24/19 -   Lopez Moss, OT Occupational Therapist OT     03/07/18 -  Evelyn Dodson COTA/L Occupational Therapy Assistant OT    ME 09/10/19 -  Eitan Haynes OT Occupational Therapist OT                OT Recommendation and Plan  Outcome Summary/Treatment Plan (OT)  Daily Summary of Progress (OT): progress toward functional goals is good  Plan for Continued Treatment (OT): Cont OT POC.  Anticipated Discharge Disposition (OT): home with 24/7 care, home with assist, home with home health, skilled nursing facility  Therapy Frequency (OT Eval): daily  Daily Summary of Progress (OT): progress toward functional goals is good  Plan of Care Review  Plan of Care Reviewed With: patient  Plan of Care Reviewed With: patient  Outcome Summary: OT tx on this date.  Pt transfer to toilet with CGA.  He was able to don / doff socks with sock aide and CGA.  Dons hospital pants with CGA/min A and reacher.  Partially met transfer goal and LE dressing goal.  Outcome Measures     Row Name 07/18/20 1403 07/18/20 1314 07/17/20 1600       How much help from another person do you currently need...    Turning from your back to your side while in flat bed without using bedrails?  --  3  -TW  3  -EM    Moving from lying on back to sitting on the side of a flat bed without bedrails?  --  3  -TW  3  -EM    Moving to and from a bed to a chair (including a wheelchair)?  --  3  -TW  3  -EM    Standing up from a chair using your arms (e.g., wheelchair, bedside chair)?  --  3  -TW  3  -EM    Climbing 3-5 steps with a railing?  --  3  -TW  3  -EM    To walk in hospital room?  --  3  -TW  3  -EM    AM-PAC 6 Clicks Score (PT)  --  18  -TW  18  -EM       How much help from another is currently needed...    Putting on and taking off regular lower body clothing?  3  -RB  --  --    Bathing (including washing, rinsing, and drying)  3  -RB  --  --    Toileting (which includes using toilet bed pan or urinal)  3  -RB  --  --    Putting on and taking off regular  upper body clothing  3  -RB  --  --    Taking care of personal grooming (such as brushing teeth)  4  -RB  --  --    Eating meals  4  -RB  --  --    AM-PAC 6 Clicks Score (OT)  20  -RB  --  --       Functional Assessment    Outcome Measure Options  AM-PAC 6 Clicks Daily Activity (OT)  -RB  --  AM-PAC 6 Clicks Basic Mobility (PT)  -EM    Row Name 07/17/20 0939 07/16/20 0634          How much help from another person do you currently need...    Turning from your back to your side while in flat bed without using bedrails?  --  3  -TW     Moving from lying on back to sitting on the side of a flat bed without bedrails?  --  3  -TW     Moving to and from a bed to a chair (including a wheelchair)?  --  3  -TW     Standing up from a chair using your arms (e.g., wheelchair, bedside chair)?  --  3  -TW     Climbing 3-5 steps with a railing?  --  3  -TW     To walk in hospital room?  --  3  -TW     AM-PAC 6 Clicks Score (PT)  --  18  -TW        How much help from another is currently needed...    Putting on and taking off regular lower body clothing?  2  -RC  --     Bathing (including washing, rinsing, and drying)  3  -RC  --     Toileting (which includes using toilet bed pan or urinal)  2  -RC  --     Putting on and taking off regular upper body clothing  3  -RC  --     Taking care of personal grooming (such as brushing teeth)  3  -RC  --     Eating meals  3  -RC  --     AM-PAC 6 Clicks Score (OT)  16  -RC  --       User Key  (r) = Recorded By, (t) = Taken By, (c) = Cosigned By    Initials Name Provider Type    EM Tee Burris, PTA Physical Therapy Assistant    Lopez Moe OT Occupational Therapist    Juan C Hawk, DANIE Physical Therapy Assistant    Evelyn Farley COTA/L Occupational Therapy Assistant           Time Calculation:   Time Calculation- OT     Row Name 07/18/20 1624             Time Calculation- OT    OT Start Time  1403  -RB      OT Stop Time  1447  -RB      OT Time Calculation (min)  44 min   -RB      OT Received On  07/18/20  -RB        User Key  (r) = Recorded By, (t) = Taken By, (c) = Cosigned By    Initials Name Provider Type    Lopez Moe OT Occupational Therapist        Therapy Charges for Today     Code Description Service Date Service Provider Modifiers Qty    [id number]  OT SELF CARE/MGMT/TRAIN EA 15 MIN 7/18/2020 Lopez Moss OT GO 3               Loepz Moss OT  7/18/2020

## 2020-07-18 NOTE — THERAPY TREATMENT NOTE
Acute Care - Physical Therapy Treatment Note  [facility]     Patient Name: Jose Dacosta  : 1935  MRN: [id number]  Today's Date: 2020  Onset of Illness/Injury or Date of Surgery: 20(surgery yesterday (2020))     Referring Physician: DAYANARA Kumar APRN(surgeon: DAJA Solis MD )    Admit Date: 2020    Visit Dx:    ICD-10-CM ICD-9-CM   1. Syncope and collapse R55 780.2   2. Facial laceration, initial encounter S01.81XA 873.40   3. Closed fracture of left hip, initial encounter (CMS/HCC) S72.002A 820.8   4. Fall, initial encounter W19.XXXA E888.9   5. Closed head injury, initial encounter S09.90XA 959.01   6. Closed fracture of neck of left femur, initial encounter (CMS/HCC) S72.002A 820.8   7. Impaired functional mobility, balance, gait, and endurance Z74.09 V49.89   8. Impaired mobility and ADLs Z74.09 V49.89    Z78.9      Patient Active Problem List   Diagnosis   • Coronary artery disease involving native coronary artery of native heart without angina pectoris   • Vasovagal syncope   • RBBB (right bundle branch block with left anterior fascicular block)   • Essential hypertension   • Ischemic cardiomyopathy   • Syncope and collapse   • Closed fracture of neck of left femur (CMS/[facility])   • Coronary artery disease with history of myocardial infarction without history of CABG   • Facial laceration   • HFrEF (heart failure with reduced ejection fraction) (CMS/[facility])   • Postoperative anemia due to acute blood loss       Therapy Treatment    Rehabilitation Treatment Summary     Row Name 20 1403 20 1314          Treatment Time/Intention    Discipline  occupational therapist  -RB  physical therapy assistant  -TW     Document Type  therapy note (daily note)  -RB  therapy note (daily note)  -TW     Subjective Information  --  complains of;pain;fatigue  -TW     Mode of Treatment  occupational therapy;individual therapy  -RB  physical therapy;individual therapy  -TW      Patient/Family Observations  --  Pt's granddaughter present throughout tx.  -TW     Therapy Frequency (OT Eval)  daily  -RB  --     Patient Effort  good  -RB  good  -TW     Existing Precautions/Restrictions  hip, posterior;fall  -RB  hip, posterior;fall  -TW     Recorded by [RB] Lopez Moss OT 07/18/20 1415 [TW] Juan C Leyva, PTA 07/18/20 1558     Row Name 07/18/20 1403 07/18/20 1314          Vital Signs    Pre Systolic BP Rehab  126  -RB  133  -TW     Pre Treatment Diastolic BP  69  -RB  76  -TW     Pretreatment Heart Rate (beats/min)  75  -RB  70  -TW     Posttreatment Heart Rate (beats/min)  --  76  -TW     Pre SpO2 (%)  --  94  -TW     O2 Delivery Pre Treatment  --  room air  -TW     Post SpO2 (%)  --  97  -TW     Pre Patient Position  --  Supine  -TW     Intra Patient Position  --  Standing  -TW     Post Patient Position  --  Supine  -TW     Recorded by [RB] Lopez Moss OT 07/18/20 1415 [TW] Juan C Leyva PTA 07/18/20 1558     Row Name 07/18/20 1403 07/18/20 1314          Cognitive Assessment/Intervention- PT/OT    Affect/Mental Status (Cognitive)  WFL  -RB  --     Orientation Status (Cognition)  oriented x 4  -RB  oriented x 4  -TW     Follows Commands (Cognition)  --  follows one step commands;over 90% accuracy  -TW     Cognitive Function (Cognitive)  executive function deficit  -RB  executive function deficit;safety deficit  -TW     Safety Deficit (Cognitive)  --  impulsivity  -TW     Personal Safety Interventions  --  fall prevention program maintained;gait belt;nonskid shoes/slippers when out of bed  -TW     Recorded by [RB] Lopez Moss OT 07/18/20 1415 [TW] Juan C Leyva PTA 07/18/20 1558     Row Name 07/18/20 1314             Safety Issues, Functional Mobility    Impairments Affecting Function (Mobility)  balance;endurance/activity tolerance;pain;range of motion (ROM);strength  -TW      Recorded by [TW] Juan C Leyva PTA 07/18/20 1558      Row Name 07/18/20 1314              Mobility Assessment/Intervention    Left Lower Extremity (Weight-bearing Status)  weight-bearing as tolerated (WBAT)  -TW      Recorded by [TW] Juan C Leyva PTA 07/18/20 1558      Row Name 07/18/20 1403 07/18/20 1314          Bed Mobility Assessment/Treatment    Supine-Sit [place] (Bed Mobility)  --  supervision;verbal cues  -TW     Sit-Supine [place] (Bed Mobility)  supervision  -RB  supervision  -TW     Bed Mobility, Safety Issues  --  decreased use of legs for bridging/pushing  -TW     Assistive Device (Bed Mobility)  --  bed rails;head of bed elevated  -TW     Comment (Bed Mobility)  --  Pt cont to have issues with dizziness throughout tx. Pt also requires lots of VCs for proper tech with t/fs due to pt IR and ER L hip during sup to sit to sup t/f's.   -TW     Recorded by [RB] Lopez Moss OT 07/18/20 1415 [TW] Juan C Leyva PTA 07/18/20 1558     Row Name 07/18/20 1403             Functional Mobility    Functional Mobility- Ind. Level  contact guard assist  -RB      Functional Mobility- Device  rolling walker  -RB      Recorded by [RB] Lopez Moss OT 07/18/20 1415      Row Name 07/18/20 1403 07/18/20 1314          Sit-Stand Transfer    Sit-Stand [place] (Transfers)  contact guard  -RB  contact guard;verbal cues  -TW     Assistive Device (Sit-Stand Transfers)  walker, front-wheeled  -RB  walker, front-wheeled  -TW     Recorded by [RB] Lopez Moss OT 07/18/20 1415 [TW] Juan C Leyva PTA 07/18/20 1558     Row Name 07/18/20 1403 07/18/20 1314          Stand-Sit Transfer    Stand-Sit [place] (Transfers)  contact guard  -RB  contact guard  -TW     Assistive Device (Stand-Sit Transfers)  walker, front-wheeled  -RB  walker, front-wheeled  -TW     Recorded by [RB] Lopez Moss OT 07/18/20 1415 [TW] Juan C Leyva PTA 07/18/20 1558     Row Name 07/18/20 1314             Toilet Transfer    Type (Toilet Transfer)  sit-stand;stand-sit  -TW      [place]  Level (Toilet Transfer)  contact guard  -TW      Assistive Device (Toilet Transfer)  grab bars/safety frame  -TW      Recorded by [TW] Juan C Leyva PTA 07/18/20 1558      Row Name 07/18/20 1314             Gait/Stairs Assessment/Training    Gait/Stairs Assessment/Training  gait/ambulation assistive device  -TW      [place] Level (Gait)  verbal cues;contact guard  -TW      Assistive Device (Gait)  walker, front-wheeled  -TW      Distance in Feet (Gait)  148ft then 6ft  -TW      Deviations/Abnormal Patterns (Gait)  antalgic  -TW      Bilateral Gait Deviations  forward flexed posture  -TW      Comment (Gait/Stairs)  Cues needed for proper use/positioning of RW throughout gt trial.   -TW      Recorded by [TW] Juan C Leyva PTA 07/18/20 1558      Row Name 07/18/20 1403             Bathing Assessment/Intervention    Bathing [place] Level  set up;supervision;contact guard assist;verbal cues  -RB      Bathing Position  supported sitting;supported standing  -RB      Recorded by [RB] Lopez Moss OT 07/18/20 1415      Row Name 07/18/20 1403             Grooming Assessment/Training    [place] Level (Grooming)  grooming skills;set up  -RB      Grooming Position  supported sitting  -RB      Recorded by [RB] Lopez Moss OT 07/18/20 1415      Row Name 07/18/20 1314             Positioning and Restraints    Pre-Treatment Position  in bed  -TW      Post Treatment Position  bed  -TW      In Bed  supine;call light within reach;encouraged to call for assist;exit alarm on  -TW      Recorded by [TW] Juan C Leyva PTA 07/18/20 1558      Row Name 07/18/20 1403 07/18/20 1314          Pain Scale: Numbers Pre/Post-Treatment    Pain Scale: Numbers, Pretreatment  0/10 - no pain  -RB  0/10 - no pain  -TW     Pain Scale: Numbers, Post-Treatment  0/10 - no pain  -RB  0/10 - no pain  -TW     Recorded by [RB] Lopez Moss OT 07/18/20 1415 [TW] Juan C Leyva, PTA 07/18/20 1558     Row Name                 Wound 07/08/20 1810 Left upper eyebrow Laceration    Wound - Properties Group Date first assessed: 07/08/20 [TM] Time first assessed: 1810 [TM] Present on Hospital Admission: Y [TM] Side: Left [TM] Orientation: upper [TM] Location: eyebrow [TM] Primary Wound Type: Laceration [TM] Recorded by:  [TM] Josephine Elias RN 07/08/20 1811    Row Name                Wound 07/09/20 1515 Left posterior hip Incision    Wound - Properties Group Date first assessed: 07/09/20 [AQ] Time first assessed: 1515 [AQ] Present on Hospital Admission: N [AQ] Side: Left [AQ] Orientation: posterior [AQ] Location: hip [AQ] Primary Wound Type: Incision [AQ] Recorded by:  [AQ] Cathy Cano RN 07/09/20 1515    Row Name 07/18/20 1314             Outcome Summary/Treatment Plan (PT)    Daily Summary of Progress (PT)  progress toward functional goals is good  -TW      Barriers to Overall Progress (PT)  Pt cont to have dizziness throughout tx and has diffficulty with maintaining proper positioning of L LE during bed mobility and t/f's.  -TW      Anticipated Discharge Disposition (PT)  anticipate therapy at next level of care  -TW      Recorded by [TW] Juan C Leyva PTA 07/18/20 1558        User Key  (r) = Recorded By, (t) = Taken By, (c) = Cosigned By    Initials Name Effective Dates Discipline    RB Lopez Moss OT 07/24/19 -  OT    AQ Cathy Cano RN 10/17/16 -  Nurse    TW Juan C Leyva PTA 03/07/18 -  PT    TM Josephine Elias RN 07/24/18 -  Nurse          Wound 07/08/20 1810 Left upper eyebrow Laceration (Active)   Dressing Appearance open to air 7/18/2020 10:04 AM   Closure Adhesive closure strips 7/18/2020 10:04 AM   Base maroon/purple 7/18/2020 10:04 AM   Drainage Amount none 7/18/2020 10:04 AM       Wound 07/09/20 1515 Left posterior hip Incision (Active)   Dressing Appearance open to air 7/18/2020 10:04 AM   Closure Staples 7/18/2020 10:04 AM   Base clean;dry 7/18/2020 10:04 AM   Drainage  Amount none 7/18/2020 10:04 AM       Rehab Goal Summary     Row Name 07/18/20 1314             Bed Mobility Goal 1 (PT)    Activity/Assistive Device (Bed Mobility Goal 1, PT)  sit to supine;supine to sit  -TW      [place] Level/Cues Needed (Bed Mobility Goal 1, PT)  independent  -TW      Time Frame (Bed Mobility Goal 1, PT)  by discharge  -TW      Progress/Outcomes (Bed Mobility Goal 1, PT)  goal not met  -TW         Transfer Goal 1 (PT)    Activity/Assistive Device (Transfer Goal 1, PT)  sit-to-stand/stand-to-sit;bed-to-chair/chair-to-bed;toilet  -TW      [place] Level/Cues Needed (Transfer Goal 1, PT)  conditional independence  -TW      Time Frame (Transfer Goal 1, PT)  2 - 3 days  -TW      Progress/Outcome (Transfer Goal 1, PT)  goal not met  -TW         Gait Training Goal 1 (PT)    Activity/Assistive Device (Gait Training Goal 1, PT)  gait (walking locomotion);assistive device use;walker, rolling  -TW      [place] Level (Gait Training Goal 1, PT)  conditional independence  -TW      Distance (Gait Goal 1, PT)  [id number]  -TW      Time Frame (Gait Training Goal 1, PT)  by discharge  -TW      Progress/Outcome (Gait Training Goal 1, PT)  goal not met  -TW         Stairs Goal 1 (PT)    Activity/Assistive Device (Stairs Goal 1, PT)  ascending stairs;descending stairs;using handrail, left;assistive device use  -TW      [place] Level/Cues Needed (Stairs Goal 1, PT)  conditional independence  -TW      Time Frame (Stairs Goal 1, PT)  by discharge  -TW      Progress/Outcome (Stairs Goal 1, PT)  goal not met  -TW        User Key  (r) = Recorded By, (t) = Taken By, (c) = Cosigned By    Initials Name Provider Type Discipline    TW Juan C Leyva PTA Physical Therapy Assistant PT          Physical Therapy Education                 Title: PT OT SLP Therapies (In Progress)     Topic: Physical Therapy (In Progress)     Point: Mobility training (In Progress)     Description:   Instruct learner(s) on safety  and technique for assisting patient out of bed, chair or wheelchair.  Instruct in the proper use of assistive devices, such as walker, crutches, cane or brace.              Patient Friendly Description:   It's important to get you on your feet again, but we need to do so in a way that is safe for you. Falling has serious consequences, and your personal safety is the most important thing of all.        When it's time to get out of bed, one of us or a family member will sit next to you on the bed to give you support.     If your doctor or nurse tells you to use a walker, crutches, a cane, or a brace, be sure you use it every time you get out of bed, even if you think you don't need it.    Learning Progress Summary           Patient Acceptance, E, NR by [facility] at 7/11/2020 1047    Comment:  edu on hip dislocation precautions.    Acceptance, E, NR by [facility] at 7/10/2020 1348                   Point: Home exercise program (Not Started)     Description:   Instruct learner(s) on appropriate technique for monitoring, assisting and/or progressing patient with therapeutic exercises and activities.              Learner Progress:   Not documented in this visit.          Point: Body mechanics (In Progress)     Description:   Instruct learner(s) on proper positioning and spine alignment for patient and/or caregiver during mobility tasks and/or exercises.              Learning Progress Summary           Patient Acceptance, E, NR by RONNIE at 7/10/2020 1348                   Point: Precautions (In Progress)     Description:   Instruct learner(s) on prescribed precautions during mobility and gait tasks              Learning Progress Summary           Patient Acceptance, E, NR by LILA at 7/11/2020 1047    Comment:  edu on hip dislocation precautions.    Acceptance, E, NR by Gabino at 7/10/2020 1348                               User Key     Initials Effective Dates Name Provider Type Discipline    [facility] 04/03/18 -  Angelica Torres PT Physical  Therapist PT    [facility] 03/07/18 -  Vikash Walters PTA Physical Therapy Assistant PT                PT Recommendation and Plan  Anticipated Discharge Disposition (PT): anticipate therapy at next level of care  Outcome Summary/Treatment Plan (PT)  Daily Summary of Progress (PT): progress toward functional goals is good  Barriers to Overall Progress (PT): Pt cont to have dizziness throughout tx and has diffficulty with maintaining proper positioning of L LE during bed mobility and t/f's.  Plan for Continued Treatment (PT): Cont  Anticipated Discharge Disposition (PT): anticipate therapy at next level of care  Plan of Care Reviewed With: patient, grandchild(maikel)  Progress: improving  Outcome Summary: Pt agreeable to therapy. Pt t/f's sup to sit with spv but requires lots of cueing for proper positioning of L LE throughout t/f. Pt stood with CGa and amb with CGA and VCs for proper RW placement and usage. Pt is very [place] and requires one on one assistance throughout tx due to safety and management of L LE. Pt would cont to benefit from tehrapy upon DC.  Outcome Measures     Row Name 07/18/20 1314 07/17/20 1600 07/17/20 0939       How much help from another person do you currently need...    Turning from your back to your side while in flat bed without using bedrails?  3  -TW  3  -EM  --    Moving from lying on back to sitting on the side of a flat bed without bedrails?  3  -TW  3  -EM  --    Moving to and from a bed to a chair (including a wheelchair)?  3  -TW  3  -EM  --    Standing up from a chair using your arms (e.g., wheelchair, bedside chair)?  3  -TW  3  -EM  --    Climbing 3-5 steps with a railing?  3  -TW  3  -EM  --    To walk in hospital room?  3  -TW  3  -EM  --    AM-PAC 6 Clicks Score (PT)  18  -TW  18  -EM  --       How much help from another is currently needed...    Putting on and taking off regular lower body clothing?  --  --  2  -RC    Bathing (including washing, rinsing, and drying)  --  --  3  -RC     Toileting (which includes using toilet bed pan or urinal)  --  --  2  -RC    Putting on and taking off regular upper body clothing  --  --  3  -RC    Taking care of personal grooming (such as brushing teeth)  --  --  3  -RC    Eating meals  --  --  3  -RC    AM-PAC 6 Clicks Score (OT)  --  --  16  -RC       Functional Assessment    Outcome Measure Options  --  AM-PAC 6 Clicks Basic Mobility (PT)  -  --    Row Name 07/16/20 1454             How much help from another person do you currently need...    Turning from your back to your side while in flat bed without using bedrails?  3  -TW      Moving from lying on back to sitting on the side of a flat bed without bedrails?  3  -TW      Moving to and from a bed to a chair (including a wheelchair)?  3  -TW      Standing up from a chair using your arms (e.g., wheelchair, bedside chair)?  3  -TW      Climbing 3-5 steps with a railing?  3  -TW      To walk in hospital room?  3  -TW      AM-PAC 6 Clicks Score (PT)  18  -TW        User Key  (r) = Recorded By, (t) = Taken By, (c) = Cosigned By    Initials Name Provider Type    EM Tee Burris PTA Physical Therapy Assistant    TW Juan C Leyva PTA Physical Therapy Assistant    RC Evelyn Dodson, AGUILAR/L Occupational Therapy Assistant         Time Calculation:   PT Charges     Row Name 07/18/20 1601             Time Calculation    Start Time  1314  -TW      Stop Time  1358  -TW      Time Calculation (min)  44 min  -TW      PT Received On  07/18/20  -TW      PT Goal Re-Cert Due Date  07/23/20  -TW         Time Calculation- PT    Total Timed Code Minutes- PT  44 minute(s)  -TW        User Key  (r) = Recorded By, (t) = Taken By, (c) = Cosigned By    Initials Name Provider Type    TW Juan C Leyva PTA Physical Therapy Assistant        Therapy Charges for Today     Code Description Service Date Service Provider Modifiers Qty    [id number] HC GAIT TRAINING EA 15 MIN 7/18/2020 Juan C Leyva, DANIE GP 1     [id number]  PT THERAPEUTIC ACT EA 15 MIN 7/18/2020 Juan C Leyva, DANIE GP 2          PT G-Codes  Outcome Measure Options: AM-PAC 6 Clicks Basic Mobility (PT)  AM-PAC 6 Clicks Score (PT): 18  AM-PAC 6 Clicks Score (OT): 16    Juan C Leyva PTA  7/18/2020

## 2020-07-19 LAB
ANION GAP SERPL CALCULATED.3IONS-SCNC: 8 MMOL/L (ref 5–15)
BUN SERPL-MCNC: 16 MG/DL (ref 8–23)
BUN/CREAT SERPL: 17 (ref 7–25)
CALCIUM SPEC-SCNC: 9.8 MG/DL (ref 8.6–10.5)
CHLORIDE SERPL-SCNC: 100 MMOL/L (ref 98–107)
CO2 SERPL-SCNC: 24 MMOL/L (ref 22–29)
CREAT SERPL-MCNC: 0.94 MG/DL (ref 0.76–1.27)
DEPRECATED RDW RBC AUTO: 49.1 FL (ref 37–54)
ERYTHROCYTE [DISTWIDTH] IN BLOOD BY AUTOMATED COUNT: 14.6 % (ref 12.3–15.4)
GFR SERPL CREATININE-BSD FRML MDRD: 76 ML/MIN/1.73
GLUCOSE SERPL-MCNC: 94 MG/DL (ref 65–99)
HCT VFR BLD AUTO: 31.4 % (ref 37.5–51)
HGB BLD-MCNC: 10.5 G/DL (ref 13–17.7)
MCH RBC QN AUTO: 30.8 PG (ref 26.6–33)
MCHC RBC AUTO-ENTMCNC: 33.4 G/DL (ref 31.5–35.7)
MCV RBC AUTO: 92.1 FL (ref 79–97)
PLATELET # BLD AUTO: 517 10*3/MM3 (ref 140–450)
PMV BLD AUTO: 9.8 FL (ref 6–12)
POTASSIUM SERPL-SCNC: 4.2 MMOL/L (ref 3.5–5.2)
RBC # BLD AUTO: 3.41 10*6/MM3 (ref 4.14–5.8)
SODIUM SERPL-SCNC: 132 MMOL/L (ref 136–145)
WBC # BLD AUTO: 11.6 10*3/MM3 (ref 3.4–10.8)

## 2020-07-19 PROCEDURE — 80048 BASIC METABOLIC PNL TOTAL CA: CPT | Performed by: FAMILY MEDICINE

## 2020-07-19 PROCEDURE — 97535 SELF CARE MNGMENT TRAINING: CPT

## 2020-07-19 PROCEDURE — 97110 THERAPEUTIC EXERCISES: CPT

## 2020-07-19 PROCEDURE — 87635 SARS-COV-2 COVID-19 AMP PRB: CPT | Performed by: INTERNAL MEDICINE

## 2020-07-19 PROCEDURE — 97116 GAIT TRAINING THERAPY: CPT

## 2020-07-19 PROCEDURE — 97530 THERAPEUTIC ACTIVITIES: CPT

## 2020-07-19 PROCEDURE — 25010000002 ENOXAPARIN PER 10 MG: Performed by: ORTHOPAEDIC SURGERY

## 2020-07-19 PROCEDURE — 85027 COMPLETE CBC AUTOMATED: CPT | Performed by: FAMILY MEDICINE

## 2020-07-19 RX ORDER — FLUTICASONE PROPIONATE 50 MCG
2 SPRAY, SUSPENSION (ML) NASAL DAILY
Status: DISCONTINUED | OUTPATIENT
Start: 2020-07-19 | End: 2020-07-22 | Stop reason: HOSPADM

## 2020-07-19 RX ADMIN — Medication 2.5 MG: at 20:00

## 2020-07-19 RX ADMIN — ROPINIROLE HYDROCHLORIDE 0.5 MG: 0.5 TABLET, FILM COATED ORAL at 09:06

## 2020-07-19 RX ADMIN — ATORVASTATIN CALCIUM 20 MG: 20 TABLET, FILM COATED ORAL at 09:06

## 2020-07-19 RX ADMIN — ACETAMINOPHEN 1000 MG: 500 TABLET ORAL at 18:01

## 2020-07-19 RX ADMIN — ACETAMINOPHEN 1000 MG: 500 TABLET ORAL at 09:06

## 2020-07-19 RX ADMIN — ACETAMINOPHEN 1000 MG: 500 TABLET ORAL at 13:13

## 2020-07-19 RX ADMIN — ASPIRIN 81 MG: 81 TABLET, COATED ORAL at 09:06

## 2020-07-19 RX ADMIN — PANTOPRAZOLE SODIUM 40 MG: 40 TABLET, DELAYED RELEASE ORAL at 09:06

## 2020-07-19 RX ADMIN — TRAMADOL HYDROCHLORIDE 25 MG: 50 TABLET, FILM COATED ORAL at 00:52

## 2020-07-19 RX ADMIN — Medication 2.5 MG: at 09:06

## 2020-07-19 RX ADMIN — FLUTICASONE PROPIONATE 2 SPRAY: 50 SPRAY, METERED NASAL at 13:13

## 2020-07-19 RX ADMIN — LOSARTAN POTASSIUM 25 MG: 25 TABLET, FILM COATED ORAL at 09:06

## 2020-07-19 RX ADMIN — TRAMADOL HYDROCHLORIDE 25 MG: 50 TABLET, FILM COATED ORAL at 09:06

## 2020-07-19 RX ADMIN — SODIUM CHLORIDE, PRESERVATIVE FREE 10 ML: 5 INJECTION INTRAVENOUS at 09:07

## 2020-07-19 RX ADMIN — METOPROLOL SUCCINATE 25 MG: 25 TABLET, FILM COATED, EXTENDED RELEASE ORAL at 09:06

## 2020-07-19 RX ADMIN — ENOXAPARIN SODIUM 30 MG: 30 INJECTION SUBCUTANEOUS at 13:16

## 2020-07-19 NOTE — THERAPY TREATMENT NOTE
Acute Care - Physical Therapy Treatment Note  [facility]     Patient Name: Jose Dacosta  : 1935  MRN: [id number]  Today's Date: 2020  Onset of Illness/Injury or Date of Surgery: 20(surgery yesterday (2020))     Referring Physician: DAYANARA Kumar APRN(surgeon: DAJA Solis MD )    Admit Date: 2020    Visit Dx:    ICD-10-CM ICD-9-CM   1. Syncope and collapse R55 780.2   2. Facial laceration, initial encounter S01.81XA 873.40   3. Closed fracture of left hip, initial encounter (CMS/HCC) S72.002A 820.8   4. Fall, initial encounter W19.XXXA E888.9   5. Closed head injury, initial encounter S09.90XA 959.01   6. Closed fracture of neck of left femur, initial encounter (CMS/HCC) S72.002A 820.8   7. Impaired functional mobility, balance, gait, and endurance Z74.09 V49.89   8. Impaired mobility and ADLs Z74.09 V49.89    Z78.9      Patient Active Problem List   Diagnosis   • Coronary artery disease involving native coronary artery of native heart without angina pectoris   • Vasovagal syncope   • RBBB (right bundle branch block with left anterior fascicular block)   • Essential hypertension   • Ischemic cardiomyopathy   • Syncope and collapse   • Closed fracture of neck of left femur (CMS/[facility])   • Coronary artery disease with history of myocardial infarction without history of CABG   • Facial laceration   • HFrEF (heart failure with reduced ejection fraction) (CMS/[facility])   • Postoperative anemia due to acute blood loss       Therapy Treatment    Rehabilitation Treatment Summary     Row Name 20 1320 20 0710          Treatment Time/Intention    Discipline  physical therapy assistant  -TW  occupational therapy assistant  -KD     Document Type  therapy note (daily note)  -TW  therapy note (daily note)  -KD     Subjective Information  complains of;fatigue;dizziness  -TW  no complaints  -KD     Mode of Treatment  physical therapy;individual therapy  -TW  occupational therapy  -KD      Patient/Family Observations  No family present.  -TW  No family present  -KD     Therapy Frequency (OT Eval)  --  daily  -KD     Patient Effort  good  -TW  good  -KD     Existing Precautions/Restrictions  hip, posterior;fall  -TW  hip, anterior  -KD     Treatment Considerations/Comments  --  [place]  -KD     Recorded by [TW] Juan C Leyva, PTA 07/19/20 1602 [KD] Ciarra Finley AGUILAR/L 07/19/20 0955     Row Name 07/19/20 1320 07/19/20 0710          Vital Signs    Pre Systolic BP Rehab  149  -TW  115  -KD     Pre Treatment Diastolic BP  73  -TW  63  -KD     Pretreatment Heart Rate (beats/min)  73  -TW  59  -KD     Posttreatment Heart Rate (beats/min)  --  64  -KD     Pre SpO2 (%)  92  -TW  94  -KD     O2 Delivery Pre Treatment  room air  -TW  room air  -KD     Post SpO2 (%)  95  -TW  96  -KD     O2 Delivery Post Treatment  room air  -TW  room air  -KD     Pre Patient Position  Supine  -TW  Supine  -KD     Intra Patient Position  Standing  -TW  Standing  -KD     Post Patient Position  Supine  -TW  Sitting  -KD     Recorded by [TW] Juan C Leyva, PTA 07/19/20 1602 [KD] Ciarra Finley AGUILAR/L 07/19/20 0955     Row Name 07/19/20 1320 07/19/20 0710          Cognitive Assessment/Intervention- PT/OT    Affect/Mental Status (Cognitive)  WFL  -TW  WFL  -KD     Orientation Status (Cognition)  oriented x 4  -TW  oriented x 4  -KD     Follows Commands (Cognition)  follows one step commands;over 90% accuracy  -TW  follows one step commands  -KD     Cognitive Function (Cognitive)  executive function deficit;safety deficit  -TW  --     Safety Deficit (Cognitive)  impulsivity  -TW  impulsivity  -KD     Personal Safety Interventions  fall prevention program maintained;gait belt;nonskid shoes/slippers when out of bed  -TW  gait belt;nonskid shoes/slippers when out of bed  -KD     Recorded by [TW] Juan C Leyva, DANIE 07/19/20 1602 [KD] Ciarra Finley AGUILAR/L 07/19/20 0955     Row Name 07/19/20 1320             Safety  Issues, Functional Mobility    Impairments Affecting Function (Mobility)  balance;endurance/activity tolerance;pain;range of motion (ROM);strength  -TW      Recorded by [TW] Juan C Leyva [facility] 07/19/20 1602      Row Name 07/19/20 1320             Mobility Assessment/Intervention    Left Lower Extremity (Weight-bearing Status)  weight-bearing as tolerated (WBAT)  -TW      Recorded by [TW] Juan C Leyva PTA 07/19/20 1602      Row Name 07/19/20 1320 07/19/20 0710          Bed Mobility Assessment/Treatment    Supine-Sit [place] (Bed Mobility)  supervision;verbal cues  -TW  minimum assist (75% patient effort)  -KD     Sit-Supine [place] (Bed Mobility)  supervision  -TW  --  -KD     Bed Mobility, Safety Issues  decreased use of legs for bridging/pushing  -TW  --     Assistive Device (Bed Mobility)  bed rails;head of bed elevated  -TW  --     Comment (Bed Mobility)  Pt requires lots of cues for proper tech of t/f and to ensure good positioning of L LE.   -TW  --     Recorded by [TW] Juan C Leyva, DANIE 07/19/20 1602 [KD] Ciarra Finley COTA/L 07/19/20 0956     Row Name 07/19/20 0710             Functional Mobility    Functional Mobility- Ind. Level  contact guard assist  -KD      Functional Mobility- Device  rolling walker  -KD      Functional Mobility-Distance (Feet)  6  -KD      Recorded by [KD] Ciarra Finley COTA/L 07/19/20 0955      Row Name 07/19/20 0710             Transfer Assessment/Treatment    Transfer Assessment/Treatment  sit-stand transfer;stand-sit transfer  -KD      Recorded by [KD] Ciarra Finley COTA/L 07/19/20 0955      Row Name 07/19/20 0710             Bed-Chair Transfer    Bed-Chair [place] (Transfers)  contact guard  -KD      Assistive Device (Bed-Chair Transfers)  walker, front-wheeled  -KD      Recorded by [KD] Ciarra Finley AGUILAR/L 07/19/20 0955      Row Name 07/19/20 1320 07/19/20 0710          Sit-Stand Transfer    Sit-Stand [place] (Transfers)   contact guard;verbal cues  -TW  contact guard  -KD     Assistive Device (Sit-Stand Transfers)  walker, front-wheeled  -TW  walker, front-wheeled  -KD     Recorded by [TW] Juan C Leyva, PTA 07/19/20 1602 [KD] Ciarra Finley COTA/L 07/19/20 0955     Row Name 07/19/20 1320 07/19/20 0710          Stand-Sit Transfer    Stand-Sit [place] (Transfers)  contact guard  -TW  contact guard  -KD     Assistive Device (Stand-Sit Transfers)  walker, front-wheeled  -TW  walker, front-wheeled  -KD     Recorded by [TW] Juan C Leyva, PTA 07/19/20 1602 [KD] Ciarra Finley COTA/L 07/19/20 0955     Row Name 07/19/20 1320             Toilet Transfer    Type (Toilet Transfer)  sit-stand;stand-sit  -TW      [place] Level (Toilet Transfer)  contact guard  -TW      Assistive Device (Toilet Transfer)  grab bars/safety frame  -TW      Recorded by [TW] Juan C Leyva, DANIE 07/19/20 1602      Row Name 07/19/20 1320             Gait/Stairs Assessment/Training    Gait/Stairs Assessment/Training  gait/ambulation assistive device  -TW      [place] Level (Gait)  verbal cues;contact guard  -TW      Assistive Device (Gait)  walker, front-wheeled  -TW      Distance in Feet (Gait)  112ft and 100ft  -TW      Deviations/Abnormal Patterns (Gait)  antalgic  -TW      Bilateral Gait Deviations  forward flexed posture  -TW      Comment (Gait/Stairs)  Cues needed for proper positioning of RW and not to leave RW behind when in tight areas.  -TW      Recorded by [TW] Juan C Leyva, PTA 07/19/20 1602      Row Name 07/19/20 0710             Bathing Assessment/Intervention    Bathing [place] Level  bathing skills;upper body;supervision;lower body Pt CGA w/ pericare; Min A BLE's  -KD      Assistive Devices (Bathing)  bath mitt  -KD      Bathing Position  edge of bed sitting  -KD      Recorded by [KD] Ciarra Finley COTA/L 07/19/20 0955      Row Name 07/19/20 0710             Upper Body Dressing Assessment/Training     Upper Body Dressing [place] Level  upper body dressing skills;doff;don;supervision HG  -KD      Upper Body Dressing Position  edge of bed sitting  -KD      Recorded by [KD] Ciarra Finley COTA/L 07/19/20 0955      Row Name 07/19/20 0710             Lower Body Dressing Assessment/Training    Lower Body Dressing [place] Level  lower body dressing skills;don;socks;minimum assist (75% patient effort)  -KD      Lower Body Dressing Position  edge of bed sitting  -KD      Recorded by [KD] Ciarra Finley COTA/L 07/19/20 0955      Row Name 07/19/20 0710             Grooming Assessment/Training    [place] Level (Grooming)  grooming skills;wash face, hands;supervision  -KD      Grooming Position  edge of bed sitting  -KD      Recorded by [KD] Ciarra Finley COTA/L 07/19/20 0955      Row Name 07/19/20 1320             Lower Extremity Supine Therapeutic Exercise    Performed, Supine Lower Extremity (Therapeutic Exercise)  quadriceps sets;gluteal sets;ankle pumps;heel slides  -TW      Exercise Type, Supine Lower Extremity (Therapeutic Exercise)  AROM (active range of motion);AAROM (active assistive range of motion)  -TW      Sets/Reps Detail, Supine Lower Extremity (Therapeutic Exercise)  1/20  -TW      Recorded by [TW] Juan C Leyva, PTA 07/19/20 1602      Row Name 07/19/20 1320 07/19/20 0710          Positioning and Restraints    Pre-Treatment Position  in bed  -TW  in bed  -KD     Post Treatment Position  bed  -TW  chair  -KD     In Bed  supine;call light within reach;encouraged to call for assist;exit alarm on  -TW  --     In Chair  --  sitting;call light within reach;encouraged to call for assist;exit alarm on  -KD     Recorded by [TW] Juan C Leyva, PTA 07/19/20 1602 [KD] Ciarra Finley COTA/L 07/19/20 0955     Row Name 07/19/20 1320 07/19/20 0710          Pain Scale: Numbers Pre/Post-Treatment    Pain Scale: Numbers, Pretreatment  0/10 - no pain  -TW  0/10 - no pain  -KD     Pain Scale:  Numbers, Post-Treatment  0/10 - no pain  -TW  0/10 - no pain  -KD     Recorded by [TW] Juan C Leyva PTA 07/19/20 1602 [KD] Ciarra Finley COTA/L 07/19/20 0955     Row Name                Wound 07/08/20 1810 Left upper eyebrow Laceration    Wound - Properties Group Date first assessed: 07/08/20 [TM] Time first assessed: 1810 [TM] Present on Hospital Admission: Y [TM] Side: Left [TM] Orientation: upper [TM] Location: eyebrow [TM] Primary Wound Type: Laceration [TM] Recorded by:  [TM] Josephine Elias RN 07/08/20 1811    Row Name                Wound 07/09/20 1515 Left posterior hip Incision    Wound - Properties Group Date first assessed: 07/09/20 [AQ] Time first assessed: 1515 [AQ] Present on Hospital Admission: N [AQ] Side: Left [AQ] Orientation: posterior [AQ] Location: hip [AQ] Primary Wound Type: Incision [AQ] Recorded by:  [AQ] Cathy Cano RN 07/09/20 1515    Row Name 07/19/20 0710             Outcome Summary/Treatment Plan (OT)    Daily Summary of Progress (OT)  progress toward functional goals as expected  -KD      Plan for Continued Treatment (OT)  Cont OT POC  -KD      Anticipated Discharge Disposition (OT)  home with 24/7 care;home with assist;home with home health;skilled nursing facility  -KD      Recorded by [KD] Ciarra Finley COTA/L 07/19/20 0955      Row Name 07/19/20 1320             Outcome Summary/Treatment Plan (PT)    Daily Summary of Progress (PT)  progress toward functional goals is good  -TW      Barriers to Overall Progress (PT)  dizziness throughout tx.  -TW      Plan for Continued Treatment (PT)  Cont  -TW      Anticipated Discharge Disposition (PT)  anticipate therapy at next level of care  -TW      Recorded by [TW] Juan C Leyva PTA 07/19/20 1602        User Key  (r) = Recorded By, (t) = Taken By, (c) = Cosigned By    Initials Name Effective Dates Discipline    AQ Cathy Cano RN 10/17/16 -  Nurse    TW Juan C Leyva PTA 03/07/18 -  PT    KD  Ciarra Finley, AGUILAR/L 03/07/18 -  OT    TM Josephine Elias RN 07/24/18 -  Nurse          Wound 07/08/20 1810 Left upper eyebrow Laceration (Active)   Dressing Appearance open to air 7/19/2020  9:04 AM   Closure Adhesive closure strips 7/19/2020  9:04 AM   Base maroon/purple 7/19/2020  9:04 AM   Drainage Amount none 7/19/2020  9:04 AM       Wound 07/09/20 1515 Left posterior hip Incision (Active)   Dressing Appearance open to air 7/19/2020  9:04 AM   Closure [place] 7/19/2020  9:04 AM   Base clean;dry 7/19/2020  9:04 AM   Drainage Amount none 7/19/2020  9:04 AM       Rehab Goal Summary     Row Name 07/19/20 1320 07/19/20 0710          Bed Mobility Goal 1 (PT)    Activity/Assistive Device (Bed Mobility Goal 1, PT)  sit to supine;supine to sit  -TW  --     [place] Level/Cues Needed (Bed Mobility Goal 1, PT)  independent  -TW  --     Time Frame (Bed Mobility Goal 1, PT)  by discharge  -TW  --     Progress/Outcomes (Bed Mobility Goal 1, PT)  goal not met  -TW  --        Transfer Goal 1 (PT)    Activity/Assistive Device (Transfer Goal 1, PT)  sit-to-stand/stand-to-sit;bed-to-chair/chair-to-bed;toilet  -TW  --     [place] Level/Cues Needed (Transfer Goal 1, PT)  conditional independence  -TW  --     Time Frame (Transfer Goal 1, PT)  2 - 3 days  -TW  --     Progress/Outcome (Transfer Goal 1, PT)  goal not met  -TW  --        Gait Training Goal 1 (PT)    Activity/Assistive Device (Gait Training Goal 1, PT)  gait (walking locomotion);assistive device use;walker, rolling  -TW  --     [place] Level (Gait Training Goal 1, PT)  conditional independence  -TW  --     Distance (Gait Goal 1, PT)  [id number]  -TW  --     Time Frame (Gait Training Goal 1, PT)  by discharge  -TW  --     Progress/Outcome (Gait Training Goal 1, PT)  goal not met  -TW  --        Stairs Goal 1 (PT)    Activity/Assistive Device (Stairs Goal 1, PT)  ascending stairs;descending stairs;using handrail, left;assistive device use  -TW  --      [place] Level/Cues Needed (Stairs Goal 1, PT)  conditional independence  -TW  --     Time Frame (Stairs Goal 1, PT)  by discharge  -TW  --     Progress/Outcome (Stairs Goal 1, PT)  goal not met  -TW  --        Occupational Therapy Goals    Transfer Goal Selection (OT)  --  transfer, OT goal 1  -KD     Bathing Goal Selection (OT)  --  bathing, OT goal 1  -KD     Dressing Goal Selection (OT)  --  dressing, OT goal 1  -KD     Toileting Goal Selection (OT)  --  toileting, OT goal 1  -KD     Endurance Goal Selection (OT)  --  endurance, OT goal 1  -KD     Safety Awareness Goal Selection (OT)  --  safety awareness, OT goal 1  -KD        Transfer Goal 1 (OT)    Activity/Assistive Device (Transfer Goal 1, OT)  --  toilet  -KD     [place] Level/Cues Needed (Transfer Goal 1, OT)  --  contact guard assist  -KD     Time Frame (Transfer Goal 1, OT)  --  long term goal (LTG);by discharge  -KD     Barriers (Transfers Goal 1, OT)  --  hearing deficit;safety deficit   -KD     Progress/Outcome (Transfer Goal 1, OT)  --  goal partially met  -KD        Bathing Goal 1 (OT)    Activity/Assistive Device (Bathing Goal 1, OT)  --  bathing skills, all  -KD     [place] Level/Cues Needed (Bathing Goal 1, OT)  --  minimum assist (75% or more patient effort)  -KD     Time Frame (Bathing Goal 1, OT)  --  long term goal (LTG);by discharge  -KD     Progress/Outcomes (Bathing Goal 1, OT)  --  goal not met  -KD        Dressing Goal 1 (OT)    Activity/Assistive Device (Dressing Goal 1, OT)  --  dressing skills, all  -KD     [place]/Cues Needed (Dressing Goal 1, OT)  --  minimum assist (75% or more patient effort)  -KD     Time Frame (Dressing Goal 1, OT)  --  long term goal (LTG);by discharge  -KD     Progress/Outcome (Dressing Goal 1, OT)  --  goal partially met;other (see comments) for lower body.  -KD        Toileting Goal 1 (OT)    Activity/Device (Toileting Goal 1, OT)  --  toileting skills, all  -KD     [place]  Level/Cues Needed (Toileting Goal 1, OT)  --  minimum assist (75% or more patient effort)  -KD     Time Frame (Toileting Goal 1, OT)  --  long term goal (LTG);by discharge  -KD     Progress/Outcome (Toileting Goal 1, OT)  --  goal not met  -KD         Endurance Goal 1 (OT)    Endurance Goal 1 (OT)  --  15 mins fxl activity w 02 90% or above  -KD     Time Frame (Endurance Goal 1, OT)  --  long term goal (LTG)  -KD     Progress/Outcome (Endurance Goal 1, OT)  --  goal not met  -KD        Safety Awareness Goal 1 (OT)    Activity (Safety Awareness Goal 1, OT)  --  awareness of need for assistance;impulse control;insight into deficits/self awareness;judgment;safe use of assistive device/equipment;follow through of safety precautions;demonstrates compliance;demonstrates understanding;demonstration of safe behaviors  -KD     [place]/Cues/Accuracy (Safety Awareness Goal 1, OT)  --  with minimum;verbal cues/redirection;with repetition of directions;with 90% accuracy  -KD     Time Frame (Safety Awareness Goal 1, OT)  --  long term goal (LTG);by discharge  -KD     Progress/Outcome (Safety Awareness Goal 1, OT)  --  goal not met  -KD       User Key  (r) = Recorded By, (t) = Taken By, (c) = Cosigned By    Initials Name Provider Type Discipline    TW Juan C Leyva, PTA Physical Therapy Assistant PT    KD Ciarra Finley, JEFF/L Occupational Therapy Assistant OT          Physical Therapy Education                 Title: PT OT SLP Therapies (In Progress)     Topic: Physical Therapy (In Progress)     Point: Mobility training (In Progress)     Description:   Instruct learner(s) on safety and technique for assisting patient out of bed, chair or wheelchair.  Instruct in the proper use of assistive devices, such as walker, crutches, cane or brace.              Patient Friendly Description:   It's important to get you on your feet again, but we need to do so in a way that is safe for you. Falling has serious consequences, and  your personal safety is the most important thing of all.        When it's time to get out of bed, one of us or a family member will sit next to you on the bed to give you support.     If your doctor or nurse tells you to use a walker, crutches, a cane, or a brace, be sure you use it every time you get out of bed, even if you think you don't need it.    Learning Progress Summary           Patient Acceptance, E, NR by  at 7/11/2020 1047    Comment:  edu on hip dislocation precautions.    Acceptance, E, NR by [facility] at 7/10/2020 1348                   Point: Home exercise program (Not Started)     Description:   Instruct learner(s) on appropriate technique for monitoring, assisting and/or progressing patient with therapeutic exercises and activities.              Learner Progress:   Not documented in this visit.          Point: Body mechanics (In Progress)     Description:   Instruct learner(s) on proper positioning and spine alignment for patient and/or caregiver during mobility tasks and/or exercises.              Learning Progress Summary           Patient Acceptance, E, NR by [facility] at 7/10/2020 1348                   Point: Precautions (In Progress)     Description:   Instruct learner(s) on prescribed precautions during mobility and gait tasks              Learning Progress Summary           Patient Acceptance, E, NR by  at 7/11/2020 1047    Comment:  [facility] on hip dislocation precautions.    Acceptance, E, NR by [facility] at 7/10/2020 1348                               User Key     Initials Effective Dates Name Provider Type Discipline    [facility] 04/03/18 -  Angelica Torres, PT Physical Therapist PT     03/07/18 -  Vikash Walters, PTA Physical Therapy Assistant PT                PT Recommendation and Plan  Anticipated Discharge Disposition (PT): anticipate therapy at next level of care  Outcome Summary/Treatment Plan (PT)  Daily Summary of Progress (PT): progress toward functional goals is good  Barriers to Overall Progress  (PT): dizziness throughout tx.  Plan for Continued Treatment (PT): Cont  Anticipated Discharge Disposition (PT): anticipate therapy at next level of care  Plan of Care Reviewed With: patient  Progress: improving  Outcome Summary: Pt agreeable to therapy. Pt performed B LE ther ex in supine with assistance given for L LE heel slides. Pt t/f sup to sit with cGA due to pt cont to have issues with L LE crossing midline and IR even though pt instructed on preventing this when t/f'ing. Pt stood with CGA and amb with RW and lots of cues to positron RW correctly throughout gait distance of 112ft and 100ft. Pt requires increased cues when amb in tight areas due to pt wanting to leave RW behind and amb without AD. Pt cont to require therapy for safety and cont management of L hip surgery to prevent further injury.  Outcome Measures     Row Name 07/19/20 1320 07/19/20 0710 07/18/20 1403       How much help from another person do you currently need...    Turning from your back to your side while in flat bed without using bedrails?  3  -TW  --  --    Moving from lying on back to sitting on the side of a flat bed without bedrails?  3  -TW  --  --    Moving to and from a bed to a chair (including a wheelchair)?  3  -TW  --  --    Standing up from a chair using your arms (e.g., wheelchair, bedside chair)?  3  -TW  --  --    Climbing 3-5 steps with a railing?  3  -TW  --  --    To walk in hospital room?  3  -TW  --  --    AM-PAC 6 Clicks Score (PT)  18  -TW  --  --       How much help from another is currently needed...    Putting on and taking off regular lower body clothing?  --  3  -KD  3  -RB    Bathing (including washing, rinsing, and drying)  --  3  -KD  3  -RB    Toileting (which includes using toilet bed pan or urinal)  --  3  -KD  3  -RB    Putting on and taking off regular upper body clothing  --  4  -KD  3  -RB    Taking care of personal grooming (such as brushing teeth)  --  4  -KD  4  -RB    Eating meals  --  4  -KD  4   -RB    AM-PAC 6 Clicks Score (OT)  --  21  -KD  20  -RB       Functional Assessment    Outcome Measure Options  --  --  AM-PAC 6 Clicks Daily Activity (OT)  -RB    Row Name 07/18/20 1314 07/17/20 1600 07/17/20 0939       How much help from another person do you currently need...    Turning from your back to your side while in flat bed without using bedrails?  3  -TW  3  -EM  --    Moving from lying on back to sitting on the side of a flat bed without bedrails?  3  -TW  3  -EM  --    Moving to and from a bed to a chair (including a wheelchair)?  3  -TW  3  -EM  --    Standing up from a chair using your arms (e.g., wheelchair, bedside chair)?  3  -TW  3  -EM  --    Climbing 3-5 steps with a railing?  3  -TW  3  -EM  --    To walk in hospital room?  3  -TW  3  -EM  --    AM-PAC 6 Clicks Score (PT)  18  -TW  18  -EM  --       How much help from another is currently needed...    Putting on and taking off regular lower body clothing?  --  --  2  -RC    Bathing (including washing, rinsing, and drying)  --  --  3  -RC    Toileting (which includes using toilet bed pan or urinal)  --  --  2  -RC    Putting on and taking off regular upper body clothing  --  --  3  -RC    Taking care of personal grooming (such as brushing teeth)  --  --  3  -RC    Eating meals  --  --  3  -RC    AM-PAC 6 Clicks Score (OT)  --  --  16  -RC       Functional Assessment    Outcome Measure Options  --  AM-PAC 6 Clicks Basic Mobility (PT)  -EM  --      User Key  (r) = Recorded By, (t) = Taken By, (c) = Cosigned By    Initials Name Provider Type    EM Tee Burris, PTA Physical Therapy Assistant    Lopez Moe OT Occupational Therapist    Juan C Hawk, PTA Physical Therapy Assistant    Evelyn Farley, AGUILAR/L Occupational Therapy Assistant    Ciarra Parker, AGUILAR/L Occupational Therapy Assistant         Time Calculation:   PT Charges     Row Name 07/19/20 1608             Time Calculation    Start Time  1320  -TW      Stop  Time  1400  -TW      Time Calculation (min)  40 min  -TW      PT Received On  07/19/20  -TW      PT Goal Re-Cert Due Date  07/23/20  -TW         Time Calculation- PT    Total Timed Code Minutes- PT  40 minute(s)  -TW        User Key  (r) = Recorded By, (t) = Taken By, (c) = Cosigned By    Initials Name Provider Type     Juan C Leyva, DANIE Physical Therapy Assistant        Therapy Charges for Today     Code Description Service Date Service Provider Modifiers Qty    83412489298 HC GAIT TRAINING EA 15 MIN 7/18/2020 Juan C Leyva, PTA GP 1    60497939874 HC PT THERAPEUTIC ACT EA 15 MIN 7/18/2020 Juan C Leyva, DANIE GP 2    23491980123 HC GAIT TRAINING EA 15 MIN 7/19/2020 Juan C Leyva, DANIE GP 1    01258546959 HC PT THERAPEUTIC ACT EA 15 MIN 7/19/2020 Juan C Leyva, DANIE GP 1    44130824198 HC PT THER PROC EA 15 MIN 7/19/2020 Juan C Leyva, PTA GP 1          PT G-Codes  Outcome Measure Options: AM-PAC 6 Clicks Daily Activity (OT)  AM-PAC 6 Clicks Score (PT): 18  AM-PAC 6 Clicks Score (OT): 21    Juan C Leyva PTA  7/19/2020

## 2020-07-19 NOTE — PLAN OF CARE
Problem: Patient Care Overview  Goal: Plan of Care Review  Flowsheets (Taken 7/19/2020 2419)  Plan of Care Reviewed With: patient  Outcome Summary: Sup-sit-Min A, sit-stand-sit-CGA, Amb ~8' CGA of 1 w/ RW. Pt sat EOB and completed ADL. UB bathing/dressing-SBA. Pericare CGA while standing. BLE's-Min/Mod A. No goals met this date. Cont OT POC.

## 2020-07-19 NOTE — PLAN OF CARE
Problem: Patient Care Overview  Goal: Plan of Care Review  Outcome: Ongoing (interventions implemented as appropriate)  Flowsheets (Taken 7/19/2020 1320)  Progress: improving  Plan of Care Reviewed With: patient  Outcome Summary: Pt agreeable to therapy. Pt performed B LE ther ex in supine with assistance given for L LE heel slides. Pt t/f sup to sit with cGA due to pt cont to have issues with L LE crossing midline and IR even though pt instructed on preventing this when t/f'ing. Pt stood with CGA and amb with RW and lots of cues to positron RW correctly throughout gait distance of 112ft and 100ft. Pt requires increased cues when amb in tight areas due to pt wanting to leave RW behind and amb without AD. Pt cont to require therapy for safety and cont management of L hip surgery to prevent further injury.

## 2020-07-19 NOTE — THERAPY TREATMENT NOTE
Acute Care - Occupational Therapy Treatment Note  [facility]     Patient Name: Jose Dacosta  : 1935  MRN: [id number]  Today's Date: 2020  Onset of Illness/Injury or Date of Surgery: 20(surgery yesterday (2020))  Date of Referral to OT: 07/10/20  Referring Physician: DAYANARA Kumar APRN(surgeon: DAJA Solis MD )    Admit Date: 2020       ICD-10-CM ICD-9-CM   1. Syncope and collapse R55 780.2   2. Facial laceration, initial encounter S01.81XA 873.40   3. Closed fracture of left hip, initial encounter (CMS/HCC) S72.002A 820.8   4. Fall, initial encounter W19.XXXA E888.9   5. Closed head injury, initial encounter S09.90XA 959.01   6. Closed fracture of neck of left femur, initial encounter (CMS/HCC) S72.002A 820.8   7. Impaired functional mobility, balance, gait, and endurance Z74.09 V49.89   8. Impaired mobility and ADLs Z74.09 V49.89    Z78.9      Patient Active Problem List   Diagnosis   • Coronary artery disease involving native coronary artery of native heart without angina pectoris   • Vasovagal syncope   • RBBB (right bundle branch block with left anterior fascicular block)   • Essential hypertension   • Ischemic cardiomyopathy   • Syncope and collapse   • Closed fracture of neck of left femur (CMS/HCC)   • Coronary artery disease with history of myocardial infarction without history of CABG   • Facial laceration   • HFrEF (heart failure with reduced ejection fraction) (CMS/HCC)   • Postoperative anemia due to acute blood loss     Past Medical History:   Diagnosis Date   • Abnormal ECG      Past Surgical History:   Procedure Laterality Date   • CORONARY STENT PLACEMENT     • HIP HEMIARTHROPLASTY Left 2020    Procedure: LEFT HIP HEMIARTHROPLASTY;  Surgeon: Jitendra Solis MD;  Location: [facility];  Service: Orthopedics;  Laterality: Left;       Therapy Treatment    Rehabilitation Treatment Summary     Row Name 20 0710             Treatment Time/Intention     Discipline  occupational therapy assistant  -KD      Document Type  therapy note (daily note)  -KD      Subjective Information  no complaints  -KD      Mode of Treatment  occupational therapy  -KD      Patient/Family Observations  No family present  -KD      Therapy Frequency (OT Eval)  daily  -KD      Patient Effort  good  -KD      Existing Precautions/Restrictions  hip, anterior  -KD      Treatment Considerations/Comments  [place]  -KD      Recorded by [KD] Ciarra Finley COTA/L 07/19/20 0955      Row Name 07/19/20 0710             Vital Signs    Pre Systolic BP Rehab  115  -KD      Pre Treatment Diastolic BP  63  -KD      Pretreatment Heart Rate (beats/min)  59  -KD      Posttreatment Heart Rate (beats/min)  64  -KD      Pre SpO2 (%)  94  -KD      O2 Delivery Pre Treatment  room air  -KD      Post SpO2 (%)  96  -KD      O2 Delivery Post Treatment  room air  -KD      Pre Patient Position  Supine  -KD      Intra Patient Position  Standing  -KD      Post Patient Position  Sitting  -KD      Recorded by [KD] Ciarra Finley COTA/L 07/19/20 0955      Row Name 07/19/20 0710             Cognitive Assessment/Intervention- PT/OT    Affect/Mental Status (Cognitive)  WFL  -KD      Orientation Status (Cognition)  oriented x 4  -KD      Follows Commands (Cognition)  follows one step commands  -KD      Safety Deficit (Cognitive)  impulsivity  -KD      Personal Safety Interventions  gait belt;nonskid shoes/slippers when out of bed  -KD      Recorded by [KD] Ciarra Finley COTA/L 07/19/20 0955      Row Name 07/19/20 0710             Bed Mobility Assessment/Treatment    Supine-Sit [place] (Bed Mobility)  minimum assist (75% patient effort)  -KD      Sit-Supine [place] (Bed Mobility)  --  -KD      Recorded by [KD] Ciarra Finley COTA/L 07/19/20 0956      Row Name 07/19/20 0710             Functional Mobility    Functional Mobility- Ind. Level  contact guard assist  -KD      Functional Mobility- Device  rolling  walker  -KD      Functional Mobility-Distance (Feet)  6  -KD      Recorded by [KD] Ciarra Finley AGUILAR/L 07/19/20 0955      Row Name 07/19/20 0710             Transfer Assessment/Treatment    Transfer Assessment/Treatment  sit-stand transfer;stand-sit transfer  -KD      Recorded by [KD] Ciarra Finley AGUILAR/L 07/19/20 0955      Row Name 07/19/20 0710             Bed-Chair Transfer    Bed-Chair [place] (Transfers)  contact guard  -KD      Assistive Device (Bed-Chair Transfers)  walker, front-wheeled  -KD      Recorded by [KD] Ciarra Finley AGUILAR/L 07/19/20 0955      Row Name 07/19/20 0710             Sit-Stand Transfer    Sit-Stand [place] (Transfers)  contact guard  -KD      Assistive Device (Sit-Stand Transfers)  walker, front-wheeled  -KD      Recorded by [KD] Ciarra Finley AGUILAR/L 07/19/20 0955      Row Name 07/19/20 0710             Stand-Sit Transfer    Stand-Sit [place] (Transfers)  contact guard  -KD      Assistive Device (Stand-Sit Transfers)  walker, front-wheeled  -KD      Recorded by [KD] Ciarra Finley AGUILAR/L 07/19/20 0955      Row Name 07/19/20 0710             Bathing Assessment/Intervention    Bathing [place] Level  bathing skills;upper body;supervision;lower body Pt CGA w/ pericare; Min A BLE's  -KD      Assistive Devices (Bathing)  bath mitt  -KD      Bathing Position  edge of bed sitting  -KD      Recorded by [KD] Ciarra Finley AGUILAR/L 07/19/20 0955      Row Name 07/19/20 0710             Upper Body Dressing Assessment/Training    Upper Body Dressing [place] Level  upper body dressing skills;doff;don;supervision HG  -KD      Upper Body Dressing Position  edge of bed sitting  -KD      Recorded by [KD] Ciarra Finley AGUILAR/L 07/19/20 0955      Row Name 07/19/20 0710             Lower Body Dressing Assessment/Training    Lower Body Dressing [place] Level  lower body dressing skills;don;socks;minimum assist (75% patient effort)  -KD      Lower Body Dressing  Position  edge of bed sitting  -KD      Recorded by [KD] Ciarra Finley AGUILAR/L 07/19/20 0955      Row Name 07/19/20 0710             Grooming Assessment/Training    [place] Level (Grooming)  grooming skills;wash face, hands;supervision  -KD      Grooming Position  edge of bed sitting  -KD      Recorded by [KD] Ciarra Finley COTA/L 07/19/20 0955      Row Name 07/19/20 0710             Positioning and Restraints    Pre-Treatment Position  in bed  -KD      Post Treatment Position  chair  -KD      In Chair  sitting;call light within reach;encouraged to call for assist;exit alarm on  -KD      Recorded by [KD] Ciarra Finley AGUILAR/L 07/19/20 0955      Row Name 07/19/20 0710             Pain Scale: Numbers Pre/Post-Treatment    Pain Scale: Numbers, Pretreatment  0/10 - no pain  -KD      Pain Scale: Numbers, Post-Treatment  0/10 - no pain  -KD      Recorded by [KD] Ciarra Finley AGUILAR/L 07/19/20 0955      Row Name                Wound 07/08/20 1810 Left upper eyebrow Laceration    Wound - Properties Group Date first assessed: 07/08/20 [TM] Time first assessed: 1810 [TM] Present on Hospital Admission: Y [TM] Side: Left [TM] Orientation: upper [TM] Location: eyebrow [TM] Primary Wound Type: Laceration [TM] Recorded by:  [TM] Josephine Elias, RN 07/08/20 1811    Row Name                Wound 07/09/20 1515 Left posterior hip Incision    Wound - Properties Group Date first assessed: 07/09/20 [AQ] Time first assessed: 1515 [AQ] Present on Hospital Admission: N [AQ] Side: Left [AQ] Orientation: posterior [AQ] Location: hip [AQ] Primary Wound Type: Incision [AQ] Recorded by:  [AQ] Cathy Cano RN 07/09/20 1515    Row Name 07/19/20 0710             Outcome Summary/Treatment Plan (OT)    Daily Summary of Progress (OT)  progress toward functional goals as expected  -KD      Plan for Continued Treatment (OT)  Cont OT POC  -KD      Anticipated Discharge Disposition (OT)  home with 24/7 care;home with  assist;home with home health;skilled nursing facility  -KD      Recorded by [KD] Ciarra Finley, AGUILAR/L 07/19/20 0955        User Key  (r) = Recorded By, (t) = Taken By, (c) = Cosigned By    Initials Name Effective Dates Discipline    AQ Cathy Cano, RN 10/17/16 -  Nurse    KD Ciarra Finley, AGUILAR/L 03/07/18 -  OT    TM Josephine Elias RN 07/24/18 -  Nurse        Wound 07/08/20 1810 Left upper eyebrow Laceration (Active)   Dressing Appearance open to air 7/18/2020  8:00 PM   Closure Adhesive closure strips 7/18/2020  8:00 PM   Base maroon/purple 7/18/2020  8:00 PM   Drainage Amount none 7/18/2020  8:00 PM       Wound 07/09/20 1515 Left posterior hip Incision (Active)   Dressing Appearance open to air 7/18/2020  8:00 PM   Closure [place] 7/18/2020  8:00 PM   Base clean;dry 7/18/2020  8:00 PM   Drainage Amount none 7/18/2020  8:00 PM     Rehab Goal Summary     Row Name 07/19/20 0710             Occupational Therapy Goals    Transfer Goal Selection (OT)  transfer, OT goal 1  -KD      Bathing Goal Selection (OT)  bathing, OT goal 1  -KD      Dressing Goal Selection (OT)  dressing, OT goal 1  -KD      Toileting Goal Selection (OT)  toileting, OT goal 1  -KD      Endurance Goal Selection (OT)  endurance, OT goal 1  -KD      Safety Awareness Goal Selection (OT)  safety awareness, OT goal 1  -KD         Transfer Goal 1 (OT)    Activity/Assistive Device (Transfer Goal 1, OT)  toilet  -KD      [place] Level/Cues Needed (Transfer Goal 1, OT)  contact guard assist  -KD      Time Frame (Transfer Goal 1, OT)  long term goal (LTG);by discharge  -KD      Barriers (Transfers Goal 1, OT)  hearing deficit;safety deficit   -KD      Progress/Outcome (Transfer Goal 1, OT)  goal partially met  -KD         Bathing Goal 1 (OT)    Activity/Assistive Device (Bathing Goal 1, OT)  bathing skills, all  -KD      [place] Level/Cues Needed (Bathing Goal 1, OT)  minimum assist (75% or more patient effort)  -KD      Time  Frame (Bathing Goal 1, OT)  long term goal (LTG);by discharge  -KD      Progress/Outcomes (Bathing Goal 1, OT)  goal not met  -KD         Dressing Goal 1 (OT)    Activity/Assistive Device (Dressing Goal 1, OT)  dressing skills, all  -KD      [place]/Cues Needed (Dressing Goal 1, OT)  minimum assist (75% or more patient effort)  -KD      Time Frame (Dressing Goal 1, OT)  long term goal (LTG);by discharge  -KD      Progress/Outcome (Dressing Goal 1, OT)  goal partially met;other (see comments) for lower body.  -KD         Toileting Goal 1 (OT)    Activity/Device (Toileting Goal 1, OT)  toileting skills, all  -KD      [place] Level/Cues Needed (Toileting Goal 1, OT)  minimum assist (75% or more patient effort)  -KD      Time Frame (Toileting Goal 1, OT)  long term goal (LTG);by discharge  -KD      Progress/Outcome (Toileting Goal 1, OT)  goal not met  -KD          Endurance Goal 1 (OT)    Endurance Goal 1 (OT)  15 mins fxl activity w 02 90% or above  -KD      Time Frame (Endurance Goal 1, OT)  long term goal (LTG)  -KD      Progress/Outcome (Endurance Goal 1, OT)  goal not met  -KD         Safety Awareness Goal 1 (OT)    Activity (Safety Awareness Goal 1, OT)  awareness of need for assistance;impulse control;insight into deficits/self awareness;judgment;safe use of assistive device/equipment;follow through of safety precautions;demonstrates compliance;demonstrates understanding;demonstration of safe behaviors  -KD      [place]/Cues/Accuracy (Safety Awareness Goal 1, OT)  with minimum;verbal cues/redirection;with repetition of directions;with 90% accuracy  -KD      Time Frame (Safety Awareness Goal 1, OT)  long term goal (LTG);by discharge  -KD      Progress/Outcome (Safety Awareness Goal 1, OT)  goal not met  -KD        User Key  (r) = Recorded By, (t) = Taken By, (c) = Cosigned By    Initials Name Provider Type Discipline    KD Ciarra Finley COTA/L Occupational Therapy Assistant OT         Occupational Therapy Education                 Title: PT OT SLP Therapies (In Progress)     Topic: Occupational Therapy (In Progress)     Point: ADL training (In Progress)     Description:   Instruct learner(s) on proper safety adaptation and remediation techniques during self care or transfers.   Instruct in proper use of assistive devices.              Learning Progress Summary           Patient Acceptance, E, NR by RC at 7/17/2020 1200    Acceptance, E,D,H, NR by  at 7/11/2020 1429    Comment:  much review and issued handout for HIP precautions, edu on use of lh ae will need reinforcement.    Acceptance, E, NR by ME at 7/10/2020 1503    Comment:  Educated on OT and POC. Educated to call for assistance. Educated on safety precautions. Educated on proper body mechanics for transfers, bed mobility, ADLs, and funcitonal mobility.                   Point: Home exercise program (Not Started)     Description:   Instruct learner(s) on appropriate technique for monitoring, assisting and/or progressing therapeutic exercises/activities.              Learner Progress:   Not documented in this visit.          Point: Precautions (Done)     Description:   Instruct learner(s) on prescribed precautions during self-care and functional transfers.              Learning Progress Summary           Patient Acceptance, E, VU,NR by RB at 7/18/2020 1620    Comment:  Edu pt on use of gait belt and non skid socks when OOB and no OOB without assist.    Acceptance, E, NR by RC at 7/17/2020 1200    Acceptance, E, NR by RC at 7/13/2020 1528    Acceptance, E, NR by  at 7/12/2020 1503    Comment:  multi review of hip precautions    Acceptance, E,D,H, NR by  at 7/11/2020 1429    Comment:  much review and issued handout for HIP precautions, edu on use of lh ae will need reinforcement.    Acceptance, E, NR by ME at 7/10/2020 1503    Comment:  Educated on OT and POC. Educated to call for assistance. Educated on safety precautions. Educated  on proper body mechanics for transfers, bed mobility, ADLs, and funcitonal mobility.                   Point: Body mechanics (In Progress)     Description:   Instruct learner(s) on proper positioning and spine alignment during self-care, functional mobility activities and/or exercises.              Learning Progress Summary           Patient Acceptance, E, NR by  at 7/17/2020 1200    Acceptance, E, NR by  at 7/13/2020 1528    Acceptance, E,D,H, NR by  at 7/11/2020 1429    Comment:  much review and issued handout for HIP precautions, edu on use of lh ae will need reinforcement.    Acceptance, E, NR by ME at 7/10/2020 1503    Comment:  Educated on OT and POC. Educated to call for assistance. Educated on safety precautions. Educated on proper body mechanics for transfers, bed mobility, ADLs, and funcitonal mobility.                               User Key     Initials Effective Dates Name Provider Type Discipline     07/24/19 -  Lopez Moss, OT Occupational Therapist OT     03/07/18 -  Evelyn Dodson COTA/L Occupational Therapy Assistant OT    ME 09/10/19 -  Eitan Haynes OT Occupational Therapist OT                OT Recommendation and Plan  Outcome Summary/Treatment Plan (OT)  Daily Summary of Progress (OT): progress toward functional goals as expected  Plan for Continued Treatment (OT): Cont OT POC  Anticipated Discharge Disposition (OT): home with 24/7 care, home with assist, home with home health, skilled nursing facility  Therapy Frequency (OT Eval): daily  Daily Summary of Progress (OT): progress toward functional goals as expected  Plan of Care Review  Plan of Care Reviewed With: patient  Plan of Care Reviewed With: patient  Outcome Summary: Sup-sit-Min A, sit-stand-sit-CGA, Amb ~8' CGA of 1 w/ RW. Pt sat EOB and completed ADL. UB bathing/dressing-SBA. Pericare CGA while standing. BLE's-Min/Mod A. No goals met this date. Cont OT POC.  Outcome Measures     Row Name 07/19/20 0710 07/18/20 7546  07/18/20 1314       How much help from another person do you currently need...    Turning from your back to your side while in flat bed without using bedrails?  --  --  3  -TW    Moving from lying on back to sitting on the side of a flat bed without bedrails?  --  --  3  -TW    Moving to and from a bed to a chair (including a wheelchair)?  --  --  3  -TW    Standing up from a chair using your arms (e.g., wheelchair, bedside chair)?  --  --  3  -TW    Climbing 3-5 steps with a railing?  --  --  3  -TW    To walk in hospital room?  --  --  3  -TW    AM-PAC 6 Clicks Score (PT)  --  --  18  -TW       How much help from another is currently needed...    Putting on and taking off regular lower body clothing?  3  -KD  3  -RB  --    Bathing (including washing, rinsing, and drying)  3  -KD  3  -RB  --    Toileting (which includes using toilet bed pan or urinal)  3  -KD  3  -RB  --    Putting on and taking off regular upper body clothing  4  -KD  3  -RB  --    Taking care of personal grooming (such as brushing teeth)  4  -KD  4  -RB  --    Eating meals  4  -KD  4  -RB  --    AM-PAC 6 Clicks Score (OT)  21  -KD  20  -RB  --       Functional Assessment    Outcome Measure Options  --  AM-PAC 6 Clicks Daily Activity (OT)  -RB  --    Row Name 07/17/20 1600 07/17/20 0939 07/16/20 5162       How much help from another person do you currently need...    Turning from your back to your side while in flat bed without using bedrails?  3  -EM  --  3  -TW    Moving from lying on back to sitting on the side of a flat bed without bedrails?  3  -EM  --  3  -TW    Moving to and from a bed to a chair (including a wheelchair)?  3  -EM  --  3  -TW    Standing up from a chair using your arms (e.g., wheelchair, bedside chair)?  3  -EM  --  3  -TW    Climbing 3-5 steps with a railing?  3  -EM  --  3  -TW    To walk in hospital room?  3  -EM  --  3  -TW    AM-PAC 6 Clicks Score (PT)  18  -EM  --  18  -TW       How much help from another is currently  needed...    Putting on and taking off regular lower body clothing?  --  2  -RC  --    Bathing (including washing, rinsing, and drying)  --  3  -RC  --    Toileting (which includes using toilet bed pan or urinal)  --  2  -RC  --    Putting on and taking off regular upper body clothing  --  3  -RC  --    Taking care of personal grooming (such as brushing teeth)  --  3  -RC  --    Eating meals  --  3  -RC  --    AM-PAC 6 Clicks Score (OT)  --  16  -RC  --       Functional Assessment    Outcome Measure Options  AM-PAC 6 Clicks Basic Mobility (PT)  -EM  --  --      User Key  (r) = Recorded By, (t) = Taken By, (c) = Cosigned By    Initials Name Provider Type    EM Tee Burris, PTA Physical Therapy Assistant    RB Lopez Moss, OT Occupational Therapist    Juan C Hawk, PTA Physical Therapy Assistant    Evelyn Farley, AGUILAR/L Occupational Therapy Assistant    Ciarra Parker COTA/L Occupational Therapy Assistant           Time Calculation:   Time Calculation- OT     Row Name 07/19/20 0959             Time Calculation- OT    OT Start Time  0710  -KD      OT Stop Time  0749  -KD      OT Time Calculation (min)  39 min  -KD      Total Timed Code Minutes- OT  39 minute(s)  -KD      OT Received On  07/19/20  -KD        User Key  (r) = Recorded By, (t) = Taken By, (c) = Cosigned By    Initials Name Provider Type    Ciarra Parker COTA/L Occupational Therapy Assistant        Therapy Charges for Today     Code Description Service Date Service Provider Modifiers Qty    [id number]  OT SELF CARE/MGMT/TRAIN EA 15 MIN 7/19/2020 Ciarra Finley COTA/L GO 3               MICK Buchanan  7/19/2020

## 2020-07-19 NOTE — PROGRESS NOTES
MEDICINE DAILY PROGRESS NOTE  NAME: Jose Dacosta  : 1935  MRN: [id number]     LOS: 11 days     PROVIDER OF SERVICE: Parker Barros MD    Chief Complaint: Closed fracture of neck of left femur (CMS/HCC)    Subjective:   HPI: 85 year old male with a history of ischemic cardiomyopathy, HFrEF, and postural syncope, and HTN who presented to the ED after a syncopal episode which took place after standing from his chair.  He suffered facial trauma and a left hip fracture. CT of the head was negative.  Sutures were placed in the ED to facial laceration.  Orthopedics consulted and recommends hip arthroplasty after cardiac clearance. Cardiology cleared for surgery. He underwent left hip hemiarthroplasty.  Pain is controlled.  No new complaints.      Interval History:  History taken from: patient chart family RN   - Patient doing okay today. He has an intermittent headache. Still with intermittent dizziness.   - Patient working with therapy. No acute complaints. He desires to goto SNF for rehab to home. Will discuss with CM. Insurance initially denied SNF rehab, but patient desires to go there now.    Review of Systems:   Review of Systems   Constitutional: Positive for activity change and fatigue.   Cardiovascular: Negative for chest pain and palpitations.   Gastrointestinal: Negative for abdominal pain and nausea.   Skin: Negative for color change and rash.   Neurological: Positive for dizziness (Improving).   Psychiatric/Behavioral: Negative for behavioral problems and confusion.       Objective:     Vital Signs  Vitals:    20 1121 20 1525 20 0416 20 0904   BP: 116/58 120/58 118/60 133/67   BP Location: Left arm Left arm Right arm Right arm   Patient Position: Lying Lying Lying Lying   Pulse: 64 65 110 81   Resp:    Temp: 97.6 °F (36.4 °C) 97.7 °F (36.5 °C) 97.2 °F (36.2 °C) 96.8 °F (36 °C)   TempSrc:   Oral Temporal   SpO2: 95% 100% 95% 95%   Weight:   68.4 kg (150  lb 12.8 oz)    Height:           Physical Exam  Physical Exam   Constitutional: He is oriented to person, place, and time. He appears well-developed and well-nourished. No distress.   HENT:   Head: Normocephalic and atraumatic.   Right Ear: External ear normal.   Left Ear: External ear normal.   Nose: Nose normal.   Eyes: Pupils are equal, round, and reactive to light. Conjunctivae and EOM are normal.   Neck: Neck supple. No thyromegaly present.   Cardiovascular: Normal rate, regular rhythm and normal heart sounds.   Pulmonary/Chest: Effort normal and breath sounds normal. No respiratory distress. He has no wheezes. He has no rales. He exhibits no tenderness.   Abdominal: Soft. Bowel sounds are normal. He exhibits no distension. There is no tenderness.   Musculoskeletal: He exhibits no edema.   Working with therapy, ambulating.   Neurological: He is alert and oriented to person, place, and time.   Skin: Skin is warm and dry. No rash noted. He is not diaphoretic. No erythema. No pallor.   Psychiatric: He has a normal mood and affect. His behavior is normal.   Nursing note and vitals reviewed.      Medication Review    Current Facility-Administered Medications:   •  acetaminophen (TYLENOL) tablet 1,000 mg, 1,000 mg, Oral, 4x Daily, Jitendra Solis MD, 1,000 mg at 07/19/20 0906  •  albuterol (PROVENTIL) nebulizer solution 0.083% 2.5 mg/3mL, 2.5 mg, Nebulization, Q4H PRN, Jitendra Solis MD, 2.5 mg at 07/15/20 1010  •  aspirin EC tablet 81 mg, 81 mg, Oral, Daily, Jitendra Solis MD, 81 mg at 07/19/20 0906  •  atorvastatin (LIPITOR) tablet 20 mg, 20 mg, Oral, Daily, Jitendra Solis MD, 20 mg at 07/19/20 0906  •  enoxaparin (LOVENOX) syringe 30 mg, 30 mg, Subcutaneous, Q24H, Jitendra Solis MD, 30 mg at 07/18/20 1438  •  ipratropium-albuterol (DUO-NEB) nebulizer solution 3 mL, 3 mL, Nebulization, Once, Jitendra Solis MD  •  losartan (COZAAR) tablet 25 mg, 25 mg, Oral, Daily,  Jitendra Solis MD, 25 mg at 07/19/20 0906  •  metoprolol succinate XL (TOPROL-XL) 24 hr tablet 25 mg, 25 mg, Oral, Daily, Jitendra Solis MD, 25 mg at 07/19/20 0906  •  morphine injection 1 mg, 1 mg, Intravenous, Q4H PRN, Jitendra Solis MD  •  naloxone (NARCAN) injection 0.4 mg, 0.4 mg, Intravenous, Q5 Min PRN, Jitendra Solis MD, 0.4 mg at 07/09/20 0841  •  ondansetron (ZOFRAN) injection 4 mg, 4 mg, Intravenous, Q6H PRN, Jitendra Solis MD  •  oxybutynin (DITROPAN) half tablet 2.5 mg, 2.5 mg, Oral, BID, Jitendra Solis MD, 2.5 mg at 07/19/20 0906  •  pantoprazole (PROTONIX) EC tablet 40 mg, 40 mg, Oral, QAM, Jitendra Solis MD, 40 mg at 07/19/20 0906  •  rOPINIRole (REQUIP) tablet 0.5 mg, 0.5 mg, Oral, Daily, Jitendra Solis MD, 0.5 mg at 07/19/20 0906  •  sodium chloride 0.9 % flush 10 mL, 10 mL, Intravenous, PRN, Jitendra Solis MD  •  sodium chloride 0.9 % flush 10 mL, 10 mL, Intravenous, Q12H, Jitendra Solis MD, 10 mL at 07/19/20 0907  •  sodium chloride 0.9 % flush 10 mL, 10 mL, Intravenous, PRN, Jitendra Solis MD  •  traMADol (ULTRAM) tablet 25 mg, 25 mg, Oral, Q6H PRN, Jitendra Slois MD, 25 mg at 07/19/20 0906     Diagnostic Data    Lab Results (last 24 hours)     Procedure Component Value Units Date/Time    Basic Metabolic Panel [[id number]]  (Abnormal) Collected:  07/19/20 0622    Specimen:  Blood Updated:  07/19/20 0734     Glucose 94 mg/dL      BUN 16 mg/dL      Creatinine 0.94 mg/dL      Sodium 132 mmol/L      Potassium 4.2 mmol/L      Chloride 100 mmol/L      CO2 24.0 mmol/L      Calcium 9.8 mg/dL      eGFR Non African Amer 76 mL/min/1.73      BUN/Creatinine Ratio 17.0     Anion Gap 8.0 mmol/L     Narrative:       GFR Normal >60  Chronic Kidney Disease <60  Kidney Failure <15      CBC (No Diff) [[id number]]  (Abnormal) Collected:  07/19/20 0622    Specimen:  Blood Updated:  07/19/20 0706     WBC 11.60 10*3/mm3      RBC 3.41  10*6/mm3      Hemoglobin 10.5 g/dL      Hematocrit 31.4 %      MCV 92.1 fL      MCH 30.8 pg      MCHC 33.4 g/dL      RDW 14.6 %      RDW-SD 49.1 fl      MPV 9.8 fL      Platelets 517 10*3/mm3           I reviewed the patient's new clinical results.    Assessment/Plan:     Active Hospital Problems    Diagnosis POA   • **Closed fracture of neck of left femur (CMS/[facility]) [S72.002A] Yes   • Postoperative anemia due to acute blood loss [D62] Unknown     Not unexpected     • Facial laceration [S01.81XA] Unknown   • HFrEF (heart failure with reduced ejection fraction) (CMS/[facility]) [I50.20] Unknown   • Syncope and collapse [R55] Yes   • Ischemic cardiomyopathy [I25.5] Yes   • Essential hypertension [I10] Yes   • Coronary artery disease involving native coronary artery of native heart without angina pectoris [I25.10] Yes       #. Left hip fracture.  7/19. POD#10. Peer to peer for tomorrow. Working with therapy.   7/18. POD#9. Peer to peer planned. Patient doesn't feel safe going home and desires rehab to home. Am labs ordered.  #. Pain control. Tramadol.  #. HLD. Statin.  #. HTN. Losartan. Toprol.  #. RLS. Requip.      Will monitor patient's hospital course and adjust treatment as hospital course dictates.    DVT prophylaxis: Lovenox  Code status is   Code Status and Medical Interventions:   Ordered at: 07/08/20 2053     Level Of Support Discussed With:    Patient     Code Status:    CPR     Medical Interventions (Level of Support Prior to Arrest):    Full       Plan for disposition:Where: home or SNF and When:  1-3 days      Time:           This document has been electronically signed by Parker Barros MD on July 19, 2020 10:55      The patient was evaluated during the global COVID-19 pandemic, and the diagnosis was suspected/considered upon their initial presentation.  Evaluation, treatment, and testing were consistent with current guidelines for patients who present with complaints or symptoms that may be related to  COVID-19.

## 2020-07-20 LAB — SARS-COV-2 N GENE RESP QL NAA+PROBE: NOT DETECTED

## 2020-07-20 PROCEDURE — 97530 THERAPEUTIC ACTIVITIES: CPT

## 2020-07-20 PROCEDURE — 25010000002 ENOXAPARIN PER 10 MG: Performed by: ORTHOPAEDIC SURGERY

## 2020-07-20 PROCEDURE — 97110 THERAPEUTIC EXERCISES: CPT

## 2020-07-20 PROCEDURE — 97116 GAIT TRAINING THERAPY: CPT

## 2020-07-20 RX ORDER — ERYTHROMYCIN 5 MG/G
OINTMENT OPHTHALMIC EVERY 8 HOURS SCHEDULED
Status: DISCONTINUED | OUTPATIENT
Start: 2020-07-20 | End: 2020-07-20

## 2020-07-20 RX ADMIN — ENOXAPARIN SODIUM 30 MG: 30 INJECTION SUBCUTANEOUS at 13:00

## 2020-07-20 RX ADMIN — ACETAMINOPHEN 1000 MG: 500 TABLET ORAL at 17:11

## 2020-07-20 RX ADMIN — FLUTICASONE PROPIONATE 2 SPRAY: 50 SPRAY, METERED NASAL at 08:42

## 2020-07-20 RX ADMIN — SODIUM CHLORIDE, PRESERVATIVE FREE 10 ML: 5 INJECTION INTRAVENOUS at 20:32

## 2020-07-20 RX ADMIN — ROPINIROLE HYDROCHLORIDE 0.5 MG: 0.5 TABLET, FILM COATED ORAL at 08:42

## 2020-07-20 RX ADMIN — ACETAMINOPHEN 1000 MG: 500 TABLET ORAL at 12:59

## 2020-07-20 RX ADMIN — LOSARTAN POTASSIUM 25 MG: 25 TABLET, FILM COATED ORAL at 08:42

## 2020-07-20 RX ADMIN — PANTOPRAZOLE SODIUM 40 MG: 40 TABLET, DELAYED RELEASE ORAL at 08:42

## 2020-07-20 RX ADMIN — METOPROLOL SUCCINATE 25 MG: 25 TABLET, FILM COATED, EXTENDED RELEASE ORAL at 08:42

## 2020-07-20 RX ADMIN — ATORVASTATIN CALCIUM 20 MG: 20 TABLET, FILM COATED ORAL at 08:42

## 2020-07-20 RX ADMIN — Medication 2.5 MG: at 08:42

## 2020-07-20 RX ADMIN — ASPIRIN 81 MG: 81 TABLET, COATED ORAL at 08:42

## 2020-07-20 RX ADMIN — ACETAMINOPHEN 1000 MG: 500 TABLET ORAL at 20:32

## 2020-07-20 RX ADMIN — MAGNESIUM HYDROXIDE 5 ML: 2400 SUSPENSION ORAL at 18:31

## 2020-07-20 RX ADMIN — Medication 2.5 MG: at 20:32

## 2020-07-20 RX ADMIN — SODIUM CHLORIDE, PRESERVATIVE FREE 10 ML: 5 INJECTION INTRAVENOUS at 08:43

## 2020-07-20 RX ADMIN — ACETAMINOPHEN 1000 MG: 500 TABLET ORAL at 08:42

## 2020-07-20 NOTE — PLAN OF CARE
Problem: Patient Care Overview  Goal: Plan of Care Review  Outcome: Ongoing (interventions implemented as appropriate)  Flowsheets (Taken 7/20/2020 1511)  Progress: improving  Plan of Care Reviewed With: patient  Outcome Summary: Pt participated in BID tx this date. Pt tx cutshort in am due to pt c/o increased dizziness with gait and t/f's. Pt performed B LE ther ex in sup for 20 reps in pm and able to amb 100ft and 84ft in pm with VCs for RW placement and to keep RW with him at all times when amb. Pt  tends to want to leave RW behind when in tight surroundings. Pt would cont to benefit from therapy upon DC.

## 2020-07-20 NOTE — PLAN OF CARE
Vss, pain controlled, resting between rounds, anticipating nursing hime placement Monday 7/20, COVID swab sent.

## 2020-07-20 NOTE — NURSING NOTE
Pt witnessed digging in rectum. Instructed him to not stick his fingers in the rectum. States he feels like he needs to have a bowel movement and wants milk of mag. Dr. Barros paged.

## 2020-07-20 NOTE — THERAPY TREATMENT NOTE
Acute Care - Occupational Therapy Treatment Note  [facility]     Patient Name: Jose Dacosta  : 1935  MRN: [id number]  Today's Date: 2020  Onset of Illness/Injury or Date of Surgery: 20(surgery yesterday (2020))  Date of Referral to OT: 07/10/20  Referring Physician: DAYANARA Kmuar APRN(surgeon: DAJA Solis MD )    Admit Date: 2020       ICD-10-CM ICD-9-CM   1. Syncope and collapse R55 780.2   2. Facial laceration, initial encounter S01.81XA 873.40   3. Closed fracture of left hip, initial encounter (CMS/HCC) S72.002A 820.8   4. Fall, initial encounter W19.XXXA E888.9   5. Closed head injury, initial encounter S09.90XA 959.01   6. Closed fracture of neck of left femur, initial encounter (CMS/HCC) S72.002A 820.8   7. Impaired functional mobility, balance, gait, and endurance Z74.09 V49.89   8. Impaired mobility and ADLs Z74.09 V49.89    Z78.9      Patient Active Problem List   Diagnosis   • Coronary artery disease involving native coronary artery of native heart without angina pectoris   • Vasovagal syncope   • RBBB (right bundle branch block with left anterior fascicular block)   • Essential hypertension   • Ischemic cardiomyopathy   • Syncope and collapse   • Closed fracture of neck of left femur (CMS/HCC)   • Coronary artery disease with history of myocardial infarction without history of CABG   • Facial laceration   • HFrEF (heart failure with reduced ejection fraction) (CMS/HCC)   • Postoperative anemia due to acute blood loss     Past Medical History:   Diagnosis Date   • Abnormal ECG      Past Surgical History:   Procedure Laterality Date   • CORONARY STENT PLACEMENT     • HIP HEMIARTHROPLASTY Left 2020    Procedure: LEFT HIP HEMIARTHROPLASTY;  Surgeon: Jitendra Solis MD;  Location: [facility];  Service: Orthopedics;  Laterality: Left;       Therapy Treatment    Rehabilitation Treatment Summary     Row Name 20 1030             Treatment Time/Intention     Discipline  occupational therapy assistant  -LM      Document Type  therapy note (daily note)  -LM      Subjective Information  no complaints  -LM      Mode of Treatment  individual therapy;occupational therapy  -LM      Therapy Frequency (PT Clinical Impression)  daily  -LM      Therapy Frequency (OT Eval)  daily  -LM      Patient Effort  good  -LM      Recorded by [LM] Alise Mora AGUILAR/L 07/20/20 1454      Row Name 07/20/20 1030             Vital Signs    Pre Systolic BP Rehab  95 supine   -LM      Pre Treatment Diastolic BP  52  -LM      Intra Systolic BP Rehab  100 seated eob   -LM      Intra Treatment Diastolic BP  59  -LM      Post Systolic BP Rehab  100  -LM      Post Treatment Diastolic BP  58 standing   -LM      Recorded by [LM] Alise Mora COTA/L 07/20/20 1454      Row Name 07/20/20 1030             Cognitive Assessment/Intervention- PT/OT    Affect/Mental Status (Cognitive)  WFL  -LM      Orientation Status (Cognition)  oriented x 3;oriented x 4  -LM      Follows Commands (Cognition)  follows one step commands  -LM      Safety Deficit (Cognitive)  impulsivity  -LM      Recorded by [LM] Alise Mora COTA/L 07/20/20 1454      Row Name 07/20/20 1030             Bed Mobility Assessment/Treatment    Bed Mobility Assessment/Treatment  supine-sit;sit-supine  -LM      Supine-Sit [place] (Bed Mobility)  supervision  -LM      Recorded by [LM] Alise Mora COTA/L 07/20/20 1454      Row Name 07/20/20 1030             Transfer Assessment/Treatment    Transfer Assessment/Treatment  sit-stand transfer;stand-sit transfer  -LM      Recorded by [LM] Alise Mora AGUILAR/L 07/20/20 1454      Row Name 07/20/20 1030             Bed-Chair Transfer    Bed-Chair [place] (Transfers)  contact guard  -LM      Assistive Device (Bed-Chair Transfers)  walker, front-wheeled  -LM      Recorded by [LM] Alise Mora AGUILAR/L 07/20/20 1454      Row Name 07/20/20 1030             Chair-Bed  Transfer    Chair-Bed [place] (Transfers)  minimum assist (75% patient effort)  -LM      Assistive Device (Chair-Bed Transfers)  walker, front-wheeled  -LM      Recorded by [LM] Alise Mora AGUILAR/L 07/20/20 1454      Row Name 07/20/20 1030             Sit-Stand Transfer    Sit-Stand [place] (Transfers)  contact guard  -LM      Assistive Device (Sit-Stand Transfers)  walker, front-wheeled  -LM      Recorded by [LM] Alise Mora AGUILAR/L 07/20/20 1454      Row Name 07/20/20 1030             Stand-Sit Transfer    Stand-Sit [place] (Transfers)  contact guard  -LM      Assistive Device (Stand-Sit Transfers)  walker, front-wheeled  -LM      Recorded by [LM] Alise Mora AGUILAR/L 07/20/20 1454      Row Name 07/20/20 1030             Toilet Transfer    Type (Toilet Transfer)  sit-stand;stand-sit  -LM      Recorded by [LM] Alise Mora AGUILAR/L 07/20/20 1454      Row Name 07/20/20 1030             Motor Skills Assessment/Interventions    Additional Documentation  -- standing at eob   -LM      Recorded by [LM] Alise Mora AGUILAR/L 07/20/20 1454      Row Name 07/20/20 1030             Pain Scale: Numbers Pre/Post-Treatment    Pain Scale: Numbers, Pretreatment  0/10 - no pain  -LM      Pain Scale: Numbers, Post-Treatment  0/10 - no pain  -LM      Recorded by [LM] Alise Mora AGUILAR/L 07/20/20 1454      Row Name                Wound 07/08/20 1810 Left upper eyebrow Laceration    Wound - Properties Group Date first assessed: 07/08/20 [TM] Time first assessed: 1810 [TM] Present on Hospital Admission: Y [TM] Side: Left [TM] Orientation: upper [TM] Location: eyebrow [TM] Primary Wound Type: Laceration [TM] Recorded by:  [TM] Josephine Elias RN 07/08/20 1811    Row Name                Wound 07/09/20 1515 Left posterior hip Incision    Wound - Properties Group Date first assessed: 07/09/20 [AQ] Time first assessed: 1515 [AQ] Present on Hospital Admission: N [AQ] Side: Left  [AQ] Orientation: posterior [AQ] Location: hip [AQ] Primary Wound Type: Incision [AQ] Recorded by:  [AQ] Cathy Cano RN 07/09/20 1515    Row Name 07/20/20 1030             Plan of Care Review    Plan of Care Reviewed With  patient  -LM      Recorded by [LM] Alise Mora AGUILAR/L 07/20/20 1454      Row Name 07/20/20 1030             Outcome Summary/Treatment Plan (OT)    Daily Summary of Progress (OT)  progress towards functional goals is fair  -LM      Recorded by [LM] Alise Mora AGUILAR/L 07/20/20 1453        User Key  (r) = Recorded By, (t) = Taken By, (c) = Cosigned By    Initials Name Effective Dates Discipline    AQ Cathy Cano RN 10/17/16 -  Nurse    LM Alise oMra AGUILAR/L 03/07/18 -  OT    TM Josephine Elias RN 07/24/18 -  Nurse        Wound 07/08/20 1810 Left upper eyebrow Laceration (Active)   Dressing Appearance open to air 7/20/2020  8:39 AM   Closure Adhesive closure strips 7/20/2020  8:39 AM   Base maroon/purple 7/20/2020  8:39 AM   Drainage Amount none 7/19/2020  8:00 PM       Wound 07/09/20 1515 Left posterior hip Incision (Active)   Dressing Appearance open to air 7/20/2020  8:39 AM   Closure Staples 7/20/2020  8:39 AM   Base clean;dry 7/20/2020  8:39 AM   Drainage Amount none 7/19/2020  8:00 PM     Rehab Goal Summary     Row Name 07/20/20 1455             Occupational Therapy Goals    Transfer Goal Selection (OT)  transfer, OT goal 1  -LM      Bathing Goal Selection (OT)  bathing, OT goal 1  -LM      Dressing Goal Selection (OT)  dressing, OT goal 1  -LM      Toileting Goal Selection (OT)  toileting, OT goal 1  -LM      Endurance Goal Selection (OT)  endurance, OT goal 1  -LM      Safety Awareness Goal Selection (OT)  safety awareness, OT goal 1  -LM         Transfer Goal 1 (OT)    Activity/Assistive Device (Transfer Goal 1, OT)  toilet  -LM      [place] Level/Cues Needed (Transfer Goal 1, OT)  contact guard assist  -LM      Time Frame (Transfer Goal 1,  OT)  long term goal (LTG);by discharge  -LM      Barriers (Transfers Goal 1, OT)  hearing deficit;safety deficit   -LM      Progress/Outcome (Transfer Goal 1, OT)  goal partially met  -LM         Bathing Goal 1 (OT)    Activity/Assistive Device (Bathing Goal 1, OT)  bathing skills, all  -LM      [place] Level/Cues Needed (Bathing Goal 1, OT)  minimum assist (75% or more patient effort)  -LM      Time Frame (Bathing Goal 1, OT)  long term goal (LTG);by discharge  -LM      Progress/Outcomes (Bathing Goal 1, OT)  goal not met  -LM         Dressing Goal 1 (OT)    Activity/Assistive Device (Dressing Goal 1, OT)  dressing skills, all  -LM      [place]/Cues Needed (Dressing Goal 1, OT)  minimum assist (75% or more patient effort)  -LM      Time Frame (Dressing Goal 1, OT)  long term goal (LTG);by discharge  -LM      Progress/Outcome (Dressing Goal 1, OT)  goal partially met;other (see comments) for lower body.  -LM         Toileting Goal 1 (OT)    Activity/Device (Toileting Goal 1, OT)  toileting skills, all  -LM      [place] Level/Cues Needed (Toileting Goal 1, OT)  minimum assist (75% or more patient effort)  -LM      Time Frame (Toileting Goal 1, OT)  long term goal (LTG);by discharge  -LM      Progress/Outcome (Toileting Goal 1, OT)  goal not met  -LM          Endurance Goal 1 (OT)    Progress/Outcome (Endurance Goal 1, OT)  goal not met  -LM         Safety Awareness Goal 1 (OT)    Activity (Safety Awareness Goal 1, OT)  awareness of need for assistance;impulse control;insight into deficits/self awareness;judgment;safe use of assistive device/equipment;follow through of safety precautions;demonstrates compliance;demonstrates understanding;demonstration of safe behaviors  -LM      [place]/Cues/Accuracy (Safety Awareness Goal 1, OT)  with minimum;verbal cues/redirection;with repetition of directions;with 90% accuracy  -LM      Time Frame (Safety Awareness Goal 1, OT)  long term goal (LTG);by discharge   -LM      Progress/Outcome (Safety Awareness Goal 1, OT)  goal not met  -LM        User Key  (r) = Recorded By, (t) = Taken By, (c) = Cosigned By    Initials Name Provider Type Discipline    LM Alise Mora COTA/L Occupational Therapy Assistant OT        Occupational Therapy Education                 Title: PT OT SLP Therapies (In Progress)     Topic: Occupational Therapy (In Progress)     Point: ADL training (In Progress)     Description:   Instruct learner(s) on proper safety adaptation and remediation techniques during self care or transfers.   Instruct in proper use of assistive devices.              Learning Progress Summary           Patient Acceptance, E, NR by RC at 7/17/2020 1200    Acceptance, E,D,H, NR by  at 7/11/2020 1429    Comment:  much review and issued handout for HIP precautions, edu on use of lh ae will need reinforcement.    Acceptance, E, NR by ME at 7/10/2020 1503    Comment:  Educated on OT and POC. Educated to call for assistance. Educated on safety precautions. Educated on proper body mechanics for transfers, bed mobility, ADLs, and funcitonal mobility.                   Point: Home exercise program (Not Started)     Description:   Instruct learner(s) on appropriate technique for monitoring, assisting and/or progressing therapeutic exercises/activities.              Learner Progress:   Not documented in this visit.          Point: Precautions (Done)     Description:   Instruct learner(s) on prescribed precautions during self-care and functional transfers.              Learning Progress Summary           Patient Acceptance, E, VU,NR by  at 7/18/2020 1620    Comment:  Edu pt on use of gait belt and non skid socks when OOB and no OOB without assist.    Acceptance, E, NR by RC at 7/17/2020 1200    Acceptance, E, NR by RC at 7/13/2020 1528    Acceptance, E, NR by RC at 7/12/2020 1503    Comment:  multi review of hip precautions    Acceptance, E,D,H, NR by  at 7/11/2020 0087     Comment:  much review and issued handout for HIP precautions, edu on use of lh ae will need reinforcement.    Acceptance, E, NR by ME at 7/10/2020 1503    Comment:  Educated on OT and POC. Educated to call for assistance. Educated on safety precautions. Educated on proper body mechanics for transfers, bed mobility, ADLs, and funcitonal mobility.                   Point: Body mechanics (In Progress)     Description:   Instruct learner(s) on proper positioning and spine alignment during self-care, functional mobility activities and/or exercises.              Learning Progress Summary           Patient Acceptance, E, NR by  at 7/17/2020 1200    Acceptance, E, NR by RC at 7/13/2020 1528    Acceptance, E,D,H, NR by  at 7/11/2020 1429    Comment:  much review and issued handout for HIP precautions, edu on use of lh ae will need reinforcement.    Acceptance, E, NR by ME at 7/10/2020 1503    Comment:  Educated on OT and POC. Educated to call for assistance. Educated on safety precautions. Educated on proper body mechanics for transfers, bed mobility, ADLs, and funcitonal mobility.                               User Key     Initials Effective Dates Name Provider Type Discipline     07/24/19 -  Lopez Moss, OT Occupational Therapist OT     03/07/18 -  Evelyn Dodson COTA/L Occupational Therapy Assistant OT    ME 09/10/19 -  Eitan Haynes OT Occupational Therapist OT                OT Recommendation and Plan  Outcome Summary/Treatment Plan (OT)  Daily Summary of Progress (OT): progress towards functional goals is fair  Therapy Frequency (OT Eval): daily  Daily Summary of Progress (OT): progress towards functional goals is fair  Plan of Care Review  Plan of Care Reviewed With: patient  Plan of Care Reviewed With: patient  Outcome Summary: pt perf bed mob with sba pt sat eob with ind and perf static standing act  pt reports dizziness reported to md in room bp 100/59 with standing although higher than 95/54 in  supine  Outcome Measures     Row Name 07/19/20 1320 07/19/20 0710 07/18/20 1403       How much help from another person do you currently need...    Turning from your back to your side while in flat bed without using bedrails?  3  -TW  --  --    Moving from lying on back to sitting on the side of a flat bed without bedrails?  3  -TW  --  --    Moving to and from a bed to a chair (including a wheelchair)?  3  -TW  --  --    Standing up from a chair using your arms (e.g., wheelchair, bedside chair)?  3  -TW  --  --    Climbing 3-5 steps with a railing?  3  -TW  --  --    To walk in hospital room?  3  -TW  --  --    AM-PAC 6 Clicks Score (PT)  18  -TW  --  --       How much help from another is currently needed...    Putting on and taking off regular lower body clothing?  --  3  -KD  3  -RB    Bathing (including washing, rinsing, and drying)  --  3  -KD  3  -RB    Toileting (which includes using toilet bed pan or urinal)  --  3  -KD  3  -RB    Putting on and taking off regular upper body clothing  --  4  -KD  3  -RB    Taking care of personal grooming (such as brushing teeth)  --  4  -KD  4  -RB    Eating meals  --  4  -KD  4  -RB    AM-PAC 6 Clicks Score (OT)  --  21  -KD  20  -RB       Functional Assessment    Outcome Measure Options  --  --  AM-PAC 6 Clicks Daily Activity (OT)  -RB    Row Name 07/18/20 1314 07/17/20 1600          How much help from another person do you currently need...    Turning from your back to your side while in flat bed without using bedrails?  3  -TW  3  -EM     Moving from lying on back to sitting on the side of a flat bed without bedrails?  3  -TW  3  -EM     Moving to and from a bed to a chair (including a wheelchair)?  3  -TW  3  -EM     Standing up from a chair using your arms (e.g., wheelchair, bedside chair)?  3  -TW  3  -EM     Climbing 3-5 steps with a railing?  3  -TW  3  -EM     To walk in hospital room?  3  -TW  3  -EM     AM-PAC 6 Clicks Score (PT)  18  -TW  18  -EM         Functional Assessment    Outcome Measure Options  --  AM-PAC 6 Clicks Basic Mobility (PT)  -EM       User Key  (r) = Recorded By, (t) = Taken By, (c) = Cosigned By    Initials Name Provider Type    EM Tee Burris, PTA Physical Therapy Assistant    RB Lopez Moss, OT Occupational Therapist    TW Juan C Leyva, PTA Physical Therapy Assistant    KD Ciarra Finley, AGUILAR/L Occupational Therapy Assistant           Time Calculation:   Time Calculation- OT     Row Name 07/20/20 1437             Time Calculation- OT    OT Start Time  1030  -LM      OT Stop Time  1054  -LM      OT Time Calculation (min)  24 min  -LM      Total Timed Code Minutes- OT  24 minute(s)  -LM      OT Received On  07/20/20  -LM        User Key  (r) = Recorded By, (t) = Taken By, (c) = Cosigned By    Initials Name Provider Type     Alise Mora COTA/L Occupational Therapy Assistant        Therapy Charges for Today     Code Description Service Date Service Provider Modifiers Qty    [id number]  OT THERAPEUTIC ACT EA 15 MIN 7/20/2020 Alise Mora COTA/L GO 2               MICK Wallace  7/20/2020

## 2020-07-20 NOTE — PROGRESS NOTES
Adult Nutrition  Assessment    Patient Name:  Jose Dacosta  YOB: 1935  MRN: [id number]  Admit Date:  7/8/2020    Assessment Date:  7/20/2020    Comments:  POD 11 s/p repair left hip fracture. Noted facial laceration w/ staples placed in ER. Soft but whole foods diet, intakes average >75% [id number]. Admit wt  145# and today wt 152# BMI 20.6. Pt stated 7/17 he thinks his -160#. Pt does not want Boost. Agreed to whole milk at meals and chocolate milkshakes (made with magic cups) w/ Lunch and Dinner.  RD to follow hospital course. D/c planning in progress, likely to SNF for rehab, possibly today.    Reason for Assessment     Row Name 07/20/20 0925          Reason for Assessment    Reason For Assessment  follow-up protocol     Diagnosis  trauma           Anthropometrics     Row Name 07/20/20 0351          Anthropometrics    Weight  69 kg (152 lb 1.6 oz)         Labs/Tests/Procedures/Meds     Row Name 07/20/20 0925          Labs/Procedures/Meds    Lab Results Reviewed  reviewed        Diagnostic Tests/Procedures    Diagnostic Test/Procedure Reviewed  reviewed        Medications    Pertinent Medications Reviewed  reviewed             Nutrition Prescription Ordered     Row Name 07/20/20 0925          Nutrition Prescription PO    Current PO Diet  Soft Texture     Texture  Whole foods     Supplement  Milk;Magic Cup     Supplement Frequency  2 times a day;3 times a day         Evaluation of Received Nutrient/Fluid Intake     Row Name 07/20/20 0981          PO Evaluation    Number of Days PO Intake Evaluated  Insufficient Data     Number of Meals  3     % PO Intake  75x3 meals recorded in 3 days               Electronically signed by:  Viri Bentley RD  07/20/20 09:34

## 2020-07-20 NOTE — PLAN OF CARE
Problem: Patient Care Overview  Goal: Plan of Care Review  Outcome: Ongoing (interventions implemented as appropriate)  Flowsheets (Taken 7/20/2020 2549)  Progress: improving  Plan of Care Reviewed With: patient  Outcome Summary: pt perf bed mob with sba pt sat eob with ind and perf static standing act  pt reports dizziness reported to md in room bp 100/59 with standing although higher than 95/54 in supine

## 2020-07-20 NOTE — PROGRESS NOTES
MEDICINE DAILY PROGRESS NOTE  NAME: Jose Dacosta  : 1935  MRN: [id number]     LOS: 12 days     PROVIDER OF SERVICE: Parker Barros MD    Chief Complaint: Closed fracture of neck of left femur (CMS/HCC)    Subjective:   HPI: 85 year old male with a history of ischemic cardiomyopathy, HFrEF, and postural syncope, and HTN who presented to the ED after a syncopal episode which took place after standing from his chair.  He suffered facial trauma and a left hip fracture. CT of the head was negative.  Sutures were placed in the ED to facial laceration.  Orthopedics consulted and recommends hip arthroplasty after cardiac clearance. Cardiology cleared for surgery. He underwent left hip hemiarthroplasty.  Pain is controlled.  No new complaints.      Interval History:  History taken from: patient chart family RN   - Patient doing good. Still with intermittent dizziness reported, but per patient it is better this morning than yesterday. Still awaiting peer to peer for possible SNF placement. Patient is ambulating well, but with the intermittent dizziness and patient desire to goto rehab to home will attempt to get him placed.   - Patient doing okay today. He has an intermittent headache. Still with intermittent dizziness.   - Patient working with therapy. No acute complaints. He desires to goto SNF for rehab to home. Will discuss with CM. Insurance initially denied SNF rehab, but patient desires to go there now.    Review of Systems:   Review of Systems   Constitutional: Positive for activity change and fatigue.   Cardiovascular: Negative for chest pain and palpitations.   Gastrointestinal: Negative for abdominal pain and nausea.   Skin: Negative for color change and rash.   Neurological: Positive for dizziness (Improving).   Psychiatric/Behavioral: Negative for behavioral problems and confusion.       Objective:     Vital Signs  Vitals:    20 1958 20 2331 20 0351 20 0839   BP:  136/71 137/73 130/70 142/80   BP Location:  Right arm Right arm Right arm   Patient Position:  Lying Lying Sitting   Pulse: 61 70 60 64   Resp: 16 18 18 18   Temp: 98.1 °F (36.7 °C) 98.1 °F (36.7 °C) 98.1 °F (36.7 °C) 98.2 °F (36.8 °C)   TempSrc:  Oral Oral Oral   SpO2: 100% 100% 100% 98%   Weight:   69 kg (152 lb 1.6 oz)    Height:           Physical Exam  Physical Exam   Constitutional: He is oriented to person, place, and time. He appears well-developed and well-nourished. No distress.   HENT:   Head: Normocephalic and atraumatic.   Right Ear: External ear normal.   Left Ear: External ear normal.   Nose: Nose normal.   Eyes: Pupils are equal, round, and reactive to light. Conjunctivae and EOM are normal.   Neck: Neck supple. No thyromegaly present.   Cardiovascular: Normal rate, regular rhythm and normal heart sounds.   Pulmonary/Chest: Effort normal and breath sounds normal. No respiratory distress. He has no wheezes. He has no rales. He exhibits no tenderness.   Abdominal: Soft. Bowel sounds are normal. He exhibits no distension. There is no tenderness.   Musculoskeletal: He exhibits no edema.   Working with therapy, ambulating.   Neurological: He is alert and oriented to person, place, and time.   Skin: Skin is warm and dry. No rash noted. He is not diaphoretic. No erythema. No pallor.   Psychiatric: He has a normal mood and affect. His behavior is normal.   Nursing note and vitals reviewed.      Medication Review    Current Facility-Administered Medications:   •  acetaminophen (TYLENOL) tablet 1,000 mg, 1,000 mg, Oral, 4x Daily, Jitendra Solis MD, 1,000 mg at 07/20/20 0842  •  albuterol (PROVENTIL) nebulizer solution 0.083% 2.5 mg/3mL, 2.5 mg, Nebulization, Q4H PRN, Jitendra Solis MD, 2.5 mg at 07/15/20 1010  •  aspirin EC tablet 81 mg, 81 mg, Oral, Daily, Jitendra Solis MD, 81 mg at 07/20/20 0842  •  atorvastatin (LIPITOR) tablet 20 mg, 20 mg, Oral, Daily, Jitendra Solis MD, 20 mg  at 07/20/20 0842  •  enoxaparin (LOVENOX) syringe 30 mg, 30 mg, Subcutaneous, Q24H, Jitendra Solis MD, 30 mg at 07/19/20 1316  •  fluticasone (FLONASE) 50 MCG/ACT nasal spray 2 spray, 2 spray, Each Nare, Daily, Parker Barros MD, 2 spray at 07/20/20 0842  •  ipratropium-albuterol (DUO-NEB) nebulizer solution 3 mL, 3 mL, Nebulization, Once, Jitendra Solis MD  •  losartan (COZAAR) tablet 25 mg, 25 mg, Oral, Daily, Jitendra Solis MD, 25 mg at 07/20/20 0842  •  metoprolol succinate XL (TOPROL-XL) 24 hr tablet 25 mg, 25 mg, Oral, Daily, Jitendra Solis MD, 25 mg at 07/20/20 0842  •  naloxone (NARCAN) injection 0.4 mg, 0.4 mg, Intravenous, Q5 Min PRN, Jitendra Solis MD, 0.4 mg at 07/09/20 0841  •  ondansetron (ZOFRAN) injection 4 mg, 4 mg, Intravenous, Q6H PRN, Jitendra Solis MD  •  oxybutynin (DITROPAN) half tablet 2.5 mg, 2.5 mg, Oral, BID, Jitendra Solis MD, 2.5 mg at 07/20/20 0842  •  pantoprazole (PROTONIX) EC tablet 40 mg, 40 mg, Oral, QAM, Jitendra Solis MD, 40 mg at 07/20/20 0842  •  rOPINIRole (REQUIP) tablet 0.5 mg, 0.5 mg, Oral, Daily, Jitendra Solis MD, 0.5 mg at 07/20/20 0842  •  sodium chloride 0.9 % flush 10 mL, 10 mL, Intravenous, PRN, Jitendra Solis MD  •  sodium chloride 0.9 % flush 10 mL, 10 mL, Intravenous, Q12H, Jitendra Solis MD, 10 mL at 07/20/20 0843  •  sodium chloride 0.9 % flush 10 mL, 10 mL, Intravenous, PRN, Jitendra Solis MD     Diagnostic Data    Lab Results (last 24 hours)     Procedure Component Value Units Date/Time    COVID PRE-OP / PRE-PROCEDURE SCREENING ORDER (NO ISOLATION) - Swab, Nasopharynx [[id number]] Collected:  07/19/20 2301    Specimen:  Swab from Nasopharynx Updated:  07/20/20 0150    Narrative:       The following orders were created for panel order COVID PRE-OP / PRE-PROCEDURE SCREENING ORDER (NO ISOLATION) - Swab, Nasopharynx.  Procedure                               Abnormality          Status                     ---------                               -----------         ------                     COVID-19, [facility] IN-HOUS...[[id number]]  Normal              Final result                 Please view results for these tests on the individual orders.    COVID-19, [facility] IN-HOUSE, NP SWAB IN TRANSPORT MEDIA 8-10 HR TAT - Swab, Nasopharynx [[id number]]  (Normal) Collected:  07/19/20 2301    Specimen:  Swab from Nasopharynx Updated:  07/20/20 0150     COVID19 Not Detected    Narrative:       Testing performed by Real Time RT-PCR  This test has not been approved by the [employer] but is authorized under the Emergency Use Act (EUA)    Fact sheet for providers: https://www.fda.gov/media/032482/download    Fact sheet for patients: https://www.[employer].gov/media/731018/download              I reviewed the patient's new clinical results.    Assessment/Plan:     Active Hospital Problems    Diagnosis POA   • **Closed fracture of neck of left femur (CMS/[facility]) [S72.002A] Yes   • Postoperative anemia due to acute blood loss [D62] Unknown     Not unexpected     • Facial laceration [S01.81XA] Unknown   • HFrEF (heart failure with reduced ejection fraction) (CMS/[facility]) [I50.20] Unknown   • Syncope and collapse [R55] Yes   • Ischemic cardiomyopathy [I25.5] Yes   • Essential hypertension [I10] Yes   • Coronary artery disease involving native coronary artery of native heart without angina pectoris [I25.10] Yes       #. Left hip fracture.  7/20. POD#11. Attempting to get SNF placement secondary to dizziness and concern for skilled need at home. Patient has been ambulating with therapy, but his daughter will be unable to care for him secondary to her caring for her mother/patient's spouse.  7/19. POD#10. Peer to peer for tomorrow. Working with therapy.   7/18. POD#9. Peer to peer planned. Patient doesn't feel safe going home and desires rehab to home. Am labs ordered.  #. Pain control. Tramadol.  #. HLD. Statin.  #. HTN. Losartan.  Toprol.  #. RLS. Requip.      Will monitor patient's hospital course and adjust treatment as hospital course dictates.    DVT prophylaxis: Lovenox  Code status is   Code Status and Medical Interventions:   Ordered at: 07/08/20 2053     Level Of Support Discussed With:    Patient     Code Status:    CPR     Medical Interventions (Level of Support Prior to Arrest):    Full       Plan for disposition:Where: home or SNF and When:  1-3 days      Time:           This document has been electronically signed by Parker Barros MD on July 20, 2020 12:09      The patient was evaluated during the global COVID-19 pandemic, and the diagnosis was suspected/considered upon their initial presentation.  Evaluation, treatment, and testing were consistent with current guidelines for patients who present with complaints or symptoms that may be related to COVID-19.

## 2020-07-20 NOTE — PLAN OF CARE
Problem: Patient Care Overview  Goal: Plan of Care Review  Flowsheets  Taken 7/20/2020 1448  Progress: improving  Outcome Summary: vss, pain controlled, tolerting pt/ot, cont to monitor  Taken 7/20/2020 9816  Plan of Care Reviewed With: patient

## 2020-07-20 NOTE — THERAPY TREATMENT NOTE
Acute Care - Physical Therapy Treatment Note  [facility]     Patient Name: Jose Dacosta  : 1935  MRN: [id number]  Today's Date: 2020  Onset of Illness/Injury or Date of Surgery: 20(surgery yesterday (2020))     Referring Physician: DAYANARA Kumar APRN(surgeon: DAJA Solis MD )    Admit Date: 2020    Visit Dx:    ICD-10-CM ICD-9-CM   1. Syncope and collapse R55 780.2   2. Facial laceration, initial encounter S01.81XA 873.40   3. Closed fracture of left hip, initial encounter (CMS/HCC) S72.002A 820.8   4. Fall, initial encounter W19.XXXA E888.9   5. Closed head injury, initial encounter S09.90XA 959.01   6. Closed fracture of neck of left femur, initial encounter (CMS/HCC) S72.002A 820.8   7. Impaired functional mobility, balance, gait, and endurance Z74.09 V49.89   8. Impaired mobility and ADLs Z74.09 V49.89    Z78.9      Patient Active Problem List   Diagnosis   • Coronary artery disease involving native coronary artery of native heart without angina pectoris   • Vasovagal syncope   • RBBB (right bundle branch block with left anterior fascicular block)   • Essential hypertension   • Ischemic cardiomyopathy   • Syncope and collapse   • Closed fracture of neck of left femur (CMS/[facility])   • Coronary artery disease with history of myocardial infarction without history of CABG   • Facial laceration   • HFrEF (heart failure with reduced ejection fraction) (CMS/[facility])   • Postoperative anemia due to acute blood loss       Therapy Treatment    Rehabilitation Treatment Summary     Row Name 20 1511 20 1030 20 0912       Treatment Time/Intention    Discipline  physical therapy assistant  -TW  occupational therapy assistant  -LM  physical therapy assistant  -TW    Document Type  therapy note (daily note)  -TW  therapy note (daily note)  -LM  therapy note (daily note)  -TW    Subjective Information  complains of;dizziness  -TW  no complaints  -LM  complains of;dizziness   -TW    Mode of Treatment  physical therapy;individual therapy  -TW  individual therapy;occupational therapy  -LM  physical therapy;individual therapy  -TW    Patient/Family Observations  Pt with no family prresent but agreeable to amb. States he is feeling better than he did this am.  -TW  --  Pt with no family present.  -TW    Therapy Frequency (PT Clinical Impression)  --  daily  -LM  daily  -TW    Therapy Frequency (OT Eval)  --  daily  -LM  --    Patient Effort  good  -TW  good  -LM  good  -TW    Existing Precautions/Restrictions  hip, posterior;fall  -TW  --  fall;hip, posterior  -TW    Treatment Considerations/Comments  --  --  Pt is extremely [place].  -TW    Recorded by [TW] Juan C Leyva PTA 07/20/20 1658 [LM] Alise Mora COTA/JAZZY 07/20/20 1454 [TW] Juan C Leyva PTA 07/20/20 1652    Row Name 07/20/20 1511 07/20/20 1030 07/20/20 0912       Vital Signs    Pre Systolic BP Rehab  10  -TW  95 supine   -LM  135  -TW    Pre Treatment Diastolic BP  67  -TW  52  -LM  77  -TW    Intra Systolic BP Rehab  --  100 seated eob   -LM  --    Intra Treatment Diastolic BP  --  59  -LM  --    Post Systolic BP Rehab  154  -TW  100  -LM  137  -TW    Post Treatment Diastolic BP  72  -TW  58 standing   -LM  74  -TW    Pretreatment Heart Rate (beats/min)  70  -TW  --  74  -TW    Posttreatment Heart Rate (beats/min)  76  -TW  --  78  -TW    Pre SpO2 (%)  99  -TW  --  99  -TW    O2 Delivery Pre Treatment  room air  -TW  --  room air  -TW    Post SpO2 (%)  99  -TW  --  100  -TW    Pre Patient Position  Supine  -TW  --  Supine  -TW    Intra Patient Position  Standing  -TW  --  Standing  -TW    Post Patient Position  Supine  -TW  --  Supine  -TW    Recorded by [TW] Juan C Leyva PTA 07/20/20 1658 [LM] Alise Mora COTA/JAZZY 07/20/20 1454 [TW] Juan C Leyva PTA 07/20/20 1652    Row Name 07/20/20 1511 07/20/20 1030 07/20/20 0912       Cognitive Assessment/Intervention- PT/OT    Affect/Mental Status  (Cognitive)  WFL  -TW  WFL  -LM  WFL  -TW    Orientation Status (Cognition)  oriented x 4  -TW  oriented x 3;oriented x 4  -LM  oriented x 4  -TW    Follows Commands (Cognition)  follows one step commands;over 90% accuracy  -TW  follows one step commands  -LM  follows one step commands;over 90% accuracy  -TW    Cognitive Function (Cognitive)  executive function deficit;safety deficit  -TW  --  safety deficit  -TW    Safety Deficit (Cognitive)  impulsivity  -TW  impulsivity  -LM  impulsivity  -TW    Personal Safety Interventions  fall prevention program maintained;gait belt;nonskid shoes/slippers when out of bed  -TW  --  fall prevention program maintained;gait belt;nonskid shoes/slippers when out of bed  -TW    Recorded by [TW] Juan C Leyva, PTA 07/20/20 1658 [LM] Alise Mora COTA/JAZZY 07/20/20 1454 [TW] Juan C Leyva, PTA 07/20/20 1652    Row Name 07/20/20 1511 07/20/20 0912          Safety Issues, Functional Mobility    Impairments Affecting Function (Mobility)  balance;endurance/activity tolerance;pain;range of motion (ROM);strength  -TW  balance;endurance/activity tolerance;pain;strength;postural/trunk control  -TW     Recorded by [TW] Juan C Leyva, PTA 07/20/20 1658 [TW] Juan C Leyva, PTA 07/20/20 1652     Row Name 07/20/20 1511 07/20/20 0912          Mobility Assessment/Intervention    Left Lower Extremity (Weight-bearing Status)  weight-bearing as tolerated (WBAT)  -TW  weight-bearing as tolerated (WBAT)  -TW     Recorded by [TW] Juan C Leyva, PTA 07/20/20 1658 [TW] Juan C Leyva, PTA 07/20/20 1652     Row Name 07/20/20 1511 07/20/20 1030 07/20/20 0912       Bed Mobility Assessment/Treatment    Bed Mobility Assessment/Treatment  --  supine-sit;sit-supine  -LM  --    Supine-Sit [place] (Bed Mobility)  supervision;verbal cues  -TW  supervision  -LM  supervision;verbal cues  -TW    Sit-Supine [place] (Bed Mobility)  supervision  -TW  --  supervision;verbal  cues  -TW    Bed Mobility, Safety Issues  decreased use of legs for bridging/pushing  -TW  --  --    Assistive Device (Bed Mobility)  bed rails;head of bed elevated  -TW  --  --    Comment (Bed Mobility)  Again pt requires VCs for L hip maintainance.  -TW  --  Pt cont to inadvertantly IR and ER L hip during sup to sit to sup t/f.  -TW    Recorded by [TW] Juan C Leyva, PTA 07/20/20 1658 [LM] Alise Mora AGUILAR/L 07/20/20 1454 [TW] Juan C Leyva, PTA 07/20/20 1652    Row Name 07/20/20 1030             Transfer Assessment/Treatment    Transfer Assessment/Treatment  sit-stand transfer;stand-sit transfer  -LM      Recorded by [LM] Alise Mora AGUILAR/L 07/20/20 1454      Row Name 07/20/20 1030             Bed-Chair Transfer    Bed-Chair [place] (Transfers)  contact guard  -LM      Assistive Device (Bed-Chair Transfers)  walker, front-wheeled  -LM      Recorded by [LM] Alise Mora AGUILAR/L 07/20/20 1454      Row Name 07/20/20 1030             Chair-Bed Transfer    Chair-Bed [place] (Transfers)  minimum assist (75% patient effort)  -LM      Assistive Device (Chair-Bed Transfers)  walker, front-wheeled  -LM      Recorded by [LM] Alise Mora AGUILAR/L 07/20/20 1454      Row Name 07/20/20 1511 07/20/20 1030 07/20/20 0912       Sit-Stand Transfer    Sit-Stand [place] (Transfers)  contact guard;verbal cues  -TW  contact guard  -LM  contact guard  -TW    Assistive Device (Sit-Stand Transfers)  walker, front-wheeled  -TW  walker, front-wheeled  -LM  walker, front-wheeled  -TW    Recorded by [TW] Juan C Leyva, PTA 07/20/20 1658 [LM] Alise Mora AGUILAR/L 07/20/20 1454 [TW] Juan C Leyva, PTA 07/20/20 1652    Row Name 07/20/20 1511 07/20/20 1030 07/20/20 0912       Stand-Sit Transfer    Stand-Sit [place] (Transfers)  contact guard  -TW  contact guard  -LM  contact guard  -TW    Assistive Device (Stand-Sit Transfers)  walker, front-wheeled  -TW  walker,  front-wheeled  -LM  walker, front-wheeled  -TW    Recorded by [TW] Juan C Leyva, PTA 07/20/20 1658 [LM] Alise Mora COTA/JAZZY 07/20/20 1454 [TW] Juan C Leyva, PTA 07/20/20 1652    Row Name 07/20/20 1030 07/20/20 0912          Toilet Transfer    Type (Toilet Transfer)  sit-stand;stand-sit  -LM  sit-stand;stand-sit  -TW     [place] Level (Toilet Transfer)  --  contact guard  -TW     Assistive Device (Toilet Transfer)  --  grab bars/safety frame;walker, front-wheeled  -TW     Recorded by [LM] Alise Mora COTA/JAZZY 07/20/20 1454 [TW] Juna C Leyva, PTA 07/20/20 1652     Row Name 07/20/20 1511 07/20/20 0912          Gait/Stairs Assessment/Training    Gait/Stairs Assessment/Training  gait/ambulation assistive device  -TW  gait/ambulation assistive device  -TW     [place] Level (Gait)  verbal cues;contact guard  -TW  contact guard;verbal cues  -TW     Assistive Device (Gait)  walker, front-wheeled  -TW  walker, front-wheeled  -TW     Distance in Feet (Gait)  100ft then 84ft  -TW  12ft then 8ft to bed from bathroom.  -TW     Pattern (Gait)  step-through  -TW  step-through  -TW     Deviations/Abnormal Patterns (Gait)  antalgic  -TW  antalgic  -TW     Bilateral Gait Deviations  forward flexed posture  -TW  forward flexed posture  -TW     Comment (Gait/Stairs)  --  Pt cont to require VC to keep RW with him at all times to minimize falling threat. Pt verb understanding but cont to attempt to leave RW when amb in tight spaces.  -TW     Recorded by [TW] Juan C Leyva PTA 07/20/20 1658 [TW] Juan C Leyva, PTA 07/20/20 1652     Row Name 07/20/20 1030             Motor Skills Assessment/Interventions    Additional Documentation  -- standing at eob   -LM      Recorded by [LM] Alise Mora COTA/L 07/20/20 1454      Row Name 07/20/20 1511             Lower Extremity Supine Therapeutic Exercise    Performed, Supine Lower Extremity (Therapeutic Exercise)  gluteal sets;quadriceps  sets;ankle pumps;heel slides  -TW      Exercise Type, Supine Lower Extremity (Therapeutic Exercise)  AROM (active range of motion);AAROM (active assistive range of motion)  -TW      Sets/Reps Detail, Supine Lower Extremity (Therapeutic Exercise)  1/20 -TW      Recorded by [TW] Juan C Leyva PTA 07/20/20 1658      Row Name 07/20/20 1511 07/20/20 0912          Positioning and Restraints    Pre-Treatment Position  in bed  -TW  in bed  -TW     Post Treatment Position  bed  -TW  bed  -TW     In Bed  supine;call light within reach;encouraged to call for assist;exit alarm on  -TW  supine;call light within reach;encouraged to call for assist;exit alarm on  -TW     Recorded by [TW] Juan C Leyva PTA 07/20/20 1658 [TW] Juan C Leyva, PTA 07/20/20 1652     Row Name 07/20/20 1511 07/20/20 1030 07/20/20 0912       Pain Scale: Numbers Pre/Post-Treatment    Pain Scale: Numbers, Pretreatment  0/10 - no pain  -TW  0/10 - no pain  -LM  0/10 - no pain  -TW    Pain Scale: Numbers, Post-Treatment  0/10 - no pain  -TW  0/10 - no pain  -LM  0/10 - no pain  -TW    Recorded by [TW] Juan C Leyva, DANIE 07/20/20 1658 [LM] Alise Mora COTA/JAZZY 07/20/20 1454 [TW] Juan C Leyva, PTA 07/20/20 1652    Row Name                Wound 07/08/20 1810 Left upper eyebrow Laceration    Wound - Properties Group Date first assessed: 07/08/20 [TM] Time first assessed: 1810 [TM] Present on Hospital Admission: Y [TM] Side: Left [TM] Orientation: upper [TM] Location: eyebrow [TM] Primary Wound Type: Laceration [TM] Recorded by:  [TM] Josephine Elias RN 07/08/20 1811    Row Name                Wound 07/09/20 1515 Left posterior hip Incision    Wound - Properties Group Date first assessed: 07/09/20 [AQ] Time first assessed: 1515 [AQ] Present on Hospital Admission: N [AQ] Side: Left [AQ] Orientation: posterior [AQ] Location: hip [AQ] Primary Wound Type: Incision [AQ] Recorded by:  [AQ] Cathy Cano RN 07/09/20  1515    Row Name 07/20/20 1030             Plan of Care Review    Plan of Care Reviewed With  patient  -LM      Recorded by [LM] Alise Mora COTA/L 07/20/20 1454      Row Name 07/20/20 1030             Outcome Summary/Treatment Plan (OT)    Daily Summary of Progress (OT)  progress towards functional goals is fair  -LM      Recorded by [LM] Alise Mora COTA/L 07/20/20 1454      Row Name 07/20/20 1511 07/20/20 0912          Outcome Summary/Treatment Plan (PT)    Daily Summary of Progress (PT)  progress toward functional goals is good  -TW  progress toward functional goals is good  -TW     Barriers to Overall Progress (PT)  --  dizziness with movement  -TW     Plan for Continued Treatment (PT)  Cont  -TW  Cont  -TW     Anticipated Discharge Disposition (PT)  anticipate therapy at next level of care  -TW  anticipate therapy at next level of care  -TW     Recorded by [TW] Juan C Leyva, PTA 07/20/20 1658 [TW] Juan C Leyva, PTA 07/20/20 1652       User Key  (r) = Recorded By, (t) = Taken By, (c) = Cosigned By    Initials Name Effective Dates Discipline    AQ Cathy Cano, RN 10/17/16 -  Nurse    TW Juan C Leyva PTA 03/07/18 -  PT    LM Alise Mora AGUILAR/L 03/07/18 -  OT    TM Josephine Elias, RN 07/24/18 -  Nurse          Wound 07/08/20 1810 Left upper eyebrow Laceration (Active)   Dressing Appearance open to air 7/20/2020  8:39 AM   Closure Adhesive closure strips 7/20/2020  8:39 AM   Base maroon/purple 7/20/2020  8:39 AM   Drainage Amount none 7/19/2020  8:00 PM       Wound 07/09/20 1515 Left posterior hip Incision (Active)   Dressing Appearance open to air 7/20/2020  8:39 AM   Closure [place] 7/20/2020  8:39 AM   Base clean;dry 7/20/2020  8:39 AM   Drainage Amount none 7/19/2020  8:00 PM       Rehab Goal Summary     Row Name 07/20/20 1511 07/20/20 1455          Bed Mobility Goal 1 (PT)    Activity/Assistive Device (Bed Mobility Goal 1, PT)  sit to supine;supine  to sit  -TW  --     [place] Level/Cues Needed (Bed Mobility Goal 1, PT)  independent  -TW  --     Time Frame (Bed Mobility Goal 1, PT)  by discharge  -TW  --     Progress/Outcomes (Bed Mobility Goal 1, PT)  goal not met  -TW  --        Transfer Goal 1 (PT)    Activity/Assistive Device (Transfer Goal 1, PT)  sit-to-stand/stand-to-sit;bed-to-chair/chair-to-bed;toilet  -TW  --     [place] Level/Cues Needed (Transfer Goal 1, PT)  conditional independence  -TW  --     Time Frame (Transfer Goal 1, PT)  2 - 3 days  -TW  --     Progress/Outcome (Transfer Goal 1, PT)  goal not met  -TW  --        Gait Training Goal 1 (PT)    Activity/Assistive Device (Gait Training Goal 1, PT)  gait (walking locomotion);assistive device use;walker, rolling  -TW  --     [place] Level (Gait Training Goal 1, PT)  conditional independence  -TW  --     Distance (Gait Goal 1, PT)  [id number]  -TW  --     Time Frame (Gait Training Goal 1, PT)  by discharge  -TW  --     Progress/Outcome (Gait Training Goal 1, PT)  goal not met  -TW  --        Stairs Goal 1 (PT)    Activity/Assistive Device (Stairs Goal 1, PT)  ascending stairs;descending stairs;using handrail, left;assistive device use  -TW  --     [place] Level/Cues Needed (Stairs Goal 1, PT)  conditional independence  -TW  --     Time Frame (Stairs Goal 1, PT)  by discharge  -TW  --     Progress/Outcome (Stairs Goal 1, PT)  goal not met  -TW  --        Occupational Therapy Goals    Transfer Goal Selection (OT)  --  transfer, OT goal 1  -LM     Bathing Goal Selection (OT)  --  bathing, OT goal 1  -LM     Dressing Goal Selection (OT)  --  dressing, OT goal 1  -LM     Toileting Goal Selection (OT)  --  toileting, OT goal 1  -LM     Endurance Goal Selection (OT)  --  endurance, OT goal 1  -LM     Safety Awareness Goal Selection (OT)  --  safety awareness, OT goal 1  -LM        Transfer Goal 1 (OT)    Activity/Assistive Device (Transfer Goal 1, OT)  --  toilet  -LM     [place]  Level/Cues Needed (Transfer Goal 1, OT)  --  contact guard assist  -LM     Time Frame (Transfer Goal 1, OT)  --  long term goal (LTG);by discharge  -LM     Barriers (Transfers Goal 1, OT)  --  hearing deficit;safety deficit   -LM     Progress/Outcome (Transfer Goal 1, OT)  --  goal partially met  -LM        Bathing Goal 1 (OT)    Activity/Assistive Device (Bathing Goal 1, OT)  --  bathing skills, all  -LM     [place] Level/Cues Needed (Bathing Goal 1, OT)  --  minimum assist (75% or more patient effort)  -LM     Time Frame (Bathing Goal 1, OT)  --  long term goal (LTG);by discharge  -LM     Progress/Outcomes (Bathing Goal 1, OT)  --  goal not met  -LM        Dressing Goal 1 (OT)    Activity/Assistive Device (Dressing Goal 1, OT)  --  dressing skills, all  -LM     [place]/Cues Needed (Dressing Goal 1, OT)  --  minimum assist (75% or more patient effort)  -LM     Time Frame (Dressing Goal 1, OT)  --  long term goal (LTG);by discharge  -LM     Progress/Outcome (Dressing Goal 1, OT)  --  goal partially met;other (see comments) for lower body.  -LM        Toileting Goal 1 (OT)    Activity/Device (Toileting Goal 1, OT)  --  toileting skills, all  -LM     [place] Level/Cues Needed (Toileting Goal 1, OT)  --  minimum assist (75% or more patient effort)  -LM     Time Frame (Toileting Goal 1, OT)  --  long term goal (LTG);by discharge  -LM     Progress/Outcome (Toileting Goal 1, OT)  --  goal not met  -LM         Endurance Goal 1 (OT)    Progress/Outcome (Endurance Goal 1, OT)  --  goal not met  -LM        Safety Awareness Goal 1 (OT)    Activity (Safety Awareness Goal 1, OT)  --  awareness of need for assistance;impulse control;insight into deficits/self awareness;judgment;safe use of assistive device/equipment;follow through of safety precautions;demonstrates compliance;demonstrates understanding;demonstration of safe behaviors  -LM     [place]/Cues/Accuracy (Safety Awareness Goal 1, OT)  --  with  minimum;verbal cues/redirection;with repetition of directions;with 90% accuracy  -LM     Time Frame (Safety Awareness Goal 1, OT)  --  long term goal (LTG);by discharge  -LM     Progress/Outcome (Safety Awareness Goal 1, OT)  --  goal not met  -LM       User Key  (r) = Recorded By, (t) = Taken By, (c) = Cosigned By    Initials Name Provider Type Discipline    TW Juan C Leyva, DANIE Physical Therapy Assistant PT    Alise Arguelles, JEFF/L Occupational Therapy Assistant OT          Physical Therapy Education                 Title: PT OT SLP Therapies (In Progress)     Topic: Physical Therapy (In Progress)     Point: Mobility training (In Progress)     Description:   Instruct learner(s) on safety and technique for assisting patient out of bed, chair or wheelchair.  Instruct in the proper use of assistive devices, such as walker, crutches, cane or brace.              Patient Friendly Description:   It's important to get you on your feet again, but we need to do so in a way that is safe for you. Falling has serious consequences, and your personal safety is the most important thing of all.        When it's time to get out of bed, one of us or a family member will sit next to you on the bed to give you support.     If your doctor or nurse tells you to use a walker, crutches, a cane, or a brace, be sure you use it every time you get out of bed, even if you think you don't need it.    Learning Progress Summary           Patient Acceptance, E, NR by LILA at 7/11/2020 1047    Comment:  edu on hip dislocation precautions.    Acceptance, E, NR by JC1 at 7/10/2020 1348                   Point: Home exercise program (Not Started)     Description:   Instruct learner(s) on appropriate technique for monitoring, assisting and/or progressing patient with therapeutic exercises and activities.              Learner Progress:   Not documented in this visit.          Point: Body mechanics (In Progress)     Description:   Instruct  learner(s) on proper positioning and spine alignment for patient and/or caregiver during mobility tasks and/or exercises.              Learning Progress Summary           Patient Acceptance, E, NR by [facility] at 7/10/2020 1348                   Point: Precautions (In Progress)     Description:   Instruct learner(s) on prescribed precautions during mobility and gait tasks              Learning Progress Summary           Patient Acceptance, E, NR by LILA at 7/11/2020 1047    Comment:  edu on hip dislocation precautions.    Acceptance, E, NR by 1 at 7/10/2020 1348                               User Key     Initials Effective Dates Name Provider Type Discipline    [facility] 04/03/18 -  Angelica Torres, PT Physical Therapist PT    [facility] 03/07/18 -  Vikash Walters, PTA Physical Therapy Assistant PT                PT Recommendation and Plan  Anticipated Discharge Disposition (PT): anticipate therapy at next level of care  Therapy Frequency (PT Clinical Impression): daily  Outcome Summary/Treatment Plan (PT)  Daily Summary of Progress (PT): progress toward functional goals is good  Barriers to Overall Progress (PT): dizziness with movement  Plan for Continued Treatment (PT): Cont  Anticipated Discharge Disposition (PT): anticipate therapy at next level of care  Plan of Care Reviewed With: patient  Progress: improving  Outcome Summary: Pt participated in BID tx this date. Pt tx cutshort in am due to pt c/o increased dizziness with gait and t/f's. Pt performed B LE ther ex in sup for 20 reps in pm and able to amb 100ft and 84ft in pm with VCs for RW placement and to keep RW with him at all times when amb. Pt  tends to want to leave RW behind when in tight surroundings. Pt would cont to benefit from therapy upon DC.  Outcome Measures     Row Name 07/20/20 1511 07/19/20 1320 07/19/20 0710       How much help from another person do you currently need...    Turning from your back to your side while in flat bed without using bedrails?  3  -TW   3  -TW  --    Moving from lying on back to sitting on the side of a flat bed without bedrails?  3  -TW  3  -TW  --    Moving to and from a bed to a chair (including a wheelchair)?  3  -TW  3  -TW  --    Standing up from a chair using your arms (e.g., wheelchair, bedside chair)?  3  -TW  3  -TW  --    Climbing 3-5 steps with a railing?  3  -TW  3  -TW  --    To walk in hospital room?  3  -TW  3  -TW  --    AM-PAC 6 Clicks Score (PT)  18  -TW  18  -TW  --       How much help from another is currently needed...    Putting on and taking off regular lower body clothing?  --  --  3  -KD    Bathing (including washing, rinsing, and drying)  --  --  3  -KD    Toileting (which includes using toilet bed pan or urinal)  --  --  3  -KD    Putting on and taking off regular upper body clothing  --  --  4  -KD    Taking care of personal grooming (such as brushing teeth)  --  --  4  -KD    Eating meals  --  --  4  -KD    AM-PAC 6 Clicks Score (OT)  --  --  21  -KD    Row Name 07/18/20 1403 07/18/20 1314          How much help from another person do you currently need...    Turning from your back to your side while in flat bed without using bedrails?  --  3  -TW     Moving from lying on back to sitting on the side of a flat bed without bedrails?  --  3  -TW     Moving to and from a bed to a chair (including a wheelchair)?  --  3  -TW     Standing up from a chair using your arms (e.g., wheelchair, bedside chair)?  --  3  -TW     Climbing 3-5 steps with a railing?  --  3  -TW     To walk in hospital room?  --  3  -TW     AM-PAC 6 Clicks Score (PT)  --  18  -TW        How much help from another is currently needed...    Putting on and taking off regular lower body clothing?  3  -RB  --     Bathing (including washing, rinsing, and drying)  3  -RB  --     Toileting (which includes using toilet bed pan or urinal)  3  -RB  --     Putting on and taking off regular upper body clothing  3  -RB  --     Taking care of personal grooming (such as  brushing teeth)  4  -RB  --     Eating meals  4  -RB  --     AM-PAC 6 Clicks Score (OT)  20  -RB  --        Functional Assessment    Outcome Measure Options  AM-PAC 6 Clicks Daily Activity (OT)  -RB  --       User Key  (r) = Recorded By, (t) = Taken By, (c) = Cosigned By    Initials Name Provider Type    RB Lopez Moss, OT Occupational Therapist    TW Juan C Leyva, DANIE Physical Therapy Assistant    Ciarra Parker, JEFF/L Occupational Therapy Assistant         Time Calculation:   PT Charges     Row Name 07/20/20 1701 07/20/20 1652          Time Calculation    Start Time  1511  -TW  0912  -TW     Stop Time  1540  -TW  0938  -TW     Time Calculation (min)  29 min  -TW  26 min  -TW     PT Received On  07/20/20  -TW  07/20/20  -TW     PT Goal Re-Cert Due Date  07/23/20  -TW 07/23/20  -TW        Time Calculation- PT    Total Timed Code Minutes- PT  29 minute(s)  -TW  26 minute(s)  -TW       User Key  (r) = Recorded By, (t) = Taken By, (c) = Cosigned By    Initials Name Provider Type    TW Juan C Leyva, DANIE Physical Therapy Assistant        Therapy Charges for Today     Code Description Service Date Service Provider Modifiers Qty    57229109963 HC GAIT TRAINING EA 15 MIN 7/19/2020 Juan C Leyva, PTA GP 1    [id number] HC PT THERAPEUTIC ACT EA 15 MIN 7/19/2020 Juan C Leyva, PTA GP 1    [id number] HC PT THER PROC EA 15 MIN 7/19/2020 Juan C Leyva PTA GP 1    [id number] HC GAIT TRAINING EA 15 MIN 7/20/2020 Juan C Leyva, PTA GP 1    [id number] HC PT THERAPEUTIC ACT EA 15 MIN 7/20/2020 Juan C Leyva, PTA GP 1    [id number] HC GAIT TRAINING EA 15 MIN 7/20/2020 Juan C Leyva, PTA GP 1    [id number] HC PT THER PROC EA 15 MIN 7/20/2020 Juan C Leyva, PTA GP 1          PT G-Codes  Outcome Measure Options: AM-PAC 6 Clicks Daily Activity (OT)  AM-PAC 6 Clicks Score (PT): 18  AM-PAC 6 Clicks Score (OT): 21    Juan C Leyva, PTA  7/20/2020

## 2020-07-21 PROCEDURE — 97116 GAIT TRAINING THERAPY: CPT

## 2020-07-21 PROCEDURE — 25010000002 ENOXAPARIN PER 10 MG: Performed by: ORTHOPAEDIC SURGERY

## 2020-07-21 PROCEDURE — 97530 THERAPEUTIC ACTIVITIES: CPT

## 2020-07-21 PROCEDURE — 97168 OT RE-EVAL EST PLAN CARE: CPT

## 2020-07-21 PROCEDURE — 97110 THERAPEUTIC EXERCISES: CPT

## 2020-07-21 RX ORDER — ERYTHROMYCIN 5 MG/G
OINTMENT OPHTHALMIC EVERY 12 HOURS
Status: DISCONTINUED | OUTPATIENT
Start: 2020-07-21 | End: 2020-07-22 | Stop reason: HOSPADM

## 2020-07-21 RX ORDER — ERYTHROMYCIN 5 MG/G
OINTMENT OPHTHALMIC EVERY 12 HOURS
Status: DISCONTINUED | OUTPATIENT
Start: 2020-07-22 | End: 2020-07-21

## 2020-07-21 RX ORDER — TRAMADOL HYDROCHLORIDE 50 MG/1
50 TABLET ORAL EVERY 6 HOURS PRN
Status: DISCONTINUED | OUTPATIENT
Start: 2020-07-21 | End: 2020-07-22 | Stop reason: HOSPADM

## 2020-07-21 RX ADMIN — Medication 2.5 MG: at 20:44

## 2020-07-21 RX ADMIN — LOSARTAN POTASSIUM 25 MG: 25 TABLET, FILM COATED ORAL at 08:00

## 2020-07-21 RX ADMIN — TRAMADOL HYDROCHLORIDE 50 MG: 50 TABLET, FILM COATED ORAL at 23:45

## 2020-07-21 RX ADMIN — PANTOPRAZOLE SODIUM 40 MG: 40 TABLET, DELAYED RELEASE ORAL at 08:00

## 2020-07-21 RX ADMIN — ATORVASTATIN CALCIUM 20 MG: 20 TABLET, FILM COATED ORAL at 08:00

## 2020-07-21 RX ADMIN — ACETAMINOPHEN 1000 MG: 500 TABLET ORAL at 20:44

## 2020-07-21 RX ADMIN — TRAMADOL HYDROCHLORIDE 50 MG: 50 TABLET, FILM COATED ORAL at 16:31

## 2020-07-21 RX ADMIN — ERYTHROMYCIN: 5 OINTMENT OPHTHALMIC at 14:47

## 2020-07-21 RX ADMIN — SODIUM CHLORIDE, PRESERVATIVE FREE 10 ML: 5 INJECTION INTRAVENOUS at 20:44

## 2020-07-21 RX ADMIN — ASPIRIN 81 MG: 81 TABLET, COATED ORAL at 08:00

## 2020-07-21 RX ADMIN — ACETAMINOPHEN 1000 MG: 500 TABLET ORAL at 08:00

## 2020-07-21 RX ADMIN — METOPROLOL SUCCINATE 25 MG: 25 TABLET, FILM COATED, EXTENDED RELEASE ORAL at 08:00

## 2020-07-21 RX ADMIN — Medication 2.5 MG: at 08:00

## 2020-07-21 RX ADMIN — ROPINIROLE HYDROCHLORIDE 0.5 MG: 0.5 TABLET, FILM COATED ORAL at 08:00

## 2020-07-21 RX ADMIN — ENOXAPARIN SODIUM 30 MG: 30 INJECTION SUBCUTANEOUS at 14:15

## 2020-07-21 NOTE — THERAPY TREATMENT NOTE
Acute Care - Physical Therapy Treatment Note  [facility]     Patient Name: Jose Dacosta  : 1935  MRN: [id number]  Today's Date: 2020  Onset of Illness/Injury or Date of Surgery: 20(surgery yesterday (2020))     Referring Physician: DAYANARA Kumar APRN(surgeon: DAJA Solis MD )    Admit Date: 2020    Visit Dx:    ICD-10-CM ICD-9-CM   1. Syncope and collapse R55 780.2   2. Facial laceration, initial encounter S01.81XA 873.40   3. Closed fracture of left hip, initial encounter (CMS/HCC) S72.002A 820.8   4. Fall, initial encounter W19.XXXA E888.9   5. Closed head injury, initial encounter S09.90XA 959.01   6. Closed fracture of neck of left femur, initial encounter (CMS/HCC) S72.002A 820.8   7. Impaired functional mobility, balance, gait, and endurance Z74.09 V49.89   8. Impaired mobility and ADLs Z74.09 V49.89    Z78.9      Patient Active Problem List   Diagnosis   • Coronary artery disease involving native coronary artery of native heart without angina pectoris   • Vasovagal syncope   • RBBB (right bundle branch block with left anterior fascicular block)   • Essential hypertension   • Ischemic cardiomyopathy   • Syncope and collapse   • Closed fracture of neck of left femur (CMS/[facility])   • Coronary artery disease with history of myocardial infarction without history of CABG   • Facial laceration   • HFrEF (heart failure with reduced ejection fraction) (CMS/[facility])   • Postoperative anemia due to acute blood loss       Therapy Treatment    Rehabilitation Treatment Summary     Row Name 20 1530 20 1447 20 1147       Treatment Time/Intention    Discipline  physical therapy assistant  -LILA  occupational therapist  -ME  --  -ME    Document Type  therapy note (daily note)  -JC2  re-evaluation  -ME  --  -ME    Subjective Information  --  no complaints  -ME  --  -ME    Mode of Treatment  physical therapy;individual therapy  -JC2  individual therapy;occupational therapy   -ME  --  -ME    Patient/Family Observations  --  fowlers, on room air   -ME  --  -ME    Care Plan Review  --  evaluation/treatment results reviewed;care plan/treatment goals reviewed;risks/benefits reviewed;current/potential barriers reviewed  -ME  --  -ME    Total Minutes, Occupational Therapy Treatment  --  38  -ME2  --    Therapy Frequency (OT Eval)  --  daily  -ME  --  -ME    Patient Effort  good  -JC2  good  -ME2  --    Comment  --  pt is extremely hard of hearing; requires large amounts of direction repetition secondary to this; requires repetition of hip precautions as pt is unable to remember them on his own at this time   -ME3  --    Existing Precautions/Restrictions  hip, posterior;fall  -JC2  hip, posterior;fall  -ME  --  -ME    Recorded by [LILA] Vikash Walters, PTA 07/21/20 1541  [JC2] Vikash Walters, PTA 07/21/20 1546 [ME] Eitan Haynes, OT 07/21/20 1504  [ME2] Eitan Haynes, OT 07/21/20 1630  [ME3] Eitan Haynes, OT 07/21/20 1523 [ME] Eitan Haynes, OT 07/21/20 1504    Row Name 07/21/20 1059             Treatment Time/Intention    Discipline  physical therapy assistant  -LILA      Document Type  therapy note (daily note)  -LILA      Mode of Treatment  physical therapy;individual therapy  -LILA      Patient Effort  good  -LILA      Comment  KI left off after speakin w/ nsg about redness on L LE caused by brace  -JC2      Existing Precautions/Restrictions  hip, posterior;fall  -LILA      Recorded by [LILA] Vikash Walters, PTA 07/21/20 1117  [JC2] Vikash Walters, PTA 07/21/20 1244      Row Name 07/21/20 1530 07/21/20 1447 07/21/20 1147       Vital Signs    Pre Systolic BP Rehab  135  -LILA  132  -ME  --  -ME    Pre Treatment Diastolic BP  65  -LILA  77  -ME  --  -ME    Post Systolic BP Rehab  149  -JC2  136  -ME2  --    Post Treatment Diastolic BP  93  -JC2  74  -ME2  --    Pretreatment Heart Rate (beats/min)  69  -LILA  57  -ME  --  -ME    Posttreatment Heart Rate (beats/min)  70  -JC2  68  -ME2  --    Pre SpO2  (%)  -- unable to obtain.  -JC3  99  -ME  --  -ME    O2 Delivery Pre Treatment  --  room air  -ME  --  -ME    Post SpO2 (%)  -- fingers too cold  -JC3  96  -ME2  --    O2 Delivery Post Treatment  --  room air  -ME2  --    Pre Patient Position  Supine  -JC3  Supine  -ME  --  -ME    Post Patient Position  Supine  -JC3  Supine  -ME2  --    Recorded by [LILA] Vikash Walters, PTA 07/21/20 1541  [JC2] Vikash Walters, PTA 07/21/20 1546  [JC3] Vikash Walters, PTA 07/21/20 1719 [ME] Eitan Haynes, OT 07/21/20 1504  [ME2] Eitan Haynes, OT 07/21/20 1520 [ME] Eitan Haynes, OT 07/21/20 1504    Row Name 07/21/20 1059             Vital Signs    Pre Systolic BP Rehab  119  -LILA      Pre Treatment Diastolic BP  69  -LILA      Post Systolic BP Rehab  115  -JC2      Pretreatment Heart Rate (beats/min)  73  -LILA      Posttreatment Heart Rate (beats/min)  69  -JC2      Pre SpO2 (%)  -- unable to obtain  -JC2      Post SpO2 (%)  -- 95  -JC2      O2 Delivery Post Treatment  room air  -JC2      Pre Patient Position  Supine  -LILA      Post Patient Position  Sitting  -JC3      Recorded by [LILA] Vikash Walters, PTA 07/21/20 1117  [JC2] Vikash Walters, PTA 07/21/20 1139  [JC3] Vikash Walters, PTA 07/21/20 1244      Row Name 07/21/20 1530 07/21/20 1447 07/21/20 1147       Cognitive Assessment/Intervention- PT/OT    Affect/Mental Status (Cognitive)  WFL  -LILA  WFL  -ME  --    Orientation Status (Cognition)  oriented x 4  -LILA  oriented x 4  -ME2  --  -ME    Follows Commands (Cognition)  follows one step commands;over 90% accuracy  -LILA  follows one step commands;over 90% accuracy;delayed response/completion;increased processing time needed;initiation impaired;physical/tactile prompts required;repetition of directions required;verbal cues/prompting required hearing deficit   -ME3  --    Cognitive Function (Cognitive)  executive function deficit;safety deficit  -LILA  executive function deficit;safety deficit  -ME3  --    Safety Deficit  (Cognitive)  --  moderate deficit;awareness of need for assistance;insight into deficits/self awareness;judgment;problem solving;safety precautions follow-through/compliance;safety precautions awareness  -ME3  --    Personal Safety Interventions  fall prevention program maintained;gait belt;muscle strengthening facilitated;nonskid shoes/slippers when out of bed;supervised activity  -JC2  --  --    Recorded by [LILA] Vikash Walters, PTA 07/21/20 1546  [JC2] Vikash Walters, PTA 07/21/20 1719 [ME] Dallas Laird, OT 07/21/20 1523  [ME2] DallasEitan, OT 07/21/20 1504  [ME3] Eitan Haynes, OT 07/21/20 1630 [ME] Eitan Haynes, OT 07/21/20 1504    Row Name 07/21/20 1059             Cognitive Assessment/Intervention- PT/OT    Affect/Mental Status (Cognitive)  WFL  -LILA      Orientation Status (Cognition)  oriented x 4  -LILA      Follows Commands (Cognition)  follows one step commands;over 90% accuracy  -LILA      Cognitive Function (Cognitive)  executive function deficit;safety deficit  -LILA      Personal Safety Interventions  fall prevention program maintained;gait belt;muscle strengthening facilitated;nonskid shoes/slippers when out of bed;supervised activity  -JC2      Recorded by [LILA] Vikash Walters, PTA 07/21/20 1117  [JC2] Vikash Walters, PTA 07/21/20 1244      Row Name 07/21/20 1530 07/21/20 1447 07/21/20 1059       Safety Issues, Functional Mobility    Safety Issues Affecting Function (Mobility)  --  insight into deficits/self awareness;judgment;problem solving;safety precaution awareness;safety precautions follow-through/compliance  -ME  --    Impairments Affecting Function (Mobility)  balance;endurance/activity tolerance;pain;range of motion (ROM);strength  -LILA  balance;endurance/activity tolerance;strength;pain;range of motion (ROM)  -ME  balance;endurance/activity tolerance;pain;range of motion (ROM);strength  -LILA    Recorded by [LILA] Vikash Walters, PTA 07/21/20 1546 [ME] Eitan Haynes, OT 07/21/20 1630  [LILA] Vikash Walters, PTA 07/21/20 1117    Row Name 07/21/20 1530 07/21/20 1447 07/21/20 1059       Mobility Assessment/Intervention    Extremity Weight-bearing Status  --  left lower extremity  -ME  --    Left Lower Extremity (Weight-bearing Status)  weight-bearing as tolerated (WBAT)  -LILA  weight-bearing as tolerated (WBAT)  -ME  weight-bearing as tolerated (WBAT)  -LILA    Recorded by [LILA] Vikash Walters, PTA 07/21/20 1546 [ME] Eitan Haynes, OT 07/21/20 1630 [LILA] Vikash Walters, PTA 07/21/20 1117    Row Name 07/21/20 1530 07/21/20 1447 07/21/20 1059       Bed Mobility Assessment/Treatment    Bed Mobility Assessment/Treatment  supine-sit;sit-supine  -LILA  supine-sit;sit-supine  -ME  --    Supine-Sit [place] (Bed Mobility)  other (see comments) SBA  -JC2  supervision;verbal cues  -ME  other (see comments) SBA  -LILA    Sit-Supine [place] (Bed Mobility)  other (see comments) SBA  -LILA  contact guard;verbal cues  -ME2  --  -LILA    Bed Mobility, Safety Issues  decreased use of legs for bridging/pushing  -2  decreased use of legs for bridging/pushing  -ME  decreased use of legs for bridging/pushing  -2    Assistive Device (Bed Mobility)  bed rails;head of bed elevated  -2  bed rails;head of bed elevated  -ME  bed rails;head of bed elevated  -2    Comment (Bed Mobility)  --  requires large amounts of vc's as pt is unable to recall and adhere to L hip precautions   -ME  --    Recorded by [LILA] Vikash Walters, PTA 07/21/20 1719  [JC2] Vikash Walters, PTA 07/21/20 1546 [ME] Eitan Haynes, OT 07/21/20 1630  [ME2] Eitan Haynes, OT 07/21/20 1634 [LILA] Vikash Walters, PTA 07/21/20 1244  [JC2] Vikash Walters, PTA 07/21/20 1117    Row Name 07/21/20 1447             Functional Mobility    Functional Mobility- Ind. Level  contact guard assist;verbal cues required  -ME      Functional Mobility- Device  rolling walker  -ME      Functional Mobility- Comment  steps forward and backward at EOB with  immobilizer on LLE; vc's for L hip precautions   -ME      Recorded by [ME] Eitan Haynes, OT 07/21/20 1630      Row Name 07/21/20 1530 07/21/20 1447 07/21/20 1059       Transfer Assessment/Treatment    Transfer Assessment/Treatment  sit-stand transfer;stand-sit transfer  -LILA  sit-stand transfer;stand-sit transfer  -ME  sit-stand transfer;stand-sit transfer;bed-chair transfer  -LILA    Comment (Transfers)  --  vc's for hand placement for transfers; and adherance to L hip precautions   -ME  --    Recorded by [LILA] Vikash Walters, PTA 07/21/20 1719 [ME] Eitan Haynes, OT 07/21/20 1630 [LILA] Vikash Walters, PTA 07/21/20 1244    Row Name 07/21/20 1530 07/21/20 1059          Bed-Chair Transfer    Bed-Chair [place] (Transfers)  --  -LILA  contact guard  -LILA     Assistive Device (Bed-Chair Transfers)  --  -LILA  walker, front-wheeled  -LILA     Recorded by [LILA] Vikash Walters, PTA 07/21/20 1719 [LILA] Vikash Walters, PTA 07/21/20 1244     Row Name 07/21/20 1530 07/21/20 1447 07/21/20 1059       Sit-Stand Transfer    Sit-Stand [place] (Transfers)  contact guard  -LILA  contact guard;verbal cues  -ME  contact guard;verbal cues  -LILA    Assistive Device (Sit-Stand Transfers)  walker, front-wheeled  -JC2  walker, front-wheeled  -ME  walker, front-wheeled  -LILA    Recorded by [LILA] Vikash Walters, PTA 07/21/20 1719  [JC2] Vikash Walters, PTA 07/21/20 1546 [ME] Eitan Haynes, OT 07/21/20 1630 [LILA] Vikash Walters, PTA 07/21/20 1117    Row Name 07/21/20 1530 07/21/20 1447 07/21/20 1059       Stand-Sit Transfer    Stand-Sit [place] (Transfers)  contact guard  -LILA  contact guard;verbal cues  -ME  contact guard  -LILA    Assistive Device (Stand-Sit Transfers)  walker, front-wheeled  -LILA  walker, front-wheeled  -ME  walker, front-wheeled  -LILA    Recorded by [LILA] Vikash Walters, PTA 07/21/20 1546 [ME] Eitan Haynes, OT 07/21/20 1630 [LILA] Vikash Walters, PTA 07/21/20 1117    Row Name 07/21/20 1530 07/21/20 1058           Gait/Stairs Assessment/Training    Gait/Stairs Assessment/Training  gait/ambulation assistive device  -LILA  gait/ambulation assistive device  -LILA     [place] Level (Gait)  verbal cues;contact guard  -LILA  verbal cues;contact guard  -LILA     Assistive Device (Gait)  walker, front-wheeled  -LILA  walker, front-wheeled  -LILA     Distance in Feet (Gait)  110 ft x2  -JC2  110 x2  -JC2     Pattern (Gait)  step-through  -LILA  step-through  -LILA     Deviations/Abnormal Patterns (Gait)  antalgic  -LILA  antalgic  -LILA     Bilateral Gait Deviations  forward flexed posture  -LILA  forward flexed posture  -LILA     Negotiation (Stairs)  stairs independence;stairs assistive device;handrail location;number of steps;ascending technique;descending technique  -LILA  stairs independence;stairs assistive device;handrail location;number of steps;ascending technique;descending technique  -JC2     [place] Level (Stairs)  contact guard  -LILA  contact guard  -JC2     Assistive Device (Stairs)  walker, front-wheeled  -LILA  walker, front-wheeled  -JC2     Handrail Location (Stairs)  both sides  -LILA  both sides  -JC2     Number of Steps (Stairs)  3  -JC2  3 x2  -JC2     Ascending Technique (Stairs)  step-to-step  -LILA  step-to-step  -JC2     Descending Technique (Stairs)  step-to-step  -LILA  step-to-step  -JC2     Recorded by [LILA] Vikash Walters, PTA 07/21/20 1546  [JC2] Vikash Walters, PTA 07/21/20 1719 [LILA] Vikash Walters, PTA 07/21/20 1117  [JC2] Vikash Walters, PTA 07/21/20 1244     Row Name 07/21/20 1447             BADL Safety/Performance    Impairments, BADL Safety/Performance  balance;endurance/activity tolerance;coordination;pain;strength;range of motion  -ME      Recorded by [ME] Eitan Haynes OT 07/21/20 1630      Row Name 07/21/20 1447 07/21/20 1147          General ROM    GENERAL ROM COMMENTS  BUE ROM WFL grossly; opposition completed with good accuracy   -ME  --  -ME     Recorded by [ME] Eitan Haynes OT 07/21/20 1504 [ME]  Eitan Haynes, OT 07/21/20 1504     Row Name 07/21/20 1447             MMT (Manual Muscle Testing)    General MMT Comments  R  strength approx 4/5; L  strength approx 4-/5; pt states that he uses both hands, but writes with the R   -ME      Recorded by [ME] Eitan Haynes, ANA 07/21/20 1504      Row Name 07/21/20 1447             Motor Skills Assessment/Interventions    Additional Documentation  Balance (Group)  -ME      Recorded by [ME] Eitan Haynes OT 07/21/20 1630      Row Name 07/21/20 1447             Balance    Balance  static sitting balance;static standing balance  -ME      Recorded by [ME] Eitan Haynes OT 07/21/20 1630      Row Name 07/21/20 1447             Static Sitting Balance    Level of [place] (Unsupported Sitting, Static Balance)  supervision  -ME      Sitting Position (Unsupported Sitting, Static Balance)  sitting on edge of bed  -ME      Time Able to Maintain Position (Unsupported Sitting, Static Balance)  more than 5 minutes  -ME      Recorded by [ME] Eitan Haynes OT 07/21/20 1630      Row Name 07/21/20 1447             Static Standing Balance    Level of [place] (Supported Standing, Static Balance)  contact guard assist  -ME      Time Able to Maintain Position (Supported Standing, Static Balance)  3 to 4 minutes  -ME      Assistive Device Utilized (Supported Standing, Static Balance)  walker, rolling  -ME      Recorded by [ME] Eitan Haynes, ANA 07/21/20 1630      Row Name 07/21/20 1530 07/21/20 1447 07/21/20 1059       Positioning and Restraints    Pre-Treatment Position  in bed  -LILA  in bed  -ME  in bed  -LILA    Post Treatment Position  bed  -LILA  bed  -ME  chair  -LILA    In Bed  fowlers;call light within reach;encouraged to call for assist;exit alarm on all needs met  -  notified nsg;supine;call light within reach;encouraged to call for assist;exit alarm on  -ME  --    In Chair  --  --  reclined;encouraged to call for assist;call light within reach;exit alarm on all  needs met  -LILA    Recorded by [LILA] Vikash Walters, PTA 07/21/20 1719 [ME] Eitan Haynes, OT 07/21/20 1630 [LILA] Vikash Walters, PTA 07/21/20 1244    Row Name 07/21/20 1447 07/21/20 1147          Pain Assessment    Additional Documentation  Pain Scale: Numbers Pre/Post-Treatment (Group)  -ME  --  -ME     Recorded by [ME] Eitan Haynes, OT 07/21/20 1507 [ME] Eitan Haynes, OT 07/21/20 1507     Row Name 07/21/20 1530 07/21/20 1447 07/21/20 1147       Pain Scale: Numbers Pre/Post-Treatment    Pain Scale: Numbers, Pretreatment  0/10 - no pain  -LILA  6/10  -ME  --  -ME    Pain Scale: Numbers, Post-Treatment  0/10 - no pain  -LILA  6/10  -ME2  --    Pain Location - Side  Left  -LILA  Left  -ME  --  -ME    Pain Location  hip  -LILA  hip  -ME  --  -ME    Pain Intervention(s)  Repositioned  -JC2  --  --    Recorded by [LILA] Vikash Walters, PTA 07/21/20 1546  [JC2] Vikash Walters, PTA 07/21/20 1719 [ME] Eitan Haynes, OT 07/21/20 1504  [ME2] Eitan Haynes, OT 07/21/20 1630 [ME] Dallas Laird, OT 07/21/20 1507    Row Name 07/21/20 1059             Pain Scale: Numbers Pre/Post-Treatment    Pain Scale: Numbers, Pretreatment  0/10 - no pain  -LILA      Pain Scale: Numbers, Post-Treatment  0/10 - no pain  -LILA      Pain Location - Side  Left  -JC2      Pain Location  hip  -JC2      Recorded by [LILA] Vikash Walters, PTA 07/21/20 1117  [JC2] Vikash Walters, PTA 07/21/20 1139      Row Name 07/21/20 1447             Sensory Assessment/Intervention    Sensory General Assessment  no sensation deficits identified;other (see comments) light touch testing   -ME      Additional Documentation  Hearing Assessment (Group);Vision Assessment/Intervention (Group)  -ME      Recorded by [ME] Eitan Haynes, OT 07/21/20 1504      Row Name 07/21/20 1447             Hearing Assessment    Hearing Status  hearing impairment, bilaterally  -ME      Recorded by [ME] Eitan Haynes, OT 07/21/20 1506      Row Name 07/21/20 1447             Vision  Assessment/Intervention    Visual Impairment/Limitations  other (see comments);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   -ME      Recorded by [ME] Eitan Haynes, ANA 07/21/20 1504      Row Name                Wound 07/08/20 1810 Left upper eyebrow Laceration    Wound - Properties Group Date first assessed: 07/08/20 [TM] Time first assessed: 1810 [TM] Present on Hospital Admission: Y [TM] Side: Left [TM] Orientation: upper [TM] Location: eyebrow [TM] Primary Wound Type: Laceration [TM] Recorded by:  [TM] Josephine Elias, RN 07/08/20 1811    Row Name                Wound 07/09/20 1515 Left posterior hip Incision    Wound - Properties Group Date first assessed: 07/09/20 [AQ] Time first assessed: 1515 [AQ] Present on Hospital Admission: N [AQ] Side: Left [AQ] Orientation: posterior [AQ] Location: hip [AQ] Primary Wound Type: Incision [AQ] Recorded by:  [AQ] Cathy Cano RN 07/09/20 1515    Row Name 07/21/20 1447             Plan of Care Review    Plan of Care Reviewed With  patient  -ME      Recorded by [ME] Eitan Haynes OT 07/21/20 1630      Row Name 07/21/20 1447             Outcome Summary/Treatment Plan (OT)    Daily Summary of Progress (OT)  progress towards functional goals is fair  -ME      Plan for Continued Treatment (OT)  continue per OT POC following re-evaluation and adjustment of goals as needed   -ME      Anticipated Equipment Needs at Discharge (OT)  shower chair;bedside commode  -ME      Anticipated Discharge Disposition (OT)  home with 24/7 care;skilled nursing facility;anticipate therapy at next level of care  -ME      Recorded by [ME] Eitan Haynes, ANA 07/21/20 1630      Row Name 07/21/20 1530 07/21/20 1059          Outcome Summary/Treatment Plan (PT)    Daily Summary of Progress (PT)  progress toward functional goals as expected  -LILA  progress toward functional goals as expected  -LILA     Plan for Continued Treatment (PT)  continue  -LILA  continue  -LILA     Anticipated  Discharge Disposition (PT)  anticipate therapy at next level of care  -JC2  anticipate therapy at next level of care  -JC2     Recorded by [LILA] Vikash Walters, PTA 07/21/20 1719  [JC2] Vikash Walters, PTA 07/21/20 1546 [LILA] Vikash Walters R, PTA 07/21/20 1244  [JC2] Vikash Walters R, PTA 07/21/20 1117       User Key  (r) = Recorded By, (t) = Taken By, (c) = Cosigned By    Initials Name Effective Dates Discipline    AQ Cathy Cano, RN 10/17/16 -  Nurse    Vikash Whatley, PTA 03/07/18 -  PT    TM Josephine Elias, RN 07/24/18 -  Nurse    ME Eitan Haynes, OT 09/10/19 -  OT          Wound 07/08/20 1810 Left upper eyebrow Laceration (Active)   Dressing Appearance open to air 7/20/2020  8:28 PM   Closure Adhesive closure strips 7/20/2020  8:28 PM   Base maroon/purple 7/20/2020  8:28 PM       Wound 07/09/20 1515 Left posterior hip Incision (Active)   Dressing Appearance open to air 7/20/2020  8:28 PM   Closure Staples 7/20/2020  8:28 PM   Base clean;dry 7/20/2020  8:28 PM       Rehab Goal Summary     Row Name 07/21/20 1530 07/21/20 1447 07/21/20 1059       Bed Mobility Goal 1 (PT)    Activity/Assistive Device (Bed Mobility Goal 1, PT)  sit to supine;supine to sit  -LILA  --  sit to supine;supine to sit  -LILA    [place] Level/Cues Needed (Bed Mobility Goal 1, PT)  independent  -LILA  --  independent  -LILA    Time Frame (Bed Mobility Goal 1, PT)  by discharge  -LILA  --  by discharge  -LILA    Progress/Outcomes (Bed Mobility Goal 1, PT)  goal not met  -LILA  --  goal not met  -LILA       Transfer Goal 1 (PT)    Activity/Assistive Device (Transfer Goal 1, PT)  sit-to-stand/stand-to-sit;bed-to-chair/chair-to-bed;toilet  -LILA  --  sit-to-stand/stand-to-sit;bed-to-chair/chair-to-bed;toilet  -LILA    [place] Level/Cues Needed (Transfer Goal 1, PT)  conditional independence  -LILA  --  conditional independence  -LILA    Time Frame (Transfer Goal 1, PT)  2 - 3 days  -LILA  --  2 - 3 days  -LILA    Progress/Outcome  (Transfer Goal 1, PT)  goal not met  -LILA  --  goal not met  -LILA       Gait Training Goal 1 (PT)    Activity/Assistive Device (Gait Training Goal 1, PT)  gait (walking locomotion);assistive device use;walker, rolling  -LILA  --  gait (walking locomotion);assistive device use;walker, rolling  -LILA    [place] Level (Gait Training Goal 1, PT)  conditional independence  -LILA  --  conditional independence  -LILA    Distance (Gait Goal 1, PT)  [id number]  -LILA  --  [id number]  -LILA    Time Frame (Gait Training Goal 1, PT)  by discharge  -LILA  --  by discharge  -LILA    Progress/Outcome (Gait Training Goal 1, PT)  goal not met  -LILA  --  goal not met  -LILA       Stairs Goal 1 (PT)    Activity/Assistive Device (Stairs Goal 1, PT)  ascending stairs;descending stairs;using handrail, left;assistive device use  -LILA  --  ascending stairs;descending stairs;using handrail, left;assistive device use  -LILA    [place] Level/Cues Needed (Stairs Goal 1, PT)  conditional independence  -LILA  --  conditional independence  -LILA    Time Frame (Stairs Goal 1, PT)  by discharge  -LILA  --  by discharge  -LILA    Progress/Outcome (Stairs Goal 1, PT)  goal not met  -LILA  --  goal not met  -LILA       Occupational Therapy Goals    Transfer Goal Selection (OT)  --  transfer, OT goal 1  -ME  --    Bathing Goal Selection (OT)  --  bathing, OT goal 1  -ME  --    Dressing Goal Selection (OT)  --  dressing, OT goal 1  -ME  --    Toileting Goal Selection (OT)  --  toileting, OT goal 1  -ME  --    Endurance Goal Selection (OT)  --  endurance, OT goal 1  -ME  --    Safety Awareness Goal Selection (OT)  --  safety awareness, OT goal 1  -ME  --       Transfer Goal 1 (OT)    Activity/Assistive Device (Transfer Goal 1, OT)  --  toilet  -ME  --    [place] Level/Cues Needed (Transfer Goal 1, OT)  --  contact guard assist  -ME  --    Time Frame (Transfer Goal 1, OT)  --  long term goal (LTG);by discharge  -ME  --    Barriers (Transfers Goal 1, OT)  --  hearing deficit;safety  deficit   -ME  --    Progress/Outcome (Transfer Goal 1, OT)  --  goal partially met  -ME  --       Bathing Goal 1 (OT)    Activity/Assistive Device (Bathing Goal 1, OT)  --  lower body bathing  -ME  --    [place] Level/Cues Needed (Bathing Goal 1, OT)  --  minimum assist (75% or more patient effort)  -ME  --    Time Frame (Bathing Goal 1, OT)  --  long term goal (LTG);by discharge  -ME  --    Progress/Outcomes (Bathing Goal 1, OT)  --  goal not met;goal revised this date  -ME  --       Dressing Goal 1 (OT)    Activity/Assistive Device (Dressing Goal 1, OT)  --  lower body dressing  -ME  --    [place]/Cues Needed (Dressing Goal 1, OT)  --  minimum assist (75% or more patient effort)  -ME  --    Time Frame (Dressing Goal 1, OT)  --  long term goal (LTG);by discharge  -ME  --    Progress/Outcome (Dressing Goal 1, OT)  --  goal not met;goal partially met;goal revised this date  -ME  --       Toileting Goal 1 (OT)    Activity/Device (Toileting Goal 1, OT)  --  toileting skills, all  -ME  --    [place] Level/Cues Needed (Toileting Goal 1, OT)  --  minimum assist (75% or more patient effort)  -ME  --    Time Frame (Toileting Goal 1, OT)  --  long term goal (LTG);by discharge  -ME  --    Progress/Outcome (Toileting Goal 1, OT)  --  goal not met  -ME  --        Endurance Goal 1 (OT)    Endurance Goal 1 (OT)  --  15 min functional activity with O2 sats remaining above 90%  -ME  --    Time Frame (Endurance Goal 1, OT)  --  long term goal (LTG);by discharge  -ME  --    Progress/Outcome (Endurance Goal 1, OT)  --  goal not met  -ME  --       Safety Awareness Goal 1 (OT)    Activity (Safety Awareness Goal 1, OT)  --  awareness of need for assistance;impulse control;insight into deficits/self awareness;judgment;safe use of assistive device/equipment;follow through of safety precautions;demonstrates compliance;demonstrates understanding;demonstration of safe behaviors  -ME  --    [place]/Cues/Accuracy (Safety  Awareness Goal 1, OT)  --  with minimum;verbal cues/redirection;with repetition of directions;with 90% accuracy  -ME  --    Time Frame (Safety Awareness Goal 1, OT)  --  long term goal (LTG);by discharge  -ME  --    Progress/Outcome (Safety Awareness Goal 1, OT)  --  goal not met  -ME  --      User Key  (r) = Recorded By, (t) = Taken By, (c) = Cosigned By    Initials Name Provider Type Discipline    Vikash Whatley, PTA Physical Therapy Assistant PT    Eitan Bobo, OT Occupational Therapist OT          Physical Therapy Education                 Title: PT OT SLP Therapies (In Progress)     Topic: Physical Therapy (In Progress)     Point: Mobility training (In Progress)     Description:   Instruct learner(s) on safety and technique for assisting patient out of bed, chair or wheelchair.  Instruct in the proper use of assistive devices, such as walker, crutches, cane or brace.              Patient Friendly Description:   It's important to get you on your feet again, but we need to do so in a way that is safe for you. Falling has serious consequences, and your personal safety is the most important thing of all.        When it's time to get out of bed, one of us or a family member will sit next to you on the bed to give you support.     If your doctor or nurse tells you to use a walker, crutches, a cane, or a brace, be sure you use it every time you get out of bed, even if you think you don't need it.    Learning Progress Summary           Patient Acceptance, E, NR by LILA at 7/21/2020 1246    Comment:  reinforced hip prec    Acceptance, E, NR by LILA at 7/11/2020 1047    Comment:  edu on hip dislocation precautions.    Acceptance, E, NR by LILA1 at 7/10/2020 1348                   Point: Home exercise program (Not Started)     Description:   Instruct learner(s) on appropriate technique for monitoring, assisting and/or progressing patient with therapeutic exercises and activities.              Learner Progress:   Not  documented in this visit.          Point: Body mechanics (In Progress)     Description:   Instruct learner(s) on proper positioning and spine alignment for patient and/or caregiver during mobility tasks and/or exercises.              Learning Progress Summary           Patient Acceptance, E, NR by [facility] at 7/10/2020 1348                   Point: Precautions (In Progress)     Description:   Instruct learner(s) on prescribed precautions during mobility and gait tasks              Learning Progress Summary           Patient Acceptance, E, NR by LILA at 7/21/2020 1246    Comment:  reinforced hip prec    Acceptance, E, NR by LILA at 7/11/2020 1047    Comment:  edu on hip dislocation precautions.    Acceptance, E, NR by 1 at 7/10/2020 1348                               User Key     Initials Effective Dates Name Provider Type Discipline    [facility] 04/03/18 -  Angelica Torres, PT Physical Therapist PT     03/07/18 -  Vikash Walters PTA Physical Therapy Assistant PT                PT Recommendation and Plan  Anticipated Discharge Disposition (PT): anticipate therapy at next level of care  Therapy Frequency (PT Clinical Impression): daily  Outcome Summary/Treatment Plan (PT)  Daily Summary of Progress (PT): progress toward functional goals as expected  Plan for Continued Treatment (PT): continue  Anticipated Discharge Disposition (PT): anticipate therapy at next level of care  Plan of Care Reviewed With: patient  Progress: improving  Outcome Summary: BID tx completed. pt w/ increased gai to 110 ft x2 CGA w/ RW. pt requires SBA for bed mobility and CGA for stair training and t/fs. no new goals met at this time. pt would continue to benefit from PT services.  Outcome Measures     Row Name 07/21/20 1447 07/21/20 1059 07/20/20 1511       How much help from another person do you currently need...    Turning from your back to your side while in flat bed without using bedrails?  --  3  -LILA  3  -TW    Moving from lying on back to sitting  on the side of a flat bed without bedrails?  --  3  -LILA  3  -TW    Moving to and from a bed to a chair (including a wheelchair)?  --  3  -LILA  3  -TW    Standing up from a chair using your arms (e.g., wheelchair, bedside chair)?  --  3  -LILA  3  -TW    Climbing 3-5 steps with a railing?  --  3  -LILA  3  -TW    To walk in hospital room?  --  3  -LILA  3  -TW    AM-PAC 6 Clicks Score (PT)  --  18  -LILA  18  -TW       How much help from another is currently needed...    Putting on and taking off regular lower body clothing?  3  -ME  --  --    Bathing (including washing, rinsing, and drying)  3  -ME  --  --    Toileting (which includes using toilet bed pan or urinal)  3  -ME  --  --    Putting on and taking off regular upper body clothing  4  -ME  --  --    Taking care of personal grooming (such as brushing teeth)  4  -ME  --  --    Eating meals  4  -ME  --  --    AM-PAC 6 Clicks Score (OT)  21  -ME  --  --       Functional Assessment    Outcome Measure Options  AM-PAC 6 Clicks Daily Activity (OT)  -ME  AM-[facility] 6 Clicks Basic Mobility (PT)  -  --    Row Name 07/19/20 1320 07/19/20 0710          How much help from another person do you currently need...    Turning from your back to your side while in flat bed without using bedrails?  3  -TW  --     Moving from lying on back to sitting on the side of a flat bed without bedrails?  3  -TW  --     Moving to and from a bed to a chair (including a wheelchair)?  3  -TW  --     Standing up from a chair using your arms (e.g., wheelchair, bedside chair)?  3  -TW  --     Climbing 3-5 steps with a railing?  3  -TW  --     To walk in hospital room?  3  -TW  --     AM-PAC 6 Clicks Score (PT)  18  -TW  --        How much help from another is currently needed...    Putting on and taking off regular lower body clothing?  --  3  -KD     Bathing (including washing, rinsing, and drying)  --  3  -KD     Toileting (which includes using toilet bed pan or urinal)  --  3  -KD     Putting on and  taking off regular upper body clothing  --  4  -KD     Taking care of personal grooming (such as brushing teeth)  --  4  -KD     Eating meals  --  4  -KD     AM-PAC 6 Clicks Score (OT)  --  21  -KD       User Key  (r) = Recorded By, (t) = Taken By, (c) = Cosigned By    Initials Name Provider Type    Vikash Whatley PTA Physical Therapy Assistant    TW Juan C Leyva, PTA Physical Therapy Assistant    KD Ciarra Finley, JEFF/L Occupational Therapy Assistant    ME Eitan Haynes, OT Occupational Therapist         Time Calculation:   PT Charges     Row Name 07/21/20 1723 07/21/20 1246          Time Calculation    Start Time  1530  -LILA  1059  -LILA     Stop Time  1546  -LILA  1134  -LILA     Time Calculation (min)  16 min  -LILA  35 min  -LILA        Time Calculation- PT    Total Timed Code Minutes- PT  16 minute(s)  -LILA  35 minute(s)  -LILA       User Key  (r) = Recorded By, (t) = Taken By, (c) = Cosigned By    Initials Name Provider Type    Vikash Whatley PTA Physical Therapy Assistant        Therapy Charges for Today     Code Description Service Date Service Provider Modifiers Qty    32515313883 HC GAIT TRAINING EA 15 MIN 7/21/2020 Vikash Walters PTA GP 1    16875097743 HC PT THERAPEUTIC ACT EA 15 MIN 7/21/2020 Vikash Walters PTA GP 1    01273491470 HC PT THERAPEUTIC ACT EA 15 MIN 7/21/2020 Vikash Walters PTA GP 1          PT G-Codes  Outcome Measure Options: AM-PAC 6 Clicks Daily Activity (OT)  AM-PAC 6 Clicks Score (PT): 18  AM-PAC 6 Clicks Score (OT): 21    Vikash Walters PTA  7/21/2020

## 2020-07-21 NOTE — PROGRESS NOTES
[facility] Medicine Services  INPATIENT PROGRESS NOTE    Length of Stay: 13  Date of Admission: 7/8/2020  Primary Care Physician: Terry Peterson MD    Subjective   Chief Complaint: Fall with left femur fracture  HPI: Patient states he is feeling better today.  He does complain of some soreness that the Tylenol is not helping.    Review of Systems   Constitutional: Negative for appetite change, chills, fatigue and fever.   Respiratory: Negative for chest tightness and shortness of breath.    Cardiovascular: Negative for chest pain, palpitations and leg swelling.   Gastrointestinal: Negative for abdominal pain, constipation, diarrhea, nausea and vomiting.   Musculoskeletal: Positive for joint swelling.   Skin: Negative for wound.   Neurological: Negative for dizziness, weakness, light-headedness, numbness and headaches.      All pertinent negatives and positives are as above. All other systems have been reviewed and are negative unless otherwise stated.     Objective    Temp:  [96.3 °F (35.7 °C)-97.9 °F (36.6 °C)] 97.4 °F (36.3 °C)  Heart Rate:  [62-85] 85  Resp:  [18] 18  BP: (128-157)/(59-80) 157/80    Physical Exam   Constitutional: He appears well-developed and well-nourished.   HENT:   Head: Normocephalic and atraumatic.   Eyes: Pupils are equal, round, and reactive to light. EOM are normal.   Neck: Normal range of motion. Neck supple.   Cardiovascular: Normal rate, regular rhythm, normal heart sounds and intact distal pulses. Exam reveals no gallop and no friction rub.   No murmur heard.  Pulmonary/Chest: Effort normal and breath sounds normal. No respiratory distress. He has no wheezes. He has no rales. He exhibits no tenderness.   Abdominal: Soft. Bowel sounds are normal. He exhibits no distension. There is no tenderness.   Psychiatric: He has a normal mood and affect. His behavior is normal.   Vitals reviewed.    Results Review:  I have reviewed the labs,  radiology results, and diagnostic studies.    Laboratory Data:   Results from last 7 days   Lab Units 07/19/20  0622   SODIUM mmol/L 132*   POTASSIUM mmol/L 4.2   CHLORIDE mmol/L 100   CO2 mmol/L 24.0   BUN mg/dL 16   CREATININE mg/dL 0.94   GLUCOSE mg/dL 94   CALCIUM mg/dL 9.8   ANION GAP mmol/L 8.0     Estimated Creatinine Clearance: 54 mL/min (by C-G formula based on SCr of 0.94 mg/dL).          Results from last 7 days   Lab Units 07/19/20  0622   WBC 10*3/mm3 11.60*   HEMOGLOBIN g/dL 10.5*   HEMATOCRIT % 31.4*   PLATELETS 10*3/mm3 517*           Culture Data:   No results found for: BLOODCX  No results found for: URINECX  No results found for: RESPCX  No results found for: WOUNDCX  No results found for: STOOLCX  No components found for: BODYFLD    Radiology Data:   Imaging Results (Last 24 Hours)     ** No results found for the last 24 hours. **          I have reviewed the patient's current medications.     Assessment/Plan     Active Hospital Problems    Diagnosis   • **Closed fracture of neck of left femur (CMS/[facility])   • Postoperative anemia due to acute blood loss     Not unexpected     • Facial laceration   • HFrEF (heart failure with reduced ejection fraction) (CMS/[facility])   • Syncope and collapse   • Ischemic cardiomyopathy   • Essential hypertension   • Coronary artery disease involving native coronary artery of native heart without angina pectoris       Plan:    1.  Left hip fracture: Postop day 12.  Plan for skilled nursing facility placement.  Restart tramadol.   2.  Hyperlipidemia: Continue statin.  3.  Hypertension: Continue losartan and Toprol.  4.  Restless leg syndrome: Continue Requip.  5.  DVT prophylaxis: Lovenox.    The patient was evaluated during the global COVID-19 pandemic, and the diagnosis was suspected/considered upon their initial presentation.  Evaluation, treatment, and testing were consistent with current guidelines for patients who present with complaints or symptoms that may be  related to COVID-19.    Discharge Planning: I expect patient to be discharged to SNF in 1-2 days.        This document has been electronically signed by Jcarlos Leyva MD on July 21, 2020 14:21

## 2020-07-21 NOTE — PLAN OF CARE
Problem: Patient Care Overview  Goal: Plan of Care Review  Flowsheets  Taken 7/21/2020 1516  Progress: no change  Outcome Summary: vss, pain controlled, no acute changes, possible d/c to SNF, cont to monitor  Taken 7/21/2020 0800  Plan of Care Reviewed With: patient

## 2020-07-21 NOTE — PLAN OF CARE
Problem: Patient Care Overview  Goal: Plan of Care Review  Outcome: Ongoing (interventions implemented as appropriate)  Flowsheets (Taken 7/21/2020 1721)  Progress: improving  Plan of Care Reviewed With: patient  Outcome Summary: BID tx completed. pt w/ increased gait to 110 ft x2 CGA w/ RW. pt requires SBA for bed mobility and CGA for stair training and t/fs. no new goals met at this time. pt would continue to benefit from PT services.

## 2020-07-21 NOTE — THERAPY PROGRESS REPORT/RE-CERT
Acute Care - Occupational Therapy Re-Evaluation  [facility]     Patient Name: Jose Dacosta  : 1935  MRN: [id number]  Today's Date: 2020  Onset of Illness/Injury or Date of Surgery: 20(surgery yesterday (2020))  Date of Referral to OT: 07/10/20  Referring Physician: DAYANARA Kumar APRN(surgeon: DAJA Solis MD )    Admit Date: 2020       ICD-10-CM ICD-9-CM   1. Syncope and collapse R55 780.2   2. Facial laceration, initial encounter S01.81XA 873.40   3. Closed fracture of left hip, initial encounter (CMS/HCC) S72.002A 820.8   4. Fall, initial encounter W19.XXXA E888.9   5. Closed head injury, initial encounter S09.90XA 959.01   6. Closed fracture of neck of left femur, initial encounter (CMS/HCC) S72.002A 820.8   7. Impaired functional mobility, balance, gait, and endurance Z74.09 V49.89   8. Impaired mobility and ADLs Z74.09 V49.89    Z78.9      Patient Active Problem List   Diagnosis   • Coronary artery disease involving native coronary artery of native heart without angina pectoris   • Vasovagal syncope   • RBBB (right bundle branch block with left anterior fascicular block)   • Essential hypertension   • Ischemic cardiomyopathy   • Syncope and collapse   • Closed fracture of neck of left femur (CMS/[facility])   • Coronary artery disease with history of myocardial infarction without history of CABG   • Facial laceration   • HFrEF (heart failure with reduced ejection fraction) (CMS/[facility])   • Postoperative anemia due to acute blood loss     Past Medical History:   Diagnosis Date   • Abnormal ECG      Past Surgical History:   Procedure Laterality Date   • CORONARY STENT PLACEMENT     • HIP HEMIARTHROPLASTY Left 2020    Procedure: LEFT HIP HEMIARTHROPLASTY;  Surgeon: Jitendra Solis MD;  Location: [facility];  Service: Orthopedics;  Laterality: Left;          OT ASSESSMENT FLOWSHEET (last 12 hours)      Occupational Therapy Evaluation     Row Name                     Wound  07/08/20 1810 Left upper eyebrow Laceration    Wound - Properties Group Date first assessed: 07/08/20  -TM Time first assessed: 1810  -TM Present on Hospital Admission: Y  -TM Side: Left  -TM Orientation: upper  -TM Location: eyebrow  -TM Primary Wound Type: Laceration  -TM       Wound 07/09/20 1515 Left posterior hip Incision    Wound - Properties Group Date first assessed: 07/09/20  -AQ Time first assessed: 1515  -AQ Present on Hospital Admission: N  -AQ Side: Left  -AQ Orientation: posterior  -AQ Location: hip  -AQ Primary Wound Type: Incision  -AQ      User Key  (r) = Recorded By, (t) = Taken By, (c) = Cosigned By    Initials Name Effective Dates    Cathy Gil RN 10/17/16 -     TM Josephine Elias RN 07/24/18 -          Occupational Therapy Education                 Title: PT OT SLP Therapies (In Progress)     Topic: Occupational Therapy (In Progress)     Point: ADL training (In Progress)     Description:   Instruct learner(s) on proper safety adaptation and remediation techniques during self care or transfers.   Instruct in proper use of assistive devices.              Learning Progress Summary           Patient Acceptance, E, NR by  at 7/17/2020 1200    Acceptance, E,D,H, NR by  at 7/11/2020 1429    Comment:  much review and issued handout for HIP precautions, edu on use of lh ae will need reinforcement.    Acceptance, E, NR by ME at 7/10/2020 1503    Comment:  Educated on OT and POC. Educated to call for assistance. Educated on safety precautions. Educated on proper body mechanics for transfers, bed mobility, ADLs, and funcitonal mobility.                   Point: Home exercise program (Not Started)     Description:   Instruct learner(s) on appropriate technique for monitoring, assisting and/or progressing therapeutic exercises/activities.              Learner Progress:   Not documented in this visit.          Point: Precautions (Done)     Description:   Instruct learner(s) on  prescribed precautions during self-care and functional transfers.              Learning Progress Summary           Patient Acceptance, E, VU,NR by ME at 7/21/2020 1636    Comment:  Educated on OT and POC. Educated to call for assistance. Educated on safety precautions. Educated on proper body mechanics. Educated on proper hand placement for transfers. Educated on L posterior hip precautions.    Acceptance, E, VU,NR by RB at 7/18/2020 1620    Comment:  Edu pt on use of gait belt and non skid socks when OOB and no OOB without assist.    Acceptance, E, NR by RC at 7/17/2020 1200    Acceptance, E, NR by RC at 7/13/2020 1528    Acceptance, E, NR by RC at 7/12/2020 1503    Comment:  multi review of hip precautions    Acceptance, E,D,H, NR by RC at 7/11/2020 1429    Comment:  much review and issued handout for HIP precautions, edu on use of lh ae will need reinforcement.    Acceptance, E, NR by ME at 7/10/2020 1503    Comment:  Educated on OT and POC. Educated to call for assistance. Educated on safety precautions. Educated on proper body mechanics for transfers, bed mobility, ADLs, and funcitonal mobility.                   Point: Body mechanics (Done)     Description:   Instruct learner(s) on proper positioning and spine alignment during self-care, functional mobility activities and/or exercises.              Learning Progress Summary           Patient Acceptance, E, VU,NR by ME at 7/21/2020 1636    Comment:  Educated on OT and POC. Educated to call for assistance. Educated on safety precautions. Educated on proper body mechanics. Educated on proper hand placement for transfers. Educated on L posterior hip precautions.    Acceptance, E, NR by RC at 7/17/2020 1200    Acceptance, E, NR by RC at 7/13/2020 1528    Acceptance, E,D,H, NR by RC at 7/11/2020 1429    Comment:  much review and issued handout for HIP precautions, edu on use of lh ae will need reinforcement.    Acceptance, E, NR by ME at 7/10/2020 1503    Comment:   Educated on OT and POC. Educated to call for assistance. Educated on safety precautions. Educated on proper body mechanics for transfers, bed mobility, ADLs, and funcitonal mobility.                               User Key     Initials Effective Dates Name Provider Type Discipline     07/24/19 -  Lopez Moss, OT Occupational Therapist OT     03/07/18 -  Evelyn Dodson COTA/L Occupational Therapy Assistant OT    ME 09/10/19 -  Eitan Haynes OT Occupational Therapist OT                  OT Recommendation and Plan  Outcome Summary/Treatment Plan (OT)  Daily Summary of Progress (OT): progress towards functional goals is fair  Plan for Continued Treatment (OT): continue per OT POC following re-evaluation and adjustment of goals as needed   Anticipated Equipment Needs at Discharge (OT): shower chair, bedside commode  Anticipated Discharge Disposition (OT): home with 24/7 care, skilled nursing facility, anticipate therapy at next level of care  Planned Therapy Interventions (OT Eval): activity tolerance training, adaptive equipment training, BADL retraining, functional balance retraining, IADL retraining, neuromuscular control/coordination retraining, occupation/activity based interventions, passive ROM/stretching, patient/caregiver education/training, ROM/therapeutic exercise, strengthening exercise, transfer/mobility retraining  Therapy Frequency (OT Eval): daily  Daily Summary of Progress (OT): progress towards functional goals is fair  Plan of Care Review  Plan of Care Reviewed With: patient  Plan of Care Reviewed With: patient  Outcome Summary: OT re-evaluation complete this date. pt pleasant and cooperative throughout, agreeable. very hard of hearing, requiring repetition of directions frequently secondary to this. difficulty adhering to L posterior hip precautions, requiring verbal cues for adherence. MMT and ROM of BUE's tested, no change. sup to sit with supervision, and sit to sup with CGA, HOB elevated  and bed rails. sit to stand and stand to sit at EOB with CGA and RW, vc's for hand placement. mobility with CGA, RW, vc's. static sitting with supervision at EOB for 5+ minutes. static standing with use of RW and CGA for 2-3 minutes. goals revised this date. continue skilled OT services to address safety deficits, decreased strength and endurance, and decreased balance. continue per revised OT POC. no new goals met.    Outcome Measures     Row Name 07/21/20 1447 07/21/20 1059 07/20/20 1511       How much help from another person do you currently need...    Turning from your back to your side while in flat bed without using bedrails?  --  3  -LILA  3  -TW    Moving from lying on back to sitting on the side of a flat bed without bedrails?  --  3  -LILA  3  -TW    Moving to and from a bed to a chair (including a wheelchair)?  --  3  -LILA  3  -TW    Standing up from a chair using your arms (e.g., wheelchair, bedside chair)?  --  3  -LILA  3  -TW    Climbing 3-5 steps with a railing?  --  3  -LILA  3  -TW    To walk in hospital room?  --  3  -LILA  3  -TW    AM-PAC 6 Clicks Score (PT)  --  18  -LILA  18  -TW       How much help from another is currently needed...    Putting on and taking off regular lower body clothing?  3  -ME  --  --    Bathing (including washing, rinsing, and drying)  3  -ME  --  --    Toileting (which includes using toilet bed pan or urinal)  3  -ME  --  --    Putting on and taking off regular upper body clothing  4  -ME  --  --    Taking care of personal grooming (such as brushing teeth)  4  -ME  --  --    Eating meals  4  -ME  --  --    AM-PAC 6 Clicks Score (OT)  21  -ME  --  --       Functional Assessment    Outcome Measure Options  AM-PAC 6 Clicks Daily Activity (OT)  -ME  AM-PAC 6 Clicks Basic Mobility (PT)  -LILA  --    Row Name 07/19/20 1320 07/19/20 0710          How much help from another person do you currently need...    Turning from your back to your side while in flat bed without using bedrails?  3   -TW  --     Moving from lying on back to sitting on the side of a flat bed without bedrails?  3  -TW  --     Moving to and from a bed to a chair (including a wheelchair)?  3  -TW  --     Standing up from a chair using your arms (e.g., wheelchair, bedside chair)?  3  -TW  --     Climbing 3-5 steps with a railing?  3  -TW  --     To walk in hospital room?  3  -TW  --     AM-PAC 6 Clicks Score (PT)  18  -TW  --        How much help from another is currently needed...    Putting on and taking off regular lower body clothing?  --  3  -KD     Bathing (including washing, rinsing, and drying)  --  3  -KD     Toileting (which includes using toilet bed pan or urinal)  --  3  -KD     Putting on and taking off regular upper body clothing  --  4  -KD     Taking care of personal grooming (such as brushing teeth)  --  4  -KD     Eating meals  --  4  -KD     AM-PAC 6 Clicks Score (OT)  --  21  -KD       User Key  (r) = Recorded By, (t) = Taken By, (c) = Cosigned By    Initials Name Provider Type    LILA Vikash Walters, PTA Physical Therapy Assistant    TW Juan C Leyva, DANIE Physical Therapy Assistant    KD Ciarra Finley, JEFF/L Occupational Therapy Assistant    ME Eitan Haynes OT Occupational Therapist          Time Calculation:   Time Calculation- OT     Row Name 07/21/20 1639             Time Calculation- OT    OT Start Time  1447  -ME      OT Stop Time  1525  -ME      OT Time Calculation (min)  38 min  -ME      OT Received On  07/21/20  -ME      OT Goal Re-Cert Due Date  08/03/20  -ME        User Key  (r) = Recorded By, (t) = Taken By, (c) = Cosigned By    Initials Name Provider Type    ME Eitan Haynes OT Occupational Therapist        Therapy Charges for Today     Code Description Service Date Service Provider Modifiers Qty    29752171623 HC OT RE-EVAL 2 7/21/2020 Eitan Haynes OT GO 1    [id number]  OT THER PROC EA 15 MIN 7/21/2020 Eitan Haynes OT GO 1               Eitan Haynes OT  7/21/2020

## 2020-07-21 NOTE — PLAN OF CARE
Problem: Patient Care Overview  Goal: Plan of Care Review  Outcome: Ongoing (interventions implemented as appropriate)  Flowsheets (Taken 7/21/2020 0122)  Progress: no change  Plan of Care Reviewed With: patient  Outcome Summary: VSS, pain controlled. No acute changes this shift. Will continue to monitor.

## 2020-07-21 NOTE — PLAN OF CARE
Problem: Patient Care Overview  Goal: Plan of Care Review  Outcome: Ongoing (interventions implemented as appropriate)  Flowsheets (Taken 7/21/2020 9178)  Outcome Summary: OT re-evaluation complete this date. pt pleasant and cooperative throughout, agreeable. very hard of hearing, requiring repetition of directions frequently secondary to this. difficulty adhering to L posterior hip precautions, requiring verbal cues for adherence. MMT and ROM of BUE's tested, no change. sup to sit with supervision, and sit to sup with CGA, HOB elevated and bed rails. sit to stand and stand to sit at EOB with CGA and RW, vc's for hand placement. mobility with CGA, RW, vc's. static sitting with supervision at EOB for 5+ minutes. static standing with use of RW and CGA for 2-3 minutes. goals revised this date. continue skilled OT services to address safety deficits, decreased strength and endurance, and decreased balance. continue per revised OT POC. no new goals met.

## 2020-07-22 VITALS
HEART RATE: 65 BPM | DIASTOLIC BLOOD PRESSURE: 72 MMHG | BODY MASS INDEX: 19.75 KG/M2 | RESPIRATION RATE: 18 BRPM | HEIGHT: 72 IN | SYSTOLIC BLOOD PRESSURE: 154 MMHG | OXYGEN SATURATION: 95 % | WEIGHT: 145.8 LBS | TEMPERATURE: 97.1 F

## 2020-07-22 PROCEDURE — 97110 THERAPEUTIC EXERCISES: CPT

## 2020-07-22 PROCEDURE — 25010000002 ENOXAPARIN PER 10 MG: Performed by: ORTHOPAEDIC SURGERY

## 2020-07-22 PROCEDURE — 97116 GAIT TRAINING THERAPY: CPT

## 2020-07-22 PROCEDURE — 97530 THERAPEUTIC ACTIVITIES: CPT

## 2020-07-22 RX ORDER — TRAMADOL HYDROCHLORIDE 50 MG/1
50 TABLET ORAL EVERY 6 HOURS PRN
Qty: 12 TABLET | Refills: 0 | Status: ON HOLD | OUTPATIENT
Start: 2020-07-22 | End: 2020-07-29

## 2020-07-22 RX ORDER — HYDROCODONE BITARTRATE AND ACETAMINOPHEN 5; 325 MG/1; MG/1
1 TABLET ORAL ONCE
Status: COMPLETED | OUTPATIENT
Start: 2020-07-22 | End: 2020-07-22

## 2020-07-22 RX ADMIN — ACETAMINOPHEN 1000 MG: 500 TABLET ORAL at 09:54

## 2020-07-22 RX ADMIN — ATORVASTATIN CALCIUM 20 MG: 20 TABLET, FILM COATED ORAL at 09:54

## 2020-07-22 RX ADMIN — ENOXAPARIN SODIUM 30 MG: 30 INJECTION SUBCUTANEOUS at 12:48

## 2020-07-22 RX ADMIN — PANTOPRAZOLE SODIUM 40 MG: 40 TABLET, DELAYED RELEASE ORAL at 09:54

## 2020-07-22 RX ADMIN — ROPINIROLE HYDROCHLORIDE 0.5 MG: 0.5 TABLET, FILM COATED ORAL at 09:54

## 2020-07-22 RX ADMIN — ASPIRIN 81 MG: 81 TABLET, COATED ORAL at 09:54

## 2020-07-22 RX ADMIN — SODIUM CHLORIDE, PRESERVATIVE FREE 10 ML: 5 INJECTION INTRAVENOUS at 09:55

## 2020-07-22 RX ADMIN — LOSARTAN POTASSIUM 25 MG: 25 TABLET, FILM COATED ORAL at 09:54

## 2020-07-22 RX ADMIN — METOPROLOL SUCCINATE 25 MG: 25 TABLET, FILM COATED, EXTENDED RELEASE ORAL at 09:54

## 2020-07-22 RX ADMIN — Medication 2.5 MG: at 09:54

## 2020-07-22 RX ADMIN — HYDROCODONE BITARTRATE AND ACETAMINOPHEN 1 TABLET: 5; 325 TABLET ORAL at 01:13

## 2020-07-22 RX ADMIN — FLUTICASONE PROPIONATE 2 SPRAY: 50 SPRAY, METERED NASAL at 09:55

## 2020-07-22 NOTE — PLAN OF CARE
Problem: Patient Care Overview  Goal: Plan of Care Review  Outcome: Ongoing (interventions implemented as appropriate)  Flowsheets (Taken 7/22/2020 0404)  Progress: no change  Plan of Care Reviewed With: patient  Outcome Summary: VSS, pain uncontrolled with tramadol this shift. OTD of Erhard given. Resting between care. Will continue to monitor.

## 2020-07-22 NOTE — PLAN OF CARE
Problem: Patient Care Overview  Goal: Plan of Care Review  7/22/2020 1412 by Evelyn Dodson COTA/L  Outcome: Ongoing (interventions implemented as appropriate)  Flowsheets (Taken 7/22/2020 1412)  Progress: improving  Plan of Care Reviewed With: patient  Outcome Summary: participated in ue ex w/o c/o  d/c to snf today,

## 2020-07-22 NOTE — THERAPY TREATMENT NOTE
Acute Care - Occupational Therapy Treatment Note  [facility]     Patient Name: Jose Dacosta  : 1935  MRN: [id number]  Today's Date: 2020  Onset of Illness/Injury or Date of Surgery: 20(surgery yesterday (2020))  Date of Referral to OT: 07/10/20  Referring Physician: DAYANARA Kumar APRN(surgeon: DAJA Solis MD )    Admit Date: 2020       ICD-10-CM ICD-9-CM   1. Syncope and collapse R55 780.2   2. Facial laceration, initial encounter S01.81XA 873.40   3. Closed fracture of left hip, initial encounter (CMS/HCC) S72.002A 820.8   4. Fall, initial encounter W19.XXXA E888.9   5. Closed head injury, initial encounter S09.90XA 959.01   6. Closed fracture of neck of left femur, initial encounter (CMS/HCC) S72.002A 820.8   7. Impaired functional mobility, balance, gait, and endurance Z74.09 V49.89   8. Impaired mobility and ADLs Z74.09 V49.89    Z78.9      Patient Active Problem List   Diagnosis   • Coronary artery disease involving native coronary artery of native heart without angina pectoris   • Vasovagal syncope   • RBBB (right bundle branch block with left anterior fascicular block)   • Essential hypertension   • Ischemic cardiomyopathy   • Syncope and collapse   • Closed fracture of neck of left femur (CMS/[facility])   • Coronary artery disease with history of myocardial infarction without history of CABG   • Facial laceration   • HFrEF (heart failure with reduced ejection fraction) (CMS/HCC)   • Postoperative anemia due to acute blood loss     Past Medical History:   Diagnosis Date   • Abnormal ECG      Past Surgical History:   Procedure Laterality Date   • CORONARY STENT PLACEMENT     • HIP HEMIARTHROPLASTY Left 2020    Procedure: LEFT HIP HEMIARTHROPLASTY;  Surgeon: Jitendra Solis MD;  Location: [facility];  Service: Orthopedics;  Laterality: Left;       Therapy Treatment    Rehabilitation Treatment Summary     Row Name 20 1000 20 0854          Treatment  Time/Intention    Discipline  occupational therapy assistant  -RC  physical therapy assistant  -TW     Document Type  therapy note (daily note)  -2  therapy note (daily note)  -TW     Subjective Information  no complaints  -2  complains of;dizziness  -TW     Mode of Treatment  occupational therapy;individual therapy  -2  physical therapy;individual therapy  -TW     Patient/Family Observations  no visitors present   -2  Pt supine in bed and agreeable to therapy.  -TW     Therapy Frequency (PT Clinical Impression)  daily  -2  --     Total Minutes, Occupational Therapy Treatment  30  -RC2  --     Patient Effort  good  -2  good  -TW     Existing Precautions/Restrictions  --  hip, posterior;fall  -TW     Recorded by [RC] Evelyn Dodson COTA/L 07/22/20 1004  [RC2] Evelyn Dodson AGUILAR/JAZZY 07/22/20 1410 [TW] Juan C Leyva, PTA 07/22/20 1114     Row Name 07/22/20 1000 07/22/20 0854          Vital Signs    Pre Systolic BP Rehab  143  -  146  -TW     Pre Treatment Diastolic BP  63  -  72  -TW     Pretreatment Heart Rate (beats/min)  55  -  72  -TW     Pre SpO2 (%)  97  -  99  -TW     O2 Delivery Pre Treatment  room air  -  room air  -TW     Pre Patient Position  --  Supine  -TW     Intra Patient Position  --  Standing  -TW     Post Patient Position  --  Sitting  -TW     Recorded by [RC] Evelyn Dodson AGUILAR/JAZZY 07/22/20 1004 [TW] Juan C Leyva, PTA 07/22/20 1114     Row Name 07/22/20 1000 07/22/20 0854          Cognitive Assessment/Intervention- PT/OT    Affect/Mental Status (Cognitive)  WFL  -  WFL  -TW     Orientation Status (Cognition)  --  oriented x 4  -TW     Follows Commands (Cognition)  --  follows one step commands;over 90% accuracy  -TW     Cognitive Function (Cognitive)  --  executive function deficit;safety deficit  -TW     Personal Safety Interventions  --  fall prevention program maintained;gait belt;nonskid shoes/slippers when out of bed  -TW     Recorded by [RC] [place]  JEFF Self/JAZZY 07/22/20 1410 [TW] Juan C Leyva, PTA 07/22/20 1114     Row Name 07/22/20 0854             Safety Issues, Functional Mobility    Impairments Affecting Function (Mobility)  balance;endurance/activity tolerance;pain;range of motion (ROM);strength  -TW      Recorded by [TW] Juan C Leyva, PTA 07/22/20 1114      Row Name 07/22/20 0854             Mobility Assessment/Intervention    Left Lower Extremity (Weight-bearing Status)  weight-bearing as tolerated (WBAT)  -TW      Recorded by [TW] Juan C Leyva, PTA 07/22/20 1114      Row Name 07/22/20 0854             Bed Mobility Assessment/Treatment    Supine-Sit Dukes (Bed Mobility)  supervision;verbal cues  -TW      Sit-Supine [place]ukes (Bed Mobility)  not tested  -TW      Bed Mobility, Safety Issues  decreased use of legs for bridging/pushing  -TW      Assistive Device (Bed Mobility)  bed rails;head of bed elevated  -TW      Recorded by [TW] Juan C Leyva PTA 07/22/20 1114      Row Name 07/22/20 0854             Sit-Stand Transfer    Sit-Stand Dukes (Transfers)  contact guard;verbal cues  -TW      Assistive Device (Sit-Stand Transfers)  walker, front-wheeled  -TW      Recorded by [TW] Juan C Leyva PTA 07/22/20 1114      Row Name 07/22/20 0854             Stand-Sit Transfer    Stand-Sit Dukes (Transfers)  contact guard  -TW      Assistive Device (Stand-Sit Transfers)  walker, front-wheeled  -TW      Recorded by [TW] Juan C Leyva PTA 07/22/20 1114      Row Name 07/22/20 0854             Gait/Stairs Assessment/Training    Gait/Stairs Assessment/Training  gait/ambulation assistive device  -TW      Dukes Level (Gait)  verbal cues;contact guard  -TW      Assistive Device (Gait)  walker, front-wheeled  -TW      Distance in Feet (Gait)  112ft then 100ft  -TW      Pattern (Gait)  step-through  -TW      Deviations/Abnormal Patterns (Gait)  antalgic  -TW      Bilateral Gait Deviations  forward  flexed posture  -TW      Recorded by [TW] Juan C Leyva PTA 07/22/20 1114      Row Name 07/22/20 1000             Upper Extremity Seated Therapeutic Exercise    Performed, Seated Upper Extremity (Therapeutic Exercise)  shoulder flexion/extension;elbow flexion/extension;shoulder external/internal rotation  -RC      Sets/Reps Detail, Seated Upper Extremity (Therapeutic Exercise)  20x3  -RC      Recorded by [RC] Evelyn Dodson COTA/L 07/22/20 1410      Row Name 07/22/20 0854             Lower Extremity Supine Therapeutic Exercise    Performed, Supine Lower Extremity (Therapeutic Exercise)  ankle dorsiflexion/plantarflexion;quadriceps sets;gluteal sets;heel slides  -TW      Exercise Type, Supine Lower Extremity (Therapeutic Exercise)  AROM (active range of motion);AAROM (active assistive range of motion)  -TW      Sets/Reps Detail, Supine Lower Extremity (Therapeutic Exercise)  1/20  -TW      Comment, Supine Lower Extremity (Therapeutic Exercise)  Pt L immobilizer removed for heel slide ex's.  -TW      Recorded by [TW] Juan C Leyva PTA 07/22/20 1114      Row Name 07/22/20 1000 07/22/20 0854          Positioning and Restraints    Pre-Treatment Position  sitting in chair/recliner  -RC  in bed  -TW     Post Treatment Position  chair  -RC  chair  -TW     In Chair  call light within reach;encouraged to call for assist;exit alarm on  -RC  reclined;call light within reach;encouraged to call for assist;exit alarm on  -TW     Recorded by [RC] Evelyn Dodson COTA/JAZZY 07/22/20 1410 [TW] Juan C Leyva PTA 07/22/20 1114     Row Name 07/22/20 1000 07/22/20 0854          Pain Scale: Numbers Pre/Post-Treatment    Pain Scale: Numbers, Pretreatment  0/10 - no pain  -RC  0/10 - no pain  -TW     Pain Scale: Numbers, Post-Treatment  0/10 - no pain  -RC  0/10 - no pain  -TW     Recorded by [RC] Evelyn Dodson COTA/JAZZY 07/22/20 1004 [TW] Juan C Leyva PTA 07/22/20 1114     Row Name                Wound  07/08/20 1810 Left upper eyebrow Laceration    Wound - Properties Group Date first assessed: 07/08/20 [TM] Time first assessed: 1810 [TM] Present on Hospital Admission: Y [TM] Side: Left [TM] Orientation: upper [TM] Location: eyebrow [TM] Primary Wound Type: Laceration [TM] Recorded by:  [TM] Josephine Elias RN 07/08/20 1811    Row Name                Wound 07/09/20 1515 Left posterior hip Incision    Wound - Properties Group Date first assessed: 07/09/20 [AQ] Time first assessed: 1515 [AQ] Present on Hospital Admission: N [AQ] Side: Left [AQ] Orientation: posterior [AQ] Location: hip [AQ] Primary Wound Type: Incision [AQ] Recorded by:  [AQ] Cathy Cano RN 07/09/20 1515    Row Name 07/22/20 1000             Outcome Summary/Treatment Plan (OT)    Daily Summary of Progress (OT)  progress toward functional goals as expected  -RC      Plan for Continued Treatment (OT)  cont pont  -RC      Recorded by [RC] Evelyn Dodson COTA/L 07/22/20 1410      Row Name 07/22/20 0854             Outcome Summary/Treatment Plan (PT)    Daily Summary of Progress (PT)  progress toward functional goals is good  -TW      Barriers to Overall Progress (PT)  dizziness with t/f's and gait.  -TW      Plan for Continued Treatment (PT)  Cont  -TW      Anticipated Discharge Disposition (PT)  anticipate therapy at next level of care  -TW      Recorded by [TW] Juan C Leyva, DANIE 07/22/20 1114        User Key  (r) = Recorded By, (t) = Taken By, (c) = Cosigned By    Initials Name Effective Dates Discipline    AQ Cathy Cano RN 10/17/16 -  Nurse    TW Juan C Leyva, PTA 03/07/18 -  PT    RC Evelyn Dodson AGUILAR/L 03/07/18 -  OT    TM Josephine Elias RN 07/24/18 -  Nurse        Wound 07/08/20 1810 Left upper eyebrow Laceration (Active)   Dressing Appearance open to air 7/21/2020  8:43 PM       Wound 07/09/20 1515 Left posterior hip Incision (Active)   Dressing Appearance open to air 7/21/2020  8:43 PM    Closure Staples 7/22/2020 12:00 PM   Number of Sutures Removed 18 7/22/2020 12:00 PM   Staples Removed Intact Yes 7/22/2020 12:00 PM     Rehab Goal Summary     Row Name 07/22/20 1000 07/22/20 0854          Bed Mobility Goal 1 (PT)    Activity/Assistive Device (Bed Mobility Goal 1, PT)  --  sit to supine;supine to sit  -TW     [place] Level/Cues Needed (Bed Mobility Goal 1, PT)  --  independent  -TW     Time Frame (Bed Mobility Goal 1, PT)  --  by discharge  -TW     Progress/Outcomes (Bed Mobility Goal 1, PT)  --  goal not met  -TW        Transfer Goal 1 (PT)    Activity/Assistive Device (Transfer Goal 1, PT)  --  sit-to-stand/stand-to-sit;bed-to-chair/chair-to-bed;toilet  -TW     [place] Level/Cues Needed (Transfer Goal 1, PT)  --  conditional independence  -TW     Time Frame (Transfer Goal 1, PT)  --  2 - 3 days  -TW     Progress/Outcome (Transfer Goal 1, PT)  --  goal not met  -TW        Gait Training Goal 1 (PT)    Activity/Assistive Device (Gait Training Goal 1, PT)  --  gait (walking locomotion);assistive device use;walker, rolling  -TW     [place] Level (Gait Training Goal 1, PT)  --  conditional independence  -TW     Distance (Gait Goal 1, PT)  --  [id number]  -TW     Time Frame (Gait Training Goal 1, PT)  --  by discharge  -TW     Progress/Outcome (Gait Training Goal 1, PT)  --  goal not met  -TW        Stairs Goal 1 (PT)    Activity/Assistive Device (Stairs Goal 1, PT)  --  ascending stairs;descending stairs;using handrail, left;assistive device use  -TW     [place] Level/Cues Needed (Stairs Goal 1, PT)  --  conditional independence  -TW     Time Frame (Stairs Goal 1, PT)  --  by discharge  -TW     Progress/Outcome (Stairs Goal 1, PT)  --  goal not met  -TW        Occupational Therapy Goals    Transfer Goal Selection (OT)  transfer, OT goal 1  -RC  --     Bathing Goal Selection (OT)  bathing, OT goal 1  -RC  --     Dressing Goal Selection (OT)  dressing, OT goal 1  -RC  --     Toileting  Goal Selection (OT)  toileting, OT goal 1  -RC  --     Endurance Goal Selection (OT)  endurance, OT goal 1  -RC  --     Safety Awareness Goal Selection (OT)  safety awareness, OT goal 1  -RC  --        Transfer Goal 1 (OT)    Activity/Assistive Device (Transfer Goal 1, OT)  toilet  -RC  --     [place] Level/Cues Needed (Transfer Goal 1, OT)  contact guard assist  -RC  --     Time Frame (Transfer Goal 1, OT)  long term goal (LTG);by discharge  -RC  --     Barriers (Transfers Goal 1, OT)  hearing deficit;safety deficit   -RC  --     Progress/Outcome (Transfer Goal 1, OT)  goal partially met  -RC  --        Bathing Goal 1 (OT)    Activity/Assistive Device (Bathing Goal 1, OT)  lower body bathing  -RC  --     [place] Level/Cues Needed (Bathing Goal 1, OT)  minimum assist (75% or more patient effort)  -RC  --     Time Frame (Bathing Goal 1, OT)  long term goal (LTG);by discharge  -RC  --     Progress/Outcomes (Bathing Goal 1, OT)  goal not met;goal revised this date  -RC  --        Dressing Goal 1 (OT)    Activity/Assistive Device (Dressing Goal 1, OT)  lower body dressing  -RC  --     [place]/Cues Needed (Dressing Goal 1, OT)  minimum assist (75% or more patient effort)  -RC  --     Time Frame (Dressing Goal 1, OT)  long term goal (LTG);by discharge  -RC  --     Progress/Outcome (Dressing Goal 1, OT)  goal not met;goal partially met;goal revised this date  -RC  --        Toileting Goal 1 (OT)    Activity/Device (Toileting Goal 1, OT)  toileting skills, all  -RC  --     [place] Level/Cues Needed (Toileting Goal 1, OT)  minimum assist (75% or more patient effort)  -RC  --     Time Frame (Toileting Goal 1, OT)  long term goal (LTG);by discharge  -RC  --     Progress/Outcome (Toileting Goal 1, OT)  goal not met  -RC  --         Endurance Goal 1 (OT)    Progress/Outcome (Endurance Goal 1, OT)  goal not met  -RC  --        Safety Awareness Goal 1 (OT)    Activity (Safety Awareness Goal 1, OT)  awareness of  need for assistance;impulse control;insight into deficits/self awareness;judgment;safe use of assistive device/equipment;follow through of safety precautions;demonstrates compliance;demonstrates understanding;demonstration of safe behaviors  -  --     [place]/Cues/Accuracy (Safety Awareness Goal 1, OT)  with minimum;verbal cues/redirection;with repetition of directions;with 90% accuracy  -  --     Time Frame (Safety Awareness Goal 1, OT)  long term goal (LTG);by discharge  -  --     Progress/Outcome (Safety Awareness Goal 1, OT)  goal not met  -  --       User Key  (r) = Recorded By, (t) = Taken By, (c) = Cosigned By    Initials Name Provider Type Discipline    TW Juan C Leyva, DANIE Physical Therapy Assistant PT    Evelny Farley COTA/L Occupational Therapy Assistant OT        Occupational Therapy Education                 Title: PT OT SLP Therapies (In Progress)     Topic: Occupational Therapy (In Progress)     Point: ADL training (In Progress)     Description:   Instruct learner(s) on proper safety adaptation and remediation techniques during self care or transfers.   Instruct in proper use of assistive devices.              Learning Progress Summary           Patient Acceptance, E, NR by  at 7/17/2020 1200    Acceptance, E,D,H, NR by  at 7/11/2020 1429    Comment:  much review and issued handout for HIP precautions, edu on use of lh ae will need reinforcement.    Acceptance, E, NR by ME at 7/10/2020 1503    Comment:  Educated on OT and POC. Educated to call for assistance. Educated on safety precautions. Educated on proper body mechanics for transfers, bed mobility, ADLs, and funcitonal mobility.                   Point: Home exercise program (Not Started)     Description:   Instruct learner(s) on appropriate technique for monitoring, assisting and/or progressing therapeutic exercises/activities.              Learner Progress:   Not documented in this visit.          Point:  Precautions (In Progress)     Description:   Instruct learner(s) on prescribed precautions during self-care and functional transfers.              Learning Progress Summary           Patient Acceptance, E, NR by RC at 7/22/2020 1412    Acceptance, E, VU,NR by ME at 7/21/2020 1636    Comment:  Educated on OT and POC. Educated to call for assistance. Educated on safety precautions. Educated on proper body mechanics. Educated on proper hand placement for transfers. Educated on L posterior hip precautions.    Acceptance, E, VU,NR by RB at 7/18/2020 1620    Comment:  Edu pt on use of gait belt and non skid socks when OOB and no OOB without assist.    Acceptance, E, NR by RC at 7/17/2020 1200    Acceptance, E, NR by RC at 7/13/2020 1528    Acceptance, E, NR by RC at 7/12/2020 1503    Comment:  multi review of hip precautions    Acceptance, E,D,H, NR by RC at 7/11/2020 1429    Comment:  much review and issued handout for HIP precautions, edu on use of lh ae will need reinforcement.    Acceptance, E, NR by ME at 7/10/2020 1503    Comment:  Educated on OT and POC. Educated to call for assistance. Educated on safety precautions. Educated on proper body mechanics for transfers, bed mobility, ADLs, and funcitonal mobility.                   Point: Body mechanics (Done)     Description:   Instruct learner(s) on proper positioning and spine alignment during self-care, functional mobility activities and/or exercises.              Learning Progress Summary           Patient Acceptance, E, VU,NR by ME at 7/21/2020 1636    Comment:  Educated on OT and POC. Educated to call for assistance. Educated on safety precautions. Educated on proper body mechanics. Educated on proper hand placement for transfers. Educated on L posterior hip precautions.    Acceptance, E, NR by RC at 7/17/2020 1200    Acceptance, E, NR by RC at 7/13/2020 1528    Acceptance, E,D,H, NR by RC at 7/11/2020 1429    Comment:  much review and issued handout for HIP  precautions, edu on use of lh ae will need reinforcement.    Acceptance, E, NR by ME at 7/10/2020 2895    Comment:  Educated on OT and POC. Educated to call for assistance. Educated on safety precautions. Educated on proper body mechanics for transfers, bed mobility, ADLs, and funcitonal mobility.                               User Key     Initials Effective Dates Name Provider Type Discipline     07/24/19 -  Lopez Moss, OT Occupational Therapist OT     03/07/18 -  Evelyn Dodson COTA/L Occupational Therapy Assistant OT    ME 09/10/19 -  Eitan Haynes OT Occupational Therapist OT                OT Recommendation and Plan  Outcome Summary/Treatment Plan (OT)  Daily Summary of Progress (OT): progress toward functional goals as expected  Plan for Continued Treatment (OT): cont pont  Therapy Frequency (OT Eval): daily  Daily Summary of Progress (OT): progress toward functional goals as expected  Plan of Care Review  Plan of Care Reviewed With: patient  Plan of Care Reviewed With: patient  Outcome Summary: participated in ue ex w/o c/o  d/c to snf today,  Outcome Measures     Row Name 07/22/20 1000 07/22/20 0854 07/21/20 1447       How much help from another person do you currently need...    Turning from your back to your side while in flat bed without using bedrails?  --  3  -TW  --    Moving from lying on back to sitting on the side of a flat bed without bedrails?  --  3  -TW  --    Moving to and from a bed to a chair (including a wheelchair)?  --  3  -TW  --    Standing up from a chair using your arms (e.g., wheelchair, bedside chair)?  --  3  -TW  --    Climbing 3-5 steps with a railing?  --  3  -TW  --    To walk in hospital room?  --  3  -TW  --    AM-PAC 6 Clicks Score (PT)  --  18  -TW  --       How much help from another is currently needed...    Putting on and taking off regular lower body clothing?  3  -RC  --  3  -ME    Bathing (including washing, rinsing, and drying)  3  -RC  --  3  -ME     Toileting (which includes using toilet bed pan or urinal)  3  -RC  --  3  -ME    Putting on and taking off regular upper body clothing  4  -RC  --  4  -ME    Taking care of personal grooming (such as brushing teeth)  4  -RC  --  4  -ME    Eating meals  4  -RC  --  4  -ME    AM-PAC 6 Clicks Score (OT)  21  -RC  --  21  -ME       Functional Assessment    Outcome Measure Options  --  --  AM-PAC 6 Clicks Daily Activity (OT)  -ME    Row Name 07/21/20 1059 07/20/20 1511          How much help from another person do you currently need...    Turning from your back to your side while in flat bed without using bedrails?  3  -LILA  3  -TW     Moving from lying on back to sitting on the side of a flat bed without bedrails?  3  -LILA  3  -TW     Moving to and from a bed to a chair (including a wheelchair)?  3  -LILA  3  -TW     Standing up from a chair using your arms (e.g., wheelchair, bedside chair)?  3  -LILA  3  -TW     Climbing 3-5 steps with a railing?  3  -LILA  3  -TW     To walk in hospital room?  3  -LILA  3  -TW     AM-PAC 6 Clicks Score (PT)  18  -LILA  18  -TW        Functional Assessment    Outcome Measure Options  AM-PAC 6 Clicks Basic Mobility (PT)  -  --       User Key  (r) = Recorded By, (t) = Taken By, (c) = Cosigned By    Initials Name Provider Type     Vikash Walters PTA Physical Therapy Assistant     Juan C Leyva, DANIE Physical Therapy Assistant     Evelyn Dodson COTA/L Occupational Therapy Assistant    ME Eitan Haynes, OT Occupational Therapist           Time Calculation:   Time Calculation- OT     Row Name 07/22/20 1414             Time Calculation- OT    OT Start Time  1000  -      OT Stop Time  1030  -      OT Time Calculation (min)  30 min  -      Total Timed Code Minutes- OT  30 minute(s)  -      OT Received On  07/22/20  -        User Key  (r) = Recorded By, (t) = Taken By, (c) = Cosigned By    Initials Name Provider Type     Evelyn Dodson COTA/L Occupational Therapy  Assistant        Therapy Charges for Today     Code Description Service Date Service Provider Modifiers Qty    [id number] HC OT THER PROC EA 15 MIN 7/22/2020 Evelyn Dodson, AGUILAR/L GO 2               Evelyn Dodson AGUILAR/L  7/22/2020

## 2020-07-22 NOTE — THERAPY TREATMENT NOTE
Acute Care - Physical Therapy Treatment Note  [facility]     Patient Name: Jose Dacosta  : 1935  MRN: [id number]  Today's Date: 2020  Onset of Illness/Injury or Date of Surgery: 20(surgery yesterday (2020))     Referring Physician: DAYANARA Kumar APRN(surgeon: DAJA Solis MD )    Admit Date: 2020    Visit Dx:    ICD-10-CM ICD-9-CM   1. Syncope and collapse R55 780.2   2. Facial laceration, initial encounter S01.81XA 873.40   3. Closed fracture of left hip, initial encounter (CMS/HCC) S72.002A 820.8   4. Fall, initial encounter W19.XXXA E888.9   5. Closed head injury, initial encounter S09.90XA 959.01   6. Closed fracture of neck of left femur, initial encounter (CMS/HCC) S72.002A 820.8   7. Impaired functional mobility, balance, gait, and endurance Z74.09 V49.89   8. Impaired mobility and ADLs Z74.09 V49.89    Z78.9      Patient Active Problem List   Diagnosis   • Coronary artery disease involving native coronary artery of native heart without angina pectoris   • Vasovagal syncope   • RBBB (right bundle branch block with left anterior fascicular block)   • Essential hypertension   • Ischemic cardiomyopathy   • Syncope and collapse   • Closed fracture of neck of left femur (CMS/[facility])   • Coronary artery disease with history of myocardial infarction without history of CABG   • Facial laceration   • HFrEF (heart failure with reduced ejection fraction) (CMS/[facility])   • Postoperative anemia due to acute blood loss       Therapy Treatment    Rehabilitation Treatment Summary     Row Name 20 1000 20 0854          Treatment Time/Intention    Discipline  occupational therapy assistant  -RC  physical therapy assistant  -TW     Document Type  --  therapy note (daily note)  -TW     Subjective Information  --  complains of;dizziness  -TW     Mode of Treatment  --  physical therapy;individual therapy  -TW     Patient/Family Observations  --  Pt supine in bed and agreeable to  therapy.  -TW     Patient Effort  --  good  -TW     Existing Precautions/Restrictions  --  hip, posterior;fall  -TW     Recorded by [RC] Evelyn Dodson COTA/JAZZY 07/22/20 1004 [TW] Juan C Leyva PTA 07/22/20 1114     Row Name 07/22/20 1000 07/22/20 0854          Vital Signs    Pre Systolic BP Rehab  143  -RC  146  -TW     Pre Treatment Diastolic BP  63  -RC  72  -TW     Pretreatment Heart Rate (beats/min)  55  -RC  72  -TW     Pre SpO2 (%)  97  -RC  99  -TW     O2 Delivery Pre Treatment  room air  -RC  room air  -TW     Pre Patient Position  --  Supine  -TW     Intra Patient Position  --  Standing  -TW     Post Patient Position  --  Sitting  -TW     Recorded by [RC] Evelyn Dodson COTA/L 07/22/20 1004 [TW] Juan C Leyva PTA 07/22/20 1114     Row Name 07/22/20 0854             Cognitive Assessment/Intervention- PT/OT    Affect/Mental Status (Cognitive)  WFL  -TW      Orientation Status (Cognition)  oriented x 4  -TW      Follows Commands (Cognition)  follows one step commands;over 90% accuracy  -TW      Cognitive Function (Cognitive)  executive function deficit;safety deficit  -TW      Personal Safety Interventions  fall prevention program maintained;gait belt;nonskid shoes/slippers when out of bed  -TW      Recorded by [TW] Juan C Leyva PTA 07/22/20 1114      Row Name 07/22/20 0854             Safety Issues, Functional Mobility    Impairments Affecting Function (Mobility)  balance;endurance/activity tolerance;pain;range of motion (ROM);strength  -TW      Recorded by [TW] Juan C Leyva PTA 07/22/20 1114      Row Name 07/22/20 0854             Mobility Assessment/Intervention    Left Lower Extremity (Weight-bearing Status)  weight-bearing as tolerated (WBAT)  -TW      Recorded by [TW] Juan C Leyva PTA 07/22/20 1114      Row Name 07/22/20 0854             Bed Mobility Assessment/Treatment    Supine-Sit [place] (Bed Mobility)  supervision;verbal cues  -TW      Sit-Supine  [place] (Bed Mobility)  not tested  -TW      Bed Mobility, Safety Issues  decreased use of legs for bridging/pushing  -TW      Assistive Device (Bed Mobility)  bed rails;head of bed elevated  -TW      Recorded by [TW] Juan C Leyva, PTA 07/22/20 1114      Row Name 07/22/20 0854             Sit-Stand Transfer    Sit-Stand [place] (Transfers)  contact guard;verbal cues  -TW      Assistive Device (Sit-Stand Transfers)  walker, front-wheeled  -TW      Recorded by [TW] Juan C Leyva PTA 07/22/20 1114      Row Name 07/22/20 0854             Stand-Sit Transfer    Stand-Sit [place] (Transfers)  contact guard  -TW      Assistive Device (Stand-Sit Transfers)  walker, front-wheeled  -TW      Recorded by [TW] Juan C Leyva PTA 07/22/20 1114      Row Name 07/22/20 0854             Gait/Stairs Assessment/Training    Gait/Stairs Assessment/Training  gait/ambulation assistive device  -TW      [place] Level (Gait)  verbal cues;contact guard  -TW      Assistive Device (Gait)  walker, front-wheeled  -TW      Distance in Feet (Gait)  112ft then 100ft  -TW      Pattern (Gait)  step-through  -TW      Deviations/Abnormal Patterns (Gait)  antalgic  -TW      Bilateral Gait Deviations  forward flexed posture  -TW      Recorded by [TW] Juan C Leyva PTA 07/22/20 1114      Row Name 07/22/20 0854             Lower Extremity Supine Therapeutic Exercise    Performed, Supine Lower Extremity (Therapeutic Exercise)  ankle dorsiflexion/plantarflexion;quadriceps sets;gluteal sets;heel slides  -TW      Exercise Type, Supine Lower Extremity (Therapeutic Exercise)  AROM (active range of motion);AAROM (active assistive range of motion)  -TW      Sets/Reps Detail, Supine Lower Extremity (Therapeutic Exercise)  1/20  -TW      Comment, Supine Lower Extremity (Therapeutic Exercise)  Pt L immobilizer removed for heel slide ex's.  -TW      Recorded by [TW] Juan C Leyva PTA 07/22/20 1114      Row Name  07/22/20 0854             Positioning and Restraints    Pre-Treatment Position  in bed  -TW      Post Treatment Position  chair  -TW      In Chair  reclined;call light within reach;encouraged to call for assist;exit alarm on  -TW      Recorded by [TW] Juan C Leyva PTA 07/22/20 1114      Row Name 07/22/20 1000 07/22/20 0854          Pain Scale: Numbers Pre/Post-Treatment    Pain Scale: Numbers, Pretreatment  0/10 - no pain  -RC  0/10 - no pain  -TW     Pain Scale: Numbers, Post-Treatment  0/10 - no pain  -RC  0/10 - no pain  -TW     Recorded by [RC] Evelyn Dodson COTA/JAZZY 07/22/20 1004 [TW] Juan C Leyva PTA 07/22/20 1114     Row Name                Wound 07/08/20 1810 Left upper eyebrow Laceration    Wound - Properties Group Date first assessed: 07/08/20 [TM] Time first assessed: 1810 [TM] Present on Hospital Admission: Y [TM] Side: Left [TM] Orientation: upper [TM] Location: eyebrow [TM] Primary Wound Type: Laceration [TM] Recorded by:  [TM] Josephine Elias RN 07/08/20 1811    Row Name                Wound 07/09/20 1515 Left posterior hip Incision    Wound - Properties Group Date first assessed: 07/09/20 [AQ] Time first assessed: 1515 [AQ] Present on Hospital Admission: N [AQ] Side: Left [AQ] Orientation: posterior [AQ] Location: hip [AQ] Primary Wound Type: Incision [AQ] Recorded by:  [AQ] Cathy Cano RN 07/09/20 1515    Row Name 07/22/20 0854             Outcome Summary/Treatment Plan (PT)    Daily Summary of Progress (PT)  progress toward functional goals is good  -TW      Barriers to Overall Progress (PT)  dizziness with t/f's and gait.  -TW      Plan for Continued Treatment (PT)  Cont  -TW      Anticipated Discharge Disposition (PT)  anticipate therapy at next level of care  -TW      Recorded by [TW] Juan C Leyva PTA 07/22/20 1114        User Key  (r) = Recorded By, (t) = Taken By, (c) = Cosigned By    Initials Name Effective Dates Discipline    AQ Cathy Cano, RN  10/17/16 -  Nurse    TW Juan C Leyva, PTA 03/07/18 -  PT    RC Evelyn Dodson, AGUILAR/L 03/07/18 -  OT    TM Josephine Elias RN 07/24/18 -  Nurse          Wound 07/08/20 1810 Left upper eyebrow Laceration (Active)   Dressing Appearance open to air 7/21/2020  8:43 PM       Wound 07/09/20 1515 Left posterior hip Incision (Active)   Dressing Appearance open to air 7/21/2020  8:43 PM       Rehab Goal Summary     Row Name 07/22/20 0854             Bed Mobility Goal 1 (PT)    Activity/Assistive Device (Bed Mobility Goal 1, PT)  sit to supine;supine to sit  -TW      Nicholas Level/Cues Needed (Bed Mobility Goal 1, PT)  independent  -TW      Time Frame (Bed Mobility Goal 1, PT)  by discharge  -TW      Progress/Outcomes (Bed Mobility Goal 1, PT)  goal not met  -TW         Transfer Goal 1 (PT)    Activity/Assistive Device (Transfer Goal 1, PT)  sit-to-stand/stand-to-sit;bed-to-chair/chair-to-bed;toilet  -TW      [place] Level/Cues Needed (Transfer Goal 1, PT)  conditional independence  -TW      Time Frame (Transfer Goal 1, PT)  2 - 3 days  -TW      Progress/Outcome (Transfer Goal 1, PT)  goal not met  -TW         Gait Training Goal 1 (PT)    Activity/Assistive Device (Gait Training Goal 1, PT)  gait (walking locomotion);assistive device use;walker, rolling  -TW      [place]olas Level (Gait Training Goal 1, PT)  conditional independence  -TW      Distance (Gait Goal 1, PT)  [id number]  -TW      Time Frame (Gait Training Goal 1, PT)  by discharge  -TW      Progress/Outcome (Gait Training Goal 1, PT)  goal not met  -TW         Stairs Goal 1 (PT)    Activity/Assistive Device (Stairs Goal 1, PT)  ascending stairs;descending stairs;using handrail, left;assistive device use  -TW      [place] Level/Cues Needed (Stairs Goal 1, PT)  conditional independence  -TW      Time Frame (Stairs Goal 1, PT)  by discharge  -TW      Progress/Outcome (Stairs Goal 1, PT)  goal not met  -TW        User Key  (r) =  Recorded By, (t) = Taken By, (c) = Cosigned By    Initials Name Provider Type Discipline    Juan C Hawk, DANIE Physical Therapy Assistant PT          Physical Therapy Education                 Title: PT OT SLP Therapies (In Progress)     Topic: Physical Therapy (In Progress)     Point: Mobility training (In Progress)     Description:   Instruct learner(s) on safety and technique for assisting patient out of bed, chair or wheelchair.  Instruct in the proper use of assistive devices, such as walker, crutches, cane or brace.              Patient Friendly Description:   It's important to get you on your feet again, but we need to do so in a way that is safe for you. Falling has serious consequences, and your personal safety is the most important thing of all.        When it's time to get out of bed, one of us or a family member will sit next to you on the bed to give you support.     If your doctor or nurse tells you to use a walker, crutches, a cane, or a brace, be sure you use it every time you get out of bed, even if you think you don't need it.    Learning Progress Summary           Patient Acceptance, E, NR by LILA at 7/21/2020 1246    Comment:  reinforced hip prec    Acceptance, E, NR by LILA at 7/11/2020 1047    Comment:  edu on hip dislocation precautions.    Acceptance, E, NR by JC1 at 7/10/2020 1348                   Point: Home exercise program (Not Started)     Description:   Instruct learner(s) on appropriate technique for monitoring, assisting and/or progressing patient with therapeutic exercises and activities.              Learner Progress:   Not documented in this visit.          Point: Body mechanics (In Progress)     Description:   Instruct learner(s) on proper positioning and spine alignment for patient and/or caregiver during mobility tasks and/or exercises.              Learning Progress Summary           Patient Acceptance, E, NR by [email or web address] at 7/10/2020 1348                   Point: Precautions  (In Progress)     Description:   Instruct learner(s) on prescribed precautions during mobility and gait tasks              Learning Progress Summary           Patient Acceptance, E, NR by  at 7/21/2020 1246    Comment:  reinforced hip prec    Acceptance, E, NR by  at 7/11/2020 1047    Comment:  edu on hip dislocation precautions.    Acceptance, E, NR by [facility] at 7/10/2020 1348                               User Key     Initials Effective Dates Name Provider Type Discipline    [facility] 04/03/18 -  Angelica Torres, PT Physical Therapist PT     03/07/18 -  Vikash Walters PTA Physical Therapy Assistant PT                PT Recommendation and Plan  Anticipated Discharge Disposition (PT): anticipate therapy at next level of care  Therapy Frequency (PT Clinical Impression): daily  Outcome Summary/Treatment Plan (PT)  Daily Summary of Progress (PT): progress toward functional goals is good  Barriers to Overall Progress (PT): dizziness with t/f's and gait.  Plan for Continued Treatment (PT): Cont  Anticipated Discharge Disposition (PT): anticipate therapy at next level of care  Plan of Care Reviewed With: patient  Progress: improving  Outcome Summary: Pt cont to c/o dizziness which increases with t/f's and gait. Pt amb 112ft and 100ft in am with CGA using RW and frequent VC needed for positioning of RW and postural control. Pt is able to correct each with VC but does not maintain correction long. Pt cont to require VC for sup to sit t/f due to pt poor implamenting precautions for L hip during t/f's. Pt would cont to benefit from therapy upon DC.  Outcome Measures     Row Name 07/22/20 0854 07/21/20 1447 07/21/20 1059       How much help from another person do you currently need...    Turning from your back to your side while in flat bed without using bedrails?  3  -TW  --  3  -LILA    Moving from lying on back to sitting on the side of a flat bed without bedrails?  3  -TW  --  3  -LILA    Moving to and from a bed to a chair  (including a wheelchair)?  3  -TW  --  3  -LILA    Standing up from a chair using your arms (e.g., wheelchair, bedside chair)?  3  -TW  --  3  -LILA    Climbing 3-5 steps with a railing?  3  -TW  --  3  -LILA    To walk in hospital room?  3  -TW  --  3  -LILA    AM-PAC 6 Clicks Score (PT)  18  -TW  --  18  -LILA       How much help from another is currently needed...    Putting on and taking off regular lower body clothing?  --  3  -ME  --    Bathing (including washing, rinsing, and drying)  --  3  -ME  --    Toileting (which includes using toilet bed pan or urinal)  --  3  -ME  --    Putting on and taking off regular upper body clothing  --  4  -ME  --    Taking care of personal grooming (such as brushing teeth)  --  4  -ME  --    Eating meals  --  4  -ME  --    AM-PAC 6 Clicks Score (OT)  --  21  -ME  --       Functional Assessment    Outcome Measure Options  --  AM-PAC 6 Clicks Daily Activity (OT)  -ME  AM-PAC 6 Clicks Basic Mobility (PT)  -    Row Name 07/20/20 1511 07/19/20 1320          How much help from another person do you currently need...    Turning from your back to your side while in flat bed without using bedrails?  3  -TW  3  -TW     Moving from lying on back to sitting on the side of a flat bed without bedrails?  3  -TW  3  -TW     Moving to and from a bed to a chair (including a wheelchair)?  3  -TW  3  -TW     Standing up from a chair using your arms (e.g., wheelchair, bedside chair)?  3  -TW  3  -TW     Climbing 3-5 steps with a railing?  3  -TW  3  -TW     To walk in hospital room?  3  -TW  3  -TW     AM-PAC 6 Clicks Score (PT)  18  -TW  18  -TW       User Key  (r) = Recorded By, (t) = Taken By, (c) = Cosigned By    Initials Name Provider Type    LILA Vikash Walters, PTA Physical Therapy Assistant    TW Juan C Leyva, PTA Physical Therapy Assistant    ME Eitan Haynes, OT Occupational Therapist         Time Calculation:   PT Charges     Row Name 07/22/20 1117             Time Calculation    Start  Time  0854  -TW      Stop Time  0934  -TW      Time Calculation (min)  40 min  -TW      PT Received On  07/22/20  -TW      PT Goal Re-Cert Due Date  07/23/20  -TW         Time Calculation- PT    Total Timed Code Minutes- PT  40 minute(s)  -TW        User Key  (r) = Recorded By, (t) = Taken By, (c) = Cosigned By    Initials Name Provider Type     Juan C Leyva PTA Physical Therapy Assistant        Therapy Charges for Today     Code Description Service Date Service Provider Modifiers Qty    43329271921 HC GAIT TRAINING EA 15 MIN 7/22/2020 Juan C Leyva, DANIE GP 1    18319858549 HC PT THERAPEUTIC ACT EA 15 MIN 7/22/2020 Juan C Leyva, DANIE GP 1    34299908222 HC PT THER PROC EA 15 MIN 7/22/2020 Juan C Leyva, DANIE GP 1          PT G-Codes  Outcome Measure Options: AM-PAC 6 Clicks Daily Activity (OT)  AM-PAC 6 Clicks Score (PT): 18  AM-PAC 6 Clicks Score (OT): 21    Juan C Leyva PTA  7/22/2020

## 2020-07-22 NOTE — PLAN OF CARE
Problem: Patient Care Overview  Goal: Plan of Care Review  7/22/2020 1114 by Juan C Leyva, DANIE  Outcome: Ongoing (interventions implemented as appropriate)  Flowsheets (Taken 7/22/2020 0854)  Progress: improving  Plan of Care Reviewed With: patient  Outcome Summary: Pt cont to c/o dizziness which increases with t/f's and gait. Pt amb 112ft and 100ft in am with CGA using RW and frequent VC needed for positioning of RW and postural control. Pt is able to correct each with VC but does not maintain correction long. Pt cont to require VC for sup to sit t/f due to pt poor implamenting precautions for L hip during t/f's. Pt would cont to benefit from therapy upon DC.

## 2020-07-23 RX ORDER — GABAPENTIN 600 MG/1
600 TABLET ORAL 3 TIMES DAILY
Qty: 9 TABLET | Refills: 0 | Status: ON HOLD | OUTPATIENT
Start: 2020-07-23 | End: 2020-08-06 | Stop reason: SDUPTHER

## 2020-07-23 NOTE — PAYOR COMM NOTE
"Cathy Noyola   [facility]  phone [phone]  fax [phone]    Auth# [id number]    Sylvia Dacosta (85 y.o. Male)     Date of Birth Social Security Number Address Home Phone MRN    1935  [address]  [facility] [address] [phone] [id number]    Christian Marital Status          Congregational        Admission Date Admission Type Admitting Provider Attending Provider Department, Room/Bed    7/8/20 Emergency Misha Parada MD  [facility] [address] [place], 411/1    Discharge Date Discharge Disposition Discharge Destination        7/22/2020 Skilled Nursing Facility (DC - External)              Attending Provider:  (none)   Allergies:  Hydrochlorothiazide    Isolation:  None   Infection:  None   Code Status:  Prior    Ht:  182.9 cm (72\")   Wt:  66.1 kg (145 lb 12.8 oz)    Admission Cmt:  None   Principal Problem:  Closed fracture of neck of left femur (CMS/[facility]) [S72.002A]                 Active Insurance as of 7/8/2020     Primary Coverage     Payor Plan Insurance Group Employer/Plan Group    AETNA MEDICARE REPLACEMENT AETNA MEDICARE REPLACEMENT [id number]     Payor Plan Address Payor Plan Phone Number Payor Plan Fax Number Effective Dates    [address] [phone]  4/1/2019 - None Entered    [facility] [id number]       Subscriber Name Subscriber Birth Date Member ID       SYLVIA DACOSTA 1935 [id number]                 Emergency Contacts      (Rel.) Home Phone Work Phone Mobile Phone    AURELIA BOX (Daughter) [phone] -- [phone]    Carla Dcaosta (Spouse) [phone] -- [phone]    jefffiordaliza abrams (Grandchild) -- -- [phone]            Discharge Summary    No notes of this type exist for this encounter.         "

## 2020-07-23 NOTE — PAYOR COMM NOTE
"Cathy Noyola   [facility]  phone [phone]  fax [phone]    Auth# [id number]    Sylvia Dacosta (85 y.o. Male)     Date of Birth Social Security Number Address Home Phone MRN    1935  [address]  [facility] [address] [phone] [id number]    Cheondoism Marital Status          Baptism        Admission Date Admission Type Admitting Provider Attending Provider Department, Room/Bed    7/8/20 Emergency Misha Parada MD  [facility] [address] [place], 411/1    Discharge Date Discharge Disposition Discharge Destination        7/22/2020 Skilled Nursing Facility (DC - External)              Attending Provider:  (none)   Allergies:  Hydrochlorothiazide    Isolation:  None   Infection:  None   Code Status:  Prior    Ht:  182.9 cm (72\")   Wt:  66.1 kg (145 lb 12.8 oz)    Admission Cmt:  None   Principal Problem:  Closed fracture of neck of left femur (CMS/[facility]) [S72.002A]                 Active Insurance as of 7/8/2020     Primary Coverage     Payor Plan Insurance Group Employer/Plan Group    AETNA MEDICARE REPLACEMENT AETNA MEDICARE REPLACEMENT [id number]     Payor Plan Address Payor Plan Phone Number Payor Plan Fax Number Effective Dates    [address] [phone]  4/1/2019 - None Entered    [facility] [id number]       Subscriber Name Subscriber Birth Date Member ID       SYLVIA DACOSTA 1935 [id number]                 Emergency Contacts      (Rel.) Home Phone Work Phone Mobile Phone    AURELIA BOX (Daughter) [phone] -- [phone]    Carla Dacosta (Spouse) [phone] -- [phone]    jefffiordaliza abrams (Grandchild) -- -- [phone]            Discharge Summary    No notes of this type exist for this encounter.         "

## 2020-07-26 NOTE — DISCHARGE SUMMARY
[facility]  DISCHARGE SUMMARY       Date of Admission: 7/8/2020  Date of Discharge:  7/22/2020  Primary Care Physician: Terry Peterson MD    Presenting Problem/History of Present Illness:  Syncope and collapse [R55]  Facial laceration, initial encounter [S01.81XA]  Closed fracture of left hip, initial encounter (CMS/[facility]) [S72.002A]  Closed head injury, initial encounter [S09.90XA]  Fall, initial encounter [W19.XXXA]       Final Discharge Diagnoses:  Active Hospital Problems    Diagnosis   • **Closed fracture of neck of left femur (CMS/[facility])   • Postoperative anemia due to acute blood loss     Not unexpected     • Facial laceration   • HFrEF (heart failure with reduced ejection fraction) (CMS/[facility])   • Syncope and collapse   • Ischemic cardiomyopathy   • Essential hypertension   • Coronary artery disease involving native coronary artery of native heart without angina pectoris       Consults:   Consults     Date and Time Order Name Status Description    7/8/2020 2004 Orthopedics (on-call MD unless specified) Completed           Procedures Performed: Procedure(s):  LEFT HIP HEMIARTHROPLASTY                Pertinent Test Results:   Lab Results (most recent)     Procedure Component Value Units Date/Time    COVID PRE-OP / PRE-PROCEDURE SCREENING ORDER (NO ISOLATION) - Swab, Nasopharynx [[id number]] Collected:  07/19/20 2301    Specimen:  Swab from Nasopharynx Updated:  07/20/20 0150    Narrative:       The following orders were created for panel order COVID PRE-OP / PRE-PROCEDURE SCREENING ORDER (NO ISOLATION) - Swab, Nasopharynx.  Procedure                               Abnormality         Status                     ---------                               -----------         ------                     COVID-19, [facility] IN-HOUS...[[id number]]  Normal              Final result                 Please view results for these tests on the individual orders.     COVID-19, [facility] IN-HOUSE, NP SWAB IN TRANSPORT MEDIA 8-10 HR TAT - Swab, Nasopharynx [[id number]]  (Normal) Collected:  07/19/20 2301    Specimen:  Swab from Nasopharynx Updated:  07/20/20 0150     COVID19 Not Detected    Narrative:       Testing performed by Real Time RT-PCR  This test has not been approved by the [facility] but is authorized under the Emergency Use Act (EUA)    Fact sheet for providers: https://www.fda.gov/media/972354/download    Fact sheet for patients: https://www.fda.gov/media/877848/download        Basic Metabolic Panel [[id number]]  (Abnormal) Collected:  07/19/20 0622    Specimen:  Blood Updated:  07/19/20 0734     Glucose 94 mg/dL      BUN 16 mg/dL      Creatinine 0.94 mg/dL      Sodium 132 mmol/L      Potassium 4.2 mmol/L      Chloride 100 mmol/L      CO2 24.0 mmol/L      Calcium 9.8 mg/dL      eGFR Non African Amer 76 mL/min/1.73      BUN/Creatinine Ratio 17.0     Anion Gap 8.0 mmol/L     Narrative:       GFR Normal >60  Chronic Kidney Disease <60  Kidney Failure <15      CBC (No Diff) [[id number]]  (Abnormal) Collected:  07/19/20 0622    Specimen:  Blood Updated:  07/19/20 0706     WBC 11.60 10*3/mm3      RBC 3.41 10*6/mm3      Hemoglobin 10.5 g/dL      Hematocrit 31.4 %      MCV 92.1 fL      MCH 30.8 pg      MCHC 33.4 g/dL      RDW 14.6 %      RDW-SD 49.1 fl      MPV 9.8 fL      Platelets 517 10*3/mm3     Tissue Pathology Exam [[id number]] Collected:  07/09/20 1519    Specimen:  Bone from Hip, Left Updated:  07/13/20 1214     Case Report --     Surgical Pathology Report                         Case: [id number]                                  Authorizing Provider:  Jitendra Solis MD   Collected:           07/09/2020 03:19 PM          Ordering Location:     [facility]             Received:            07/10/2020 06:50 AM                                 [place]                                                              Pathologist:           Bella Rocha DO                                                         Specimen:    Hip, Left, Femoral head from left hip                                                       Final Diagnosis --     See Scanned Report        COVID-19, [facility] IN-HOUSE, NP SWAB IN TRANSPORT MEDIA 8-10 HR TAT - Swab, Nasopharynx [[id number]]  (Normal) Collected:  07/13/20 0520    Specimen:  Swab from Nasopharynx Updated:  07/13/20 0920     COVID19 Not Detected    Narrative:       Testing performed by Real Time RT-PCR  This test has not been approved by the [facility] but is authorized under the Emergency Use Act (EUA)    Fact sheet for providers: https://www.fda.gov/media/362688/download    Fact sheet for patients: https://www.fda.gov/media/340047/download        Basic Metabolic Panel [[id number]]  (Normal) Collected:  07/11/20 0538    Specimen:  Blood Updated:  07/11/20 0714     Glucose 92 mg/dL      BUN 20 mg/dL      Creatinine 0.91 mg/dL      Sodium 137 mmol/L      Potassium 4.2 mmol/L      Chloride 104 mmol/L      CO2 24.0 mmol/L      Calcium 9.3 mg/dL      eGFR Non African Amer 79 mL/min/1.73      BUN/Creatinine Ratio 22.0     Anion Gap 9.0 mmol/L     Narrative:       GFR Normal >60  Chronic Kidney Disease <60  Kidney Failure <15      CBC & Differential [[id number]] Collected:  07/11/20 0538    Specimen:  Blood Updated:  07/11/20 0653    Narrative:       The following orders were created for panel order CBC & Differential.  Procedure                               Abnormality         Status                     ---------                               -----------         ------                     CBC Auto Differential[[id number]]        Abnormal            Final result                 Please view results for these tests on the individual orders.    CBC Auto Differential [[id number]]  (Abnormal) Collected:  07/11/20 0538    Specimen:  Blood Updated:  07/11/20 0653     WBC 11.49 10*3/mm3      RBC 3.18 10*6/mm3      Hemoglobin 9.8 g/dL      Hematocrit 29.2 %      MCV 91.8 fL       MCH 30.8 pg      MCHC 33.6 g/dL      RDW 14.4 %      RDW-SD 49.1 fl      MPV 11.3 fL      Platelets 144 10*3/mm3      Neutrophil % 72.4 %      Lymphocyte % 11.8 %      Monocyte % 9.0 %      Eosinophil % 5.4 %      Basophil % 0.8 %      Immature Grans % 0.6 %      Neutrophils, Absolute 8.32 10*3/mm3      Lymphocytes, Absolute 1.36 10*3/mm3      Monocytes, Absolute 1.03 10*3/mm3      Eosinophils, Absolute 0.62 10*3/mm3      Basophils, Absolute 0.09 10*3/mm3      Immature Grans, Absolute 0.07 10*3/mm3      nRBC 0.0 /100 WBC     Hemoglobin A1c [[id number]]  (Normal) Collected:  07/10/20 0550    Specimen:  Blood Updated:  07/10/20 0629     Hemoglobin A1C 4.80 %     Narrative:       Hemoglobin A1C Ranges:    Increased Risk for Diabetes  5.7% to 6.4%  Diabetes                     >= 6.5%  Diabetic Goal                < 7.0%    CBC & Differential [[id number]] Collected:  07/10/20 0550    Specimen:  Blood Updated:  07/10/20 0602    Narrative:       The following orders were created for panel order CBC & Differential.  Procedure                               Abnormality         Status                     ---------                               -----------         ------                     CBC Auto Differential[[id number]]        Abnormal            Final result                 Please view results for these tests on the individual orders.    CBC Auto Differential [[id number]]  (Abnormal) Collected:  07/10/20 0550    Specimen:  Blood Updated:  07/10/20 0602     WBC 10.08 10*3/mm3      RBC 3.31 10*6/mm3      Hemoglobin 10.4 g/dL      Hematocrit 30.4 %      MCV 91.8 fL      MCH 31.4 pg      MCHC 34.2 g/dL      RDW 14.5 %      RDW-SD 48.7 fl      MPV 10.4 fL      Platelets 149 10*3/mm3      Neutrophil % 75.0 %      Lymphocyte % 11.4 %      Monocyte % 7.3 %      Eosinophil % 4.9 %      Basophil % 1.0 %      Immature Grans % 0.4 %      Neutrophils, Absolute 7.56 10*3/mm3      Lymphocytes, Absolute 1.15 10*3/mm3      Monocytes,  Absolute 0.74 10*3/mm3      Eosinophils, Absolute 0.49 10*3/mm3      Basophils, Absolute 0.10 10*3/mm3      Immature Grans, Absolute 0.04 10*3/mm3      nRBC 0.0 /100 WBC     COVID PRE-OP / PRE-PROCEDURE SCREENING ORDER (NO ISOLATION) - Swab, Nasopharynx [[id number]] Collected:  07/09/20 1334    Specimen:  Swab from Nasopharynx Updated:  07/09/20 1946    Narrative:       The following orders were created for panel order COVID PRE-OP / PRE-PROCEDURE SCREENING ORDER (NO ISOLATION) - Swab, Nasopharynx.  Procedure                               Abnormality         Status                     ---------                               -----------         ------                     COVID-19, [facility] IN-HOUS...[[id number]]  Normal              Final result                 Please view results for these tests on the individual orders.    Blood Gas, Arterial [[id number]]  (Abnormal) Collected:  07/09/20 0900    Specimen:  Arterial Blood Updated:  07/09/20 0912     Site Left Radial     Anmol's Test Negative     pH, Arterial 7.440 pH units      pCO2, Arterial 35.0 mm Hg      pO2, Arterial 60.1 mm Hg      Comment: 84 Value below reference range        HCO3, Arterial 23.8 mmol/L      Base Excess, Arterial 0.0 mmol/L      O2 Saturation, Arterial 92.3 %      Comment: 84 Value below reference range        Barometric Pressure for Blood Gas 745 mmHg      Modality Nasal Cannula     Flow Rate 2.0 lpm      Ventilator Mode NA     Collected by EJ     Comment: Meter: [id number]     :  [id number]       POC Glucose Once [[id number]]  (Normal) Collected:  07/09/20 0612    Specimen:  Blood Updated:  07/09/20 0724     Glucose 119 mg/dL      Comment: RN NotifiedOperator: [id number] NIKI REBAMeter ID: [id number]       Troponin [[id number]]  (Normal) Collected:  07/09/20 0546    Specimen:  Blood Updated:  07/09/20 0617     Troponin T <0.010 ng/mL     Narrative:       Troponin T Reference Range:  <= 0.03 ng/mL-   Negative for AMI  >0.03 ng/mL-      Abnormal for myocardial necrosis.  Clinicians would have to utilize clinical acumen, EKG, Troponin and serial changes to determine if it is an Acute Myocardial Infarction or myocardial injury due to an underlying chronic condition.       Results may be falsely decreased if patient taking Biotin.      Troponin [[id number]]  (Normal) Collected:  07/09/20 0115    Specimen:  Blood Updated:  07/09/20 0208     Troponin T <0.010 ng/mL     Narrative:       Troponin T Reference Range:  <= 0.03 ng/mL-   Negative for AMI  >0.03 ng/mL-     Abnormal for myocardial necrosis.  Clinicians would have to utilize clinical acumen, EKG, Troponin and serial changes to determine if it is an Acute Myocardial Infarction or myocardial injury due to an underlying chronic condition.       Results may be falsely decreased if patient taking Biotin.      [place] Draw [[id number]] Collected:  07/08/20 1924    Specimen:  Blood Updated:  07/08/20 2030    Narrative:       The following orders were created for panel order [place] Draw.  Procedure                               Abnormality         Status                     ---------                               -----------         ------                     Light Blue Top[[id number]]                                   Final result               Green Top (Gel)[[id number]]                                  Final result               Lavender Top[[id number]]                                     Final result               Gold Top - SST[[id number]]                                   Final result                 Please view results for these tests on the individual orders.    Light Blue Top [[id number]] Collected:  07/08/20 1924    Specimen:  Blood Updated:  07/08/20 2030     Extra Tube hold for add-on     Comment: Auto resulted       Green Top (Gel) [[id number]] Collected:  07/08/20 1924    Specimen:  Blood Updated:  07/08/20 2030     Extra Tube Hold for add-ons.     Comment: Auto resulted.       Lavender Top  [[id number]] Collected:  07/08/20 1924    Specimen:  Blood Updated:  07/08/20 2030     Extra Tube hold for add-on     Comment: Auto resulted       Gold Top - SST [[id number]] Collected:  07/08/20 1924    Specimen:  Blood Updated:  07/08/20 2030     Extra Tube Hold for add-ons.     Comment: Auto resulted.       Comprehensive Metabolic Panel [[id number]] Collected:  07/08/20 1924    Specimen:  Blood Updated:  07/08/20 1952     Glucose 89 mg/dL      BUN 17 mg/dL      Creatinine 1.07 mg/dL      Sodium 139 mmol/L      Potassium 3.5 mmol/L      Chloride 104 mmol/L      CO2 23.0 mmol/L      Calcium 9.3 mg/dL      Total Protein 7.1 g/dL      Albumin 4.10 g/dL      ALT (SGPT) 22 U/L      AST (SGOT) 32 U/L      Alkaline Phosphatase 108 U/L      Total Bilirubin 0.2 mg/dL      eGFR Non African Amer 66 mL/min/1.73      Globulin 3.0 gm/dL      A/G Ratio 1.4 g/dL      BUN/Creatinine Ratio 15.9     Anion Gap 12.0 mmol/L     Narrative:       GFR Normal >60  Chronic Kidney Disease <60  Kidney Failure <15      BNP [[id number]]  (Normal) Collected:  07/08/20 1924    Specimen:  Blood Updated:  07/08/20 1949     proBNP 1,741.0 pg/mL     Narrative:       Among patients with dyspnea, NT-proBNP is highly sensitive for the detection of acute congestive heart failure. In addition NT-proBNP of <300 pg/ml effectively rules out acute congestive heart failure with 99% negative predictive value.    Results may be falsely decreased if patient taking Biotin.          Imaging Results (Most Recent)     Procedure Component Value Units Date/Time    XR Hip With or Without Pelvis 1 View Left [[id number]] Collected:  07/10/20 1308     Updated:  07/10/20 1430    Narrative:         PROCEDURE:Left  Hip AP view     REASON FOR EXAM: Postop, R55 Syncope and collapse S01.81XA  Laceration without foreign body of other part of head, initial  encounter S72.002A Fracture of unspecified part of neck of left  femur, initial encounter for closed fracture W19.XXXA  Unspecified  fall, initial encounter S09.90XA Unspecified injury of head,  initial encounter S72.002A Fracture of unspecified part of neck  of left femur, initial encounter for sean    FINDINGS: Comparison exam dated July 8, 2020. There is been  interval resection of the left femoral head and femoral neck  region. Left bipolar hip prostheses placement with prostheses  appearing appropriately positioned. Otherwise the bony structures  of the left hip are unremarkable. Mild postsurgical soft tissue  swelling and soft tissue air within the postsurgical bed.  Remainder of left hip exam unremarkable.      Impression:       1.  There is been interval resection of the left femoral head and  femoral neck region. Left bipolar hip prostheses placement with  prostheses appearing appropriately positioned.   2.  Mild postsurgical soft tissue swelling and soft tissue air  within the postsurgical bed.  3.  Remainder of left hip exam is unremarkable.    Electronically signed by:  Mauri Noriega MD  7/10/2020 1:42 PM CDT  Workstation: [id number]    CT Cervical Spine Without Contrast [[id number]] Collected:  07/08/20 1850     Updated:  07/08/20 1937    Narrative:       CT cervical spine without contrast    HISTORY: Fell    Nonenhanced axial scans of the cervical spine were obtained.  Sagittal and coronal reconstructions were performed.    This exam was performed according to our departmental  dose-optimization program, which includes automated exposure  control, adjustment of the mA and/or kV according to patient size  and/or use of iterative reconstruction technique.    CT DLP: 454.30    Findings:  Normal cervical lordosis.   Vertebral height maintained.   No fracture identified.   Multilevel facet arthropathy with resultant minimal  retrolisthesis of C3 on C4 and minimal anterolisthesis of C4 on  C5 and C5 on C6.  Degenerative disc disease C6-7.    Chronic obstructive pulmonary disease.  5 mm right upper lobe pulmonary  nodule.  Sternotomy.      Impression:       CONCLUSION:  No cervical fracture.  Multilevel facet arthropathy with resultant minimal  retrolisthesis of C3 on C4 and minimal anterolisthesis of C4 on  C5 and C5 on C6.  Degenerative disc disease C6-7.  Chronic obstructive pulmonary disease.  5 mm right upper lobe pulmonary nodule.  No follow-up recommended as below.  Sternotomy.      2017 Fleischner Society Recommendations for Single Solid Lung  Nodule Follow-Up based on size (average of long- and short-axis  diameters)    <6 mm Low-Risk Patient: No routine follow-up   <6 mm High-Risk Patient: Optional CT at 12 months    6-8 mm Low-Risk Patient: CT at 6-12 months then consider CT at  18-24 months  6-8 mm High-Risk Patient: CT at 6-12 months then CT at 18-24  months    >8 mm Low-Risk Patient: Consider CT, PET/CT or tissue sampling at  3 months  >8 mm High-Risk Patient: Same as for low-risk patient     [id number]    Electronically signed by:  Emmanuel Burris MD  7/8/2020 7:36 PM CDT  Workstation: [id number]    CT Head Without Contrast [[id number]] Collected:  07/08/20 1850     Updated:  07/08/20 1931    Narrative:         CT Head Without Contrast    History: Fell. Laceration left eyebrow.    Axial scans of the brain were obtained without intravenous  contrast.  Coronal and sagital reconstructions were preformed.    This exam was performed according to our departmental  dose-optimization program, which includes automated exposure  control, adjustment of the mA and/or kV according to patient size  and/or use of iterative reconstruction technique.    DLP: 964.00    Comparison: None    Findings:  Bone windows are unremarkable.  The visualized paranasal sinuses are unremarkable.    No intracranial injury or acute process.  Cerebral and cerebellar atrophy.  Old lacunar infarcts in the basal ganglia.  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  Moderate small vessel disease.   No hemorrhage.  No mass.  No abnormal areas of increased  attenuation.  No midline shift.  No abnormal extra-axial fluid collections.      Impression:       CONCLUSION:  No intracranial injury or acute process.  Cerebral and cerebellar atrophy.  Old lacunar infarcts in the basal ganglia.  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  Moderate small vessel disease.    [id number]    Electronically signed by:  Emmanuel Burris MD  7/8/2020 7:30 PM CDT  Workstation: [id number]    XR Hip With or Without Pelvis 2 - 3 View Left [[id number]] Collected:  07/08/20 1840     Updated:  07/08/20 1915    Narrative:         Two view left hip with single view pelvis    HISTORY: Pain after falling. Syncope and collapse.    AP and crosstable lateral lateral views of the left hip and AP  film of the pelvis obtained.    COMPARISON: None    FINDINGS:   Comminuted minimally displaced transcervical fracture left hip.  No dislocation of the femoral head.  Dextroscoliosis lumbar spine.  Multilevel degenerative disc disease lumbar spine..  No other osseous or articular abnormality.    Pelvic phleboliths.  Atherosclerotic calcification iliac and femoral arteries.      Impression:       CONCLUSION:  Comminuted minimally displaced transcervical fracture left hip.    [id number]    Electronically signed by:  Emmanuel Burris MD  7/8/2020 7:14 PM CDT  Workstation: [id number]    XR Chest 1 View [[id number]] Collected:  07/08/20 1840     Updated:  07/08/20 1913    Narrative:         PORTABLE CHEST    HISTORY: Chest pain. Syncope and collapse.    Portable AP supine film of the chest was obtained at 6:45 PM.  COMPARISON: None    FINDINGS:   EKG leads.  Bilateral linear atelectasis or scar.  Sternotomy.  The heart is not enlarged.  The pulmonary vasculature is not increased.  No pleural effusion.  No pneumothorax.  No acute osseous abnormality.  Degenerative changes are present in the thoracic spine.      Impression:       CONCLUSION:  Bilateral linear atelectasis or scar.  Sternotomy.    [id number]    Electronically signed by:  Emmanuel Burris MD   "7/8/2020 7:12 PM Fittr  Workstation: [id number]          Chief Complaint on Day of Discharge:  Hip pain    Hospital Course:  The patient is a 85 y.o. male who presented to [facility] with fall.  She had left hip pain and was found to have a fracture.  Orthopedics was consulted.  Due to patient's syncopal episode leading to the fall, cardiology was consulted for preoperative evaluation.  Cardiology felt that no further evaluation was required.  Patient underwent surgery without significant difficulty.  Patient worked well physical therapy occupational therapy.  Patient desired to go to skilled nursing facility, but insurance denied this initially.  After further consultation with the insurance company skilled nursing facility placement was approved and arranged.  Patient was transferred skilled nursing facility in good condition.    Condition on Discharge: Stable    Physical Exam on Discharge:  /72 (BP Location: Left arm, Patient Position: Lying)   Pulse 65   Temp 97.1 °F (36.2 °C) (Oral)   Resp 18   Ht 182.9 cm (72\")   Wt 66.1 kg (145 lb 12.8 oz) Comment: 3 blankets  SpO2 95%   BMI 19.77 kg/m²   Physical Exam   Constitutional: He appears well-developed and well-nourished.   HENT:   Head: Normocephalic and atraumatic.   Eyes: Pupils are equal, round, and reactive to light. EOM are normal.   Neck: Normal range of motion. Neck supple.   Cardiovascular: Normal rate, regular rhythm, normal heart sounds and intact distal pulses. Exam reveals no gallop and no friction rub.   No murmur heard.  Pulmonary/Chest: Effort normal and breath sounds normal. No respiratory distress. He has no wheezes. He has no rales. He exhibits no tenderness.   Abdominal: Soft. Bowel sounds are normal. He exhibits no distension. There is no tenderness.   Psychiatric: He has a normal mood and affect. His behavior is normal.   Vitals reviewed.        Discharge Disposition:  Skilled Nursing Facility (DC - " External)    Discharge Medications:     Discharge Medications      New Medications      Instructions Start Date   enoxaparin 30 MG/0.3ML solution syringe  Commonly known as:  LOVENOX   30 mg, Subcutaneous, Every 24 Hours      traMADol 50 MG tablet  Commonly known as:  ULTRAM   50 mg, Oral, Every 6 Hours PRN         Changes to Medications      Instructions Start Date   gabapentin 600 MG tablet  Commonly known as:  NEURONTIN  What changed:  See the new instructions.   600 mg, Oral, 3 Times Daily         Continue These Medications      Instructions Start Date   albuterol sulfate  (90 Base) MCG/ACT inhaler  Commonly known as:  PROVENTIL HFA;VENTOLIN HFA;PROAIR HFA   INHALE 2 PUFFS INTO THE LUNGS EVERY 6 (SIX) HOURS AS NEEDED FOR WHEEZING      amitriptyline 100 MG tablet  Commonly known as:  ELAVIL   100 mg, Oral, Every Night at Bedtime      aspirin 81 MG EC tablet   81 mg, Oral, Daily      Colcrys 0.6 MG tablet  Generic drug:  colchicine   0.6 mg, Oral, Daily      Lipitor 20 MG tablet  Generic drug:  atorvastatin   1 tablet, Oral      losartan 25 MG tablet  Commonly known as:  COZAAR   TAKE 1 TABLET BY MOUTH EVERY DAY      metoprolol succinate XL 25 MG 24 hr tablet  Commonly known as:  TOPROL-XL   TAKE 1 TABLET BY MOUTH EVERY DAY      omeprazole 40 MG capsule  Commonly known as:  priLOSEC   40 mg, Oral, Every Morning      rOPINIRole 0.5 MG tablet  Commonly known as:  REQUIP   0.5 mg, Oral, Daily      tolterodine 2 MG tablet  Commonly known as:  DETROL   2 mg, Oral, 2 Times Daily         Stop These Medications    meloxicam 15 MG tablet  Commonly known as:  MOBIC            Discharge Diet:   Diet Instructions     Advance Diet As Tolerated            Activity at Discharge:   Activity Instructions     Activity as Tolerated      PT/OT          Follow-up Appointments:   Future Appointments   Date Time Provider Department Center   8/5/2020  8:30 AM Jitendra Solis MD [facility] None           This document has  been electronically signed by Jcarlos Leyva MD on July 26, 2020 18:33      Time: 35 min

## 2020-07-29 ENCOUNTER — APPOINTMENT (OUTPATIENT)
Dept: GENERAL RADIOLOGY | Facility: HOSPITAL | Age: 85
End: 2020-07-29

## 2020-07-29 ENCOUNTER — HOSPITAL ENCOUNTER (INPATIENT)
Facility: HOSPITAL | Age: 85
LOS: 8 days | Discharge: NURSING, REHAB OR LONG-TERM CARE | End: 2020-08-06
Attending: EMERGENCY MEDICINE | Admitting: INTERNAL MEDICINE

## 2020-07-29 DIAGNOSIS — Z74.09 IMPAIRED MOBILITY AND ADLS: ICD-10-CM

## 2020-07-29 DIAGNOSIS — R41.82 ALTERED MENTAL STATUS, UNSPECIFIED ALTERED MENTAL STATUS TYPE: ICD-10-CM

## 2020-07-29 DIAGNOSIS — R13.12 OROPHARYNGEAL DYSPHAGIA: ICD-10-CM

## 2020-07-29 DIAGNOSIS — S72.002A CLOSED FRACTURE OF LEFT HIP, INITIAL ENCOUNTER (HCC): ICD-10-CM

## 2020-07-29 DIAGNOSIS — R33.8 ACUTE URINARY RETENTION: ICD-10-CM

## 2020-07-29 DIAGNOSIS — J18.9 PNEUMONIA OF BOTH LOWER LOBES DUE TO INFECTIOUS ORGANISM: Primary | ICD-10-CM

## 2020-07-29 DIAGNOSIS — Z78.9 IMPAIRED MOBILITY AND ADLS: ICD-10-CM

## 2020-07-29 DIAGNOSIS — R26.89 DECREASED FUNCTIONAL MOBILITY: ICD-10-CM

## 2020-07-29 LAB
ALBUMIN SERPL-MCNC: 3.2 G/DL (ref 3.5–5.2)
ALBUMIN/GLOB SERPL: 0.9 G/DL
ALP SERPL-CCNC: 189 U/L (ref 39–117)
ALT SERPL W P-5'-P-CCNC: 42 U/L (ref 1–41)
ANION GAP SERPL CALCULATED.3IONS-SCNC: 9 MMOL/L (ref 5–15)
ARTERIAL PATENCY WRIST A: ABNORMAL
AST SERPL-CCNC: 48 U/L (ref 1–40)
ATMOSPHERIC PRESS: 746 MMHG
BASE EXCESS BLDA CALC-SCNC: 0.3 MMOL/L (ref 0–2)
BASOPHILS # BLD AUTO: 0.11 10*3/MM3 (ref 0–0.2)
BASOPHILS NFR BLD AUTO: 0.7 % (ref 0–1.5)
BDY SITE: ABNORMAL
BILIRUB SERPL-MCNC: 0.6 MG/DL (ref 0–1.2)
BILIRUB UR QL STRIP: ABNORMAL
BUN SERPL-MCNC: 37 MG/DL (ref 8–23)
BUN/CREAT SERPL: 29.6 (ref 7–25)
CALCIUM SPEC-SCNC: 9.7 MG/DL (ref 8.6–10.5)
CHLORIDE SERPL-SCNC: 104 MMOL/L (ref 98–107)
CLARITY UR: CLEAR
CO2 SERPL-SCNC: 25 MMOL/L (ref 22–29)
COLOR UR: ABNORMAL
CREAT SERPL-MCNC: 1.25 MG/DL (ref 0.76–1.27)
CRP SERPL-MCNC: 19.99 MG/DL (ref 0–0.5)
D-LACTATE SERPL-SCNC: 1 MMOL/L (ref 0.5–2)
DEPRECATED RDW RBC AUTO: 49.8 FL (ref 37–54)
EOSINOPHIL # BLD AUTO: 0.42 10*3/MM3 (ref 0–0.4)
EOSINOPHIL NFR BLD AUTO: 2.6 % (ref 0.3–6.2)
ERYTHROCYTE [DISTWIDTH] IN BLOOD BY AUTOMATED COUNT: 14.5 % (ref 12.3–15.4)
FERRITIN SERPL-MCNC: 997.2 NG/ML (ref 30–400)
GFR SERPL CREATININE-BSD FRML MDRD: 55 ML/MIN/1.73
GLOBULIN UR ELPH-MCNC: 3.5 GM/DL
GLUCOSE SERPL-MCNC: 92 MG/DL (ref 65–99)
GLUCOSE UR STRIP-MCNC: NEGATIVE MG/DL
HCO3 BLDA-SCNC: 24.8 MMOL/L (ref 20–26)
HCT VFR BLD AUTO: 29.5 % (ref 37.5–51)
HGB BLD-MCNC: 9.7 G/DL (ref 13–17.7)
HGB UR QL STRIP.AUTO: NEGATIVE
HOLD SPECIMEN: NORMAL
IMM GRANULOCYTES # BLD AUTO: 0.07 10*3/MM3 (ref 0–0.05)
IMM GRANULOCYTES NFR BLD AUTO: 0.4 % (ref 0–0.5)
KETONES UR QL STRIP: NEGATIVE
L PNEUMO1 AG UR QL IA: NEGATIVE
LDH SERPL-CCNC: 200 U/L (ref 135–225)
LEUKOCYTE ESTERASE UR QL STRIP.AUTO: NEGATIVE
LYMPHOCYTES # BLD AUTO: 2.45 10*3/MM3 (ref 0.7–3.1)
LYMPHOCYTES NFR BLD AUTO: 15.4 % (ref 19.6–45.3)
Lab: ABNORMAL
MCH RBC QN AUTO: 30.9 PG (ref 26.6–33)
MCHC RBC AUTO-ENTMCNC: 32.9 G/DL (ref 31.5–35.7)
MCV RBC AUTO: 93.9 FL (ref 79–97)
MODALITY: ABNORMAL
MONOCYTES # BLD AUTO: 1.61 10*3/MM3 (ref 0.1–0.9)
MONOCYTES NFR BLD AUTO: 10.2 % (ref 5–12)
NEUTROPHILS NFR BLD AUTO: 11.2 10*3/MM3 (ref 1.7–7)
NEUTROPHILS NFR BLD AUTO: 70.7 % (ref 42.7–76)
NITRITE UR QL STRIP: NEGATIVE
NRBC BLD AUTO-RTO: 0 /100 WBC (ref 0–0.2)
NT-PROBNP SERPL-MCNC: 4193 PG/ML (ref 0–1800)
PCO2 BLDA: 38.9 MM HG (ref 35–45)
PH BLDA: 7.41 PH UNITS (ref 7.35–7.45)
PH UR STRIP.AUTO: <=5 [PH] (ref 5–9)
PLATELET # BLD AUTO: 371 10*3/MM3 (ref 140–450)
PMV BLD AUTO: 9.7 FL (ref 6–12)
PO2 BLDA: 57.8 MM HG (ref 83–108)
POTASSIUM SERPL-SCNC: 4.4 MMOL/L (ref 3.5–5.2)
PROCALCITONIN SERPL-MCNC: 0.55 NG/ML (ref 0–0.25)
PROT SERPL-MCNC: 6.7 G/DL (ref 6–8.5)
PROT UR QL STRIP: ABNORMAL
RBC # BLD AUTO: 3.14 10*6/MM3 (ref 4.14–5.8)
S PNEUM AG SPEC QL LA: NEGATIVE
SAO2 % BLDCOA: 90 % (ref 94–99)
SODIUM SERPL-SCNC: 138 MMOL/L (ref 136–145)
SP GR UR STRIP: 1.03 (ref 1–1.03)
TROPONIN T SERPL-MCNC: 0.06 NG/ML (ref 0–0.03)
UROBILINOGEN UR QL STRIP: ABNORMAL
VENTILATOR MODE: ABNORMAL
WBC # BLD AUTO: 15.86 10*3/MM3 (ref 3.4–10.8)
WHOLE BLOOD HOLD SPECIMEN: NORMAL
WHOLE BLOOD HOLD SPECIMEN: NORMAL

## 2020-07-29 PROCEDURE — 71045 X-RAY EXAM CHEST 1 VIEW: CPT

## 2020-07-29 PROCEDURE — 87086 URINE CULTURE/COLONY COUNT: CPT | Performed by: INTERNAL MEDICINE

## 2020-07-29 PROCEDURE — 80053 COMPREHEN METABOLIC PANEL: CPT | Performed by: EMERGENCY MEDICINE

## 2020-07-29 PROCEDURE — 87635 SARS-COV-2 COVID-19 AMP PRB: CPT | Performed by: EMERGENCY MEDICINE

## 2020-07-29 PROCEDURE — 87040 BLOOD CULTURE FOR BACTERIA: CPT | Performed by: EMERGENCY MEDICINE

## 2020-07-29 PROCEDURE — 93005 ELECTROCARDIOGRAM TRACING: CPT | Performed by: EMERGENCY MEDICINE

## 2020-07-29 PROCEDURE — 83605 ASSAY OF LACTIC ACID: CPT | Performed by: EMERGENCY MEDICINE

## 2020-07-29 PROCEDURE — 85025 COMPLETE CBC W/AUTO DIFF WBC: CPT | Performed by: EMERGENCY MEDICINE

## 2020-07-29 PROCEDURE — 87147 CULTURE TYPE IMMUNOLOGIC: CPT | Performed by: EMERGENCY MEDICINE

## 2020-07-29 PROCEDURE — 83615 LACTATE (LD) (LDH) ENZYME: CPT | Performed by: INTERNAL MEDICINE

## 2020-07-29 PROCEDURE — 25010000002 AZITHROMYCIN PER 500 MG: Performed by: EMERGENCY MEDICINE

## 2020-07-29 PROCEDURE — 99285 EMERGENCY DEPT VISIT HI MDM: CPT

## 2020-07-29 PROCEDURE — 82803 BLOOD GASES ANY COMBINATION: CPT

## 2020-07-29 PROCEDURE — 93010 ELECTROCARDIOGRAM REPORT: CPT | Performed by: INTERNAL MEDICINE

## 2020-07-29 PROCEDURE — 84484 ASSAY OF TROPONIN QUANT: CPT | Performed by: EMERGENCY MEDICINE

## 2020-07-29 PROCEDURE — 83880 ASSAY OF NATRIURETIC PEPTIDE: CPT | Performed by: EMERGENCY MEDICINE

## 2020-07-29 PROCEDURE — 81003 URINALYSIS AUTO W/O SCOPE: CPT | Performed by: EMERGENCY MEDICINE

## 2020-07-29 PROCEDURE — 36600 WITHDRAWAL OF ARTERIAL BLOOD: CPT

## 2020-07-29 PROCEDURE — 87150 DNA/RNA AMPLIFIED PROBE: CPT | Performed by: EMERGENCY MEDICINE

## 2020-07-29 PROCEDURE — 94799 UNLISTED PULMONARY SVC/PX: CPT

## 2020-07-29 PROCEDURE — 82728 ASSAY OF FERRITIN: CPT | Performed by: INTERNAL MEDICINE

## 2020-07-29 PROCEDURE — 25010000002 CEFTRIAXONE PER 250 MG: Performed by: EMERGENCY MEDICINE

## 2020-07-29 PROCEDURE — 86140 C-REACTIVE PROTEIN: CPT | Performed by: INTERNAL MEDICINE

## 2020-07-29 PROCEDURE — 87899 AGENT NOS ASSAY W/OPTIC: CPT | Performed by: INTERNAL MEDICINE

## 2020-07-29 PROCEDURE — 84145 PROCALCITONIN (PCT): CPT | Performed by: INTERNAL MEDICINE

## 2020-07-29 RX ORDER — SODIUM CHLORIDE 0.9 % (FLUSH) 0.9 %
10 SYRINGE (ML) INJECTION AS NEEDED
Status: DISCONTINUED | OUTPATIENT
Start: 2020-07-29 | End: 2020-08-06 | Stop reason: HOSPADM

## 2020-07-29 RX ORDER — COLCHICINE 0.6 MG/1
0.6 TABLET ORAL DAILY
Status: DISCONTINUED | OUTPATIENT
Start: 2020-07-30 | End: 2020-08-06 | Stop reason: HOSPADM

## 2020-07-29 RX ORDER — SODIUM CHLORIDE 0.9 % (FLUSH) 0.9 %
10 SYRINGE (ML) INJECTION EVERY 12 HOURS SCHEDULED
Status: DISCONTINUED | OUTPATIENT
Start: 2020-07-29 | End: 2020-08-06 | Stop reason: HOSPADM

## 2020-07-29 RX ORDER — MORPHINE SULFATE 2 MG/ML
1 INJECTION, SOLUTION INTRAMUSCULAR; INTRAVENOUS EVERY 4 HOURS PRN
Status: DISCONTINUED | OUTPATIENT
Start: 2020-07-29 | End: 2020-08-04

## 2020-07-29 RX ORDER — TRAMADOL HYDROCHLORIDE 50 MG/1
50 TABLET ORAL EVERY 6 HOURS PRN
Status: DISPENSED | OUTPATIENT
Start: 2020-07-29 | End: 2020-07-31

## 2020-07-29 RX ORDER — ONDANSETRON 2 MG/ML
4 INJECTION INTRAMUSCULAR; INTRAVENOUS EVERY 6 HOURS PRN
Status: DISCONTINUED | OUTPATIENT
Start: 2020-07-29 | End: 2020-08-06 | Stop reason: HOSPADM

## 2020-07-29 RX ORDER — AMITRIPTYLINE HYDROCHLORIDE 50 MG/1
100 TABLET, FILM COATED ORAL NIGHTLY
Status: DISCONTINUED | OUTPATIENT
Start: 2020-07-29 | End: 2020-08-06 | Stop reason: HOSPADM

## 2020-07-29 RX ORDER — ASPIRIN 81 MG/1
81 TABLET ORAL DAILY
Status: DISCONTINUED | OUTPATIENT
Start: 2020-07-30 | End: 2020-08-06 | Stop reason: HOSPADM

## 2020-07-29 RX ORDER — METOPROLOL SUCCINATE 25 MG/1
25 TABLET, EXTENDED RELEASE ORAL DAILY
Status: DISCONTINUED | OUTPATIENT
Start: 2020-07-30 | End: 2020-08-06 | Stop reason: HOSPADM

## 2020-07-29 RX ORDER — ATORVASTATIN CALCIUM 20 MG/1
20 TABLET, FILM COATED ORAL NIGHTLY
Status: DISCONTINUED | OUTPATIENT
Start: 2020-07-30 | End: 2020-08-06 | Stop reason: HOSPADM

## 2020-07-29 RX ORDER — SODIUM CHLORIDE 9 MG/ML
50 INJECTION, SOLUTION INTRAVENOUS CONTINUOUS
Status: DISCONTINUED | OUTPATIENT
Start: 2020-07-29 | End: 2020-07-30

## 2020-07-29 RX ORDER — OXYBUTYNIN CHLORIDE 10 MG/1
10 TABLET, EXTENDED RELEASE ORAL DAILY
Status: DISCONTINUED | OUTPATIENT
Start: 2020-07-30 | End: 2020-08-06 | Stop reason: HOSPADM

## 2020-07-29 RX ORDER — GABAPENTIN 300 MG/1
600 CAPSULE ORAL EVERY 8 HOURS SCHEDULED
Status: DISCONTINUED | OUTPATIENT
Start: 2020-07-29 | End: 2020-08-06 | Stop reason: HOSPADM

## 2020-07-29 RX ORDER — ACETAMINOPHEN 325 MG/1
650 TABLET ORAL EVERY 4 HOURS PRN
Status: DISCONTINUED | OUTPATIENT
Start: 2020-07-29 | End: 2020-08-06 | Stop reason: HOSPADM

## 2020-07-29 RX ORDER — LOSARTAN POTASSIUM 25 MG/1
25 TABLET ORAL DAILY
Status: DISCONTINUED | OUTPATIENT
Start: 2020-07-30 | End: 2020-08-06 | Stop reason: HOSPADM

## 2020-07-29 RX ORDER — NALOXONE HCL 0.4 MG/ML
0.4 VIAL (ML) INJECTION
Status: DISCONTINUED | OUTPATIENT
Start: 2020-07-29 | End: 2020-08-06 | Stop reason: HOSPADM

## 2020-07-29 RX ORDER — PANTOPRAZOLE SODIUM 40 MG/1
40 TABLET, DELAYED RELEASE ORAL EVERY MORNING
Status: DISCONTINUED | OUTPATIENT
Start: 2020-07-30 | End: 2020-08-06 | Stop reason: HOSPADM

## 2020-07-29 RX ORDER — ROPINIROLE 0.5 MG/1
0.5 TABLET, FILM COATED ORAL DAILY
Status: DISCONTINUED | OUTPATIENT
Start: 2020-07-30 | End: 2020-08-06 | Stop reason: HOSPADM

## 2020-07-29 RX ADMIN — AZITHROMYCIN MONOHYDRATE 500 MG: 500 INJECTION, POWDER, LYOPHILIZED, FOR SOLUTION INTRAVENOUS at 17:18

## 2020-07-29 RX ADMIN — CEFTRIAXONE SODIUM 1 G: 1 INJECTION, POWDER, FOR SOLUTION INTRAMUSCULAR; INTRAVENOUS at 16:44

## 2020-07-29 RX ADMIN — SODIUM CHLORIDE, PRESERVATIVE FREE 10 ML: 5 INJECTION INTRAVENOUS at 21:22

## 2020-07-29 RX ADMIN — SODIUM CHLORIDE 50 ML/HR: 9 INJECTION, SOLUTION INTRAVENOUS at 21:22

## 2020-07-29 RX ADMIN — AMITRIPTYLINE HYDROCHLORIDE 100 MG: 50 TABLET, FILM COATED ORAL at 21:22

## 2020-07-29 RX ADMIN — GABAPENTIN 600 MG: 300 CAPSULE ORAL at 21:23

## 2020-07-29 RX ADMIN — DOXYCYCLINE 100 MG: 100 INJECTION, POWDER, LYOPHILIZED, FOR SOLUTION INTRAVENOUS at 21:22

## 2020-07-30 LAB
ANION GAP SERPL CALCULATED.3IONS-SCNC: 13 MMOL/L (ref 5–15)
BACTERIA BLD CULT: ABNORMAL
BACTERIA SPEC AEROBE CULT: NO GROWTH
BASOPHILS # BLD AUTO: 0.11 10*3/MM3 (ref 0–0.2)
BASOPHILS NFR BLD AUTO: 0.8 % (ref 0–1.5)
BUN SERPL-MCNC: 33 MG/DL (ref 8–23)
BUN/CREAT SERPL: 30.3 (ref 7–25)
CALCIUM SPEC-SCNC: 10.3 MG/DL (ref 8.6–10.5)
CHLORIDE SERPL-SCNC: 102 MMOL/L (ref 98–107)
CO2 SERPL-SCNC: 25 MMOL/L (ref 22–29)
CREAT SERPL-MCNC: 1.09 MG/DL (ref 0.76–1.27)
DEPRECATED RDW RBC AUTO: 50.4 FL (ref 37–54)
EOSINOPHIL # BLD AUTO: 0.71 10*3/MM3 (ref 0–0.4)
EOSINOPHIL NFR BLD AUTO: 5.4 % (ref 0.3–6.2)
ERYTHROCYTE [DISTWIDTH] IN BLOOD BY AUTOMATED COUNT: 14.4 % (ref 12.3–15.4)
GFR SERPL CREATININE-BSD FRML MDRD: 64 ML/MIN/1.73
GLUCOSE SERPL-MCNC: 76 MG/DL (ref 65–99)
HCT VFR BLD AUTO: 30.8 % (ref 37.5–51)
HGB BLD-MCNC: 10 G/DL (ref 13–17.7)
IMM GRANULOCYTES # BLD AUTO: 0.04 10*3/MM3 (ref 0–0.05)
IMM GRANULOCYTES NFR BLD AUTO: 0.3 % (ref 0–0.5)
LYMPHOCYTES # BLD AUTO: 1.76 10*3/MM3 (ref 0.7–3.1)
LYMPHOCYTES NFR BLD AUTO: 13.4 % (ref 19.6–45.3)
MCH RBC QN AUTO: 30.7 PG (ref 26.6–33)
MCHC RBC AUTO-ENTMCNC: 32.5 G/DL (ref 31.5–35.7)
MCV RBC AUTO: 94.5 FL (ref 79–97)
MONOCYTES # BLD AUTO: 1.25 10*3/MM3 (ref 0.1–0.9)
MONOCYTES NFR BLD AUTO: 9.5 % (ref 5–12)
NEUTROPHILS NFR BLD AUTO: 70.6 % (ref 42.7–76)
NEUTROPHILS NFR BLD AUTO: 9.28 10*3/MM3 (ref 1.7–7)
NRBC BLD AUTO-RTO: 0 /100 WBC (ref 0–0.2)
PLATELET # BLD AUTO: 380 10*3/MM3 (ref 140–450)
PMV BLD AUTO: 10.6 FL (ref 6–12)
POTASSIUM SERPL-SCNC: 4.2 MMOL/L (ref 3.5–5.2)
RBC # BLD AUTO: 3.26 10*6/MM3 (ref 4.14–5.8)
SARS-COV-2 N GENE RESP QL NAA+PROBE: NOT DETECTED
SODIUM SERPL-SCNC: 140 MMOL/L (ref 136–145)
WBC # BLD AUTO: 13.15 10*3/MM3 (ref 3.4–10.8)

## 2020-07-30 PROCEDURE — 85025 COMPLETE CBC W/AUTO DIFF WBC: CPT | Performed by: INTERNAL MEDICINE

## 2020-07-30 PROCEDURE — 80048 BASIC METABOLIC PNL TOTAL CA: CPT | Performed by: INTERNAL MEDICINE

## 2020-07-30 PROCEDURE — 25010000002 PIPERACILLIN SOD-TAZOBACTAM PER 1 G: Performed by: INTERNAL MEDICINE

## 2020-07-30 PROCEDURE — 97166 OT EVAL MOD COMPLEX 45 MIN: CPT

## 2020-07-30 PROCEDURE — 25010000002 ENOXAPARIN PER 10 MG: Performed by: INTERNAL MEDICINE

## 2020-07-30 PROCEDURE — 25010000002 MORPHINE PER 10 MG: Performed by: INTERNAL MEDICINE

## 2020-07-30 RX ADMIN — TRAMADOL HYDROCHLORIDE 50 MG: 50 TABLET, FILM COATED ORAL at 21:09

## 2020-07-30 RX ADMIN — PIPERACILLIN SODIUM AND TAZOBACTAM SODIUM 3.38 G: 3; .375 INJECTION, POWDER, LYOPHILIZED, FOR SOLUTION INTRAVENOUS at 22:10

## 2020-07-30 RX ADMIN — GABAPENTIN 600 MG: 300 CAPSULE ORAL at 20:33

## 2020-07-30 RX ADMIN — ROPINIROLE HYDROCHLORIDE 0.5 MG: 0.5 TABLET, FILM COATED ORAL at 09:00

## 2020-07-30 RX ADMIN — PIPERACILLIN SODIUM AND TAZOBACTAM SODIUM 3.38 G: 3; .375 INJECTION, POWDER, LYOPHILIZED, FOR SOLUTION INTRAVENOUS at 00:22

## 2020-07-30 RX ADMIN — PIPERACILLIN SODIUM AND TAZOBACTAM SODIUM 3.38 G: 3; .375 INJECTION, POWDER, LYOPHILIZED, FOR SOLUTION INTRAVENOUS at 04:33

## 2020-07-30 RX ADMIN — GABAPENTIN 600 MG: 300 CAPSULE ORAL at 14:18

## 2020-07-30 RX ADMIN — DOXYCYCLINE 100 MG: 100 INJECTION, POWDER, LYOPHILIZED, FOR SOLUTION INTRAVENOUS at 20:33

## 2020-07-30 RX ADMIN — LOSARTAN POTASSIUM 25 MG: 25 TABLET, FILM COATED ORAL at 09:00

## 2020-07-30 RX ADMIN — OXYBUTYNIN CHLORIDE 10 MG: 10 TABLET, EXTENDED RELEASE ORAL at 09:00

## 2020-07-30 RX ADMIN — AMITRIPTYLINE HYDROCHLORIDE 100 MG: 50 TABLET, FILM COATED ORAL at 20:40

## 2020-07-30 RX ADMIN — PIPERACILLIN SODIUM AND TAZOBACTAM SODIUM 3.38 G: 3; .375 INJECTION, POWDER, LYOPHILIZED, FOR SOLUTION INTRAVENOUS at 13:01

## 2020-07-30 RX ADMIN — DOXYCYCLINE 100 MG: 100 INJECTION, POWDER, LYOPHILIZED, FOR SOLUTION INTRAVENOUS at 11:03

## 2020-07-30 RX ADMIN — ENOXAPARIN SODIUM 40 MG: 40 INJECTION SUBCUTANEOUS at 06:06

## 2020-07-30 RX ADMIN — GABAPENTIN 600 MG: 300 CAPSULE ORAL at 06:06

## 2020-07-30 RX ADMIN — METOPROLOL SUCCINATE 25 MG: 25 TABLET, EXTENDED RELEASE ORAL at 09:00

## 2020-07-30 RX ADMIN — SODIUM CHLORIDE, PRESERVATIVE FREE 10 ML: 5 INJECTION INTRAVENOUS at 20:33

## 2020-07-30 RX ADMIN — MORPHINE SULFATE 1 MG: 2 INJECTION, SOLUTION INTRAMUSCULAR; INTRAVENOUS at 00:21

## 2020-07-30 RX ADMIN — COLCHICINE 0.6 MG: 0.6 TABLET, FILM COATED ORAL at 09:00

## 2020-07-30 RX ADMIN — ATORVASTATIN CALCIUM 20 MG: 20 TABLET, FILM COATED ORAL at 20:33

## 2020-07-30 RX ADMIN — ASPIRIN 81 MG: 81 TABLET, COATED ORAL at 09:00

## 2020-07-30 RX ADMIN — PANTOPRAZOLE SODIUM 40 MG: 40 TABLET, DELAYED RELEASE ORAL at 06:06

## 2020-07-31 ENCOUNTER — APPOINTMENT (OUTPATIENT)
Dept: CT IMAGING | Facility: HOSPITAL | Age: 85
End: 2020-07-31

## 2020-07-31 LAB
ANION GAP SERPL CALCULATED.3IONS-SCNC: 13 MMOL/L (ref 5–15)
BACTERIA SPEC AEROBE CULT: ABNORMAL
BASOPHILS # BLD AUTO: 0.13 10*3/MM3 (ref 0–0.2)
BASOPHILS NFR BLD AUTO: 0.9 % (ref 0–1.5)
BUN SERPL-MCNC: 30 MG/DL (ref 8–23)
BUN/CREAT SERPL: 28.8 (ref 7–25)
CALCIUM SPEC-SCNC: 10.1 MG/DL (ref 8.6–10.5)
CHLORIDE SERPL-SCNC: 102 MMOL/L (ref 98–107)
CO2 SERPL-SCNC: 24 MMOL/L (ref 22–29)
CREAT SERPL-MCNC: 1.04 MG/DL (ref 0.76–1.27)
DEPRECATED RDW RBC AUTO: 49.1 FL (ref 37–54)
EOSINOPHIL # BLD AUTO: 1.02 10*3/MM3 (ref 0–0.4)
EOSINOPHIL NFR BLD AUTO: 7.3 % (ref 0.3–6.2)
ERYTHROCYTE [DISTWIDTH] IN BLOOD BY AUTOMATED COUNT: 14.3 % (ref 12.3–15.4)
GFR SERPL CREATININE-BSD FRML MDRD: 68 ML/MIN/1.73
GLUCOSE SERPL-MCNC: 87 MG/DL (ref 65–99)
GRAM STN SPEC: ABNORMAL
HCT VFR BLD AUTO: 30.5 % (ref 37.5–51)
HGB BLD-MCNC: 10 G/DL (ref 13–17.7)
IMM GRANULOCYTES # BLD AUTO: 0.03 10*3/MM3 (ref 0–0.05)
IMM GRANULOCYTES NFR BLD AUTO: 0.2 % (ref 0–0.5)
ISOLATED FROM: ABNORMAL
LYMPHOCYTES # BLD AUTO: 1.83 10*3/MM3 (ref 0.7–3.1)
LYMPHOCYTES NFR BLD AUTO: 13.1 % (ref 19.6–45.3)
MCH RBC QN AUTO: 30.6 PG (ref 26.6–33)
MCHC RBC AUTO-ENTMCNC: 32.8 G/DL (ref 31.5–35.7)
MCV RBC AUTO: 93.3 FL (ref 79–97)
MONOCYTES # BLD AUTO: 1.39 10*3/MM3 (ref 0.1–0.9)
MONOCYTES NFR BLD AUTO: 10 % (ref 5–12)
NEUTROPHILS NFR BLD AUTO: 68.5 % (ref 42.7–76)
NEUTROPHILS NFR BLD AUTO: 9.52 10*3/MM3 (ref 1.7–7)
NRBC BLD AUTO-RTO: 0 /100 WBC (ref 0–0.2)
PLATELET # BLD AUTO: 345 10*3/MM3 (ref 140–450)
PMV BLD AUTO: 9.8 FL (ref 6–12)
POTASSIUM SERPL-SCNC: 4 MMOL/L (ref 3.5–5.2)
RBC # BLD AUTO: 3.27 10*6/MM3 (ref 4.14–5.8)
SODIUM SERPL-SCNC: 139 MMOL/L (ref 136–145)
WBC # BLD AUTO: 13.92 10*3/MM3 (ref 3.4–10.8)

## 2020-07-31 PROCEDURE — 80048 BASIC METABOLIC PNL TOTAL CA: CPT | Performed by: INTERNAL MEDICINE

## 2020-07-31 PROCEDURE — 71250 CT THORAX DX C-: CPT

## 2020-07-31 PROCEDURE — 92610 EVALUATE SWALLOWING FUNCTION: CPT | Performed by: SPEECH-LANGUAGE PATHOLOGIST

## 2020-07-31 PROCEDURE — 25010000002 PIPERACILLIN SOD-TAZOBACTAM PER 1 G: Performed by: INTERNAL MEDICINE

## 2020-07-31 PROCEDURE — 97535 SELF CARE MNGMENT TRAINING: CPT

## 2020-07-31 PROCEDURE — 97162 PT EVAL MOD COMPLEX 30 MIN: CPT

## 2020-07-31 PROCEDURE — 25010000002 ENOXAPARIN PER 10 MG: Performed by: INTERNAL MEDICINE

## 2020-07-31 PROCEDURE — 97530 THERAPEUTIC ACTIVITIES: CPT

## 2020-07-31 PROCEDURE — 97116 GAIT TRAINING THERAPY: CPT

## 2020-07-31 PROCEDURE — 85025 COMPLETE CBC W/AUTO DIFF WBC: CPT | Performed by: INTERNAL MEDICINE

## 2020-07-31 RX ADMIN — PIPERACILLIN SODIUM AND TAZOBACTAM SODIUM 3.38 G: 3; .375 INJECTION, POWDER, LYOPHILIZED, FOR SOLUTION INTRAVENOUS at 22:30

## 2020-07-31 RX ADMIN — SODIUM CHLORIDE, PRESERVATIVE FREE 10 ML: 5 INJECTION INTRAVENOUS at 21:12

## 2020-07-31 RX ADMIN — AMITRIPTYLINE HYDROCHLORIDE 100 MG: 50 TABLET, FILM COATED ORAL at 21:12

## 2020-07-31 RX ADMIN — SODIUM CHLORIDE, PRESERVATIVE FREE 10 ML: 5 INJECTION INTRAVENOUS at 09:00

## 2020-07-31 RX ADMIN — PANTOPRAZOLE SODIUM 40 MG: 40 TABLET, DELAYED RELEASE ORAL at 05:50

## 2020-07-31 RX ADMIN — TRAMADOL HYDROCHLORIDE 50 MG: 50 TABLET, FILM COATED ORAL at 09:30

## 2020-07-31 RX ADMIN — METOPROLOL SUCCINATE 25 MG: 25 TABLET, EXTENDED RELEASE ORAL at 08:59

## 2020-07-31 RX ADMIN — GABAPENTIN 600 MG: 300 CAPSULE ORAL at 21:12

## 2020-07-31 RX ADMIN — DOXYCYCLINE 100 MG: 100 INJECTION, POWDER, LYOPHILIZED, FOR SOLUTION INTRAVENOUS at 09:00

## 2020-07-31 RX ADMIN — PIPERACILLIN SODIUM AND TAZOBACTAM SODIUM 3.38 G: 3; .375 INJECTION, POWDER, LYOPHILIZED, FOR SOLUTION INTRAVENOUS at 05:50

## 2020-07-31 RX ADMIN — DOXYCYCLINE 100 MG: 100 INJECTION, POWDER, LYOPHILIZED, FOR SOLUTION INTRAVENOUS at 21:12

## 2020-07-31 RX ADMIN — ATORVASTATIN CALCIUM 20 MG: 20 TABLET, FILM COATED ORAL at 21:12

## 2020-07-31 RX ADMIN — ASPIRIN 81 MG: 81 TABLET, COATED ORAL at 09:00

## 2020-07-31 RX ADMIN — GABAPENTIN 600 MG: 300 CAPSULE ORAL at 05:50

## 2020-07-31 RX ADMIN — GABAPENTIN 600 MG: 300 CAPSULE ORAL at 14:03

## 2020-07-31 RX ADMIN — COLCHICINE 0.6 MG: 0.6 TABLET, FILM COATED ORAL at 09:00

## 2020-07-31 RX ADMIN — ENOXAPARIN SODIUM 40 MG: 40 INJECTION SUBCUTANEOUS at 05:50

## 2020-07-31 RX ADMIN — OXYBUTYNIN CHLORIDE 10 MG: 10 TABLET, EXTENDED RELEASE ORAL at 09:00

## 2020-07-31 RX ADMIN — LOSARTAN POTASSIUM 25 MG: 25 TABLET, FILM COATED ORAL at 08:59

## 2020-07-31 RX ADMIN — PIPERACILLIN SODIUM AND TAZOBACTAM SODIUM 3.38 G: 3; .375 INJECTION, POWDER, LYOPHILIZED, FOR SOLUTION INTRAVENOUS at 12:39

## 2020-07-31 RX ADMIN — ROPINIROLE HYDROCHLORIDE 0.5 MG: 0.5 TABLET, FILM COATED ORAL at 09:00

## 2020-08-01 ENCOUNTER — APPOINTMENT (OUTPATIENT)
Dept: MRI IMAGING | Facility: HOSPITAL | Age: 85
End: 2020-08-01

## 2020-08-01 LAB
ANION GAP SERPL CALCULATED.3IONS-SCNC: 13 MMOL/L (ref 5–15)
BASOPHILS # BLD AUTO: 0.1 10*3/MM3 (ref 0–0.2)
BASOPHILS NFR BLD AUTO: 0.7 % (ref 0–1.5)
BUN SERPL-MCNC: 23 MG/DL (ref 8–23)
BUN/CREAT SERPL: 21.7 (ref 7–25)
CALCIUM SPEC-SCNC: 9.8 MG/DL (ref 8.6–10.5)
CHLORIDE SERPL-SCNC: 100 MMOL/L (ref 98–107)
CO2 SERPL-SCNC: 24 MMOL/L (ref 22–29)
CREAT SERPL-MCNC: 1.06 MG/DL (ref 0.76–1.27)
DEPRECATED RDW RBC AUTO: 48.9 FL (ref 37–54)
EOSINOPHIL # BLD AUTO: 1.09 10*3/MM3 (ref 0–0.4)
EOSINOPHIL NFR BLD AUTO: 7.9 % (ref 0.3–6.2)
ERYTHROCYTE [DISTWIDTH] IN BLOOD BY AUTOMATED COUNT: 14.3 % (ref 12.3–15.4)
GFR SERPL CREATININE-BSD FRML MDRD: 67 ML/MIN/1.73
GLUCOSE SERPL-MCNC: 98 MG/DL (ref 65–99)
HCT VFR BLD AUTO: 31.6 % (ref 37.5–51)
HGB BLD-MCNC: 10.2 G/DL (ref 13–17.7)
IMM GRANULOCYTES # BLD AUTO: 0.05 10*3/MM3 (ref 0–0.05)
IMM GRANULOCYTES NFR BLD AUTO: 0.4 % (ref 0–0.5)
LYMPHOCYTES # BLD AUTO: 2.21 10*3/MM3 (ref 0.7–3.1)
LYMPHOCYTES NFR BLD AUTO: 16 % (ref 19.6–45.3)
MCH RBC QN AUTO: 30.1 PG (ref 26.6–33)
MCHC RBC AUTO-ENTMCNC: 32.3 G/DL (ref 31.5–35.7)
MCV RBC AUTO: 93.2 FL (ref 79–97)
MONOCYTES # BLD AUTO: 1.34 10*3/MM3 (ref 0.1–0.9)
MONOCYTES NFR BLD AUTO: 9.7 % (ref 5–12)
NEUTROPHILS NFR BLD AUTO: 65.3 % (ref 42.7–76)
NEUTROPHILS NFR BLD AUTO: 9.01 10*3/MM3 (ref 1.7–7)
NRBC BLD AUTO-RTO: 0 /100 WBC (ref 0–0.2)
PLATELET # BLD AUTO: 361 10*3/MM3 (ref 140–450)
PMV BLD AUTO: 10.4 FL (ref 6–12)
POTASSIUM SERPL-SCNC: 4 MMOL/L (ref 3.5–5.2)
RBC # BLD AUTO: 3.39 10*6/MM3 (ref 4.14–5.8)
SODIUM SERPL-SCNC: 137 MMOL/L (ref 136–145)
WBC # BLD AUTO: 13.8 10*3/MM3 (ref 3.4–10.8)

## 2020-08-01 PROCEDURE — 85025 COMPLETE CBC W/AUTO DIFF WBC: CPT | Performed by: INTERNAL MEDICINE

## 2020-08-01 PROCEDURE — 94760 N-INVAS EAR/PLS OXIMETRY 1: CPT

## 2020-08-01 PROCEDURE — 80048 BASIC METABOLIC PNL TOTAL CA: CPT | Performed by: INTERNAL MEDICINE

## 2020-08-01 PROCEDURE — 97530 THERAPEUTIC ACTIVITIES: CPT

## 2020-08-01 PROCEDURE — 25010000002 ENOXAPARIN PER 10 MG: Performed by: INTERNAL MEDICINE

## 2020-08-01 PROCEDURE — 94799 UNLISTED PULMONARY SVC/PX: CPT

## 2020-08-01 PROCEDURE — 70551 MRI BRAIN STEM W/O DYE: CPT

## 2020-08-01 PROCEDURE — 25010000002 PIPERACILLIN SOD-TAZOBACTAM PER 1 G: Performed by: INTERNAL MEDICINE

## 2020-08-01 RX ADMIN — ENOXAPARIN SODIUM 40 MG: 40 INJECTION SUBCUTANEOUS at 06:03

## 2020-08-01 RX ADMIN — PIPERACILLIN SODIUM AND TAZOBACTAM SODIUM 3.38 G: 3; .375 INJECTION, POWDER, LYOPHILIZED, FOR SOLUTION INTRAVENOUS at 04:14

## 2020-08-01 RX ADMIN — PANTOPRAZOLE SODIUM 40 MG: 40 TABLET, DELAYED RELEASE ORAL at 06:03

## 2020-08-01 RX ADMIN — DOXYCYCLINE 100 MG: 100 INJECTION, POWDER, LYOPHILIZED, FOR SOLUTION INTRAVENOUS at 12:45

## 2020-08-01 RX ADMIN — GABAPENTIN 600 MG: 300 CAPSULE ORAL at 06:03

## 2020-08-01 RX ADMIN — PIPERACILLIN SODIUM AND TAZOBACTAM SODIUM 3.38 G: 3; .375 INJECTION, POWDER, LYOPHILIZED, FOR SOLUTION INTRAVENOUS at 17:34

## 2020-08-01 RX ADMIN — DOXYCYCLINE 100 MG: 100 INJECTION, POWDER, LYOPHILIZED, FOR SOLUTION INTRAVENOUS at 23:45

## 2020-08-01 RX ADMIN — PIPERACILLIN SODIUM AND TAZOBACTAM SODIUM 3.38 G: 3; .375 INJECTION, POWDER, LYOPHILIZED, FOR SOLUTION INTRAVENOUS at 11:42

## 2020-08-01 NOTE — PLAN OF CARE
Problem: Patient Care Overview  Goal: Plan of Care Review  Outcome: Ongoing (interventions implemented as appropriate)  Flowsheets (Taken 8/1/2020 0016)  Plan of Care Reviewed With: patient  Outcome Summary: pt vs stable and is resting comfortably at this time; no new events throughout the night; will continue to monitor

## 2020-08-01 NOTE — PROGRESS NOTES
[facility] Medicine Services  INPATIENT PROGRESS NOTE    Length of Stay: 2  Date of Admission: 7/29/2020  Primary Care Physician: Terry Peterson MD    Subjective   Chief Complaint: Lethargy, altered mental status    HPI:  Patient is a 85 y.o. male with a past medical history of essential hypertension, gout, chronic pain, coronary artery disease, right bundle branch block, and recent admission and treatment for left hip fracture earlier in July 2020 with subsequent discharge to the skilled nursing facility for physical rehabilitation. He presented with complaints of lethargy of 3 days duration and was reportedly somnolent and unresponsive. He is being managed for sepsis secondary to pneumonia, acute respiratory failure with hypoxia and suspected gram-negative pneumonia.    This morning patient is alert and oriented. He complains of some cough productive of phlegm.    Review of Systems  Constitutional: Negative for activity change, appetite change, chills, fatigue and fever.   HENT: Negative for congestion, rhinorrhea, sore throat and trouble swallowing.    Respiratory: Positive for cough, chest tightness, shortness of breath and wheezing.    Cardiovascular: Negative for chest pain, palpitations and leg swelling.   Gastrointestinal: Negative for abdominal distention, abdominal pain, diarrhea, nausea and vomiting.   Genitourinary: Negative for difficulty urinating, dysuria and hematuria.   Musculoskeletal: Negative for arthralgias, back pain and myalgias.   Skin: Negative for pallor and rash.   Neurological: Negative for dizziness, syncope, weakness, light-headedness and headaches.   Hematological: Negative for adenopathy. Does not bruise/bleed easily.   Psychiatric/Behavioral: Negative for agitation and confusion. The patient is not nervous/anxious.     Objective    Temp:  [96.3 °F (35.7 °C)-97.9 °F (36.6 °C)] 96.3 °F (35.7 °C)  Heart Rate:  [73-89] 74  Resp:  [18]  18  BP: (130-162)/(70-78) 162/77    Physical Exam  Constitutional: He appears well-developed and well-nourished. No distress.   HENT:   Head: Normocephalic and atraumatic.   Mouth/Throat: Oropharynx is clear and moist. No oropharyngeal exudate.   Eyes: Pupils are equal, round, and reactive to light. Conjunctivae and EOM are normal. No scleral icterus.   Neck: Normal range of motion. Neck supple. No JVD present. No tracheal deviation present. No thyromegaly present.   Cardiovascular: Normal rate, regular rhythm, normal heart sounds and intact distal pulses. Exam reveals no gallop and no friction rub.   No murmur heard.  Pulmonary/Chest: Effort normal and breath sounds normal. No stridor. No respiratory distress. He has no wheezes. He has no rales. He exhibits no tenderness.   Abdominal: Soft. Bowel sounds are normal. He exhibits no distension and no mass. There is no tenderness. There is no rebound and no guarding. No hernia.   Musculoskeletal: Normal range of motion. He exhibits no edema, tenderness or deformity.   Lymphadenopathy:     He has no cervical adenopathy.   Neurological: He is alert. No cranial nerve deficit or sensory deficit. He exhibits normal muscle tone. Coordination normal.   Skin: Skin is warm and dry. No rash noted. He is not diaphoretic. No erythema. No pallor.   Psychiatric: He has a normal mood and affect. His behavior is normal. Judgment and thought content normal.    Medication Review:    Current Facility-Administered Medications:   •  acetaminophen (TYLENOL) tablet 650 mg, 650 mg, Oral, Q4H PRN, Pranay Melendez MD  •  amitriptyline (ELAVIL) tablet 100 mg, 100 mg, Oral, Nightly, Pranay Melendez MD, 100 mg at 07/30/20 2040  •  aspirin EC tablet 81 mg, 81 mg, Oral, Daily, Pranay Melendez MD, 81 mg at 07/31/20 0900  •  atorvastatin (LIPITOR) tablet 20 mg, 20 mg, Oral, Nightly, Pranay Melendez MD, 20 mg at 07/30/20 2033  •  colchicine tablet 0.6 mg, 0.6 mg, Oral, Daily, Al  Pranay VICENTE MD, 0.6 mg at 07/31/20 0900  •  doxycycline (VIBRAMYCIN) 100 mg/250 mL 0.9% NS VTB, 100 mg, Intravenous, Q12H, Pranay Melendez MD, 100 mg at 07/31/20 0900  •  enoxaparin (LOVENOX) syringe 40 mg, 40 mg, Subcutaneous, Q24H, Pranay Melendez MD, 40 mg at 07/31/20 0550  •  gabapentin (NEURONTIN) capsule 600 mg, 600 mg, Oral, Q8H, Pranay Melendez MD, 600 mg at 07/31/20 1403  •  losartan (COZAAR) tablet 25 mg, 25 mg, Oral, Daily, Pranay Melendze MD, 25 mg at 07/31/20 0859  •  metoprolol succinate XL (TOPROL-XL) 24 hr tablet 25 mg, 25 mg, Oral, Daily, Pranay Melendez MD, 25 mg at 07/31/20 0859  •  morphine injection 1 mg, 1 mg, Intravenous, Q4H PRN, 1 mg at 07/30/20 0021 **AND** naloxone (NARCAN) injection 0.4 mg, 0.4 mg, Intravenous, Q5 Min PRN, Pranay Melendez MD  •  ondansetron (ZOFRAN) injection 4 mg, 4 mg, Intravenous, Q6H PRN, Pranay Melendez MD  •  oxybutynin XL (DITROPAN-XL) 24 hr tablet 10 mg, 10 mg, Oral, Daily, Pranay Melendez MD, 10 mg at 07/31/20 0900  •  pantoprazole (PROTONIX) EC tablet 40 mg, 40 mg, Oral, QAM, Pranay Melendez MD, 40 mg at 07/31/20 0550  •  Pharmacy to Dose Zosyn, , Does not apply, Continuous PRN, Pranay Melendez MD  •  piperacillin-tazobactam (ZOSYN) 3.375 g/100 mL 0.9% NS IVPB (mbp), 3.375 g, Intravenous, Q8H, Pranay Melendez MD, Stopped at 07/31/20 1730  •  rOPINIRole (REQUIP) tablet 0.5 mg, 0.5 mg, Oral, Daily, Pranay Melendez MD, 0.5 mg at 07/31/20 0900  •  sodium chloride 0.9 % flush 10 mL, 10 mL, Intravenous, PRN, Pranay Melendez MD  •  sodium chloride 0.9 % flush 10 mL, 10 mL, Intravenous, Q12H, Pranay Melendez MD, 10 mL at 07/31/20 0900  •  sodium chloride 0.9 % flush 10 mL, 10 mL, Intravenous, PRN, Pranay Melendez MD  •  traMADol (ULTRAM) tablet 50 mg, 50 mg, Oral, Q6H PRN, Pranay Melendez MD, 50 mg at 07/31/20 0930    I have reviewed the patient's current medications.     Results Review:  I have reviewed the  labs, radiology results, and diagnostic studies.    Laboratory Data:   Results from last 7 days   Lab Units 07/31/20  0635 07/30/20  0800 07/29/20  1505   SODIUM mmol/L 139 140 138   POTASSIUM mmol/L 4.0 4.2 4.4   CHLORIDE mmol/L 102 102 104   CO2 mmol/L 24.0 25.0 25.0   BUN mg/dL 30* 33* 37*   CREATININE mg/dL 1.04 1.09 1.25   GLUCOSE mg/dL 87 76 92   CALCIUM mg/dL 10.1 10.3 9.7   BILIRUBIN mg/dL  --   --  0.6   ALK PHOS U/L  --   --  189*   ALT (SGPT) U/L  --   --  42*   AST (SGOT) U/L  --   --  48*   ANION GAP mmol/L 13.0 13.0 9.0     Estimated Creatinine Clearance: 50 mL/min (by C-G formula based on SCr of 1.04 mg/dL).          Results from last 7 days   Lab Units 07/31/20  0635 07/30/20  0800 07/29/20  1505   WBC 10*3/mm3 13.92* 13.15* 15.86*   HEMOGLOBIN g/dL 10.0* 10.0* 9.7*   HEMATOCRIT % 30.5* 30.8* 29.5*   PLATELETS 10*3/mm3 345 380 371           Culture Data:   Blood Culture   Date Value Ref Range Status   07/29/2020 Staphylococcus, coagulase negative (C)  Final     Comment:     Probable contaminant requires clinical correlation, susceptibility not performed unless requested by physician.     07/29/2020 No growth at 2 days  Preliminary     Urine Culture   Date Value Ref Range Status   07/29/2020 No growth  Final     No results found for: RESPCX  No results found for: WOUNDCX  No results found for: STOOLCX  No components found for: BODYFLD    Radiology Data:   Imaging Results (Last 24 Hours)     Procedure Component Value Units Date/Time    CT Chest Without Contrast [[id number]] Collected:  07/31/20 1328     Updated:  07/31/20 2197    Narrative:           Patient Name: SYLVIA PACK    ORDERING: RIMA LEWIS    ATTENDING: RIMA LEWIS     REFERRING: RIMA LEWIS    -----------------------  EXAM DESCRIPTION: CT CHEST WO CONTRAST    CLINICAL HISTORY:  Acute resp illness J18.9 Pneumonia,  unspecified organism R41.82 Altered mental status, unspecified  Z74.09 Other reduced mobility Z78.9 Other  specified health status  R13.12 Dysphagia, oropharyngeal phase: Shortness of breath .     COMPARISON: Portable chest radiograph 7/29/2020.    DOSE LENGTH PRODUCT: 215.3    This exam was performed according to our departmental dose  optimization program, which includes automated exposure control,  adjustment of the mA and/or KV according to patient size and/or  use of iterative reconstruction technique.      TECHNIQUE: Axial imaging of the chest are obtained without  utilization of intravenous contrast. Coronal and sagittal  reformat images provided.    FINDINGS:    LUNGS/PLEURA: There is posterior lower lobe and by basilar  pulmonary infiltrative changes present. There is linear  atelectasis or scarring within the right middle lobe. No  associated pleural effusion or pneumothorax.  No pulmonary mass or suspicious nodule.  TRACHEA AND BRONCHI:  The trachea and bronchi are patent.  MEDIASTINUM, PANKAJ AND LYMPH NODES: There are couple lymph nodes  anteriorly and is below the level of the james measuring 1.65  and 1.3 cm. These may be reactive. No conglomerate or necrotic  adenopathy. No thyroid mass or enlargement. There is mild air  distention of the upper esophagus small amount of pooling  secretions. Mid to distal aspect the esophagus is mildly  circumferentially thickened. This may relate to esophagitis but  is nonspecific.  HEART AND PERICARDIUM: Borderline cardiac size without  pericardial effusion. Extensive coronary vascular calcification  is present.  VASCULAR: Vascular calcification involving the thoracic aorta  without aneurysmal dilation.  UPPER ABDOMEN: No acute finding within the included upper  abdomen. No adrenal mass.  OSSEOUS STRUCTURES: Sternotomy wires are present. There is  demineralization and degenerative changes within the included  spine.      Impression:       Bilateral posterior lower lobe and basilar infiltrates without  effusion.    Mildly prominent mediastinal lymph nodes likely reactive.  No  conglomerate or necrotic adenopathy.    Mild circumferential wall thickening of the mid to distal  esophagus. This may represent sequela of esophagitis however is  nonspecific. If warranted follow-up with direct visualization  could be performed.    Extensive coronary vascular calcification.    Electronically signed by:  Lakhwinder Chavarria MD  7/31/2020 2:35 PM  CDT Workstation: [id number]          Assessment/Plan     Hospital Problem List:  Principal Problem:    Pneumonia of both lower lobes due to infectious organism  Sepsis secondary to pneumonia present on admission  Suspected gram-negative pneumonia  Acute respiratory failure with hypoxia  Altered mental status and lethargy  Elevated troponin from demand ischemia secondary to pneumonia  Essential hypertension  Coronary artery disease  Gout     Plan  - Patient had lethargy for the previous 3 days along with leukocytosis and infiltrates on chest x-ray.  - Continue IV Zosyn with pharmacy to dose and IV doxycycline for suspected gram-negative pneumonia  - Follow-up blood cultures and urine cultures. Urine Legionella and strep pneumo antigen negative.  - COVID-19 test was negative  -Continue oxygen by nasal cannula to keep O2 sat greater than 92% and wean as tolerated.  - Continue medications for coronary artery disease  - Continue medications for gout  - MRI brain in the morning due to patient's expressive dysphasia and confusion.  -DVT prophylaxis with subcutaneous Lovenox  -PT/OT consult  -CODE STATUS is full code     I discussed the patient's findings and my recommendations with patient      Discharge Planning: In progress    Pranay Melendez MD   07/31/20   19:52

## 2020-08-01 NOTE — THERAPY TREATMENT NOTE
Acute Care - Physical Therapy Treatment Note  [facility]     Patient Name: Jose Dacosta  : 1935  MRN: [id number]  Today's Date: 2020  Onset of Illness/Injury or Date of Surgery: 20     Referring Physician: Dr. Melendez    Admit Date: 2020    Visit Dx:    ICD-10-CM ICD-9-CM   1. Pneumonia of both lower lobes due to infectious organism J18.9 486   2. Altered mental status, unspecified altered mental status type R41.82 780.97   3. Impaired mobility and ADLs Z74.09 V49.89    Z78.9    4. Oropharyngeal dysphagia R13.12 787.22     Patient Active Problem List   Diagnosis   • Coronary artery disease involving native coronary artery of native heart without angina pectoris   • Vasovagal syncope   • RBBB (right bundle branch block with left anterior fascicular block)   • Essential hypertension   • Ischemic cardiomyopathy   • Syncope and collapse   • Closed fracture of neck of left femur (CMS/[facility])   • Coronary artery disease with history of myocardial infarction without history of CABG   • Facial laceration   • HFrEF (heart failure with reduced ejection fraction) (CMS/[facility])   • Postoperative anemia due to acute blood loss   • Pneumonia of both lower lobes due to infectious organism       Therapy Treatment    Rehabilitation Treatment Summary     Row Name 20 1339             Treatment Time/Intention    Discipline  physical therapy assistant  -EM      Document Type  therapy note (daily note)  -EM      Subjective Information  no complaints  -EM      Mode of Treatment  physical therapy;individual therapy  -EM      Patient Effort  adequate  -EM      Existing Precautions/Restrictions  fall  -EM      Recorded by [EM] Tee Burris, PTA 20 1614      Row Name 20 1339             Vital Signs    Pre Systolic BP Rehab  158  -EM      Pre Treatment Diastolic BP  104  -EM      Post Systolic BP Rehab  129  -EM      Post Treatment Diastolic BP  77  -EM      Pretreatment Heart Rate (beats/min)   95  -EM      Pre SpO2 (%)  97  -EM      O2 Delivery Pre Treatment  supplemental O2  -EM      Pre Patient Position  Supine  -EM      Intra Patient Position  Sitting  -EM      Post Patient Position  Supine  -EM      Recorded by [EM] Tee Burris, PTA 08/01/20 1614      Row Name 08/01/20 1339             Cognitive Assessment/Intervention- PT/OT    Affect/Mental Status (Cognitive)  confused  -EM      Orientation Status (Cognition)  oriented to;person;place  -EM      Follows Commands (Cognition)  follows one step commands;50-74% accuracy  -EM      Recorded by [EM] Tee Burris, PTA 08/01/20 1614      Row Name 08/01/20 1339             Bed Mobility Assessment/Treatment    Supine-Sit [place] (Bed Mobility)  minimum assist (75% patient effort)  -EM      Sit-Supine [place] (Bed Mobility)  minimum assist (75% patient effort)  -EM      Bed Mobility, Safety Issues  decreased use of legs for bridging/pushing  -EM      Assistive Device (Bed Mobility)  bed rails;head of bed elevated;draw sheet  -EM      Recorded by [EM] Tee Burris, PTA 08/01/20 1614      Row Name 08/01/20 1339             Sit-Stand Transfer    Sit-Stand [place] (Transfers)  minimum assist (75% patient effort) Pt stood x3 attempts for pericare.  -EM      Assistive Device (Sit-Stand Transfers)  walker, front-wheeled  -EM2      Recorded by [EM] Tee Burris, PTA 08/01/20 1618  [EM2] Tee Burris, PTA 08/01/20 1614      Row Name 08/01/20 1339             Stand-Sit Transfer    Stand-Sit [place] (Transfers)  minimum assist (75% patient effort)  -EM      Assistive Device (Stand-Sit Transfers)  walker, front-wheeled  -EM      Recorded by [EM] Tee Burris, PTA 08/01/20 1614      Row Name 08/01/20 1339             Gait/Stairs Assessment/Training    [place] Level (Gait)  moderate assist (50% patient effort)  -EM      Assistive Device (Gait)  walker, front-wheeled  -EM      Distance in Feet (Gait)  1-2' to HOB  -EM       Deviations/Abnormal Patterns (Gait)  base of support, narrow  -EM      Comment (Gait/Stairs)  Pt incontinent with bowels and urination with standing. Immoblizer on L.   -EM2      Recorded by [EM] Tee Burris, PTA 08/01/20 1614  [EM2] Tee Burris, PTA 08/01/20 1618      Row Name 08/01/20 1334             Positioning and Restraints    Pre-Treatment Position  in bed  -EM      Post Treatment Position  bed  -EM      In Bed  supine;call light within reach;encouraged to call for assist;exit alarm on  -EM      Recorded by [EM] Tee Burris, PTA 08/01/20 1614      Row Name 08/01/20 1339             Pain Scale: Numbers Pre/Post-Treatment    Pain Scale: Numbers, Pretreatment  0/10 - no pain  -EM      Pain Scale: Numbers, Post-Treatment  9/10  -EM      Recorded by [EM] Tee Burris, PTA 08/01/20 1614      Row Name 08/01/20 1330             Outcome Summary/Treatment Plan (PT)    Daily Summary of Progress (PT)  progress toward functional goals is good  -EM      Plan for Continued Treatment (PT)  Continue  -EM      Anticipated Discharge Disposition (PT)  anticipate therapy at next level of care;skilled nursing facility  -EM      Recorded by [EM] Tee Burris, PTA 08/01/20 1614        User Key  (r) = Recorded By, (t) = Taken By, (c) = Cosigned By    Initials Name Effective Dates Discipline    EM Tee Burris, PTA 08/11/15 -  PT               Rehab Goal Summary     Row Name 08/01/20 1421             Bed Mobility Goal 1 (PT)    Activity/Assistive Device (Bed Mobility Goal 1, PT)  sit to supine;supine to sit  -EM      [place] Level/Cues Needed (Bed Mobility Goal 1, PT)  contact guard assist;standby assist  -EM      Time Frame (Bed Mobility Goal 1, PT)  by discharge  -EM      Progress/Outcomes (Bed Mobility Goal 1, PT)  goal not met  -EM         Transfer Goal 1 (PT)    Activity/Assistive Device (Transfer Goal 1, PT)  bed-to-chair/chair-to-bed;sit-to-stand/stand-to-sit  -EM      [place]/Cues Needed  (Transfer Goal 1, PT)  contact guard assist  -EM      Time Frame (Transfer Goal 1, PT)  by discharge  -EM      Progress/Outcome (Transfer Goal 1, PT)  goal not met  -EM         Gait Training Goal 1 (PT)    Activity/Assistive Device (Gait Training Goal 1, PT)  gait (walking locomotion);assistive device use;walker, rolling  -EM      [place] Level (Gait Training Goal 1, PT)  contact guard assist  -EM      Time Frame (Gait Training Goal 1, PT)  5 days  -EM      Progress/Outcome (Gait Training Goal 1, PT)  goal not met  -EM        User Key  (r) = Recorded By, (t) = Taken By, (c) = Cosigned By    Initials Name Provider Type Discipline    EM Tee Burris, PTA Physical Therapy Assistant PT          Physical Therapy Education                 Title: PT OT SLP Therapies (In Progress)     Topic: Physical Therapy (In Progress)     Point: Mobility training (In Progress)     Description:   Instruct learner(s) on safety and technique for assisting patient out of bed, chair or wheelchair.  Instruct in the proper use of assistive devices, such as walker, crutches, cane or brace.              Patient Friendly Description:   It's important to get you on your feet again, but we need to do so in a way that is safe for you. Falling has serious consequences, and your personal safety is the most important thing of all.        When it's time to get out of bed, one of us or a family member will sit next to you on the bed to give you support.     If your doctor or nurse tells you to use a walker, crutches, a cane, or a brace, be sure you use it every time you get out of bed, even if you think you don't need it.    Learning Progress Summary           Patient Acceptance, E, NR by LILA at 7/31/2020 1553                   Point: Home exercise program (Not Started)     Description:   Instruct learner(s) on appropriate technique for monitoring, assisting and/or progressing patient with therapeutic exercises and activities.              Learner  Progress:   Not documented in this visit.          Point: Body mechanics (In Progress)     Description:   Instruct learner(s) on proper positioning and spine alignment for patient and/or caregiver during mobility tasks and/or exercises.              Learning Progress Summary           Patient Acceptance, E, NR by LILA at 7/31/2020 1657                   Point: Precautions (In Progress)     Description:   Instruct learner(s) on prescribed precautions during mobility and gait tasks              Learning Progress Summary           Patient Acceptance, E, NR by LILA at 7/31/2020 1657                               User Key     Initials Effective Dates Name Provider Type Discipline    LILA 04/03/18 -  Angelica Torres, PT Physical Therapist PT                PT Recommendation and Plan  Anticipated Discharge Disposition (PT): anticipate therapy at next level of care, skilled nursing facility  Outcome Summary/Treatment Plan (PT)  Daily Summary of Progress (PT): progress toward functional goals is good  Plan for Continued Treatment (PT): Continue  Anticipated Discharge Disposition (PT): anticipate therapy at next level of care, skilled nursing facility  Plan of Care Reviewed With: patient  Progress: improving  Outcome Summary: Pt maryuri tx fair. Pt t/f sup-sit with Mod Ax1, sit-stand-sit with Mod Ax1. Pt stood x3 times for dep pericare each time. Pt took 1-2 small steps to HOB with Mod Ax1 with FWRW. No goals met this tx.  Outcome Measures     Row Name 08/01/20 1339 07/31/20 0752 07/30/20 1540       How much help from another person do you currently need...    Turning from your back to your side while in flat bed without using bedrails?  3  -EM  --  --    Moving from lying on back to sitting on the side of a flat bed without bedrails?  2  -EM  --  --    Moving to and from a bed to a chair (including a wheelchair)?  2  -EM  --  --    Standing up from a chair using your arms (e.g., wheelchair, bedside chair)?  2  -EM  --  --    Climbing  3-5 steps with a railing?  1  -EM  --  --    To walk in hospital room?  2  -EM  --  --    AM-PAC 6 Clicks Score (PT)  12  -EM  --  --       How much help from another is currently needed...    Putting on and taking off regular lower body clothing?  --  2  -BB  2  -AS    Bathing (including washing, rinsing, and drying)  --  2  -BB  2  -AS    Toileting (which includes using toilet bed pan or urinal)  --  2  -BB  2  -AS    Putting on and taking off regular upper body clothing  --  3  -BB  3  -AS    Taking care of personal grooming (such as brushing teeth)  --  3  -BB  3  -AS    Eating meals  --  3  -BB  3  -AS    AM-PAC 6 Clicks Score (OT)  --  15  -BB  15  -AS       Functional Assessment    Outcome Measure Options  AM-PAC 6 Clicks Basic Mobility (PT)  -EM  --  AM-PAC 6 Clicks Daily Activity (OT)  -AS      User Key  (r) = Recorded By, (t) = Taken By, (c) = Cosigned By    Initials Name Provider Type    EM Tee Burris PTA Physical Therapy Assistant    BB Loni Noonan, JEFF/L Occupational Therapy Assistant    AS Ligia Junior, OT Occupational Therapist         Time Calculation:   PT Charges     Row Name 08/01/20 1622             Time Calculation    Start Time  1339  -EM      Stop Time  1415  -EM      Time Calculation (min)  36 min  -EM         Time Calculation- PT    Total Timed Code Minutes- PT  36 minute(s)  -EM        User Key  (r) = Recorded By, (t) = Taken By, (c) = Cosigned By    Initials Name Provider Type     Tee Burris PTA Physical Therapy Assistant        Therapy Charges for Today     Code Description Service Date Service Provider Modifiers Qty    [id number]  PT THERAPEUTIC ACT EA 15 MIN 8/1/2020 Tee Burris PTA GP 2          PT G-Codes  Outcome Measure Options: AM-PAC 6 Clicks Basic Mobility (PT)  AM-PAC 6 Clicks Score (PT): 12  AM-PAC 6 Clicks Score (OT): 15    Tee Burris PTA  8/1/2020

## 2020-08-01 NOTE — SIGNIFICANT NOTE
08/01/20 1238   Rehab Treatment   Discipline speech language pathologist   Reason Treatment Not Performed unable to treat, medical status change  (Pt lethargic. Pt not safe for po intake. SLP spoke with RN and RN reported same concern for lethargy. Pt not safe for po at this time.)

## 2020-08-01 NOTE — PROGRESS NOTES
[facility]  INPATIENT PROGRESS NOTE    Length of Stay: 3  Date of Admission: 7/29/2020  Primary Care Physician: Terry Peterson MD    Subjective   Chief Complaint: Lethargy, altered mental status    HPI:  Patient is a 85 y.o. male with a past medical history of essential hypertension, gout, chronic pain, coronary artery disease, right bundle branch block, and recent admission and treatment for left hip fracture earlier in July 2020 with subsequent discharge to the skilled nursing facility for physical rehabilitation. He presented with complaints of lethargy of 3 days duration and was reportedly somnolent and unresponsive. He is being managed for sepsis secondary to pneumonia, acute respiratory failure with hypoxia and suspected gram-negative pneumonia.    This morning patient is alert and oriented. He complains of some cough productive of phlegm.    Review of Systems  Constitutional: Negative for activity change, appetite change, chills, fatigue and fever.   HENT: Negative for congestion, rhinorrhea, sore throat and trouble swallowing.    Respiratory: Positive for cough, chest tightness, shortness of breath and wheezing.    Cardiovascular: Negative for chest pain, palpitations and leg swelling.   Gastrointestinal: Negative for abdominal distention, abdominal pain, diarrhea, nausea and vomiting.   Genitourinary: Negative for difficulty urinating, dysuria and hematuria.   Musculoskeletal: Negative for arthralgias, back pain and myalgias.   Skin: Negative for pallor and rash.   Neurological: Negative for dizziness, syncope, weakness, light-headedness and headaches.   Hematological: Negative for adenopathy. Does not bruise/bleed easily.   Psychiatric/Behavioral: Negative for agitation and confusion. The patient is not nervous/anxious.     Objective    Temp:  [96.3 °F (35.7 °C)-97.3 °F (36.3 °C)] 96.5 °F (35.8 °C)  Heart Rate:  [73-96] 96  Resp:   [16-18] 18  BP: (116-162)/(59-86) 147/71    Physical Exam  Constitutional: He appears well-developed and well-nourished. No distress.   HENT:   Head: Normocephalic and atraumatic.   Mouth/Throat: Oropharynx is clear and moist. No oropharyngeal exudate.   Eyes: Pupils are equal, round, and reactive to light. Conjunctivae and EOM are normal. No scleral icterus.   Neck: Normal range of motion. Neck supple. No JVD present. No tracheal deviation present. No thyromegaly present.   Cardiovascular: Normal rate, regular rhythm, normal heart sounds and intact distal pulses. Exam reveals no gallop and no friction rub.   No murmur heard.  Pulmonary/Chest: Effort normal and breath sounds normal. No stridor. No respiratory distress. He has no wheezes. He has no rales. He exhibits no tenderness.   Abdominal: Soft. Bowel sounds are normal. He exhibits no distension and no mass. There is no tenderness. There is no rebound and no guarding. No hernia.   Musculoskeletal: Normal range of motion. He exhibits no edema, tenderness or deformity.   Lymphadenopathy:     He has no cervical adenopathy.   Neurological: He is alert. No cranial nerve deficit or sensory deficit. He exhibits normal muscle tone. Coordination normal.  He exhibits some garbled speech which appears to be baseline for him.  Skin: Skin is warm and dry. No rash noted. He is not diaphoretic. No erythema. No pallor.   Psychiatric: He has a normal mood and affect. His behavior is normal. Judgment and thought content normal.    Medication Review:    Current Facility-Administered Medications:   •  acetaminophen (TYLENOL) tablet 650 mg, 650 mg, Oral, Q4H PRN, Pranay Melendez MD  •  amitriptyline (ELAVIL) tablet 100 mg, 100 mg, Oral, Nightly, Pranay Melendez MD, 100 mg at 07/31/20 2112  •  aspirin EC tablet 81 mg, 81 mg, Oral, Daily, Pranay Melendez MD, 81 mg at 07/31/20 0900  •  atorvastatin (LIPITOR) tablet 20 mg, 20 mg, Oral, Nightly, Pranay Melendez MD, 20 mg  at 07/31/20 2112  •  colchicine tablet 0.6 mg, 0.6 mg, Oral, Daily, Pranay Melendez MD, 0.6 mg at 07/31/20 0900  •  doxycycline (VIBRAMYCIN) 100 mg/250 mL 0.9% NS VTB, 100 mg, Intravenous, Q12H, Pranay Melendez MD, 100 mg at 08/01/20 1245  •  enoxaparin (LOVENOX) syringe 40 mg, 40 mg, Subcutaneous, Q24H, Pranay Melendez MD, 40 mg at 08/01/20 0603  •  gabapentin (NEURONTIN) capsule 600 mg, 600 mg, Oral, Q8H, Pranay Melendez MD, 600 mg at 08/01/20 0603  •  losartan (COZAAR) tablet 25 mg, 25 mg, Oral, Daily, Pranay Melendez MD, 25 mg at 07/31/20 0859  •  metoprolol succinate XL (TOPROL-XL) 24 hr tablet 25 mg, 25 mg, Oral, Daily, Pranay Melendez MD, 25 mg at 07/31/20 0859  •  morphine injection 1 mg, 1 mg, Intravenous, Q4H PRN, 1 mg at 07/30/20 0021 **AND** naloxone (NARCAN) injection 0.4 mg, 0.4 mg, Intravenous, Q5 Min PRN, Pranay Melendez MD  •  ondansetron (ZOFRAN) injection 4 mg, 4 mg, Intravenous, Q6H PRN, Pranay Melendez MD  •  oxybutynin XL (DITROPAN-XL) 24 hr tablet 10 mg, 10 mg, Oral, Daily, Pranay Melendez MD, 10 mg at 07/31/20 0900  •  pantoprazole (PROTONIX) EC tablet 40 mg, 40 mg, Oral, QAM, Pranay Melendez MD, 40 mg at 08/01/20 0603  •  Pharmacy to Dose Zosyn, , Does not apply, Continuous PRN, Pranay Melendez MD  •  piperacillin-tazobactam (ZOSYN) 3.375 g/100 mL 0.9% NS IVPB (mbp), 3.375 g, Intravenous, Q8H, Pranay Melendez MD  •  rOPINIRole (REQUIP) tablet 0.5 mg, 0.5 mg, Oral, Daily, Pranay Melendez MD, 0.5 mg at 07/31/20 0900  •  sodium chloride 0.9 % flush 10 mL, 10 mL, Intravenous, PRN, Pranay Melendez MD  •  sodium chloride 0.9 % flush 10 mL, 10 mL, Intravenous, Q12H, Pranay Melendez MD, 10 mL at 07/31/20 2112  •  sodium chloride 0.9 % flush 10 mL, 10 mL, Intravenous, PRN, Pranay Melendez MD    I have reviewed the patient's current medications.     Results Review:  I have reviewed the labs, radiology results, and diagnostic  studies.    Laboratory Data:   Results from last 7 days   Lab Units 08/01/20  0709 07/31/20  0635 07/30/20  0800 07/29/20  1505   SODIUM mmol/L 137 139 140 138   POTASSIUM mmol/L 4.0 4.0 4.2 4.4   CHLORIDE mmol/L 100 102 102 104   CO2 mmol/L 24.0 24.0 25.0 25.0   BUN mg/dL 23 30* 33* 37*   CREATININE mg/dL 1.06 1.04 1.09 1.25   GLUCOSE mg/dL 98 87 76 92   CALCIUM mg/dL 9.8 10.1 10.3 9.7   BILIRUBIN mg/dL  --   --   --  0.6   ALK PHOS U/L  --   --   --  189*   ALT (SGPT) U/L  --   --   --  42*   AST (SGOT) U/L  --   --   --  48*   ANION GAP mmol/L 13.0 13.0 13.0 9.0     Estimated Creatinine Clearance: 49.1 mL/min (by C-G formula based on SCr of 1.06 mg/dL).          Results from last 7 days   Lab Units 08/01/20  0709 07/31/20  0635 07/30/20  0800 07/29/20  1505   WBC 10*3/mm3 13.80* 13.92* 13.15* 15.86*   HEMOGLOBIN g/dL 10.2* 10.0* 10.0* 9.7*   HEMATOCRIT % 31.6* 30.5* 30.8* 29.5*   PLATELETS 10*3/mm3 361 345 380 371           Culture Data:   Blood Culture   Date Value Ref Range Status   07/29/2020 Staphylococcus, coagulase negative (C)  Final     Comment:     Probable contaminant requires clinical correlation, susceptibility not performed unless requested by physician.       No results found for: URINECX  No results found for: RESPCX  No results found for: WOUNDCX  No results found for: STOOLCX  No components found for: BODYFLD    Radiology Data:   Imaging Results (Last 24 Hours)     Procedure Component Value Units Date/Time    MRI Brain Without Contrast [[id number]] Collected:  08/01/20 0916     Updated:  08/01/20 1102    Narrative:       MRI brain without contrast.         CLINICAL INDICATION: Effusion, delirium.          COMPARISON: CT brain June 8, 2020.   PROCEDURE/TECHNIQUE:    MRI of the brain was performed utilizing the standard protocol  without the administration of gadolinium.    FINDINGS: Diffusion-weighted images demonstrate no restricted  diffusion i.e. no evidence of acute stroke.    There are  involutional, atrophic changes. There are  periventricular foci of increased signal intensity, chronic small  vessel ischemic changes. 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        The gyri and sulci are otherwise unremarkable bilaterally  symmetric consistent with patient's age. Ventricular dilatation  secondary to atrophy. No mass lesions,  midline shift,  intracranial hemorrhage, or abnormal intra- or extra-axial fluid  collections are identified.  The brainstem and sellar and  parasellar structures are without abnormalities.  The orbits,  sinuses and mastoid air cells are unremarkable.  The visualized  intracranial vessels show normal signal void.      Impression:       Involutional, atrophic changes. Periventricular foci of increased  signal intensity, chronic small vessel ischemic changes. 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 MRI brain without contrast is otherwise  remarkable. There are no acute changes.    Electronically signed by:  Esdras Paul MD  8/1/2020 11:01 AM CDT  Workstation: [id number]          Assessment/Plan     Hospital Problem List:  Principal Problem:    Pneumonia of both lower lobes due to infectious organism  Sepsis secondary to pneumonia present on admission  Suspected gram-negative pneumonia  Acute respiratory failure with hypoxia  Altered mental status and lethargy  Elevated troponin from demand ischemia secondary to pneumonia  Essential hypertension  Coronary artery disease  Gout     Plan  - Patient had lethargy for 3 days prior to admission along with leukocytosis and infiltrates on chest x-ray.  - Continue IV Zosyn with pharmacy to dose and IV doxycycline for suspected gram-negative pneumonia  -  Blood culture growing contaminant in 1 bottle.  Follow-up final blood cultures.  Urine cultures showed no growth.  Urine Legionella and strep pneumo antigen negative.  - COVID-19 test was  negative  -Continue oxygen by nasal cannula to keep O2 sat greater than 92% and wean as tolerated.  - Continue medications for coronary artery disease  - Continue medications for gout  - MRI brain this morning did not show any acute stroke but did show old stroke lesions.  Patient's expressive dysphasia likely related to old strokes.  -Speech and swallow evaluation input appreciated.  Continue modified diet.  -Patient's confusion is resolved and he is alert and oriented today.  -DVT prophylaxis with subcutaneous Lovenox  -PT/OT consult  -CODE STATUS is full code     I discussed the patient's findings and my recommendations with patient and his daughter    Discharge Planning: In progress    Pranay Melendez MD   08/01/20   16:21

## 2020-08-01 NOTE — PLAN OF CARE
Problem: Patient Care Overview  Goal: Plan of Care Review  Outcome: Ongoing (interventions implemented as appropriate)  Flowsheets (Taken 8/1/2020 1619)  Progress: improving  Plan of Care Reviewed With: patient  Outcome Summary: Pt maryuri tx fair. Pt t/f sup-sit with Mod Ax1, sit-stand-sit with Mod Ax1. Pt stood x3 times for dep pericare each time. Pt took 1-2 small steps to HOB with Mod Ax1 with FWRW. No goals met this tx.

## 2020-08-02 LAB
ANION GAP SERPL CALCULATED.3IONS-SCNC: 13 MMOL/L (ref 5–15)
BASOPHILS # BLD AUTO: 0.1 10*3/MM3 (ref 0–0.2)
BASOPHILS NFR BLD AUTO: 0.8 % (ref 0–1.5)
BUN SERPL-MCNC: 17 MG/DL (ref 8–23)
BUN/CREAT SERPL: 17.9 (ref 7–25)
CALCIUM SPEC-SCNC: 10 MG/DL (ref 8.6–10.5)
CHLORIDE SERPL-SCNC: 102 MMOL/L (ref 98–107)
CO2 SERPL-SCNC: 23 MMOL/L (ref 22–29)
CREAT SERPL-MCNC: 0.95 MG/DL (ref 0.76–1.27)
DEPRECATED RDW RBC AUTO: 48 FL (ref 37–54)
EOSINOPHIL # BLD AUTO: 0.62 10*3/MM3 (ref 0–0.4)
EOSINOPHIL NFR BLD AUTO: 4.7 % (ref 0.3–6.2)
ERYTHROCYTE [DISTWIDTH] IN BLOOD BY AUTOMATED COUNT: 14.3 % (ref 12.3–15.4)
GFR SERPL CREATININE-BSD FRML MDRD: 76 ML/MIN/1.73
GLUCOSE SERPL-MCNC: 105 MG/DL (ref 65–99)
HCT VFR BLD AUTO: 31.6 % (ref 37.5–51)
HGB BLD-MCNC: 10.4 G/DL (ref 13–17.7)
IMM GRANULOCYTES # BLD AUTO: 0.07 10*3/MM3 (ref 0–0.05)
IMM GRANULOCYTES NFR BLD AUTO: 0.5 % (ref 0–0.5)
LYMPHOCYTES # BLD AUTO: 2.16 10*3/MM3 (ref 0.7–3.1)
LYMPHOCYTES NFR BLD AUTO: 16.4 % (ref 19.6–45.3)
MCH RBC QN AUTO: 30.1 PG (ref 26.6–33)
MCHC RBC AUTO-ENTMCNC: 32.9 G/DL (ref 31.5–35.7)
MCV RBC AUTO: 91.6 FL (ref 79–97)
MONOCYTES # BLD AUTO: 1.34 10*3/MM3 (ref 0.1–0.9)
MONOCYTES NFR BLD AUTO: 10.2 % (ref 5–12)
NEUTROPHILS NFR BLD AUTO: 67.4 % (ref 42.7–76)
NEUTROPHILS NFR BLD AUTO: 8.87 10*3/MM3 (ref 1.7–7)
NRBC BLD AUTO-RTO: 0 /100 WBC (ref 0–0.2)
PLATELET # BLD AUTO: 331 10*3/MM3 (ref 140–450)
PMV BLD AUTO: 9.8 FL (ref 6–12)
POTASSIUM SERPL-SCNC: 4.2 MMOL/L (ref 3.5–5.2)
RBC # BLD AUTO: 3.45 10*6/MM3 (ref 4.14–5.8)
SODIUM SERPL-SCNC: 138 MMOL/L (ref 136–145)
WBC # BLD AUTO: 13.16 10*3/MM3 (ref 3.4–10.8)

## 2020-08-02 PROCEDURE — 97535 SELF CARE MNGMENT TRAINING: CPT

## 2020-08-02 PROCEDURE — 85025 COMPLETE CBC W/AUTO DIFF WBC: CPT | Performed by: INTERNAL MEDICINE

## 2020-08-02 PROCEDURE — 97530 THERAPEUTIC ACTIVITIES: CPT

## 2020-08-02 PROCEDURE — 80048 BASIC METABOLIC PNL TOTAL CA: CPT | Performed by: INTERNAL MEDICINE

## 2020-08-02 PROCEDURE — 94799 UNLISTED PULMONARY SVC/PX: CPT

## 2020-08-02 PROCEDURE — 25010000002 MORPHINE PER 10 MG: Performed by: INTERNAL MEDICINE

## 2020-08-02 PROCEDURE — 92526 ORAL FUNCTION THERAPY: CPT | Performed by: SPEECH-LANGUAGE PATHOLOGIST

## 2020-08-02 PROCEDURE — 25010000002 PIPERACILLIN SOD-TAZOBACTAM PER 1 G: Performed by: INTERNAL MEDICINE

## 2020-08-02 RX ORDER — LANOLIN ALCOHOL/MO/W.PET/CERES
1000 CREAM (GRAM) TOPICAL DAILY
Status: DISCONTINUED | OUTPATIENT
Start: 2020-08-02 | End: 2020-08-06 | Stop reason: HOSPADM

## 2020-08-02 RX ORDER — GUAIFENESIN 600 MG/1
1200 TABLET, EXTENDED RELEASE ORAL EVERY 12 HOURS SCHEDULED
Status: DISCONTINUED | OUTPATIENT
Start: 2020-08-02 | End: 2020-08-06 | Stop reason: HOSPADM

## 2020-08-02 RX ADMIN — AMITRIPTYLINE HYDROCHLORIDE 100 MG: 50 TABLET, FILM COATED ORAL at 20:13

## 2020-08-02 RX ADMIN — OXYBUTYNIN CHLORIDE 10 MG: 10 TABLET, EXTENDED RELEASE ORAL at 09:24

## 2020-08-02 RX ADMIN — GUAIFENESIN 1200 MG: 600 TABLET, EXTENDED RELEASE ORAL at 17:25

## 2020-08-02 RX ADMIN — MORPHINE SULFATE 1 MG: 2 INJECTION, SOLUTION INTRAMUSCULAR; INTRAVENOUS at 09:19

## 2020-08-02 RX ADMIN — PIPERACILLIN SODIUM AND TAZOBACTAM SODIUM 3.38 G: 3; .375 INJECTION, POWDER, LYOPHILIZED, FOR SOLUTION INTRAVENOUS at 12:38

## 2020-08-02 RX ADMIN — ASPIRIN 81 MG: 81 TABLET, COATED ORAL at 09:24

## 2020-08-02 RX ADMIN — CYANOCOBALAMIN TAB 1000 MCG 1000 MCG: 1000 TAB at 17:25

## 2020-08-02 RX ADMIN — SODIUM CHLORIDE, PRESERVATIVE FREE 10 ML: 5 INJECTION INTRAVENOUS at 09:26

## 2020-08-02 RX ADMIN — GABAPENTIN 600 MG: 300 CAPSULE ORAL at 14:32

## 2020-08-02 RX ADMIN — COLCHICINE 0.6 MG: 0.6 TABLET, FILM COATED ORAL at 09:24

## 2020-08-02 RX ADMIN — PIPERACILLIN SODIUM AND TAZOBACTAM SODIUM 3.38 G: 3; .375 INJECTION, POWDER, LYOPHILIZED, FOR SOLUTION INTRAVENOUS at 02:45

## 2020-08-02 RX ADMIN — GABAPENTIN 600 MG: 300 CAPSULE ORAL at 20:13

## 2020-08-02 RX ADMIN — DOXYCYCLINE 100 MG: 100 INJECTION, POWDER, LYOPHILIZED, FOR SOLUTION INTRAVENOUS at 10:53

## 2020-08-02 RX ADMIN — METOPROLOL SUCCINATE 25 MG: 25 TABLET, EXTENDED RELEASE ORAL at 09:24

## 2020-08-02 RX ADMIN — ROPINIROLE HYDROCHLORIDE 0.5 MG: 0.5 TABLET, FILM COATED ORAL at 09:24

## 2020-08-02 RX ADMIN — PIPERACILLIN SODIUM AND TAZOBACTAM SODIUM 3.38 G: 3; .375 INJECTION, POWDER, LYOPHILIZED, FOR SOLUTION INTRAVENOUS at 17:25

## 2020-08-02 RX ADMIN — MORPHINE SULFATE 1 MG: 2 INJECTION, SOLUTION INTRAMUSCULAR; INTRAVENOUS at 01:14

## 2020-08-02 RX ADMIN — Medication 1 TABLET: at 17:25

## 2020-08-02 RX ADMIN — LOSARTAN POTASSIUM 25 MG: 25 TABLET, FILM COATED ORAL at 09:25

## 2020-08-02 NOTE — THERAPY TREATMENT NOTE
Acute Care - Occupational Therapy Treatment Note  [facility]     Patient Name: Jose Dacosta  : 1935  MRN: [id number]  Today's Date: 2020  Onset of Illness/Injury or Date of Surgery: 20  Date of Referral to OT: 20  Referring Physician: Dr. Melendez    Admit Date: 2020       ICD-10-CM ICD-9-CM   1. Pneumonia of both lower lobes due to infectious organism J18.9 486   2. Altered mental status, unspecified altered mental status type R41.82 780.97   3. Impaired mobility and ADLs Z74.09 V49.89    Z78.9    4. Oropharyngeal dysphagia R13.12 787.22     Patient Active Problem List   Diagnosis   • Coronary artery disease involving native coronary artery of native heart without angina pectoris   • Vasovagal syncope   • RBBB (right bundle branch block with left anterior fascicular block)   • Essential hypertension   • Ischemic cardiomyopathy   • Syncope and collapse   • Closed fracture of neck of left femur (CMS/[facility])   • Coronary artery disease with history of myocardial infarction without history of CABG   • Facial laceration   • HFrEF (heart failure with reduced ejection fraction) (CMS/[facility])   • Postoperative anemia due to acute blood loss   • Pneumonia of both lower lobes due to infectious organism     Past Medical History:   Diagnosis Date   • Abnormal ECG      Past Surgical History:   Procedure Laterality Date   • CORONARY STENT PLACEMENT     • HIP HEMIARTHROPLASTY Left 2020    Procedure: LEFT HIP HEMIARTHROPLASTY;  Surgeon: Jitendra Solis MD;  Location: [facility];  Service: Orthopedics;  Laterality: Left;       Therapy Treatment    Rehabilitation Treatment Summary     Row Name 20 0925 20 0840          Treatment Time/Intention    Discipline  speech language pathologist  -EK  occupational therapy assistant  -RC     Document Type  therapy note (daily note)  -EK  therapy note (daily note)  -RC2     Subjective Information  no complaints  -EK  complains of;pain  -RC2      Mode of Treatment  --  occupational therapy;individual therapy  -RC3     Patient/Family Observations  --  no visitor   -RC3     Total Minutes, Occupational Therapy Treatment  --  38  -RC2     Therapy Frequency (OT Eval)  --  -- 5-7 days week   -RC2     Patient Effort  good  -EK  adequate  -RC4     Existing Precautions/Restrictions  --  fall;hip, posterior  -RC3     Recorded by [EK] Yesenia Melton CCC-SLP 08/02/20 0944 [RC] Evelyn Dodson, AGUILAR/L 08/02/20 0847  [RC2] Evelyn Dodson, AGUILAR/L 08/02/20 0916  [RC3] Evelyn Dodson, AGUILAR/L 08/02/20 1353  [RC4] Evelyn Dodson, AGUILAR/L 08/02/20 0917     Row Name 08/02/20 0840             Vital Signs    Pre Systolic BP Rehab  145  -RC      Pre Treatment Diastolic BP  93  -RC      Post Systolic BP Rehab  148  -RC2      Post Treatment Diastolic BP  87  -RC2      Posttreatment Heart Rate (beats/min)  97  -RC2      Pre SpO2 (%)  95  -RC2      O2 Delivery Pre Treatment  supplemental O2  -RC2      Post SpO2 (%)  96  -RC2      O2 Delivery Post Treatment  supplemental O2  -RC2      Recorded by [RC] Evelyn Dodson, AGUILAR/L 08/02/20 0847  [RC2] Evelyn Dodson, AGUILAR/L 08/02/20 0916      Row Name 08/02/20 0840             Cognitive Assessment/Intervention- PT/OT    Affect/Mental Status (Cognitive)  confused  -RC      Recorded by [RC] Evelyn Dodson, AGUILAR/L 08/02/20 0847      Row Name 08/02/20 0925             Clinical Swallow Eval    Oral Prep Phase  impaired  -EK      Oral Transit  WFL  -EK      Oral Residue  WFL  -EK      Pharyngeal Phase  WFL  -EK      Recorded by [EK] Yesenia Melton CCC-SLP 08/02/20 0944      Row Name 08/02/20 0925             Oral Prep Concerns    Oral Prep Concerns  prolonged mastication mild but tolerated well   -EK      Recorded by [EK] Yesenia Melton CCC-SLP 08/02/20 0944      Row Name 08/02/20 0925             Clinical Impression    SLP Swallowing Diagnosis  mild;oral dysfunction  -EK      Recorded by  [EK] Yesenia Mleton, CCC-SLP 08/02/20 1035      Row Name 08/02/20 0840             Bed Mobility Assessment/Treatment    Supine-Sit [place] (Bed Mobility)  minimum assist (75% patient effort)  -RC      Sit-Supine [place] (Bed Mobility)  minimum assist (75% patient effort)  -RC      Recorded by [RC] Evelyn Dodson AGUILAR/L 08/02/20 1353      Row Name 08/02/20 0840             Bathing Assessment/Intervention    Bathing [place] Level  minimum assist (75% patient effort)  -RC      Bathing Position  supine;edge of bed sitting  -RC      Recorded by [RC] Evelyn Dodson AGUILAR/L 08/02/20 1353      Row Name 08/02/20 0840             Upper Body Dressing Assessment/Training    Upper Body Dressing [place] Level  moderate assist (50% patient effort)  -RC      Comment (Upper Body Dressing)  \[facility]\"" gown   -RC      Recorded by [RC] Evelyn Dodson AGUILAR/L 08/02/20 1353      Row Name 08/02/20 0840             Lower Body Dressing Assessment/Training    Lower Body Dressing [place] Level  socks;dependent (less than 25% patient effort)  -RC      Recorded by [RC] Evelyn Dodson AGUILAR/L 08/02/20 1353      Row Name 08/02/20 0840             Grooming Assessment/Training    [place] Level (Grooming)  dependent (less than 25% patient effort)  -RC      Recorded by [RC] Evelyn Dodson AGUILAR/L 08/02/20 1353      Row Name 08/02/20 0840             Static Sitting Balance    Level of [place] (Unsupported Sitting, Static Balance)  standby assist  -RC      Sitting Position (Unsupported Sitting, Static Balance)  sitting on edge of bed  -RC      Recorded by [RC] Evelyn Dodson AGUILAR/L 08/02/20 1353      Row Name 08/02/20 0925 08/02/20 0840          Positioning and Restraints    Pre-Treatment Position  in bed  -EK  in bed  -RC     Post Treatment Position  bed  -EK  bed  -RC     In Bed  call light within reach;encouraged to call for assist;exit alarm on  -EK  call light within reach;encouraged to  call for assist;exit alarm on;with nsg  -RC     Recorded by [EK] Yesenia Melton, CCC-SLP 08/02/20 1035 [RC] Evelyn Dodson AGUILAR/L 08/02/20 1353     Row Name 08/02/20 0840             Pain Scale: Numbers Pre/Post-Treatment    Pain Scale: Numbers, Pretreatment  0/10 - no pain  -RC      Pain Scale: Numbers, Post-Treatment  0/10 - no pain  -RC      Recorded by [RC] Evelyn Dodson AGUILAR/L 08/02/20 1353      Row Name                Wound 08/01/20 1749 Left gluteal Pressure Injury    Wound - Properties Group Date first assessed: 08/01/20 [TC] Time first assessed: 1749 [TC] Present on Hospital Admission: N [TC] Side: Left [TC] Location: gluteal [TC] Primary Wound Type: Pressure inj [TC] Stage, Pressure Injury: Stage 2 [TC] Recorded by:  [TC] Kandis Mercedes RN 08/01/20 1749    Row Name                Wound 08/01/20 1749 Left heel    Wound - Properties Group Date first assessed: 08/01/20 [TC] Time first assessed: 1749 [TC] Present on Hospital Admission: N [TC] Side: Left [TC] Location: heel [TC] Recorded by:  [TC] Kandis Mercedes RN 08/01/20 1749    Row Name                Wound 07/08/20 1810 Left upper eyebrow Laceration    Wound - Properties Group Date first assessed: 07/08/20 [TM] Time first assessed: 1810 [TM] Present on Hospital Admission: Y [TM] Side: Left [TM] Orientation: upper [TM] Location: eyebrow [TM] Primary Wound Type: Laceration [TM] Recorded by:  [TM] Josephine Elias RN 07/08/20 1811    Row Name                Wound 08/01/20 2043 Left posterior;lower leg Pressure Injury    Wound - Properties Group Date first assessed: 08/01/20 [EF] Time first assessed: 2043 [EF] Present on Hospital Admission: Y [EF] Side: Left [EF] Orientation: posterior;lower [EF] Location: leg [EF], below immobilizer  Primary Wound Type: Pressure inj [EF] Stage, Pressure Injury: deep tissue injury [EF] Recorded by:  [EF] Amy Campbell RN 08/01/20 2045    Row Name 08/02/20 0901             Plan of  Care Review    Plan of Care Reviewed With  patient  -EK      Progress  improving  -EK      Outcome Summary  Swallow: Pt lacks dentition but pt is able to safely manage pudding and jazmine cracker this am. Pt with no s/s of aspiration. Continue for one additional session for diet tolerance and diet safety.   -EK      Recorded by [EK] Yesenia Melton CCC-SLP 08/02/20 1035      Row Name 08/02/20 0840             Outcome Summary/Treatment Plan (OT)    Daily Summary of Progress (OT)  progress toward functional goals as expected  -RC      Plan for Continued Treatment (OT)  cont poc   -RC      Recorded by [RC] Evelyn Dodson, AGUILAR/L 08/02/20 1353      Row Name 08/02/20 0925             Outcome Summary/Treatment Plan (SLP)    Daily Summary of Progress (SLP)  progress toward functional goals is good  -EK      Barriers to Overall Progress (SLP)  lack of dentition  -EK      Plan for Continued Treatment (SLP)  Continue plan of care  -EK      Recorded by [EK] Yesenia Melton CCC-SLP 08/02/20 0944        User Key  (r) = Recorded By, (t) = Taken By, (c) = Cosigned By    Initials Name Effective Dates Discipline    EK Yesenia Melton CCC-SLP 07/24/19 -  SLP    TC Kandis Mercedes RN 10/17/16 -  Nurse    Amy Rubalcava RN 10/17/16 -  Nurse    Evelyn Falrey, AGUILAR/L 03/07/18 -  OT    TM Josephine Elias, RN 07/24/18 -  Nurse        Wound 08/01/20 1749 Left gluteal Pressure Injury (Active)   Closure Open to air 8/2/2020  9:24 AM   Periwound redness;non-blanchable 8/2/2020  9:24 AM   Edges irregular 8/2/2020  9:24 AM   Drainage Amount none 8/2/2020  9:24 AM   Care, Wound cleansed with;soap and water 8/1/2020  8:03 PM   Dressing Care, Wound skin barrier agent applied 8/1/2020  8:03 PM   Periwound Care, Wound barrier ointment applied 8/1/2020  8:03 PM       Wound 08/01/20 1749 Left heel (Active)   Closure Open to air 8/2/2020  9:24 AM   Base red;non-blanchable 8/2/2020  9:24  AM       Wound 08/01/20 2043 Left posterior;lower leg Pressure Injury (Active)   Dressing Appearance open to air 8/2/2020  9:24 AM   Closure None 8/2/2020  9:24 AM   Base non-blanchable;red 8/2/2020  9:24 AM     Rehab Goal Summary     Row Name 08/02/20 0845             Occupational Therapy Goals    Transfer Goal Selection (OT)  transfer, OT goal 1  -RC      Bathing Goal Selection (OT)  bathing, OT goal 1  -RC      Dressing Goal Selection (OT)  dressing, OT goal 1  -RC      Toileting Goal Selection (OT)  toileting, OT goal 1  -RC      Activity Tolerance Goal Selection (OT)  activity tolerance, OT goal 1  -RC         Transfer Goal 1 (OT)    Activity/Assistive Device (Transfer Goal 1, OT)  sit-to-stand/stand-to-sit;bed-to-chair/chair-to-bed;toilet  -RC      [place] Level/Cues Needed (Transfer Goal 1, OT)  contact guard assist  -RC      Time Frame (Transfer Goal 1, OT)  long term goal (LTG);by discharge  -RC      Progress/Outcome (Transfer Goal 1, OT)  goal not met  -RC         Bathing Goal 1 (OT)    Activity/Assistive Device (Bathing Goal 1, OT)  bathing skills, all  -RC      [place] Level/Cues Needed (Bathing Goal 1, OT)  minimum assist (75% or more patient effort)  -RC      Time Frame (Bathing Goal 1, OT)  long term goal (LTG);by discharge  -RC      Progress/Outcomes (Bathing Goal 1, OT)  goal not met  -RC         Dressing Goal 1 (OT)    Activity/Assistive Device (Dressing Goal 1, OT)  lower body dressing  -RC      [place]/Cues Needed (Dressing Goal 1, OT)  minimum assist (75% or more patient effort)  -RC      Time Frame (Dressing Goal 1, OT)  long term goal (LTG);by discharge  -RC      Progress/Outcome (Dressing Goal 1, OT)  goal not met  -RC         Toileting Goal 1 (OT)    Activity/Device (Toileting Goal 1, OT)  toileting skills, all  -RC      [place] Level/Cues Needed (Toileting Goal 1, OT)  supervision required  -RC      Time Frame (Toileting Goal 1, OT)  long term goal (LTG);by discharge   -      Progress/Outcome (Toileting Goal 1, OT)  goal not met  -          Activity Tolerance Goal 1 (OT)    Activity Level (Endurance Goal 1, OT)  15 min activity functional/therapeutic activities  -      Progress/Outcome (Activity Tolerance Goal 1, OT)  goal met  -        User Key  (r) = Recorded By, (t) = Taken By, (c) = Cosigned By    Initials Name Provider Type Discipline     Evelyn Dodson COTA/L Occupational Therapy Assistant OT        Occupational Therapy Education                 Title: PT OT SLP Therapies (In Progress)     Topic: Occupational Therapy (In Progress)     Point: ADL training (In Progress)     Description:   Instruct learner(s) on proper safety adaptation and remediation techniques during self care or transfers.   Instruct in proper use of assistive devices.              Learning Progress Summary           Patient Acceptance, E,D, NR by  at 8/2/2020 1354    Acceptance, E, NR,NL by AS at 7/30/2020 1656    Comment:  role of OT, OT POC, bed mobility, transfer training                   Point: Home exercise program (Not Started)     Description:   Instruct learner(s) on appropriate technique for monitoring, assisting and/or progressing therapeutic exercises/activities.              Learner Progress:   Not documented in this visit.          Point: Precautions (In Progress)     Description:   Instruct learner(s) on prescribed precautions during self-care and functional transfers.              Learning Progress Summary           Patient Acceptance, E,D, NR by  at 8/2/2020 1354    Acceptance, E, NR,NL by AS at 7/30/2020 1656    Comment:  role of OT, OT POC, bed mobility, transfer training                   Point: Body mechanics (In Progress)     Description:   Instruct learner(s) on proper positioning and spine alignment during self-care, functional mobility activities and/or exercises.              Learning Progress Summary           Patient Acceptance, E,D, NR by  at 8/2/2020 1354                                User Key     Initials Effective Dates Name Provider Type Discipline     03/07/18 -  Evelyn Dodson, JEFF/L Occupational Therapy Assistant OT    AS 07/05/20 -  Ligia Junior, OT Occupational Therapist OT                OT Recommendation and Plan  Outcome Summary/Treatment Plan (OT)  Daily Summary of Progress (OT): progress toward functional goals as expected  Plan for Continued Treatment (OT): cont poc   Therapy Frequency (OT Eval): (5-7 days week )  Daily Summary of Progress (OT): progress toward functional goals as expected  Plan of Care Review  Plan of Care Reviewed With: patient  Plan of Care Reviewed With: patient  Outcome Summary: pt sat eob for 10 min, w/ sba, completed bathing taks w/ min @, cont poc  Outcome Measures     Row Name 08/02/20 0845 08/01/20 1339 07/31/20 0752       How much help from another person do you currently need...    Turning from your back to your side while in flat bed without using bedrails?  --  3  -EM  --    Moving from lying on back to sitting on the side of a flat bed without bedrails?  --  2  -EM  --    Moving to and from a bed to a chair (including a wheelchair)?  --  2  -EM  --    Standing up from a chair using your arms (e.g., wheelchair, bedside chair)?  --  2  -EM  --    Climbing 3-5 steps with a railing?  --  1  -EM  --    To walk in hospital room?  --  2  -EM  --    AM-PAC 6 Clicks Score (PT)  --  12  -EM  --       How much help from another is currently needed...    Putting on and taking off regular lower body clothing?  2  -RC  --  2  -BB    Bathing (including washing, rinsing, and drying)  2  -RC  --  2  -BB    Toileting (which includes using toilet bed pan or urinal)  2  -RC  --  2  -BB    Putting on and taking off regular upper body clothing  3  -RC  --  3  -BB    Taking care of personal grooming (such as brushing teeth)  3  -RC  --  3  -BB    Eating meals  3  -RC  --  3  -BB    AM-PAC 6 Clicks Score (OT)  15  -RC  --  15  -BB        Functional Assessment    Outcome Measure Options  --  AM-[facility] 6 Clicks Basic Mobility (PT)  -EM  --    Row Name 07/30/20 1540             How much help from another is currently needed...    Putting on and taking off regular lower body clothing?  2  -AS      Bathing (including washing, rinsing, and drying)  2  -AS      Toileting (which includes using toilet bed pan or urinal)  2  -AS      Putting on and taking off regular upper body clothing  3  -AS      Taking care of personal grooming (such as brushing teeth)  3  -AS      Eating meals  3  -AS      AM-PAC 6 Clicks Score (OT)  15  -AS         Functional Assessment    Outcome Measure Options  AM-[facility] 6 Clicks Daily Activity (OT)  -AS        User Key  (r) = Recorded By, (t) = Taken By, (c) = Cosigned By    Initials Name Provider Type    EM Tee Burris, PTA Physical Therapy Assistant    BB Loni Noonan, AGUILAR/L Occupational Therapy Assistant    Evelyn Farley AGUILAR/L Occupational Therapy Assistant    AS Ligia Junior, OT Occupational Therapist           Time Calculation:   Time Calculation- OT     Row Name 08/02/20 1357 08/02/20 0916          Time Calculation- OT    OT Start Time  --  -  0840  -     OT Stop Time  --  -  0918  -     OT Time Calculation (min)  --  -  38 min  -     Total Timed Code Minutes- OT  --  -  38 minute(s)  -     OT Received On  --  -  08/02/20  -       User Key  (r) = Recorded By, (t) = Taken By, (c) = Cosigned By    Initials Name Provider Type     Evelyn Dodson AGUILAR/L Occupational Therapy Assistant        Therapy Charges for Today     Code Description Service Date Service Provider Modifiers Qty    10799522427  OT THERAPEUTIC ACT EA 15 MIN 8/2/2020 Evelyn Dodson AGUILAR/L GO 1    33760831088 HC OT SELF CARE/MGMT/TRAIN EA 15 MIN 8/2/2020 Evelyn Dodson AGUILAR/JAZZY GO 2               TRISTIN LaneA/JAZZY  8/2/2020

## 2020-08-02 NOTE — NURSING NOTE
Upon rounding at shift change pt noted to be lethargic. Pt did not appear to be safe to take PO medications. Will monitor to determine of PO meds can be given later this shift.

## 2020-08-02 NOTE — THERAPY TREATMENT NOTE
Acute Care - Speech Language Pathology   Swallow Treatment Note [facility]     Patient Name: Jose Dacosta  : 1935  MRN: [id number]  Today's Date: 2020  Onset of Illness/Injury or Date of Surgery: 20     Referring Physician: Dr. Melendez      Admit Date: 2020    Visit Dx:      ICD-10-CM ICD-9-CM   1. Pneumonia of both lower lobes due to infectious organism J18.9 486   2. Altered mental status, unspecified altered mental status type R41.82 780.97   3. Impaired mobility and ADLs Z74.09 V49.89    Z78.9    4. Oropharyngeal dysphagia R13.12 787.22     Patient Active Problem List   Diagnosis   • Coronary artery disease involving native coronary artery of native heart without angina pectoris   • Vasovagal syncope   • RBBB (right bundle branch block with left anterior fascicular block)   • Essential hypertension   • Ischemic cardiomyopathy   • Syncope and collapse   • Closed fracture of neck of left femur (CMS/[facility])   • Coronary artery disease with history of myocardial infarction without history of CABG   • Facial laceration   • HFrEF (heart failure with reduced ejection fraction) (CMS/[facility])   • Postoperative anemia due to acute blood loss   • Pneumonia of both lower lobes due to infectious organism       Therapy Treatment  Rehabilitation Treatment Summary     Row Name 20 0925 20 0840          Treatment Time/Intention    Discipline  speech language pathologist  -EK  occupational therapy assistant  -RC     Document Type  therapy note (daily note)  -EK  therapy note (daily note)  -RC2     Subjective Information  no complaints  -EK  complains of;pain  -RC2     Total Minutes, Occupational Therapy Treatment  --  38  -RC2     Therapy Frequency (OT Eval)  --  -- 5-7 days week   -RC2     Patient Effort  good  -EK  adequate  -RC3     Recorded by [EK] Yesenia Melton CCC-SLP 20 0944 [RC] Evelyn Dodson COTA/L 20 0847  [RC2] Evelyn Dodson AGUILAR/JAZZY 20  0916  [RC3] Evelyn Dodson, AGUILAR/L 08/02/20 0917     Row Name 08/02/20 0840             Vital Signs    Pre Systolic BP Rehab  145  -RC      Pre Treatment Diastolic BP  93  -RC      Post Systolic BP Rehab  148  -RC2      Post Treatment Diastolic BP  87  -RC2      Posttreatment Heart Rate (beats/min)  97  -RC2      Pre SpO2 (%)  95  -RC2      O2 Delivery Pre Treatment  supplemental O2  -RC2      Post SpO2 (%)  96  -RC2      O2 Delivery Post Treatment  supplemental O2  -RC2      Recorded by [RC] Evelyn Dodson, AGUILAR/L 08/02/20 0847  [RC2] Evelyn Dodson, AGUILAR/L 08/02/20 0916      Row Name 08/02/20 0840             Cognitive Assessment/Intervention- PT/OT    Affect/Mental Status (Cognitive)  confused  -RC      Recorded by [RC] Evelyn Dodson, AGUILAR/L 08/02/20 0847      Row Name 08/02/20 0925             Clinical Swallow Eval    Oral Prep Phase  impaired  -EK      Oral Transit  WFL  -EK      Oral Residue  WFL  -EK      Pharyngeal Phase  WFL  -EK      Recorded by [EK] Yesenia Melton CCC-SLP 08/02/20 0944      Row Name 08/02/20 0925             Oral Prep Concerns    Oral Prep Concerns  prolonged mastication mild but tolerated well   -EK      Recorded by [EK] Yesenia Melton CCC-SLP 08/02/20 0944      Row Name 08/02/20 0925             Clinical Impression    SLP Swallowing Diagnosis  mild;oral dysfunction  -EK      Recorded by [EK] Yesenia Melton CCC-SLP 08/02/20 1035      Row Name 08/02/20 0925             Positioning and Restraints    Pre-Treatment Position  in bed  -EK      Post Treatment Position  bed  -EK      In Bed  call light within reach;encouraged to call for assist;exit alarm on  -EK      Recorded by [EK] Yesenia Melton CCC-SLP 08/02/20 1035      Row Name                Wound 08/01/20 1749 Left gluteal Pressure Injury    Wound - Properties Group Date first assessed: 08/01/20 [TC] Time first assessed: 1749 [TC] Present on Hospital Admission: N [TC]  Side: Left [TC] Location: gluteal [TC] Primary Wound Type: Pressure inj [TC] Stage, Pressure Injury: Stage 2 [TC] Recorded by:  [TC] Kandis Mercedes RN 08/01/20 1749    Row Name                Wound 08/01/20 1749 Left heel    Wound - Properties Group Date first assessed: 08/01/20 [TC] Time first assessed: 1749 [TC] Present on Hospital Admission: N [TC] Side: Left [TC] Location: heel [TC] Recorded by:  [TC] Kandis Mercedes RN 08/01/20 1749    Row Name                Wound 07/08/20 1810 Left upper eyebrow Laceration    Wound - Properties Group Date first assessed: 07/08/20 [TM] Time first assessed: 1810 [TM] Present on Hospital Admission: Y [TM] Side: Left [TM] Orientation: upper [TM] Location: eyebrow [TM] Primary Wound Type: Laceration [TM] Recorded by:  [TM] Josephine Elias RN 07/08/20 1811    Row Name                Wound 08/01/20 2043 Left posterior;lower leg Pressure Injury    Wound - Properties Group Date first assessed: 08/01/20 [EF] Time first assessed: 2043 [EF] Present on Hospital Admission: Y [EF] Side: Left [EF] Orientation: posterior;lower [EF] Location: leg [EF], below immobilizer  Primary Wound Type: Pressure inj [EF] Stage, Pressure Injury: deep tissue injury [EF] Recorded by:  [EF] Amy Campbell RN 08/01/20 2045    Row Name 08/02/20 0925             Plan of Care Review    Plan of Care Reviewed With  patient  -EK      Progress  improving  -EK      Outcome Summary  Swallow: Pt lacks dentition but pt is able to safely manage pudding and jazmine cracker this am. Pt with no s/s of aspiration. Continue for one additional session for diet tolerance and diet safety.   -EK      Recorded by [EK] Yesenia Melton CCC-SLP 08/02/20 1035      Row Name 08/02/20 0925             Outcome Summary/Treatment Plan (SLP)    Daily Summary of Progress (SLP)  progress toward functional goals is good  -EK      Barriers to Overall Progress (SLP)  lack of dentition  -EK      Plan for Continued  Treatment (SLP)  Continue plan of care  -EK      Recorded by [EK] Yesenia Melton CCC-SLP 08/02/20 0944        User Key  (r) = Recorded By, (t) = Taken By, (c) = Cosigned By    Initials Name Effective Dates Discipline    EK Yesenia Melton CCC-SLP 07/24/19 -  SLP    Kandis Hudson RN 10/17/16 -  Nurse    Amy Rubalcava RN 10/17/16 -  Nurse    Evelyn Farley, AGUILAR/L 03/07/18 -  OT    Josephine Jimenes RN 07/24/18 -  Nurse          Outcome Summary  Outcome Summary/Treatment Plan (SLP)  Daily Summary of Progress (SLP): progress toward functional goals is good (08/02/20 0925 : Yesenia Melton, CCC-SLP)  Barriers to Overall Progress (SLP): lack of dentition (08/02/20 0925 : Yesenia Melton CCC-SLP)  Plan for Continued Treatment (SLP): Continue plan of care (08/02/20 0925 : Yesenia Melton [facility]-SLP)      SLP GOALS     Row Name 07/31/20 1135             Oral Nutrition/Hydration Goal 1 (SLP)    Oral Nutrition/Hydration Goal 1, SLP  pt to tolerate recommended diet for safe and adequate nutrition/hydration  -EC      Time Frame (Oral Nutrition/Hydration Goal 1, SLP)  by discharge  -EC      Barriers (Oral Nutrition/Hydration Goal 1, SLP)  edentulous, cough with thin liquids  -EC      Progress/Outcomes (Oral Nutrition/Hydration Goal 1, SLP)  goal ongoing  -EC        User Key  (r) = Recorded By, (t) = Taken By, (c) = Cosigned By    Initials Name Provider Type    EC Yesenia Brown CCC-SLP Speech and Language Pathologist          EDUCATION  The patient has been educated in the following areas:   Dysphagia (Swallowing Impairment).    SLP Recommendation and Plan  Daily Summary of Progress (SLP): progress toward functional goals is good  Barriers to Overall Progress (SLP): lack of dentition  Plan for Continued Treatment (SLP): Continue plan of care                       Time Calculation:   Time Calculation- SLP     Row Name 08/02/20  1035             Time Calculation- SLP    SLP Start Time  0925  -EK      SLP Stop Time  0948  -EK      SLP Time Calculation (min)  23 min  -EK      SLP Received On  08/02/20  -EK        User Key  (r) = Recorded By, (t) = Taken By, (c) = Cosigned By    Initials Name Provider Type    Yesenia Crystal CCC-SLP Speech and Language Pathologist          Therapy Charges for Today     Code Description Service Date Service Provider Modifiers Qty    [id number]  ST TREATMENT SWALLOW 2 8/2/2020 Yesenia Melton CCC-SLP GN 1                 HARJINDER Bejarano  8/2/2020

## 2020-08-02 NOTE — PLAN OF CARE
Problem: Patient Care Overview  Goal: Plan of Care Review  Flowsheets  Taken 8/2/2020 1035  Progress: improving  Taken 8/2/2020 0932  Plan of Care Reviewed With: patient  Outcome Summary: Swallow: Pt lacks dentition but pt is able to safely manage pudding and jazmine cracker this am. Pt with no s/s of aspiration. Continue for one additional session for diet tolerance and diet safety.

## 2020-08-02 NOTE — PLAN OF CARE
Pt yells out at times. Pt has been digitally penetrating his anus. Nurse educated pt to stop and why it is not good for the pt. Pt's VSS. Will continue to monitor.

## 2020-08-02 NOTE — PLAN OF CARE
Problem: Patient Care Overview  Goal: Plan of Care Review  Outcome: Ongoing (interventions implemented as appropriate)  Flowsheets (Taken 8/2/2020 3241)  Progress: improving  Plan of Care Reviewed With: patient  Outcome Summary: pt sat eob for 10 min, w/ sba, completed bathing taks w/ min @, cont poc

## 2020-08-03 DIAGNOSIS — S72.002A CLOSED FRACTURE OF NECK OF LEFT FEMUR, INITIAL ENCOUNTER (HCC): Primary | ICD-10-CM

## 2020-08-03 LAB
ANION GAP SERPL CALCULATED.3IONS-SCNC: 10 MMOL/L (ref 5–15)
BACTERIA SPEC AEROBE CULT: NORMAL
BASOPHILS # BLD AUTO: 0.12 10*3/MM3 (ref 0–0.2)
BASOPHILS NFR BLD AUTO: 0.9 % (ref 0–1.5)
BUN SERPL-MCNC: 14 MG/DL (ref 8–23)
BUN/CREAT SERPL: 15.2 (ref 7–25)
CALCIUM SPEC-SCNC: 9.7 MG/DL (ref 8.6–10.5)
CHLORIDE SERPL-SCNC: 103 MMOL/L (ref 98–107)
CO2 SERPL-SCNC: 25 MMOL/L (ref 22–29)
CREAT SERPL-MCNC: 0.92 MG/DL (ref 0.76–1.27)
DEPRECATED RDW RBC AUTO: 47.8 FL (ref 37–54)
EOSINOPHIL # BLD AUTO: 0.96 10*3/MM3 (ref 0–0.4)
EOSINOPHIL NFR BLD AUTO: 7.5 % (ref 0.3–6.2)
ERYTHROCYTE [DISTWIDTH] IN BLOOD BY AUTOMATED COUNT: 14.1 % (ref 12.3–15.4)
GFR SERPL CREATININE-BSD FRML MDRD: 78 ML/MIN/1.73
GLUCOSE SERPL-MCNC: 101 MG/DL (ref 65–99)
HCT VFR BLD AUTO: 29.9 % (ref 37.5–51)
HGB BLD-MCNC: 9.7 G/DL (ref 13–17.7)
IMM GRANULOCYTES # BLD AUTO: 0.06 10*3/MM3 (ref 0–0.05)
IMM GRANULOCYTES NFR BLD AUTO: 0.5 % (ref 0–0.5)
LYMPHOCYTES # BLD AUTO: 1.77 10*3/MM3 (ref 0.7–3.1)
LYMPHOCYTES NFR BLD AUTO: 13.8 % (ref 19.6–45.3)
MCH RBC QN AUTO: 29.9 PG (ref 26.6–33)
MCHC RBC AUTO-ENTMCNC: 32.4 G/DL (ref 31.5–35.7)
MCV RBC AUTO: 92.3 FL (ref 79–97)
MONOCYTES # BLD AUTO: 1.31 10*3/MM3 (ref 0.1–0.9)
MONOCYTES NFR BLD AUTO: 10.2 % (ref 5–12)
NEUTROPHILS NFR BLD AUTO: 67.1 % (ref 42.7–76)
NEUTROPHILS NFR BLD AUTO: 8.59 10*3/MM3 (ref 1.7–7)
NRBC BLD AUTO-RTO: 0 /100 WBC (ref 0–0.2)
PLATELET # BLD AUTO: 308 10*3/MM3 (ref 140–450)
PMV BLD AUTO: 10.2 FL (ref 6–12)
POTASSIUM SERPL-SCNC: 3.5 MMOL/L (ref 3.5–5.2)
RBC # BLD AUTO: 3.24 10*6/MM3 (ref 4.14–5.8)
SARS-COV-2 N GENE RESP QL NAA+PROBE: NOT DETECTED
SODIUM SERPL-SCNC: 138 MMOL/L (ref 136–145)
WBC # BLD AUTO: 12.81 10*3/MM3 (ref 3.4–10.8)

## 2020-08-03 PROCEDURE — 97110 THERAPEUTIC EXERCISES: CPT

## 2020-08-03 PROCEDURE — 80048 BASIC METABOLIC PNL TOTAL CA: CPT | Performed by: INTERNAL MEDICINE

## 2020-08-03 PROCEDURE — 25010000002 PIPERACILLIN SOD-TAZOBACTAM PER 1 G: Performed by: INTERNAL MEDICINE

## 2020-08-03 PROCEDURE — 87635 SARS-COV-2 COVID-19 AMP PRB: CPT | Performed by: INTERNAL MEDICINE

## 2020-08-03 PROCEDURE — 97530 THERAPEUTIC ACTIVITIES: CPT

## 2020-08-03 PROCEDURE — 92526 ORAL FUNCTION THERAPY: CPT | Performed by: SPEECH-LANGUAGE PATHOLOGIST

## 2020-08-03 PROCEDURE — 97535 SELF CARE MNGMENT TRAINING: CPT

## 2020-08-03 PROCEDURE — 85025 COMPLETE CBC W/AUTO DIFF WBC: CPT | Performed by: INTERNAL MEDICINE

## 2020-08-03 PROCEDURE — 25010000002 MORPHINE PER 10 MG: Performed by: INTERNAL MEDICINE

## 2020-08-03 RX ORDER — HYDROCODONE BITARTRATE AND ACETAMINOPHEN 5; 325 MG/1; MG/1
1 TABLET ORAL EVERY 6 HOURS PRN
Status: DISCONTINUED | OUTPATIENT
Start: 2020-08-03 | End: 2020-08-04

## 2020-08-03 RX ORDER — DOCUSATE SODIUM 100 MG/1
100 CAPSULE, LIQUID FILLED ORAL 2 TIMES DAILY
Status: DISCONTINUED | OUTPATIENT
Start: 2020-08-03 | End: 2020-08-06 | Stop reason: HOSPADM

## 2020-08-03 RX ADMIN — DOXYCYCLINE 100 MG: 100 INJECTION, POWDER, LYOPHILIZED, FOR SOLUTION INTRAVENOUS at 01:16

## 2020-08-03 RX ADMIN — GABAPENTIN 600 MG: 300 CAPSULE ORAL at 14:53

## 2020-08-03 RX ADMIN — HYDROCODONE BITARTRATE AND ACETAMINOPHEN 1 TABLET: 5; 325 TABLET ORAL at 12:00

## 2020-08-03 RX ADMIN — GUAIFENESIN 1200 MG: 600 TABLET, EXTENDED RELEASE ORAL at 21:01

## 2020-08-03 RX ADMIN — GUAIFENESIN 1200 MG: 600 TABLET, EXTENDED RELEASE ORAL at 09:36

## 2020-08-03 RX ADMIN — AMITRIPTYLINE HYDROCHLORIDE 100 MG: 50 TABLET, FILM COATED ORAL at 21:06

## 2020-08-03 RX ADMIN — COLCHICINE 0.6 MG: 0.6 TABLET, FILM COATED ORAL at 09:24

## 2020-08-03 RX ADMIN — METOPROLOL SUCCINATE 25 MG: 25 TABLET, EXTENDED RELEASE ORAL at 09:24

## 2020-08-03 RX ADMIN — DOCUSATE SODIUM 100 MG: 100 CAPSULE, LIQUID FILLED ORAL at 21:01

## 2020-08-03 RX ADMIN — SODIUM CHLORIDE, PRESERVATIVE FREE 10 ML: 5 INJECTION INTRAVENOUS at 21:02

## 2020-08-03 RX ADMIN — DOCUSATE SODIUM 100 MG: 100 CAPSULE, LIQUID FILLED ORAL at 12:04

## 2020-08-03 RX ADMIN — OXYBUTYNIN CHLORIDE 10 MG: 10 TABLET, EXTENDED RELEASE ORAL at 09:24

## 2020-08-03 RX ADMIN — HYDROCORTISONE: 1 CREAM TOPICAL at 14:58

## 2020-08-03 RX ADMIN — PIPERACILLIN SODIUM AND TAZOBACTAM SODIUM 3.38 G: 3; .375 INJECTION, POWDER, LYOPHILIZED, FOR SOLUTION INTRAVENOUS at 12:04

## 2020-08-03 RX ADMIN — PANTOPRAZOLE SODIUM 40 MG: 40 TABLET, DELAYED RELEASE ORAL at 09:24

## 2020-08-03 RX ADMIN — PIPERACILLIN SODIUM AND TAZOBACTAM SODIUM 3.38 G: 3; .375 INJECTION, POWDER, LYOPHILIZED, FOR SOLUTION INTRAVENOUS at 21:01

## 2020-08-03 RX ADMIN — Medication 1 APPLICATION: at 18:22

## 2020-08-03 RX ADMIN — ROPINIROLE HYDROCHLORIDE 0.5 MG: 0.5 TABLET, FILM COATED ORAL at 09:23

## 2020-08-03 RX ADMIN — CYANOCOBALAMIN TAB 1000 MCG 1000 MCG: 1000 TAB at 09:24

## 2020-08-03 RX ADMIN — PIPERACILLIN SODIUM AND TAZOBACTAM SODIUM 3.38 G: 3; .375 INJECTION, POWDER, LYOPHILIZED, FOR SOLUTION INTRAVENOUS at 01:16

## 2020-08-03 RX ADMIN — HYDROCORTISONE: 1 CREAM TOPICAL at 21:02

## 2020-08-03 RX ADMIN — ASPIRIN 81 MG: 81 TABLET, COATED ORAL at 09:23

## 2020-08-03 RX ADMIN — MORPHINE SULFATE 1 MG: 2 INJECTION, SOLUTION INTRAMUSCULAR; INTRAVENOUS at 09:36

## 2020-08-03 RX ADMIN — Medication 1 TABLET: at 09:23

## 2020-08-03 RX ADMIN — GABAPENTIN 600 MG: 300 CAPSULE ORAL at 21:01

## 2020-08-03 RX ADMIN — SODIUM CHLORIDE, PRESERVATIVE FREE 10 ML: 5 INJECTION INTRAVENOUS at 09:37

## 2020-08-03 RX ADMIN — HYDROCODONE BITARTRATE AND ACETAMINOPHEN 1 TABLET: 5; 325 TABLET ORAL at 21:01

## 2020-08-03 RX ADMIN — ATORVASTATIN CALCIUM 20 MG: 20 TABLET, FILM COATED ORAL at 21:01

## 2020-08-03 RX ADMIN — LOSARTAN POTASSIUM 25 MG: 25 TABLET, FILM COATED ORAL at 09:24

## 2020-08-03 NOTE — PROGRESS NOTES
"    [facility] Medicine Services  INPATIENT PROGRESS NOTE    Length of Stay: 5  Date of Admission: 7/29/2020  Primary Care Physician: Terry Peterson MD    Subjective   Chief Complaint: AMS  Summary: Patient is a 85 y.o. male with a past medical history of essential hypertension, gout, chronic pain, coronary artery disease, right bundle branch block, and recent admission and treatment for left hip fracture earlier in July 2020 with subsequent discharge to the skilled nursing facility for physical rehabilitation. He presented with complaints of lethargy of 3 days duration and was reportedly somnolent and unresponsive. He is being managed for sepsis secondary to pneumonia, acute respiratory failure with hypoxia and suspected gram-negative pneumonia.    Today: Reports some discomfort on his \"bottom\".  Has had problems with hemorrhoids in the past. Feels like his breathing is stable and doing better.     Review of Systems   Constitutional: Negative for chills and fever.   HENT: Negative for congestion.    Eyes: Negative for visual disturbance.   Respiratory: Positive for cough and shortness of breath.    Cardiovascular: Negative for chest pain.   Gastrointestinal: Positive for rectal pain. Negative for abdominal pain.      All pertinent negatives and positives are as above. All other systems have been reviewed and are negative unless otherwise stated.     Objective    Temp:  [97 °F (36.1 °C)-98.4 °F (36.9 °C)] 98 °F (36.7 °C)  Heart Rate:  [79-97] 85  Resp:  [16-18] 18  BP: (128-140)/(76-83) 140/78    Physical Exam   Constitutional: He is oriented to person, place, and time.   Elderly appearing male.    HENT:   Head: Normocephalic and atraumatic.   Nose: Nose normal.   Eyes: Conjunctivae and EOM are normal.   Neck: Normal range of motion.   Cardiovascular: Normal rate, regular rhythm, normal heart sounds and intact distal pulses.   Pulmonary/Chest:   Distant but clear " bilaterally.    Abdominal: Soft. Bowel sounds are normal. He exhibits no distension.   Musculoskeletal: He exhibits no edema.   Neurological: He is alert and oriented to person, place, and time.   Skin: Skin is warm and dry. Capillary refill takes less than 2 seconds.   Psychiatric: His behavior is normal.     Results Review:  I have reviewed the labs, radiology results, and diagnostic studies.    Laboratory Data:   Results from last 7 days   Lab Units 08/03/20  0554 08/02/20  0533 08/01/20  0709  07/29/20  1505   SODIUM mmol/L 138 138 137   < > 138   POTASSIUM mmol/L 3.5 4.2 4.0   < > 4.4   CHLORIDE mmol/L 103 102 100   < > 104   CO2 mmol/L 25.0 23.0 24.0   < > 25.0   BUN mg/dL 14 17 23   < > 37*   CREATININE mg/dL 0.92 0.95 1.06   < > 1.25   GLUCOSE mg/dL 101* 105* 98   < > 92   CALCIUM mg/dL 9.7 10.0 9.8   < > 9.7   BILIRUBIN mg/dL  --   --   --   --  0.6   ALK PHOS U/L  --   --   --   --  189*   ALT (SGPT) U/L  --   --   --   --  42*   AST (SGOT) U/L  --   --   --   --  48*   ANION GAP mmol/L 10.0 13.0 13.0   < > 9.0    < > = values in this interval not displayed.     Estimated Creatinine Clearance: 56 mL/min (by C-G formula based on SCr of 0.92 mg/dL).          Results from last 7 days   Lab Units 08/03/20  0554 08/02/20  0533 08/01/20  0709 07/31/20  0635 07/30/20  0800   WBC 10*3/mm3 12.81* 13.16* 13.80* 13.92* 13.15*   HEMOGLOBIN g/dL 9.7* 10.4* 10.2* 10.0* 10.0*   HEMATOCRIT % 29.9* 31.6* 31.6* 30.5* 30.8*   PLATELETS 10*3/mm3 308 331 361 345 380           Culture Data:   No results found for: BLOODCX  No results found for: URINECX  No results found for: RESPCX  No results found for: WOUNDCX  No results found for: STOOLCX  No components found for: BODYFLD    Radiology Data:   Imaging Results (Last 24 Hours)     ** No results found for the last 24 hours. **          I have reviewed the patient's current medications.     Assessment/Plan     Principal Problem:    Pneumonia of both lower lobes due to infectious  organism    Sepsis 2/2 Pneumonia - Improved. Continue Zosyn.  Completed 5 days of Doxy as well.  Continue supplemental O2 and wean as tolerated. COVID negative.  Urine antigens negative.      AMS - improved.  MRI negative.     HTN - Continue Losartan and Toprol XL.     DVT ppx - Lovenox  CODE - Full    Discharge Planning: In progress.    Charly Hercules MD

## 2020-08-03 NOTE — PLAN OF CARE
Problem: Patient Care Overview  Goal: Plan of Care Review  Outcome: Ongoing (interventions implemented as appropriate)  Flowsheets (Taken 8/3/2020 1329)  Progress: improving  Plan of Care Reviewed With: patient  Outcome Summary: pt responded well to PT w/ increased gait to 3, 6 ft min A w/ RW. pt requires mod A for sup-sit. pt t/f to recliner min A w/ RW. pt is incontinent of urine and bowels. pt participated in LE ther ex in recliner. some encouragement required. no new goals met at this time. pt would continue to benefit from PT services.

## 2020-08-03 NOTE — DISCHARGE PLACEMENT REQUEST
"Sylvia Dacosta (85 y.o. Male)     Date of Birth Social Security Number Address Home Phone MRN    [id number]  [id number] [address] [phone] [id number]    Hinduism Marital Status          Holiness        Admission Date Admission Type Admitting Provider Attending Provider Department, Room/Bed    7/29/20 Emergency Pranay Melendez MD [place], Sotonte E, MD [address], 405/[address]    Discharge Date Discharge Disposition Discharge Destination                       Attending Provider:  Pranay Melendez MD    Allergies:  Hydrochlorothiazide    Isolation:  None   Infection:  COVID Screen (preop/placement) (08/03/20)   Code Status:  CPR    Ht:  182.8 cm (71.97\")   Wt:  66.2 kg (145 lb 14.4 oz)    Admission Cmt:  None   Principal Problem:  Pneumonia of both lower lobes due to infectious organism [J18.9]                 Active Insurance as of 7/29/2020     Primary Coverage     Payor Plan Insurance Group Employer/Plan Group    AETNA MEDICARE REPLACEMENT AETNA MEDICARE REPLACEMENT [id number]     Payor Plan Address Payor Plan Phone Number Payor Plan Fax Number Effective Dates    [address] [phone]  4/1/2019 - None Entered    [facility] [id number]       Subscriber Name Subscriber Birth Date Member ID       SYLVIA DACOSTA 1935 [id number]                 Emergency Contacts      (Rel.) Home Phone Work Phone Mobile Phone    ISAUROVIVIANAAURELIA (Relative) [phone]    Carla Dacosta (Spouse) [phone] -- [phone]    fiordaliza aguilar (Grandchild) -- -- [phone]            Emergency Contact Information     Name Relation Home Work Mobile    [address] Relative [phone]    OlesyaCarla Spouse [phone]    jeffeunicei Grandchild   [phone]          Insurance Information                AETNA MEDICARE REPLACEMENT/AETNA MEDICARE REPLACEMENT Phone: " [phone]    Subscriber: Jose Dacosta Subscriber#: [id number]    Group#: [id number] Precert#:

## 2020-08-03 NOTE — PLAN OF CARE
"  Problem: Patient Care Overview  Goal: Plan of Care Review  Outcome: Ongoing (interventions implemented as appropriate)  Flowsheets  Taken 8/3/2020 0737  Progress: no change  Plan of Care Reviewed With: patient  Taken 8/3/2020 0713  Outcome Summary: Pt seen for dysphagia tx this date and willing to participate w/AM meal.  Pt did verbalize pain \"all over\" when SLP respositioned to upright in bed.  Pt agreeable to consume meal despite pain; however, meal was ended early d/t pain and pt requesting to be respositioned.  Once pt was respositioned back to supine, he immediately feel asleep.  Pt consumed mech soft solids w/mild oral residue, but cleared w/liquid wash.  Pt consumed thin liquids via straw w/cough when taking consecutive drinks.  No coughing or other over s/s of aspiration noted when pt consumed thin liquids via straw w/single sip controlled by SLP.  SLP recommends continued current diet and d/c from skilled ST services as goal has been met.  Pt in agreement w/recommendations.     "

## 2020-08-03 NOTE — NURSING NOTE
"Pt agitated this AM and refused AM medications stating \"every time I fall to sleep you youngins come in here and wake me up. I haven't had any sleep and here you come again. I don't want that shit.\"  "

## 2020-08-03 NOTE — PAYOR COMM NOTE
"    Justa Ann RN [facility]  [phone]     Phone  [phone]     Fax  Cont stay review      Sylvia Dacosta (85 y.o. Male)     Date of Birth Social Security Number Address Home Phone MRN    [id number]  [id number]  [place] [address] [phone] [id number]    Yarsani Marital Status          Zoroastrian        Admission Date Admission Type Admitting Provider Attending Provider Department, Room/Bed    7/29/20 Emergency Pranay Melendez MD Ebenibo, Sotonte E, MD [address], 405/1    Discharge Date Discharge Disposition Discharge Destination                       Attending Provider:  Pranay Melendez MD    Allergies:  Hydrochlorothiazide    Isolation:  None   Infection:  None   Code Status:  CPR    Ht:  182.8 cm (71.97\")   Wt:  66.2 kg (145 lb 14.4 oz)    Admission Cmt:  None   Principal Problem:  Pneumonia of both lower lobes due to infectious organism [J18.9]                 Active Insurance as of 7/29/2020     Primary Coverage     Payor Plan Insurance Group Employer/Plan Group    AETNA MEDICARE REPLACEMENT AETNA MEDICARE REPLACEMENT [id number]     Payor Plan Address Payor Plan Phone Number Payor Plan Fax Number Effective Dates    [address] [phone]  4/1/2019 - None Entered    [facility] [id number]       Subscriber Name Subscriber Birth Date Member ID       SYLVIA DACOSTA [id number]                 Emergency Contacts      (Rel.) Home Phone Work Phone Mobile Phone    AURELIA BOX (Relative) [phone]    Carla Dacosta (Spouse) [phone] -- [phone]    jefffiordaliza (Grandchild) -- -- [phone]            Vital Signs (last day)     Date/Time   Temp   Temp src   Pulse   Resp   BP   Patient Position   SpO2    08/03/20 0344   97 (36.1)   Axillary   91   18   128/81   Lying   96    08/02/20 1924   --   --   79   18   --   --   97    08/02/20 1604   " 98.4 (36.9)   Axillary   97   18   134/76   Lying   98    08/02/20 1131   98.9 (37.2)   Axillary   99   18   130/77   Lying   98    08/02/20 0313   96.3 (35.7)   --   71   16   139/72   --   98              Current Facility-Administered Medications   Medication Dose Route Frequency Provider Last Rate Last Dose   • acetaminophen (TYLENOL) tablet 650 mg  650 mg Oral Q4H PRN Pranay Melendez MD       • amitriptyline (ELAVIL) tablet 100 mg  100 mg Oral Nightly Pranay Melendez MD   100 mg at 08/02/20 2013   • aspirin EC tablet 81 mg  81 mg Oral Daily Pranay Melendez MD   81 mg at 08/02/20 0924   • atorvastatin (LIPITOR) tablet 20 mg  20 mg Oral Nightly Pranay Melendez MD   20 mg at 07/31/20 2112   • colchicine tablet 0.6 mg  0.6 mg Oral Daily Pranay Melendez MD   0.6 mg at 08/02/20 0924   • doxycycline (VIBRAMYCIN) 100 mg/250 mL 0.9% NS VTB  100 mg Intravenous Q12H Pranay Melendez MD 0 mL/hr at 08/02/20 0337 100 mg at 08/03/20 0116   • enoxaparin (LOVENOX) syringe 40 mg  40 mg Subcutaneous Q24H Pranay Melendez MD   40 mg at 08/01/20 0603   • gabapentin (NEURONTIN) capsule 600 mg  600 mg Oral Q8H Pranay Melendez MD   600 mg at 08/02/20 2013   • guaiFENesin (MUCINEX) 12 hr tablet 1,200 mg  1,200 mg Oral Q12H Pranay Melendez MD   1,200 mg at 08/02/20 1725   • losartan (COZAAR) tablet 25 mg  25 mg Oral Daily Pranay Melendez MD   25 mg at 08/02/20 0925   • metoprolol succinate XL (TOPROL-XL) 24 hr tablet 25 mg  25 mg Oral Daily Pranay Melendez MD   25 mg at 08/02/20 0924   • morphine injection 1 mg  1 mg Intravenous Q4H PRN Pranay Melendez MD   1 mg at 08/02/20 0919    And   • naloxone (NARCAN) injection 0.4 mg  0.4 mg Intravenous Q5 Min PRN Pranay Melendez MD       • multivitamin with minerals tablet 1 tablet  1 tablet Oral Daily Pranay Melendez MD   1 tablet at 08/02/20 1725   • ondansetron (ZOFRAN) injection 4 mg  4 mg Intravenous Q6H PRN Pranay Melendez MD       •  oxybutynin XL (DITROPAN-XL) 24 hr tablet 10 mg  10 mg Oral Daily Pranay Melendez MD   10 mg at 08/02/20 0924   • pantoprazole (PROTONIX) EC tablet 40 mg  40 mg Oral QAM Pranay Melendez MD   40 mg at 08/01/20 0603   • Pharmacy to Dose Zosyn   Does not apply Continuous PRN Pranay Melendez MD       • piperacillin-tazobactam (ZOSYN) 3.375 g/100 mL 0.9% NS IVPB (mbp)  3.375 g Intravenous Q8H Pranay Melendez MD   3.375 g at 08/03/20 0116   • rOPINIRole (REQUIP) tablet 0.5 mg  0.5 mg Oral Daily Pranay Melendez MD   0.5 mg at 08/02/20 0924   • sodium chloride 0.9 % flush 10 mL  10 mL Intravenous PRN Pranay Melendez MD       • sodium chloride 0.9 % flush 10 mL  10 mL Intravenous Q12H Pranay Melendez MD   10 mL at 08/02/20 0926   • sodium chloride 0.9 % flush 10 mL  10 mL Intravenous PRN Pranay Melendez MD       • vitamin B-12 (CYANOCOBALAMIN) tablet 1,000 mcg  1,000 mcg Oral Daily Pranay Melendez MD   1,000 mcg at 08/02/20 1725        Physician Progress Notes (last 24 hours) (Notes from 08/02/20 0856 through 08/03/20 0856)      Pranay Melendez MD at 08/02/20 1941              [facility] Medicine Services  INPATIENT PROGRESS NOTE    Length of Stay: 4  Date of Admission: 7/29/2020  Primary Care Physician: Terry Peterson MD    Subjective   Chief Complaint: Lethargy, altered mental status    HPI:  Patient is a 85 y.o. male with a past medical history of essential hypertension, gout, chronic pain, coronary artery disease, right bundle branch block, and recent admission and treatment for left hip fracture earlier in July 2020 with subsequent discharge to the skilled nursing facility for physical rehabilitation. He presented with complaints of lethargy of 3 days duration and was reportedly somnolent and unresponsive. He is being managed for sepsis secondary to pneumonia, acute respiratory failure with hypoxia and suspected gram-negative  pneumonia.    This morning patient is alert and oriented.  He is more interactive and communicative today.  He is asking when he can get out of here. He complains of some cough productive of phlegm.    Review of Systems  Constitutional: Negative for activity change, appetite change, chills, fatigue and fever.   HENT: Negative for congestion, rhinorrhea, sore throat and trouble swallowing.    Respiratory: Positive for cough, chest tightness, shortness of breath and wheezing.    Cardiovascular: Negative for chest pain, palpitations and leg swelling.   Gastrointestinal: Negative for abdominal distention, abdominal pain, diarrhea, nausea and vomiting.   Genitourinary: Negative for difficulty urinating, dysuria and hematuria.   Musculoskeletal: Negative for arthralgias, back pain and myalgias.   Skin: Negative for pallor and rash.   Neurological: Negative for dizziness, syncope, weakness, light-headedness and headaches.   Hematological: Negative for adenopathy. Does not bruise/bleed easily.   Psychiatric/Behavioral: Negative for agitation and confusion. The patient is not nervous/anxious.     Objective    Temp:  [96.3 °F (35.7 °C)-98.9 °F (37.2 °C)] 98.4 °F (36.9 °C)  Heart Rate:  [71-99] 79  Resp:  [16-18] 18  BP: (130-139)/(72-77) 134/76    Physical Exam  Constitutional: He appears well-developed and well-nourished. No distress.   HENT:   Head: Normocephalic and atraumatic.   Mouth/Throat: Oropharynx is clear and moist. No oropharyngeal exudate.   Eyes: Pupils are equal, round, and reactive to light. Conjunctivae and EOM are normal. No scleral icterus.   Neck: Normal range of motion. Neck supple. No JVD present. No tracheal deviation present. No thyromegaly present.   Cardiovascular: Normal rate, regular rhythm, normal heart sounds and intact distal pulses. Exam reveals no gallop and no friction rub.   No murmur heard.  Pulmonary/Chest: Effort normal and breath sounds normal. No stridor. No respiratory distress. He has  no wheezes. He has no rales. He exhibits no tenderness.   Abdominal: Soft. Bowel sounds are normal. He exhibits no distension and no mass. There is no tenderness. There is no rebound and no guarding. No hernia.   Musculoskeletal: Normal range of motion. He exhibits no edema, tenderness or deformity.   Lymphadenopathy:     He has no cervical adenopathy.   Neurological: He is alert. No cranial nerve deficit or sensory deficit. He exhibits normal muscle tone. Coordination normal.  He exhibits some garbled speech which appears to be baseline for him.  Skin: Skin is warm and dry. No rash noted. He is not diaphoretic. No erythema. No pallor.   Psychiatric: He has a normal mood and affect. His behavior is normal. Judgment and thought content normal.    Medication Review:    Current Facility-Administered Medications:   •  acetaminophen (TYLENOL) tablet 650 mg, 650 mg, Oral, Q4H PRN, Pranay Melendez MD  •  amitriptyline (ELAVIL) tablet 100 mg, 100 mg, Oral, Nightly, Pranay Melendez MD, 100 mg at 07/31/20 2112  •  aspirin EC tablet 81 mg, 81 mg, Oral, Daily, Pranay Melendez MD, 81 mg at 08/02/20 0924  •  atorvastatin (LIPITOR) tablet 20 mg, 20 mg, Oral, Nightly, Pranay Melendez MD, 20 mg at 07/31/20 2112  •  colchicine tablet 0.6 mg, 0.6 mg, Oral, Daily, Pranay Melendez MD, 0.6 mg at 08/02/20 0924  •  doxycycline (VIBRAMYCIN) 100 mg/250 mL 0.9% NS VTB, 100 mg, Intravenous, Q12H, Pranay Melendez MD, Last Rate: 0 mL/hr at 08/02/20 0337, 100 mg at 08/02/20 1053  •  enoxaparin (LOVENOX) syringe 40 mg, 40 mg, Subcutaneous, Q24H, Pranay Melendez MD, 40 mg at 08/01/20 0603  •  gabapentin (NEURONTIN) capsule 600 mg, 600 mg, Oral, Q8H, Pranay Melendez MD, 600 mg at 08/02/20 1432  •  guaiFENesin (MUCINEX) 12 hr tablet 1,200 mg, 1,200 mg, Oral, Q12H, Pranay Melendez MD, 1,200 mg at 08/02/20 1725  •  losartan (COZAAR) tablet 25 mg, 25 mg, Oral, Daily, Pranay Melendez MD, 25 mg at 08/02/20 0954  •   metoprolol succinate XL (TOPROL-XL) 24 hr tablet 25 mg, 25 mg, Oral, Daily, Pranay Melendez MD, 25 mg at 08/02/20 0924  •  morphine injection 1 mg, 1 mg, Intravenous, Q4H PRN, 1 mg at 08/02/20 0919 **AND** naloxone (NARCAN) injection 0.4 mg, 0.4 mg, Intravenous, Q5 Min PRN, Pranay Melendez MD  •  multivitamin with minerals tablet 1 tablet, 1 tablet, Oral, Daily, Pranay Melendez MD, 1 tablet at 08/02/20 1725  •  ondansetron (ZOFRAN) injection 4 mg, 4 mg, Intravenous, Q6H PRN, Pranay Melendez MD  •  oxybutynin XL (DITROPAN-XL) 24 hr tablet 10 mg, 10 mg, Oral, Daily, Pranay Melendez MD, 10 mg at 08/02/20 0924  •  pantoprazole (PROTONIX) EC tablet 40 mg, 40 mg, Oral, QAM, Pranay Melendez MD, 40 mg at 08/01/20 0603  •  Pharmacy to Dose Zosyn, , Does not apply, Continuous PRN, Pranay Melendez MD  •  piperacillin-tazobactam (ZOSYN) 3.375 g/100 mL 0.9% NS IVPB (mbp), 3.375 g, Intravenous, Q8H, Pranay Melendez MD, 3.375 g at 08/02/20 1725  •  rOPINIRole (REQUIP) tablet 0.5 mg, 0.5 mg, Oral, Daily, Pranay Melendez MD, 0.5 mg at 08/02/20 0924  •  sodium chloride 0.9 % flush 10 mL, 10 mL, Intravenous, PRN, Pranay Melendez MD  •  sodium chloride 0.9 % flush 10 mL, 10 mL, Intravenous, Q12H, Pranay Melendez MD, 10 mL at 08/02/20 0926  •  sodium chloride 0.9 % flush 10 mL, 10 mL, Intravenous, PRN, Pranay Melendez MD  •  vitamin B-12 (CYANOCOBALAMIN) tablet 1,000 mcg, 1,000 mcg, Oral, Daily, Pranay Melendez MD, 1,000 mcg at 08/02/20 1725    I have reviewed the patient's current medications.     Results Review:  I have reviewed the labs, radiology results, and diagnostic studies.    Laboratory Data:   Results from last 7 days   Lab Units 08/02/20  0533 08/01/20  0709 07/31/20  0635  07/29/20  1505   SODIUM mmol/L 138 137 139   < > 138   POTASSIUM mmol/L 4.2 4.0 4.0   < > 4.4   CHLORIDE mmol/L 102 100 102   < > 104   CO2 mmol/L 23.0 24.0 24.0   < > 25.0   BUN mg/dL 17 23 30*   < >  37*   CREATININE mg/dL 0.95 1.06 1.04   < > 1.25   GLUCOSE mg/dL 105* 98 87   < > 92   CALCIUM mg/dL 10.0 9.8 10.1   < > 9.7   BILIRUBIN mg/dL  --   --   --   --  0.6   ALK PHOS U/L  --   --   --   --  189*   ALT (SGPT) U/L  --   --   --   --  42*   AST (SGOT) U/L  --   --   --   --  48*   ANION GAP mmol/L 13.0 13.0 13.0   < > 9.0    < > = values in this interval not displayed.     Estimated Creatinine Clearance: 54.8 mL/min (by C-G formula based on SCr of 0.95 mg/dL).          Results from last 7 days   Lab Units 08/02/20  0533 08/01/20  0709 07/31/20  0635 07/30/20  0800 07/29/20  1505   WBC 10*3/mm3 13.16* 13.80* 13.92* 13.15* 15.86*   HEMOGLOBIN g/dL 10.4* 10.2* 10.0* 10.0* 9.7*   HEMATOCRIT % 31.6* 31.6* 30.5* 30.8* 29.5*   PLATELETS 10*3/mm3 331 361 345 380 371           Culture Data:   No results found for: BLOODCX  No results found for: URINECX  No results found for: RESPCX  No results found for: WOUNDCX  No results found for: STOOLCX  No components found for: BODYFLD    Radiology Data:   Imaging Results (Last 24 Hours)     ** No results found for the last 24 hours. **          Assessment/Plan     Hospital Problem List:  Principal Problem:    Pneumonia of both lower lobes due to infectious organism  Sepsis secondary to pneumonia present on admission  Suspected gram-negative pneumonia  Acute respiratory failure with hypoxia  Altered mental status and lethargy  Elevated troponin from demand ischemia secondary to pneumonia  Essential hypertension  Coronary artery disease  Gout     Plan  - Patient had lethargy for 3 days prior to admission along with leukocytosis and infiltrates on chest x-ray.  - Continue IV Zosyn with pharmacy to dose and IV doxycycline for suspected gram-negative pneumonia  -  Blood culture growing contaminant in 1 bottle.  Follow-up final blood cultures.  Urine cultures showed no growth.  Urine Legionella and strep pneumo antigen negative.  - COVID-19 test was negative  -Continue oxygen by  nasal cannula to keep O2 sat greater than 92% and wean as tolerated.  Patient currently saturating adequately on 2 L of oxygen by nasal cannula.  - Continue medications for coronary artery disease  - Continue medications for gout  - MRI brain did not show any acute stroke but did show old stroke lesions.  Patient's expressive dysphasia likely related to old strokes.  -Speech and swallow evaluation input appreciated.  Continue modified diet.  -Patient's confusion is resolved and he is alert and oriented today.  Consider discharge in the next 24 hours with or without home oxygen.  Patient family wants patient to be discharged home with home health therapy  -DVT prophylaxis with subcutaneous Lovenox  -PT/OT consult  -CODE STATUS is full code     I discussed the patient's findings and my recommendations with patient    Discharge Planning: I expect patient to be discharged in next 1 to 2 days    Pranay Melendez MD   08/02/20   19:41          Electronically signed by Pranay Melendez MD at 08/02/20 1945       Medical Student Notes (last 24 hours) (Notes from 08/02/20 0856 through 08/03/20 0856)    No notes of this type exist for this encounter.         Consult Notes (last 24 hours) (Notes from 08/02/20 0856 through 08/03/20 0856)    No notes of this type exist for this encounter.

## 2020-08-03 NOTE — THERAPY DISCHARGE NOTE
"Acute Care - Speech Language Pathology   Swallow Treatment Note/Discharge   [facility]     Patient Name: Jose Dacosta  : 1935  MRN: [id number]  Today's Date: 8/3/2020  Onset of Illness/Injury or Date of Surgery: 20     Referring Physician: Dr. Melendez      Admit Date: 2020      Goal:  Patient will safely tolerate least restricted diet w/no overt s/s of aspiration for adequate nutrition and hydration:  Pt seen for dysphagia tx this date and willing to participate w/AM meal. Pt did verbalize pain \"all over\" when SLP respositioned to upright in bed. Pt agreeable to consume meal despite pain; however, meal was ended early d/t pain and pt requesting to be respositioned. Once pt was respositioned back to supine, he immediately feel asleep. Pt consumed mech soft solids w/mild oral residue, but cleared w/liquid wash. Pt consumed thin liquids via straw w/cough when taking consecutive drinks. No coughing or other over s/s of aspiration noted when pt consumed thin liquids via straw w/single sip controlled by SLP. SLP recommends continued current diet and d/c from skilled ST services as goal has been met. Pt in agreement w/recommendations.  Goal Met    Visit Dx:      ICD-10-CM ICD-9-CM   1. Pneumonia of both lower lobes due to infectious organism J18.9 486   2. Altered mental status, unspecified altered mental status type R41.82 780.97   3. Impaired mobility and ADLs Z74.09 V49.89    Z78.9    4. Oropharyngeal dysphagia R13.12 787.22     Patient Active Problem List   Diagnosis   • Coronary artery disease involving native coronary artery of native heart without angina pectoris   • Vasovagal syncope   • RBBB (right bundle branch block with left anterior fascicular block)   • Essential hypertension   • Ischemic cardiomyopathy   • Syncope and collapse   • Closed fracture of neck of left femur (CMS/HCC)   • Coronary artery disease with history of myocardial infarction without history of CABG   • Facial " "laceration   • HFrEF (heart failure with reduced ejection fraction) (CMS/HCC)   • Postoperative anemia due to acute blood loss   • Pneumonia of both lower lobes due to infectious organism       Therapy Treatment  Rehabilitation Treatment Summary     Row Name 08/03/20 0713             Treatment Time/Intention    Discipline  speech language pathologist  -CK      Document Type  discharge treatment  -CK2      Subjective Information  no complaints  -CK      Mode of Treatment  speech-language pathology;individual therapy  -CK      Patient/Family Observations  No family at bedside  -CK      Patient Effort  good  -CK2      Recorded by [CK] Donna Melton MS CCC-SLP 08/03/20 0725  [CK2] Donna Melton MS CCC-SLP 08/03/20 0736      Row Name 08/03/20 0713             Positioning and Restraints    Pre-Treatment Position  in bed  -CK      Post Treatment Position  bed  -CK      In Bed  supine;call light within reach;encouraged to call for assist;exit alarm on  -CK      Recorded by [CK] Donna Melton MS CCC-SLP 08/03/20 0736      Row Name 08/03/20 0713             Pain Assessment    Additional Documentation  Pain Scale: Numbers Pre/Post-Treatment (Group)  -CK      Recorded by [CK] Donna Melton MS CCC-SLP 08/03/20 0736      Row Name 08/03/20 0713             Pain Scale: Numbers Pre/Post-Treatment    Pain Scale: Numbers, Pretreatment  4/10  -CK      Pain Scale: Numbers, Post-Treatment  4/10  -CK      Pain Location  -- \"all over\"  -CK      Pre/Post Treatment Pain Comment  Pt in pain when repositioned to upright.    -CK      Pain Intervention(s)  Repositioned  -CK      Recorded by [CK] Donna Melton MS CCC-SLP 08/03/20 0736      Row Name                Wound 08/01/20 1749 Left gluteal Pressure Injury    Wound - Properties Group Date first assessed: 08/01/20 [TC] Time first assessed: 1749 [TC] Present on Hospital Admission: N [TC] Side: Left [TC] Location: gluteal [TC] Primary Wound Type: Pressure inj [TC] " "Stage, Pressure Injury: Stage 2 [TC] Recorded by:  [TC] Kandis Mercedes RN 08/01/20 1749    Row Name                Wound 08/01/20 1749 Left heel    Wound - Properties Group Date first assessed: 08/01/20 [TC] Time first assessed: 1749 [TC] Present on Hospital Admission: N [TC] Side: Left [TC] Location: heel [TC] Recorded by:  [TC] Kandis Mercedes RN 08/01/20 1749    Row Name                Wound 07/08/20 1810 Left upper eyebrow Laceration    Wound - Properties Group Date first assessed: 07/08/20 [TM] Time first assessed: 1810 [TM] Present on Hospital Admission: Y [TM] Side: Left [TM] Orientation: upper [TM] Location: eyebrow [TM] Primary Wound Type: Laceration [TM] Recorded by:  [TM] Josephine Elias RN 07/08/20 1811    Row Name                Wound 08/01/20 2043 Left posterior;lower leg Pressure Injury    Wound - Properties Group Date first assessed: 08/01/20 [EF] Time first assessed: 2043 [EF] Present on Hospital Admission: Y [EF] Side: Left [EF] Orientation: posterior;lower [EF] Location: leg [EF], below immobilizer  Primary Wound Type: Pressure inj [EF] Stage, Pressure Injury: deep tissue injury [EF] Recorded by:  [EF] Amy Campbell RN 08/01/20 2045    Row Name 08/03/20 0713             Plan of Care Review    Plan of Care Reviewed With  patient  -CK      Progress  no change  -CK      Outcome Summary  Pt seen for dysphagia tx this date and willing to participate w/AM meal.  Pt did verbalize pain \"all over\" when SLP respositioned to upright in bed.  Pt agreeable to consume meal despite pain; however, meal was ended early d/t pain and pt requesting to be respositioned.  Once pt was respositioned back to supine, he immediately feel asleep.  Pt consumed mech soft solids w/mild oral residue, but cleared w/liquid wash.  Pt consumed thin liquids via straw w/cough when taking consecutive drinks.  No coughing or other over s/s of aspiration noted when pt consumed thin liquids via straw w/single sip " controlled by SLP.  SLP recommends continued current diet and d/c from skilled ST services as goal has been met.  Pt in agreement w/recommendations.  -CK      Recorded by [CK] Donna Melton MS CCC-SLP 08/03/20 0736      Row Name 08/03/20 0713             Outcome Summary/Treatment Plan (SLP)    Daily Summary of Progress (SLP)  prepare for discharge  -CK      Barriers to Overall Progress (SLP)  edentulous  -CK      Plan for Continued Treatment (SLP)  d/c from skilled ST services on current diet  -CK      Recorded by [CK] Donna Melton MS CCC-SLP 08/03/20 0736        User Key  (r) = Recorded By, (t) = Taken By, (c) = Cosigned By    Initials Name Effective Dates Discipline    CK Donna Melton MS CCC-SLP 02/11/20 -  SLP    Kandis Hudson RN 10/17/16 -  Nurse    Amy Rubalcava RN 10/17/16 -  Nurse    Josephine Jimenes RN 07/24/18 -  Nurse        Outcome Summary  Outcome Summary/Treatment Plan (SLP)  Daily Summary of Progress (SLP): prepare for discharge (08/03/20 0713 : Donna Melton, MS CCC-SLP)  Barriers to Overall Progress (SLP): edentulous (08/03/20 0713 : Donna Melton, MS CCC-SLP)  Plan for Continued Treatment (SLP): d/c from skilled ST services on current diet (08/03/20 0713 : Donna Melton, MS CCC-SLP)  Reason for Discharge: all goals and outcomes met, no further needs identified (08/03/20 0736 : Donna Melton, MS CCC-[facility])  SLP GOALS     Row Name 08/03/20 0713 07/31/20 1135          Oral Nutrition/Hydration Goal 1 (SLP)    Oral Nutrition/Hydration Goal 1, SLP  pt to tolerate recommended diet for safe and adequate nutrition/hydration  -CK  pt to tolerate recommended diet for safe and adequate nutrition/hydration  -EC     Time Frame (Oral Nutrition/Hydration Goal 1, SLP)  by discharge  -CK  by discharge  -EC     Barriers (Oral Nutrition/Hydration Goal 1, SLP)  edentulous; coughs w/consecutive sips of thin liquids.  -CK  edentulous, cough with thin  liquids  -EC     Progress/Outcomes (Oral Nutrition/Hydration Goal 1, SLP)  (S) goal met  -CK  goal ongoing  -EC       User Key  (r) = Recorded By, (t) = Taken By, (c) = Cosigned By    Initials Name Provider Type    Yesenia Fernando CCC-SLP Speech and Language Pathologist    Donna Whitman MS CCC-SLP Speech and Language Pathologist          EDUCATION  The patient has been educated in the following areas:   Dysphagia (Swallowing Impairment) Modified Diet Instruction.    SLP Recommendation and Plan  Daily Summary of Progress (SLP): prepare for discharge  Barriers to Overall Progress (SLP): edentulous  Plan for Continued Treatment (SLP): d/c from skilled ST services on current diet           Reason for Discharge: all goals and outcomes met, no further needs identified           Time Calculation:   Time Calculation- SLP     Row Name 08/03/20 0737             Time Calculation- SLP    SLP Start Time  0713  -      SLP Stop Time  0737  -      SLP Time Calculation (min)  24 min  -      Total Timed Code Minutes- SLP  24 minute(s)  -      SLP Received On  08/03/20  -CK        User Key  (r) = Recorded By, (t) = Taken By, (c) = Cosigned By    Initials Name Provider Type    Donna Whitman, MS CCC-SLP Speech and Language Pathologist          Therapy Charges for Today     Code Description Service Date Service Provider Modifiers Qty    [id number]  ST TREATMENT SWALLOW 2 8/3/2020 Donna Melton MS CCC-SLP GN 1               SLP Discharge Summary  Reason for Discharge: all goals and outcomes met, no further needs identified  Progress Toward Achieving Short/long Term Goals: all goals met within established timelines    Donna Melton MS CCC-SLP  8/3/2020

## 2020-08-03 NOTE — THERAPY TREATMENT NOTE
Acute Care - Occupational Therapy Treatment Note  [facility]     Patient Name: Jose Dacosta  : 1935  MRN: [id number]  Today's Date: 8/3/2020  Onset of Illness/Injury or Date of Surgery: 20  Date of Referral to OT: 20  Referring Physician: Dr. Melendez    Admit Date: 2020       ICD-10-CM ICD-9-CM   1. Pneumonia of both lower lobes due to infectious organism J18.9 486   2. Altered mental status, unspecified altered mental status type R41.82 780.97   3. Impaired mobility and ADLs Z74.09 V49.89    Z78.9    4. Oropharyngeal dysphagia R13.12 787.22   5. Decreased functional mobility R26.89 781.99     Patient Active Problem List   Diagnosis   • Coronary artery disease involving native coronary artery of native heart without angina pectoris   • Vasovagal syncope   • RBBB (right bundle branch block with left anterior fascicular block)   • Essential hypertension   • Ischemic cardiomyopathy   • Syncope and collapse   • Closed fracture of neck of left femur (CMS/[facility])   • Coronary artery disease with history of myocardial infarction without history of CABG   • Facial laceration   • HFrEF (heart failure with reduced ejection fraction) (CMS/[facility])   • Postoperative anemia due to acute blood loss   • Pneumonia of both lower lobes due to infectious organism     Past Medical History:   Diagnosis Date   • Abnormal ECG      Past Surgical History:   Procedure Laterality Date   • CORONARY STENT PLACEMENT     • HIP HEMIARTHROPLASTY Left 2020    Procedure: LEFT HIP HEMIARTHROPLASTY;  Surgeon: Jitendra Solis MD;  Location: [facility];  Service: Orthopedics;  Laterality: Left;       Therapy Treatment    Rehabilitation Treatment Summary     Row Name 20 1027 20 0922 20 0713       Treatment Time/Intention    Discipline  occupational therapy assistant  -BB  physical therapy assistant  -LILA  speech language pathologist  -CK    Document Type  therapy note (daily note)  -BB  therapy note  (daily note)  -LILA  discharge treatment  -CK2    Subjective Information  --  --  no complaints  -CK    Mode of Treatment  individual therapy;occupational therapy  -BB  physical therapy;individual therapy  -LILA  speech-language pathology;individual therapy  -CK    Patient/Family Observations  --  --  No family at bedside  -CK    Total Minutes, Occupational Therapy Treatment  15  -BB  --  --    Therapy Frequency (OT Eval)  other (see comments) 5-7 days/wk  -BB  --  --    Patient Effort  adequate  -BB  adequate  -LILA  good  -CK2    Comment  Pt wanting to go back to bed after working with PT, however he agreed to sit up until after lunch.  -BB  pt required some encouragement  -JC2  --    Existing Precautions/Restrictions  fall  -BB  fall  -LILA  --    Recorded by [BB] Loni Noonan AGUILAR/L 08/03/20 1429 [LILA] Vikash Walters, PTA 08/03/20 1005  [JC2] Vikash Walters, PTA 08/03/20 1327 [CK] Donna Melton, MS CCC-SLP 08/03/20 0725  [CK2] Donna Melton, MS CCC-SLP 08/03/20 0736    Row Name 08/03/20 1027 08/03/20 0922          Vital Signs    Pre Systolic BP Rehab  --  138  -LILA     Pre Treatment Diastolic BP  --  83  -LILA     Post Systolic BP Rehab  --  129  -JC2     Post Treatment Diastolic BP  --  75  -JC2     Pretreatment Heart Rate (beats/min)  87  -BB  88  -LILA     Posttreatment Heart Rate (beats/min)  --  92  -JC2     Pre SpO2 (%)  96  -BB  93  -LILA     O2 Delivery Pre Treatment  supplemental O2  -BB  supplemental O2  -JC2     Post SpO2 (%)  --  97  -LILA     O2 Delivery Post Treatment  --  supplemental O2  -LILA     Pre Patient Position  Sitting  -BB  Supine  -JC2     Post Patient Position  --  Sitting  -LILA     Recorded by [BB] Loni Noonan AGUILAR/L 08/03/20 1429 [LILA] Vikash Walters, PTA 08/03/20 1327  [JC2] Vikash Walters, PTA 08/03/20 1005     Row Name 08/03/20 1027 08/03/20 0922          Cognitive Assessment/Intervention- PT/OT    Affect/Mental Status (Cognitive)  confused  -BB  confused  -LILA      Orientation Status (Cognition)  oriented to;person;place  -BB  oriented to;person;place  -LILA     Follows Commands (Cognition)  follows one step commands;75-90% accuracy  -BB  follows one step commands;75-90% accuracy  -JC2     Recorded by [BB] Loni Noonan COTA/L 08/03/20 1429 [LILA] Vikash Walters, PTA 08/03/20 1005  [JC2] Vikash Walters, PTA 08/03/20 1327     Row Name 08/03/20 0922             Mobility Assessment/Intervention    Extremity Weight-bearing Status  left lower extremity  -LILA      Left Lower Extremity (Weight-bearing Status)  weight-bearing as tolerated (WBAT)  -LILA      Recorded by [LILA] Vikash Walters, PTA 08/03/20 1327      Row Name 08/03/20 0922             Bed Mobility Assessment/Treatment    Supine-Sit [place] (Bed Mobility)  moderate assist (50% patient effort)  -LILA      Sit-Supine [place] (Bed Mobility)  --  -LILA      Bed Mobility, Safety Issues  decreased use of legs for bridging/pushing  -JC2      Assistive Device (Bed Mobility)  bed rails;head of bed elevated;draw sheet  -JC2      Recorded by [LILA] Vikash Walters, PTA 08/03/20 1327  [JC2] Vikash Walters, PTA 08/03/20 1005      Row Name 08/03/20 0922             Transfer Assessment/Treatment    Transfer Assessment/Treatment  sit-stand transfer;stand-sit transfer;bed-chair transfer  -LILA      Comment (Transfers)  pt can be impulsive and unsafe. pt attempted to sit with nothing behind him.   -LILA      Recorded by [LILA] Vikash Walters, PTA 08/03/20 1327      Row Name 08/03/20 0922             Bed-Chair Transfer    Bed-Chair [place] (Transfers)  minimum assist (75% patient effort);verbal cues  -LILA      Assistive Device (Bed-Chair Transfers)  walker, front-wheeled  -LILA      Recorded by [LILA] Vikash Walters, PTA 08/03/20 1327      Row Name 08/03/20 0922             Sit-Stand Transfer    Sit-Stand [place] (Transfers)  minimum assist (75% patient effort);verbal cues  -LILA      Assistive Device (Sit-Stand Transfers)   walker, front-wheeled  -JC2      Recorded by [LILA] Vikash Walters, PTA 08/03/20 1327  [JC2] Vikash Walters, PTA 08/03/20 1005      Row Name 08/03/20 0922             Stand-Sit Transfer    Stand-Sit [place] (Transfers)  minimum assist (75% patient effort);verbal cues  -LILA      Assistive Device (Stand-Sit Transfers)  walker, front-wheeled  -JC2      Recorded by [LILA] Vikash Walters, PTA 08/03/20 1327  [JC2] Vikash Walters, PTA 08/03/20 1005      Row Name 08/03/20 0922             Gait/Stairs Assessment/Training    Gait/Stairs Assessment/Training  gait/ambulation independence;gait/ambulation assistive device  -LILA      [place] Level (Gait)  minimum assist (75% patient effort);verbal cues;nonverbal cues (demo/gesture)  -LILA      Assistive Device (Gait)  walker, front-wheeled  -JC2      Distance in Feet (Gait)  3, 6  -LILA      Pattern (Gait)  3-point  -LILA      Deviations/Abnormal Patterns (Gait)  base of support, narrow;maggie decreased;gait speed decreased  -LILA      Left Sided Gait Deviations  forward flexed posture  -LILA      Comment (Gait/Stairs)  pt incontinent of urine and bowels.   -LILA      Recorded by [LILA] Vikash Walters, PTA 08/03/20 1327  [JC2] Vikash Walters, PTA 08/03/20 1005      Row Name 08/03/20 1027             Upper Body Dressing Assessment/Training    Upper Body Dressing [place] Level  doff;don;verbal cues;moderate assist (50% patient effort) hospital gown  -BB      Upper Body Dressing Position  -- sitting in recliner  -BB      Recorded by [BB] Loni Noonan COTA/L 08/03/20 1429      Row Name 08/03/20 1027             Grooming Assessment/Training    [place] Level (Grooming)  hair care, combing/brushing;wash face, hands;moderate assist (50% patient effort);set up;verbal cues  -BB      Grooming Position  -- sitting in recliner  -BB      Recorded by [BB] Loni Noonan COTA/L 08/03/20 1429      Row Name 08/03/20 0922             Therapeutic Exercise    Therapeutic  Exercise  supine, lower extremities  -LILA      Recorded by [LILA] Vikash Walters, PTA 08/03/20 1327      Row Name 08/03/20 0922             Lower Extremity Supine Therapeutic Exercise    Performed, Supine Lower Extremity (Therapeutic Exercise)  ankle dorsiflexion/plantarflexion;hip abduction/adduction;heel slides  -      Exercise Type, Supine Lower Extremity (Therapeutic Exercise)  AROM (active range of motion)  -LILA      Sets/Reps Detail, Supine Lower Extremity (Therapeutic Exercise)  10x1 veda  -LILA      Comment, Supine Lower Extremity (Therapeutic Exercise)  careful not to break hip prec. encouragement provided to get to 10 reps  -LILA      Recorded by [LILA] Vikash Walters, PTA 08/03/20 1327      Row Name 08/03/20 1027 08/03/20 0922 08/03/20 0713       Positioning and Restraints    Pre-Treatment Position  sitting in chair/recliner  -BB  in bed  -LILA  in bed  -CK    Post Treatment Position  chair  -BB  chair  -LILA  bed  -CK    In Bed  --  --  supine;call light within reach;encouraged to call for assist;exit alarm on  -CK    In Chair  reclined;call light within reach;encouraged to call for assist;exit alarm on  -BB  reclined;call light within reach;encouraged to call for assist;exit alarm on all needs met  -  --    Recorded by [BB] Loni Noonan COTA/JAZZY 08/03/20 1429 [LILA] Vikash Walters, PTA 08/03/20 1327 [CK] Donna Melton, MS CCC-SLP 08/03/20 0736    Row Name 08/03/20 0922 08/03/20 0713          Pain Assessment    Additional Documentation  Pain Scale: Numbers Pre/Post-Treatment (Group)  -LILA  Pain Scale: Numbers Pre/Post-Treatment (Group)  -CK     Recorded by [LILA] Vikash Walters, PTA 08/03/20 1327 [CK] Donna Melton, MS CCC-SLP 08/03/20 0736     Row Name 08/03/20 1027 08/03/20 0922 08/03/20 0713       Pain Scale: Numbers Pre/Post-Treatment    Pain Scale: Numbers, Pretreatment  9/10  -BB  9/10  -LILA  4/10  -CK    Pain Scale: Numbers, Post-Treatment  9/10  -BB  9/10  -JC2  4/10  -CK    Pain Location   "hip  -BB  -- bottom and hip  -LILA  -- \"all over\"  -CK    Pre/Post Treatment Pain Comment  --  --  Pt in pain when repositioned to upright.    -CK    Pain Intervention(s)  Medication (See MAR)  -BB  Repositioned;Medication (See MAR)  -LILA  Repositioned  -CK    Recorded by [BB] Loni Noonan, AGUILAR/L 08/03/20 1429 [LILA] Vikash Walters, PTA 08/03/20 1327  [JC2] Vikash Walters, PTA 08/03/20 1005 [CK] Donna Melton, MS CCC-SLP 08/03/20 0736    Row Name                Wound 08/01/20 1749 Left gluteal Pressure Injury    Wound - Properties Group Date first assessed: 08/01/20 [TC] Time first assessed: 1749 [TC] Present on Hospital Admission: N [TC] Side: Left [TC] Location: gluteal [TC] Primary Wound Type: Pressure inj [TC] Stage, Pressure Injury: Stage 2 [TC] Recorded by:  [TC] Kandis Mercedes RN 08/01/20 1749    Row Name                Wound 08/01/20 1749 Left heel    Wound - Properties Group Date first assessed: 08/01/20 [TC] Time first assessed: 1749 [TC] Present on Hospital Admission: N [TC] Side: Left [TC] Location: heel [TC] Recorded by:  [TC] Kandis Mercedes RN 08/01/20 1749    Row Name                Wound 07/08/20 1810 Left upper eyebrow Laceration    Wound - Properties Group Date first assessed: 07/08/20 [TM] Time first assessed: 1810 [TM] Present on Hospital Admission: Y [TM] Side: Left [TM] Orientation: upper [TM] Location: eyebrow [TM] Primary Wound Type: Laceration [TM] Recorded by:  [TM] Josephine Elias RN 07/08/20 1811    Row Name                Wound 08/01/20 2043 Left posterior;lower leg Pressure Injury    Wound - Properties Group Date first assessed: 08/01/20 [EF] Time first assessed: 2043 [EF] Present on Hospital Admission: Y [EF] Side: Left [EF] Orientation: posterior;lower [EF] Location: leg [EF], below immobilizer  Primary Wound Type: Pressure inj [EF] Stage, Pressure Injury: deep tissue injury [EF] Recorded by:  [EF] Amy Campbell RN 08/01/20 2045    Row Name 08/03/20 " "1027 08/03/20 0713          Plan of Care Review    Plan of Care Reviewed With  patient  -BB  patient  -CK     Progress  no change  -BB  no change  -CK     Outcome Summary  Pt Mod A for UB dressing and grooming tasks. Pt is tired and wants to rest.  -BB  Pt seen for dysphagia tx this date and willing to participate w/AM meal.  Pt did verbalize pain \"all over\" when SLP respositioned to upright in bed.  Pt agreeable to consume meal despite pain; however, meal was ended early d/t pain and pt requesting to be respositioned.  Once pt was respositioned back to supine, he immediately feel asleep.  Pt consumed mech soft solids w/mild oral residue, but cleared w/liquid wash.  Pt consumed thin liquids via straw w/cough when taking consecutive drinks.  No coughing or other over s/s of aspiration noted when pt consumed thin liquids via straw w/single sip controlled by SLP.  SLP recommends continued current diet and d/c from skilled ST services as goal has been met.  Pt in agreement w/recommendations.  -CK     Recorded by [BB] Loni Noonan COTA/L 08/03/20 1429 [CK] Donna Melton, MS CCC-SLP 08/03/20 0736     Row Name 08/03/20 1027             Outcome Summary/Treatment Plan (OT)    Daily Summary of Progress (OT)  progress toward functional goals as expected  -BB      Plan for Continued Treatment (OT)  continue POC  -BB      Anticipated Discharge Disposition (OT)  skilled nursing [facility]  -BB      Recorded by [BB] Loni Noonan COTA/L 08/03/20 1429      Row Name 08/03/20 0922             Outcome Summary/Treatment Plan (PT)    Daily Summary of Progress (PT)  progress toward functional goals as expected  -LILA      Plan for Continued Treatment (PT)  continue  -LILA      Anticipated Discharge Disposition (PT)  anticipate therapy at next level of care;skilled nursing facility  -JC2      Recorded by [LILA] Vikash Walters, PTA 08/03/20 1327  [JC2] Vikash Walters, PTA 08/03/20 1005      Row Name 08/03/20 0713          "    Outcome Summary/Treatment Plan (SLP)    Daily Summary of Progress (SLP)  prepare for discharge  -CK      Barriers to Overall Progress (SLP)  edentulous  -CK      Plan for Continued Treatment (SLP)  d/c from skilled ST services on current diet  -CK      Recorded by [CK] Geronimo Donna PURCELL, MS CCC-SLP 08/03/20 0736        User Key  (r) = Recorded By, (t) = Taken By, (c) = Cosigned By    Initials Name Effective Dates Discipline    CK Geronimo Donna L, MS CCC-SLP 02/11/20 -  SLP    TC Kandis Mercedes, RN 10/17/16 -  Nurse    Amy Rubalcava RN 10/17/16 -  Nurse    Vikash Whatley PTA 03/07/18 -  PT    Loni Mathur COTA/L 03/07/18 -  OT    TM Josephine Elias, RN 07/24/18 -  Nurse        Wound 08/01/20 1749 Left gluteal Pressure Injury (Active)   Closure Open to air 8/2/2020  8:13 PM   Periwound redness;non-blanchable 8/2/2020  8:13 PM   Edges irregular 8/2/2020  8:13 PM   Drainage Amount none 8/2/2020  8:13 PM   Care, Wound cleansed with;soap and water 8/2/2020  8:13 PM   Dressing Care, Wound skin barrier agent applied 8/2/2020  8:13 PM   Periwound Care, Wound barrier film applied 8/2/2020  8:13 PM       Wound 08/01/20 1749 Left heel (Active)   Closure Open to air 8/2/2020  8:13 PM   Base red;non-blanchable 8/2/2020  8:13 PM       Wound 08/01/20 2043 Left posterior;lower leg Pressure Injury (Active)   Dressing Appearance open to air 8/2/2020  8:13 PM   Closure None 8/2/2020  8:13 PM   Base non-blanchable;red 8/2/2020  8:13 PM     Rehab Goal Summary     Row Name 08/03/20 1027 08/03/20 0922 08/03/20 0713       Bed Mobility Goal 1 (PT)    Activity/Assistive Device (Bed Mobility Goal 1, PT)  --  sit to supine;supine to sit  -LILA  --    [place] Level/Cues Needed (Bed Mobility Goal 1, PT)  --  contact guard assist;standby assist  -LILA  --    Time Frame (Bed Mobility Goal 1, PT)  --  by discharge  -LILA  --    Progress/Outcomes (Bed Mobility Goal 1, PT)  --  goal not met  -LILA  --        Transfer Goal 1 (PT)    Activity/Assistive Device (Transfer Goal 1, PT)  --  bed-to-chair/chair-to-bed;sit-to-stand/stand-to-sit  -LILA  --    [place] Level/Cues Needed (Transfer Goal 1, PT)  --  contact guard assist  -LILA  --    Time Frame (Transfer Goal 1, PT)  --  by discharge  -LILA  --    Progress/Outcome (Transfer Goal 1, PT)  --  goal not met  -LILA  --       Gait Training Goal 1 (PT)    Activity/Assistive Device (Gait Training Goal 1, PT)  --  gait (walking locomotion);assistive device use;walker, rolling  -LILA  --    [place] Level (Gait Training Goal 1, PT)  --  contact guard assist  -LILA  --    Time Frame (Gait Training Goal 1, PT)  --  5 days  -LILA  --    Progress/Outcome (Gait Training Goal 1, PT)  --  goal not met  -LILA  --       Occupational Therapy Goals    Transfer Goal Selection (OT)  transfer, OT goal 1  -BB  --  --    Bathing Goal Selection (OT)  bathing, OT goal 1  -BB  --  --    Dressing Goal Selection (OT)  dressing, OT goal 1  -BB  --  --    Toileting Goal Selection (OT)  toileting, OT goal 1  -BB  --  --    Activity Tolerance Goal Selection (OT)  activity tolerance, OT goal 1  -BB  --  --       Transfer Goal 1 (OT)    Activity/Assistive Device (Transfer Goal 1, OT)  sit-to-stand/stand-to-sit;bed-to-chair/chair-to-bed;toilet  -BB  --  --    [place] Level/Cues Needed (Transfer Goal 1, OT)  contact guard assist  -BB  --  --    Time Frame (Transfer Goal 1, OT)  long term goal (LTG);by discharge  -BB  --  --    Progress/Outcome (Transfer Goal 1, OT)  goal not met  -BB  --  --       Bathing Goal 1 (OT)    Activity/Assistive Device (Bathing Goal 1, OT)  bathing skills, all  -BB  --  --    [place] Level/Cues Needed (Bathing Goal 1, OT)  minimum assist (75% or more patient effort)  -BB  --  --    Time Frame (Bathing Goal 1, OT)  long term goal (LTG);by discharge  -BB  --  --    Progress/Outcomes (Bathing Goal 1, OT)  goal not met  -BB  --  --       Dressing Goal 1 (OT)    Activity/Assistive  Device (Dressing Goal 1, OT)  lower body dressing  -BB  --  --    [place]/Cues Needed (Dressing Goal 1, OT)  minimum assist (75% or more patient effort)  -BB  --  --    Time Frame (Dressing Goal 1, OT)  long term goal (LTG);by discharge  -BB  --  --    Progress/Outcome (Dressing Goal 1, OT)  goal not met  -BB  --  --       Toileting Goal 1 (OT)    Activity/Device (Toileting Goal 1, OT)  toileting skills, all  -BB  --  --    [place] Level/Cues Needed (Toileting Goal 1, OT)  supervision required  -BB  --  --    Time Frame (Toileting Goal 1, OT)  long term goal (LTG);by discharge  -BB  --  --    Progress/Outcome (Toileting Goal 1, OT)  goal not met  -BB  --  --        Activity Tolerance Goal 1 (OT)    Activity Level (Endurance Goal 1, OT)  15 min activity functional/therapeutic activities  -BB  --  --    Time Frame (Activity Tolerance Goal 1, OT)  long term goal (LTG);by discharge  -BB  --  --    Progress/Outcome (Activity Tolerance Goal 1, OT)  goal met  -BB  --  --       [place] Goals (SLP)    Oral Nutrition/Hydration Goal Selection (SLP)  --  --  oral nutrition/hydration, SLP goal 1  -CK       Oral Nutrition/Hydration Goal 1 (SLP)    Oral Nutrition/Hydration Goal 1, SLP  --  --  pt to tolerate recommended diet for safe and adequate nutrition/hydration  -CK    Time Frame (Oral Nutrition/Hydration Goal 1, SLP)  --  --  by discharge  -CK    Barriers (Oral Nutrition/Hydration Goal 1, SLP)  --  --  edentulous; coughs w/consecutive sips of thin liquids.  -CK    Progress/Outcomes (Oral Nutrition/Hydration Goal 1, SLP)  --  --  (S) goal met  -CK      User Key  (r) = Recorded By, (t) = Taken By, (c) = Cosigned By    Initials Name Provider Type Discipline    CK Donna Melton, MS CCC-SLP Speech and Language Pathologist SLP    Vikash Whatley, PTA Physical Therapy Assistant PT    BB Loni Noonan, AGUILAR/L Occupational Therapy Assistant OT        Occupational Therapy Education                 Title: PT OT  SLP Therapies (In Progress)     Topic: Occupational Therapy (In Progress)     Point: ADL training (In Progress)     Description:   Instruct learner(s) on proper safety adaptation and remediation techniques during self care or transfers.   Instruct in proper use of assistive devices.              Learning Progress Summary           Patient Acceptance, E, NR by  at 8/3/2020 1430    Acceptance, E,D, NR by RC at 8/2/2020 1354    Acceptance, E, NR,NL by AS at 7/30/2020 1656    Comment:  role of OT, OT POC, bed mobility, transfer training                   Point: Home exercise program (Not Started)     Description:   Instruct learner(s) on appropriate technique for monitoring, assisting and/or progressing therapeutic exercises/activities.              Learner Progress:   Not documented in this visit.          Point: Precautions (In Progress)     Description:   Instruct learner(s) on prescribed precautions during self-care and functional transfers.              Learning Progress Summary           Patient Acceptance, E,D, NR by RC at 8/2/2020 1354    Acceptance, E, NR,NL by AS at 7/30/2020 1656    Comment:  role of OT, OT POC, bed mobility, transfer training                   Point: Body mechanics (In Progress)     Description:   Instruct learner(s) on proper positioning and spine alignment during self-care, functional mobility activities and/or exercises.              Learning Progress Summary           Patient Acceptance, E,D, NR by  at 8/2/2020 1354                               User Key     Initials Effective Dates Name Provider Type Discipline     03/07/18 -  Loni Noonan COTA/L Occupational Therapy Assistant OT    RC 03/07/18 -  Evelyn Dodson COTA/L Occupational Therapy Assistant OT    AS 07/05/20 -  Ligia Junoir OT Occupational Therapist OT                OT Recommendation and Plan  Outcome Summary/Treatment Plan (OT)  Daily Summary of Progress (OT): progress toward functional goals as  expected  Plan for Continued Treatment (OT): continue POC  Anticipated Discharge Disposition (OT): skilled nursing facility  Therapy Frequency (OT Eval): other (see comments)(5-7 days/wk)  Daily Summary of Progress (OT): progress toward functional goals as expected  Plan of Care Review  Plan of Care Reviewed With: patient  Plan of Care Reviewed With: patient  Outcome Summary: Pt Mod A for UB dressing and grooming tasks. Pt requires encouragement to work with OT and is fatigued this am.  Outcome Measures     Row Name 08/03/20 1027 08/03/20 0922 08/02/20 0845       How much help from another person do you currently need...    Turning from your back to your side while in flat bed without using bedrails?  --  3  -LILA  --    Moving from lying on back to sitting on the side of a flat bed without bedrails?  --  2  -LILA  --    Moving to and from a bed to a chair (including a wheelchair)?  --  3  -LILA  --    Standing up from a chair using your arms (e.g., wheelchair, bedside chair)?  --  3  -LILA  --    Climbing 3-5 steps with a railing?  --  1  -LILA  --    To walk in hospital room?  --  3  -LILA  --    AM-PAC 6 Clicks Score (PT)  --  15  -LILA  --       How much help from another is currently needed...    Putting on and taking off regular lower body clothing?  2  -BB  --  2  -RC    Bathing (including washing, rinsing, and drying)  2  -BB  --  2  -RC    Toileting (which includes using toilet bed pan or urinal)  2  -BB  --  2  -RC    Putting on and taking off regular upper body clothing  3  -BB  --  3  -RC    Taking care of personal grooming (such as brushing teeth)  3  -BB  --  3  -RC    Eating meals  3  -BB  --  3  -RC    AM-PAC 6 Clicks Score (OT)  15  -BB  --  15  -RC       Functional Assessment    Outcome Measure Options  --  AM-PAC 6 Clicks Basic Mobility (PT)  -LILA  --    Row Name 08/01/20 1332             How much help from another person do you currently need...    Turning from your back to your side while in flat bed without  using bedrails?  3  -EM      Moving from lying on back to sitting on the side of a flat bed without bedrails?  2  -EM      Moving to and from a bed to a chair (including a wheelchair)?  2  -EM      Standing up from a chair using your arms (e.g., wheelchair, bedside chair)?  2  -EM      Climbing 3-5 steps with a railing?  1  -EM      To walk in hospital room?  2  -EM      AM-PAC 6 Clicks Score (PT)  12  -EM         Functional Assessment    Outcome Measure Options  AM-PAC 6 Clicks Basic Mobility (PT)  -EM        User Key  (r) = Recorded By, (t) = Taken By, (c) = Cosigned By    Initials Name Provider Type    EM Tee Burris, PTA Physical Therapy Assistant    LILA Vikash Walters, PTA Physical Therapy Assistant    Loni Mathur COTA/L Occupational Therapy Assistant    Evelyn Farley COTA/L Occupational Therapy Assistant           Time Calculation:   Time Calculation- OT     Row Name 08/03/20 1433             Time Calculation- OT    OT Start Time  1027  -BB      OT Stop Time  1042  -BB      OT Time Calculation (min)  15 min  -      Total Timed Code Minutes- OT  15 minute(s)  -      OT Received On  08/03/20  -        User Key  (r) = Recorded By, (t) = Taken By, (c) = Cosigned By    Initials Name Provider Type    Loni Mathur COTA/L Occupational Therapy Assistant        Therapy Charges for Today     Code Description Service Date Service Provider Modifiers Qty    87225141575 HC OT SELF CARE/MGMT/TRAIN EA 15 MIN 8/3/2020 Loni Noonan COTA/L GO 1               MICK Turk  8/3/2020

## 2020-08-03 NOTE — PLAN OF CARE
Problem: Patient Care Overview  Goal: Plan of Care Review  Outcome: Ongoing (interventions implemented as appropriate)     Problem: Wound (Includes Pressure Injury) (Adult)  Goal: Signs and Symptoms of Listed Potential Problems Will be Absent, Minimized or Managed (Wound)  Outcome: Ongoing (interventions implemented as appropriate)  Note:   pressure injury to Lt heel, Lt gluteal and LLE. Alb 3.2L. ST has placed pt on soft/ground diet- cardiac restrictions- intakes 25-50% [id number]. Current wt 145# /BMI 19.8. RD following.

## 2020-08-03 NOTE — THERAPY TREATMENT NOTE
Acute Care - Physical Therapy Treatment Note  [facility]     Patient Name: Jose Dacosta  : 1935  MRN: [id number]  Today's Date: 8/3/2020  Onset of Illness/Injury or Date of Surgery: 20     Referring Physician: Dr. Melendez    Admit Date: 2020    Visit Dx:    ICD-10-CM ICD-9-CM   1. Pneumonia of both lower lobes due to infectious organism J18.9 486   2. Altered mental status, unspecified altered mental status type R41.82 780.97   3. Impaired mobility and ADLs Z74.09 V49.89    Z78.9    4. Oropharyngeal dysphagia R13.12 787.22   5. Decreased functional mobility R26.89 781.99     Patient Active Problem List   Diagnosis   • Coronary artery disease involving native coronary artery of native heart without angina pectoris   • Vasovagal syncope   • RBBB (right bundle branch block with left anterior fascicular block)   • Essential hypertension   • Ischemic cardiomyopathy   • Syncope and collapse   • Closed fracture of neck of left femur (CMS/[facility])   • Coronary artery disease with history of myocardial infarction without history of CABG   • Facial laceration   • HFrEF (heart failure with reduced ejection fraction) (CMS/[facility])   • Postoperative anemia due to acute blood loss   • Pneumonia of both lower lobes due to infectious organism       Therapy Treatment    Rehabilitation Treatment Summary     Row Name 20 0922 20 0713          Treatment Time/Intention    Discipline  physical therapy assistant  -LILA  speech language pathologist  -CK     Document Type  therapy note (daily note)  -LILA  discharge treatment  -CK2     Subjective Information  --  no complaints  -CK     Mode of Treatment  physical therapy;individual therapy  -LILA  speech-language pathology;individual therapy  -CK     Patient/Family Observations  --  No family at bedside  -CK     Patient Effort  adequate  -LILA  good  -CK2     Comment  pt required some encouragement  -JC2  --     Existing Precautions/Restrictions  fall  -LILA  --      Recorded by [LILA] Vikash Walters, PTA 08/03/20 1005  [JC2] Vikash Walters, PTA 08/03/20 1327 [CK] Poplar BranchDonna rose, MS CCC-SLP 08/03/20 0725  [CK2] GeronimoDonna rose, MS CCC-SLP 08/03/20 0736     Row Name 08/03/20 0922             Vital Signs    Pre Systolic BP Rehab  138  -LILA      Pre Treatment Diastolic BP  83  -LILA      Post Systolic BP Rehab  129  -JC2      Post Treatment Diastolic BP  75  -JC2      Pretreatment Heart Rate (beats/min)  88  -LILA      Posttreatment Heart Rate (beats/min)  92  -JC2      Pre SpO2 (%)  93  -LILA      O2 Delivery Pre Treatment  supplemental O2  -JC2      Post SpO2 (%)  97  -LILA      O2 Delivery Post Treatment  supplemental O2  -LILA      Pre Patient Position  Supine  -JC2      Post Patient Position  Sitting  -LILA      Recorded by [LILA] Vikash Walters, PTA 08/03/20 1327  [JC2] Vikash Walters, PTA 08/03/20 1005      Row Name 08/03/20 0922             Cognitive Assessment/Intervention- PT/OT    Affect/Mental Status (Cognitive)  confused  -LILA      Orientation Status (Cognition)  oriented to;person;place  -LILA      Follows Commands (Cognition)  follows one step commands;75-90% accuracy  -JC2      Recorded by [LILA] Vikash Walters, PTA 08/03/20 1005  [JC2] Vikash Walters, PTA 08/03/20 1327      Row Name 08/03/20 0922             Mobility Assessment/Intervention    Extremity Weight-bearing Status  left lower extremity  -LILA      Left Lower Extremity (Weight-bearing Status)  weight-bearing as tolerated (WBAT)  -LILA      Recorded by [LILA] Vikash Walters, PTA 08/03/20 1327      Row Name 08/03/20 0922             Bed Mobility Assessment/Treatment    Supine-Sit [place] (Bed Mobility)  moderate assist (50% patient effort)  -LILA      Sit-Supine [place] (Bed Mobility)  --  -LILA      Bed Mobility, Safety Issues  decreased use of legs for bridging/pushing  -JC2      Assistive Device (Bed Mobility)  bed rails;head of bed elevated;draw sheet  -JC2      Recorded by [LILA] Vikash Walters, PTA  08/03/20 1327  [JC2] Vikash Walters, PTA 08/03/20 1005      Row Name 08/03/20 0922             Transfer Assessment/Treatment    Transfer Assessment/Treatment  sit-stand transfer;stand-sit transfer;bed-chair transfer  -LILA      Comment (Transfers)  pt can be impulsive and unsafe. pt attempted to sit with nothing behind him.   -LILA      Recorded by [LILA] Vikash Walters, PTA 08/03/20 1327      Row Name 08/03/20 0922             Bed-Chair Transfer    Bed-[place]air [place] (Transfers)  minimum assist (75% patient effort);verbal cues  -LILA      Assistive Device (Bed-Chair Transfers)  walker, front-wheeled  -LILA      Recorded by [LILA] Vikash Walters, PTA 08/03/20 1327      Row Name 08/03/20 0922             Sit-Stand Transfer    Sit-Stand [place] (Transfers)  minimum assist (75% patient effort);verbal cues  -LILA      Assistive Device (Sit-Stand Transfers)  walker, front-wheeled  -JC2      Recorded by [LILA] Vikash Walters, PTA 08/03/20 1327  [JC2] Vikash Walters, PTA 08/03/20 1005      Row Name 08/03/20 0922             Stand-Sit Transfer    Stand-Sit [place] (Transfers)  minimum assist (75% patient effort);verbal cues  -LILA      Assistive Device (Stand-Sit Transfers)  walker, front-wheeled  -JC2      Recorded by [LILA] Vikash Walters, PTA 08/03/20 1327  [JC2] Vikash Walters, PTA 08/03/20 1005      Row Name 08/03/20 0922             Gait/Stairs Assessment/Training    Gait/Stairs Assessment/Training  gait/ambulation independence;gait/ambulation assistive device  -LILA      [place] Level (Gait)  minimum assist (75% patient effort);verbal cues;nonverbal cues (demo/gesture)  -LILA      Assistive Device (Gait)  walker, front-wheeled  -JC2      Distance in Feet (Gait)  3, 6  -LILA      Pattern (Gait)  3-point  -LILA      Deviations/Abnormal Patterns (Gait)  base of support, narrow;maggie decreased;gait speed decreased  -LILA      Left Sided Gait Deviations  forward flexed posture  -LILA      Comment (Gait/Stairs)  pt  incontinent of urine and bowels.   -LILA      Recorded by [LILA] Vikash Walters, PTA 08/03/20 1327  [JC2] Vikash Walters, PTA 08/03/20 1005      Row Name 08/03/20 0922             Therapeutic Exercise    Therapeutic Exercise  supine, lower extremities  -LILA      Recorded by [LILA] Vikash Walters, PTA 08/03/20 1327      Row Name 08/03/20 0922             Lower Extremity Supine Therapeutic Exercise    Performed, Supine Lower Extremity (Therapeutic Exercise)  ankle dorsiflexion/plantarflexion;hip abduction/adduction;heel slides  -LILA      Exercise Type, Supine Lower Extremity (Therapeutic Exercise)  AROM (active range of motion)  -LILA      Sets/Reps Detail, Supine Lower Extremity (Therapeutic Exercise)  10x1 veda  -LILA      Comment, Supine Lower Extremity (Therapeutic Exercise)  careful not to break hip prec. encouragement provided to get to 10 reps  -LILA      Recorded by [LILA] Vikash Walters, PTA 08/03/20 1327      Row Name 08/03/20 0922 08/03/20 0713          Positioning and Restraints    Pre-Treatment Position  in bed  -LILA  in bed  -CK     Post Treatment Position  chair  -LILA  bed  -CK     In Bed  --  supine;call light within reach;encouraged to call for assist;exit alarm on  -CK     In Chair  reclined;call light within reach;encouraged to call for assist;exit alarm on all needs met  -  --     Recorded by [LILA] Vikash Walters, PTA 08/03/20 1327 [CK] Donna Melton, MS CCC-SLP 08/03/20 0736     Row Name 08/03/20 0922 08/03/20 0713          Pain Assessment    Additional Documentation  Pain Scale: Numbers Pre/Post-Treatment (Group)  -LILA  Pain Scale: Numbers Pre/Post-Treatment (Group)  -CK     Recorded by [LILA] Vikash Walters, PTA 08/03/20 1327 [CK] Donna Melton, MS CCC-SLP 08/03/20 0736     Row Name 08/03/20 0922 08/03/20 0713          Pain Scale: Numbers Pre/Post-Treatment    Pain Scale: Numbers, Pretreatment  9/10  -LILA  4/10  -CK     Pain Scale: Numbers, Post-Treatment  9/10  -JC2  4/10  -CK     Pain Location  " -- bottom and hip  -LILA  -- \"all over\"  -CK     Pre/Post Treatment Pain Comment  --  Pt in pain when repositioned to upright.    -CK     Pain Intervention(s)  Repositioned;Medication (See MAR)  -LILA  Repositioned  -CK     Recorded by [LILA] Vikash Walters, PTA 08/03/20 1327  [JC2] Vikash Walters, PTA 08/03/20 1005 [CK] Donna Melton, MS CCC-SLP 08/03/20 0736     Row Name                Wound 08/01/20 1749 Left gluteal Pressure Injury    Wound - Properties Group Date first assessed: 08/01/20 [TC] Time first assessed: 1749 [TC] Present on Hospital Admission: N [TC] Side: Left [TC] Location: gluteal [TC] Primary Wound Type: Pressure inj [TC] Stage, Pressure Injury: Stage 2 [TC] Recorded by:  [TC] Kandis Mercedes RN 08/01/20 1749    Row Name                Wound 08/01/20 1749 Left heel    Wound - Properties Group Date first assessed: 08/01/20 [TC] Time first assessed: 1749 [TC] Present on Hospital Admission: N [TC] Side: Left [TC] Location: heel [TC] Recorded by:  [TC] Kandis Mercedes RN 08/01/20 1749    Row Name                Wound 07/08/20 1810 Left upper eyebrow Laceration    Wound - Properties Group Date first assessed: 07/08/20 [TM] Time first assessed: 1810 [TM] Present on Hospital Admission: Y [TM] Side: Left [TM] Orientation: upper [TM] Location: eyebrow [TM] Primary Wound Type: Laceration [TM] Recorded by:  [TM] Josephine Elias RN 07/08/20 1811    Row Name                Wound 08/01/20 2043 Left posterior;lower leg Pressure Injury    Wound - Properties Group Date first assessed: 08/01/20 [EF] Time first assessed: 2043 [EF] Present on Hospital Admission: Y [EF] Side: Left [EF] Orientation: posterior;lower [EF] Location: leg [EF], below immobilizer  Primary Wound Type: Pressure inj [EF] Stage, Pressure Injury: deep tissue injury [EF] Recorded by:  [EF] Amy Campbell RN 08/01/20 2045    Row Name 08/03/20 0713             Plan of Care Review    Plan of Care Reviewed With  patient  -CK   " "   Progress  no change  -CK      Outcome Summary  Pt seen for dysphagia tx this date and willing to participate w/AM meal.  Pt did verbalize pain \"all over\" when SLP respositioned to upright in bed.  Pt agreeable to consume meal despite pain; however, meal was ended early d/t pain and pt requesting to be respositioned.  Once pt was respositioned back to supine, he immediately feel asleep.  Pt consumed mech soft solids w/mild oral residue, but cleared w/liquid wash.  Pt consumed thin liquids via straw w/cough when taking consecutive drinks.  No coughing or other over s/s of aspiration noted when pt consumed thin liquids via straw w/single sip controlled by SLP.  SLP recommends continued current diet and d/c from skilled ST services as goal has been met.  Pt in agreement w/recommendations.  -CK      Recorded by [CK] Donna Melton MS CCC-SLP 08/03/20 0736      Row Name 08/03/20 0922             Outcome Summary/Treatment Plan (PT)    Daily Summary of Progress (PT)  progress toward functional goals as expected  -LILA      Plan for Continued Treatment (PT)  continue  -LILA      Anticipated Discharge Disposition (PT)  anticipate therapy at next level of care;skilled nursing facility  -JC2      Recorded by [LILA] Vikash Walters, PTA 08/03/20 1327  [JC2] Vikash Walters, PTA 08/03/20 1005      Row Name 08/03/20 0713             Outcome Summary/Treatment Plan (SLP)    Daily Summary of Progress (SLP)  prepare for discharge  -CK      Barriers to Overall Progress (SLP)  edentulous  -CK      Plan for Continued Treatment (SLP)  d/c from skilled ST services on current diet  -CK      Recorded by [CK] Donna Melton MS CCC-SLP 08/03/20 0736        User Key  (r) = Recorded By, (t) = Taken By, (c) = Cosigned By    Initials Name Effective Dates Discipline    CK Donna Melton, MS CCC-SLP 02/11/20 -  SLP    Kandis Hudson, RN 10/17/16 -  Nurse    Amy Rubalcava RN 10/17/16 -  Nurse    Vikash Whatley, PTA " 03/07/18 -  PT    TM Josephine Elias RN 07/24/18 -  Nurse          Wound 08/01/20 1749 Left gluteal Pressure Injury (Active)   Closure Open to air 8/2/2020  8:13 PM   Periwound redness;non-blanchable 8/2/2020  8:13 PM   Edges irregular 8/2/2020  8:13 PM   Drainage Amount none 8/2/2020  8:13 PM   Care, Wound cleansed with;soap and water 8/2/2020  8:13 PM   Dressing Care, Wound skin barrier agent applied 8/2/2020  8:13 PM   Periwound Care, Wound barrier film applied 8/2/2020  8:13 PM       Wound 08/01/20 1749 Left heel (Active)   Closure Open to air 8/2/2020  8:13 PM   Base red;non-blanchable 8/2/2020  8:13 PM       Wound 08/01/20 2043 Left posterior;lower leg Pressure Injury (Active)   Dressing Appearance open to air 8/2/2020  8:13 PM   Closure None 8/2/2020  8:13 PM   Base non-blanchable;red 8/2/2020  8:13 PM       Rehab Goal Summary     Row Name 08/03/20 0922 08/03/20 0713          Bed Mobility Goal 1 (PT)    Activity/Assistive Device (Bed Mobility Goal 1, PT)  sit to supine;supine to sit  -LILA  --     [place] Level/Cues Needed (Bed Mobility Goal 1, PT)  contact guard assist;standby assist  -LILA  --     Time Frame (Bed Mobility Goal 1, PT)  by discharge  -LILA  --     Progress/Outcomes (Bed Mobility Goal 1, PT)  goal not met  -LILA  --        Transfer Goal 1 (PT)    Activity/Assistive Device (Transfer Goal 1, PT)  bed-to-chair/chair-to-bed;sit-to-stand/stand-to-sit  -LILA  --     [place] Level/Cues Needed (Transfer Goal 1, PT)  contact guard assist  -LILA  --     Time Frame (Transfer Goal 1, PT)  by discharge  -LILA  --     Progress/Outcome (Transfer Goal 1, PT)  goal not met  -LILA  --        Gait Training Goal 1 (PT)    Activity/Assistive Device (Gait Training Goal 1, PT)  gait (walking locomotion);assistive device use;walker, rolling  -LILA  --     [place] Level (Gait Training Goal 1, PT)  contact guard assist  -LILA  --     Time Frame (Gait Training Goal 1, PT)  5 days  -LILA  --     Progress/Outcome  (Gait Training Goal 1, PT)  goal not met  -LILA  --        Swallow Goals (SLP)    Oral Nutrition/Hydration Goal Selection (SLP)  --  oral nutrition/hydration, SLP goal 1  -CK        Oral Nutrition/Hydration Goal 1 (SLP)    Oral Nutrition/Hydration Goal 1, SLP  --  pt to tolerate recommended diet for safe and adequate nutrition/hydration  -CK     Time Frame (Oral Nutrition/Hydration Goal 1, SLP)  --  by discharge  -CK     Barriers (Oral Nutrition/Hydration Goal 1, SLP)  --  edentulous; coughs w/consecutive sips of thin liquids.  -CK     Progress/Outcomes (Oral Nutrition/Hydration Goal 1, SLP)  --  (S) goal met  -CK       User Key  (r) = Recorded By, (t) = Taken By, (c) = Cosigned By    Initials Name Provider Type Discipline    CK Donna Melton, MS CCC-SLP Speech and Language Pathologist SLP    Vikash Whatley, PTA Physical Therapy Assistant PT          Physical Therapy Education                 Title: PT OT SLP Therapies (In Progress)     Topic: Physical Therapy (In Progress)     Point: Mobility training (In Progress)     Description:   Instruct learner(s) on safety and technique for assisting patient out of bed, chair or wheelchair.  Instruct in the proper use of assistive devices, such as walker, crutches, cane or brace.              Patient Friendly Description:   It's important to get you on your feet again, but we need to do so in a way that is safe for you. Falling has serious consequences, and your personal safety is the most important thing of all.        When it's time to get out of bed, one of us or a family member will sit next to you on the bed to give you support.     If your doctor or nurse tells you to use a walker, crutches, a cane, or a brace, be sure you use it every time you get out of bed, even if you think you don't need it.    Learning Progress Summary           Patient Acceptance, E, NR by LILA at 7/31/2020 0178                   Point: Home exercise program (Not Started)     Description:    Instruct learner(s) on appropriate technique for monitoring, assisting and/or progressing patient with therapeutic exercises and activities.              Learner Progress:   Not documented in this visit.          Point: Body mechanics (In Progress)     Description:   Instruct learner(s) on proper positioning and spine alignment for patient and/or caregiver during mobility tasks and/or exercises.              Learning Progress Summary           Patient Acceptance, E, NR by LILA at 7/31/2020 1657                   Point: Precautions (In Progress)     Description:   Instruct learner(s) on prescribed precautions during mobility and gait tasks              Learning Progress Summary           Patient Acceptance, E, NR by LILA at 7/31/2020 1657                               User Key     Initials Effective Dates Name Provider Type Discipline     04/03/18 -  Angelica Torres, PT Physical Therapist PT                PT Recommendation and Plan  Anticipated Discharge Disposition (PT): anticipate therapy at next level of care, skilled nursing facility  Outcome Summary/Treatment Plan (PT)  Daily Summary of Progress (PT): progress toward functional goals as expected  Plan for Continued Treatment (PT): continue  Anticipated Discharge Disposition (PT): anticipate therapy at next level of care, skilled nursing facility  Plan of Care Reviewed With: patient  Progress: improving  Outcome Summary: pt responded well to PT w/ increased gait to 3, 6 ft min A w/ RW. pt require mod A for sup-sit. pt t/f to recliner min A w/ RW. pt is incontinent of urine and bowels. pt participated in LE ther ex in recliner. some encouragement requires. no new goals met at this time. pt would continue to benefit from PT services.  Outcome Measures     Row Name 08/03/20 0922 08/02/20 0845 08/01/20 3144       How much help from another person do you currently need...    Turning from your back to your side while in flat bed without using bedrails?  3  -LILA  --  3   -EM    Moving from lying on back to sitting on the side of a flat bed without bedrails?  2  -LILA  --  2  -EM    Moving to and from a bed to a chair (including a wheelchair)?  3  -LILA  --  2  -EM    Standing up from a chair using your arms (e.g., wheelchair, bedside chair)?  3  -LILA  --  2  -EM    Climbing 3-5 steps with a railing?  1  -LILA  --  1  -EM    To walk in hospital room?  3  -LILA  --  2  -EM    AM-PAC 6 Clicks Score (PT)  15  -LILA  --  12  -EM       How much help from another is currently needed...    Putting on and taking off regular lower body clothing?  --  2  -RC  --    Bathing (including washing, rinsing, and drying)  --  2  -RC  --    Toileting (which includes using toilet bed pan or urinal)  --  2  -RC  --    Putting on and taking off regular upper body clothing  --  3  -RC  --    Taking care of personal grooming (such as brushing teeth)  --  3  -RC  --    Eating meals  --  3  -RC  --    AM-PAC 6 Clicks Score (OT)  --  15  -RC  --       Functional Assessment    Outcome Measure Options  AM-PAC 6 Clicks Basic Mobility (PT)  -LILA  --  AM-[facility] 6 Clicks Basic Mobility (PT)  -      User Key  (r) = Recorded By, (t) = Taken By, (c) = Cosigned By    Initials Name Provider Type    EM Tee Burris, PTA Physical Therapy Assistant    Vikash Whatley PTA Physical Therapy Assistant    Evelyn Farley, JEFF/L Occupational Therapy Assistant         Time Calculation:   PT Charges     Row Name 08/03/20 1330             Time Calculation    Start Time  0922  -      Stop Time  1000  -      Time Calculation (min)  38 min  -         Time Calculation- PT    Total Timed Code Minutes- PT  38 minute(s)  -        User Key  (r) = Recorded By, (t) = Taken By, (c) = Cosigned By    Initials Name Provider Type    Vikash Whatley PTA Physical Therapy Assistant        Therapy Charges for Today     Code Description Service Date Service Provider Modifiers Qty    76956772848 HC PT THER PROC EA 15 MIN 8/3/2020 Romeo  Vikash DORADO PTA GP 1    06695469019 HC PT THERAPEUTIC ACT EA 15 MIN 8/3/2020 Vikash Walters, DANIE GP 2          PT G-Codes  Outcome Measure Options: AM-PAC 6 Clicks Basic Mobility (PT)  AM-PAC 6 Clicks Score (PT): 15  AM-PAC 6 Clicks Score (OT): 15    Vikash Walters PTA  8/3/2020

## 2020-08-03 NOTE — PROGRESS NOTES
[facility] Medicine Services  INPATIENT PROGRESS NOTE    Length of Stay: 4  Date of Admission: 7/29/2020  Primary Care Physician: Terry Peterson MD    Subjective   Chief Complaint: Lethargy, altered mental status    HPI:  Patient is a 85 y.o. male with a past medical history of essential hypertension, gout, chronic pain, coronary artery disease, right bundle branch block, and recent admission and treatment for left hip fracture earlier in July 2020 with subsequent discharge to the skilled nursing facility for physical rehabilitation. He presented with complaints of lethargy of 3 days duration and was reportedly somnolent and unresponsive. He is being managed for sepsis secondary to pneumonia, acute respiratory failure with hypoxia and suspected gram-negative pneumonia.    This morning patient is alert and oriented.  He is more interactive and communicative today.  He is asking when he can get out of here. He complains of some cough productive of phlegm.    Review of Systems  Constitutional: Negative for activity change, appetite change, chills, fatigue and fever.   HENT: Negative for congestion, rhinorrhea, sore throat and trouble swallowing.    Respiratory: Positive for cough, chest tightness, shortness of breath and wheezing.    Cardiovascular: Negative for chest pain, palpitations and leg swelling.   Gastrointestinal: Negative for abdominal distention, abdominal pain, diarrhea, nausea and vomiting.   Genitourinary: Negative for difficulty urinating, dysuria and hematuria.   Musculoskeletal: Negative for arthralgias, back pain and myalgias.   Skin: Negative for pallor and rash.   Neurological: Negative for dizziness, syncope, weakness, light-headedness and headaches.   Hematological: Negative for adenopathy. Does not bruise/bleed easily.   Psychiatric/Behavioral: Negative for agitation and confusion. The patient is not nervous/anxious.     Objective    Temp:   [96.3 °F (35.7 °C)-98.9 °F (37.2 °C)] 98.4 °F (36.9 °C)  Heart Rate:  [71-99] 79  Resp:  [16-18] 18  BP: (130-139)/(72-77) 134/76    Physical Exam  Constitutional: He appears well-developed and well-nourished. No distress.   HENT:   Head: Normocephalic and atraumatic.   Mouth/Throat: Oropharynx is clear and moist. No oropharyngeal exudate.   Eyes: Pupils are equal, round, and reactive to light. Conjunctivae and EOM are normal. No scleral icterus.   Neck: Normal range of motion. Neck supple. No JVD present. No tracheal deviation present. No thyromegaly present.   Cardiovascular: Normal rate, regular rhythm, normal heart sounds and intact distal pulses. Exam reveals no gallop and no friction rub.   No murmur heard.  Pulmonary/Chest: Effort normal and breath sounds normal. No stridor. No respiratory distress. He has no wheezes. He has no rales. He exhibits no tenderness.   Abdominal: Soft. Bowel sounds are normal. He exhibits no distension and no mass. There is no tenderness. There is no rebound and no guarding. No hernia.   Musculoskeletal: Normal range of motion. He exhibits no edema, tenderness or deformity.   Lymphadenopathy:     He has no cervical adenopathy.   Neurological: He is alert. No cranial nerve deficit or sensory deficit. He exhibits normal muscle tone. Coordination normal.  He exhibits some garbled speech which appears to be baseline for him.  Skin: Skin is warm and dry. No rash noted. He is not diaphoretic. No erythema. No pallor.   Psychiatric: He has a normal mood and affect. His behavior is normal. Judgment and thought content normal.    Medication Review:    Current Facility-Administered Medications:   •  acetaminophen (TYLENOL) tablet 650 mg, 650 mg, Oral, Q4H PRN, Pranay Melendez MD  •  amitriptyline (ELAVIL) tablet 100 mg, 100 mg, Oral, Nightly, Pranay Melendez MD, 100 mg at 07/31/20 2112  •  aspirin EC tablet 81 mg, 81 mg, Oral, Daily, Pranay Melendez MD, 81 mg at 08/02/20 0924  •   atorvastatin (LIPITOR) tablet 20 mg, 20 mg, Oral, Nightly, Pranay Melendez MD, 20 mg at 07/31/20 2112  •  colchicine tablet 0.6 mg, 0.6 mg, Oral, Daily, Pranay Melendez MD, 0.6 mg at 08/02/20 0924  •  doxycycline (VIBRAMYCIN) 100 mg/250 mL 0.9% NS VTB, 100 mg, Intravenous, Q12H, Pranay Melendez MD, Last Rate: 0 mL/hr at 08/02/20 0337, 100 mg at 08/02/20 1053  •  enoxaparin (LOVENOX) syringe 40 mg, 40 mg, Subcutaneous, Q24H, Pranay Melendez MD, 40 mg at 08/01/20 0603  •  gabapentin (NEURONTIN) capsule 600 mg, 600 mg, Oral, Q8H, Pranay Melendez MD, 600 mg at 08/02/20 1432  •  guaiFENesin (MUCINEX) 12 hr tablet 1,200 mg, 1,200 mg, Oral, Q12H, Pranay Melendez MD, 1,200 mg at 08/02/20 1725  •  losartan (COZAAR) tablet 25 mg, 25 mg, Oral, Daily, Pranay Melendez MD, 25 mg at 08/02/20 0925  •  metoprolol succinate XL (TOPROL-XL) 24 hr tablet 25 mg, 25 mg, Oral, Daily, Pranay Melendez MD, 25 mg at 08/02/20 0924  •  morphine injection 1 mg, 1 mg, Intravenous, Q4H PRN, 1 mg at 08/02/20 0919 **AND** naloxone (NARCAN) injection 0.4 mg, 0.4 mg, Intravenous, Q5 Min PRN, Pranay Melendez MD  •  multivitamin with minerals tablet 1 tablet, 1 tablet, Oral, Daily, Pranay Melendez MD, 1 tablet at 08/02/20 1725  •  ondansetron (ZOFRAN) injection 4 mg, 4 mg, Intravenous, Q6H PRN, Pranay Melendez MD  •  oxybutynin XL (DITROPAN-XL) 24 hr tablet 10 mg, 10 mg, Oral, Daily, Pranay Melendez MD, 10 mg at 08/02/20 0924  •  pantoprazole (PROTONIX) EC tablet 40 mg, 40 mg, Oral, QAM, Pranay Melendez MD, 40 mg at 08/01/20 0603  •  Pharmacy to Dose Zosyn, , Does not apply, Continuous PRN, Pranay Melendez MD  •  piperacillin-tazobactam (ZOSYN) 3.375 g/100 mL 0.9% NS IVPB (mbp), 3.375 g, Intravenous, Q8H, Pranay Melendez MD, 3.375 g at 08/02/20 1725  •  rOPINIRole (REQUIP) tablet 0.5 mg, 0.5 mg, Oral, Daily, Pranay Melendez MD, 0.5 mg at 08/02/20 0924  •  sodium chloride 0.9 % flush 10 mL,  10 mL, Intravenous, PRN, Pranay Melendez MD  •  sodium chloride 0.9 % flush 10 mL, 10 mL, Intravenous, Q12H, Pranay Melendez MD, 10 mL at 08/02/20 0926  •  sodium chloride 0.9 % flush 10 mL, 10 mL, Intravenous, PRN, Pranay Melendez MD  •  vitamin B-12 (CYANOCOBALAMIN) tablet 1,000 mcg, 1,000 mcg, Oral, Daily, Pranay Melendez MD, 1,000 mcg at 08/02/20 1725    I have reviewed the patient's current medications.     Results Review:  I have reviewed the labs, radiology results, and diagnostic studies.    Laboratory Data:   Results from last 7 days   Lab Units 08/02/20  0533 08/01/20  0709 07/31/20  0635  07/29/20  1505   SODIUM mmol/L 138 137 139   < > 138   POTASSIUM mmol/L 4.2 4.0 4.0   < > 4.4   CHLORIDE mmol/L 102 100 102   < > 104   CO2 mmol/L 23.0 24.0 24.0   < > 25.0   BUN mg/dL 17 23 30*   < > 37*   CREATININE mg/dL 0.95 1.06 1.04   < > 1.25   GLUCOSE mg/dL 105* 98 87   < > 92   CALCIUM mg/dL 10.0 9.8 10.1   < > 9.7   BILIRUBIN mg/dL  --   --   --   --  0.6   ALK PHOS U/L  --   --   --   --  189*   ALT (SGPT) U/L  --   --   --   --  42*   AST (SGOT) U/L  --   --   --   --  48*   ANION GAP mmol/L 13.0 13.0 13.0   < > 9.0    < > = values in this interval not displayed.     Estimated Creatinine Clearance: 54.8 mL/min (by C-G formula based on SCr of 0.95 mg/dL).          Results from last 7 days   Lab Units 08/02/20  0533 08/01/20  0709 07/31/20  0635 07/30/20  0800 07/29/20  1505   WBC 10*3/mm3 13.16* 13.80* 13.92* 13.15* 15.86*   HEMOGLOBIN g/dL 10.4* 10.2* 10.0* 10.0* 9.7*   HEMATOCRIT % 31.6* 31.6* 30.5* 30.8* 29.5*   PLATELETS 10*3/mm3 331 361 345 380 371           Culture Data:   No results found for: BLOODCX  No results found for: URINECX  No results found for: RESPCX  No results found for: WOUNDCX  No results found for: STOOLCX  No components found for: BODYFLD    Radiology Data:   Imaging Results (Last 24 Hours)     ** No results found for the last 24 hours. **          Assessment/Plan      Hospital Problem List:  Principal Problem:    Pneumonia of both lower lobes due to infectious organism  Sepsis secondary to pneumonia present on admission  Suspected gram-negative pneumonia  Acute respiratory failure with hypoxia  Altered mental status and lethargy  Elevated troponin from demand ischemia secondary to pneumonia  Essential hypertension  Coronary artery disease  Gout     Plan  - Patient had lethargy for 3 days prior to admission along with leukocytosis and infiltrates on chest x-ray.  - Continue IV Zosyn with pharmacy to dose and IV doxycycline for suspected gram-negative pneumonia  -  Blood culture growing contaminant in 1 bottle.  Follow-up final blood cultures.  Urine cultures showed no growth.  Urine Legionella and strep pneumo antigen negative.  - COVID-19 test was negative  -Continue oxygen by nasal cannula to keep O2 sat greater than 92% and wean as tolerated.  Patient currently saturating adequately on 2 L of oxygen by nasal cannula.  - Continue medications for coronary artery disease  - Continue medications for gout  - MRI brain did not show any acute stroke but did show old stroke lesions.  Patient's expressive dysphasia likely related to old strokes.  -Speech and swallow evaluation input appreciated.  Continue modified diet.  -Patient's confusion is resolved and he is alert and oriented today.  Consider discharge in the next 24 hours with or without home oxygen.  Patient family wants patient to be discharged home with home health therapy  -DVT prophylaxis with subcutaneous Lovenox  -PT/OT consult  -CODE STATUS is full code     I discussed the patient's findings and my recommendations with patient    Discharge Planning: I expect patient to be discharged in next 1 to 2 days    Pranay Melendez MD   08/02/20   19:41

## 2020-08-03 NOTE — PLAN OF CARE
"Vss, nonverbal pain cues absent, routinely has his fingers in his rectum on rounds and must be redirected, asking to go home because he believes he is at the \"truck stop\"  "

## 2020-08-03 NOTE — NURSING NOTE
Patient has a stage 2 pressure injury left buttock with shearing noted as well. Wound measures 0.8 x 0.6 x 0.1 cm Wound bed 100% granulation. He has a combination deep tissue/ stage 1 pressure injury on his left lateral heel. It measures 1.7 x 1.4 cm. Needs wound care. Magic Butt has been ordered for buttock wound. Protection and offloading.  All POA

## 2020-08-03 NOTE — PROGRESS NOTES
Adult Nutrition  Assessment    Patient Name:  Jose Dacosta  YOB: 1935  MRN: [id number]  Admit Date:  7/29/2020    Assessment Date:  8/3/2020    Comments:  86yo male admit w/ acute respiratory failure and sepsis r/t bilateral pneumonia- COVID 19 negative. MD notes AMS/lethargy for 3days pta. Nurse notes pt is agitated this am. Notes indicate 8/1 pressure injury to Lt heel, Lt gluteal and LLE.   Noted h/o left hip fracture 7/9. ST has placed pt on soft/ground diet- cardiac restrictions- intakes 25-50% [id number]. Current wt 145# /BMI 19.8- noted wt 1yr ago 155#, but wt stable from 7/8/2020 145#. Pt known to this RD from previous admits- will not accept Boost. Has agreed to whole milk at meals and chocolate milkshakes (made with magic cups) w/ Lunch and Dinner. RD to follow hospital course.       Reason for Assessment     Row Name 08/03/20 1023          Reason for Assessment    Reason For Assessment  identified at risk by screening criteria     Diagnosis  infection/sepsis;pulmonary disease;neurologic conditions     Identified At Risk by Screening Criteria  large or nonhealing wound, burn or pressure injury;difficulty chewing/swallowing         Nutrition/Diet History     Row Name 08/03/20 1023          Nutrition/Diet History    Typical Food/Fluid Intake  Nurse states pt is agitated at this time- RD will attempt to visit later         Anthropometrics     Row Name 08/03/20 0344          Anthropometrics    Weight  66.2 kg (145 lb 14.4 oz)         Labs/Tests/Procedures/Meds     Row Name 08/03/20 1024          Labs/Procedures/Meds    Lab Results Reviewed  reviewed     Lab Results Comments  Glu 101. Alb 3.2L        Diagnostic Tests/Procedures    Diagnostic Test/Procedure Reviewed  reviewed     Diagnostic Test/Procedures Comments  CT chest, MRI brain, COVID 19 negative        Medications    Pertinent Medications Reviewed  reviewed     Pertinent Medications Comments  MVI, B12           Estimated/Assessed  Needs     Row Name 08/03/20 1024          Calculation Measurements    Weight Used For Calculations  80.7 kg (178 lb) IBW        Estimated/Assessed Needs    Additional Documentation  Fluid Requirements (Group);Protein Requirements (Group);Calorie Requirements (Group);KCAL/KG (Group)        Calorie Requirements    Estimated Calorie Requirement (kcal/day)  2000        KCAL/KG    KCAL/KG  25 Kcal/Kg (kcal)     25 Kcal/Kg (kcal)  2018.5        Protein Requirements    Weight Used For Protein Calculations  80.7 kg (178 lb)     Est Protein Requirement Amount (gms/kg)  1.0 gm protein     Estimated Protein Requirements (gms/day)  80.74        Fluid Requirements    Estimated Fluid Requirements (mL/day)  2000     RDA Method (mL)  2000         Nutrition Prescription Ordered     Row Name 08/03/20 1025          Nutrition Prescription PO    Current PO Diet  Soft Texture     Texture  Ground     Fluid Consistency  Thin     Common Modifiers  Cardiac         Evaluation of Received Nutrient/Fluid Intake     Row Name 08/03/20 1025          PO Evaluation    Number of Days PO Intake Evaluated  Insufficient Data     Number of Meals  5     % PO Intake  25x3, 50x2 (LOS 5days)               Electronically signed by:  Viri Bentley RD  08/03/20 10:26

## 2020-08-03 NOTE — PLAN OF CARE
Problem: Patient Care Overview  Goal: Plan of Care Review  8/3/2020 1431 by Loni Noonan COTA/L  Outcome: Ongoing (interventions implemented as appropriate)  Flowsheets (Taken 8/3/2020 1431)  Progress: improving  Plan of Care Reviewed With: patient  Outcome Summary: Pt Mod A for UB dressing and grooming tasks. Pt requires encouragement to work with OT and is fatigued this am.  8/3/2020 1430 by Loni Noonan COTA/L  Outcome: Ongoing (interventions implemented as appropriate)  Flowsheets  Taken 8/3/2020 1430 by Loni Noonan COTA/L  Progress: improving  Plan of Care Reviewed With: patient  Taken 8/3/2020 1328 by Vikash Walters, DANIE  Outcome Summary: pt responded well to PT w/ increased gait to 3, 6 ft min A w/ RW. pt require mod A for sup-sit. pt t/f to recliner min A w/ RW. pt is incontinent of urine and bowels. pt participated in LE ther ex in recliner. some encouragement requires. no new goals met at this time. pt would continue to benefit from PT services.

## 2020-08-04 LAB
ANION GAP SERPL CALCULATED.3IONS-SCNC: 13 MMOL/L (ref 5–15)
BASOPHILS # BLD AUTO: 0.1 10*3/MM3 (ref 0–0.2)
BASOPHILS NFR BLD AUTO: 0.9 % (ref 0–1.5)
BUN SERPL-MCNC: 17 MG/DL (ref 8–23)
BUN/CREAT SERPL: 18.7 (ref 7–25)
CALCIUM SPEC-SCNC: 9.7 MG/DL (ref 8.6–10.5)
CHLORIDE SERPL-SCNC: 101 MMOL/L (ref 98–107)
CO2 SERPL-SCNC: 24 MMOL/L (ref 22–29)
CREAT SERPL-MCNC: 0.91 MG/DL (ref 0.76–1.27)
DEPRECATED RDW RBC AUTO: 49.8 FL (ref 37–54)
EOSINOPHIL # BLD AUTO: 0.62 10*3/MM3 (ref 0–0.4)
EOSINOPHIL NFR BLD AUTO: 5.6 % (ref 0.3–6.2)
ERYTHROCYTE [DISTWIDTH] IN BLOOD BY AUTOMATED COUNT: 14.6 % (ref 12.3–15.4)
GFR SERPL CREATININE-BSD FRML MDRD: 79 ML/MIN/1.73
GLUCOSE SERPL-MCNC: 96 MG/DL (ref 65–99)
HCT VFR BLD AUTO: 31.1 % (ref 37.5–51)
HGB BLD-MCNC: 10.1 G/DL (ref 13–17.7)
IMM GRANULOCYTES # BLD AUTO: 0.07 10*3/MM3 (ref 0–0.05)
IMM GRANULOCYTES NFR BLD AUTO: 0.6 % (ref 0–0.5)
LYMPHOCYTES # BLD AUTO: 1.28 10*3/MM3 (ref 0.7–3.1)
LYMPHOCYTES NFR BLD AUTO: 11.6 % (ref 19.6–45.3)
MCH RBC QN AUTO: 30.2 PG (ref 26.6–33)
MCHC RBC AUTO-ENTMCNC: 32.5 G/DL (ref 31.5–35.7)
MCV RBC AUTO: 93.1 FL (ref 79–97)
MONOCYTES # BLD AUTO: 0.82 10*3/MM3 (ref 0.1–0.9)
MONOCYTES NFR BLD AUTO: 7.4 % (ref 5–12)
NEUTROPHILS NFR BLD AUTO: 73.9 % (ref 42.7–76)
NEUTROPHILS NFR BLD AUTO: 8.18 10*3/MM3 (ref 1.7–7)
NRBC BLD AUTO-RTO: 0 /100 WBC (ref 0–0.2)
PLATELET # BLD AUTO: 262 10*3/MM3 (ref 140–450)
PMV BLD AUTO: 9.8 FL (ref 6–12)
POTASSIUM SERPL-SCNC: 3.9 MMOL/L (ref 3.5–5.2)
RBC # BLD AUTO: 3.34 10*6/MM3 (ref 4.14–5.8)
SODIUM SERPL-SCNC: 138 MMOL/L (ref 136–145)
WBC # BLD AUTO: 11.07 10*3/MM3 (ref 3.4–10.8)

## 2020-08-04 PROCEDURE — 85025 COMPLETE CBC W/AUTO DIFF WBC: CPT | Performed by: INTERNAL MEDICINE

## 2020-08-04 PROCEDURE — 25010000002 PIPERACILLIN SOD-TAZOBACTAM PER 1 G: Performed by: INTERNAL MEDICINE

## 2020-08-04 PROCEDURE — 94799 UNLISTED PULMONARY SVC/PX: CPT

## 2020-08-04 PROCEDURE — 97530 THERAPEUTIC ACTIVITIES: CPT

## 2020-08-04 PROCEDURE — 80048 BASIC METABOLIC PNL TOTAL CA: CPT | Performed by: INTERNAL MEDICINE

## 2020-08-04 PROCEDURE — 97535 SELF CARE MNGMENT TRAINING: CPT

## 2020-08-04 PROCEDURE — 25010000002 ENOXAPARIN PER 10 MG: Performed by: INTERNAL MEDICINE

## 2020-08-04 RX ADMIN — PANTOPRAZOLE SODIUM 40 MG: 40 TABLET, DELAYED RELEASE ORAL at 08:07

## 2020-08-04 RX ADMIN — GABAPENTIN 600 MG: 300 CAPSULE ORAL at 20:42

## 2020-08-04 RX ADMIN — PIPERACILLIN SODIUM AND TAZOBACTAM SODIUM 3.38 G: 3; .375 INJECTION, POWDER, LYOPHILIZED, FOR SOLUTION INTRAVENOUS at 12:30

## 2020-08-04 RX ADMIN — SODIUM CHLORIDE, PRESERVATIVE FREE 10 ML: 5 INJECTION INTRAVENOUS at 08:05

## 2020-08-04 RX ADMIN — ENOXAPARIN SODIUM 40 MG: 40 INJECTION SUBCUTANEOUS at 05:09

## 2020-08-04 RX ADMIN — Medication 1 APPLICATION: at 08:05

## 2020-08-04 RX ADMIN — AMITRIPTYLINE HYDROCHLORIDE 100 MG: 50 TABLET, FILM COATED ORAL at 20:43

## 2020-08-04 RX ADMIN — ASPIRIN 81 MG: 81 TABLET, COATED ORAL at 08:07

## 2020-08-04 RX ADMIN — DOCUSATE SODIUM 100 MG: 100 CAPSULE, LIQUID FILLED ORAL at 08:07

## 2020-08-04 RX ADMIN — ROPINIROLE HYDROCHLORIDE 0.5 MG: 0.5 TABLET, FILM COATED ORAL at 08:07

## 2020-08-04 RX ADMIN — HYDROCORTISONE: 1 CREAM TOPICAL at 20:45

## 2020-08-04 RX ADMIN — GUAIFENESIN 1200 MG: 600 TABLET, EXTENDED RELEASE ORAL at 08:07

## 2020-08-04 RX ADMIN — ATORVASTATIN CALCIUM 20 MG: 20 TABLET, FILM COATED ORAL at 20:42

## 2020-08-04 RX ADMIN — SODIUM CHLORIDE, PRESERVATIVE FREE 10 ML: 5 INJECTION INTRAVENOUS at 20:43

## 2020-08-04 RX ADMIN — PIPERACILLIN SODIUM AND TAZOBACTAM SODIUM 3.38 G: 3; .375 INJECTION, POWDER, LYOPHILIZED, FOR SOLUTION INTRAVENOUS at 02:35

## 2020-08-04 RX ADMIN — GUAIFENESIN 1200 MG: 600 TABLET, EXTENDED RELEASE ORAL at 21:43

## 2020-08-04 RX ADMIN — PIPERACILLIN SODIUM AND TAZOBACTAM SODIUM 3.38 G: 3; .375 INJECTION, POWDER, LYOPHILIZED, FOR SOLUTION INTRAVENOUS at 20:42

## 2020-08-04 RX ADMIN — LOSARTAN POTASSIUM 25 MG: 25 TABLET, FILM COATED ORAL at 08:07

## 2020-08-04 RX ADMIN — HYDROCORTISONE: 1 CREAM TOPICAL at 08:05

## 2020-08-04 RX ADMIN — OXYBUTYNIN CHLORIDE 10 MG: 10 TABLET, EXTENDED RELEASE ORAL at 08:07

## 2020-08-04 RX ADMIN — METOPROLOL SUCCINATE 25 MG: 25 TABLET, EXTENDED RELEASE ORAL at 08:07

## 2020-08-04 RX ADMIN — CYANOCOBALAMIN TAB 1000 MCG 1000 MCG: 1000 TAB at 08:12

## 2020-08-04 RX ADMIN — Medication 1 TABLET: at 08:07

## 2020-08-04 RX ADMIN — COLCHICINE 0.6 MG: 0.6 TABLET, FILM COATED ORAL at 08:07

## 2020-08-04 NOTE — PROGRESS NOTES
[facility] Medicine Services  INPATIENT PROGRESS NOTE    Length of Stay: 6  Date of Admission: 7/29/2020  Primary Care Physician: Terry Peterson MD    Subjective   Chief Complaint: AMS  Summary: Patient is a 85 y.o. male with a past medical history of essential hypertension, gout, chronic pain, coronary artery disease, right bundle branch block, and recent admission and treatment for left hip fracture earlier in July 2020 with subsequent discharge to the skilled nursing facility for physical rehabilitation. He presented with complaints of lethargy of 3 days duration and was reportedly somnolent and unresponsive. He is being managed for sepsis secondary to pneumonia, acute respiratory failure with hypoxia and suspected gram-negative pneumonia.    Today: More confused today, thinks his wife is in the room with him during my visit.  Also believes he is a [employer].      Review of Systems   Unable to perform ROS: Mental status change      All pertinent negatives and positives are as above. All other systems have been reviewed and are negative unless otherwise stated.     Objective    Temp:  [96 °F (35.6 °C)-98 °F (36.7 °C)] 97.8 °F (36.6 °C)  Heart Rate:  [70-99] 95  Resp:  [16-18] 18  BP: (131-165)/(74-88) 152/74    Physical Exam   Constitutional: He is oriented to person, place, and time.   Elderly appearing male.    HENT:   Head: Normocephalic and atraumatic.   Nose: Nose normal.   Eyes: Conjunctivae and EOM are normal.   Neck: Normal range of motion.   Cardiovascular: Normal rate, regular rhythm, normal heart sounds and intact distal pulses.   Pulmonary/Chest:   Distant but clear bilaterally.    Abdominal: Soft. Bowel sounds are normal. He exhibits no distension.   Musculoskeletal: He exhibits no edema.   Neurological: He is alert and oriented to person, place, and time.   Skin: Skin is warm and dry. Capillary refill takes less than 2 seconds.   Psychiatric:  His behavior is normal.     Results Review:  I have reviewed the labs, radiology results, and diagnostic studies.    Laboratory Data:   Results from last 7 days   Lab Units 08/04/20  0604 08/03/20  0554 08/02/20  0533  07/29/20  1505   SODIUM mmol/L 138 138 138   < > 138   POTASSIUM mmol/L 3.9 3.5 4.2   < > 4.4   CHLORIDE mmol/L 101 103 102   < > 104   CO2 mmol/L 24.0 25.0 23.0   < > 25.0   BUN mg/dL 17 14 17   < > 37*   CREATININE mg/dL 0.91 0.92 0.95   < > 1.25   GLUCOSE mg/dL 96 101* 105*   < > 92   CALCIUM mg/dL 9.7 9.7 10.0   < > 9.7   BILIRUBIN mg/dL  --   --   --   --  0.6   ALK PHOS U/L  --   --   --   --  189*   ALT (SGPT) U/L  --   --   --   --  42*   AST (SGOT) U/L  --   --   --   --  48*   ANION GAP mmol/L 13.0 10.0 13.0   < > 9.0    < > = values in this interval not displayed.     Estimated Creatinine Clearance: 57.7 mL/min (by C-G formula based on SCr of 0.91 mg/dL).          Results from last 7 days   Lab Units 08/04/20  0604 08/03/20  0554 08/02/20  0533 08/01/20  0709 07/31/20  0635   WBC 10*3/mm3 11.07* 12.81* 13.16* 13.80* 13.92*   HEMOGLOBIN g/dL 10.1* 9.7* 10.4* 10.2* 10.0*   HEMATOCRIT % 31.1* 29.9* 31.6* 31.6* 30.5*   PLATELETS 10*3/mm3 262 308 331 361 345           Culture Data:   No results found for: BLOODCX  No results found for: URINECX  No results found for: RESPCX  No results found for: WOUNDCX  No results found for: STOOLCX  No components found for: BODYFLD    Radiology Data:   Imaging Results (Last 24 Hours)     ** No results found for the last 24 hours. **          I have reviewed the patient's current medications.     Assessment/Plan     Principal Problem:    Pneumonia of both lower lobes due to infectious organism    Sepsis 2/2 Pneumonia - Improved. Continue Zosyn.  Completed 5 days of Doxy as well.  Continue supplemental O2 and wean as tolerated. COVID negative.  Urine antigens negative.      AMS - Appears worse today, possibly due to narcotics, will d/c Norco and Morphin and see  how he does.  MRI negative. Underlying cognitive impairment?    HTN - Continue Losartan and Toprol XL.     DVT ppx - Lovenox  CODE - Full    Discharge Planning: In progress.    Charly Hercules MD

## 2020-08-04 NOTE — PLAN OF CARE
Problem: Patient Care Overview  Goal: Plan of Care Review  Outcome: Ongoing (interventions implemented as appropriate)  Flowsheets (Taken 8/4/2020 7236)  Progress: improving  Plan of Care Reviewed With: patient  Outcome Summary: pt w/ increased confusion this date. pt was soiled w/ feces from where he has a BM and digs at his rectum and then touches everything near him. pt also attempts to grab ahold of staff when assisting him with bed mobility. pt required mod-max A for rolling R/L multiple times. pt resisted some with bed mobility. dep A to scoot in bed. no further tx due to decreased cognition limits pts ability to follow instructions.

## 2020-08-04 NOTE — THERAPY TREATMENT NOTE
Acute Care - Physical Therapy Treatment Note  [facility]     Patient Name: Jose Dacosta  : 1935  MRN: [id number]  Today's Date: 2020  Onset of Illness/Injury or Date of Surgery: 20     Referring Physician: Dr. Melendez    Admit Date: 2020    Visit Dx:    ICD-10-CM ICD-9-CM   1. Pneumonia of both lower lobes due to infectious organism J18.9 486   2. Altered mental status, unspecified altered mental status type R41.82 780.97   3. Impaired mobility and ADLs Z74.09 V49.89    Z78.9    4. Oropharyngeal dysphagia R13.12 787.22   5. Decreased functional mobility R26.89 781.99     Patient Active Problem List   Diagnosis   • Coronary artery disease involving native coronary artery of native heart without angina pectoris   • Vasovagal syncope   • RBBB (right bundle branch block with left anterior fascicular block)   • Essential hypertension   • Ischemic cardiomyopathy   • Syncope and collapse   • Closed fracture of neck of left femur (CMS/Formerly [facility])   • Coronary artery disease with history of myocardial infarction without history of CABG   • Facial laceration   • HFrEF (heart failure with reduced ejection fraction) (CMS/Formerly [facility])   • Postoperative anemia due to acute blood loss   • Pneumonia of both lower lobes due to infectious organism       Therapy Treatment    Rehabilitation Treatment Summary     Row Name 20 1336             Treatment Time/Intention    Discipline  physical therapy assistant  -LILA      Document Type  therapy note (daily note)  -LILA      Mode of Treatment  physical therapy;individual therapy  -LILA      Patient Effort  adequate  -LILA      Comment  pt is confused, laying in the bed naked upon entry. pt has feces on sheets, blankets, and himself. pt not following comands well and constantly reaching to hold staff with soiled hands while rolling and repositioning. pt digs at his rectum after having BM and this is what causes a mess.   -JC2      Existing Precautions/Restrictions  fall   -LILA      Recorded by [LILA] Vikash Walters, PTA 08/04/20 1352  [JC2] Vikash Walters, PTA 08/04/20 1540      Row Name 08/04/20 1336             Vital Signs    Pre Systolic BP Rehab  156  -LILA      Pre Treatment Diastolic BP  84  -LILA      Post Systolic BP Rehab  144  -JC2      Post Treatment Diastolic BP  83  -JC2      Pretreatment Heart Rate (beats/min)  114  -JC2      Posttreatment Heart Rate (beats/min)  118  -JC2      Pre SpO2 (%)  96  -LILA      O2 Delivery Pre Treatment  room air  -LILA      Post SpO2 (%)  93  -JC2      O2 Delivery Post Treatment  room air  -JC2      Pre Patient Position  Supine  -JC3      Post Patient Position  Supine  -JC3      Recorded by [LILA] Vikash Walters, PTA 08/04/20 1352  [JC2] Vikash Walters, PTA 08/04/20 1410  [JC3] Vikash Walters, PTA 08/04/20 1540      Row Name 08/04/20 1336             Cognitive Assessment/Intervention- PT/OT    Affect/Mental Status (Cognitive)  confused  -LILA      Orientation Status (Cognition)  oriented to;person;place  -LILA      Follows Commands (Cognition)  follows one step commands;75-90% accuracy  -LILA      Recorded by [LILA] Vikash Walters, PTA 08/04/20 1352      Row Name 08/04/20 1336             Mobility Assessment/Intervention    Extremity Weight-bearing Status  left lower extremity  -LILA      Left Lower Extremity (Weight-bearing Status)  weight-bearing as tolerated (WBAT)  -LILA      Recorded by [LILA] Vikash Walters, PTA 08/04/20 1352      Row Name 08/04/20 1336             Bed Mobility Assessment/Treatment    Bed Mobility Assessment/Treatment  rolling left;rolling right;scooting/bridging  -LILA      Rolling Left Floyd (Bed Mobility)  maximum assist (25% patient effort)  -LILA      Rolling Right Floyd (Bed Mobility)  moderate assist (50% patient effort)  -LILA      Scooting/Bridging Floyd (Bed Mobility)  dependent (less than 25% patient effort);2 person assist  -LILA      Supine-Sit Floyd (Bed Mobility)  --  -LILA      Bed Mobility,  Safety Issues  decreased use of legs for bridging/pushing  -JC2      Assistive Device (Bed Mobility)  bed rails;head of bed elevated;draw sheet  -JC2      Comment (Bed Mobility)  pt resisting movement with bed mobility at times. pt rolled multiple times for pericare, changing linens, and for bed pan.   -LILA      Recorded by [LILA] Vikash Walters, PTA 08/04/20 1540  [JC2] Vikash Walters, PTA 08/04/20 1352      Row Name 08/04/20 1336             Transfer Assessment/Treatment    Transfer Assessment/Treatment  --  -LILA      Recorded by [LILA] Vikash Walters, PTA 08/04/20 1540      Row Name 08/04/20 1336             Bed-Chair Transfer    Bed-Chair Smith (Transfers)  --  -LILA      Assistive Device (Bed-Chair Transfers)  --  -LILA      Recorded by [LILA] Vikash Walters, PTA 08/04/20 1540      Row Name 08/04/20 1336             Sit-Stand Transfer    Sit-Stand Smith (Transfers)  --  -LILA      Assistive Device (Sit-Stand Transfers)  --  -LILA      Recorded by [LILA] Vikash Walters, PTA 08/04/20 1540      Row Name 08/04/20 1336             Stand-Sit Transfer    Stand-Sit Smith (Transfers)  --  -LILA      Assistive Device (Stand-Sit Transfers)  --  -LILA      Recorded by [LILA] Vikash Walters, PTA 08/04/20 1540      Row Name 08/04/20 1336             Gait/Stairs Assessment/Training    Gait/Stairs Assessment/Training  --  -LILA      Smith Level (Gait)  --  -LILA      Assistive Device (Gait)  --  -LILA      Pattern (Gait)  --  -LILA      Deviations/Abnormal Patterns (Gait)  --  -LILA      Left Sided Gait Deviations  --  -LILA      Recorded by [LILA] Vikash Walters, PTA 08/04/20 1540      Row Name 08/04/20 1336             Lower Extremity Supine Therapeutic Exercise    Comment, Supine Lower Extremity (Therapeutic Exercise)  pt not following instructions well enough this date.   -LILA      Recorded by [LILA] Vikash Walters, PTA 08/04/20 1540      Row Name 08/04/20 1336             Pain Scale: Numbers Pre/Post-Treatment    Pain  Scale: Numbers, Pretreatment  --  -LILA      Pain Scale: Numbers, Post-Treatment  --  -LILA      Pain Location  --  -LILA      Recorded by [LILA] Vikash Walters PTA 08/04/20 1540      Row Name                Wound 08/01/20 1749 Left gluteal Pressure Injury    Wound - Properties Group Date first assessed: 08/01/20 [TC] Time first assessed: 1749 [TC] Present on Hospital Admission: N [TC] Side: Left [TC] Location: gluteal [TC] Primary Wound Type: Pressure inj [TC] Stage, Pressure Injury: Stage 2 [TC] Recorded by:  [TC] Kandis Mercedes RN 08/01/20 1749    Row Name                Wound 08/01/20 1749 Left heel    Wound - Properties Group Date first assessed: 08/01/20 [TC] Time first assessed: 1749 [TC] Present on Hospital Admission: N [TC] Side: Left [TC] Location: heel [TC] Recorded by:  [TC] Kandis Mercedes RN 08/01/20 1749    Row Name                Wound 07/08/20 1810 Left upper eyebrow Laceration    Wound - Properties Group Date first assessed: 07/08/20 [TM] Time first assessed: 1810 [TM] Present on Hospital Admission: Y [TM] Side: Left [TM] Orientation: upper [TM] Location: eyebrow [TM] Primary Wound Type: Laceration [TM] Recorded by:  [TM] Josephine Elias RN 07/08/20 1811    Row Name                Wound 08/01/20 2043 Left posterior;lower leg Pressure Injury    Wound - Properties Group Date first assessed: 08/01/20 [EF] Time first assessed: 2043 [EF] Present on Hospital Admission: Y [EF] Side: Left [EF] Orientation: posterior;lower [EF] Location: leg [EF], below immobilizer  Primary Wound Type: Pressure inj [EF] Stage, Pressure Injury: deep tissue injury [EF] Recorded by:  [EF] Amy Campbell RN 08/01/20 2045    Row Name 08/04/20 1336             Outcome Summary/Treatment Plan (PT)    Daily Summary of Progress (PT)  progress toward functional goals as expected  -LILA      Plan for Continued Treatment (PT)  continue  -LILA      Anticipated Discharge Disposition (PT)  anticipate therapy at next level of  care;skilled nursing facility  -JC2      Recorded by [LILA] Vikash Walters, PTA 08/04/20 1540  [JC2] Vikash Walters, PTA 08/04/20 1352        User Key  (r) = Recorded By, (t) = Taken By, (c) = Cosigned By    Initials Name Effective Dates Discipline    TC Kandis Mercedes, RN 10/17/16 -  Nurse    EF Amy Campbell RN 10/17/16 -  Nurse    LILA Vikash Walters, PTA 03/07/18 -  PT    TM Josephine Elias, RN 07/24/18 -  Nurse          Wound 08/01/20 1749 Left gluteal Pressure Injury (Active)   Wound Image    8/4/2020  8:00 AM   Closure Open to air 8/4/2020  7:24 AM   Periwound non-blanchable;redness 8/3/2020  9:01 PM   Periwound Care, Wound barrier ointment applied 8/4/2020  7:24 AM       Wound 08/01/20 1749 Left heel (Active)   Wound Image   8/4/2020  8:00 AM   Closure Open to air 8/4/2020  7:24 AM   Base non-blanchable;red 8/3/2020  9:01 PM       Wound 08/01/20 2043 Left posterior;lower leg Pressure Injury (Active)   Wound Image   8/4/2020  8:00 AM       Rehab Goal Summary     Row Name 08/04/20 1334             Bed Mobility Goal 1 (PT)    Activity/Assistive Device (Bed Mobility Goal 1, PT)  sit to supine;supine to sit  -LILA      Campbell Level/Cues Needed (Bed Mobility Goal 1, PT)  contact guard assist;standby assist  -LILA      Time Frame (Bed Mobility Goal 1, PT)  by discharge  -LILA      Progress/Outcomes (Bed Mobility Goal 1, PT)  goal not met  -LILA         Transfer Goal 1 (PT)    Activity/Assistive Device (Transfer Goal 1, PT)  bed-to-chair/chair-to-bed;sit-to-stand/stand-to-sit  -LILA      Campbell Level/Cues Needed (Transfer Goal 1, PT)  contact guard assist  -LILA      Time Frame (Transfer Goal 1, PT)  by discharge  -LILA      Progress/Outcome (Transfer Goal 1, PT)  goal not met  -LILA         Gait Training Goal 1 (PT)    Activity/Assistive Device (Gait Training Goal 1, PT)  gait (walking locomotion);assistive device use;walker, rolling  -LILA      Campbell Level (Gait Training Goal 1, PT)  contact  guard assist  -LILA      Time Frame (Gait Training Goal 1, PT)  5 days  -LILA      Progress/Outcome (Gait Training Goal 1, PT)  goal not met  -LILA        User Key  (r) = Recorded By, (t) = Taken By, (c) = Cosigned By    Initials Name Provider Type Discipline    Vikash Whatley, PTA Physical Therapy Assistant PT          Physical Therapy Education                 Title: PT OT SLP Therapies (In Progress)     Topic: Physical Therapy (In Progress)     Point: Mobility training (In Progress)     Description:   Instruct learner(s) on safety and technique for assisting patient out of bed, chair or wheelchair.  Instruct in the proper use of assistive devices, such as walker, crutches, cane or brace.              Patient Friendly Description:   It's important to get you on your feet again, but we need to do so in a way that is safe for you. Falling has serious consequences, and your personal safety is the most important thing of all.        When it's time to get out of bed, one of us or a family member will sit next to you on the bed to give you support.     If your doctor or nurse tells you to use a walker, crutches, a cane, or a brace, be sure you use it every time you get out of bed, even if you think you don't need it.    Learning Progress Summary           Patient Acceptance, E, NR by LILA at 8/4/2020 1541    Acceptance, E, NR by JC1 at 7/31/2020 1657                   Point: Home exercise program (Not Started)     Description:   Instruct learner(s) on appropriate technique for monitoring, assisting and/or progressing patient with therapeutic exercises and activities.              Learner Progress:   Not documented in this visit.          Point: Body mechanics (In Progress)     Description:   Instruct learner(s) on proper positioning and spine alignment for patient and/or caregiver during mobility tasks and/or exercises.              Learning Progress Summary           Patient Acceptance, E, NR by JC1 at 7/31/2020 1657                    Point: Precautions (In Progress)     Description:   Instruct learner(s) on prescribed precautions during mobility and gait tasks              Learning Progress Summary           Patient Acceptance, E, NR by [facility] at 7/31/2020 4392                               User Key     Initials Effective Dates Name Provider Type Discipline    [facility] 04/03/18 -  Angelica Torres, PT Physical Therapist PT     03/07/18 -  Vikash Walters, PTA Physical Therapy Assistant PT                PT Recommendation and Plan  Anticipated Discharge Disposition (PT): anticipate therapy at next level of care, skilled nursing facility  Outcome Summary/Treatment Plan (PT)  Daily Summary of Progress (PT): progress toward functional goals as expected  Plan for Continued Treatment (PT): continue  Anticipated Discharge Disposition (PT): anticipate therapy at next level of care, skilled nursing facility  Plan of Care Reviewed With: patient  Progress: improving  Outcome Summary: pt w/ increased confusion this date. pt was soiled w/ feces from where he has a BM and digs at his rectum and then touches everything near him. pt also attempts to grab ahold of staff when assisting him with bed mobility. pt required mod-max A for rolling R/L multiple time. pt resisted some with bed mobility. dep A to scoot in bed. no further tx due to decreased cognition limits pts ability to follow instructions.  Outcome Measures     Row Name 08/04/20 1334 08/03/20 1027 08/03/20 0922       How much help from another person do you currently need...    Turning from your back to your side while in flat bed without using bedrails?  2  -LILA  --  3  -LILA    Moving from lying on back to sitting on the side of a flat bed without bedrails?  2  -LILA  --  2  -LILA    Moving to and from a bed to a chair (including a wheelchair)?  2  -LILA  --  3  -LILA    Standing up from a chair using your arms (e.g., wheelchair, bedside chair)?  2  -LILA  --  3  -LILA    Climbing 3-5 steps with a railing?   1  -LILA  --  1  -LILA    To walk in hospital room?  2  -LILA  --  3  -LILA    AM-PAC 6 Clicks Score (PT)  11  -LILA  --  15  -LILA       How much help from another is currently needed...    Putting on and taking off regular lower body clothing?  --  2  -BB  --    Bathing (including washing, rinsing, and drying)  --  2  -BB  --    Toileting (which includes using toilet bed pan or urinal)  --  2  -BB  --    Putting on and taking off regular upper body clothing  --  3  -BB  --    Taking care of personal grooming (such as brushing teeth)  --  3  -BB  --    Eating meals  --  3  -BB  --    AM-PAC 6 Clicks Score (OT)  --  15  -BB  --       Functional Assessment    Outcome Measure Options  AM-PAC 6 Clicks Basic Mobility (PT)  -LILA  --  AM-[facility] 6 Clicks Basic Mobility (PT)  -LILA    Row Name 08/02/20 0845             How much help from another is currently needed...    Putting on and taking off regular lower body clothing?  2  -RC      Bathing (including washing, rinsing, and drying)  2  -RC      Toileting (which includes using toilet bed pan or urinal)  2  -RC      Putting on and taking off regular upper body clothing  3  -RC      Taking care of personal grooming (such as brushing teeth)  3  -RC      Eating meals  3  -RC      AM-[facility] 6 Clicks Score (OT)  15  -RC        User Key  (r) = Recorded By, (t) = Taken By, (c) = Cosigned By    Initials Name Provider Type    Vikash Whatley PTA Physical Therapy Assistant    Loni Mathur, AGUILAR/L Occupational Therapy Assistant    Evelyn Farley, AGUILAR/L Occupational Therapy Assistant         Time Calculation:   PT Charges     Row Name 08/04/20 1548             Time Calculation    Start Time  1336  -LILA      Stop Time  1412  -LILA      Time Calculation (min)  36 min  -LILA         Time Calculation- PT    Total Timed Code Minutes- PT  36 minute(s)  -        User Key  (r) = Recorded By, (t) = Taken By, (c) = Cosigned By    Initials Name Provider Type    Vikash Whatley PTA Physical  Therapy Assistant        Therapy Charges for Today     Code Description Service Date Service Provider Modifiers Qty    89889110451 HC PT THER PROC EA 15 MIN 8/3/2020 Vikash Walters, PTA GP 1    25006467491 HC PT THERAPEUTIC ACT EA 15 MIN 8/3/2020 Vikash Walters, DANIE GP 2    28617160593 HC PT THERAPEUTIC ACT EA 15 MIN 8/4/2020 Vikash Walters, DANIE GP 2          PT G-Codes  Outcome Measure Options: AM-PAC 6 Clicks Basic Mobility (PT)  AM-PAC 6 Clicks Score (PT): 11  AM-PAC 6 Clicks Score (OT): 15    Vikash Walters PTA  8/4/2020

## 2020-08-04 NOTE — PLAN OF CARE
Problem: Patient Care Overview  Goal: Plan of Care Review  Flowsheets (Taken 8/4/2020 9772)  Outcome Summary: OT tx completed. Upon OT arrival, pt naked with feces all over him and bedding and was trying to get OOB. Pt adamant to sit EOB. Pt min A supine>sit. Pt SBA for sitting balance EOB. Pt donned gown with max A. After approx 5 min seated EOB, pt throws himself back into supine and refuses to move further. OT encouraged pt to perform rolling to get cleaned up. Pt min A for rolling L<>R and (D) for alicia hygiene. Pt min A scooting towards HOB in supine with vc for direction. Pt left supine in bed, bedding changed, and exit alarm on. Charge RN notified of pt attempts to exit bed. Confuson is barrier to progress at this time. No goals met on this date; cont OT POC

## 2020-08-04 NOTE — PLAN OF CARE
Problem: Patient Care Overview  Goal: Plan of Care Review  Outcome: Ongoing (interventions implemented as appropriate)  Flowsheets  Taken 8/4/2020 0329  Progress: no change  Outcome Summary: patient rested well throghout shift; some confusion duriing the night;; vitals stable; painmed given as needed  Taken 8/3/2020 2101  Plan of Care Reviewed With: patient

## 2020-08-05 LAB
ANION GAP SERPL CALCULATED.3IONS-SCNC: 13 MMOL/L (ref 5–15)
BASOPHILS # BLD AUTO: 0.1 10*3/MM3 (ref 0–0.2)
BASOPHILS NFR BLD AUTO: 1 % (ref 0–1.5)
BUN SERPL-MCNC: 18 MG/DL (ref 8–23)
BUN/CREAT SERPL: 19.8 (ref 7–25)
CALCIUM SPEC-SCNC: 9.6 MG/DL (ref 8.6–10.5)
CHLORIDE SERPL-SCNC: 101 MMOL/L (ref 98–107)
CO2 SERPL-SCNC: 23 MMOL/L (ref 22–29)
CREAT SERPL-MCNC: 0.91 MG/DL (ref 0.76–1.27)
DEPRECATED RDW RBC AUTO: 48.2 FL (ref 37–54)
EOSINOPHIL # BLD AUTO: 0.3 10*3/MM3 (ref 0–0.4)
EOSINOPHIL NFR BLD AUTO: 2.9 % (ref 0.3–6.2)
ERYTHROCYTE [DISTWIDTH] IN BLOOD BY AUTOMATED COUNT: 14.6 % (ref 12.3–15.4)
GFR SERPL CREATININE-BSD FRML MDRD: 79 ML/MIN/1.73
GLUCOSE SERPL-MCNC: 83 MG/DL (ref 65–99)
HCT VFR BLD AUTO: 28.1 % (ref 37.5–51)
HGB BLD-MCNC: 9.5 G/DL (ref 13–17.7)
IMM GRANULOCYTES # BLD AUTO: 0.07 10*3/MM3 (ref 0–0.05)
IMM GRANULOCYTES NFR BLD AUTO: 0.7 % (ref 0–0.5)
LYMPHOCYTES # BLD AUTO: 1.54 10*3/MM3 (ref 0.7–3.1)
LYMPHOCYTES NFR BLD AUTO: 14.6 % (ref 19.6–45.3)
MCH RBC QN AUTO: 30.4 PG (ref 26.6–33)
MCHC RBC AUTO-ENTMCNC: 33.8 G/DL (ref 31.5–35.7)
MCV RBC AUTO: 89.8 FL (ref 79–97)
MONOCYTES # BLD AUTO: 1.48 10*3/MM3 (ref 0.1–0.9)
MONOCYTES NFR BLD AUTO: 14.1 % (ref 5–12)
NEUTROPHILS NFR BLD AUTO: 66.7 % (ref 42.7–76)
NEUTROPHILS NFR BLD AUTO: 7.03 10*3/MM3 (ref 1.7–7)
NRBC BLD AUTO-RTO: 0 /100 WBC (ref 0–0.2)
PLATELET # BLD AUTO: 214 10*3/MM3 (ref 140–450)
PMV BLD AUTO: 10.1 FL (ref 6–12)
POTASSIUM SERPL-SCNC: 3.5 MMOL/L (ref 3.5–5.2)
RBC # BLD AUTO: 3.13 10*6/MM3 (ref 4.14–5.8)
SODIUM SERPL-SCNC: 137 MMOL/L (ref 136–145)
WBC # BLD AUTO: 10.52 10*3/MM3 (ref 3.4–10.8)

## 2020-08-05 PROCEDURE — 25010000002 ENOXAPARIN PER 10 MG: Performed by: INTERNAL MEDICINE

## 2020-08-05 PROCEDURE — 80048 BASIC METABOLIC PNL TOTAL CA: CPT | Performed by: INTERNAL MEDICINE

## 2020-08-05 PROCEDURE — 97530 THERAPEUTIC ACTIVITIES: CPT

## 2020-08-05 PROCEDURE — 85025 COMPLETE CBC W/AUTO DIFF WBC: CPT | Performed by: INTERNAL MEDICINE

## 2020-08-05 PROCEDURE — 25010000002 PIPERACILLIN SOD-TAZOBACTAM PER 1 G: Performed by: INTERNAL MEDICINE

## 2020-08-05 PROCEDURE — 97110 THERAPEUTIC EXERCISES: CPT

## 2020-08-05 PROCEDURE — 94799 UNLISTED PULMONARY SVC/PX: CPT

## 2020-08-05 RX ORDER — TERAZOSIN 1 MG/1
1 CAPSULE ORAL NIGHTLY
Status: DISCONTINUED | OUTPATIENT
Start: 2020-08-05 | End: 2020-08-06 | Stop reason: HOSPADM

## 2020-08-05 RX ORDER — TAMSULOSIN HYDROCHLORIDE 0.4 MG/1
0.4 CAPSULE ORAL DAILY
Status: DISCONTINUED | OUTPATIENT
Start: 2020-08-05 | End: 2020-08-05 | Stop reason: CLARIF

## 2020-08-05 RX ADMIN — PIPERACILLIN SODIUM AND TAZOBACTAM SODIUM 3.38 G: 3; .375 INJECTION, POWDER, LYOPHILIZED, FOR SOLUTION INTRAVENOUS at 18:30

## 2020-08-05 RX ADMIN — GUAIFENESIN 1200 MG: 600 TABLET, EXTENDED RELEASE ORAL at 09:48

## 2020-08-05 RX ADMIN — DOCUSATE SODIUM 100 MG: 100 CAPSULE, LIQUID FILLED ORAL at 09:48

## 2020-08-05 RX ADMIN — OXYBUTYNIN CHLORIDE 10 MG: 10 TABLET, EXTENDED RELEASE ORAL at 09:49

## 2020-08-05 RX ADMIN — Medication 1 APPLICATION: at 09:54

## 2020-08-05 RX ADMIN — HYDROCORTISONE: 1 CREAM TOPICAL at 21:12

## 2020-08-05 RX ADMIN — GABAPENTIN 600 MG: 300 CAPSULE ORAL at 21:12

## 2020-08-05 RX ADMIN — TERAZOSIN HYDROCHLORIDE 1 MG: 1 CAPSULE ORAL at 21:12

## 2020-08-05 RX ADMIN — COLCHICINE 0.6 MG: 0.6 TABLET, FILM COATED ORAL at 09:48

## 2020-08-05 RX ADMIN — LOSARTAN POTASSIUM 25 MG: 25 TABLET, FILM COATED ORAL at 09:48

## 2020-08-05 RX ADMIN — ENOXAPARIN SODIUM 40 MG: 40 INJECTION SUBCUTANEOUS at 05:51

## 2020-08-05 RX ADMIN — CYANOCOBALAMIN TAB 1000 MCG 1000 MCG: 1000 TAB at 09:48

## 2020-08-05 RX ADMIN — GUAIFENESIN 1200 MG: 600 TABLET, EXTENDED RELEASE ORAL at 21:11

## 2020-08-05 RX ADMIN — METOPROLOL SUCCINATE 25 MG: 25 TABLET, EXTENDED RELEASE ORAL at 09:48

## 2020-08-05 RX ADMIN — SODIUM CHLORIDE, PRESERVATIVE FREE 10 ML: 5 INJECTION INTRAVENOUS at 21:12

## 2020-08-05 RX ADMIN — ASPIRIN 81 MG: 81 TABLET, COATED ORAL at 09:48

## 2020-08-05 RX ADMIN — PIPERACILLIN SODIUM AND TAZOBACTAM SODIUM 3.38 G: 3; .375 INJECTION, POWDER, LYOPHILIZED, FOR SOLUTION INTRAVENOUS at 10:51

## 2020-08-05 RX ADMIN — DOCUSATE SODIUM 100 MG: 100 CAPSULE, LIQUID FILLED ORAL at 21:12

## 2020-08-05 RX ADMIN — AMITRIPTYLINE HYDROCHLORIDE 100 MG: 50 TABLET, FILM COATED ORAL at 21:12

## 2020-08-05 RX ADMIN — PIPERACILLIN SODIUM AND TAZOBACTAM SODIUM 3.38 G: 3; .375 INJECTION, POWDER, LYOPHILIZED, FOR SOLUTION INTRAVENOUS at 02:31

## 2020-08-05 RX ADMIN — HYDROCORTISONE: 1 CREAM TOPICAL at 09:54

## 2020-08-05 RX ADMIN — Medication 1 TABLET: at 09:48

## 2020-08-05 RX ADMIN — ROPINIROLE HYDROCHLORIDE 0.5 MG: 0.5 TABLET, FILM COATED ORAL at 09:48

## 2020-08-05 RX ADMIN — ATORVASTATIN CALCIUM 20 MG: 20 TABLET, FILM COATED ORAL at 21:12

## 2020-08-05 RX ADMIN — SODIUM CHLORIDE, PRESERVATIVE FREE 10 ML: 5 INJECTION INTRAVENOUS at 10:00

## 2020-08-05 RX ADMIN — PANTOPRAZOLE SODIUM 40 MG: 40 TABLET, DELAYED RELEASE ORAL at 09:48

## 2020-08-05 NOTE — PROGRESS NOTES
[facility] Medicine Services  INPATIENT PROGRESS NOTE    Length of Stay: 7  Date of Admission: 2020  Primary Care Physician: Terry Peterson MD    Subjective   Chief Complaint: AMS  Summary: Patient is a 85 y.o. male with a past medical history of essential hypertension, gout, chronic pain, coronary artery disease, right bundle branch block, and recent admission and treatment for left hip fracture earlier in 2020 with subsequent discharge to the skilled nursing facility for physical rehabilitation. He presented with complaints of lethargy of 3 days duration and was reportedly somnolent and unresponsive. He is being managed for sepsis secondary to pneumonia, acute respiratory failure with hypoxia and suspected gram-negative pneumonia.    Today: More awake and alert today. Able to state name, , place.  Accurately notes year but not month or president.  Also noted inappropriately remove clothing as he wanted to leave.      Review of Systems   Unable to perform ROS: Mental status change      All pertinent negatives and positives are as above. All other systems have been reviewed and are negative unless otherwise stated.     Objective    Temp:  [96.7 °F (35.9 °C)-99.5 °F (37.5 °C)] 96.7 °F (35.9 °C)  Heart Rate:  [] 91  Resp:  [16-22] 16  BP: (115-144)/(72-78) 115/74    Physical Exam   Constitutional: He is oriented to person, place, and time.   Elderly appearing male.    HENT:   Head: Normocephalic and atraumatic.   Nose: Nose normal.   Eyes: Conjunctivae and EOM are normal.   Neck: Normal range of motion.   Cardiovascular: Normal rate, regular rhythm, normal heart sounds and intact distal pulses.   Pulmonary/Chest:   Distant but clear bilaterally.    Abdominal: Soft. Bowel sounds are normal. He exhibits no distension.   Musculoskeletal: He exhibits no edema.   Neurological: He is alert and oriented to person, place, and time.   Skin: Skin is warm  and dry. Capillary refill takes less than 2 seconds.   Psychiatric: His behavior is normal.     Results Review:  I have reviewed the labs, radiology results, and diagnostic studies.    Laboratory Data:   Results from last 7 days   Lab Units 08/05/20  0543 08/04/20  0604 08/03/20  0554   SODIUM mmol/L 137 138 138   POTASSIUM mmol/L 3.5 3.9 3.5   CHLORIDE mmol/L 101 101 103   CO2 mmol/L 23.0 24.0 25.0   BUN mg/dL 18 17 14   CREATININE mg/dL 0.91 0.91 0.92   GLUCOSE mg/dL 83 96 101*   CALCIUM mg/dL 9.6 9.7 9.7   ANION GAP mmol/L 13.0 13.0 10.0     Estimated Creatinine Clearance: 56 mL/min (by C-G formula based on SCr of 0.91 mg/dL).          Results from last 7 days   Lab Units 08/05/20  0543 08/04/20  0604 08/03/20  0554 08/02/20  0533 08/01/20  0709   WBC 10*3/mm3 10.52 11.07* 12.81* 13.16* 13.80*   HEMOGLOBIN g/dL 9.5* 10.1* 9.7* 10.4* 10.2*   HEMATOCRIT % 28.1* 31.1* 29.9* 31.6* 31.6*   PLATELETS 10*3/mm3 214 262 308 331 361           Culture Data:   No results found for: BLOODCX  No results found for: URINECX  No results found for: RESPCX  No results found for: WOUNDCX  No results found for: STOOLCX  No components found for: BODYFLD    Radiology Data:   Imaging Results (Last 24 Hours)     ** No results found for the last 24 hours. **          I have reviewed the patient's current medications.     Assessment/Plan     Principal Problem:    Pneumonia of both lower lobes due to infectious organism    Sepsis 2/2 Pneumonia - Improved. Will finish course of Zosyn.  Completed 5 days of Doxy as well.  Continue supplemental O2 and wean as tolerated. COVID negative.  Urine antigens negative.      AMS - Improved today after hold narcotics.  Will continue to hold.  MRI negative. Underlying cognitive impairment?    HTN - Continue Losartan and Toprol XL.     Urinary retention - Powell anchored and Flomax started.  Will need outpatient urology follow up.     DVT ppx - Lovenox  CODE - Full    Discharge Planning: In progress. Will  need repeat COVID testing which has been ordered.     Charly Hercules MD

## 2020-08-05 NOTE — SIGNIFICANT NOTE
08/05/20 1532   Rehab Treatment   Discipline physical therapy assistant   Reason Treatment Not Performed unable to treat, medical status change  (pt. unarousable this pm and unable to tx.)

## 2020-08-05 NOTE — PROGRESS NOTES
Malnutrition Severity Assessment    Patient Name:  Jose Dacosta  YOB: 1935  MRN: [id number]  Admit Date:  7/29/2020    Patient meets criteria for : Moderate (non-severe) Malnutrition    Comments:  Wound nurse indicates 8/1 DTI/Stg 1pressure injury to Lt heel, Stg 2 Lt buttock/gluteal area. Skin breakdown noted to LLE and healing surgical wound to left hip fracture 7/9. Meds and labs noted, Alb 3.2. MVI in place. ST has placed pt on soft/ground diet- cardiac restrictions- intakes 25-50% [id number]. Whole milk and milkshake made w/ magic cup at L/D. Pt has hx of adequate intake on past admits. Current wt 145# /BMI 19.8- noted wt 1yr ago 155#(-6.5%, mild loss in 1 yr), but wt stable from 7/8/2020 145#. Pt meets criteria for acute malnutrition but noted fat loss and muscle wasting indicates possible chronic malnutrition. If intakes does not improve would recommend MD consider appetite stimulant. RD to follow hospital course.          Malnutrition Severity Assessment  Malnutrition Type: Acute Disease or Injury - Related Malnutrition     Malnutrition Type (last 8 hours)      Malnutrition Severity Assessment     Row Name 08/05/20 1233       Malnutrition Severity Assessment    Malnutrition Type  Acute Disease or Injury - Related Malnutrition    Row Name 08/05/20 1233       Insufficient Energy Intake     Insufficient Energy Intake   < or equal to 50% of est. energy requirement for > or equal to 5d) h/o adequate intake on past admission    Row Name 08/05/20 1233       Unintentional Weight Loss     Unintentional Weight Loss Findings  Mild    Unintentional Weight Loss   Weight loss of 20% in one year wt stable this admission ---noted -6.5%  loss x1yr (mild)     Row Name 08/05/20 1233       Muscle Loss    Loss of Muscle Mass Findings  Moderate    [place] Region  Moderate - slight depression    Clavicle Bone Region  Severe - protruding prominent bone    Acromion Bone Region  Moderate - acromion may slightly  protrude    Scapular Bone Region  Moderate - mild depression, bones may show slightly    Dorsal Hand Region  Moderate - slight depression    Row Name 08/05/20 1233       Fat Loss    Orbital Region   Moderate -  somewhat hollowness, slightly dark circles    Upper Arm Region  None    Row Name 08/05/20 1233       Criteria Met (Must meet criteria for severity in at least 2 of these categories: M Wasting, Fat Loss, Fluid, Secondary Signs, Wt. Status, Intake)    Patient meets criteria for   Moderate (non-severe) Malnutrition          Electronically signed by:  Viri Bentley RD  08/05/20 12:46

## 2020-08-05 NOTE — PLAN OF CARE
Problem: Patient Care Overview  Goal: Plan of Care Review  Flowsheets  Taken 8/5/2020 0352  Progress: improving  Outcome Summary: patient rested well throughout shift; vitals stable  Taken 8/4/2020 2043  Plan of Care Reviewed With: patient

## 2020-08-05 NOTE — PROGRESS NOTES
Adult Nutrition  Assessment    Patient Name:  Jose Dacosta  YOB: 1935  MRN: [id number]  Admit Date:  7/29/2020    Assessment Date:  8/5/2020    Comments:  Wound  nurse indicates 8/1 DTI/Stg 1pressure injury to Lt heel, Stg 2 Lt buttock/gluteal area. Skin breakdown noted to LLE and healing surgical wound to left hip fracture 7/9. Meds and labs noted, Alb 3.2. MVI in place. ST has placed pt on soft/ground diet- cardiac restrictions- intakes 25-50% [id number]. Whole milk and milkshake made w/ magic cup at L/D. Pt has hx of adequate intake on past admits. Current wt 145# /BMI 19.8- noted wt 1yr ago 155#(-6.5%, mild loss in 1 yr), but wt stable from 7/8/2020 145#. Pt meets criteria for acute malnutrition but noted fat loss and muscle wasting indicates possible chronic malnutrition. If intakes does not improve would recommend MD consider appetite stimulant. RD to follow hospital course.        Reason for Assessment     Row Name 08/05/20 1231          Reason for Assessment    Reason For Assessment  follow-up protocol     Diagnosis  infection/sepsis;pulmonary disease;neurologic conditions     Identified At Risk by Screening Criteria  large or nonhealing wound, burn or pressure injury;difficulty chewing/swallowing         Nutrition/Diet History     Row Name 08/05/20 1231          Nutrition/Diet History    Typical Food/Fluid Intake  Pt confused and unable to answer RD ? although RD completed NPFE to upper body during visit.     Factors Affecting Nutritional Intake  impaired cognitive status/motor control           Labs/Tests/Procedures/Meds     Row Name 08/05/20 1231          Labs/Procedures/Meds    Lab Results Reviewed  reviewed     Lab Results Comments  Glu 83, Alb 3.2L, elev CRP        Diagnostic Tests/Procedures    Diagnostic Test/Procedure Reviewed  reviewed        Medications    Pertinent Medications Comments  MVI, B12             Nutrition Prescription Ordered     Row Name 08/05/20 8751           Nutrition Prescription PO    Current PO Diet  Soft Texture     Texture  Ground     Fluid Consistency  Thin     Supplement  Milk;Magic Cup     Supplement Frequency  3 times a day;2 times a day     Common Modifiers  Cardiac         Evaluation of Received Nutrient/Fluid Intake     Row Name 08/05/20 1232          PO Evaluation    Number of Days PO Intake Evaluated  2 days     Number of Meals  5     % PO Intake  25x3, 50x2           Malnutrition Severity Assessment     Row Name 08/05/20 1233          Malnutrition Severity Assessment    Malnutrition Type  Acute Disease or Injury - Related Malnutrition        Insufficient Energy Intake     Insufficient Energy Intake   < or equal to 50% of est. energy requirement for > or equal to 5d) h/o adequate intake on past admission        Unintentional Weight Loss     Unintentional Weight Loss Findings  Mild     Unintentional Weight Loss   Weight loss of 20% in one year wt stable this admission ---noted -6.5%  loss x1yr (mild)         Muscle Loss    Loss of Muscle Mass Findings  Moderate     [place] Region  Moderate - slight depression     Clavicle Bone Region  Severe - protruding prominent bone     Acromion Bone Region  Moderate - acromion may slightly protrude     Scapular Bone Region  Moderate - mild depression, bones may show slightly     Dorsal Hand Region  Moderate - slight depression        Fat Loss    Orbital Region   Moderate -  somewhat hollowness, slightly dark circles     Upper Arm Region  None        Criteria Met (Must meet criteria for severity in at least 2 of these categories: M Wasting, Fat Loss, Fluid, Secondary Signs, Wt. Status, Intake)    Patient meets criteria for   Moderate (non-severe) Malnutrition           Electronically signed by:  Viri Bentley RD  08/05/20 12:41

## 2020-08-05 NOTE — THERAPY TREATMENT NOTE
Acute Care - Occupational Therapy Treatment Note  [facility]     Patient Name: Jose Dacosta  : 1935  MRN: [id number]  Today's Date: 2020  Onset of Illness/Injury or Date of Surgery: 20  Date of Referral to OT: 20  Referring Physician: Dr. Melendez    Admit Date: 2020       ICD-10-CM ICD-9-CM   1. Pneumonia of both lower lobes due to infectious organism J18.9 486   2. Altered mental status, unspecified altered mental status type R41.82 780.97   3. Impaired mobility and ADLs Z74.09 V49.89    Z78.9    4. Oropharyngeal dysphagia R13.12 787.22   5. Decreased functional mobility R26.89 781.99     Patient Active Problem List   Diagnosis   • Coronary artery disease involving native coronary artery of native heart without angina pectoris   • Vasovagal syncope   • RBBB (right bundle branch block with left anterior fascicular block)   • Essential hypertension   • Ischemic cardiomyopathy   • Syncope and collapse   • Closed fracture of neck of left femur (CMS/[facility])   • Coronary artery disease with history of myocardial infarction without history of CABG   • Facial laceration   • HFrEF (heart failure with reduced ejection fraction) (CMS/[facility])   • Postoperative anemia due to acute blood loss   • Pneumonia of both lower lobes due to infectious organism     Past Medical History:   Diagnosis Date   • Abnormal ECG      Past Surgical History:   Procedure Laterality Date   • CORONARY STENT PLACEMENT     • HIP HEMIARTHROPLASTY Left 2020    Procedure: LEFT HIP HEMIARTHROPLASTY;  Surgeon: Jitendra Solis MD;  Location: [facility];  Service: Orthopedics;  Laterality: Left;       Therapy Treatment    Rehabilitation Treatment Summary     Row Name 20 1045             Treatment Time/Intention    Discipline  occupational therapy assistant  -LM      Document Type  therapy note (daily note)  -LM      Subjective Information  no complaints  -LM      Mode of Treatment  individual  therapy;occupational therapy  -LM      Therapy Frequency (PT Clinical Impression)  daily  -LM      Recorded by [LM] Alise Mora AGUILAR/L 08/05/20 1533      Row Name 08/05/20 1045             Bed Mobility Assessment/Treatment    Comment (Bed Mobility)  denied all bed mobility   -LM      Recorded by [LM] Alise Mora COTA/L 08/05/20 1533      Row Name 08/05/20 1045             Grooming Assessment/Training    [place] Level (Grooming)  grooming skills;wash face, hands;set up;verbal cues  -LM      Recorded by [LM] Alise Mora COTA/L 08/05/20 1533      Row Name 08/05/20 1045             Therapeutic Exercise    Therapeutic Exercise  supine, upper extremities  -LM      Additional Documentation  -- all planes no wt b ue elbow chest press shld flex hand pumps  -LM      Recorded by [LM] Alise Mora AGUILAR/L 08/05/20 1533      Row Name 08/05/20 1045             Upper Extremity Supine Therapeutic Exercise    Performed, Supine Upper Extremity (Therapeutic Exercise)  shoulder flexion/extension;shoulder abduction/adduction;elbow flexion/extension;other (see comments) hand pumps   -LM      Device, Supine Upper Extremity (Therapeutic Exercise)  chest press  -LM      Recorded by [LM] Alise Mora AGUILAR/L 08/05/20 1533      Row Name                Wound 08/01/20 1749 Left gluteal Pressure Injury    Wound - Properties Group Date first assessed: 08/01/20 [TC] Time first assessed: 1749 [TC] Present on Hospital Admission: N [TC] Side: Left [TC] Location: gluteal [TC] Primary Wound Type: Pressure inj [TC] Stage, Pressure Injury: Stage 2 [TC] Recorded by:  [TC] Kandis Mercedes RN 08/01/20 1749    Row Name                Wound 08/01/20 1749 Left heel    Wound - Properties Group Date first assessed: 08/01/20 [TC] Time first assessed: 1749 [TC] Present on Hospital Admission: N [TC] Side: Left [TC] Location: heel [TC] Recorded by:  [TC] Kandis Mercedes RN 08/01/20 1749    Row Name                 Wound 07/08/20 1810 Left upper eyebrow Laceration    Wound - Properties Group Date first assessed: 07/08/20 [TM] Time first assessed: 1810 [TM] Present on Hospital Admission: Y [TM] Side: Left [TM] Orientation: upper [TM] Location: eyebrow [TM] Primary Wound Type: Laceration [TM] Recorded by:  [TM] Josephine Elias RN 07/08/20 1811    Row Name                Wound 08/01/20 2043 Left posterior;lower leg Pressure Injury    Wound - Properties Group Date first assessed: 08/01/20 [EF] Time first assessed: 2043 [EF] Present on Hospital Admission: Y [EF] Side: Left [EF] Orientation: posterior;lower [EF] Location: leg [EF], below immobilizer  Primary Wound Type: Pressure inj [EF] Stage, Pressure Injury: deep tissue injury [EF] Recorded by:  [EF] Amy Campbell RN 08/01/20 2045    Row Name 08/05/20 1045             Plan of Care Review    Progress  no change  -LM      Recorded by [LM] Alise Mora COTA/L 08/05/20 1533      Row Name 08/05/20 1045             Outcome Summary/Treatment Plan (OT)    Daily Summary of Progress (OT)  progress towards functional goals is fair  -LM      Recorded by [LM] Alise Mora AGUILAR/L 08/05/20 1533        User Key  (r) = Recorded By, (t) = Taken By, (c) = Cosigned By    Initials Name Effective Dates Discipline    TC Kandis Mercedes RN 10/17/16 -  Nurse    EF Amy Campbell RN 10/17/16 -  Nurse    LM Alise Mora AGUILAR/L 03/07/18 -  OT    TM Josephine Elias, RN 07/24/18 -  Nurse        Wound 08/01/20 1749 Left gluteal Pressure Injury (Active)   Closure Open to air 8/4/2020  8:43 PM   Base red;non-blanchable 8/4/2020  8:43 PM   Periwound Care, Wound barrier ointment applied 8/4/2020  8:43 PM       Wound 08/01/20 1749 Left heel (Active)   Closure Open to air 8/4/2020  8:43 PM   Base red;non-blanchable 8/4/2020  8:43 PM     Rehab Goal Summary     Row Name 08/05/20 1533             Occupational Therapy Goals    Transfer Goal Selection (OT)  transfer, OT  goal 1  -LM      Bathing Goal Selection (OT)  bathing, OT goal 1  -LM      Dressing Goal Selection (OT)  dressing, OT goal 1  -LM      Toileting Goal Selection (OT)  toileting, OT goal 1  -LM      Activity Tolerance Goal Selection (OT)  activity tolerance, OT goal 1  -LM         Transfer Goal 1 (OT)    Activity/Assistive Device (Transfer Goal 1, OT)  sit-to-stand/stand-to-sit;bed-to-chair/chair-to-bed;toilet  -LM      [place] Level/Cues Needed (Transfer Goal 1, OT)  contact guard assist  -LM      Time Frame (Transfer Goal 1, OT)  long term goal (LTG);by discharge  -LM      Progress/Outcome (Transfer Goal 1, OT)  goal not met  -LM         Bathing Goal 1 (OT)    Activity/Assistive Device (Bathing Goal 1, OT)  bathing skills, all  -LM      [place] Level/Cues Needed (Bathing Goal 1, OT)  minimum assist (75% or more patient effort)  -LM      Time Frame (Bathing Goal 1, OT)  long term goal (LTG);by discharge  -LM      Progress/Outcomes (Bathing Goal 1, OT)  goal not met  -LM         Dressing Goal 1 (OT)    Activity/Assistive Device (Dressing Goal 1, OT)  lower body dressing  -LM      [place]/Cues Needed (Dressing Goal 1, OT)  minimum assist (75% or more patient effort)  -LM      Time Frame (Dressing Goal 1, OT)  long term goal (LTG);by discharge  -LM      Progress/Outcome (Dressing Goal 1, OT)  goal not met  -LM         Toileting Goal 1 (OT)    Activity/Device (Toileting Goal 1, OT)  toileting skills, all  -LM      [place] Level/Cues Needed (Toileting Goal 1, OT)  supervision required  -LM      Time Frame (Toileting Goal 1, OT)  long term goal (LTG);by discharge  -LM      Progress/Outcome (Toileting Goal 1, OT)  goal not met  -LM          Activity Tolerance Goal 1 (OT)    Activity Level (Endurance Goal 1, OT)  15 min activity functional/therapeutic activities  -LM      Progress/Outcome (Activity Tolerance Goal 1, OT)  goal met  -LM        User Key  (r) = Recorded By, (t) = Taken By, (c) = Cosigned By     Initials Name Provider Type Discipline     Alise Mora COTA/L Occupational Therapy Assistant OT        Occupational Therapy Education                 Title: PT OT SLP Therapies (In Progress)     Topic: Occupational Therapy (In Progress)     Point: ADL training (In Progress)     Description:   Instruct learner(s) on proper safety adaptation and remediation techniques during self care or transfers.   Instruct in proper use of assistive devices.              Learning Progress Summary           Patient Acceptance, E, NR by  at 8/3/2020 1430    Acceptance, E,D, NR by  at 8/2/2020 1354    Acceptance, E, NR,NL by AS at 7/30/2020 1656    Comment:  role of OT, OT POC, bed mobility, transfer training                   Point: Home exercise program (Not Started)     Description:   Instruct learner(s) on appropriate technique for monitoring, assisting and/or progressing therapeutic exercises/activities.              Learner Progress:   Not documented in this visit.          Point: Precautions (In Progress)     Description:   Instruct learner(s) on prescribed precautions during self-care and functional transfers.              Learning Progress Summary           Patient Acceptance, E,D, NR by  at 8/2/2020 1354    Acceptance, E, NR,NL by AS at 7/30/2020 1656    Comment:  role of OT, OT POC, bed mobility, transfer training                   Point: Body mechanics (In Progress)     Description:   Instruct learner(s) on proper positioning and spine alignment during self-care, functional mobility activities and/or exercises.              Learning Progress Summary           Patient Acceptance, E,D, NR by  at 8/2/2020 1354                               User Key     Initials Effective Dates Name Provider Type Discipline     03/07/18 -  Loni Noonan COTA/L Occupational Therapy Assistant OT    KANDY 03/07/18 -  Evelyn Dodson COTA/L Occupational Therapy Assistant OT    AS 07/05/20 -  Ligia Junior OT  Occupational Therapist OT                OT Recommendation and Plan  Outcome Summary/Treatment Plan (OT)  Daily Summary of Progress (OT): progress towards functional goals is fair  Daily Summary of Progress (OT): progress towards functional goals is fair  Plan of Care Review  Plan of Care Reviewed With: patient  Plan of Care Reviewed With: patient  Outcome Summary: pt denied oob eob or any bed mobilitly  pt initially denied ther ex in sup although perf some  pt also perf groomng tasks  Outcome Measures     Row Name 08/04/20 1520 08/04/20 1334 08/03/20 1027       How much help from another person do you currently need...    Turning from your back to your side while in flat bed without using bedrails?  --  2  -LILA  --    Moving from lying on back to sitting on the side of a flat bed without bedrails?  --  2  -LILA  --    Moving to and from a bed to a chair (including a wheelchair)?  --  2  -LILA  --    Standing up from a chair using your arms (e.g., wheelchair, bedside chair)?  --  2  -LILA  --    Climbing 3-5 steps with a railing?  --  1  -LILA  --    To walk in hospital room?  --  2  -LILA  --    AM-PAC 6 Clicks Score (PT)  --  11  -LILA  --       How much help from another is currently needed...    Putting on and taking off regular lower body clothing?  2  -AS  --  2  -BB    Bathing (including washing, rinsing, and drying)  2  -AS  --  2  -BB    Toileting (which includes using toilet bed pan or urinal)  2  -AS  --  2  -BB    Putting on and taking off regular upper body clothing  2  -AS  --  3  -BB    Taking care of personal grooming (such as brushing teeth)  3  -AS  --  3  -BB    Eating meals  3  -AS  --  3  -BB    AM-PAC 6 Clicks Score (OT)  14  -AS  --  15  -BB       Functional Assessment    Outcome Measure Options  AM-PAC 6 Clicks Daily Activity (OT)  -AS  AM-PAC 6 Clicks Basic Mobility (PT)  -LILA  --    Row Name 08/03/20 0922             How much help from another person do you currently need...    Turning from your back to  your side while in flat bed without using bedrails?  3  -LILA      Moving from lying on back to sitting on the side of a flat bed without bedrails?  2  -LILA      Moving to and from a bed to a chair (including a wheelchair)?  3  -LILA      Standing up from a chair using your arms (e.g., wheelchair, bedside chair)?  3  -LILA      Climbing 3-5 steps with a railing?  1  -LILA      To walk in hospital room?  3  -LILA      AM-PAC 6 Clicks Score (PT)  15  -LILA         Functional Assessment    Outcome Measure Options  AM-PAC 6 Clicks Basic Mobility (PT)  -LILA        User Key  (r) = Recorded By, (t) = Taken By, (c) = Cosigned By    Initials Name Provider Type    LILA Vikash Walters, PTA Physical Therapy Assistant    BB Loni Noonan, AGUILAR/L Occupational Therapy Assistant    AS Ligia Junior, OT Occupational Therapist           Time Calculation:   Time Calculation- OT     Row Name 08/05/20 1529             Time Calculation- OT    OT Start Time  1020  -LM      OT Stop Time  1045  -LM      OT Time Calculation (min)  25 min  -LM      Total Timed Code Minutes- OT  25 minute(s)  -LM        User Key  (r) = Recorded By, (t) = Taken By, (c) = Cosigned By    Initials Name Provider Type     Alise Mora COTA/L Occupational Therapy Assistant        Therapy Charges for Today     Code Description Service Date Service Provider Modifiers Qty    62939245034 HC OT THERAPEUTIC ACT EA 15 MIN 8/5/2020 Alise Mora COTA/L GO 1    10333873985 HC OT THER PROC EA 15 MIN 8/5/2020 Alise Mora COTA/L GO 1               JEFF Wallace/JAZZY  8/5/2020

## 2020-08-05 NOTE — PLAN OF CARE
Problem: Patient Care Overview  Goal: Plan of Care Review  Flowsheets (Taken 8/5/2020 8153)  Progress: improving  Plan of Care Reviewed With: patient  Outcome Summary: pt denied oob eob or any bed mobilitly  pt initially denied ther ex in sup although perf some  pt also perf groomng tasks

## 2020-08-06 VITALS
HEIGHT: 72 IN | RESPIRATION RATE: 16 BRPM | HEART RATE: 84 BPM | TEMPERATURE: 97.6 F | WEIGHT: 141.1 LBS | DIASTOLIC BLOOD PRESSURE: 62 MMHG | BODY MASS INDEX: 19.11 KG/M2 | SYSTOLIC BLOOD PRESSURE: 129 MMHG | OXYGEN SATURATION: 95 %

## 2020-08-06 PROBLEM — G93.41 METABOLIC ENCEPHALOPATHY: Status: ACTIVE | Noted: 2020-08-06

## 2020-08-06 PROBLEM — R33.8 ACUTE URINARY RETENTION: Status: ACTIVE | Noted: 2020-08-06

## 2020-08-06 LAB
ANION GAP SERPL CALCULATED.3IONS-SCNC: 11 MMOL/L (ref 5–15)
BACTERIA UR QL AUTO: ABNORMAL /HPF
BASOPHILS # BLD AUTO: 0.09 10*3/MM3 (ref 0–0.2)
BASOPHILS NFR BLD AUTO: 0.6 % (ref 0–1.5)
BILIRUB UR QL STRIP: NEGATIVE
BUN SERPL-MCNC: 19 MG/DL (ref 8–23)
BUN/CREAT SERPL: 19 (ref 7–25)
CALCIUM SPEC-SCNC: 9.4 MG/DL (ref 8.6–10.5)
CHLORIDE SERPL-SCNC: 103 MMOL/L (ref 98–107)
CLARITY UR: CLEAR
CO2 SERPL-SCNC: 24 MMOL/L (ref 22–29)
COLOR UR: YELLOW
CREAT SERPL-MCNC: 1 MG/DL (ref 0.76–1.27)
DEPRECATED RDW RBC AUTO: 47.7 FL (ref 37–54)
EOSINOPHIL # BLD AUTO: 0.33 10*3/MM3 (ref 0–0.4)
EOSINOPHIL NFR BLD AUTO: 2.3 % (ref 0.3–6.2)
ERYTHROCYTE [DISTWIDTH] IN BLOOD BY AUTOMATED COUNT: 14.4 % (ref 12.3–15.4)
GFR SERPL CREATININE-BSD FRML MDRD: 71 ML/MIN/1.73
GLUCOSE SERPL-MCNC: 92 MG/DL (ref 65–99)
GLUCOSE UR STRIP-MCNC: NEGATIVE MG/DL
HCT VFR BLD AUTO: 29.1 % (ref 37.5–51)
HGB BLD-MCNC: 9.6 G/DL (ref 13–17.7)
HGB UR QL STRIP.AUTO: ABNORMAL
HYALINE CASTS UR QL AUTO: ABNORMAL /LPF
IMM GRANULOCYTES # BLD AUTO: 0.09 10*3/MM3 (ref 0–0.05)
IMM GRANULOCYTES NFR BLD AUTO: 0.6 % (ref 0–0.5)
KETONES UR QL STRIP: ABNORMAL
LEUKOCYTE ESTERASE UR QL STRIP.AUTO: NEGATIVE
LYMPHOCYTES # BLD AUTO: 1.99 10*3/MM3 (ref 0.7–3.1)
LYMPHOCYTES NFR BLD AUTO: 14 % (ref 19.6–45.3)
MCH RBC QN AUTO: 29.9 PG (ref 26.6–33)
MCHC RBC AUTO-ENTMCNC: 33 G/DL (ref 31.5–35.7)
MCV RBC AUTO: 90.7 FL (ref 79–97)
MONOCYTES # BLD AUTO: 1.66 10*3/MM3 (ref 0.1–0.9)
MONOCYTES NFR BLD AUTO: 11.7 % (ref 5–12)
NEUTROPHILS NFR BLD AUTO: 10.05 10*3/MM3 (ref 1.7–7)
NEUTROPHILS NFR BLD AUTO: 70.8 % (ref 42.7–76)
NITRITE UR QL STRIP: NEGATIVE
NRBC BLD AUTO-RTO: 0 /100 WBC (ref 0–0.2)
PH UR STRIP.AUTO: <=5 [PH] (ref 5–9)
PLATELET # BLD AUTO: 228 10*3/MM3 (ref 140–450)
PMV BLD AUTO: 10.2 FL (ref 6–12)
POTASSIUM SERPL-SCNC: 4 MMOL/L (ref 3.5–5.2)
PROT UR QL STRIP: NEGATIVE
RBC # BLD AUTO: 3.21 10*6/MM3 (ref 4.14–5.8)
RBC # UR: ABNORMAL /HPF
REF LAB TEST METHOD: ABNORMAL
SARS-COV-2 N GENE RESP QL NAA+PROBE: NOT DETECTED
SODIUM SERPL-SCNC: 138 MMOL/L (ref 136–145)
SP GR UR STRIP: 1.02 (ref 1–1.03)
SQUAMOUS #/AREA URNS HPF: ABNORMAL /HPF
URATE CRY URNS QL MICRO: ABNORMAL /HPF
UROBILINOGEN UR QL STRIP: ABNORMAL
WBC # BLD AUTO: 14.21 10*3/MM3 (ref 3.4–10.8)
WBC UR QL AUTO: ABNORMAL /HPF

## 2020-08-06 PROCEDURE — 85025 COMPLETE CBC W/AUTO DIFF WBC: CPT | Performed by: INTERNAL MEDICINE

## 2020-08-06 PROCEDURE — 87635 SARS-COV-2 COVID-19 AMP PRB: CPT | Performed by: INTERNAL MEDICINE

## 2020-08-06 PROCEDURE — 25010000002 ENOXAPARIN PER 10 MG: Performed by: INTERNAL MEDICINE

## 2020-08-06 PROCEDURE — 81001 URINALYSIS AUTO W/SCOPE: CPT | Performed by: FAMILY MEDICINE

## 2020-08-06 PROCEDURE — 25010000002 PIPERACILLIN SOD-TAZOBACTAM PER 1 G: Performed by: INTERNAL MEDICINE

## 2020-08-06 PROCEDURE — 80048 BASIC METABOLIC PNL TOTAL CA: CPT | Performed by: INTERNAL MEDICINE

## 2020-08-06 PROCEDURE — 97530 THERAPEUTIC ACTIVITIES: CPT

## 2020-08-06 PROCEDURE — 97110 THERAPEUTIC EXERCISES: CPT

## 2020-08-06 RX ORDER — GABAPENTIN 600 MG/1
600 TABLET ORAL 3 TIMES DAILY
Qty: 9 TABLET | Refills: 0 | Status: SHIPPED | OUTPATIENT
Start: 2020-08-06 | End: 2020-08-09

## 2020-08-06 RX ORDER — ACETAMINOPHEN 325 MG/1
650 TABLET ORAL EVERY 4 HOURS PRN
Start: 2020-08-06 | End: 2020-11-03

## 2020-08-06 RX ORDER — GUAIFENESIN 600 MG/1
1200 TABLET, EXTENDED RELEASE ORAL EVERY 12 HOURS SCHEDULED
Start: 2020-08-06 | End: 2020-11-03

## 2020-08-06 RX ORDER — TERAZOSIN 1 MG/1
1 CAPSULE ORAL NIGHTLY
Start: 2020-08-06 | End: 2021-03-30

## 2020-08-06 RX ADMIN — PANTOPRAZOLE SODIUM 40 MG: 40 TABLET, DELAYED RELEASE ORAL at 10:00

## 2020-08-06 RX ADMIN — SODIUM CHLORIDE, PRESERVATIVE FREE 10 ML: 5 INJECTION INTRAVENOUS at 10:05

## 2020-08-06 RX ADMIN — ENOXAPARIN SODIUM 40 MG: 40 INJECTION SUBCUTANEOUS at 10:01

## 2020-08-06 RX ADMIN — ASPIRIN 81 MG: 81 TABLET, COATED ORAL at 10:01

## 2020-08-06 RX ADMIN — GUAIFENESIN 1200 MG: 600 TABLET, EXTENDED RELEASE ORAL at 10:01

## 2020-08-06 RX ADMIN — Medication 1 APPLICATION: at 10:04

## 2020-08-06 RX ADMIN — PIPERACILLIN SODIUM AND TAZOBACTAM SODIUM 3.38 G: 3; .375 INJECTION, POWDER, LYOPHILIZED, FOR SOLUTION INTRAVENOUS at 02:09

## 2020-08-06 RX ADMIN — HYDROCORTISONE: 1 CREAM TOPICAL at 10:04

## 2020-08-06 RX ADMIN — COLCHICINE 0.6 MG: 0.6 TABLET, FILM COATED ORAL at 10:00

## 2020-08-06 RX ADMIN — DOCUSATE SODIUM 100 MG: 100 CAPSULE, LIQUID FILLED ORAL at 10:00

## 2020-08-06 RX ADMIN — ROPINIROLE HYDROCHLORIDE 0.5 MG: 0.5 TABLET, FILM COATED ORAL at 10:01

## 2020-08-06 RX ADMIN — METOPROLOL SUCCINATE 25 MG: 25 TABLET, EXTENDED RELEASE ORAL at 10:00

## 2020-08-06 RX ADMIN — OXYBUTYNIN CHLORIDE 10 MG: 10 TABLET, EXTENDED RELEASE ORAL at 10:01

## 2020-08-06 RX ADMIN — Medication 1 TABLET: at 10:01

## 2020-08-06 RX ADMIN — CYANOCOBALAMIN TAB 1000 MCG 1000 MCG: 1000 TAB at 10:00

## 2020-08-06 RX ADMIN — PIPERACILLIN SODIUM AND TAZOBACTAM SODIUM 3.38 G: 3; .375 INJECTION, POWDER, LYOPHILIZED, FOR SOLUTION INTRAVENOUS at 12:05

## 2020-08-06 RX ADMIN — LOSARTAN POTASSIUM 25 MG: 25 TABLET, FILM COATED ORAL at 10:00

## 2020-08-06 NOTE — DISCHARGE SUMMARY
[facility] Medicine Services  DISCHARGE SUMMARY       Date of Admission: 7/29/2020  Date of Discharge:  8/6/2020  Primary Care Physician: Terry Peterson MD    Presenting Problem/History of Present Illness:  Pneumonia of both lower lobes due to infectious organism [J18.9]  Altered mental status, unspecified altered mental status type [R41.82]     Final Discharge Diagnoses:  Active Hospital Problems    Diagnosis   • **Pneumonia of both lower lobes due to infectious organism   • Acute urinary retention   • Metabolic encephalopathy       Consults:   Consults     Date and Time Order Name Status Description    7/8/2020 2004 Orthopedics (on-call MD unless specified) Completed           Procedures Performed:                 Pertinent Test Results:   Lab Results (most recent)     Procedure Component Value Units Date/Time    COVID-19, [facility] IN-HOUSE, NP SWAB IN TRANSPORT MEDIA 8-10 HR TAT - Swab, Nasopharynx [[id number]]  (Normal) Collected:  08/06/20 0757    Specimen:  Swab from Nasopharynx Updated:  08/06/20 1226     COVID19 Not Detected    Narrative:       Testing performed by Real Time RT-PCR  This test has not been approved by the [employer] but is authorized under the Emergency Use Act (EUA)    Fact sheet for providers: https://www.fda.gov/media/579251/download    Fact sheet for patients: https://www.fda.gov/media/121871/download      Testing performed by Real Time RT-PCR  This test has not been approved by the [employer] but is authorized under the Emergency Use Act (EUA)    Fact sheet for providers: https://www.fda.gov/media/849829/download    Fact sheet for patients: https://www.fda.gov/media/512361/download        Urinalysis, Microscopic Only - Urine, Catheter [[id number]]  (Abnormal) Collected:  08/06/20 0744    Specimen:  Urine, Catheter Updated:  08/06/20 0834     RBC, UA 3-5 /HPF      WBC, UA 0-2 /HPF      Bacteria, UA None Seen /HPF      Squamous Epithelial Cells, UA 0-2  /HPF      Hyaline Casts, UA None Seen /LPF      Uric Acid Crystals, UA Small/1+ /HPF      Methodology Manual Light Microscopy    Urinalysis With Culture If Indicated - Urine, Catheter [[id number]]  (Abnormal) Collected:  08/06/20 0744    Specimen:  Urine, Catheter Updated:  08/06/20 0806     Color, UA Yellow     Appearance, UA Clear     pH, UA <=5.0     Specific Gravity, UA 1.025     Glucose, UA Negative     Ketones, UA 15 mg/dL (1+)     Bilirubin, UA Negative     Blood, UA Moderate (2+)     Protein, UA Negative     Leuk Esterase, UA Negative     Nitrite, UA Negative     Urobilinogen, UA 0.2 E.U./dL    Basic Metabolic Panel [[id number]]  (Normal) Collected:  08/06/20 0533    Specimen:  Blood Updated:  08/06/20 0617     Glucose 92 mg/dL      BUN 19 mg/dL      Creatinine 1.00 mg/dL      Sodium 138 mmol/L      Potassium 4.0 mmol/L      Chloride 103 mmol/L      CO2 24.0 mmol/L      Calcium 9.4 mg/dL      eGFR Non African Amer 71 mL/min/1.73      BUN/Creatinine Ratio 19.0     Anion Gap 11.0 mmol/L     Narrative:       GFR Normal >60  Chronic Kidney Disease <60  Kidney Failure <15      CBC & Differential [[id number]] Collected:  08/06/20 0533    Specimen:  Blood Updated:  08/06/20 0602    Narrative:       The following orders were created for panel order CBC & Differential.  Procedure                               Abnormality         Status                     ---------                               -----------         ------                     CBC Auto Differential[[id number]]        Abnormal            Final result                 Please view results for these tests on the individual orders.    CBC Auto Differential [[id number]]  (Abnormal) Collected:  08/06/20 0533    Specimen:  Blood Updated:  08/06/20 0602     WBC 14.21 10*3/mm3      RBC 3.21 10*6/mm3      Hemoglobin 9.6 g/dL      Hematocrit 29.1 %      MCV 90.7 fL      MCH 29.9 pg      MCHC 33.0 g/dL      RDW 14.4 %      RDW-SD 47.7 fl      MPV 10.2 fL      Platelets  228 10*3/mm3      Neutrophil % 70.8 %      Lymphocyte % 14.0 %      Monocyte % 11.7 %      Eosinophil % 2.3 %      Basophil % 0.6 %      Immature Grans % 0.6 %      Neutrophils, Absolute 10.05 10*3/mm3      Lymphocytes, Absolute 1.99 10*3/mm3      Monocytes, Absolute 1.66 10*3/mm3      Eosinophils, Absolute 0.33 10*3/mm3      Basophils, Absolute 0.09 10*3/mm3      Immature Grans, Absolute 0.09 10*3/mm3      nRBC 0.0 /100 WBC     Basic Metabolic Panel [[id number]]  (Normal) Collected:  08/05/20 0543    Specimen:  Blood Updated:  08/05/20 0657     Glucose 83 mg/dL      BUN 18 mg/dL      Creatinine 0.91 mg/dL      Sodium 137 mmol/L      Potassium 3.5 mmol/L      Chloride 101 mmol/L      CO2 23.0 mmol/L      Calcium 9.6 mg/dL      eGFR Non African Amer 79 mL/min/1.73      BUN/Creatinine Ratio 19.8     Anion Gap 13.0 mmol/L     Narrative:       GFR Normal >60  Chronic Kidney Disease <60  Kidney Failure <15      CBC & Differential [[id number]] Collected:  08/05/20 0543    Specimen:  Blood Updated:  08/05/20 0624    Narrative:       The following orders were created for panel order CBC & Differential.  Procedure                               Abnormality         Status                     ---------                               -----------         ------                     CBC Auto Differential[[id number]]        Abnormal            Final result                 Please view results for these tests on the individual orders.    CBC Auto Differential [[id number]]  (Abnormal) Collected:  08/05/20 0543    Specimen:  Blood Updated:  08/05/20 0624     WBC 10.52 10*3/mm3      RBC 3.13 10*6/mm3      Hemoglobin 9.5 g/dL      Hematocrit 28.1 %      MCV 89.8 fL      MCH 30.4 pg      MCHC 33.8 g/dL      RDW 14.6 %      RDW-SD 48.2 fl      MPV 10.1 fL      Platelets 214 10*3/mm3      Neutrophil % 66.7 %      Lymphocyte % 14.6 %      Monocyte % 14.1 %      Eosinophil % 2.9 %      Basophil % 1.0 %      Immature Grans % 0.7 %      Neutrophils,  Absolute 7.03 10*3/mm3      Lymphocytes, Absolute 1.54 10*3/mm3      Monocytes, Absolute 1.48 10*3/mm3      Eosinophils, Absolute 0.30 10*3/mm3      Basophils, Absolute 0.10 10*3/mm3      Immature Grans, Absolute 0.07 10*3/mm3      nRBC 0.0 /100 WBC     COVID-19, [facility] IN-HOUSE, NP SWAB IN TRANSPORT MEDIA 8-10 HR TAT - Swab, Nasopharynx [[id number]]  (Normal) Collected:  08/03/20 1541    Specimen:  Swab from Nasopharynx Updated:  08/03/20 1959     COVID19 Not Detected    Narrative:       Testing performed by Real Time RT-PCR  This test has not been approved by the [employer] but is authorized under the Emergency Use Act (EUA)    Fact sheet for providers: https://www.fda.gov/media/571349/download    Fact sheet for patients: https://www.fda.gov/media/711832/download        Blood Culture - Blood, Arm, Left [[id number]] Collected:  07/29/20 1516    Specimen:  Blood from Arm, Left Updated:  08/03/20 1530     Blood Culture No growth at 5 days    Blood Culture - Blood, Arm, Right [[id number]]  (Abnormal) Collected:  07/29/20 1641    Specimen:  Blood from Arm, Right Updated:  07/31/20 0647     Blood Culture Staphylococcus, coagulase negative     Comment: Probable contaminant requires clinical correlation, susceptibility not performed unless requested by physician.          Isolated from Pediatric Bottle     Gram Stain Pediatric Bottle Gram positive cocci in clusters     Comment: 1 bottle of 2 drawn positive for gram positive cocci in clusters       Urine Culture - Urine, Urine, Clean Catch [[id number]]  (Normal) Collected:  07/29/20 1553    Specimen:  Urine, Clean Catch Updated:  07/30/20 1916     Urine Culture No growth    Blood Culture ID, PCR - Blood, Arm, Right [[id number]]  (Abnormal) Collected:  07/29/20 1641    Specimen:  Blood from Arm, Right Updated:  07/30/20 1417     BCID, PCR Staphylococcus species, not aureus. mecA (methicillin resistance gene) detected. Identification by BCID PCR.    Ferritin [[id number]]  (Abnormal)  "Collected:  07/29/20 1505    Specimen:  Blood Updated:  07/29/20 2104     Ferritin 997.20 ng/mL     Narrative:       Results may be falsely decreased if patient taking Biotin.      Lactate Dehydrogenase [[id number]]  (Normal) Collected:  07/29/20 1505    Specimen:  Blood Updated:  07/29/20 2057      U/L     C-reactive Protein [[id number]]  (Abnormal) Collected:  07/29/20 1505    Specimen:  Blood Updated:  07/29/20 2057     C-Reactive Protein 19.99 mg/dL     S. Pneumo Ag Urine or CSF - Urine, Urine, Clean Catch [[id number]]  (Normal) Collected:  07/29/20 1553    Specimen:  Urine, Clean Catch Updated:  07/29/20 2053     Strep Pneumo Ag Negative    Legionella Antigen, Urine - Urine, Urine, Clean Catch [[id number]]  (Normal) Collected:  07/29/20 1553    Specimen:  Urine, Clean Catch Updated:  07/29/20 2053     LEGIONELLA ANTIGEN, URINE Negative    Procalcitonin [[id number]]  (Abnormal) Collected:  07/29/20 1505    Specimen:  Blood Updated:  07/29/20 1940     Procalcitonin 0.55 ng/mL     Narrative:       As a Marker for Sepsis (Non-Neonates):   1. <0.5 ng/mL represents a low risk of severe sepsis and/or septic shock.  1. >2 ng/mL represents a high risk of severe sepsis and/or septic shock.    As a Marker for Lower Respiratory Tract Infections that require antibiotic therapy:  PCT on Admission     Antibiotic Therapy             6-12 Hrs later  > 0.5                Strongly Recommended            >0.25 - <0.5         Recommended  0.1 - 0.25           Discouraged                   Remeasure/reassess PCT  <0.1                 Strongly Discouraged          Remeasure/reassess PCT      As 28 day mortality risk marker: \"Change in Procalcitonin Result\" (> 80 % or <=80 %) if Day 0 (or Day 1) and Day 4 values are available. Refer to http://www.[facility]-pct-calculator.com/   Change in PCT <=80 %   A decrease of PCT levels below or equal to 80 % defines a positive change in PCT test result representing a higher risk for 28-day " all-cause mortality of patients diagnosed with severe sepsis or septic shock.  Change in PCT > 80 %   A decrease of PCT levels of more than 80 % defines a negative change in PCT result representing a lower risk for 28-day all-cause mortality of patients diagnosed with severe sepsis or septic shock.                Results may be falsely decreased if patient taking Biotin.     Blood Gas, Arterial [[id number]]  (Abnormal) Collected:  07/29/20 1730    Specimen:  Arterial Blood Updated:  07/29/20 1744     Site Right Brachial     Anmol's Test N/A     pH, Arterial 7.413 pH units      pCO2, Arterial 38.9 mm Hg      pO2, Arterial 57.8 mm Hg      Comment: 84 Value below reference range        HCO3, Arterial 24.8 mmol/L      Base Excess, Arterial 0.3 mmol/L      O2 Saturation, Arterial 90.0 %      Comment: 84 Value below reference range        Barometric Pressure for Blood Gas 746 mmHg      Modality Room Air     Ventilator Mode NA     Collected by DC     Comment: Meter: [id number]     :  [id number]       Urinalysis With Microscopic If Indicated (No Culture) - Urine, Clean Catch [[id number]]  (Abnormal) Collected:  07/29/20 1553    Specimen:  Urine, Clean Catch Updated:  07/29/20 1620     Color, UA Dark Yellow     Appearance, UA Clear     pH, UA <=5.0     Specific Gravity, UA 1.027     Glucose, UA Negative     Ketones, UA Negative     Bilirubin, UA Small (1+)     Blood, UA Negative     Protein, UA Trace     Leuk Esterase, UA Negative     Nitrite, UA Negative     Urobilinogen, UA 1.0 E.U./dL    Narrative:       Urine microscopic not indicated.    Nixon Draw [[id number]] Collected:  07/29/20 1505    Specimen:  Blood Updated:  07/29/20 1615    Narrative:       The following orders were created for panel order Nixon Draw.  Procedure                               Abnormality         Status                     ---------                               -----------         ------                     Light Blue Top[[id number]]                                    Final result               Green Top (Gel)[[id number]]                                  Final result               Lavender Top[[id number]]                                     Final result               Gold Top - SST[[id number]]                                   Final result                 Please view results for these tests on the individual orders.    Light Blue Top [[id number]] Collected:  07/29/20 1505    Specimen:  Blood Updated:  07/29/20 1615     Extra Tube hold for add-on     Comment: Auto resulted       Gold Top - SST [[id number]] Collected:  07/29/20 1505    Specimen:  Blood Updated:  07/29/20 1615     Extra Tube Hold for add-ons.     Comment: Auto resulted.       Extra Tubes [[id number]] Collected:  07/29/20 1505    Specimen:  Blood, Venous Line Updated:  07/29/20 1615    Narrative:       The following orders were created for panel order Extra Tubes.  Procedure                               Abnormality         Status                     ---------                               -----------         ------                     Gold Top - SST[[id number]]                                   Final result                 Please view results for these tests on the individual orders.    Gold Top - SST [[id number]] Collected:  07/29/20 1505    Specimen:  Blood Updated:  07/29/20 1615     Extra Tube Hold for add-ons.     Comment: Auto resulted.       Green Top (Gel) [[id number]] Collected:  07/29/20 1505    Specimen:  Blood Updated:  07/29/20 1615     Extra Tube Hold for add-ons.     Comment: Auto resulted.       Lavender Top [[id number]] Collected:  07/29/20 1505    Specimen:  Blood Updated:  07/29/20 1615     Extra Tube hold for add-on     Comment: Auto resulted       Troponin [[id number]]  (Abnormal) Collected:  07/29/20 1505    Specimen:  Blood Updated:  07/29/20 1541     Troponin T 0.057 ng/mL     Narrative:       Troponin T Reference Range:  <= 0.03 ng/mL-   Negative for AMI  >0.03 ng/mL-      Abnormal for myocardial necrosis.  Clinicians would have to utilize clinical acumen, EKG, Troponin and serial changes to determine if it is an Acute Myocardial Infarction or myocardial injury due to an underlying chronic condition.       Results may be falsely decreased if patient taking Biotin.      Comprehensive Metabolic Panel [[id number]]  (Abnormal) Collected:  07/29/20 1505    Specimen:  Blood Updated:  07/29/20 1536     Glucose 92 mg/dL      BUN 37 mg/dL      Creatinine 1.25 mg/dL      Sodium 138 mmol/L      Potassium 4.4 mmol/L      Chloride 104 mmol/L      CO2 25.0 mmol/L      Calcium 9.7 mg/dL      Total Protein 6.7 g/dL      Albumin 3.20 g/dL      ALT (SGPT) 42 U/L      AST (SGOT) 48 U/L      Alkaline Phosphatase 189 U/L      Total Bilirubin 0.6 mg/dL      eGFR Non African Amer 55 mL/min/1.73      Globulin 3.5 gm/dL      A/G Ratio 0.9 g/dL      BUN/Creatinine Ratio 29.6     Anion Gap 9.0 mmol/L     Narrative:       GFR Normal >60  Chronic Kidney Disease <60  Kidney Failure <15      BNP [[id number]]  (Abnormal) Collected:  07/29/20 1505    Specimen:  Blood Updated:  07/29/20 1534     proBNP 4,193.0 pg/mL     Narrative:       Among patients with dyspnea, NT-proBNP is highly sensitive for the detection of acute congestive heart failure. In addition NT-proBNP of <300 pg/ml effectively rules out acute congestive heart failure with 99% negative predictive value.    Results may be falsely decreased if patient taking Biotin.      Lactic Acid, Plasma [[id number]]  (Normal) Collected:  07/29/20 1505    Specimen:  Blood Updated:  07/29/20 1533     Lactate 1.0 mmol/L         Imaging Results (Most Recent)     Procedure Component Value Units Date/Time    MRI Brain Without Contrast [[id number]] Collected:  08/01/20 0916     Updated:  08/01/20 1102    Narrative:       MRI brain without contrast.         CLINICAL INDICATION: Effusion, delirium.          COMPARISON: CT brain June 8, 2020.   PROCEDURE/TECHNIQUE:    MRI of  the brain was performed utilizing the standard protocol  without the administration of gadolinium.    FINDINGS: Diffusion-weighted images demonstrate no restricted  diffusion i.e. no evidence of acute stroke.    There are involutional, atrophic changes. There are  periventricular foci of increased signal intensity, chronic small  vessel ischemic changes. 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        The gyri and sulci are otherwise unremarkable bilaterally  symmetric consistent with patient's age. Ventricular dilatation  secondary to atrophy. No mass lesions,  midline shift,  intracranial hemorrhage, or abnormal intra- or extra-axial fluid  collections are identified.  The brainstem and sellar and  parasellar structures are without abnormalities.  The orbits,  sinuses and mastoid air cells are unremarkable.  The visualized  intracranial vessels show normal signal void.      Impression:       Involutional, atrophic changes. Periventricular foci of increased  signal intensity, chronic small vessel ischemic changes. 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 MRI brain without contrast is otherwise  remarkable. There are no acute changes.    Electronically signed by:  Esdras Paul MD  8/1/2020 11:01 AM CDT  Workstation: [id number]    CT Chest Without Contrast [[id number]] Collected:  07/31/20 1328     Updated:  07/31/20 1437    Narrative:           Patient Name: SYLVIA PACK    ORDERING: RIMA LEWIS    ATTENDING: RIMA LEWIS     REFERRING: RIMA LEWIS    -----------------------  EXAM DESCRIPTION: CT CHEST WO CONTRAST    CLINICAL HISTORY:  Acute resp illness J18.9 Pneumonia,  unspecified organism R41.82 Altered mental status, unspecified  Z74.09 Other reduced mobility Z78.9 Other specified health status  R13.12 Dysphagia, oropharyngeal phase: Shortness of breath .     COMPARISON: Portable chest radiograph  7/29/2020.    DOSE LENGTH PRODUCT: 215.3    This exam was performed according to our departmental dose  optimization program, which includes automated exposure control,  adjustment of the mA and/or KV according to patient size and/or  use of iterative reconstruction technique.      TECHNIQUE: Axial imaging of the chest are obtained without  utilization of intravenous contrast. Coronal and sagittal  reformat images provided.    FINDINGS:    LUNGS/PLEURA: There is posterior lower lobe and by basilar  pulmonary infiltrative changes present. There is linear  atelectasis or scarring within the right middle lobe. No  associated pleural effusion or pneumothorax.  No pulmonary mass or suspicious nodule.  TRACHEA AND BRONCHI:  The trachea and bronchi are patent.  MEDIASTINUM, PANKAJ AND LYMPH NODES: There are couple lymph nodes  anteriorly and is below the level of the james measuring 1.65  and 1.3 cm. These may be reactive. No conglomerate or necrotic  adenopathy. No thyroid mass or enlargement. There is mild air  distention of the upper esophagus small amount of pooling  secretions. Mid to distal aspect the esophagus is mildly  circumferentially thickened. This may relate to esophagitis but  is nonspecific.  HEART AND PERICARDIUM: Borderline cardiac size without  pericardial effusion. Extensive coronary vascular calcification  is present.  VASCULAR: Vascular calcification involving the thoracic aorta  without aneurysmal dilation.  UPPER ABDOMEN: No acute finding within the included upper  abdomen. No adrenal mass.  OSSEOUS STRUCTURES: Sternotomy wires are present. There is  demineralization and degenerative changes within the included  spine.      Impression:       Bilateral posterior lower lobe and basilar infiltrates without  effusion.    Mildly prominent mediastinal lymph nodes likely reactive. No  conglomerate or necrotic adenopathy.    Mild circumferential wall thickening of the mid to distal  esophagus. This may  represent sequela of esophagitis however is  nonspecific. If warranted follow-up with direct visualization  could be performed.    Extensive coronary vascular calcification.    Electronically signed by:  Lakhwinder Chavarria MD  7/31/2020 2:35 PM  CDT Workstation: [id number]    XR Chest 1 View [[id number]] Collected:  07/29/20 1519     Updated:  07/29/20 1549    Narrative:       Chest x-ray single view.           CLINICAL INDICATION: Shortness of breath. CHF, COPD protocol.    COMPARISON: July 8, 2020.    FINDINGS: Cardiac silhouette is normal in size. Pulmonary  vascularity is unremarkable.     Midline sternal sutures and prior cardiac surgery. New bilateral  basilar infiltrate changes indicating pneumonitis, unfavorable  change since prior exam.      Impression:       Interval development of new bilateral basilar  infiltrative changes, pneumonitis. Unfavorable change since prior  exam.    Electronically signed by:  Esdras Paul MD  7/29/2020 3:47 PM CDT  Workstation: [id number]          Chief Complaint on Day of Discharge: None    Hospital Course:  The patient is a 85 y.o. male who presented to [facility] with altered mental status and lethargy.  Patient was noted to be hypoxic as well.  Work up revealed pneumonia.  COVID testing was negative on several occassions during his hospital stay.  MRI was also negative for acute intracranial pathology. Patient was treated for pneumonia with IV antibiotics and improved.  His mentation improved as well.  He did complain of some pain as he had recent hip fracture and was provided narcotics which caused worsening mentation and this also improved with cessation of the Norco/Morphine.  His oxygenation improved and he was able to be weaned off of supplemental O2.  He was noted to have difficulties with urinary retention but no signs of UTI.  Powell was anchored and he was started on terazosin.  Outpatient Urology referral was placed at discharge and he will follow up  "with them.  His mental status further improved and he was felt to be appropriate for discharge.  Arrangements were made for discharge to SNF. Patient voiced agreement.       Condition on Discharge:  Stable    Physical Exam on Discharge:  /62 (BP Location: Left arm, Patient Position: Lying)   Pulse 84   Temp 97.6 °F (36.4 °C) (Axillary) Comment: 97.6  Resp 16   Ht 182.8 cm (71.97\")   Wt 64 kg (141 lb 1.6 oz)   SpO2 95%   BMI 19.15 kg/m²   Physical Exam   Constitutional: He is oriented to person, place, and time.   Elderly appearing male, NAD.    HENT:   Head: Normocephalic and atraumatic.   Nose: Nose normal.   Mouth/Throat: Oropharynx is clear and moist.   Eyes: Conjunctivae are normal.   Neck: Normal range of motion.   Cardiovascular: Normal rate, regular rhythm, normal heart sounds and intact distal pulses.   Pulmonary/Chest: Effort normal and breath sounds normal. No respiratory distress.   Abdominal: Soft. Bowel sounds are normal. He exhibits no distension. There is no tenderness.   Musculoskeletal: He exhibits no edema.   Neurological: He is alert and oriented to person, place, and time.   Skin: Skin is warm and dry. Capillary refill takes less than 2 seconds.   Psychiatric: He has a normal mood and affect. His behavior is normal.       Discharge Disposition:  Skilled Nursing Facility (DC - External)    Discharge Medications:     Discharge Medications      New Medications      Instructions Start Date   acetaminophen 325 MG tablet  Commonly known as:  TYLENOL   650 mg, Oral, Every 4 Hours PRN      cyanocobalamin 1000 MCG tablet  Commonly known as:  VITAMIN B-12   1,000 mcg, Oral, Daily   Start Date:  August 7, 2020     guaiFENesin 600 MG 12 hr tablet  Commonly known as:  MUCINEX   1,200 mg, Oral, Every 12 Hours Scheduled      magic butt ointment   1 each, Topical, 2 Times Daily      terazosin 1 MG capsule  Commonly known as:  HYTRIN   1 mg, Oral, Nightly         Continue These Medications      " Instructions Start Date   albuterol sulfate  (90 Base) MCG/ACT inhaler  Commonly known as:  PROVENTIL HFA;VENTOLIN HFA;PROAIR HFA   INHALE 2 PUFFS INTO THE LUNGS EVERY 6 (SIX) HOURS AS NEEDED FOR WHEEZING      amitriptyline 100 MG tablet  Commonly known as:  ELAVIL   100 mg, Oral, Every Night at Bedtime      aspirin 81 MG EC tablet   81 mg, Oral, Daily      Colcrys 0.6 MG tablet  Generic drug:  colchicine   0.6 mg, Oral, Daily      enoxaparin 30 MG/0.3ML solution syringe  Commonly known as:  LOVENOX   30 mg, Subcutaneous, Every 24 Hours      gabapentin 600 MG tablet  Commonly known as:  NEURONTIN   600 mg, Oral, 3 Times Daily      Lipitor 20 MG tablet  Generic drug:  atorvastatin   1 tablet, Oral      losartan 25 MG tablet  Commonly known as:  COZAAR   TAKE 1 TABLET BY MOUTH EVERY DAY      metoprolol succinate XL 25 MG 24 hr tablet  Commonly known as:  TOPROL-XL   TAKE 1 TABLET BY MOUTH EVERY DAY      omeprazole 40 MG capsule  Commonly known as:  priLOSEC   40 mg, Oral, Every Morning      rOPINIRole 0.5 MG tablet  Commonly known as:  REQUIP   0.5 mg, Oral, Daily      tolterodine 2 MG tablet  Commonly known as:  DETROL   2 mg, Oral, 2 Times Daily             Discharge Diet:   Diet Instructions     Diet: Soft Texture, Cardiac; Thin Liquids, No Restrictions; Ground      Discharge Diet:   Soft Texture  Cardiac       Fluid Consistency:  Thin Liquids, No Restrictions    Soft Options:  Ground          Activity at Discharge:   Activity Instructions     Other Activity Instructions      Activity Instructions: Per PT/OT at facility          Discharge Care Plan/Instructions: Take medications as prescribed, follow up with PCP and specialist, return for worsening symptoms or problems    Follow-up Appointments:   No future appointments.    Test Results Pending at Discharge: None    Charly Hercules MD    Time: 36 minutes

## 2020-08-06 NOTE — PLAN OF CARE
Problem: Patient Care Overview  Goal: Plan of Care Review  Outcome: Ongoing (interventions implemented as appropriate)  Flowsheets (Taken 8/6/2020 1233)  Progress: improving  Plan of Care Reviewed With: patient  Outcome Summary: pt responded well to PT. pt requires min A for sup-sit and t/fs. pt incontinent of bowels upon standing. pt stood x2 w/ RW. pt sat EOB SBA for sitting balance. pt continues to have confusion. pt up on recliner eating lunch at end of tx. no new goals met at this time. pt would continue to benefit from PT services.

## 2020-08-06 NOTE — PAYOR COMM NOTE
"    Camilla Ann RN [facility]  [phone]     Phone  [phone]      Fax  Cont stay revoew      Sylvia Dacosta (85 y.o. Male)     Date of Birth Social Security Number Address Home Phone MRN    [id number]  [id number]  [facility] [address] [phone] [id number]    Jehovah's witness Marital Status          Mu-ism        Admission Date Admission Type Admitting Provider Attending Provider Department, Room/Bed    7/29/20 Emergency Pranay Melendez MD Eben[place], Sotonte E, MD [address], 405/1    Discharge Date Discharge Disposition Discharge Destination                       Attending Provider:  Pranay Melendez MD    Allergies:  Hydrochlorothiazide    Isolation:  None   Infection:  COVID Screen (preop/placement) (08/03/20)   Code Status:  CPR    Ht:  182.8 cm (71.97\")   Wt:  64 kg (141 lb 1.6 oz)    Admission Cmt:  None   Principal Problem:  Pneumonia of both lower lobes due to infectious organism [J18.9]                 Active Insurance as of 7/29/2020     Primary Coverage     Payor Plan Insurance Group Employer/Plan Group    AETNA MEDICARE REPLACEMENT AETNA MEDICARE REPLACEMENT [id number]     Payor Plan Address Payor Plan Phone Number Payor Plan Fax Number Effective Dates    [address] [phone]  4/1/2019 - None Entered    [facility] [address]       Subscriber Name Subscriber Birth Date Member ID       SYLVIA DACOSTA [id number]                 Emergency Contacts      (Rel.) Home Phone Work Phone Mobile Phone    AURELIA BOX (Relative) [phone]    Carla Dacosta (Spouse) [phone] -- [phone]    fiordaliza aguilar (Grandchild) -- -- [phone]            Vital Signs (last day)     Date/Time   Temp   Temp src   Pulse   Resp   BP   Patient Position   SpO2    08/06/20 0418   97.1 (36.2)   Oral   95   18   130/74   Lying   96    08/05/20 2326   97.3 (36.3)   Oral   " 90   20   126/73   Lying   98    08/05/20 2033   --   --   95   16   142/78   Lying   97    08/05/20 1620   96.7 (35.9)   Oral   91   16   115/74   Lying   100    08/05/20 0944   97.2 (36.2)   Axillary   93   18   141/75   Lying   97    08/05/20 0327   97.6 (36.4)   Axillary   94   18   144/74   Lying   98              Current Facility-Administered Medications   Medication Dose Route Frequency Provider Last Rate Last Dose   • acetaminophen (TYLENOL) tablet 650 mg  650 mg Oral Q4H PRN Pranay Melendez MD       • amitriptyline (ELAVIL) tablet 100 mg  100 mg Oral Nightly Pranay Melendez MD   100 mg at 08/05/20 2112   • aspirin EC tablet 81 mg  81 mg Oral Daily Pranay Melendez MD   81 mg at 08/05/20 0948   • atorvastatin (LIPITOR) tablet 20 mg  20 mg Oral Nightly Pranay Melendez MD   20 mg at 08/05/20 2112   • Bag Balm ointment ointment 1 application  1 application Topical Daily Charly Hercules MD   1 application at 08/05/20 0954   • colchicine tablet 0.6 mg  0.6 mg Oral Daily Pranay Melendez MD   0.6 mg at 08/05/20 0948   • docusate sodium (COLACE) capsule 100 mg  100 mg Oral BID Charly Hercules MD   100 mg at 08/05/20 2112   • enoxaparin (LOVENOX) syringe 40 mg  40 mg Subcutaneous Q24H Pranay Melendez MD   40 mg at 08/05/20 0551   • gabapentin (NEURONTIN) capsule 600 mg  600 mg Oral Q8H Pranay Melendez MD   600 mg at 08/05/20 2112   • guaiFENesin (MUCINEX) 12 hr tablet 1,200 mg  1,200 mg Oral Q12H Pranay Melendez MD   1,200 mg at 08/05/20 2111   • losartan (COZAAR) tablet 25 mg  25 mg Oral Daily Pranay Melendez MD   25 mg at 08/05/20 0948   • magic butt ointment   Topical BID Charly Hercules MD       • metoprolol succinate XL (TOPROL-XL) 24 hr tablet 25 mg  25 mg Oral Daily Pranay Melendez MD   25 mg at 08/05/20 0948   • multivitamin with minerals tablet 1 tablet  1 tablet Oral Daily Pranay Melendez MD   1 tablet at 08/05/20 0948   • naloxone (NARCAN) injection 0.4 mg  0.4 mg  Intravenous Q5 Min PRN Pranay Melendez MD       • ondansetron (ZOFRAN) injection 4 mg  4 mg Intravenous Q6H PRN Pranay Melendez MD       • oxybutynin XL (DITROPAN-XL) 24 hr tablet 10 mg  10 mg Oral Daily Pranay Melendez MD   10 mg at 08/05/20 0949   • pantoprazole (PROTONIX) EC tablet 40 mg  40 mg Oral QAM Pranay Melendez MD   40 mg at 08/05/20 0948   • piperacillin-tazobactam (ZOSYN) 3.375 g/100 mL 0.9% NS IVPB (mbp)  3.375 g Intravenous Q8H Pranay Melendez MD 0 mL/hr at 08/05/20 0600 3.375 g at 08/06/20 0209   • rOPINIRole (REQUIP) tablet 0.5 mg  0.5 mg Oral Daily Pranay Melendez MD   0.5 mg at 08/05/20 0948   • sodium chloride 0.9 % flush 10 mL  10 mL Intravenous PRN Pranay Melendez MD       • sodium chloride 0.9 % flush 10 mL  10 mL Intravenous Q12H Pranay Melendez MD   10 mL at 08/05/20 2112   • sodium chloride 0.9 % flush 10 mL  10 mL Intravenous PRN Pranay Melendez MD       • terazosin (HYTRIN) capsule 1 mg  1 mg Oral Nightly Charly Hercules MD   1 mg at 08/05/20 2112   • vitamin B-12 (CYANOCOBALAMIN) tablet 1,000 mcg  1,000 mcg Oral Daily Pranay Melendez MD   1,000 mcg at 08/05/20 0948        Physician Progress Notes (last 24 hours) (Notes from 08/05/20 0920 through 08/06/20 0920)      Charly Hercules MD at 08/05/20 1716              [facility] Medicine Services  INPATIENT PROGRESS NOTE    Length of Stay: 7  Date of Admission: 7/29/2020  Primary Care Physician: Terry Peterson MD    Subjective   Chief Complaint: AMS  Summary: Patient is a 85 y.o. male with a past medical history of essential hypertension, gout, chronic pain, coronary artery disease, right bundle branch block, and recent admission and treatment for left hip fracture earlier in July 2020 with subsequent discharge to the skilled nursing facility for physical rehabilitation. He presented with complaints of lethargy of 3 days duration and was reportedly  somnolent and unresponsive. He is being managed for sepsis secondary to pneumonia, acute respiratory failure with hypoxia and suspected gram-negative pneumonia.    Today: More awake and alert today. Able to state name, , place.  Accurately notes year but not month or president.  Also noted inappropriately remove clothing as he wanted to leave.      Review of Systems   Unable to perform ROS: Mental status change      All pertinent negatives and positives are as above. All other systems have been reviewed and are negative unless otherwise stated.     Objective    Temp:  [96.7 °F (35.9 °C)-99.5 °F (37.5 °C)] 96.7 °F (35.9 °C)  Heart Rate:  [] 91  Resp:  [16-22] 16  BP: (115-144)/(72-78) 115/74    Physical Exam   Constitutional: He is oriented to person, place, and time.   Elderly appearing male.    HENT:   Head: Normocephalic and atraumatic.   Nose: Nose normal.   Eyes: Conjunctivae and EOM are normal.   Neck: Normal range of motion.   Cardiovascular: Normal rate, regular rhythm, normal heart sounds and intact distal pulses.   Pulmonary/Chest:   Distant but clear bilaterally.    Abdominal: Soft. Bowel sounds are normal. He exhibits no distension.   Musculoskeletal: He exhibits no edema.   Neurological: He is alert and oriented to person, place, and time.   Skin: Skin is warm and dry. Capillary refill takes less than 2 seconds.   Psychiatric: His behavior is normal.     Results Review:  I have reviewed the labs, radiology results, and diagnostic studies.    Laboratory Data:   Results from last 7 days   Lab Units 20  0543 20  0604 20  0554   SODIUM mmol/L 137 138 138   POTASSIUM mmol/L 3.5 3.9 3.5   CHLORIDE mmol/L 101 101 103   CO2 mmol/L 23.0 24.0 25.0   BUN mg/dL 18 17 14   CREATININE mg/dL 0.91 0.91 0.92   GLUCOSE mg/dL 83 96 101*   CALCIUM mg/dL 9.6 9.7 9.7   ANION GAP mmol/L 13.0 13.0 10.0     Estimated Creatinine Clearance: 56 mL/min (by C-G formula based on SCr of 0.91 mg/dL).             Results from last 7 days   Lab Units 08/05/20  0543 08/04/20  0604 08/03/20  0554 08/02/20  0533 08/01/20  0709   WBC 10*3/mm3 10.52 11.07* 12.81* 13.16* 13.80*   HEMOGLOBIN g/dL 9.5* 10.1* 9.7* 10.4* 10.2*   HEMATOCRIT % 28.1* 31.1* 29.9* 31.6* 31.6*   PLATELETS 10*3/mm3 214 262 308 331 361           Culture Data:   No results found for: BLOODCX  No results found for: URINECX  No results found for: RESPCX  No results found for: WOUNDCX  No results found for: STOOLCX  No components found for: BODYFLD    Radiology Data:   Imaging Results (Last 24 Hours)     ** No results found for the last 24 hours. **          I have reviewed the patient's current medications.     Assessment/Plan     Principal Problem:    Pneumonia of both lower lobes due to infectious organism    Sepsis 2/2 Pneumonia - Improved. Will finish course of Zosyn.  Completed 5 days of Doxy as well.  Continue supplemental O2 and wean as tolerated. COVID negative.  Urine antigens negative.      AMS - Improved today after hold narcotics.  Will continue to hold.  MRI negative. Underlying cognitive impairment?    HTN - Continue Losartan and Toprol XL.     Urinary retention - Powell anchored and Flomax started.  Will need outpatient urology follow up.     DVT ppx - Lovenox  CODE - Full    Discharge Planning: In progress. Will need repeat COVID testing which has been ordered.     Charly Hercules MD      Electronically signed by Charly Hercules MD at 08/05/20 1374       Medical Student Notes (last 24 hours) (Notes from 08/05/20 0920 through 08/06/20 0920)    No notes of this type exist for this encounter.         Consult Notes (last 24 hours) (Notes from 08/05/20 0920 through 08/06/20 0920)    No notes of this type exist for this encounter.

## 2020-08-06 NOTE — THERAPY TREATMENT NOTE
Acute Care - Occupational Therapy Treatment Note  [facility]     Patient Name: Jose Dacosta  : 1935  MRN: [id number]  Today's Date: 2020  Onset of Illness/Injury or Date of Surgery: 20  Date of Referral to OT: 20  Referring Physician: Dr. Melendez    Admit Date: 2020       ICD-10-CM ICD-9-CM   1. Pneumonia of both lower lobes due to infectious organism J18.9 486   2. Altered mental status, unspecified altered mental status type R41.82 780.97   3. Impaired mobility and ADLs Z74.09 V49.89    Z78.9    4. Oropharyngeal dysphagia R13.12 787.22   5. Decreased functional mobility R26.89 781.99   6. Acute urinary retention R33.8 788.29   7. Closed fracture of left hip, initial encounter (CMS/[facility]) S72.002A 820.8     Patient Active Problem List   Diagnosis   • Coronary artery disease involving native coronary artery of native heart without angina pectoris   • Vasovagal syncope   • RBBB (right bundle branch block with left anterior fascicular block)   • Essential hypertension   • Ischemic cardiomyopathy   • Syncope and collapse   • Closed fracture of neck of left femur (CMS/[facility])   • Coronary artery disease with history of myocardial infarction without history of CABG   • Facial laceration   • HFrEF (heart failure with reduced ejection fraction) (CMS/[facility])   • Postoperative anemia due to acute blood loss   • Pneumonia of both lower lobes due to infectious organism     Past Medical History:   Diagnosis Date   • Abnormal ECG      Past Surgical History:   Procedure Laterality Date   • CORONARY STENT PLACEMENT     • HIP HEMIARTHROPLASTY Left 2020    Procedure: LEFT HIP HEMIARTHROPLASTY;  Surgeon: Jitendra Solis MD;  Location: [facility];  Service: Orthopedics;  Laterality: Left;       Therapy Treatment    Rehabilitation Treatment Summary     Row Name 20 1350 20 1058          Treatment Time/Intention    Discipline  occupational therapy assistant  -  physical therapy  assistant  -LILA     Document Type  therapy note (daily note)  -LM  therapy note (daily note)  -LILA     Subjective Information  no complaints  -LM  --     Mode of Treatment  individual therapy  -LM  physical therapy;individual therapy  -LILA     Patient Effort  --  adequate  -LILA     Existing Precautions/Restrictions  --  fall  -LILA     Recorded by [LM] Alise Mora COTA/L 08/06/20 1633 [LILA] Vikash Walters, PTA 08/06/20 1150     Row Name 08/06/20 1058             Vital Signs    Pre Systolic BP Rehab  135  -LILA      Pre Treatment Diastolic BP  66  -LILA      Post Systolic BP Rehab  126  -LILA      Post Treatment Diastolic BP  62  -LILA      Pretreatment Heart Rate (beats/min)  84  -LILA      Posttreatment Heart Rate (beats/min)  87  -LILA      Pre SpO2 (%)  95  -LILA      O2 Delivery Pre Treatment  room air  -LILA      Post SpO2 (%)  -- unable to obtain  -LILA      Pre Patient Position  Supine  -LILA      Post Patient Position  Sitting  -LILA      Recorded by [LILA] Vikash Walters, PTA 08/06/20 1150      Row Name 08/06/20 1350 08/06/20 1058          Cognitive Assessment/Intervention- PT/OT    Affect/Mental Status (Cognitive)  confused  -LM  confused  -LILA     Orientation Status (Cognition)  oriented x 3  -LM  oriented to;person  -JC2     Follows Commands (Cognition)  follows one step commands  -LM  follows one step commands;75-90% accuracy  -LILA     Recorded by [LM] Alise Mora COTA/JAZZY 08/06/20 1633 [LILA] Vikash Walters, PTA 08/06/20 1150  [JC2] Vikash Walters, PTA 08/06/20 1232     Row Name 08/06/20 1058             Mobility Assessment/Intervention    Extremity Weight-bearing Status  left lower extremity  -LILA      Left Lower Extremity (Weight-bearing Status)  weight-bearing as tolerated (WBAT)  -LILA      Recorded by [LILA] Vikash Walters, PTA 08/06/20 1150      Row Name 08/06/20 1350 08/06/20 1058          Bed Mobility Assessment/Treatment    Bed Mobility Assessment/Treatment  --  -LM  supine-sit  -LILA     Rolling Left  [place] (Bed Mobility)  --  minimum assist (75% patient effort)  -LILA     Rolling Right [place] (Bed Mobility)  --  --  -LILA     Scooting/Bridging [place] (Bed Mobility)  --  --  -LILA     Bed Mobility, Safety Issues  --  decreased use of legs for bridging/pushing  -JC2     Assistive Device (Bed Mobility)  --  bed rails;head of bed elevated;draw sheet  -JC2     Comment (Bed Mobility)  deferred oob or eob  -LM  pt sat EOB w/ SBA  -LILA     Recorded by [LM] Alise Moar, AGUILAR/L 08/06/20 1633 [LILA] Vikash Walters, PTA 08/06/20 1232  [JC2] Vikash Walters, PTA 08/06/20 1150     Row Name 08/06/20 1058             Transfer Assessment/Treatment    Transfer Assessment/Treatment  sit-stand transfer;stand-sit transfer;bed-chair transfer  -LILA      Comment (Transfers)  pt stood x2. with 1st standing attempt pt had BM and sat down. pt then stood for pericare and t/f.   -LILA      Recorded by [LILA] Vikash Walters, PTA 08/06/20 1232      Row Name 08/06/20 1058             Bed-Chair Transfer    Bed-Chair [place] (Transfers)  minimum assist (75% patient effort)  -LILA      Assistive Device (Bed-Chair Transfers)  walker, front-wheeled  -LILA      Recorded by [LILA] Vikash Walters, PTA 08/06/20 1232      Row Name 08/06/20 1058             Sit-Stand Transfer    Sit-Stand [place] (Transfers)  minimum assist (75% patient effort)  -LILA      Assistive Device (Sit-Stand Transfers)  walker, front-wheeled  -LILA      Recorded by [LILA] Vikash Walters, PTA 08/06/20 1232      Row Name 08/06/20 1058             Stand-Sit Transfer    Stand-Sit [place] (Transfers)  minimum assist (75% patient effort)  -LILA      Assistive Device (Stand-Sit Transfers)  walker, front-wheeled  -LILA      Recorded by [LILA] Vikash Walters, PTA 08/06/20 1232      Row Name 08/06/20 1058             Gait/Stairs Assessment/Training    Gait/Stairs Assessment/Training  gait/ambulation independence;gait/ambulation assistive device;distance ambulated;gait  pattern;gait deviations  -LILA      [place] Level (Gait)  minimum assist (75% patient effort)  -LILA      Assistive Device (Gait)  walker, front-wheeled  -LILA      Distance in Feet (Gait)  3  -LILA      Pattern (Gait)  3-point  -LILA      Deviations/Abnormal Patterns (Gait)  antalgic;gait speed decreased;stride length decreased  -LILA      Bilateral Gait Deviations  forward flexed posture  -LILA      Recorded by [LILA] Vikash Walters PTA 08/06/20 1232      Row Name 08/06/20 1350             Therapeutic Exercise    Therapeutic Exercise  supine, upper extremities b ue 2 lb dowel sallie all planes and reaching chair position   -LM      Recorded by [LM] Alise Mora AGUILAR/L 08/06/20 1633      Row Name 08/06/20 1350             Positioning and Restraints    Pre-Treatment Position  in bed  -LM      Post Treatment Position  bed  -LM      Recorded by [LM] Alise Mora AGUILAR/L 08/06/20 1633      Row Name 08/06/20 1058             Pain Assessment    Additional Documentation  Pain Scale: Numbers Pre/Post-Treatment (Group)  -LILA      Recorded by [LILA] Vikash Walters PTA 08/06/20 1232      Row Name 08/06/20 1350 08/06/20 1058          Pain Scale: Numbers Pre/Post-Treatment    Pain Scale: Numbers, Pretreatment  0/10 - no pain  -LM  0/10 - no pain  -LILA     Pain Scale: Numbers, Post-Treatment  0/10 - no pain  -LM  0/10 - no pain  -LILA     Recorded by [LM] Alise Mora AGUILAR/L 08/06/20 1633 [LILA] Vikash Walters PTA 08/06/20 1232     Row Name                Wound 08/01/20 1749 Left gluteal Pressure Injury    Wound - Properties Group Date first assessed: 08/01/20 [TC] Time first assessed: 1749 [TC] Present on Hospital Admission: N [TC] Side: Left [TC] Location: gluteal [TC] Primary Wound Type: Pressure inj [TC] Stage, Pressure Injury: Stage 2 [TC] Recorded by:  [TC] Kandis Mercedes RN 08/01/20 1749    Row Name                Wound 08/01/20 1749 Left heel    Wound - Properties Group Date first assessed: 08/01/20 [TC] Time  first assessed: 1749 [TC] Present on Hospital Admission: N [TC] Side: Left [TC] Location: heel [TC] Recorded by:  [TC] Kandis Mercedes RN 08/01/20 1749    Row Name                Wound 07/08/20 1810 Left upper eyebrow Laceration    Wound - Properties Group Date first assessed: 07/08/20 [TM] Time first assessed: 1810 [TM] Present on Hospital Admission: Y [TM] Side: Left [TM] Orientation: upper [TM] Location: eyebrow [TM] Primary Wound Type: Laceration [TM] Recorded by:  [TM] Josephine Elias RN 07/08/20 1811    Row Name                Wound 08/01/20 2043 Left posterior;lower leg Pressure Injury    Wound - Properties Group Date first assessed: 08/01/20 [EF] Time first assessed: 2043 [EF] Present on Hospital Admission: Y [EF] Side: Left [EF] Orientation: posterior;lower [EF] Location: leg [EF], below immobilizer  Primary Wound Type: Pressure inj [EF] Stage, Pressure Injury: deep tissue injury [EF] Recorded by:  [EF] Amy Campbell RN 08/01/20 2045    Row Name 08/06/20 1350             Plan of Care Review    Plan of Care Reviewed With  patient  -LM      Progress  improving  -LM      Recorded by [LM] Alise Mora COTA/L 08/06/20 1633      Row Name 08/06/20 1350             Outcome Summary/Treatment Plan (OT)    Daily Summary of Progress (OT)  progress toward functional goals is good  -      Recorded by [LM] Alise Mora COTA/L 08/06/20 1633      Row Name 08/06/20 1058             Outcome Summary/Treatment Plan (PT)    Daily Summary of Progress (PT)  progress toward functional goals as expected  -LILA      Plan for Continued Treatment (PT)  continue  -LILA      Anticipated Discharge Disposition (PT)  anticipate therapy at next level of care;skilled nursing facility  -JC2      Recorded by [LILA] Vikash Walters, PTA 08/06/20 1232  [JC2] Vikash Walters, PTA 08/06/20 1150        User Key  (r) = Recorded By, (t) = Taken By, (c) = Cosigned By    Initials Name Effective Dates Discipline    TC  Kandis Mercedes, RN 10/17/16 -  Nurse    Amy Rubalcava RN 10/17/16 -  Nurse    LILA Vikash Walters, DANIE 03/07/18 -  PT    LM Alise Mora, AGUILAR/L 03/07/18 -  OT    TM Josephine Elias, RN 07/24/18 -  Nurse        Wound 08/01/20 1749 Left gluteal Pressure Injury (Active)   Closure Open to air 8/6/2020  9:54 AM   Base red;non-blanchable 8/6/2020  9:54 AM   Care, Wound barrier applied 8/6/2020  9:54 AM       Wound 08/01/20 1749 Left heel (Active)   Dressing Appearance open to air 8/6/2020  9:54 AM   Closure Open to air 8/6/2020  9:54 AM   Base red;non-blanchable 8/6/2020  9:54 AM       Wound 08/01/20 2043 Left posterior;lower leg Pressure Injury (Active)   Dressing Appearance open to air 8/6/2020  9:54 AM   Base red;non-blanchable 8/6/2020  9:54 AM     Rehab Goal Summary     Row Name 08/06/20 1633 08/06/20 1058          Bed Mobility Goal 1 (PT)    Activity/Assistive Device (Bed Mobility Goal 1, PT)  --  sit to supine;supine to sit  -     [place] Level/Cues Needed (Bed Mobility Goal 1, PT)  --  contact guard assist;standby assist  -LILA     Time Frame (Bed Mobility Goal 1, PT)  --  by discharge  -     Progress/Outcomes (Bed Mobility Goal 1, PT)  --  goal not met  -        Transfer Goal 1 (PT)    Activity/Assistive Device (Transfer Goal 1, PT)  --  bed-to-chair/chair-to-bed;sit-to-stand/stand-to-sit  -LILA     [place] Level/Cues Needed (Transfer Goal 1, PT)  --  contact guard assist  -LILA     Time Frame (Transfer Goal 1, PT)  --  by discharge  -     Progress/Outcome (Transfer Goal 1, PT)  --  goal not met  -        Gait Training Goal 1 (PT)    Activity/Assistive Device (Gait Training Goal 1, PT)  --  gait (walking locomotion);assistive device use;walker, rolling  -     [place] Level (Gait Training Goal 1, PT)  --  contact guard assist  -LILA     Time Frame (Gait Training Goal 1, PT)  --  5 days  -LILA     Progress/Outcome (Gait Training Goal 1, PT)  --  goal not met  -LILA         Occupational Therapy Goals    Transfer Goal Selection (OT)  transfer, OT goal 1  -LM  --     Bathing Goal Selection (OT)  bathing, OT goal 1  -LM  --     Dressing Goal Selection (OT)  dressing, OT goal 1  -LM  --     Toileting Goal Selection (OT)  toileting, OT goal 1  -LM  --     Activity Tolerance Goal Selection (OT)  activity tolerance, OT goal 1  -LM  --        Transfer Goal 1 (OT)    Activity/Assistive Device (Transfer Goal 1, OT)  sit-to-stand/stand-to-sit;bed-to-chair/chair-to-bed;toilet  -LM  --     [place] Level/Cues Needed (Transfer Goal 1, OT)  contact guard assist  -LM  --     Time Frame (Transfer Goal 1, OT)  long term goal (LTG);by discharge  -LM  --     Progress/Outcome (Transfer Goal 1, OT)  goal not met  -LM  --        Bathing Goal 1 (OT)    Activity/Assistive Device (Bathing Goal 1, OT)  bathing skills, all  -LM  --     [place] Level/Cues Needed (Bathing Goal 1, OT)  minimum assist (75% or more patient effort)  -LM  --     Time Frame (Bathing Goal 1, OT)  long term goal (LTG);by discharge  -LM  --     Progress/Outcomes (Bathing Goal 1, OT)  goal not met  -LM  --        Dressing Goal 1 (OT)    Activity/Assistive Device (Dressing Goal 1, OT)  lower body dressing  -LM  --     [place]/Cues Needed (Dressing Goal 1, OT)  minimum assist (75% or more patient effort)  -LM  --     Time Frame (Dressing Goal 1, OT)  long term goal (LTG);by discharge  -LM  --     Progress/Outcome (Dressing Goal 1, OT)  goal not met  -LM  --        Toileting Goal 1 (OT)    Activity/Device (Toileting Goal 1, OT)  toileting skills, all  -LM  --     [place] Level/Cues Needed (Toileting Goal 1, OT)  supervision required  -LM  --     Time Frame (Toileting Goal 1, OT)  long term goal (LTG);by discharge  -LM  --     Progress/Outcome (Toileting Goal 1, OT)  goal not met  -LM  --         Activity Tolerance Goal 1 (OT)    Activity Level (Endurance Goal 1, OT)  15 min activity functional/therapeutic activities  -LM  --      Progress/Outcome (Activity Tolerance Goal 1, OT)  goal met  -LM  --       User Key  (r) = Recorded By, (t) = Taken By, (c) = Cosigned By    Initials Name Provider Type Discipline    Vikash Whatley, PTA Physical Therapy Assistant PT    Alise Arguelles, JEFF/L Occupational Therapy Assistant OT        Occupational Therapy Education                 Title: PT OT SLP Therapies (Resolved)     Topic: Occupational Therapy (Resolved)     Point: ADL training (Resolved)     Description:   Instruct learner(s) on proper safety adaptation and remediation techniques during self care or transfers.   Instruct in proper use of assistive devices.              Learning Progress Summary           Patient Acceptance, E, NR by XANDER at 8/3/2020 1430    Acceptance, E,D, NR by RC at 8/2/2020 1354    Acceptance, E, NR,NL by AS at 7/30/2020 1656    Comment:  role of OT, OT POC, bed mobility, transfer training                   Point: Home exercise program (Resolved)     Description:   Instruct learner(s) on appropriate technique for monitoring, assisting and/or progressing therapeutic exercises/activities.              Learner Progress:   Not documented in this visit.          Point: Precautions (Resolved)     Description:   Instruct learner(s) on prescribed precautions during self-care and functional transfers.              Learning Progress Summary           Patient Acceptance, E,D, NR by KANDY at 8/2/2020 1354    Acceptance, E, NR,NL by AS at 7/30/2020 1656    Comment:  role of OT, OT POC, bed mobility, transfer training                   Point: Body mechanics (Resolved)     Description:   Instruct learner(s) on proper positioning and spine alignment during self-care, functional mobility activities and/or exercises.              Learning Progress Summary           Patient Acceptance, E,D, NR by  at 8/2/2020 1354                               User Key     Initials Effective Dates Name Provider Type Discipline    XANDER 03/07/18 -   Loni Noonan COTA/L Occupational Therapy Assistant OT    RC 03/07/18 -  Evelyn Dodson COTA/L Occupational Therapy Assistant OT    AS 07/05/20 -  Ligia Junior, ANA Occupational Therapist OT                OT Recommendation and Plan  Outcome Summary/Treatment Plan (OT)  Daily Summary of Progress (OT): progress toward functional goals is good  Daily Summary of Progress (OT): progress toward functional goals is good  Plan of Care Review  Plan of Care Reviewed With: patient  Plan of Care Reviewed With: patient  Outcome Summary: pt denied oob eob or any bed mobilitly  pt initially denied ther ex in sup although perf some  pt also perf groomng tasks  Outcome Measures     Row Name 08/06/20 1058 08/04/20 1520 08/04/20 1334       How much help from another person do you currently need...    Turning from your back to your side while in flat bed without using bedrails?  3  -LILA  --  2  -LILA    Moving from lying on back to sitting on the side of a flat bed without bedrails?  3  -LILA  --  2  -LILA    Moving to and from a bed to a chair (including a wheelchair)?  3  -LILA  --  2  -LILA    Standing up from a chair using your arms (e.g., wheelchair, bedside chair)?  3  -LILA  --  2  -LILA    Climbing 3-5 steps with a railing?  1  -LILA  --  1  -LILA    To walk in hospital room?  3  -LILA  --  2  -LILA    AM-PAC 6 Clicks Score (PT)  16  -LILA  --  11  -LILA       How much help from another is currently needed...    Putting on and taking off regular lower body clothing?  --  2  -AS  --    Bathing (including washing, rinsing, and drying)  --  2  -AS  --    Toileting (which includes using toilet bed pan or urinal)  --  2  -AS  --    Putting on and taking off regular upper body clothing  --  2  -AS  --    Taking care of personal grooming (such as brushing teeth)  --  3  -AS  --    Eating meals  --  3  -AS  --    AM-PAC 6 Clicks Score (OT)  --  14  -AS  --       Functional Assessment    Outcome Measure Options  AM-PAC 6 Clicks Basic Mobility (PT)   -LILA  AM-PAC 6 Clicks Daily Activity (OT)  -AS  AM-PAC 6 Clicks Basic Mobility (PT)  -      User Key  (r) = Recorded By, (t) = Taken By, (c) = Cosigned By    Initials Name Provider Type    LILA Vikash Walters, PTA Physical Therapy Assistant    AS Ligia Junior, OT Occupational Therapist           Time Calculation:   Time Calculation- OT     Row Name 08/06/20 1551             Time Calculation- OT    OT Start Time  1350  -LM      OT Stop Time  1420  -LM      OT Time Calculation (min)  30 min  -LM      Total Timed Code Minutes- OT  30 minute(s)  -LM      OT Received On  08/06/20  -        User Key  (r) = Recorded By, (t) = Taken By, (c) = Cosigned By    Initials Name Provider Type    Alise Arguelles COTA/L Occupational Therapy Assistant        Therapy Charges for Today     Code Description Service Date Service Provider Modifiers Qty    30093248541 HC OT THERAPEUTIC ACT EA 15 MIN 8/5/2020 Alise Mora AGUILAR/L GO 1    48266325329 HC OT THER PROC EA 15 MIN 8/5/2020 Alise Mora COTA/L GO 1    12245763400 HC OT THER PROC EA 15 MIN 8/6/2020 Alise Mora COTA/L GO 2               JEFF Wallace/JAZZY  8/6/2020

## 2020-08-06 NOTE — PLAN OF CARE
Problem: Patient Care Overview  Goal: Plan of Care Review  Outcome: Ongoing (interventions implemented as appropriate)  Flowsheets (Taken 8/6/2020 0527)  Progress: no change  Plan of Care Reviewed With: patient  Outcome Summary: VSS, pain controlled. Pt still confused. Powell in place. Requiring 1 L NC for now. Will continue to monitor.

## 2020-08-06 NOTE — NURSING NOTE
Atempted to call report to [facility], Douglas said he did not receive Neurontin Rx. Rx re-faxed to [phone]. Douglas to call when ready to receive report.

## 2020-08-06 NOTE — PROGRESS NOTES
"Physicians Statement of Medical Necessity for  Ambulance Transportation    GENERAL INFORMATION     Name: Jose Dacosta  YOB: 1935  Medicare #: [id number]  Transport Date: 8/6/2020 (Valid for round trips this date, or for scheduled repetitive trips for 60 days from the date signed below.)  Origin: [place] [address]  Destination: [place] [address]  Is the Patient's stay covered under Medicare Part A (PPS/DRG?)Yes   Closest appropriate facility? Yes  If this a hosp-hosp transfer? No  Is this a hospice patient? No    MEDICAL NECESSITY QUESTIONAIRE    Ambulance Transportation is medically necessary only if other means of transportation are contraindicated or would be potentially harmful to the patient.  To meet this requirement, the patient must be either \"bed confined\" or suffer from a condition such that transport by means other than an ambulance is contraindicated by the patient's condition.  The following questions must be answered by the healthcare professional signing below for this form to be valid:     1) Describe the MEDICAL CONDITION (physical and/or mental) of this patient AT THE TIME OF AMBULANCE TRANSPORT that requires the patient to be transported in an ambulance, and why transport by other means is contraindicated by the patient's condition: Patient is confused and impulsive related to underlying cognitive impairment. Confusion is a barrier to ambulation and motor skills.     Past Medical History:   Diagnosis Date   • Abnormal ECG       Past Surgical History:   Procedure Laterality Date   • CORONARY STENT PLACEMENT     • HIP HEMIARTHROPLASTY Left 7/9/2020    Procedure: LEFT HIP HEMIARTHROPLASTY;  Surgeon: Jitendra Solis MD;  Location: [facility];  Service: Orthopedics;  Laterality: Left;      2) Is this patient \"bed confined\" as defined below?Yes    To be \"bed confined\" the patient must satisfy all three of the following criteria:  (1) unable to get up from bed " without assistance; AND (2) unable to ambulate;  AND (3) unable to sit in a chair or wheelchair.  3) Can this patient safely be transported by car or wheelchair van (I.e., may safely sit during transport, without an attendant or monitoring?)No   4. In addition to completing questions 1-3 above, please check any of the following conditions that apply*:          *Note: supporting documentation for any boxes checked must be maintained in the patient's medical records Patient is confused and Unable to tolerate seated position for time needed to transport      SIGNATURE OF PHYSICIAN OR OTHER AUTHORIZED HEALTHCARE PROFESSIONAL    I certify that the above information is true and correct based on my evaluation of this patient, and represent that the patient requires transport by ambulance and that other forms of transport are contraindicated.  I understand that this information will be used by the Centers for Medicare and Medicaid Services (CMS) to support the determiniation of medical necessity for ambulance services, and I represent that I have personal knowledge of the patient's condition at the time of transport.       If this box is checked, I also certify that the patient is physically or mentally incapable of signing the ambulance service's claim form and that the institution with which I am affiliated has furnished care, services or assistance to the patient.  My signature below is made on behalf of the patient pursuant to 42 .36(b)(4). In accordance with 42 .37, the specific reason(s) that the patient is physically or mentally incapable of signing the claim for is as follows:     Signature of Physician or Healthcare Professional Milady Heath RN CM Date/Time:   8/6/2020 3990     (For Scheduled repetitive transport, this form is not valid for transports performed more than 60 days after this date).                                                                                                                                             --------------------------------------------------------------------------------------------  Printed Name and Credentials of Physician or Authorized Healthcare Professional     *Form must be signed by patient's attending physician for scheduled, repetitive transports,.  For non-repetitive ambulance transports, if unable to obtain the signature of the attending physician, any of the following may sign (please select below):     Physician  Clinical Nurse Specialist  Registered Nurse     Physician Assistant  Discharge Planner  Licensed Practical Nurse     Nurse Practitioner X

## 2020-08-06 NOTE — PROGRESS NOTES
[facility]  INPATIENT PROGRESS NOTE    Length of Stay: 8  Date of Admission: 7/29/2020  Primary Care Physician: Terry Peterson MD    Subjective   Chief Complaint: AMS  Summary: Patient is a 85 y.o. male with a past medical history of essential hypertension, gout, chronic pain, coronary artery disease, right bundle branch block, and recent admission and treatment for left hip fracture earlier in July 2020 with subsequent discharge to the skilled nursing facility for physical rehabilitation. He presented with complaints of lethargy of 3 days duration and was reportedly somnolent and unresponsive. He is being managed for sepsis secondary to pneumonia, acute respiratory failure with hypoxia and suspected gram-negative pneumonia.    Today: Sleeping initially but easy to wake.  Alert and oriented to person, place, and year but not month currently.  Able to answer who the president is accurately.      Review of Systems   Constitutional: Negative for fever.   HENT: Negative for congestion.    Respiratory: Negative for cough and shortness of breath.    Cardiovascular: Negative for chest pain.   Gastrointestinal: Negative for abdominal pain.   All other systems reviewed and are negative.     All pertinent negatives and positives are as above. All other systems have been reviewed and are negative unless otherwise stated.     Objective    Temp:  [96.7 °F (35.9 °C)-97.3 °F (36.3 °C)] 97.3 °F (36.3 °C)  Heart Rate:  [84-95] 84  Resp:  [16-20] 20  BP: (115-142)/(70-78) 130/70    Physical Exam   Constitutional:   Elderly appearing male.    HENT:   Head: Normocephalic and atraumatic.   Nose: Nose normal.   Eyes: Conjunctivae and EOM are normal.   Neck: Normal range of motion.   Cardiovascular: Normal rate, regular rhythm, normal heart sounds and intact distal pulses.   Pulmonary/Chest:   Distant but clear bilaterally.    Abdominal: Soft. Bowel sounds are normal.  He exhibits no distension.   Musculoskeletal: He exhibits no edema.   Neurological: He is alert.   Oriented to person, place, year, and current president. Follows commands, moves extremities.    Skin: Skin is warm and dry. Capillary refill takes less than 2 seconds.   Psychiatric: His behavior is normal.     Results Review:  I have reviewed the labs, radiology results, and diagnostic studies.    Laboratory Data:   Results from last 7 days   Lab Units 08/06/20  0533 08/05/20  0543 08/04/20  0604   SODIUM mmol/L 138 137 138   POTASSIUM mmol/L 4.0 3.5 3.9   CHLORIDE mmol/L 103 101 101   CO2 mmol/L 24.0 23.0 24.0   BUN mg/dL 19 18 17   CREATININE mg/dL 1.00 0.91 0.91   GLUCOSE mg/dL 92 83 96   CALCIUM mg/dL 9.4 9.6 9.7   ANION GAP mmol/L 11.0 13.0 13.0     Estimated Creatinine Clearance: 49.8 mL/min (by C-G formula based on SCr of 1 mg/dL).          Results from last 7 days   Lab Units 08/06/20  0533 08/05/20  0543 08/04/20  0604 08/03/20  0554 08/02/20  0533   WBC 10*3/mm3 14.21* 10.52 11.07* 12.81* 13.16*   HEMOGLOBIN g/dL 9.6* 9.5* 10.1* 9.7* 10.4*   HEMATOCRIT % 29.1* 28.1* 31.1* 29.9* 31.6*   PLATELETS 10*3/mm3 228 214 262 308 331           Culture Data:   No results found for: BLOODCX  No results found for: URINECX  No results found for: RESPCX  No results found for: WOUNDCX  No results found for: STOOLCX  No components found for: BODYFLD    Radiology Data:   Imaging Results (Last 24 Hours)     ** No results found for the last 24 hours. **          I have reviewed the patient's current medications.     Assessment/Plan     Principal Problem:    Pneumonia of both lower lobes due to infectious organism    Sepsis 2/2 Pneumonia - Improved. Will finish course of Zosyn.  Completed 5 days of Doxy as well.  Continue supplemental O2 and wean as tolerated. COVID negative.  Urine antigens negative.      AMS - Further improved today, discussed with his daughter who notes that his mental status is close to baseline now.  MRI  negative. Underlying cognitive impairment?    HTN - Continue Losartan and Toprol XL.     Urinary retention - Powell anchored and Terazosin started.  Will need outpatient urology follow up.     DVT ppx - Lovenox  CODE - Full    Discharge Planning: In progress. Likely tomorrow.     Charly Hercules MD

## 2020-08-06 NOTE — THERAPY TREATMENT NOTE
Acute Care - Physical Therapy Treatment Note  [facility]     Patient Name: Jose Dacosta  : 1935  MRN: [id number]  Today's Date: 2020  Onset of Illness/Injury or Date of Surgery: 20     Referring Physician: Dr. Melendez    Admit Date: 2020    Visit Dx:    ICD-10-CM ICD-9-CM   1. Pneumonia of both lower lobes due to infectious organism J18.9 486   2. Altered mental status, unspecified altered mental status type R41.82 780.97   3. Impaired mobility and ADLs Z74.09 V49.89    Z78.9    4. Oropharyngeal dysphagia R13.12 787.22   5. Decreased functional mobility R26.89 781.99     Patient Active Problem List   Diagnosis   • Coronary artery disease involving native coronary artery of native heart without angina pectoris   • Vasovagal syncope   • RBBB (right bundle branch block with left anterior fascicular block)   • Essential hypertension   • Ischemic cardiomyopathy   • Syncope and collapse   • Closed fracture of neck of left femur (CMS/[facility])   • Coronary artery disease with history of myocardial infarction without history of CABG   • Facial laceration   • HFrEF (heart failure with reduced ejection fraction) (CMS/[facility])   • Postoperative anemia due to acute blood loss   • Pneumonia of both lower lobes due to infectious organism       Therapy Treatment    Rehabilitation Treatment Summary     Row Name 20 1058             Treatment Time/Intention    Discipline  physical therapy assistant  -LILA      Document Type  therapy note (daily note)  -LILA      Mode of Treatment  physical therapy;individual therapy  -LILA      Patient Effort  adequate  -LILA      Existing Precautions/Restrictions  fall  -LILA      Recorded by [LIAL] Vikash Walters, PTA 20 1150      Row Name 20 1058             Vital Signs    Pre Systolic BP Rehab  135  -LILA      Pre Treatment Diastolic BP  66  -LILA      Post Systolic BP Rehab  126  -LILA      Post Treatment Diastolic BP  62  -LILA      Pretreatment Heart Rate (beats/min)   84  -LILA      Posttreatment Heart Rate (beats/min)  87  -LILA      Pre SpO2 (%)  95  -LILA      O2 Delivery Pre Treatment  room air  -LILA      Post SpO2 (%)  -- unable to obtain  -LILA      Pre Patient Position  Supine  -LILA      Post Patient Position  Sitting  -LILA      Recorded by [LILA] Vikash Walters, PTA 08/06/20 1150      Row Name 08/06/20 1058             Cognitive Assessment/Intervention- PT/OT    Affect/Mental Status (Cognitive)  confused  -LILA      Orientation Status (Cognition)  oriented to;person  -JC2      Follows Commands (Cognition)  follows one step commands;75-90% accuracy  -LILA      Recorded by [LILA] Vikash Walters, PTA 08/06/20 1150  [JC2] Vikash Walters, PTA 08/06/20 1232      Row Name 08/06/20 1058             Mobility Assessment/Intervention    Extremity Weight-bearing Status  left lower extremity  -LILA      Left Lower Extremity (Weight-bearing Status)  weight-bearing as tolerated (WBAT)  -LILA      Recorded by [LILA] Vikash Walters, PTA 08/06/20 1150      Row Name 08/06/20 1058             Bed Mobility Assessment/Treatment    Bed Mobility Assessment/Treatment  supine-sit  -LILA      Rolling Left [place] (Bed Mobility)  minimum assist (75% patient effort)  -LILA      Rolling Right [place] (Bed Mobility)  --  -LILA      Scooting/Bridging [place] (Bed Mobility)  --  -LILA      Bed Mobility, Safety Issues  decreased use of legs for bridging/pushing  -JC2      Assistive Device (Bed Mobility)  bed rails;head of bed elevated;draw sheet  -JC2      Comment (Bed Mobility)  pt sat EOB w/ SBA  -LILA      Recorded by [LILA] Vikash Walters, PTA 08/06/20 1232  [JC2] Vikash Walters, PTA 08/06/20 1150      Row Name 08/06/20 1058             Transfer Assessment/Treatment    Transfer Assessment/Treatment  sit-stand transfer;stand-sit transfer;bed-chair transfer  -LILA      Comment (Transfers)  pt stood x2. with 1st standing attempt pt had BM and sat down. pt then stood for pericare and t/f.   -LILA      Recorded by [LILA]  Vikash Walters, PTA 08/06/20 1232      Row Name 08/06/20 1058             Bed-Chair Transfer    Bed-Chair [place] (Transfers)  minimum assist (75% patient effort)  -LILA      Assistive Device (Bed-Chair Transfers)  walker, front-wheeled  -LILA      Recorded by [LILA] Vikash Walters, PTA 08/06/20 1232      Row Name 08/06/20 1058             Sit-Stand Transfer    Sit-Stand [place] (Transfers)  minimum assist (75% patient effort)  -LILA      Assistive Device (Sit-Stand Transfers)  walker, front-wheeled  -LILA      Recorded by [LILA] Vikash Walters, PTA 08/06/20 1232      Row Name 08/06/20 1058             Stand-Sit Transfer    Stand-Sit [place] (Transfers)  minimum assist (75% patient effort)  -LILA      Assistive Device (Stand-Sit Transfers)  walker, front-wheeled  -LILA      Recorded by [LILA] Vikash Walters, PTA 08/06/20 1232      Row Name 08/06/20 1058             Gait/Stairs Assessment/Training    Gait/Stairs Assessment/Training  gait/ambulation independence;gait/ambulation assistive device;distance ambulated;gait pattern;gait deviations  -LILA      [place] Level (Gait)  minimum assist (75% patient effort)  -LILA      Assistive Device (Gait)  walker, front-wheeled  -LILA      Distance in Feet (Gait)  3  -LILA      Pattern (Gait)  3-point  -LILA      Deviations/Abnormal Patterns (Gait)  antalgic;gait speed decreased;stride length decreased  -LILA      Bilateral Gait Deviations  forward flexed posture  -LILA      Recorded by [LILA] Vikash Walters, PTA 08/06/20 1232      Row Name 08/06/20 1058             Pain Assessment    Additional Documentation  Pain Scale: Numbers Pre/Post-Treatment (Group)  -LILA      Recorded by [LILA] Vikash Walters, PTA 08/06/20 1232      Row Name 08/06/20 1058             Pain Scale: Numbers Pre/Post-Treatment    Pain Scale: Numbers, Pretreatment  0/10 - no pain  -LILA      Pain Scale: Numbers, Post-Treatment  0/10 - no pain  -LILA      Recorded by [LILA] Vikash Walters, PTA 08/06/20 1232      Row  Name                Wound 08/01/20 1749 Left gluteal Pressure Injury    Wound - Properties Group Date first assessed: 08/01/20 [TC] Time first assessed: 1749 [TC] Present on Hospital Admission: N [TC] Side: Left [TC] Location: gluteal [TC] Primary Wound Type: Pressure inj [TC] Stage, Pressure Injury: Stage 2 [TC] Recorded by:  [TC] Kandis Mercedes RN 08/01/20 1749    Row Name                Wound 08/01/20 1749 Left heel    Wound - Properties Group Date first assessed: 08/01/20 [TC] Time first assessed: 1749 [TC] Present on Hospital Admission: N [TC] Side: Left [TC] Location: heel [TC] Recorded by:  [TC] Kandis Mercedes RN 08/01/20 1749    Row Name                Wound 07/08/20 1810 Left upper eyebrow Laceration    Wound - Properties Group Date first assessed: 07/08/20 [TM] Time first assessed: 1810 [TM] Present on Hospital Admission: Y [TM] Side: Left [TM] Orientation: upper [TM] Location: eyebrow [TM] Primary Wound Type: Laceration [TM] Recorded by:  [TM] Josephine Elias RN 07/08/20 1811    Row Name                Wound 08/01/20 2043 Left posterior;lower leg Pressure Injury    Wound - Properties Group Date first assessed: 08/01/20 [EF] Time first assessed: 2043 [EF] Present on Hospital Admission: Y [EF] Side: Left [EF] Orientation: posterior;lower [EF] Location: leg [EF], below immobilizer  Primary Wound Type: Pressure inj [EF] Stage, Pressure Injury: deep tissue injury [EF] Recorded by:  [EF] Amy Campbell RN 08/01/20 2045    Row Name 08/06/20 1058             Outcome Summary/Treatment Plan (PT)    Daily Summary of Progress (PT)  progress toward functional goals as expected  -LILA      Plan for Continued Treatment (PT)  continue  -LILA      Anticipated Discharge Disposition (PT)  anticipate therapy at next level of care;skilled nursing facility  -JC2      Recorded by [LILA] Vikash Walters, PTA 08/06/20 1232  [JC2] Vikash Walters, PTA 08/06/20 1150        User Key  (r) = Recorded By, (t) = Taken  By, (c) = Cosigned By    Initials Name Effective Dates Discipline    TC Kandis Mercedes, RN 10/17/16 -  Nurse    Amy Rubalcava RN 10/17/16 -  Nurse    Vikash Whatley PTA 03/07/18 -  PT    TM Josephine Elias, RN 07/24/18 -  Nurse          Wound 08/01/20 1749 Left gluteal Pressure Injury (Active)   Closure Open to air 8/5/2020  8:33 PM       Wound 08/01/20 1749 Left heel (Active)   Dressing Appearance open to air 8/5/2020  8:33 PM       Wound 08/01/20 2043 Left posterior;lower leg Pressure Injury (Active)   Dressing Appearance open to air 8/5/2020  8:33 PM       Rehab Goal Summary     Row Name 08/06/20 1058             Bed Mobility Goal 1 (PT)    Activity/Assistive Device (Bed Mobility Goal 1, PT)  sit to supine;supine to sit  -LILA      [place] Level/Cues Needed (Bed Mobility Goal 1, PT)  contact guard assist;standby assist  -LILA      Time Frame (Bed Mobility Goal 1, PT)  by discharge  -LILA      Progress/Outcomes (Bed Mobility Goal 1, PT)  goal not met  -LILA         Transfer Goal 1 (PT)    Activity/Assistive Device (Transfer Goal 1, PT)  bed-to-chair/chair-to-bed;sit-to-stand/stand-to-sit  -LILA      [place] Level/Cues Needed (Transfer Goal 1, PT)  contact guard assist  -LILA      Time Frame (Transfer Goal 1, PT)  by discharge  -LILA      Progress/Outcome (Transfer Goal 1, PT)  goal not met  -LILA         Gait Training Goal 1 (PT)    Activity/Assistive Device (Gait Training Goal 1, PT)  gait (walking locomotion);assistive device use;walker, rolling  -LILA      [place] Level (Gait Training Goal 1, PT)  contact guard assist  -LILA      Time Frame (Gait Training Goal 1, PT)  5 days  -LILA      Progress/Outcome (Gait Training Goal 1, PT)  goal not met  -LILA        User Key  (r) = Recorded By, (t) = Taken By, (c) = Cosigned By    Initials Name Provider Type Discipline    Vikash Whatley, PTA Physical Therapy Assistant PT          Physical Therapy Education                 Title: PT OT SLP Therapies  (In Progress)     Topic: Physical Therapy (In Progress)     Point: Mobility training (In Progress)     Description:   Instruct learner(s) on safety and technique for assisting patient out of bed, chair or wheelchair.  Instruct in the proper use of assistive devices, such as walker, crutches, cane or brace.              Patient Friendly Description:   It's important to get you on your feet again, but we need to do so in a way that is safe for you. Falling has serious consequences, and your personal safety is the most important thing of all.        When it's time to get out of bed, one of us or a family member will sit next to you on the bed to give you support.     If your doctor or nurse tells you to use a walker, crutches, a cane, or a brace, be sure you use it every time you get out of bed, even if you think you don't need it.    Learning Progress Summary           Patient Acceptance, E, NR by LILA at 8/6/2020 1233    Acceptance, E, NR by LILA at 8/4/2020 1541    Acceptance, E, NR by RONNIE at 7/31/2020 1657                   Point: Home exercise program (Not Started)     Description:   Instruct learner(s) on appropriate technique for monitoring, assisting and/or progressing patient with therapeutic exercises and activities.              Learner Progress:   Not documented in this visit.          Point: Body mechanics (In Progress)     Description:   Instruct learner(s) on proper positioning and spine alignment for patient and/or caregiver during mobility tasks and/or exercises.              Learning Progress Summary           Patient Acceptance, E, NR by RONNIE at 7/31/2020 1657                   Point: Precautions (In Progress)     Description:   Instruct learner(s) on prescribed precautions during mobility and gait tasks              Learning Progress Summary           Patient Acceptance, E, NR by RONNIE at 7/31/2020 1657                               User Key     Initials Effective Dates Name Provider Type Discipline    RONNIE  04/03/18 -  Angelica Torres, PT Physical Therapist PT     03/07/18 -  Vikash Walters, PTA Physical Therapy Assistant PT                PT Recommendation and Plan  Anticipated Discharge Disposition (PT): anticipate therapy at next level of care, skilled nursing facility  Outcome Summary/Treatment Plan (PT)  Daily Summary of Progress (PT): progress toward functional goals as expected  Plan for Continued Treatment (PT): continue  Anticipated Discharge Disposition (PT): anticipate therapy at next level of care, skilled nursing facility  Plan of Care Reviewed With: patient  Progress: improving  Outcome Summary: pt responded well to PT. pt requires min A for sup-sit and t/fs. pt incontinent of bowels upon standing. pt stood x2 w/ RW. pt sat EOB SBA. pt continues to have confused. pt up on recliner eating lunch at end of tx. no new goals met at this time. pt would continue to benefit from PT services.  Outcome Measures     Row Name 08/06/20 1058 08/04/20 1520 08/04/20 1334       How much help from another person do you currently need...    Turning from your back to your side while in flat bed without using bedrails?  3  -LILA  --  2  -LILA    Moving from lying on back to sitting on the side of a flat bed without bedrails?  3  -LILA  --  2  -LILA    Moving to and from a bed to a chair (including a wheelchair)?  3  -LILA  --  2  -LILA    Standing up from a chair using your arms (e.g., wheelchair, bedside chair)?  3  -LILA  --  2  -LILA    Climbing 3-5 steps with a railing?  1  -LILA  --  1  -LILA    To walk in hospital room?  3  -LILA  --  2  -LILA    AM-PAC 6 Clicks Score (PT)  16  -LILA  --  11  -LILA       How much help from another is currently needed...    Putting on and taking off regular lower body clothing?  --  2  -AS  --    Bathing (including washing, rinsing, and drying)  --  2  -AS  --    Toileting (which includes using toilet bed pan or urinal)  --  2  -AS  --    Putting on and taking off regular upper body clothing  --  2  -AS  --     Taking care of personal grooming (such as brushing teeth)  --  3  -AS  --    Eating meals  --  3  -AS  --    AM-PAC 6 Clicks Score (OT)  --  14  -AS  --       Functional Assessment    Outcome Measure Options  AM-PAC 6 Clicks Basic Mobility (PT)  -LILA  AM-PAC 6 Clicks Daily Activity (OT)  -AS  AM-PAC 6 Clicks Basic Mobility (PT)  -LILA      User Key  (r) = Recorded By, (t) = Taken By, (c) = Cosigned By    Initials Name Provider Type    Vikash Whatley PTA Physical Therapy Assistant    AS Ligia Junior, OT Occupational Therapist         Time Calculation:   PT Charges     Row Name 08/06/20 1236             Time Calculation    Start Time  1058  -LILA      Stop Time  1128  -      Time Calculation (min)  30 min  -LILA         Time Calculation- PT    Total Timed Code Minutes- PT  30 minute(s)  -        User Key  (r) = Recorded By, (t) = Taken By, (c) = Cosigned By    Initials Name Provider Type    Vikash Whatley PTA Physical Therapy Assistant        Therapy Charges for Today     Code Description Service Date Service Provider Modifiers Qty    [id number]  PT THERAPEUTIC ACT EA 15 MIN 8/6/2020 Vikash Walters PTA GP 2          PT G-Codes  Outcome Measure Options: AM-PAC 6 Clicks Basic Mobility (PT)  AM-PAC 6 Clicks Score (PT): 16  AM-PAC 6 Clicks Score (OT): 14    Vikash Watlers PTA  8/6/2020

## 2020-08-06 NOTE — DISCHARGE PLACEMENT REQUEST
"Sylvia Dacosta (85 y.o. Male)     Date of Birth Social Security Number Address Home Phone MRN    [id number]  [id number] [facility] [address] [phone] [id number]    Yazdanism Marital Status          Bahai        Admission Date Admission Type Admitting Provider Attending Provider Department, Room/Bed    7/29/20 Emergency Pranay Melendez MD Eben[place], Sotonte E, MD [address], [address]    Discharge Date Discharge Disposition Discharge Destination         Skilled Nursing Facility (DC - External)              Attending Provider:  Pranay Melendez MD    Allergies:  Hydrochlorothiazide    Isolation:  None   Infection:  COVID Screen (preop/placement) (08/03/20)   Code Status:  CPR    Ht:  182.8 cm (71.97\")   Wt:  64 kg (141 lb 1.6 oz)    Admission Cmt:  None   Principal Problem:  Pneumonia of both lower lobes due to infectious organism [J18.9]                 Active Insurance as of 7/29/2020     Primary Coverage     Payor Plan Insurance Group Employer/Plan Group    AET[employer] MEDICARE REPLACEMENT AETNA MEDICARE REPLACEMENT [id number]     Payor Plan Address Payor Plan Phone Number Payor Plan Fax Number Effective Dates    [address] [phone]  4/1/2019 - None Entered    [facility] [id number]       Subscriber Name Subscriber Birth Date Member ID       SYLVIA DACOSTA [id number]                 Emergency Contacts      (Rel.) Home Phone Work Phone Mobile Phone    [employer] [address] (Relative) [phone]    Carla Dacosta (Spouse) [phone] -- [phone]    fiordaliza aguilar (Grandchild) -- -- [phone]        Milady Heath RN [facility]  [phone] phone  [phone] fax        "

## 2020-08-07 NOTE — PAYOR COMM NOTE
"Cathy Noyola   [facility]  phone [phone]  fax [phone]    Auth# [id number]    Sylvia Dacosta (85 y.o. Male)     Date of Birth Social Security Number Address Home Phone MRN    1935  [address]  [facility] [address] [phone] [id number]    Jehovah's witness Marital Status          Sabianism        Admission Date Admission Type Admitting Provider Attending Provider Department, Room/Bed    7/29/20 Emergency Pranay Melendez MD  [address], 405/1    Discharge Date Discharge Disposition Discharge Destination        8/6/2020 Skilled Nursing Facility (DC - External)              Attending Provider:  (none)   Allergies:  Hydrochlorothiazide    Isolation:  None   Infection:  COVID Screen (preop/placement) (08/03/20)   Code Status:  Prior    Ht:  182.8 cm (71.97\")   Wt:  64 kg (141 lb 1.6 oz)    Admission Cmt:  None   Principal Problem:  Pneumonia of both lower lobes due to infectious organism [J18.9]                 Active Insurance as of 7/29/2020     Primary Coverage     Payor Plan Insurance Group Employer/Plan Group    AETNA MEDICARE REPLACEMENT AETNA MEDICARE REPLACEMENT [id number]     Payor Plan Address Payor Plan Phone Number Payor Plan Fax Number Effective Dates    [address] [phone]  4/1/2019 - None Entered    [facility] [id number]       Subscriber Name Subscriber Birth Date Member ID       SYLVIA DACOSTA 1935 [id number]                 Emergency Contacts      (Rel.) Home Phone Work Phone Mobile Phone    A[employer] [address]OX (Relative) [phone]    Carla Dacosta (Spouse) [phone] -- [phone]    fiordaliza aguilar (Grandchild) -- -- [phone]               Discharge Summary      Charly Hercules MD at 08/06/20 1511              [facility]  DISCHARGE SUMMARY       Date of Admission: 7/29/2020  Date of Discharge:  " 8/6/2020  Primary Care Physician: Terry Peterson MD    Presenting Problem/History of Present Illness:  Pneumonia of both lower lobes due to infectious organism [J18.9]  Altered mental status, unspecified altered mental status type [R41.82]     Final Discharge Diagnoses:  Active Hospital Problems    Diagnosis   • **Pneumonia of both lower lobes due to infectious organism   • Acute urinary retention   • Metabolic encephalopathy       Consults:   Consults     Date and Time Order Name Status Description    7/8/2020 2004 Orthopedics (on-call MD unless specified) Completed           Procedures Performed:                 Pertinent Test Results:   Lab Results (most recent)     Procedure Component Value Units Date/Time    COVID-19, [facility] IN-HOUSE, NP SWAB IN TRANSPORT MEDIA 8-10 HR TAT - Swab, Nasopharynx [[id number]]  (Normal) Collected:  08/06/20 0757    Specimen:  Swab from Nasopharynx Updated:  08/06/20 1226     COVID19 Not Detected    Narrative:       Testing performed by Real Time RT-PCR  This test has not been approved by the [employer] but is authorized under the Emergency Use Act (EUA)    Fact sheet for providers: https://www.fda.gov/media/781241/download    Fact sheet for patients: https://www.fda.gov/media/991858/download      Testing performed by Real Time RT-PCR  This test has not been approved by the [employer] but is authorized under the Emergency Use Act (EUA)    Fact sheet for providers: https://www.fda.gov/media/505393/download    Fact sheet for patients: https://www.fda.gov/media/592715/download        Urinalysis, Microscopic Only - Urine, Catheter [[id number]]  (Abnormal) Collected:  08/06/20 0744    Specimen:  Urine, Catheter Updated:  08/06/20 0834     RBC, UA 3-5 /HPF      WBC, UA 0-2 /HPF      Bacteria, UA None Seen /HPF      Squamous Epithelial Cells, UA 0-2 /HPF      Hyaline Casts, UA None Seen /LPF      Uric Acid Crystals, UA Small/1+ /HPF      Methodology Manual Light Microscopy    Urinalysis With  Culture If Indicated - Urine, Catheter [[id number]]  (Abnormal) Collected:  08/06/20 0744    Specimen:  Urine, Catheter Updated:  08/06/20 0806     Color, UA Yellow     Appearance, UA Clear     pH, UA <=5.0     Specific Gravity, UA 1.025     Glucose, UA Negative     Ketones, UA 15 mg/dL (1+)     Bilirubin, UA Negative     Blood, UA Moderate (2+)     Protein, UA Negative     Leuk Esterase, UA Negative     Nitrite, UA Negative     Urobilinogen, UA 0.2 E.U./dL    Basic Metabolic Panel [[id number]]  (Normal) Collected:  08/06/20 0533    Specimen:  Blood Updated:  08/06/20 0617     Glucose 92 mg/dL      BUN 19 mg/dL      Creatinine 1.00 mg/dL      Sodium 138 mmol/L      Potassium 4.0 mmol/L      Chloride 103 mmol/L      CO2 24.0 mmol/L      Calcium 9.4 mg/dL      eGFR Non African Amer 71 mL/min/1.73      BUN/Creatinine Ratio 19.0     Anion Gap 11.0 mmol/L     Narrative:       GFR Normal >60  Chronic Kidney Disease <60  Kidney Failure <15      CBC & Differential [[id number]] Collected:  08/06/20 0533    Specimen:  Blood Updated:  08/06/20 0602    Narrative:       The following orders were created for panel order CBC & Differential.  Procedure                               Abnormality         Status                     ---------                               -----------         ------                     CBC Auto Differential[[id number]]        Abnormal            Final result                 Please view results for these tests on the individual orders.    CBC Auto Differential [[id number]]  (Abnormal) Collected:  08/06/20 0533    Specimen:  Blood Updated:  08/06/20 0602     WBC 14.21 10*3/mm3      RBC 3.21 10*6/mm3      Hemoglobin 9.6 g/dL      Hematocrit 29.1 %      MCV 90.7 fL      MCH 29.9 pg      MCHC 33.0 g/dL      RDW 14.4 %      RDW-SD 47.7 fl      MPV 10.2 fL      Platelets 228 10*3/mm3      Neutrophil % 70.8 %      Lymphocyte % 14.0 %      Monocyte % 11.7 %      Eosinophil % 2.3 %      Basophil % 0.6 %      Immature  Grans % 0.6 %      Neutrophils, Absolute 10.05 10*3/mm3      Lymphocytes, Absolute 1.99 10*3/mm3      Monocytes, Absolute 1.66 10*3/mm3      Eosinophils, Absolute 0.33 10*3/mm3      Basophils, Absolute 0.09 10*3/mm3      Immature Grans, Absolute 0.09 10*3/mm3      nRBC 0.0 /100 WBC     Basic Metabolic Panel [[id number]]  (Normal) Collected:  08/05/20 0543    Specimen:  Blood Updated:  08/05/20 0657     Glucose 83 mg/dL      BUN 18 mg/dL      Creatinine 0.91 mg/dL      Sodium 137 mmol/L      Potassium 3.5 mmol/L      Chloride 101 mmol/L      CO2 23.0 mmol/L      Calcium 9.6 mg/dL      eGFR Non African Amer 79 mL/min/1.73      BUN/Creatinine Ratio 19.8     Anion Gap 13.0 mmol/L     Narrative:       GFR Normal >60  Chronic Kidney Disease <60  Kidney Failure <15      CBC & Differential [[id number]] Collected:  08/05/20 0543    Specimen:  Blood Updated:  08/05/20 0624    Narrative:       The following orders were created for panel order CBC & Differential.  Procedure                               Abnormality         Status                     ---------                               -----------         ------                     CBC Auto Differential[[id number]]        Abnormal            Final result                 Please view results for these tests on the individual orders.    CBC Auto Differential [[id number]]  (Abnormal) Collected:  08/05/20 0543    Specimen:  Blood Updated:  08/05/20 0624     WBC 10.52 10*3/mm3      RBC 3.13 10*6/mm3      Hemoglobin 9.5 g/dL      Hematocrit 28.1 %      MCV 89.8 fL      MCH 30.4 pg      MCHC 33.8 g/dL      RDW 14.6 %      RDW-SD 48.2 fl      MPV 10.1 fL      Platelets 214 10*3/mm3      Neutrophil % 66.7 %      Lymphocyte % 14.6 %      Monocyte % 14.1 %      Eosinophil % 2.9 %      Basophil % 1.0 %      Immature Grans % 0.7 %      Neutrophils, Absolute 7.03 10*3/mm3      Lymphocytes, Absolute 1.54 10*3/mm3      Monocytes, Absolute 1.48 10*3/mm3      Eosinophils, Absolute 0.30 10*3/mm3         Basophils, Absolute 0.10 10*3/mm3      Immature Grans, Absolute 0.07 10*3/mm3      nRBC 0.0 /100 WBC     COVID-19, [facility] IN-HOUSE, NP SWAB IN TRANSPORT MEDIA 8-10 HR TAT - Swab, Nasopharynx [[id number]]  (Normal) Collected:  08/03/20 1541    Specimen:  Swab from Nasopharynx Updated:  08/03/20 1959     COVID19 Not Detected    Narrative:       Testing performed by Real Time RT-PCR  This test has not been approved by the [employer] but is authorized under the Emergency Use Act (EUA)    Fact sheet for providers: https://www.fda.gov/media/378588/download    Fact sheet for patients: https://www.fda.gov/media/427269/download        Blood Culture - Blood, Arm, Left [[id number]] Collected:  07/29/20 1516    Specimen:  Blood from Arm, Left Updated:  08/03/20 1530     Blood Culture No growth at 5 days    Blood Culture - Blood, Arm, Right [[id number]]  (Abnormal) Collected:  07/29/20 1641    Specimen:  Blood from Arm, Right Updated:  07/31/20 0647     Blood Culture Staphylococcus, coagulase negative     Comment: Probable contaminant requires clinical correlation, susceptibility not performed unless requested by physician.          Isolated from Pediatric Bottle     Gram Stain Pediatric Bottle Gram positive cocci in clusters     Comment: 1 bottle of 2 drawn positive for gram positive cocci in clusters       Urine Culture - Urine, Urine, Clean Catch [[id number]]  (Normal) Collected:  07/29/20 1553    Specimen:  Urine, Clean Catch Updated:  07/30/20 1916     Urine Culture No growth    Blood Culture ID, PCR - Blood, Arm, Right [[id number]]  (Abnormal) Collected:  07/29/20 1641    Specimen:  Blood from Arm, Right Updated:  07/30/20 1417     BCID, PCR Staphylococcus species, not aureus. mecA (methicillin resistance gene) detected. Identification by BCID PCR.    Ferritin [[id number]]  (Abnormal) Collected:  07/29/20 1505    Specimen:  Blood Updated:  07/29/20 2104     Ferritin 997.20 ng/mL     Narrative:       Results may be falsely  "decreased if patient taking Biotin.      Lactate Dehydrogenase [[id number]]  (Normal) Collected:  07/29/20 1505    Specimen:  Blood Updated:  07/29/20 2057      U/L     C-reactive Protein [[id number]]  (Abnormal) Collected:  07/29/20 1505    Specimen:  Blood Updated:  07/29/20 2057     C-Reactive Protein 19.99 mg/dL     S. Pneumo Ag Urine or CSF - Urine, Urine, Clean Catch [[id number]]  (Normal) Collected:  07/29/20 1553    Specimen:  Urine, Clean Catch Updated:  07/29/20 2053     Strep Pneumo Ag Negative    Legionella Antigen, Urine - Urine, Urine, Clean Catch [[id number]]  (Normal) Collected:  07/29/20 1553    Specimen:  Urine, Clean Catch Updated:  07/29/20 2053     LEGIONELLA ANTIGEN, URINE Negative    Procalcitonin [[id number]]  (Abnormal) Collected:  07/29/20 1505    Specimen:  Blood Updated:  07/29/20 1940     Procalcitonin 0.55 ng/mL     Narrative:       As a Marker for Sepsis (Non-Neonates):   1. <0.5 ng/mL represents a low risk of severe sepsis and/or septic shock.  1. >2 ng/mL represents a high risk of severe sepsis and/or septic shock.    As a Marker for Lower Respiratory Tract Infections that require antibiotic therapy:  PCT on Admission     Antibiotic Therapy             6-12 Hrs later  > 0.5                Strongly Recommended            >0.25 - <0.5         Recommended  0.1 - 0.25           Discouraged                   Remeasure/reassess PCT  <0.1                 Strongly Discouraged          Remeasure/reassess PCT      As 28 day mortality risk marker: \"Change in Procalcitonin Result\" (> 80 % or <=80 %) if Day 0 (or Day 1) and Day 4 values are available. Refer to http://www.[employer]-pct-calculator.com/   Change in PCT <=80 %   A decrease of PCT levels below or equal to 80 % defines a positive change in PCT test result representing a higher risk for 28-day all-cause mortality of patients diagnosed with severe sepsis or septic shock.  Change in PCT > 80 %   A decrease of PCT levels of more than 80 % " defines a negative change in PCT result representing a lower risk for 28-day all-cause mortality of patients diagnosed with severe sepsis or septic shock.                Results may be falsely decreased if patient taking Biotin.     Blood Gas, Arterial [[id number]]  (Abnormal) Collected:  07/29/20 1730    Specimen:  Arterial Blood Updated:  07/29/20 1744     Site Right Brachial     Anmol's Test N/A     pH, Arterial 7.413 pH units      pCO2, Arterial 38.9 mm Hg      pO2, Arterial 57.8 mm Hg      Comment: 84 Value below reference range        HCO3, Arterial 24.8 mmol/L      Base Excess, Arterial 0.3 mmol/L      O2 Saturation, Arterial 90.0 %      Comment: 84 Value below reference range        Barometric Pressure for Blood Gas 746 mmHg      Modality Room Air     Ventilator Mode NA     Collected by DC     Comment: Meter: [id number]     :  [id number]       Urinalysis With Microscopic If Indicated (No Culture) - Urine, Clean Catch [[id number]]  (Abnormal) Collected:  07/29/20 1553    Specimen:  Urine, Clean Catch Updated:  07/29/20 1620     Color, UA Dark Yellow     Appearance, UA Clear     pH, UA <=5.0     Specific Gravity, UA 1.027     Glucose, UA Negative     Ketones, UA Negative     Bilirubin, UA Small (1+)     Blood, UA Negative     Protein, UA Trace     Leuk Esterase, UA Negative     Nitrite, UA Negative     Urobilinogen, UA 1.0 E.U./dL    Narrative:       Urine microscopic not indicated.    [place] Draw [[id number]] Collected:  07/29/20 1505    Specimen:  Blood Updated:  07/29/20 1615    Narrative:       The following orders were created for panel order [place] Draw.  Procedure                               Abnormality         Status                     ---------                               -----------         ------                     Light Blue Top[[id number]]                                   Final result               Green Top (Gel)[[id number]]                                  Final result                Lavender Top[[id number]]                                     Final result               Gold Top - SST[[id number]]                                   Final result                 Please view results for these tests on the individual orders.    Light Blue Top [[id number]] Collected:  07/29/20 1505    Specimen:  Blood Updated:  07/29/20 1615     Extra Tube hold for add-on     Comment: Auto resulted       Gold Top - SST [[id number]] Collected:  07/29/20 1505    Specimen:  Blood Updated:  07/29/20 1615     Extra Tube Hold for add-ons.     Comment: Auto resulted.       Extra Tubes [[id number]] Collected:  07/29/20 1505    Specimen:  Blood, Venous Line Updated:  07/29/20 1615    Narrative:       The following orders were created for panel order Extra Tubes.  Procedure                               Abnormality         Status                     ---------                               -----------         ------                     Gold Top - SST[[id number]]                                   Final result                 Please view results for these tests on the individual orders.    Gold Top - SST [[id number]] Collected:  07/29/20 1505    Specimen:  Blood Updated:  07/29/20 1615     Extra Tube Hold for add-ons.     Comment: Auto resulted.       Green Top (Gel) [[id number]] Collected:  07/29/20 1505    Specimen:  Blood Updated:  07/29/20 1615     Extra Tube Hold for add-ons.     Comment: Auto resulted.       Lavender Top [[id number]] Collected:  07/29/20 1505    Specimen:  Blood Updated:  07/29/20 1615     Extra Tube hold for add-on     Comment: Auto resulted       Troponin [[id number]]  (Abnormal) Collected:  07/29/20 1505    Specimen:  Blood Updated:  07/29/20 1541     Troponin T 0.057 ng/mL     Narrative:       Troponin T Reference Range:  <= 0.03 ng/mL-   Negative for AMI  >0.03 ng/mL-     Abnormal for myocardial necrosis.  Clinicians would have to utilize clinical acumen, EKG, Troponin and serial changes to determine if it is  an Acute Myocardial Infarction or myocardial injury due to an underlying chronic condition.       Results may be falsely decreased if patient taking Biotin.      Comprehensive Metabolic Panel [[id number]]  (Abnormal) Collected:  07/29/20 1505    Specimen:  Blood Updated:  07/29/20 1536     Glucose 92 mg/dL      BUN 37 mg/dL      Creatinine 1.25 mg/dL      Sodium 138 mmol/L      Potassium 4.4 mmol/L      Chloride 104 mmol/L      CO2 25.0 mmol/L      Calcium 9.7 mg/dL      Total Protein 6.7 g/dL      Albumin 3.20 g/dL      ALT (SGPT) 42 U/L      AST (SGOT) 48 U/L      Alkaline Phosphatase 189 U/L      Total Bilirubin 0.6 mg/dL      eGFR Non African Amer 55 mL/min/1.73      Globulin 3.5 gm/dL      A/G Ratio 0.9 g/dL      BUN/Creatinine Ratio 29.6     Anion Gap 9.0 mmol/L     Narrative:       GFR Normal >60  Chronic Kidney Disease <60  Kidney Failure <15      BNP [[id number]]  (Abnormal) Collected:  07/29/20 1505    Specimen:  Blood Updated:  07/29/20 1534     proBNP 4,193.0 pg/mL     Narrative:       Among patients with dyspnea, NT-proBNP is highly sensitive for the detection of acute congestive heart failure. In addition NT-proBNP of <300 pg/ml effectively rules out acute congestive heart failure with 99% negative predictive value.    Results may be falsely decreased if patient taking Biotin.      Lactic Acid, Plasma [[id number]]  (Normal) Collected:  07/29/20 1505    Specimen:  Blood Updated:  07/29/20 1533     Lactate 1.0 mmol/L         Imaging Results (Most Recent)     Procedure Component Value Units Date/Time    MRI Brain Without Contrast [[id number]] Collected:  08/01/20 0916     Updated:  08/01/20 1102    Narrative:       MRI brain without contrast.         CLINICAL INDICATION: Effusion, delirium.          COMPARISON: CT brain June 8, 2020.   PROCEDURE/TECHNIQUE:    MRI of the brain was performed utilizing the standard protocol  without the administration of gadolinium.    FINDINGS: Diffusion-weighted images  demonstrate no restricted  diffusion i.e. no evidence of acute stroke.    There are involutional, atrophic changes. There are  periventricular foci of increased signal intensity, chronic small  vessel ischemic changes. 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        The gyri and sulci are otherwise unremarkable bilaterally  symmetric consistent with patient's age. Ventricular dilatation  secondary to atrophy. No mass lesions,  midline shift,  intracranial hemorrhage, or abnormal intra- or extra-axial fluid  collections are identified.  The brainstem and sellar and  parasellar structures are without abnormalities.  The orbits,  sinuses and mastoid air cells are unremarkable.  The visualized  intracranial vessels show normal signal void.      Impression:       Involutional, atrophic changes. Periventricular foci of increased  signal intensity, chronic small vessel ischemic changes. 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 MRI brain without contrast is otherwise  remarkable. There are no acute changes.    Electronically signed by:  Esdras Paul MD  8/1/2020 11:01 AM CDT  Workstation: [id number]    CT Chest Without Contrast [[id number]] Collected:  07/31/20 1328     Updated:  07/31/20 1437    Narrative:           Patient Name: SYLVIA PACK    ORDERING: RIMA LEWIS    ATTENDING: RIMA LEWIS     REFERRING: RIMA LEWIS    -----------------------  EXAM DESCRIPTION: CT CHEST WO CONTRAST    CLINICAL HISTORY:  Acute resp illness J18.9 Pneumonia,  unspecified organism R41.82 Altered mental status, unspecified  Z74.09 Other reduced mobility Z78.9 Other specified health status  R13.12 Dysphagia, oropharyngeal phase: Shortness of breath .     COMPARISON: Portable chest radiograph 7/29/2020.    DOSE LENGTH PRODUCT: 215.3    This exam was performed according to our departmental dose  optimization program, which includes  automated exposure control,  adjustment of the mA and/or KV according to patient size and/or  use of iterative reconstruction technique.      TECHNIQUE: Axial imaging of the chest are obtained without  utilization of intravenous contrast. Coronal and sagittal  reformat images provided.    FINDINGS:    LUNGS/PLEURA: There is posterior lower lobe and by basilar  pulmonary infiltrative changes present. There is linear  atelectasis or scarring within the right middle lobe. No  associated pleural effusion or pneumothorax.  No pulmonary mass or suspicious nodule.  TRACHEA AND BRONCHI:  The trachea and bronchi are patent.  MEDIASTINUM, PANKAJ AND LYMPH NODES: There are couple lymph nodes  anteriorly and is below the level of the james measuring 1.65  and 1.3 cm. These may be reactive. No conglomerate or necrotic  adenopathy. No thyroid mass or enlargement. There is mild air  distention of the upper esophagus small amount of pooling  secretions. Mid to distal aspect the esophagus is mildly  circumferentially thickened. This may relate to esophagitis but  is nonspecific.  HEART AND PERICARDIUM: Borderline cardiac size without  pericardial effusion. Extensive coronary vascular calcification  is present.  VASCULAR: Vascular calcification involving the thoracic aorta  without aneurysmal dilation.  UPPER ABDOMEN: No acute finding within the included upper  abdomen. No adrenal mass.  OSSEOUS STRUCTURES: Sternotomy wires are present. There is  demineralization and degenerative changes within the included  spine.      Impression:       Bilateral posterior lower lobe and basilar infiltrates without  effusion.    Mildly prominent mediastinal lymph nodes likely reactive. No  conglomerate or necrotic adenopathy.    Mild circumferential wall thickening of the mid to distal  esophagus. This may represent sequela of esophagitis however is  nonspecific. If warranted follow-up with direct visualization  could be performed.    Extensive  coronary vascular calcification.    Electronically signed by:  Lakhwinder Chavarria MD  7/31/2020 2:35 PM  CDT Workstation: [id number]    XR Chest 1 View [[id number]] Collected:  07/29/20 1519     Updated:  07/29/20 1549    Narrative:       Chest x-ray single view.           CLINICAL INDICATION: Shortness of breath. CHF, COPD protocol.    COMPARISON: July 8, 2020.    FINDINGS: Cardiac silhouette is normal in size. Pulmonary  vascularity is unremarkable.     Midline sternal sutures and prior cardiac surgery. New bilateral  basilar infiltrate changes indicating pneumonitis, unfavorable  change since prior exam.      Impression:       Interval development of new bilateral basilar  infiltrative changes, pneumonitis. Unfavorable change since prior  exam.    Electronically signed by:  Esdras Paul MD  7/29/2020 3:47 PM CDT  Workstation: [id number]          Chief Complaint on Day of Discharge: None    Hospital Course:  The patient is a 85 y.o. male who presented to [facility] with altered mental status and lethargy.  Patient was noted to be hypoxic as well.  Work up revealed pneumonia.  COVID testing was negative on several occassions during his hospital stay.  MRI was also negative for acute intracranial pathology. Patient was treated for pneumonia with IV antibiotics and improved.  His mentation improved as well.  He did complain of some pain as he had recent hip fracture and was provided narcotics which caused worsening mentation and this also improved with cessation of the Norco/Morphine.  His oxygenation improved and he was able to be weaned off of supplemental O2.  He was noted to have difficulties with urinary retention but no signs of UTI.  Powell was anchored and he was started on terazosin.  Outpatient Urology referral was placed at discharge and he will follow up with them.  His mental status further improved and he was felt to be appropriate for discharge.  Arrangements were made for discharge to SNF.  "Patient voiced agreement.       Condition on Discharge:  Stable    Physical Exam on Discharge:  /62 (BP Location: Left arm, Patient Position: Lying)   Pulse 84   Temp 97.6 °F (36.4 °C) (Axillary) Comment: 97.6  Resp 16   Ht 182.8 cm (71.97\")   Wt 64 kg (141 lb 1.6 oz)   SpO2 95%   BMI 19.15 kg/m²    Physical Exam   Constitutional: He is oriented to person, place, and time.   Elderly appearing male, NAD.    HENT:   Head: Normocephalic and atraumatic.   Nose: Nose normal.   Mouth/Throat: Oropharynx is clear and moist.   Eyes: Conjunctivae are normal.   Neck: Normal range of motion.   Cardiovascular: Normal rate, regular rhythm, normal heart sounds and intact distal pulses.   Pulmonary/Chest: Effort normal and breath sounds normal. No respiratory distress.   Abdominal: Soft. Bowel sounds are normal. He exhibits no distension. There is no tenderness.   Musculoskeletal: He exhibits no edema.   Neurological: He is alert and oriented to person, place, and time.   Skin: Skin is warm and dry. Capillary refill takes less than 2 seconds.   Psychiatric: He has a normal mood and affect. His behavior is normal.       Discharge Disposition:  Skilled Nursing Facility (DC - External)    Discharge Medications:     Discharge Medications      New Medications      Instructions Start Date   acetaminophen 325 MG tablet  Commonly known as:  TYLENOL   650 mg, Oral, Every 4 Hours PRN      cyanocobalamin 1000 MCG tablet  Commonly known as:  VITAMIN B-12   1,000 mcg, Oral, Daily   Start Date:  August 7, 2020     guaiFENesin 600 MG 12 hr tablet  Commonly known as:  MUCINEX   1,200 mg, Oral, Every 12 Hours Scheduled      magic butt ointment   1 each, Topical, 2 Times Daily      terazosin 1 MG capsule  Commonly known as:  HYTRIN   1 mg, Oral, Nightly         Continue These Medications      Instructions Start Date   albuterol sulfate  (90 Base) MCG/ACT inhaler  Commonly known as:  PROVENTIL HFA;VENTOLIN HFA;PROAIR HFA   INHALE " 2 PUFFS INTO THE LUNGS EVERY 6 (SIX) HOURS AS NEEDED FOR WHEEZING      amitriptyline 100 MG tablet  Commonly known as:  ELAVIL   100 mg, Oral, Every Night at Bedtime      aspirin 81 MG EC tablet   81 mg, Oral, Daily      Colcrys 0.6 MG tablet  Generic drug:  colchicine   0.6 mg, Oral, Daily      enoxaparin 30 MG/0.3ML solution syringe  Commonly known as:  LOVENOX   30 mg, Subcutaneous, Every 24 Hours      gabapentin 600 MG tablet  Commonly known as:  NEURONTIN   600 mg, Oral, 3 Times Daily      Lipitor 20 MG tablet  Generic drug:  atorvastatin   1 tablet, Oral      losartan 25 MG tablet  Commonly known as:  COZAAR   TAKE 1 TABLET BY MOUTH EVERY DAY      metoprolol succinate XL 25 MG 24 hr tablet  Commonly known as:  TOPROL-XL   TAKE 1 TABLET BY MOUTH EVERY DAY      omeprazole 40 MG capsule  Commonly known as:  priLOSEC   40 mg, Oral, Every Morning      rOPINIRole 0.5 MG tablet  Commonly known as:  REQUIP   0.5 mg, Oral, Daily      tolterodine 2 MG tablet  Commonly known as:  DETROL   2 mg, Oral, 2 Times Daily             Discharge Diet:   Diet Instructions     Diet: Soft Texture, Cardiac; Thin Liquids, No Restrictions; Ground      Discharge Diet:   Soft Texture  Cardiac       Fluid Consistency:  Thin Liquids, No Restrictions    Soft Options:  Ground          Activity at Discharge:   Activity Instructions     Other Activity Instructions      Activity Instructions: Per PT/OT at facility          Discharge Care Plan/Instructions: Take medications as prescribed, follow up with PCP and specialist, return for worsening symptoms or problems    Follow-up Appointments:   No future appointments.    Test Results Pending at Discharge: None    Charly Hercules MD    Time: 36 minutes      Electronically signed by Charly Hercules MD at 08/06/20 7667

## 2020-08-17 ENCOUNTER — LAB REQUISITION (OUTPATIENT)
Dept: LAB | Facility: HOSPITAL | Age: 85
End: 2020-08-17

## 2020-08-17 DIAGNOSIS — Z11.59 ENCOUNTER FOR SCREENING FOR OTHER VIRAL DISEASES: ICD-10-CM

## 2020-08-17 LAB — SARS-COV-2 N GENE RESP QL NAA+PROBE: NOT DETECTED

## 2020-08-17 PROCEDURE — 87635 SARS-COV-2 COVID-19 AMP PRB: CPT | Performed by: INTERNAL MEDICINE

## 2020-08-20 ENCOUNTER — HOSPITAL ENCOUNTER (OUTPATIENT)
Dept: GENERAL RADIOLOGY | Facility: HOSPITAL | Age: 85
Discharge: HOME | End: 2020-08-20
Admitting: NURSE PRACTITIONER

## 2020-08-20 DIAGNOSIS — R13.10 DYSPHAGIA, UNSPECIFIED TYPE: ICD-10-CM

## 2020-08-20 PROCEDURE — 74230 X-RAY XM SWLNG FUNCJ C+: CPT

## 2020-08-20 PROCEDURE — 92611 MOTION FLUOROSCOPY/SWALLOW: CPT | Performed by: SPEECH-LANGUAGE PATHOLOGIST

## 2020-08-20 PROCEDURE — A9270 NON-COVERED ITEM OR SERVICE: HCPCS | Performed by: NURSE PRACTITIONER

## 2020-08-20 PROCEDURE — 63710000001 BARIUM SULFATE 96 % RECONSTITUTED SUSPENSION: Performed by: NURSE PRACTITIONER

## 2020-08-20 RX ADMIN — BARIUM SULFATE 35 ML: 960 POWDER, FOR SUSPENSION ORAL at 09:26

## 2020-08-20 NOTE — THERAPY EVALUATION
"Speech Language Pathology   [facility] [place]  [facility]       Patient Name: Jose Dacosta  : 1935  MRN: [id number]    Today's Date: 2020      Visit Date: 2020   SPEECH PATHOLOGY MODIFIED BARIUM SWALLOW STUDY     Views: Patient seated at 90 degrees in:    x__lateral view    __A/P    Ability to follow instructions: __Excellent        x__Good          __Fair  __Poor      Dentition: __Natural  __Dentures   _x_Edentulous         __Partials      The following consistencies were given, with symptoms as noted:  Consistency Method Labial Seal Oral Prep AP Transit Premature Spillage Delayed Swallow Reflex Laryngeal Penetration Aspiration Pooling Valleculae Pooling pyriform sinuses   Thin 5ml  spoon    x HOB in pyriform   Mod amt after the swallow Mod amt after the swallow   Thin  10ml Cup rim    x HOB in pyriform x During the swallow Mod amt before and after Mod amt before and after   Nectar 5ml spoon    x HOB in pyriform   min amt after  min amt after   Puree 5 ml spoon    x HOB in vallecuale   Mod amt before; min amt after Mod amt before and after   Thin 5ml Spoon, oral prep set, hard swallow     Yes, oral prep set       Instances of aspiration did trigger a cough, but it was not strong enough to eject from airway.  There is diffuse pharyngeal residue noted along the pharyngeal wall from valleculae to pyriform after swallows.  Cues for \"hard swallow\" minimized pharyngeal residue.    Impairments:  _x_Premature loss of bolus  __Reduced velopharyngeal closure  __Decreased lingual propulsion  __Reduced epiglottic inversion  x__Decreased hyolaryngeal elevation  x__Reduced laryngeal closure  __Reduced esophageal sphincter opening      Penetration-Aspiration Scale Rating:     Category Score Descriptions   No penetration or aspiration 1 Contrast does not enter the airway   Penetration 2 Contrast enters the airway, remains above vocal folds; no residue    3 Contrast remains above vocal " folds; visible residue remains    4 Contrast contacts vocal folds; no residue    5 Contrast contacts vocal folds; visible residue remains   Aspiration 6 Contrast passes glottis; no subglottic residue visible    7 Contrast passes glottis; visible subglottic residue despite patient's response    8 Contrast passes glottis; visible subglottic residue; absent patient response   Aspiration:  __Prior _x_During __After the swallow  Cough: __Delayed _x_Immediate   __Absent with penetration/aspiration  Cough response:  x__Weak __Strong         Dysphagia Scale Rating:    Dysphagia Rating Scale Explanation   0   Normal swallowing mechanism     1 Minimal Dysphagia- video swallow shows slight deviance from a normal swallow. Patient may report change in sensation during swallow. No change in diet is required.     2 Mild Dysphagia- oropharyngeal dysphagia present, which can be managed by specific swallow suggestions. Slight modification in consistency of diet may be indicated.      3 Mild-Moderate Dysphagia- potential for aspiration exists but is diminished by specific swallow techniques and a modified diet. Time for eating is significantly increased; thus supplemental nutrition may be indicated.      4 Moderate Dysphagia- significant potential for aspiration exists. Trace aspiration of one or more consistencies may be seen under videofluoroscopy. Patient may eat certain consistencies by using specific techniques to minimize potential for aspiration and/or facilitate swallowing. Supervision at mealtimes required. May require supplemental nutrition orally or via feeding tube.      5 Moderately Severe Dysphagia- patient aspirates 5% to 10% on one or more consistencies, with potential for aspiration on all consistencies. Potential for aspiration minimized by specific swallow instructions. Cough reflex absent or non-protective. Alterative mode of feeding required to maintain patient's nutritional needs. If pulmonary status is  "compromised, \"nothing by mouth\" may be indicated.      6 Severe Dysphagia- more than 10% aspiration for all consistencies. \"Nothing by mouth\" recommended.        Recommendation:  __Non-oral nutrition/hydration  x__Trial feeding with Speech Pathologist to address use of compensatory strategies including small bolus size with liquids, double swallow, oral prep set, cyclic eating.    strategies.   SLP from SNF present at [facility] [place] and was educated for deficits and possible treatment options.    Yesenia Brown, CCC-SLP 8/20/2020 10:23          Visit Dx:     ICD-10-CM ICD-9-CM   1. Dysphagia, unspecified type R13.10 787.20       Patient Active Problem List   Diagnosis   • Coronary artery disease involving native coronary artery of native heart without angina pectoris   • Vasovagal syncope   • RBBB (right bundle branch block with left anterior fascicular block)   • Essential hypertension   • Ischemic cardiomyopathy   • Syncope and collapse   • Closed fracture of neck of left femur (CMS/[facility])   • Coronary artery disease with history of myocardial infarction without history of CABG   • Facial laceration   • HFrEF (heart failure with reduced ejection fraction) (CMS/[facility])   • Postoperative anemia due to acute blood loss   • Pneumonia of both lower lobes due to infectious organism   • Acute urinary retention   • Metabolic encephalopathy        Past Medical History:   Diagnosis Date   • Abnormal ECG         Past Surgical History:   Procedure Laterality Date   • CORONARY STENT PLACEMENT     • HIP HEMIARTHROPLASTY Left 7/9/2020    Procedure: LEFT HIP HEMIARTHROPLASTY;  Surgeon: Jitendra Solis MD;  Location: [facility];  Service: Orthopedics;  Laterality: Left;                 SLP Adult Swallow Evaluation     Row Name 08/20/20 3773       Rehab Evaluation    Document Type  evaluation  -EC    Subjective Information  no complaints  -EC    Patient Observations  alert;cooperative;agree to therapy  -EC    Patient/Family Observations  " seen in radiology  -EC    Patient Effort  excellent  -EC    Comment  SLP from SNF present  -EC    Symptoms Noted During/After Treatment  none  -EC       General Information    Patient Profile Reviewed  yes  -EC    Pertinent History Of Current Problem  cough/wet voice with liquids at times  -EC    Current Method of Nutrition  regular textures;thin liquids  -EC       Pain Assessment    Additional Documentation  Pain Scale: FACES Pre/Post-Treatment (Group)  -EC       Pain Scale: FACES Pre/Post-Treatment    Pain: FACES Scale, Pretreatment  0-->no hurt  -EC    Pain: FACES Scale, Post-Treatment  0-->no hurt  -EC       Oral Motor and Function    Dentition Assessment  edentulous, does not have dentures  -EC    Secretion Management  WNL/WFL  -EC    Mucosal Quality  moist, healthy  -EC    Gag Response  WFL  -EC    Volitional Swallow  weak;delayed  -EC    Volitional Cough  non-productive  -EC       MBS/VFSS    Utensils Used  spoon;cup  -EC    Consistencies Trialed  thin liquids;nectar/syrup-thick liquids;pureed  -EC       MBS/VFSS Interpretation    Oral Prep Phase  WFL  -EC    Oral Transit Phase  impaired  -EC    Oral Residue  WFL  -EC    VFSS Summary  aspiration with thin lliquids improved with small bolus, oral prep set, hard swallow.    -EC       Oral Transit Phase    Impaired Oral Transit Phase  premature spillage of liquids into pharynx  -EC    Premature Spillage of Liquids into Pharynx  thin liquids;nectar-thick liquids;pudding/puree  -EC    Oral Transit Phase, Comment  bolus in the pyriforms when swallow initiated  -EC       Initiation of Pharyngeal Swallow    Initiation of Pharyngeal Swallow  bolus in pyriform sinuses  -EC    Pharyngeal Phase  impaired pharyngeal phase of swallowing  -EC    Penetration During the Swallow  thin liquids  -EC    Aspiration During the Swallow  thin liquids  -EC       Clinical Impression    SLP Swallowing Diagnosis  mild;oral dysfunction;moderate;pharyngeal dysfunction  -EC    Functional  Impact  risk of dehydration;risk of malnutrition;risk of aspiration/pneumonia  -EC    Rehab Potential/Prognosis, Swallowing  good, to achieve stated therapy goals  -EC       Recommendations    Anticipated Dischage Disposition (SLP)  skilled nursing facility  -EC      User Key  (r) = Recorded By, (t) = Taken By, (c) = Cosigned By    Initials Name Provider Type    EC Yesenia Brown CCC-SLP Speech and Language Pathologist                         OP SLP Education     Row Name 08/20/20 1010       Education    Barriers to Learning  Hearing deficit;Family was not available  -EC    Action Taken to Address Barriers  results discussed with SNF SLP that was in atttendance  -EC    Education Provided  Described results of evaluation;Patient expressed understanding of evaluation  -EC    Assessed  Learning motivation  -EC    Learning Motivation  Moderate  -EC    Learning Method  Explanation  -EC    Teaching Response  Verbalized understanding  -EC      User Key  (r) = Recorded By, (t) = Taken By, (c) = Cosigned By    Initials Name Effective Dates    EC Yesenia Brown CCC-SLP 02/11/20 -                                       Time Calculation:        Therapy Charges for Today     Code Description Service Date Service Provider Modifiers Qty    64257418277 HC ST MOTION FLUORO EVAL SWALLOW 3 8/20/2020 Yesenia Brown CCC-SLP GN 1                  HARJINDER Estrada  8/20/2020

## 2020-09-29 DIAGNOSIS — S72.002A CLOSED FRACTURE OF NECK OF LEFT FEMUR, INITIAL ENCOUNTER (HCC): Primary | ICD-10-CM

## 2020-09-30 ENCOUNTER — OFFICE VISIT (OUTPATIENT)
Dept: ORTHOPEDIC SURGERY | Facility: CLINIC | Age: 85
End: 2020-09-30

## 2020-09-30 VITALS — HEIGHT: 71 IN | BODY MASS INDEX: 22.4 KG/M2 | WEIGHT: 160 LBS

## 2020-09-30 DIAGNOSIS — M54.16 LUMBAR RADICULOPATHY: ICD-10-CM

## 2020-09-30 DIAGNOSIS — S72.002D CLOSED FRACTURE OF NECK OF LEFT FEMUR WITH ROUTINE HEALING, SUBSEQUENT ENCOUNTER: Primary | ICD-10-CM

## 2020-09-30 PROCEDURE — 99213 OFFICE O/P EST LOW 20 MIN: CPT | Performed by: ORTHOPAEDIC SURGERY

## 2020-09-30 RX ORDER — METHYLPREDNISOLONE ACETATE 40 MG/ML
80 INJECTION, SUSPENSION INTRA-ARTICULAR; INTRALESIONAL; INTRAMUSCULAR; SOFT TISSUE ONCE
Status: DISCONTINUED | OUTPATIENT
Start: 2020-09-30 | End: 2020-11-03

## 2020-09-30 RX ORDER — TRAMADOL HYDROCHLORIDE 50 MG/1
50 TABLET ORAL EVERY 8 HOURS PRN
Qty: 30 TABLET | Refills: 0 | Status: SHIPPED | OUTPATIENT
Start: 2020-09-30 | End: 2020-09-30

## 2020-09-30 RX ORDER — TRAMADOL HYDROCHLORIDE 50 MG/1
50 TABLET ORAL EVERY 8 HOURS PRN
Qty: 30 TABLET | Refills: 0 | Status: SHIPPED | OUTPATIENT
Start: 2020-09-30 | End: 2020-11-03

## 2020-10-01 PROBLEM — M54.16 LUMBAR RADICULOPATHY: Status: ACTIVE | Noted: 2020-10-01

## 2020-10-28 ENCOUNTER — OFFICE VISIT (OUTPATIENT)
Dept: ORTHOPEDIC SURGERY | Facility: CLINIC | Age: 85
End: 2020-10-28

## 2020-10-28 VITALS — BODY MASS INDEX: 22.9 KG/M2 | HEIGHT: 70 IN | WEIGHT: 160 LBS

## 2020-10-28 DIAGNOSIS — S72.002D CLOSED FRACTURE OF NECK OF LEFT FEMUR WITH ROUTINE HEALING, SUBSEQUENT ENCOUNTER: Primary | ICD-10-CM

## 2020-10-28 DIAGNOSIS — M54.16 LUMBAR RADICULOPATHY: ICD-10-CM

## 2020-10-28 PROCEDURE — 99213 OFFICE O/P EST LOW 20 MIN: CPT | Performed by: ORTHOPAEDIC SURGERY

## 2020-10-28 RX ORDER — MELOXICAM 7.5 MG/1
7.5 TABLET ORAL DAILY
Qty: 30 TABLET | Refills: 2 | Status: SHIPPED | OUTPATIENT
Start: 2020-10-28 | End: 2020-11-03

## 2020-10-28 NOTE — PROGRESS NOTES
"Jose Dacosta is a 85 y.o. male returns for     Chief Complaint   Patient presents with   • Left Hip - Follow-up       HISTORY OF PRESENT ILLNESS:  Patient in for follow up of left hip fx.   Patient states, no pain just sore.  He is better than he was still has some pain mainly in the back portion.  No groin pain still has some weakness some difficulty walking.       CONCURRENT MEDICAL HISTORY:    The following portions of the patient's history were reviewed and updated as appropriate: allergies, current medications, past family history, past medical history, past social history, past surgical history and problem list.     ROS  No fevers or chills.  No chest pain or shortness of air.  No GI or  disturbances.  No cardiac issues noted.  All other systems are reported negative or without change.    PHYSICAL EXAMINATION:       Ht 177.8 cm (70\")   Wt 72.6 kg (160 lb)   BMI 22.96 kg/m²     Physical Exam  Constitutional:       Appearance: Normal appearance. He is well-developed.   HENT:      Head: Normocephalic and atraumatic.      Nose: Nose normal. No congestion.   Eyes:      General: No scleral icterus.     Pupils: Pupils are equal, round, and reactive to light.   Neck:      Musculoskeletal: Neck supple.      Trachea: No tracheal deviation.   Pulmonary:      Effort: Pulmonary effort is normal.   Musculoskeletal: Normal range of motion.         General: Swelling and tenderness present. No deformity.   Skin:     General: Skin is warm and dry.      Findings: No erythema.   Neurological:      General: No focal deficit present.      Mental Status: He is alert and oriented to person, place, and time.   Psychiatric:         Mood and Affect: Mood normal.         GAIT:     []  Normal  []  Antalgic    Assistive device: []  None  []  Walker     []  Crutches  []  Cane     [x]  Wheelchair  []  Stretcher    Ortho Exam  Atrophy of the left quad.  Tender over sciatic notch on the left.  Hip range of motion not particular " painful internal and external rotation.  Neurovascularly otherwise intact.    Xr Hip With Or Without Pelvis 2 - 3 View Left    Result Date: 10/1/2020  Narrative: 2 views left hip with pelvis compared to hospital film Patient status post bipolar hemiarthroplasty with no significant complicating features noted Impression: Status post left bipolar hemiarthroplasty            ASSESSMENT:    Diagnoses and all orders for this visit:    Closed fracture of neck of left femur with routine healing, subsequent encounter  -     meloxicam (Mobic) 7.5 MG tablet; Take 1 tablet by mouth Daily.    Lumbar radiculopathy          PLAN I think a lot of his pain is coming from his back he is got some radicular pain.  I did put him on some low-dose Mobic.  With any side effects.  I discussed this with his family member if not better may consider an MRI scan of his back.  May benefit from an epidural injection.  He has a balance issue to which I think is also coming from his back    No follow-ups on file.        This document has been electronically signed by Jitendra Solis MD on October 28, 2020 15:11 CDT

## 2020-11-03 ENCOUNTER — APPOINTMENT (OUTPATIENT)
Dept: CT IMAGING | Facility: HOSPITAL | Age: 85
End: 2020-11-03

## 2020-11-03 ENCOUNTER — HOSPITAL ENCOUNTER (INPATIENT)
Facility: HOSPITAL | Age: 85
LOS: 3 days | Discharge: HOME HEALTH | End: 2020-11-06
Attending: FAMILY MEDICINE | Admitting: INTERNAL MEDICINE

## 2020-11-03 ENCOUNTER — APPOINTMENT (OUTPATIENT)
Dept: ULTRASOUND IMAGING | Facility: HOSPITAL | Age: 85
End: 2020-11-03

## 2020-11-03 ENCOUNTER — APPOINTMENT (OUTPATIENT)
Dept: GENERAL RADIOLOGY | Facility: HOSPITAL | Age: 85
End: 2020-11-03

## 2020-11-03 DIAGNOSIS — R07.2 PRECORDIAL PAIN: Primary | ICD-10-CM

## 2020-11-03 DIAGNOSIS — I26.93 SINGLE SUBSEGMENTAL PULMONARY EMBOLISM WITHOUT ACUTE COR PULMONALE (HCC): ICD-10-CM

## 2020-11-03 DIAGNOSIS — Z78.9 IMPAIRED MOBILITY AND ADLS: ICD-10-CM

## 2020-11-03 DIAGNOSIS — Z74.09 IMPAIRED MOBILITY AND ADLS: ICD-10-CM

## 2020-11-03 DIAGNOSIS — Z74.09 IMPAIRED FUNCTIONAL MOBILITY, BALANCE, GAIT, AND ENDURANCE: ICD-10-CM

## 2020-11-03 LAB
ALBUMIN SERPL-MCNC: 3.9 G/DL (ref 3.5–5.2)
ALBUMIN/GLOB SERPL: 1.5 G/DL
ALP SERPL-CCNC: 92 U/L (ref 39–117)
ALT SERPL W P-5'-P-CCNC: 13 U/L (ref 1–41)
ANION GAP SERPL CALCULATED.3IONS-SCNC: 8 MMOL/L (ref 5–15)
AST SERPL-CCNC: 17 U/L (ref 1–40)
BASOPHILS # BLD AUTO: 0.05 10*3/MM3 (ref 0–0.2)
BASOPHILS NFR BLD AUTO: 0.7 % (ref 0–1.5)
BILIRUB SERPL-MCNC: 0.3 MG/DL (ref 0–1.2)
BUN SERPL-MCNC: 16 MG/DL (ref 8–23)
BUN/CREAT SERPL: 16.2 (ref 7–25)
CALCIUM SPEC-SCNC: 9.2 MG/DL (ref 8.6–10.5)
CHLORIDE SERPL-SCNC: 108 MMOL/L (ref 98–107)
CK SERPL-CCNC: 57 U/L (ref 20–200)
CO2 SERPL-SCNC: 25 MMOL/L (ref 22–29)
CREAT SERPL-MCNC: 0.99 MG/DL (ref 0.76–1.27)
D-DIMER, QUANTITATIVE (MAD,POW, STR): 3010 NG/ML (FEU) (ref 0–470)
DEPRECATED RDW RBC AUTO: 54.7 FL (ref 37–54)
EOSINOPHIL # BLD AUTO: 0.31 10*3/MM3 (ref 0–0.4)
EOSINOPHIL NFR BLD AUTO: 4.3 % (ref 0.3–6.2)
ERYTHROCYTE [DISTWIDTH] IN BLOOD BY AUTOMATED COUNT: 16.5 % (ref 12.3–15.4)
GFR SERPL CREATININE-BSD FRML MDRD: 72 ML/MIN/1.73
GLOBULIN UR ELPH-MCNC: 2.6 GM/DL
GLUCOSE SERPL-MCNC: 99 MG/DL (ref 65–99)
HCT VFR BLD AUTO: 35.8 % (ref 37.5–51)
HGB BLD-MCNC: 11.7 G/DL (ref 13–17.7)
HOLD SPECIMEN: NORMAL
HOLD SPECIMEN: NORMAL
IMM GRANULOCYTES # BLD AUTO: 0.01 10*3/MM3 (ref 0–0.05)
IMM GRANULOCYTES NFR BLD AUTO: 0.1 % (ref 0–0.5)
LIPASE SERPL-CCNC: 32 U/L (ref 13–60)
LYMPHOCYTES # BLD AUTO: 1.32 10*3/MM3 (ref 0.7–3.1)
LYMPHOCYTES NFR BLD AUTO: 18.2 % (ref 19.6–45.3)
MAGNESIUM SERPL-MCNC: 1.7 MG/DL (ref 1.6–2.4)
MCH RBC QN AUTO: 29.3 PG (ref 26.6–33)
MCHC RBC AUTO-ENTMCNC: 32.7 G/DL (ref 31.5–35.7)
MCV RBC AUTO: 89.7 FL (ref 79–97)
MONOCYTES # BLD AUTO: 0.74 10*3/MM3 (ref 0.1–0.9)
MONOCYTES NFR BLD AUTO: 10.2 % (ref 5–12)
NEUTROPHILS NFR BLD AUTO: 4.84 10*3/MM3 (ref 1.7–7)
NEUTROPHILS NFR BLD AUTO: 66.5 % (ref 42.7–76)
NRBC BLD AUTO-RTO: 0 /100 WBC (ref 0–0.2)
NT-PROBNP SERPL-MCNC: 4259 PG/ML (ref 0–1800)
PLATELET # BLD AUTO: 175 10*3/MM3 (ref 140–450)
PMV BLD AUTO: 10 FL (ref 6–12)
POTASSIUM SERPL-SCNC: 3.8 MMOL/L (ref 3.5–5.2)
PROT SERPL-MCNC: 6.5 G/DL (ref 6–8.5)
RBC # BLD AUTO: 3.99 10*6/MM3 (ref 4.14–5.8)
SARS-COV-2 N GENE RESP QL NAA+PROBE: NOT DETECTED
SODIUM SERPL-SCNC: 141 MMOL/L (ref 136–145)
TROPONIN T SERPL-MCNC: 0.04 NG/ML (ref 0–0.03)
TROPONIN T SERPL-MCNC: 0.05 NG/ML (ref 0–0.03)
WBC # BLD AUTO: 7.27 10*3/MM3 (ref 3.4–10.8)
WHOLE BLOOD HOLD SPECIMEN: NORMAL
WHOLE BLOOD HOLD SPECIMEN: NORMAL

## 2020-11-03 PROCEDURE — 84484 ASSAY OF TROPONIN QUANT: CPT | Performed by: INTERNAL MEDICINE

## 2020-11-03 PROCEDURE — 94640 AIRWAY INHALATION TREATMENT: CPT

## 2020-11-03 PROCEDURE — 80053 COMPREHEN METABOLIC PANEL: CPT | Performed by: FAMILY MEDICINE

## 2020-11-03 PROCEDURE — 71045 X-RAY EXAM CHEST 1 VIEW: CPT

## 2020-11-03 PROCEDURE — 93005 ELECTROCARDIOGRAM TRACING: CPT

## 2020-11-03 PROCEDURE — 93010 ELECTROCARDIOGRAM REPORT: CPT | Performed by: INTERNAL MEDICINE

## 2020-11-03 PROCEDURE — 36415 COLL VENOUS BLD VENIPUNCTURE: CPT | Performed by: INTERNAL MEDICINE

## 2020-11-03 PROCEDURE — 93970 EXTREMITY STUDY: CPT

## 2020-11-03 PROCEDURE — 99285 EMERGENCY DEPT VISIT HI MDM: CPT

## 2020-11-03 PROCEDURE — 87635 SARS-COV-2 COVID-19 AMP PRB: CPT | Performed by: FAMILY MEDICINE

## 2020-11-03 PROCEDURE — 71275 CT ANGIOGRAPHY CHEST: CPT

## 2020-11-03 PROCEDURE — G0378 HOSPITAL OBSERVATION PER HR: HCPCS

## 2020-11-03 PROCEDURE — 25010000002 ENOXAPARIN PER 10 MG: Performed by: INTERNAL MEDICINE

## 2020-11-03 PROCEDURE — 82550 ASSAY OF CK (CPK): CPT | Performed by: FAMILY MEDICINE

## 2020-11-03 PROCEDURE — 83880 ASSAY OF NATRIURETIC PEPTIDE: CPT | Performed by: FAMILY MEDICINE

## 2020-11-03 PROCEDURE — 83690 ASSAY OF LIPASE: CPT | Performed by: FAMILY MEDICINE

## 2020-11-03 PROCEDURE — 83735 ASSAY OF MAGNESIUM: CPT | Performed by: FAMILY MEDICINE

## 2020-11-03 PROCEDURE — 0 IOPAMIDOL PER 1 ML: Performed by: FAMILY MEDICINE

## 2020-11-03 PROCEDURE — 85379 FIBRIN DEGRADATION QUANT: CPT | Performed by: FAMILY MEDICINE

## 2020-11-03 PROCEDURE — 85025 COMPLETE CBC W/AUTO DIFF WBC: CPT

## 2020-11-03 PROCEDURE — 93005 ELECTROCARDIOGRAM TRACING: CPT | Performed by: FAMILY MEDICINE

## 2020-11-03 PROCEDURE — 84484 ASSAY OF TROPONIN QUANT: CPT | Performed by: FAMILY MEDICINE

## 2020-11-03 RX ORDER — SODIUM CHLORIDE 0.9 % (FLUSH) 0.9 %
10 SYRINGE (ML) INJECTION AS NEEDED
Status: DISCONTINUED | OUTPATIENT
Start: 2020-11-03 | End: 2020-11-06 | Stop reason: HOSPADM

## 2020-11-03 RX ORDER — ACETAMINOPHEN 160 MG/5ML
650 SOLUTION ORAL EVERY 4 HOURS PRN
Status: DISCONTINUED | OUTPATIENT
Start: 2020-11-03 | End: 2020-11-06 | Stop reason: HOSPADM

## 2020-11-03 RX ORDER — METOPROLOL SUCCINATE 25 MG/1
25 TABLET, EXTENDED RELEASE ORAL DAILY
Status: DISCONTINUED | OUTPATIENT
Start: 2020-11-03 | End: 2020-11-06 | Stop reason: HOSPADM

## 2020-11-03 RX ORDER — ALBUTEROL SULFATE 2.5 MG/3ML
2.5 SOLUTION RESPIRATORY (INHALATION) EVERY 4 HOURS PRN
Status: DISCONTINUED | OUTPATIENT
Start: 2020-11-03 | End: 2020-11-06 | Stop reason: HOSPADM

## 2020-11-03 RX ORDER — HEPARIN SODIUM 5000 [USP'U]/ML
5000 INJECTION, SOLUTION INTRAVENOUS; SUBCUTANEOUS EVERY 12 HOURS SCHEDULED
Status: DISCONTINUED | OUTPATIENT
Start: 2020-11-03 | End: 2020-11-03

## 2020-11-03 RX ORDER — ACETAMINOPHEN 650 MG/1
650 SUPPOSITORY RECTAL EVERY 4 HOURS PRN
Status: DISCONTINUED | OUTPATIENT
Start: 2020-11-03 | End: 2020-11-06 | Stop reason: HOSPADM

## 2020-11-03 RX ORDER — ONDANSETRON 4 MG/1
4 TABLET, FILM COATED ORAL EVERY 6 HOURS PRN
Status: DISCONTINUED | OUTPATIENT
Start: 2020-11-03 | End: 2020-11-06 | Stop reason: HOSPADM

## 2020-11-03 RX ORDER — ATORVASTATIN CALCIUM 20 MG/1
20 TABLET, FILM COATED ORAL DAILY
Status: DISCONTINUED | OUTPATIENT
Start: 2020-11-04 | End: 2020-11-06 | Stop reason: HOSPADM

## 2020-11-03 RX ORDER — AMITRIPTYLINE HYDROCHLORIDE 50 MG/1
100 TABLET, FILM COATED ORAL NIGHTLY
Status: DISCONTINUED | OUTPATIENT
Start: 2020-11-04 | End: 2020-11-06 | Stop reason: HOSPADM

## 2020-11-03 RX ORDER — ROPINIROLE 0.5 MG/1
0.5 TABLET, FILM COATED ORAL DAILY
Status: DISCONTINUED | OUTPATIENT
Start: 2020-11-03 | End: 2020-11-06 | Stop reason: HOSPADM

## 2020-11-03 RX ORDER — ASPIRIN 81 MG/1
81 TABLET ORAL DAILY
Status: DISCONTINUED | OUTPATIENT
Start: 2020-11-03 | End: 2020-11-06 | Stop reason: HOSPADM

## 2020-11-03 RX ORDER — NITROGLYCERIN 0.4 MG/1
0.4 TABLET SUBLINGUAL
Status: DISCONTINUED | OUTPATIENT
Start: 2020-11-03 | End: 2020-11-06 | Stop reason: HOSPADM

## 2020-11-03 RX ORDER — ACETAMINOPHEN 325 MG/1
650 TABLET ORAL EVERY 4 HOURS PRN
Status: DISCONTINUED | OUTPATIENT
Start: 2020-11-03 | End: 2020-11-06 | Stop reason: HOSPADM

## 2020-11-03 RX ORDER — TERAZOSIN 1 MG/1
1 CAPSULE ORAL NIGHTLY
Status: DISCONTINUED | OUTPATIENT
Start: 2020-11-03 | End: 2020-11-06 | Stop reason: HOSPADM

## 2020-11-03 RX ORDER — SODIUM CHLORIDE 0.9 % (FLUSH) 0.9 %
10 SYRINGE (ML) INJECTION EVERY 12 HOURS SCHEDULED
Status: DISCONTINUED | OUTPATIENT
Start: 2020-11-03 | End: 2020-11-06 | Stop reason: HOSPADM

## 2020-11-03 RX ORDER — LOSARTAN POTASSIUM 25 MG/1
25 TABLET ORAL DAILY
Status: DISCONTINUED | OUTPATIENT
Start: 2020-11-03 | End: 2020-11-06 | Stop reason: HOSPADM

## 2020-11-03 RX ORDER — PANTOPRAZOLE SODIUM 40 MG/1
40 TABLET, DELAYED RELEASE ORAL EVERY MORNING
Status: DISCONTINUED | OUTPATIENT
Start: 2020-11-04 | End: 2020-11-06 | Stop reason: HOSPADM

## 2020-11-03 RX ADMIN — SODIUM CHLORIDE, PRESERVATIVE FREE 10 ML: 5 INJECTION INTRAVENOUS at 18:30

## 2020-11-03 RX ADMIN — IOPAMIDOL 68 ML: 755 INJECTION, SOLUTION INTRAVENOUS at 15:00

## 2020-11-03 RX ADMIN — TERAZOSIN HYDROCHLORIDE 1 MG: 1 CAPSULE ORAL at 21:19

## 2020-11-03 RX ADMIN — METOPROLOL SUCCINATE 25 MG: 25 TABLET, FILM COATED, EXTENDED RELEASE ORAL at 18:29

## 2020-11-03 RX ADMIN — ALBUTEROL SULFATE 2.5 MG: 2.5 SOLUTION RESPIRATORY (INHALATION) at 22:22

## 2020-11-03 RX ADMIN — ENOXAPARIN SODIUM 70 MG: 80 INJECTION SUBCUTANEOUS at 18:29

## 2020-11-03 RX ADMIN — LOSARTAN POTASSIUM 25 MG: 25 TABLET, FILM COATED ORAL at 18:29

## 2020-11-03 RX ADMIN — ROPINIROLE HYDROCHLORIDE 0.5 MG: 0.5 TABLET, FILM COATED ORAL at 18:29

## 2020-11-03 RX ADMIN — SODIUM CHLORIDE, PRESERVATIVE FREE 10 ML: 5 INJECTION INTRAVENOUS at 21:18

## 2020-11-03 RX ADMIN — ASPIRIN 81 MG: 81 TABLET, FILM COATED ORAL at 18:29

## 2020-11-03 NOTE — ED NOTES
Pt states he does not want to be admitted to the hospital and that he will unhook himself from everything and walk out, Dr. Harris made aware and he spoke with patient and daughter.     Jose Pagan, RN  11/03/20 1281

## 2020-11-03 NOTE — ED PROVIDER NOTES
Subjective   SOA and chest pain with h/o CABG.       Chest Pain  Pain location:  L chest  Pain quality: aching and pressure    Pain radiates to:  L arm  Pain severity:  Mild  Onset quality:  Unable to specify  Timing:  Constant  Progression:  Waxing and waning  Chronicity:  New  Relieved by:  Nothing  Worsened by:  Exertion, movement and certain positions  Associated symptoms: shortness of breath    Associated symptoms: no abdominal pain, no cough, no diaphoresis, no dizziness, no dysphagia, no fatigue, no fever, no headache, no nausea, no vomiting and no weakness    Risk factors: coronary artery disease, high cholesterol, hypertension and male sex    Risk factors: no smoking (quit smoking 40 years ago)    Shortness of Breath  Associated symptoms: chest pain    Associated symptoms: no abdominal pain, no cough, no diaphoresis, no ear pain, no fever, no headaches, no neck pain, no rash, no sore throat, no vomiting and no wheezing        Review of Systems   Constitutional: Positive for activity change. Negative for appetite change, chills, diaphoresis, fatigue and fever.   HENT: Negative for congestion, ear discharge, ear pain, nosebleeds, rhinorrhea, sinus pressure, sore throat and trouble swallowing.    Eyes: Negative for discharge and redness.   Respiratory: Positive for shortness of breath. Negative for apnea, cough, chest tightness and wheezing.    Cardiovascular: Positive for chest pain.   Gastrointestinal: Negative for abdominal pain, diarrhea, nausea and vomiting.   Endocrine: Negative for polyuria.   Genitourinary: Negative for dysuria, frequency and urgency.   Musculoskeletal: Negative for myalgias and neck pain.   Skin: Negative for color change and rash.   Allergic/Immunologic: Negative for immunocompromised state.   Neurological: Negative for dizziness, seizures, syncope, weakness, light-headedness and headaches.   Hematological: Negative for adenopathy. Does not bruise/bleed easily.    Psychiatric/Behavioral: Negative for behavioral problems and confusion.   All other systems reviewed and are negative.      Past Medical History:   Diagnosis Date   • Abnormal ECG        Allergies   Allergen Reactions   • Hydrochlorothiazide Swelling     TONGUE SWELLING       Past Surgical History:   Procedure Laterality Date   • CORONARY STENT PLACEMENT     • HIP HEMIARTHROPLASTY Left 2020    Procedure: LEFT HIP HEMIARTHROPLASTY;  Surgeon: Jitendra Solis MD;  Location: [facility];  Service: Orthopedics;  Laterality: Left;       Family History   Problem Relation Age of Onset   • Heart failure Mother    • Heart failure Father        Social History     Socioeconomic History   • Marital status:      Spouse name: Not on file   • Number of children: Not on file   • Years of education: Not on file   • Highest education level: Not on file   Tobacco Use   • Smoking status: Former Smoker     Quit date:      Years since quittin.8   • Smokeless tobacco: Never Used   Substance and Sexual Activity   • Alcohol use: No     Frequency: Never   • Drug use: No   • Sexual activity: Defer           Objective   Physical Exam  Vitals signs and nursing note reviewed.   Constitutional:       Appearance: He is well-developed.   HENT:      Head: Normocephalic and atraumatic.      Nose: Nose normal.   Eyes:      General: No scleral icterus.        Right eye: No discharge.         Left eye: No discharge.      Conjunctiva/sclera: Conjunctivae normal.      Pupils: Pupils are equal, round, and reactive to light.   Neck:      Musculoskeletal: Normal range of motion and neck supple.      Trachea: No tracheal deviation.   Cardiovascular:      Rate and Rhythm: Normal rate and regular rhythm.      Heart sounds: Normal heart sounds. No murmur.   Pulmonary:      Effort: Pulmonary effort is normal. No respiratory distress.      Breath sounds: Normal breath sounds. No stridor. No wheezing or rales.   Abdominal:      General:  Bowel sounds are normal. There is no distension.      Palpations: Abdomen is soft. There is no mass.      Tenderness: There is no abdominal tenderness. There is no guarding or rebound.   Skin:     General: Skin is warm and dry.      Findings: No erythema or rash.   Neurological:      Mental Status: He is alert and oriented to person, place, and time.      Coordination: Coordination normal.   Psychiatric:         Behavior: Behavior normal.         Thought Content: Thought content normal.         ECG 12 Lead      Date/Time: 11/3/2020 2:15 PM  Performed by: Julian Harris MD  Authorized by: Julian Harris MD   Interpreted by physician  Rhythm: sinus rhythm  Rate: normal  BPM: 68  Conduction: 1st degree  ST Segments: ST segments normal                   ED Course             Labs Reviewed   TROPONIN (IN-HOUSE) - Abnormal; Notable for the following components:       Result Value    Troponin T 0.044 (*)     All other components within normal limits    Narrative:     Troponin T Reference Range:  <= 0.03 ng/mL-   Negative for AMI  >0.03 ng/mL-     Abnormal for myocardial necrosis.  Clinicians would have to utilize clinical acumen, EKG, Troponin and serial changes to determine if it is an Acute Myocardial Infarction or myocardial injury due to an underlying chronic condition.       Results may be falsely decreased if patient taking Biotin.     COMPREHENSIVE METABOLIC PANEL - Abnormal; Notable for the following components:    Chloride 108 (*)     All other components within normal limits    Narrative:     GFR Normal >60  Chronic Kidney Disease <60  Kidney Failure <15     BNP (IN-HOUSE) - Abnormal; Notable for the following components:    proBNP 4,259.0 (*)     All other components within normal limits    Narrative:     Among patients with dyspnea, NT-proBNP is highly sensitive for the detection of acute congestive heart failure. In addition NT-proBNP of <300 pg/ml effectively rules out acute congestive heart  failure with 99% negative predictive value.    Results may be falsely decreased if patient taking Biotin.     CBC WITH AUTO DIFFERENTIAL - Abnormal; Notable for the following components:    RBC 3.99 (*)     Hemoglobin 11.7 (*)     Hematocrit 35.8 (*)     RDW 16.5 (*)     RDW-SD 54.7 (*)     Lymphocyte % 18.2 (*)     All other components within normal limits   D-DIMER, QUANTITATIVE - Abnormal; Notable for the following components:    D-Dimer, Quantitative 3,010 (*)     All other components within normal limits    Narrative:     Dimer values <500 ng/ml FEU are FDA approved as aid in diagnosis of deep venous thrombosis and pulmonary embolism.  This test should not be used in an exclusion strategy with pretest probability alone.    A recent guideline regarding diagnosis for pulmonary thromboembolism recommends an adjusted exclusion criterion of age x 10 ng/ml FEU for patients >50 years of age ([employer] Intern Med 2015; 163: 701-711).     CK - Normal   MAGNESIUM - Normal   LIPASE - Normal   COVID PRE-OP / PRE-PROCEDURE SCREENING ORDER (NO ISOLATION)    Narrative:     The following orders were created for panel order COVID PRE-OP / PRE-PROCEDURE SCREENING ORDER (NO ISOLATION) - Swab, Nasopharynx.  Procedure                               Abnormality         Status                     ---------                               -----------         ------                     COVID-19, RUBIA MCDANIEL IN-HOUS...[[id number]]                      In process                   Please view results for these tests on the individual orders.   COVID-19RUBIA IN-HOUSE, NP SWAB IN TRANSPORT MEDIA 8-10 HR TAT   RAINBOW DRAW    Narrative:     The following orders were created for panel order [place] Draw.  Procedure                               Abnormality         Status                     ---------                               -----------         ------                     Light Blue Top[[id number]]                                   Final result                Green Top (Gel)[[id number]]                                  Final result               Lavender Top[[id number]]                                     Final result               Gold Top - SST[[id number]]                                   Final result                 Please view results for these tests on the individual orders.   TROPONIN (IN-HOUSE)   CBC AND DIFFERENTIAL    Narrative:     The following orders were created for panel order CBC & Differential.  Procedure                               Abnormality         Status                     ---------                               -----------         ------                     CBC Auto Differential[[id number]]        Abnormal            Final result                 Please view results for these tests on the individual orders.   LIGHT BLUE TOP   GREEN TOP   LAVENDER TOP   GOLD TOP - SST       XR Chest 1 View   Final Result   As above.      Electronically signed by:  Esdras Paul MD  11/3/2020 12:34 PM CST   Workstation: [id number]      CT Angiogram Chest    (Results Pending)                                       MDM    Final diagnoses:   Precordial pain            Julian Harris MD  11/03/20 1765

## 2020-11-03 NOTE — H&P
[facility] Medicine Admission      Date of Admission: 11/3/2020      Primary Care Physician: Terry Peterson MD      Chief Complaint: chest pain  Informant: his daughter    HPI:He has been complaining of shortness of breath for over 2 weeks and today he presented chest pain irradiated to the left arm; since he is a patient with long history of coronary artery disease, his daughter brought him here. At this time he denies any chest pain  He denies nausea, vomiting, headache, abdominal pain    Concurrent Medical History:  has a past medical history of Abnormal ECG.   CAD  Essential hypertension  Depression  Chronic pain syndrome  Gout  Dementia  Hyperlipidemia  BPH  Bilateral hypoacustic    Past Surgical History:  has a past surgical history that includes Coronary stent placement and Hip hemiArthroplasty (Left, 2020).   CABG x 3  Coronary stents x 1  Renal artery stent x 1  Cholecystectomy      Family History: family history includes Heart failure in his father and mother.   Mom  on her 90s due to CAD complications  Dad  at 79 y/o due to CAD complications    Social History:  reports that he quit smoking about 30 years ago. He has never used smokeless tobacco. He reports that he does not drink alcohol or use drugs.   He lives with his wife and POA is his Mom and his daughter   He is DNR and he said that in front of his daughter     Allergies:   Allergies   Allergen Reactions   • Hydrochlorothiazide Swelling     TONGUE SWELLING       Medications:   Prior to Admission medications    Medication Sig Start Date End Date Taking? Authorizing Provider   acetaminophen (TYLENOL) 325 MG tablet Take 2 tablets by mouth Every 4 (Four) Hours As Needed for Mild Pain  or Fever. 20   Charly Hercules MD   albuterol sulfate  (90 Base) MCG/ACT inhaler INHALE 2 PUFFS INTO THE LUNGS EVERY 6 (SIX) HOURS AS NEEDED FOR WHEEZING 19   Provider, MD Marcial    amitriptyline (ELAVIL) 100 MG tablet Take 100 mg by mouth every night at bedtime. 4/8/19   Marcial Crain MD   aspirin 81 MG EC tablet Take 1 tablet by mouth Daily. 7/5/19   Júnior Licona MD   atorvastatin (LIPITOR) 20 MG tablet Take 1 tablet by mouth.    Marcial Crain MD   Bacitracin Zinc (MAGIC BUTT OINTMENT) Apply 1 each topically to the appropriate area as directed 2 (Two) Times a Day. 8/6/20   Charly Hercules MD   COLCRYS 0.6 MG tablet Take 0.6 mg by mouth Daily. 7/5/19   Marcial Crain MD   cyanocobalamin (VITAMIN B-12) 1000 MCG tablet Take 1 tablet by mouth Daily. 8/7/20   Charly Hercules MD   guaiFENesin (MUCINEX) 600 MG 12 hr tablet Take 2 tablets by mouth Every 12 (Twelve) Hours. 8/6/20   Charly Hercules MD   losartan (COZAAR) 25 MG tablet TAKE 1 TABLET BY MOUTH EVERY DAY 12/30/19   Bessy Duval MD   meloxicam (Mobic) 7.5 MG tablet Take 1 tablet by mouth Daily. 10/28/20   Jitendra Solis MD   metoprolol succinate XL (TOPROL-XL) 25 MG 24 hr tablet TAKE 1 TABLET BY MOUTH EVERY DAY 12/16/19   Júnior Licona MD   omeprazole (priLOSEC) 40 MG capsule Take 40 mg by mouth Every Morning. 6/27/19   Marcial Crain MD   rOPINIRole (REQUIP) 0.5 MG tablet Take 0.5 mg by mouth Daily. 5/3/19   Marcial Crain MD   terazosin (HYTRIN) 1 MG capsule Take 1 capsule by mouth Every Night. 8/6/20   Charly Hercules MD   tolterodine (DETROL) 2 MG tablet Take 2 mg by mouth 2 (Two) Times a Day.    Marcial Crain MD   traMADol (ULTRAM) 50 MG tablet Take 1 tablet by mouth Every 8 (Eight) Hours As Needed for Moderate Pain  (take 1/2 pill every 8 hours as needed). 9/30/20   Jitendra Solis MD       methylPREDNISolone acetate, 80 mg, Intramuscular, Once        Review of Systems:  Review of Systems   Constitutional:        Chronically ill appearing   HENT: Negative.    Eyes: Negative.    Respiratory: Positive for shortness of breath.    Cardiovascular: Positive for  chest pain.   Gastrointestinal: Negative.    Endocrine: Negative.    Genitourinary: Negative.    Musculoskeletal: Negative.    Skin: Negative.    Neurological: Negative.    Hematological: Negative.    Psychiatric/Behavioral: Negative.       Otherwise complete ROS is negative except as mentioned above.    Physical Exam:   Temp:  [97.5 °F (36.4 °C)] 97.5 °F (36.4 °C)  Heart Rate:  [60-68] 68  Resp:  [16] 16  BP: (144-154)/(77-79) 154/77  Physical Exam  Constitutional:       Comments: Chronically ill appearing   HENT:      Nose: Nose normal.      Mouth/Throat:      Mouth: Mucous membranes are moist.   Eyes:      Extraocular Movements: Extraocular movements intact.   Neck:      Musculoskeletal: Normal range of motion and neck supple.   Cardiovascular:      Rate and Rhythm: Normal rate.      Pulses: Normal pulses.   Pulmonary:      Effort: Pulmonary effort is normal.      Breath sounds: Normal breath sounds.   Abdominal:      General: Bowel sounds are normal.   Musculoskeletal: Normal range of motion.      Comments: Minimal swelling bilateral lower extremities   Skin:     General: Skin is warm.   Neurological:      General: No focal deficit present.      Mental Status: He is alert. Mental status is at baseline.   Psychiatric:         Mood and Affect: Mood normal.           Results Reviewed:  I have personally reviewed current lab, radiology, and data and agree with results.  Lab Results (last 24 hours)     Procedure Component Value Units Date/Time    [place] Draw [[id number]] Collected: 11/03/20 1203    Specimen: Blood Updated: 11/03/20 1315    Narrative:      The following orders were created for panel order [place] Draw.  Procedure                               Abnormality         Status                     ---------                               -----------         ------                     Light Blue Top[[id number]]                                   Final result               Green Top (Gel)[[id number]]                                   Final result               Lavender Top[[id number]]                                     Final result               Gold Top - SST[[id number]]                                   Final result                 Please view results for these tests on the individual orders.    Light Blue Top [[id number]] Collected: 11/03/20 1203    Specimen: Blood Updated: 11/03/20 1315     Extra Tube hold for add-on     Comment: Auto resulted       Green Top (Gel) [[id number]] Collected: 11/03/20 1203    Specimen: Blood Updated: 11/03/20 1315     Extra Tube Hold for add-ons.     Comment: Auto resulted.       Lavender Top [[id number]] Collected: 11/03/20 1203    Specimen: Blood Updated: 11/03/20 1315     Extra Tube hold for add-on     Comment: Auto resulted       Gold Top - SST [[id number]] Collected: 11/03/20 1203    Specimen: Blood Updated: 11/03/20 1315     Extra Tube Hold for add-ons.     Comment: Auto resulted.       COVID PRE-OP / PRE-PROCEDURE SCREENING ORDER (NO ISOLATION) - Swab, Nasopharynx [[id number]] Collected: 11/03/20 1302    Specimen: Swab from Nasopharynx Updated: 11/03/20 1308    Narrative:      The following orders were created for panel order COVID PRE-OP / PRE-PROCEDURE SCREENING ORDER (NO ISOLATION) - Swab, Nasopharynx.  Procedure                               Abnormality         Status                     ---------                               -----------         ------                     COVID-RUBIA Alegria IN-HOUS...[[id number]]                      In process                   Please view results for these tests on the individual orders.    COVID-RUBIA Alegria IN-HOUSE, NP SWAB IN TRANSPORT MEDIA 8-10 HR TAT - Swab, Nasopharynx [[id number]] Collected: 11/03/20 1302    Specimen: Swab from Nasopharynx Updated: 11/03/20 1308    CK [[id number]]  (Normal) Collected: 11/03/20 1203    Specimen: Blood Updated: 11/03/20 1258     Creatine Kinase 57 U/L     Magnesium [[id number]]  (Normal) Collected: 11/03/20 1203     Specimen: Blood Updated: 11/03/20 1258     Magnesium 1.7 mg/dL     Lipase [[id number]]  (Normal) Collected: 11/03/20 1203    Specimen: Blood Updated: 11/03/20 1258     Lipase 32 U/L     D-dimer, Quantitative [[id number]]  (Abnormal) Collected: 11/03/20 1203    Specimen: Blood Updated: 11/03/20 1254     D-Dimer, Quantitative 3,010 ng/mL (FEU)     Narrative:      Dimer values <500 ng/ml FEU are FDA approved as aid in diagnosis of deep venous thrombosis and pulmonary embolism.  This test should not be used in an exclusion strategy with pretest probability alone.    A recent guideline regarding diagnosis for pulmonary thromboembolism recommends an adjusted exclusion criterion of age x 10 ng/ml FEU for patients >50 years of age (Marifer Intern Med 2015; 163: 701-711).      Troponin [[id number]]  (Abnormal) Collected: 11/03/20 1203    Specimen: Blood Updated: 11/03/20 1241     Troponin T 0.044 ng/mL     Narrative:      Troponin T Reference Range:  <= 0.03 ng/mL-   Negative for AMI  >0.03 ng/mL-     Abnormal for myocardial necrosis.  Clinicians would have to utilize clinical acumen, EKG, Troponin and serial changes to determine if it is an Acute Myocardial Infarction or myocardial injury due to an underlying chronic condition.       Results may be falsely decreased if patient taking Biotin.      Comprehensive Metabolic Panel [[id number]]  (Abnormal) Collected: 11/03/20 1203    Specimen: Blood Updated: 11/03/20 1241     Glucose 99 mg/dL      BUN 16 mg/dL      Creatinine 0.99 mg/dL      Sodium 141 mmol/L      Potassium 3.8 mmol/L      Chloride 108 mmol/L      CO2 25.0 mmol/L      Calcium 9.2 mg/dL      Total Protein 6.5 g/dL      Albumin 3.90 g/dL      ALT (SGPT) 13 U/L      AST (SGOT) 17 U/L      Alkaline Phosphatase 92 U/L      Total Bilirubin 0.3 mg/dL      eGFR Non African Amer 72 mL/min/1.73      Globulin 2.6 gm/dL      A/G Ratio 1.5 g/dL      BUN/Creatinine Ratio 16.2     Anion Gap 8.0 mmol/L     Narrative:      GFR Normal  >60  Chronic Kidney Disease <60  Kidney Failure <15      BNP [[id number]]  (Abnormal) Collected: 11/03/20 1203    Specimen: Blood Updated: 11/03/20 1238     proBNP 4,259.0 pg/mL     Narrative:      Among patients with dyspnea, NT-proBNP is highly sensitive for the detection of acute congestive heart failure. In addition NT-proBNP of <300 pg/ml effectively rules out acute congestive heart failure with 99% negative predictive value.    Results may be falsely decreased if patient taking Biotin.      CBC & Differential [[id number]]  (Abnormal) Collected: 11/03/20 1203    Specimen: Blood Updated: 11/03/20 1218    Narrative:      The following orders were created for panel order CBC & Differential.  Procedure                               Abnormality         Status                     ---------                               -----------         ------                     CBC Auto Differential[[id number]]        Abnormal            Final result                 Please view results for these tests on the individual orders.    CBC Auto Differential [[id number]]  (Abnormal) Collected: 11/03/20 1203    Specimen: Blood Updated: 11/03/20 1218     WBC 7.27 10*3/mm3      RBC 3.99 10*6/mm3      Hemoglobin 11.7 g/dL      Hematocrit 35.8 %      MCV 89.7 fL      MCH 29.3 pg      MCHC 32.7 g/dL      RDW 16.5 %      RDW-SD 54.7 fl      MPV 10.0 fL      Platelets 175 10*3/mm3      Neutrophil % 66.5 %      Lymphocyte % 18.2 %      Monocyte % 10.2 %      Eosinophil % 4.3 %      Basophil % 0.7 %      Immature Grans % 0.1 %      Neutrophils, Absolute 4.84 10*3/mm3      Lymphocytes, Absolute 1.32 10*3/mm3      Monocytes, Absolute 0.74 10*3/mm3      Eosinophils, Absolute 0.31 10*3/mm3      Basophils, Absolute 0.05 10*3/mm3      Immature Grans, Absolute 0.01 10*3/mm3      nRBC 0.0 /100 WBC         Imaging Results (Last 24 Hours)     Procedure Component Value Units Date/Time    XR Chest 1 View [[id number]] Collected: 11/03/20 1215     Updated: 11/03/20  1235    Narrative:      Chest x-ray single view.         CLINICAL INDICATION: Shortness of breath    COMPARISON: Chest July 29, 2020.    FINDINGS: Cardiac silhouette is normal in size. Pulmonary  vascularity is unremarkable.     There are midline sternal sutures from prior cardiac surgery.  Subtle fibrotic changes left lung base.    Lungs otherwise clear.      Impression:      As above.    Electronically signed by:  Esdras Paul MD  11/3/2020 12:34 PM CST  Workstation: [id number]      Assessment and Plan    Chest pain     With EKG negative; mild elevation of troponin, chest x ray negative for an acute event; no chest pain at this time     Trend troponin; pending CTA chest to rule out PE;      Discussed with Dr Duval    Chronic medical problems     CAD     Essential hypertension     Chronic recurrent depression     Chronic pain syndrome     Gout     Dementia     Hyperlipidemia     BPH     Bilateral hypoacustic    I discussed the patient's findings and my recommendations with patient and his daughter    Ricky Xiong MD

## 2020-11-04 PROBLEM — I26.99 PULMONARY EMBOLI: Status: ACTIVE | Noted: 2020-11-04

## 2020-11-04 LAB
ALBUMIN SERPL-MCNC: 4.1 G/DL (ref 3.5–5.2)
ALBUMIN/GLOB SERPL: 1.4 G/DL
ALP SERPL-CCNC: 90 U/L (ref 39–117)
ALT SERPL W P-5'-P-CCNC: 13 U/L (ref 1–41)
ANION GAP SERPL CALCULATED.3IONS-SCNC: 9 MMOL/L (ref 5–15)
AST SERPL-CCNC: 20 U/L (ref 1–40)
BASOPHILS # BLD AUTO: 0.1 10*3/MM3 (ref 0–0.2)
BASOPHILS NFR BLD AUTO: 1.3 % (ref 0–1.5)
BILIRUB SERPL-MCNC: 0.5 MG/DL (ref 0–1.2)
BUN SERPL-MCNC: 12 MG/DL (ref 8–23)
BUN/CREAT SERPL: 13.2 (ref 7–25)
CALCIUM SPEC-SCNC: 9.7 MG/DL (ref 8.6–10.5)
CHLORIDE SERPL-SCNC: 104 MMOL/L (ref 98–107)
CHOLEST SERPL-MCNC: 151 MG/DL (ref 0–200)
CO2 SERPL-SCNC: 25 MMOL/L (ref 22–29)
CREAT SERPL-MCNC: 0.91 MG/DL (ref 0.76–1.27)
DEPRECATED RDW RBC AUTO: 55.2 FL (ref 37–54)
EOSINOPHIL # BLD AUTO: 0.28 10*3/MM3 (ref 0–0.4)
EOSINOPHIL NFR BLD AUTO: 3.7 % (ref 0.3–6.2)
ERYTHROCYTE [DISTWIDTH] IN BLOOD BY AUTOMATED COUNT: 16.8 % (ref 12.3–15.4)
GFR SERPL CREATININE-BSD FRML MDRD: 79 ML/MIN/1.73
GLOBULIN UR ELPH-MCNC: 2.9 GM/DL
GLUCOSE SERPL-MCNC: 96 MG/DL (ref 65–99)
HBA1C MFR BLD: 5.5 % (ref 4.8–5.6)
HCT VFR BLD AUTO: 38.5 % (ref 37.5–51)
HDLC SERPL-MCNC: 71 MG/DL (ref 40–60)
HGB BLD-MCNC: 12.5 G/DL (ref 13–17.7)
IMM GRANULOCYTES # BLD AUTO: 0.01 10*3/MM3 (ref 0–0.05)
IMM GRANULOCYTES NFR BLD AUTO: 0.1 % (ref 0–0.5)
LDLC SERPL CALC-MCNC: 66 MG/DL (ref 0–100)
LDLC/HDLC SERPL: 0.93 {RATIO}
LYMPHOCYTES # BLD AUTO: 1.67 10*3/MM3 (ref 0.7–3.1)
LYMPHOCYTES NFR BLD AUTO: 22.3 % (ref 19.6–45.3)
MCH RBC QN AUTO: 29.1 PG (ref 26.6–33)
MCHC RBC AUTO-ENTMCNC: 32.5 G/DL (ref 31.5–35.7)
MCV RBC AUTO: 89.5 FL (ref 79–97)
MONOCYTES # BLD AUTO: 0.61 10*3/MM3 (ref 0.1–0.9)
MONOCYTES NFR BLD AUTO: 8.2 % (ref 5–12)
NEUTROPHILS NFR BLD AUTO: 4.81 10*3/MM3 (ref 1.7–7)
NEUTROPHILS NFR BLD AUTO: 64.4 % (ref 42.7–76)
NRBC BLD AUTO-RTO: 0 /100 WBC (ref 0–0.2)
PLATELET # BLD AUTO: 181 10*3/MM3 (ref 140–450)
PMV BLD AUTO: 10.1 FL (ref 6–12)
POTASSIUM SERPL-SCNC: 4 MMOL/L (ref 3.5–5.2)
PROT SERPL-MCNC: 7 G/DL (ref 6–8.5)
RBC # BLD AUTO: 4.3 10*6/MM3 (ref 4.14–5.8)
SODIUM SERPL-SCNC: 138 MMOL/L (ref 136–145)
TRIGL SERPL-MCNC: 70 MG/DL (ref 0–150)
TROPONIN T SERPL-MCNC: 0.04 NG/ML (ref 0–0.03)
VLDLC SERPL-MCNC: 14 MG/DL (ref 5–40)
WBC # BLD AUTO: 7.48 10*3/MM3 (ref 3.4–10.8)

## 2020-11-04 PROCEDURE — 85025 COMPLETE CBC W/AUTO DIFF WBC: CPT | Performed by: INTERNAL MEDICINE

## 2020-11-04 PROCEDURE — 97162 PT EVAL MOD COMPLEX 30 MIN: CPT | Performed by: PHYSICAL THERAPIST

## 2020-11-04 PROCEDURE — 99223 1ST HOSP IP/OBS HIGH 75: CPT | Performed by: INTERNAL MEDICINE

## 2020-11-04 PROCEDURE — 83036 HEMOGLOBIN GLYCOSYLATED A1C: CPT | Performed by: INTERNAL MEDICINE

## 2020-11-04 PROCEDURE — 80053 COMPREHEN METABOLIC PANEL: CPT | Performed by: INTERNAL MEDICINE

## 2020-11-04 PROCEDURE — 80061 LIPID PANEL: CPT | Performed by: INTERNAL MEDICINE

## 2020-11-04 PROCEDURE — 25010000002 ENOXAPARIN PER 10 MG: Performed by: INTERNAL MEDICINE

## 2020-11-04 PROCEDURE — 97166 OT EVAL MOD COMPLEX 45 MIN: CPT

## 2020-11-04 RX ORDER — AMIODARONE HYDROCHLORIDE 200 MG/1
200 TABLET ORAL
Status: DISCONTINUED | OUTPATIENT
Start: 2020-11-04 | End: 2020-11-06 | Stop reason: HOSPADM

## 2020-11-04 RX ADMIN — METOPROLOL SUCCINATE 25 MG: 25 TABLET, FILM COATED, EXTENDED RELEASE ORAL at 08:49

## 2020-11-04 RX ADMIN — ASPIRIN 81 MG: 81 TABLET, FILM COATED ORAL at 08:50

## 2020-11-04 RX ADMIN — AMITRIPTYLINE HYDROCHLORIDE 100 MG: 50 TABLET, FILM COATED ORAL at 21:11

## 2020-11-04 RX ADMIN — LOSARTAN POTASSIUM 25 MG: 25 TABLET, FILM COATED ORAL at 08:50

## 2020-11-04 RX ADMIN — ENOXAPARIN SODIUM 70 MG: 80 INJECTION SUBCUTANEOUS at 18:48

## 2020-11-04 RX ADMIN — SODIUM CHLORIDE, PRESERVATIVE FREE 10 ML: 5 INJECTION INTRAVENOUS at 08:52

## 2020-11-04 RX ADMIN — TERAZOSIN HYDROCHLORIDE 1 MG: 1 CAPSULE ORAL at 21:11

## 2020-11-04 RX ADMIN — AMIODARONE HYDROCHLORIDE 200 MG: 200 TABLET ORAL at 14:38

## 2020-11-04 RX ADMIN — ROPINIROLE HYDROCHLORIDE 0.5 MG: 0.5 TABLET, FILM COATED ORAL at 08:50

## 2020-11-04 RX ADMIN — SODIUM CHLORIDE, PRESERVATIVE FREE 10 ML: 5 INJECTION INTRAVENOUS at 21:11

## 2020-11-04 RX ADMIN — ENOXAPARIN SODIUM 70 MG: 80 INJECTION SUBCUTANEOUS at 06:04

## 2020-11-04 RX ADMIN — ATORVASTATIN CALCIUM 20 MG: 20 TABLET, FILM COATED ORAL at 08:49

## 2020-11-04 NOTE — NURSING NOTE
Spoke with patients daughter to complete Pta medication list. Daughter is unable to confirm current medications a this time. Daughter states she can look tomorrow.

## 2020-11-04 NOTE — PROGRESS NOTES
[facility]  INPATIENT PROGRESS NOTE    Length of Stay: 0  Date of Admission: 11/3/2020  Primary Care Physician: Terry Peterson MD    Subjective   HPI:He has been complaining of shortness of breath for over 2 weeks and today he presented chest pain irradiated to the left arm; since he is a patient with long history of coronary artery disease, his daughter brought him here. At this time he denies any chest pain  He denies nausea, vomiting, headache, abdominal pain  Review of Systems   Constitutional: Positive for activity change.        Hard of hearing, elderly fragile appearing   HENT: Negative.    Eyes: Negative.    Respiratory: Negative.    Cardiovascular: Negative.    Gastrointestinal: Negative.    Endocrine: Negative.    Genitourinary: Negative.    Musculoskeletal: Negative.    Neurological: Negative.    Psychiatric/Behavioral: Negative for agitation and behavioral problems.      (S) stated that he would stay for at least 3 days; no fever and no shortness of breath;     All pertinent negatives and positives are as above. All other systems have been reviewed and are negative unless otherwise stated.     Prior to Admission medications    Medication Sig Start Date End Date Taking? Authorizing Provider   albuterol sulfate  (90 Base) MCG/ACT inhaler INHALE 2 PUFFS INTO THE LUNGS EVERY 6 (SIX) HOURS AS NEEDED FOR WHEEZING 4/8/19   Marcial Crain MD   amitriptyline (ELAVIL) 100 MG tablet Take 100 mg by mouth every night at bedtime. 4/8/19   Marcial Crain MD   aspirin 81 MG EC tablet Take 1 tablet by mouth Daily. 7/5/19   Júnior Licona MD   atorvastatin (LIPITOR) 20 MG tablet Take 1 tablet by mouth.    Marcial Crain MD   losartan (COZAAR) 25 MG tablet TAKE 1 TABLET BY MOUTH EVERY DAY 12/30/19   Bessy Duval MD   metoprolol succinate XL (TOPROL-XL) 25 MG 24 hr tablet TAKE 1 TABLET BY MOUTH EVERY DAY  12/16/19   Júnior Licona MD   omeprazole (priLOSEC) 40 MG capsule Take 40 mg by mouth Every Morning. 6/27/19   Marcial Crain MD   rOPINIRole (REQUIP) 0.5 MG tablet Take 0.5 mg by mouth Daily. 5/3/19   Marcial Crain MD   terazosin (HYTRIN) 1 MG capsule Take 1 capsule by mouth Every Night. 8/6/20   Charly Hercules MD   tolterodine (DETROL) 2 MG tablet Take 2 mg by mouth 2 (Two) Times a Day.    ProviderMarcial MD       amitriptyline, 100 mg, Oral, Nightly  aspirin, 81 mg, Oral, Daily  atorvastatin, 20 mg, Oral, Daily  enoxaparin, 1 mg/kg, Subcutaneous, Q12H  losartan, 25 mg, Oral, Daily  metoprolol succinate XL, 25 mg, Oral, Daily  pantoprazole, 40 mg, Oral, QAM  rOPINIRole, 0.5 mg, Oral, Daily  sodium chloride, 10 mL, Intravenous, Q12H  terazosin, 1 mg, Oral, Nightly           Objective    Temp:  [97 °F (36.1 °C)-97.8 °F (36.6 °C)] 97.8 °F (36.6 °C)  Heart Rate:  [60-83] 64  Resp:  [16-18] 18  BP: (118-166)/(58-90) 142/77    Physical Exam  Constitutional:       Comments: Chronically ill appearing    HENT:      Head: Normocephalic.      Nose: Nose normal.   Eyes:      Extraocular Movements: Extraocular movements intact.   Neck:      Musculoskeletal: Normal range of motion and neck supple.   Cardiovascular:      Rate and Rhythm: Normal rate and regular rhythm.      Pulses: Normal pulses.   Pulmonary:      Effort: Pulmonary effort is normal.      Breath sounds: Normal breath sounds.   Abdominal:      General: Bowel sounds are normal.      Palpations: Abdomen is soft.   Musculoskeletal: Normal range of motion.   Skin:     General: Skin is warm.   Neurological:      General: No focal deficit present.      Mental Status: He is alert. Mental status is at baseline.   Psychiatric:         Mood and Affect: Mood normal.           Results Review:  I have reviewed the labs, radiology results, and diagnostic studies.    Laboratory Data:   Results from last 7 days   Lab Units 11/04/20  0656 11/03/20  1203    SODIUM mmol/L 138 141   POTASSIUM mmol/L 4.0 3.8   CHLORIDE mmol/L 104 108*   CO2 mmol/L 25.0 25.0   BUN mg/dL 12 16   CREATININE mg/dL 0.91 0.99   GLUCOSE mg/dL 96 99   CALCIUM mg/dL 9.7 9.2   BILIRUBIN mg/dL 0.5 0.3   ALK PHOS U/L 90 92   ALT (SGPT) U/L 13 13   AST (SGOT) U/L 20 17   ANION GAP mmol/L 9.0 8.0     Estimated Creatinine Clearance: 55.8 mL/min (by C-G formula based on SCr of 0.91 mg/dL).  Results from last 7 days   Lab Units 11/03/20  1203   MAGNESIUM mg/dL 1.7         Results from last 7 days   Lab Units 11/04/20  0656 11/03/20  1203   WBC 10*3/mm3 7.48 7.27   HEMOGLOBIN g/dL 12.5* 11.7*   HEMATOCRIT % 38.5 35.8*   PLATELETS 10*3/mm3 181 175           Culture Data:   No results found for: BLOODCX  No results found for: URINECX  No results found for: RESPCX  No results found for: WOUNDCX  No results found for: STOOLCX  No components found for: BODYFLD    Radiology Data:   Imaging Results (Last 24 Hours)     Procedure Component Value Units Date/Time    US Venous Doppler Lower Extremity Bilateral (duplex) [[id number]] Collected: 11/03/20 1652     Updated: 11/03/20 1733    Narrative:      Doppler duplex venous examination bilateral lower extremities.         CLINICAL INDICATION: Pulmonary emboli.      COMPARISON: CTA thorax November 3, 2020.     FINDINGS:    The common femoral,  femoral, and popliteal veins of the  bilateral     lower extremity are well identified.    There are noncompressible echogenic acute appearing thrombi left  popliteal vein. Venous examination is otherwise unremarkable.      Impression:      Acute noncompressible echogenic thrombi left  popliteal vein.    Doppler venous examination is otherwise unremarkable.    Electronically signed by:  Esdras Paul MD  11/3/2020 5:32 PM CST  Workstation: [id number]    CT Angiogram Chest [[id number]] Collected: 11/03/20 1448     Updated: 11/03/20 1529    Narrative:      [facility] [place]    Patient Name: SYLVIA PACK    ORDERING:  ALLISON LÓPEZ    ATTENDING: ALLISON LÓPEZ     REFERRING: ALLISON LÓPEZ    -----------------------    EXAM DESCRIPTION: CT ANGIOGRAM CHEST    CLINICAL HISTORY:  PE suspected, low/intermediate prob, positive  D-dimer, R07.2 Precordial pain       COMPARISON: CT chest 7/31/2020    TECHNIQUE:   Axial imaging obtained utilizing intravenous contrast at CT PE  protocol with reformatted images obtained. Computer generated 3-D  reconstructions/MIPS were performed.  This exam was performed according to our departmental dose  optimization program, which includes automated exposure control,  adjustment of the mA and/or KV according to patient size and/or  use of iterative reconstruction technique.  Dose Length Product 184.8  CONTRAST:   68 mL intravenous Isovue 370    FINDINGS:  Diagnostic quality: Adequate .  Pulmonary embolism: Multifocal acute embolic event contributing  to luminal filling defects within the right upper, middle, and  lower lobe segmental and subsegmental branches. No embolic  changes on the left confirmed. No large main pulmonary trunk  thrombus or saddle embolus identified.  Pulmonary arteries:  Normal in caliber.  Lung parenchyma: Scattered linear type scarring and atelectasis  present bilaterally involving multiple lobes. There is no dense  parenchymal consolidation, pleural effusion, or pneumothorax. No  pulmonary mass or suspicious nodule.  Central airways: Patent.  Adenopathy: Redemonstration of mildly enlarged lymph nodes  positioned at or just below the level of the james measuring  approximately 1.35 and 1.7 cm, similar to the prior exam. No  development of conglomerate or necrotic adenopathy. No thyroid  mass or enlargement.  Heart and great vessels: No significant cardiac enlargement or  pericardial effusion. Coronary vascular calcification is present.  Upper abdomen: No acute findings within the included upper  abdomen. No adrenal mass. There is mild adrenal thickening..    Bones:  Demineralization and degenerative changes within the  spine. Sternotomy wires are present. No compression fracture  within the spine seen..    Additional findings: None.      Impression:      Showering of emboli on the right involving upper, mid, and lower  lobe branches.    Scattered linear scarring and/or atelectasis. No consolidation,  effusion or pneumothorax.    Stable appearance of the mediastinal lymph nodes described on the  prior examination. No development of necrotic or conglomerate  lymph node. No pulmonary mass or suspicious nodule.    Findings reported to Dr. Harris at the time of interpretation on  11/3/2020.    Electronically signed by:  Lakhwinder Chavarria MD  11/3/2020 3:28 PM  CST Workstation: [id number]    XR Chest 1 View [[id number]] Collected: 11/03/20 1215     Updated: 11/03/20 1235    Narrative:      Chest x-ray single view.         CLINICAL INDICATION: Shortness of breath    COMPARISON: Chest July 29, 2020.    FINDINGS: Cardiac silhouette is normal in size. Pulmonary  vascularity is unremarkable.     There are midline sternal sutures from prior cardiac surgery.  Subtle fibrotic changes left lung base.    Lungs otherwise clear.      Impression:      As above.    Electronically signed by:  Esdras Paul MD  11/3/2020 12:34 PM CST  Workstation: [id number]          I have reviewed the patient's current medications.     Assessment/Plan   Assessment and Plan    Chest pain     With EKG negative; mild elevation of troponin, chest x ray negative for an acute event; no chest pain at this time     troponin mildly elevated; likely due to below    Right lung emboli     He is keeping good O2 sat levels     Continue with lovenox full treatment for at least 3 days and then will switch to OACs     Left popliteal vein DVT     Continue with full dose lovenox treatment    Asymptomatic run of v tach     Cardiology recommended to take amiodarone     Physical deconditioning     PT and OT consulted    Chronic medical problems      CAD     Essential hypertension     Chronic recurrent depression     Chronic pain syndrome     Gout     Dementia     Hyperlipidemia     Recent left hip fracture on 7/2020      BPH     Bilateral hypoacustic       Discharge Planning: I expect patient to be discharged to home in 2-3 days    Ricky Xiong MD

## 2020-11-04 NOTE — THERAPY EVALUATION
Acute Care - Occupational Therapy Initial Evaluation  [facility]     Patient Name: Jose Dacosta  : 1935  MRN: [id number]  Today's Date: 2020  Onset of Illness/Injury or Date of Surgery: 20  Date of Referral to OT: 20  Referring Physician: SHANTELL Xiong MD     Admit Date: 11/3/2020       ICD-10-CM ICD-9-CM   1. Precordial pain  R07.2 786.51   2. Impaired mobility and ADLs  Z74.09 V49.89    Z78.9      Patient Active Problem List   Diagnosis   • Coronary artery disease involving native coronary artery of native heart without angina pectoris   • Vasovagal syncope   • RBBB (right bundle branch block with left anterior fascicular block)   • Essential hypertension   • Ischemic cardiomyopathy   • Syncope and collapse   • Closed fracture of neck of left femur (CMS/HCC)   • Coronary artery disease with history of myocardial infarction without history of CABG   • Facial laceration   • HFrEF (heart failure with reduced ejection fraction) (CMS/HCC)   • Postoperative anemia due to acute blood loss   • Pneumonia of both lower lobes due to infectious organism   • Acute urinary retention   • Metabolic encephalopathy   • Lumbar radiculopathy   • Precordial pain   • Pulmonary emboli (CMS/HCC)     Past Medical History:   Diagnosis Date   • Abnormal ECG      Past Surgical History:   Procedure Laterality Date   • CORONARY STENT PLACEMENT     • HIP HEMIARTHROPLASTY Left 2020    Procedure: LEFT HIP HEMIARTHROPLASTY;  Surgeon: Jitendra Solis MD;  Location: [facility];  Service: Orthopedics;  Laterality: Left;          OT ASSESSMENT FLOWSHEET (last 12 hours)      OT Evaluation and Treatment     Row Name 20 1353                   OT Time and Intention    Subjective Information  no complaints  -ME        Document Type  evaluation  -ME        Mode of Treatment  individual therapy;occupational therapy  -ME        Total Minutes, Occupational Therapy  39  -ME        Patient Effort  good  -ME         Comment  had L hip surgery in July of this year; EXTREM[place]; RN ok'ed evaluation; extremely poor safety awareness with impulsivity   -ME           General Information    Patient Profile Reviewed  yes  -ME        Onset of Illness/Injury or Date of Surgery  11/03/20  -ME        Referring Physician  SHANTELL Xiong MD   -ME        Patient/Family/Caregiver Comments/Observations  wife present at beginning of session   -ME        General Observations of Patient  fowlers in bed, on room air, tele   -ME        Prior Level of Function  independent:;all household mobility;community mobility;ADL's  -ME        Equipment Currently Used at Home  cane, straight;walker, rolling;shower chair hand held shower head  -ME        Existing Precautions/Restrictions  fall  -ME        Limitations/Impairments  hearing;safety/cognitive  -ME        Equipment Issued to Patient  gait belt  -ME        Barriers to Rehab  hearing deficit  -ME           Living Environment    Current Living Arrangements  home/apartment/condo  -ME        Home Accessibility  stairs to enter home  -ME        Lives With  spouse  -ME           Home Main Entrance    Number of Stairs, Main Entrance  three  -ME        Stair Railings, Main Entrance  railing on left side (ascending)  -ME        Stairs Comment, Main Entrance  also states that he has a ramp at the back door   -ME           Sensory    Additional Documentation  Hearing Assessment (Group);Sensory Assessment (Somatosensory) (Group);Vision Assessment/Intervention (Group)  -ME           Hearing Assessment    Hearing Status  hearing impairment, bilaterally  -ME           Vision Assessment/Intervention    Visual Impairment/Limitations  corrective lenses for reading  -ME           Sensory Assessment (Somatosensory)    Sensory Assessment (Somatosensory)  UE sensation intact  -ME           Cognition    Orientation Status (Cognition)  oriented to;person;place;situation correct year, incorrect month   -ME        Follows  Commands (Cognition)  follows one-step commands;75-90% accuracy;delayed response/completion;increased processing time needed;initiation impaired;physical/tactile prompts required;repetition of directions required;verbal cues/prompting required;visual cue hearing deficit   -ME           Pain Assessment    Additional Documentation  Pain Scale: Numbers Pre/Post-Treatment (Group)  -ME           Pain Scale: Numbers Pre/Post-Treatment    Pretreatment Pain Rating  0/10 - no pain  -ME        Posttreatment Pain Rating  0/10 - no pain  -ME           Range of Motion (ROM)    Range of Motion  bilateral upper extremities;ROM is [facility]  -ME           Range of Motion Comprehensive    General Range of Motion  no range of motion deficits identified  -ME           Strength (Manual Muscle Testing)    Strength (Manual Muscle Testing)  bilateral upper extremities;strength is [facility]  -ME           Strength Comprehensive (MMT)    Comment, General Manual Muscle Testing (MMT) Assessment  BUE strength and  strength grossly 4- to 4/5; pt writes with his R hand, but throws a ball and does other tasks with the L   -ME           Bed Mobility    Bed Mobility  supine-sit;sit-supine  -ME        Supine-Sit [place] (Bed Mobility)  supervision  -ME        Sit-Supine [place] (Bed Mobility)  supervision  -ME        Assistive Device (Bed Mobility)  bed rails;head of bed elevated  -ME        Comment (Bed Mobility)  no physical assistance; pt has very poor safety awareness   -ME           Functional Mobility    Functional Mobility- Ind. Level  contact guard assist  -ME        Functional Mobility- Device  other (see comments) none   -ME        Functional Mobility- Comment  pt jumped up from side of bed and started ambulating with no warning; did not allow for gait belt to be placed and was not wearing non skid socks; after ambulating and OT able to get pt back to sititng EOB, non skid socks were applied and pt was given gait belt; should be using RW  as pt demonstrates forward flexed posture and reaches for furniture; requires vc's to stand up tall   -ME           Transfer Assessment/Treatment    Transfers  stand-sit transfer;sit-stand transfer  -ME           Transfers    Sit-Stand [place] (Transfers)  contact guard  -ME        Stand-Sit [place] (Transfers)  contact guard  -ME           Sit-Stand Transfer    Assistive Device (Sit-Stand Transfers)  other (see comments) none   -ME           Stand-Sit Transfer    Assistive Device (Stand-Sit Transfers)  other (see comments) noneq  -ME           Safety Issues, Functional Mobility    Safety Issues Affecting Function (Mobility)  ability to follow commands;at risk behavior observed;awareness of need for assistance;impulsivity;insight into deficits/self-awareness;judgment;problem-solving;safety precaution awareness;safety precautions follow-through/compliance  -ME        Impairments Affecting Function (Mobility)  balance;endurance/activity tolerance;cognition;strength  -ME        Cognitive Impairments, Mobility Safety/Performance  attention;awareness, need for assistance;impulsivity;insight into deficits/self-awareness;judgment;problem-solving/reasoning;safety precaution awareness;safety precaution follow-through  -ME           Activities of Daily Living    BADL Assessment/Intervention  lower body dressing;toileting  -ME           Lower Body Dressing Assessment/Training    [place] Level (Lower Body Dressing)  doff;don;socks;set up;supervision  -ME        Position (Lower Body Dressing)  edge of bed sitting  -ME           Toileting Assessment/Training    [place] Level (Toileting)  adjust/manage clothing;perform perineal hygiene;contact guard assist  -ME        Assistive Devices (Toileting)  commode  -ME        Position (Toileting)  unsupported standing  -ME        Comment (Toileting)  pt refused to sit on toilet to complete toileting; was completed in standing and with CGA; states that he has trouble  holding his bladder and is mostly incontinent of bladder   -ME           BADL Safety/Performance    Impairments, BADL Safety/Performance  balance;endurance/activity tolerance;cognition;strength  -ME        Cognitive Impairments, BADL Safety/Performance  attention;awareness, need for assistance;impulsivity;insight into deficits/self-awareness;judgment;problem-solving/reasoning;safety precaution awareness;safety precaution follow-through  -ME           Wound 08/01/20 1749 Left heel    Wound - Properties Group Placement Date: 08/01/20  -TC Placement Time: 1749  -TC Present on Hospital Admission: N  -TC Side: Left  -TC Location: heel  -TC    Retired Wound - Properties Group Date first assessed: 08/01/20  -TC Time first assessed: 1749  -TC Present on Hospital Admission: N  -TC Side: Left  -TC Location: heel  -TC       Plan of Care Review    Plan of Care Reviewed With  patient  -ME           Vital Signs    Pre Systolic BP Rehab  148  -ME        Pre Treatment Diastolic BP  74  -ME        Pretreatment Heart Rate (beats/min)  69  -ME        Posttreatment Heart Rate (beats/min)  80  -ME        Pre SpO2 (%)  98  -ME        O2 Delivery Pre Treatment  room air  -ME        Post SpO2 (%)  96  -ME        O2 Delivery Post Treatment  room air  -ME        Pre Patient Position  Supine  -ME           OT Goals    Transfer Goal Selection (OT)  transfer, OT goal 1  -ME        Bathing Goal Selection (OT)  bathing, OT goal 1  -ME        Dressing Goal Selection (OT)  dressing, OT goal 1  -ME        Toileting Goal Selection (OT)  toileting, OT goal 1  -ME        Endurance Goal Selection (OT)  endurance, OT goal 1  -ME        Safety Awareness Goal Selection (OT)  safety awareness, OT goal 1  -ME           Transfer Goal 1 (OT)    Activity/Assistive Device (Transfer Goal 1, OT)  toilet AD as needed   -ME        [place] Level/Cues Needed (Transfer Goal 1, OT)  modified independence  -ME        Time Frame (Transfer Goal 1, OT)  long term goal  (LTG);by discharge  -ME        Progress/Outcome (Transfer Goal 1, OT)  goal not met  -ME           Bathing Goal 1 (OT)    Activity/Device (Bathing Goal 1, OT)  lower body bathing AD as needed   -ME        [place] Level/Cues Needed (Bathing Goal 1, OT)  modified independence  -ME        Time Frame (Bathing Goal 1, OT)  long term goal (LTG);by discharge  -ME        Progress/Outcomes (Bathing Goal 1, OT)  goal not met  -ME           Dressing Goal 1 (OT)    Activity/Device (Dressing Goal 1, OT)  lower body dressing AD as needed   -ME        [place]/Cues Needed (Dressing Goal 1, OT)  modified independence  -ME        Time Frame (Dressing Goal 1, OT)  long term goal (LTG);by discharge  -ME        Progress/Outcome (Dressing Goal 1, OT)  goal not met  -ME           Toileting Goal 1 (OT)    Activity/Device (Toileting Goal 1, OT)  toileting skills, all  -ME        [place] Level/Cues Needed (Toileting Goal 1, OT)  modified independence  -ME        Time Frame (Toileting Goal 1, OT)  long term goal (LTG);by discharge  -ME        Progress/Outcome (Toileting Goal 1, OT)  goal not met  -ME            Endurance Goal 1 (OT)    Endurance Goal 1 (OT)  15 min functional activity with proper EC techniques   -ME        Time Frame (Endurance Goal 1, OT)  long term goal (LTG);by discharge  -ME        Progress/Outcome (Endurance Goal 1, OT)  goal not met  -ME           Safety Awareness Goal 1 (OT)    Activity (Safety Awareness Goal 1, OT)  awareness of need for assistance;impulse control;insight into deficits/self awareness;judgment;safe use of assistive device/equipment;follow through of safety precautions;demonstrates compliance;demonstration of safe behaviors;demonstrates understanding  -ME        [place]/Cues/Accuracy (Safety Awareness Goal 1, OT)  with minimum;verbal cues/redirection;with 80% accuracy  -ME        Time Frame (Safety Awareness Goal 1, OT)  long term goal (LTG);by discharge  -ME        Progress/Outcome  (Safety Awareness Goal 1, OT)  goal not met  -ME           Positioning and Restraints    Pre-Treatment Position  in bed  -ME        Post Treatment Position  bed  -ME        In Bed  notified nsg;supine;call light within reach;encouraged to call for assist;exit alarm on  -ME           Therapy Assessment/Plan (OT)    Date of Referral to OT  11/04/20  -ME        Patient/Family Therapy Goal Statement (OT)  to return home   -ME        Functional Level at Time of Evaluation (OT)  decreased strength, balance, endurance, and safety awareness   -ME        OT Diagnosis  impaired mobility and ADLs   -ME        Rehab Potential (OT)  good, to achieve stated therapy goals  -ME        Criteria for Skilled Therapeutic Interventions Met (OT)  yes;meets criteria;skilled treatment is necessary  -ME        Therapy Frequency (OT)  other (see comments) 5-7 days per week  -ME        Predicted Duration of Therapy Intervention (OT)  until d/c or all goals met   -ME        Problem List (OT)  problems related to;balance;cognition;mobility;strength;hearing  -ME        Activity Limitations Related to Problem List (OT)  unable to ambulate safely;unable to transfer safely;IADLs not performed adequately or safely;BADLs not performed adequately or safely  -ME        Planned Therapy Interventions (OT)  activity tolerance training;adaptive equipment training;BADL retraining;functional balance retraining;IADL retraining;occupation/activity based interventions;patient/caregiver education/training;ROM/therapeutic exercise;strengthening exercise;transfer/mobility retraining  -ME           Therapy Plan Review/Discharge Plan (OT)    Therapy Plan Review (OT)  evaluation/treatment results reviewed;care plan/treatment goals reviewed;risks/benefits reviewed;patient  -ME        Anticipated Discharge Disposition (OT)  home;home with home health;home with assist  -ME          User Key  (r) = Recorded By, (t) = Taken By, (c) = Cosigned By    Initials Name Effective  Dates    Kandis Hudson RN 10/17/16 -     ME Eitan Haynes OTR/L 08/24/20 -          Occupational Therapy Education                 Title: PT OT SLP Therapies (In Progress)     Topic: Occupational Therapy (In Progress)     Point: ADL training (Not Started)     Description:   Instruct learner(s) on proper safety adaptation and remediation techniques during self care or transfers.   Instruct in proper use of assistive devices.              Learner Progress:  Not documented in this visit.          Point: Home exercise program (Not Started)     Description:   Instruct learner(s) on appropriate technique for monitoring, assisting and/or progressing therapeutic exercises/activities.              Learner Progress:  Not documented in this visit.          Point: Precautions (Done)     Description:   Instruct learner(s) on prescribed precautions during self-care and functional transfers.              Learning Progress Summary           Patient Acceptance, E, VU,NR by ME at 11/4/2020 1602    Comment: Educated on OT And POC. Educated to call for assistance. Educated on safety precautions.                   Point: Body mechanics (Done)     Description:   Instruct learner(s) on proper positioning and spine alignment during self-care, functional mobility activities and/or exercises.              Learning Progress Summary           Patient Acceptance, E, VU,NR by ME at 11/4/2020 1602    Comment: Educated on OT And POC. Educated to call for assistance. Educated on safety precautions.                               User Key     Initials Effective Dates Name Provider Type Discipline    ME 08/24/20 -  Eitan Haynes OTR/L Occupational Therapist OT                  OT Recommendation and Plan  Planned Therapy Interventions (OT): activity tolerance training, adaptive equipment training, BADL retraining, functional balance retraining, IADL retraining, occupation/activity based interventions, patient/caregiver education/training,  ROM/therapeutic exercise, strengthening exercise, transfer/mobility retraining  Therapy Frequency (OT): other (see comments)(5-7 days per week)  Plan of Care Review  Plan of Care Reviewed With: patient  Outcome Summary: initial OT evaluation complete. pt was pleasant and cooperative throughout but is extremely hard of hearing. oriented x 3. very poor safety awareness with large amounts of impulsivity. completed bed mobility with supervision. transfers and functional mobility completed with CGA and use of no AD. pt would benefit from use of RW as he reaches for furniture, but was so impulsive that OT did not have time to get the RW. no LOB.  completed toileting in standing with CGA. donned/doffed socks while sitting EOB with supervision and set-up. pt was getting home health therapy before admission, and recommend pt continue to get this when discharged. did have hip surgery on L hip in July of 2020. would benefit from further skilled OT services to address functional mobility and ADL deficits, as well as strength, balance, endurance, and safety awareness. recommend home with assistance and home health OT at discharge. goals established.  Plan of Care Reviewed With: patient  Outcome Summary: initial OT evaluation complete. pt was pleasant and cooperative throughout but is extremely hard of hearing. oriented x 3. very poor safety awareness with large amounts of impulsivity. completed bed mobility with supervision. transfers and functional mobility completed with CGA and use of no AD. pt would benefit from use of RW as he reaches for furniture, but was so impulsive that OT did not have time to get the RW. no LOB.  completed toileting in standing with CGA. donned/doffed socks while sitting EOB with supervision and set-up. pt was getting home health therapy before admission, and recommend pt continue to get this when discharged. did have hip surgery on L hip in July of 2020. would benefit from further skilled OT services  to address functional mobility and ADL deficits, as well as strength, balance, endurance, and safety awareness. recommend home with assistance and home health OT at discharge. goals established.    Outcome Measures     Row Name 11/04/20 1353             How much help from another is currently needed...    Putting on and taking off regular lower body clothing?  3  -ME      Bathing (including washing, rinsing, and drying)  3  -ME      Toileting (which includes using toilet bed pan or urinal)  3  -ME      Putting on and taking off regular upper body clothing  3  -ME      Taking care of personal grooming (such as brushing teeth)  4  -ME      Eating meals  4  -ME      AM-PAC 6 Clicks Score (OT)  20  -ME         Functional Assessment    Outcome Measure Options  AM-PAC 6 Clicks Daily Activity (OT)  -ME        User Key  (r) = Recorded By, (t) = Taken By, (c) = Cosigned By    Initials Name Provider Type    Eitan Bobo OTR/L Occupational Therapist          Time Calculation:   Time Calculation- OT     Row Name 11/04/20 1603             Time Calculation- OT    OT Start Time  1353  -ME      OT Stop Time  1432  -ME      OT Time Calculation (min)  39 min  -ME      OT Received On  11/04/20  -ME      OT Goal Re-Cert Due Date  11/17/20  -ME        User Key  (r) = Recorded By, (t) = Taken By, (c) = Cosigned By    Initials Name Provider Type    Eitan Bobo OTR/L Occupational Therapist        Therapy Charges for Today     Code Description Service Date Service Provider Modifiers Qty    39171219807 HC OT EVAL MOD COMPLEXITY 3 11/4/2020 Eitan Haynes OTR/L GO 1               MARTY Kilgore/JAZZY  11/4/2020

## 2020-11-04 NOTE — NURSING NOTE
Telemetry called to inform this nurse that the pt had 11 beats of VTACH.  Dr. Xiong/Dr. Licona notified.  Pt now in normal sinus rhythm.  Will continue to monitor.

## 2020-11-04 NOTE — CONSULTS
Cardiology at [facility]  Cardiovascular Consultation Note      Jose Dacosta  322/1  [id number]  1935    DATE OF ADMISSION: 11/3/2020  DATE OF CONSULTATION:  11/4/2020    Terry Peterson MD  Treatment Team:   Attending Provider: Emile Santoro MD  Consulting Physician: Emile Santoro MD  Consulting Physician: Júnior Licona MD  Consulting Physician: Ricky Xiong MD  Admitting Provider: Emile Santoro MD    Chief Complaint   Patient presents with   • Chest Pain   • Shortness of Breath       Chief complaint/ Reason for Consultation chest pain with persistent mildly abnormal troponin and history of nonsustained ventricular tachycardia refused cardiac evaluation and ICD documented in my previous evaluation      History of Present Illness  Patient's Body mass index is 19.53 kg/m². BMI is within normal parameters. No follow-up required.     85 years old patient with a background history of CAD, CABG more than 10 years ago, history of right bundle branch block morphology documented back in 2013, history of LV dysfunction and documented nonsustained ventricular tachycardia, normal stress test no evidence of ischemia with impaired left and systolic function echocardiogram ejection fractions 25% who previously evaluated in the office.  His family was present at that time and have discussed with the patient given the ischemic cardiomyopathy LV dysfunction and documented ventricular tachycardia to consider ICD implantation pros and cons explained patient flatly refused and not interested in.  Then admitted to [facility] following fall second to postural syncope first he does not want to come to the hospital secondary he was forced and noted to have hip fracture underwent hip surgery and subsequent discharge.  He had mildly persistent abnormal troponin and admitted for evaluation of chest pain.  To me he denies any chest pain and to any question the onset he does  not want anything to be done and want to go home.  On the monitor patient with nonsustained ventricular tachycardia and mainly symptomatic.  Baseline EKG sinus rhythm with right bundle branch block morphology and prolonged NJ interval.  He is on low-dose beta-blocker.  He does have history of orthostasis not a candidate for aggressive antiremodeling medications.  Someone from his family works in the patient has a history of vasovagal syncope Cath Lab and I will discuss the case again not interested in any invasive cardiac evaluation he does have history of vasovagal syncope, postural dizziness, history of carotid endarterectomy and mild abdominal aortic aneurysm  Clinically not in congestive heart failure even his BNP is mildly abnormal    .  Component   Ref Range & Units 1d ago  (11/3/20) 1d ago  (11/3/20) 1d ago  (11/3/20) 1d ago  (11/3/20) 1d ago  (11/3/20)   Troponin T   0.000 - 0.030 ng/mL 0.041High Critical   0.046High Critical   0.042High Critical   0.039High Critical   0.044High Critical         Total Cholesterol   0 - 200 mg/dL 151    Triglycerides   0 - 150 mg/dL 70    HDL Cholesterol   40 - 60 mg/dL 71High     LDL Cholesterol    0 - 100 mg/dL 66    VLDL Cholesterol   5 - 40 mg/dL 14    LDL/HDL Ratio  0.93    HOLTER  #1 sinus rhythm with average heart rate of 75 minimum 55 and maximum 110 bpm with underlying intraventricular conduction delay  2 frequent premature ventricular complex with full runs of nonsustained wide QRS complex tachycardia longest is 3 beat with heart rate range from 130 to 150 bpm  #3 rare premature supraventricular ectopic beat with rare nonsustained supraventricular tachycardia longest is 4 beat with maximum heart rate 160 but beats per minute   #4 no significant bradyarrhythmia or pause noted     Echo  7/2019  · The left ventricular cavity is moderately dilated.  · Estimated EF = 25%.  · Left ventricular diastolic dysfunction (grade I) consistent with impaired relaxation.  · Mild  mitral valve regurgitation is present  · Mild tricuspid valve regurgitation is present.  · Mild pulmonic valve regurgitation is present.  · Estimated EF appears to be in the range of 21 - 25%. Estimated EF = 25%. Normal left ventricular wall thickness noted. There is left ventricular global hypokinesis noted. The left ventricular cavity is moderately dilated. Left ventricular diastolic dysfunction is noted (grade I) consistent with impaired relaxation.     STRESS TEST 7/2019  · Findings consistent with an equivocal ECG stress test.  · Left ventricular ejection fraction is moderately reduced (Calculated EF = 26%).  Moderately fixed inferolateral defect with no reversibility, LV dilated  Medical History         Past Medical History:   Diagnosis Date   • Abnormal ECG        Past Surgical History:   Procedure Laterality Date   • CORONARY STENT PLACEMENT     • HIP HEMIARTHROPLASTY Left 7/9/2020    Procedure: LEFT HIP HEMIARTHROPLASTY;  Surgeon: Jitendra Solis MD;  Location: [facility];  Service: Orthopedics;  Laterality: Left;       Allergies   Allergen Reactions   • Hydrochlorothiazide Swelling     TONGUE SWELLING       No current facility-administered medications on file prior to encounter.      Current Outpatient Medications on File Prior to Encounter   Medication Sig Dispense Refill   • albuterol sulfate  (90 Base) MCG/ACT inhaler INHALE 2 PUFFS INTO THE LUNGS EVERY 6 (SIX) HOURS AS NEEDED FOR WHEEZING  3   • amitriptyline (ELAVIL) 100 MG tablet Take 100 mg by mouth every night at bedtime.  3   • aspirin 81 MG EC tablet Take 1 tablet by mouth Daily. 30 tablet 3   • atorvastatin (LIPITOR) 20 MG tablet Take 1 tablet by mouth.     • losartan (COZAAR) 25 MG tablet TAKE 1 TABLET BY MOUTH EVERY DAY 30 tablet 5   • metoprolol succinate XL (TOPROL-XL) 25 MG 24 hr tablet TAKE 1 TABLET BY MOUTH EVERY DAY 30 tablet 5   • omeprazole (priLOSEC) 40 MG capsule Take 40 mg by mouth Every Morning.  2   • rOPINIRole  "(REQUIP) 0.5 MG tablet Take 0.5 mg by mouth Daily.  2   • terazosin (HYTRIN) 1 MG capsule Take 1 capsule by mouth Every Night.     • tolterodine (DETROL) 2 MG tablet Take 2 mg by mouth 2 (Two) Times a Day.         Social History     Socioeconomic History   • Marital status:      Spouse name: Not on file   • Number of children: Not on file   • Years of education: Not on file   • Highest education level: Not on file   Tobacco Use   • Smoking status: Former Smoker     Quit date:      Years since quittin.8   • Smokeless tobacco: Never Used   Substance and Sexual Activity   • Alcohol use: No     Frequency: Never   • Drug use: No   • Sexual activity: Defer           REVIEW OF SYSTEMS:   ROS  Review of Systems   Constitutional:  Generalized weakness.  Patient denies fever cough or chills       Chronically ill appearing   HENT: Negative.    Eyes: Negative.    Respiratory: Positive for shortness of breath no orthopnea PND reported..    Cardiovascular: Positive for chest pain.  Pain-free at the time of evaluation no palpitation fluttering.  Gastrointestinal: Negative.    Endocrine: Negative for polydipsia polyuria.    Genitourinary: Negative negative for dysuria or hematuria   Musculoskeletal:  Positive history of hip fracture status post surgery   Skin: Negative.    Neurological: Positive for history of vasovagal syncope.    Hematological: Negative.    Psychiatric/Behavioral: Negative.       Objective:     Vitals:    20 0346 20 0509 20 0745 20 0806   BP:    142/77   BP Location:    Left arm   Patient Position:    Sitting   Pulse: 81  65 64   Resp:    18   Temp:    97.8 °F (36.6 °C)   TempSrc:    Temporal   SpO2:    96%   Weight:  65.3 kg (144 lb)     Height:         Body mass index is 19.53 kg/m².  Flowsheet Rows      First Filed Value   Admission Height  182.9 cm (72\") Documented at 2020 1134   Admission Weight  74.8 kg (165 lb) Documented at 2020 1134    "       Intake/Output Summary (Last 24 hours) at 11/4/2020 1052  Last data filed at 11/3/2020 2205  Gross per 24 hour   Intake --   Output 75 ml   Net -75 ml         Physical Exam   Physical Exam  Constitutional:       Comments: Chronically ill appearing   HENT:      Nose: Nose normal.      Mouth/Throat:      Mouth: Mucous membranes are moist.   Eyes:      Extraocular Movements: Extraocular movements intact.   Neck:      Musculoskeletal: Normal range of motion and neck supple.   Cardiovascular:      Rate and Rhythm: Normal rate.      Pulses: Normal pulses.   Pulmonary:      Effort: Pulmonary effort is normal.      Breath sounds: Normal breath sounds.   Abdominal:      General: Bowel sounds are normal.   Musculoskeletal: Normal range of motion.      Comments: Minimal swelling bilateral lower extremities   Skin:     General: Skin is warm.   Neurological:      General: No focal deficit present.      Mental Status: He is alert. Mental status is at baseline.   Psychiatric:         Mood and Affect: Mood normal.   Radiology Review    US Venous Doppler Lower Extremity Bilateral (duplex)   Final Result   Acute noncompressible echogenic thrombi left   popliteal vein.      Doppler venous examination is otherwise unremarkable.      Electronically signed by:  Esdras Paul MD  11/3/2020 5:32 PM CST   Workstation: [id number]      CT Angiogram Chest   Final Result   Showering of emboli on the right involving upper, mid, and lower   lobe branches.      Scattered linear scarring and/or atelectasis. No consolidation,   effusion or pneumothorax.      Stable appearance of the mediastinal lymph nodes described on the   prior examination. No development of necrotic or conglomerate   lymph node. No pulmonary mass or suspicious nodule.      Findings reported to Dr. Harris at the time of interpretation on   11/3/2020.      Electronically signed by:  Lakhwinder Chavarria MD  11/3/2020 3:28 PM   CST Workstation: [id number]      XR Chest 1 View   Final Result    As above.      Electronically signed by:  Esdras Paul MD  11/3/2020 12:34 PM [facility]   Workstation: [id number]          Lab Results:  Lab Results (last 24 hours)     Procedure Component Value Units Date/Time    Lipid Panel [[id number]]  (Abnormal) Collected: 11/04/20 0656    Specimen: Blood Updated: 11/04/20 0742     Total Cholesterol 151 mg/dL      Triglycerides 70 mg/dL      HDL Cholesterol 71 mg/dL      LDL Cholesterol  66 mg/dL      VLDL Cholesterol 14 mg/dL      LDL/HDL Ratio 0.93    Narrative:      Cholesterol Reference Ranges  (U.S. Department of Health and Human Services ATP III Classifications)    Desirable          <200 mg/dL  Borderline High    200-239 mg/dL  High Risk          >240 mg/dL      Triglyceride Reference Ranges  (U.S. Department of Health and Human Services ATP III Classifications)    Normal           <150 mg/dL  Borderline High  150-199 mg/dL  High             200-499 mg/dL  Very High        >500 mg/dL    HDL Reference Ranges  (U.S. Department of Health and Human Services ATP III Classifcations)    Low     <40 mg/dl (major risk factor for CHD)  High    >60 mg/dl ('negative' risk factor for CHD)        LDL Reference Ranges  (U.S. Department of Health and Human Services ATP III Classifcations)    Optimal          <100 mg/dL  Near Optimal     100-129 mg/dL  Borderline High  130-159 mg/dL  High             160-189 mg/dL  Very High        >189 mg/dL    Comprehensive Metabolic Panel [[id number]] Collected: 11/04/20 0656    Specimen: Blood Updated: 11/04/20 0742     Glucose 96 mg/dL      BUN 12 mg/dL      Creatinine 0.91 mg/dL      Sodium 138 mmol/L      Potassium 4.0 mmol/L      Chloride 104 mmol/L      CO2 25.0 mmol/L      Calcium 9.7 mg/dL      Total Protein 7.0 g/dL      Albumin 4.10 g/dL      ALT (SGPT) 13 U/L      AST (SGOT) 20 U/L      Alkaline Phosphatase 90 U/L      Total Bilirubin 0.5 mg/dL      eGFR Non African Amer 79 mL/min/1.73      Globulin 2.9 gm/dL      A/G Ratio 1.4 g/dL       BUN/Creatinine Ratio 13.2     Anion Gap 9.0 mmol/L     Narrative:      GFR Normal >60  Chronic Kidney Disease <60  Kidney Failure <15      Hemoglobin A1c [[id number]]  (Normal) Collected: 11/04/20 0656    Specimen: Blood Updated: 11/04/20 0739     Hemoglobin A1C 5.50 %     Narrative:      Hemoglobin A1C Ranges:    Increased Risk for Diabetes  5.7% to 6.4%  Diabetes                     >= 6.5%  Diabetic Goal                < 7.0%    CBC Auto Differential [[id number]]  (Abnormal) Collected: 11/04/20 0656    Specimen: Blood Updated: 11/04/20 0723     WBC 7.48 10*3/mm3      RBC 4.30 10*6/mm3      Hemoglobin 12.5 g/dL      Hematocrit 38.5 %      MCV 89.5 fL      MCH 29.1 pg      MCHC 32.5 g/dL      RDW 16.8 %      RDW-SD 55.2 fl      MPV 10.1 fL      Platelets 181 10*3/mm3      Neutrophil % 64.4 %      Lymphocyte % 22.3 %      Monocyte % 8.2 %      Eosinophil % 3.7 %      Basophil % 1.3 %      Immature Grans % 0.1 %      Neutrophils, Absolute 4.81 10*3/mm3      Lymphocytes, Absolute 1.67 10*3/mm3      Monocytes, Absolute 0.61 10*3/mm3      Eosinophils, Absolute 0.28 10*3/mm3      Basophils, Absolute 0.10 10*3/mm3      Immature Grans, Absolute 0.01 10*3/mm3      nRBC 0.0 /100 WBC     Troponin [[id number]]  (Abnormal) Collected: 11/03/20 2344    Specimen: Blood Updated: 11/04/20 0030     Troponin T 0.041 ng/mL     Narrative:      Troponin T Reference Range:  <= 0.03 ng/mL-   Negative for AMI  >0.03 ng/mL-     Abnormal for myocardial necrosis.  Clinicians would have to utilize clinical acumen, EKG, Troponin and serial changes to determine if it is an Acute Myocardial Infarction or myocardial injury due to an underlying chronic condition.       Results may be falsely decreased if patient taking Biotin.      Troponin [[id number]]  (Abnormal) Collected: 11/03/20 2123    Specimen: Blood Updated: 11/03/20 2157     Troponin T 0.046 ng/mL     Narrative:      Troponin T Reference Range:  <= 0.03 ng/mL-   Negative for AMI  >0.03 ng/mL-      Abnormal for myocardial necrosis.  Clinicians would have to utilize clinical acumen, EKG, Troponin and serial changes to determine if it is an Acute Myocardial Infarction or myocardial injury due to an underlying chronic condition.       Results may be falsely decreased if patient taking Biotin.      Troponin [[id number]]  (Abnormal) Collected: 11/03/20 1823    Specimen: Blood Updated: 11/03/20 1848     Troponin T 0.042 ng/mL     Narrative:      Troponin T Reference Range:  <= 0.03 ng/mL-   Negative for AMI  >0.03 ng/mL-     Abnormal for myocardial necrosis.  Clinicians would have to utilize clinical acumen, EKG, Troponin and serial changes to determine if it is an Acute Myocardial Infarction or myocardial injury due to an underlying chronic condition.       Results may be falsely decreased if patient taking Biotin.      COVID PRE-OP / PRE-PROCEDURE SCREENING ORDER (NO ISOLATION) - Swab, Nasopharynx [[id number]]  (Normal) Collected: 11/03/20 1302    Specimen: Swab from Nasopharynx Updated: 11/03/20 1634    Narrative:      The following orders were created for panel order COVID PRE-OP / PRE-PROCEDURE SCREENING ORDER (NO ISOLATION) - Swab, Nasopharynx.  Procedure                               Abnormality         Status                     ---------                               -----------         ------                     COVID-19, RUBIA MCDANIEL IN-HOUS...[[id number]]  Normal              Final result                 Please view results for these tests on the individual orders.    COVID-19, RUBIA MCDANIEL IN-HOUSE, NP SWAB IN TRANSPORT MEDIA 8-10 HR TAT - Swab, Nasopharynx [[id number]]  (Normal) Collected: 11/03/20 1302    Specimen: Swab from Nasopharynx Updated: 11/03/20 1634     COVID19 Not Detected    Narrative:      Testing performed by Real Time RT-PCR  This test has not been approved by the [facility] but is authorized under the Emergency Use Act (EUA)    Fact sheet for providers: https://www.fda.gov/media/370938/download    Fact  sheet for patients: https://www.fda.gov/media/843657/download        Troponin [[id number]]  (Abnormal) Collected: 11/03/20 1433    Specimen: Blood Updated: 11/03/20 1503     Troponin T 0.039 ng/mL     Narrative:      Troponin T Reference Range:  <= 0.03 ng/mL-   Negative for AMI  >0.03 ng/mL-     Abnormal for myocardial necrosis.  Clinicians would have to utilize clinical acumen, EKG, Troponin and serial changes to determine if it is an Acute Myocardial Infarction or myocardial injury due to an underlying chronic condition.       Results may be falsely decreased if patient taking Biotin.      [place] Draw [[id number]] Collected: 11/03/20 1203    Specimen: Blood Updated: 11/03/20 1315    Narrative:      The following orders were created for panel order [place] Draw.  Procedure                               Abnormality         Status                     ---------                               -----------         ------                     Light Blue Top[[id number]]                                   Final result               Green Top (Gel)[[id number]]                                  Final result               Lavender Top[[id number]]                                     Final result               Gold Top - SST[[id number]]                                   Final result                 Please view results for these tests on the individual orders.    Light Blue Top [[id number]] Collected: 11/03/20 1203    Specimen: Blood Updated: 11/03/20 1315     Extra Tube hold for add-on     Comment: Auto resulted       Green Top (Gel) [[id number]] Collected: 11/03/20 1203    Specimen: Blood Updated: 11/03/20 1315     Extra Tube Hold for add-ons.     Comment: Auto resulted.       Lavender Top [[id number]] Collected: 11/03/20 1203    Specimen: Blood Updated: 11/03/20 1315     Extra Tube hold for add-on     Comment: Auto resulted       Gold Top - SST [[id number]] Collected: 11/03/20 1203    Specimen: Blood Updated: 11/03/20 1315     Extra Tube  Hold for add-ons.     Comment: Auto resulted.       CK [[id number]]  (Normal) Collected: 11/03/20 1203    Specimen: Blood Updated: 11/03/20 1258     Creatine Kinase 57 U/L     Magnesium [[id number]]  (Normal) Collected: 11/03/20 1203    Specimen: Blood Updated: 11/03/20 1258     Magnesium 1.7 mg/dL     Lipase [[id number]]  (Normal) Collected: 11/03/20 1203    Specimen: Blood Updated: 11/03/20 1258     Lipase 32 U/L     D-dimer, Quantitative [[id number]]  (Abnormal) Collected: 11/03/20 1203    Specimen: Blood Updated: 11/03/20 1254     D-Dimer, Quantitative 3,010 ng/mL (FEU)     Narrative:      Dimer values <500 ng/ml FEU are FDA approved as aid in diagnosis of deep venous thrombosis and pulmonary embolism.  This test should not be used in an exclusion strategy with pretest probability alone.    A recent guideline regarding diagnosis for pulmonary thromboembolism recommends an adjusted exclusion criterion of age x 10 ng/ml FEU for patients >50 years of age ([employer] Intern Med 2015; 163: 701-711).      Troponin [[id number]]  (Abnormal) Collected: 11/03/20 1203    Specimen: Blood Updated: 11/03/20 1241     Troponin T 0.044 ng/mL     Narrative:      Troponin T Reference Range:  <= 0.03 ng/mL-   Negative for AMI  >0.03 ng/mL-     Abnormal for myocardial necrosis.  Clinicians would have to utilize clinical acumen, EKG, Troponin and serial changes to determine if it is an Acute Myocardial Infarction or myocardial injury due to an underlying chronic condition.       Results may be falsely decreased if patient taking Biotin.      Comprehensive Metabolic Panel [[id number]]  (Abnormal) Collected: 11/03/20 1203    Specimen: Blood Updated: 11/03/20 1241     Glucose 99 mg/dL      BUN 16 mg/dL      Creatinine 0.99 mg/dL      Sodium 141 mmol/L      Potassium 3.8 mmol/L      Chloride 108 mmol/L      CO2 25.0 mmol/L      Calcium 9.2 mg/dL      Total Protein 6.5 g/dL      Albumin 3.90 g/dL      ALT (SGPT) 13 U/L      AST (SGOT) 17 U/L       Alkaline Phosphatase 92 U/L      Total Bilirubin 0.3 mg/dL      eGFR Non African Amer 72 mL/min/1.73      Globulin 2.6 gm/dL      A/G Ratio 1.5 g/dL      BUN/Creatinine Ratio 16.2     Anion Gap 8.0 mmol/L     Narrative:      GFR Normal >60  Chronic Kidney Disease <60  Kidney Failure <15      BNP [[id number]]  (Abnormal) Collected: 11/03/20 1203    Specimen: Blood Updated: 11/03/20 1238     proBNP 4,259.0 pg/mL     Narrative:      Among patients with dyspnea, NT-proBNP is highly sensitive for the detection of acute congestive heart failure. In addition NT-proBNP of <300 pg/ml effectively rules out acute congestive heart failure with 99% negative predictive value.    Results may be falsely decreased if patient taking Biotin.      CBC & Differential [[id number]]  (Abnormal) Collected: 11/03/20 1203    Specimen: Blood Updated: 11/03/20 1218    Narrative:      The following orders were created for panel order CBC & Differential.  Procedure                               Abnormality         Status                     ---------                               -----------         ------                     CBC Auto Differential[[id number]]        Abnormal            Final result                 Please view results for these tests on the individual orders.    CBC Auto Differential [[id number]]  (Abnormal) Collected: 11/03/20 1203    Specimen: Blood Updated: 11/03/20 1218     WBC 7.27 10*3/mm3      RBC 3.99 10*6/mm3      Hemoglobin 11.7 g/dL      Hematocrit 35.8 %      MCV 89.7 fL      MCH 29.3 pg      MCHC 32.7 g/dL      RDW 16.5 %      RDW-SD 54.7 fl      MPV 10.0 fL      Platelets 175 10*3/mm3      Neutrophil % 66.5 %      Lymphocyte % 18.2 %      Monocyte % 10.2 %      Eosinophil % 4.3 %      Basophil % 0.7 %      Immature Grans % 0.1 %      Neutrophils, Absolute 4.84 10*3/mm3      Lymphocytes, Absolute 1.32 10*3/mm3      Monocytes, Absolute 0.74 10*3/mm3      Eosinophils, Absolute 0.31 10*3/mm3      Basophils, Absolute 0.05  10*3/mm3      Immature Grans, Absolute 0.01 10*3/mm3      nRBC 0.0 /100 WBC           I personally viewed and interpreted the patient's EKG/Telemetry data       Assessment/Plan:        #1  nonsustained ventricular tachycardia patient does have history of nonsustained ventricular arrhythmia refused ICD  #2 CAD status post CABG more than 10 years ago had persistent mildly abnormal troponin not interested in further risk stratification   #3 peripheral vascular disease status post carotid endarterectomy   #4 hyperlipidemia   #5 hypertension  History of postural syncope with orthostasis  85 years old patient appears older than his stated age with a background history of ischemic cardiomyopathy, CABG more than 10 years ago, hypertension with hypertensive heart disease, history of postural syncope, right bundle branch block morphology, documented nonsustained ventricular tachycardia refused ICD implantation admitted for evaluations of chest discomfort he denied pain at the time of evaluation and on the monitor have nonsustained ventricular tachycardia.  Patient not interested in any invasive cardiac evaluation including ICD implantation he want to go home.  We will start the patient on amiodarone 200 mg a day given the LV dysfunction and nonsustained ventricular tachycardia along with continuation of low-dose beta-blocker and losartan.  Not consider Imdur given history of orthostasis.  Recommend to continue baby aspirin and atorvastatin.     Thank you for allowing me to participate in the care of Jose Dacosta. Feel free to contact me directly with any further questions or concerns.    Júnior Licona MD  11/04/20  10:52 CST.      Part of this note may be an electronic transcription/translation of spoken language to printed text using the Dragon Dictation system.

## 2020-11-04 NOTE — PLAN OF CARE
Problem: Adult Inpatient Plan of Care  Goal: Plan of Care Review  Outcome: Ongoing, Progressing  Flowsheets  Taken 11/4/2020 5954  Plan of Care Reviewed With: patient  Outcome Summary: initial OT evaluation complete. pt was pleasant and cooperative throughout but is extremely hard of hearing. oriented x 3. very poor safety awareness with large amounts of impulsivity. completed bed mobility with supervision. transfers and functional mobility completed with CGA and use of no AD. pt would benefit from use of RW as he reaches for furniture, but was so impulsive that OT did not have time to get the RW. no LOB.  completed toileting in standing with CGA. donned/doffed socks while sitting EOB with supervision and set-up. pt was getting home health therapy before admission, and recommend pt continue to get this when discharged. did have hip surgery on L hip in July of 2020. would benefit from further skilled OT services to address functional mobility and ADL deficits, as well as strength, balance, endurance, and safety awareness. recommend home with assistance and home health OT at discharge. goals established.  Taken 11/4/2020 9120  Plan of Care Reviewed With: patient

## 2020-11-04 NOTE — PLAN OF CARE
Goal Outcome Evaluation:  Plan of Care Reviewed With: patient  Progress: no change  Outcome Summary: Patient vss. Patient has maintained Npo. Patient has rested between care. Will continue to monitor.

## 2020-11-04 NOTE — PLAN OF CARE
Goal Outcome Evaluation:  Plan of Care Reviewed With: patient  Progress: no change   Pt oriented to 3 [address]. No chest pain reported at this time. Vital signs stable.

## 2020-11-04 NOTE — NURSING NOTE
"Assisting pt to bathroom. Patient gets a bottle of smokeless tobacco out of his bag. Patient made aware that he cannot use tobacco during his stay. Patient asked if security can lock it up for safe keeping. Patient refused. Patient allows us to put it back in personal belongings bag. Patient states \"I have more stuff you don't know about.\"  Patient states \"I want to see the doctor this morning cause I wanna go home.\" Will continue to monitor.   "

## 2020-11-04 NOTE — THERAPY EVALUATION
Patient Name: Jose Dacosta  : 1935    MRN: [id number]                              Today's Date: 2020       Admit Date: 11/3/2020    Visit Dx:     ICD-10-CM ICD-9-CM   1. Precordial pain  R07.2 786.51   2. Impaired mobility and ADLs  Z74.09 V49.89    Z78.9    3. Impaired functional mobility, balance, gait, and endurance  Z74.09 V49.89     Patient Active Problem List   Diagnosis   • Coronary artery disease involving native coronary artery of native heart without angina pectoris   • Vasovagal syncope   • RBBB (right bundle branch block with left anterior fascicular block)   • Essential hypertension   • Ischemic cardiomyopathy   • Syncope and collapse   • Closed fracture of neck of left femur (CMS/HCC)   • Coronary artery disease with history of myocardial infarction without history of CABG   • Facial laceration   • HFrEF (heart failure with reduced ejection fraction) (CMS/HCC)   • Postoperative anemia due to acute blood loss   • Pneumonia of both lower lobes due to infectious organism   • Acute urinary retention   • Metabolic encephalopathy   • Lumbar radiculopathy   • Precordial pain   • Pulmonary emboli (CMS/HCC)     Past Medical History:   Diagnosis Date   • Abnormal ECG      Past Surgical History:   Procedure Laterality Date   • CORONARY STENT PLACEMENT     • HIP HEMIARTHROPLASTY Left 2020    Procedure: LEFT HIP HEMIARTHROPLASTY;  Surgeon: Jitendra Solis MD;  Location: [facility];  Service: Orthopedics;  Laterality: Left;     General Information     Row Name 20 1602          Physical Therapy Time and Intention    Mode of Treatment  individual therapy;physical therapy  -BS     Row Name 20 1602          General Information    Patient Profile Reviewed  yes  -BS     Prior Level of Function  independent:;bed mobility;ADL's;all household mobility;community mobility  -BS     Existing Precautions/Restrictions  fall  -BS     Barriers to Rehab  hearing deficit  -BS     Row Name  11/04/20 1602          Living Environment    Lives With  spouse  -BS     Row Name 11/04/20 1602          Home Main Entrance    Number of Stairs, Main Entrance  three  -BS     Stair Railings, Main Entrance  railing on left side (ascending) has a ramp at back entrance, 1 story  -BS     Row Name 11/04/20 1602          Stairs Within Home, Primary    Number of Stairs, Within Home, Primary  none  -BS     Row Name 11/04/20 1602          Cognition    Orientation Status (Cognition)  oriented x 4  -BS     Row Name 11/04/20 1602          Safety Issues, Functional Mobility    Safety Issues Affecting Function (Mobility)  impulsivity  -BS     Impairments Affecting Function (Mobility)  endurance/activity tolerance;strength  -BS     Row Name 11/04/20 1602          Relationship/Environment    Name(s) of Who Lives With Patient  Carla Dacosta, wife  -BS       User Key  (r) = Recorded By, (t) = Taken By, (c) = Cosigned By    Initials Name Provider Type    BS Mauri Argueta PT Physical Therapist        Mobility     Row Name 11/04/20 1602          Bed Mobility    Bed Mobility  bed mobility (all) activities  -BS     All Activities, [place] (Bed Mobility)  --  -BS     Supine-Sit [place] (Bed Mobility)  modified independence  -BS     Sit-Supine [place] (Bed Mobility)  modified independence  -BS     Assistive Device (Bed Mobility)  bed rails  -BS     Row Name 11/04/20 1602          Bed-Chair Transfer    Bed-Chair [place] (Transfers)  not tested  -BS     Row Name 11/04/20 1602          Sit-Stand Transfer    Sit-Stand [place] (Transfers)  supervision  -BS     Assistive Device (Sit-Stand Transfers)  walker, front-wheeled  -BS     Row Name 11/04/20 1602          Gait/Stairs (Locomotion)    [place] Level (Gait)  standby assist  -BS     Assistive Device (Gait)  walker, front-wheeled  -BS     Distance in Feet (Gait)  150'  -BS     Deviations/Abnormal Patterns (Gait)  maggie decreased  -BS     [place] Level  (Stairs)  not tested  -BS     Comment (Gait/Stairs)  no LOB episodes noted with gait assessment, stable SpO2 values on RA  -BS       User Key  (r) = Recorded By, (t) = Taken By, (c) = Cosigned By    Initials Name Provider Type    Mauri Edouard, PT Physical Therapist        Obj/Interventions     Row Name 11/04/20 1602          Range of Motion Comprehensive    General Range of Motion  no range of motion deficits identified  -BS     Row Name 11/04/20 1602          Strength Comprehensive (MMT)    General Manual Muscle Testing (MMT) Assessment  lower extremity strength deficits identified  -BS     Comment, General Manual Muscle Testing (MMT) Assessment  4+/5 B hip flex, 5/5 B knee flex/ext  -BS     Row Name 11/04/20 1602          Balance    Balance Assessment  sitting static balance;sitting dynamic balance  -BS     Static Sitting Balance  WNL  -BS     Dynamic Sitting Balance  mild impairment  -BS     Row Name 11/04/20 1602          Sensory Assessment (Somatosensory)    Sensory Assessment (Somatosensory)  sensation intact  -BS       User Key  (r) = Recorded By, (t) = Taken By, (c) = Cosigned By    Initials Name Provider Type    Mauri Edouard, PT Physical Therapist        Goals/Plan     Row Name 11/04/20 1602          Transfer Goal 1 (PT)    Activity/Assistive Device (Transfer Goal 1, PT)  sit-to-stand/stand-to-sit;bed-to-chair/chair-to-bed;walker, rolling  -BS     [place] Level/Cues Needed (Transfer Goal 1, PT)  modified independence  -BS     Time Frame (Transfer Goal 1, PT)  by discharge  -BS     Progress/Outcome (Transfer Goal 1, PT)  goal not met  -BS     Row Name 11/04/20 1602          Gait Training Goal 1 (PT)    Activity/Assistive Device (Gait Training Goal 1, PT)  gait (walking locomotion);assistive device use;walker, rolling;increase energy conservation;increase endurance/gait distance;improve balance and speed  -BS     [place] Level (Gait Training Goal 1, PT)  modified independence  -BS      Distance (Gait Training Goal 1, PT)  300' x 1  -BS     Time Frame (Gait Training Goal 1, PT)  by discharge  -BS     Progress/Outcome (Gait Training Goal 1, PT)  goal not met  -BS       User Key  (r) = Recorded By, (t) = Taken By, (c) = Cosigned By    Initials Name Provider Type    BS Mauri Argueta, PT Physical Therapist        Clinical Impression     Row Name 11/04/20 1613          Pain    Additional Documentation  Pain Scale: Numbers Pre/Post-Treatment (Group)  -BS     Row Name 11/04/20 1613 11/04/20 1602       Pain Scale: Numbers Pre/Post-Treatment    Pretreatment Pain Rating  0/10 - no pain  -BS  0/10 - no pain  -BS    Posttreatment Pain Rating  --  0/10 - no pain  -BS    Pre/Posttreatment Pain Comment  --  pt c/o L foot pain but unable to rate it (at rest)  -BS    Pain Intervention(s)  --  Repositioned  -BS    Row Name 11/04/20 1613 11/04/20 1602       Plan of Care Review    Plan of Care Reviewed With  patient  -BS  patient  -BS    Progress  --  no change  -BS    Row Name 11/04/20 1602          Therapy Assessment/Plan (PT)    Patient/Family Therapy Goals Statement (PT)  pt wants to return home  -BS     Rehab Potential (PT)  good, to achieve stated therapy goals  -BS     Criteria for Skilled Interventions Met (PT)  yes;meets criteria;skilled treatment is necessary  -BS     Predicted Duration of Therapy Intervention (PT)  until goals are met  -BS     Row Name 11/04/20 1613 11/04/20 1602       Vital Signs    Pre Systolic BP Rehab  157  -BS  157  -BS    Pre Treatment Diastolic BP  72  -BS  72  -BS    Post Systolic BP Rehab  --  72  -BS    Pretreatment Heart Rate (beats/min)  72  -BS  72  -BS    Posttreatment Heart Rate (beats/min)  --  90  -BS    Pre SpO2 (%)  98  -BS  98  -BS    O2 Delivery Pre Treatment  room air  -BS  room air  -BS    Post SpO2 (%)  --  98  -BS    O2 Delivery Post Treatment  --  room air  -BS    Pre Patient Position  Supine  -BS  Supine  -BS    Intra Patient Position  --  Sitting  -BS    Post  Patient Position  --  Supine  -BS    Row Name 11/04/20 1602          Positioning and Restraints    Pre-Treatment Position  in bed  -BS     Post Treatment Position  bed  -BS     In Bed  notified nsg;supine;call light within reach;exit alarm on  -BS       User Key  (r) = Recorded By, (t) = Taken By, (c) = Cosigned By    Initials Name Provider Type    Mauri Edouard, PT Physical Therapist        Outcome Measures     Row Name 11/04/20 1602          How much help from another person do you currently need...    Turning from your back to your side while in flat bed without using bedrails?  4  -BS     Moving from lying on back to sitting on the side of a flat bed without bedrails?  4  -BS     Moving to and from a bed to a chair (including a wheelchair)?  3  -BS     Standing up from a chair using your arms (e.g., wheelchair, bedside chair)?  3  -BS     Climbing 3-5 steps with a railing?  3  -BS     To walk in hospital room?  3  -BS     AM-PAC 6 Clicks Score (PT)  20  -BS     Row Name 11/04/20 1602          Functional Assessment    Outcome Measure Options  AM-PAC 6 Clicks Basic Mobility (PT)  -BS       User Key  (r) = Recorded By, (t) = Taken By, (c) = Cosigned By    Initials Name Provider Type    Mauri Edouard, PT Physical Therapist          PT Recommendation and Plan  Planned Therapy Interventions (PT): balance training, gait training, home exercise program, neuromuscular re-education, patient/family education, stair training, strengthening, stretching, transfer training  Plan of Care Reviewed With: patient  Progress: no change     Time Calculation:   PT Charges     Row Name 11/04/20 1641             Time Calculation    Start Time  1602  -BS      Stop Time  1631  -BS      Time Calculation (min)  29 min  -BS      PT Received On  11/04/20  -BS      PT Goal Re-Cert Due Date  11/17/20  -BS        User Key  (r) = Recorded By, (t) = Taken By, (c) = Cosigned By    Initials Name Provider Type    Mauri Edouard, PT  Physical Therapist        Therapy Charges for Today     Code Description Service Date Service Provider Modifiers Qty    51271329521 HC PT EVAL MOD COMPLEXITY 2 11/4/2020 Mauri Argueta, PT GP 1          PT G-Codes  Outcome Measure Options: AM-PAC 6 Clicks Basic Mobility (PT)  AM-PAC 6 Clicks Score (PT): 20  AM-PAC 6 Clicks Score (OT): 20    Mauri Argueta PT  11/4/2020

## 2020-11-04 NOTE — PAYOR COMM NOTE
"Cathy Noyola   [facility]  phone [phone]  fax [phone]    REF# [id number]    Sylvia Dacosta (85 y.o. Male)     Date of Birth Social Security Number Address Home Phone MRN    1935  [address]  [place] [facility] [address] [phone] [id number]    Jehovah's witness Marital Status          Zoroastrian        Admission Date Admission Type Admitting Provider Attending Provider Department, Room/Bed    11/3/20 Emergency Emile Santoro MD Popescu, Tudor, MD [address], 322/1    Discharge Date Discharge Disposition Discharge Destination                       Attending Provider: Emile Santoro MD    Allergies: Hydrochlorothiazide    Isolation: None   Infection: None   Code Status: No CPR    Ht: 182.9 cm (72\")   Wt: 65.3 kg (144 lb)    Admission Cmt: None   Principal Problem: None                Active Insurance as of 11/3/2020     Primary Coverage     Payor Plan Insurance Group Employer/Plan Group    AETNA MEDICARE REPLACEMENT AETNA MEDICARE REPLACEMENT [id number]     Payor Plan Address Payor Plan Phone Number Payor Plan Fax Number Effective Dates    [address] [phone]  4/1/2019 - None Entered    [facility] [address]       Subscriber Name Subscriber Birth Date Member ID       SYLVIA DACOSTA 1935 [id number]                 Emergency Contacts      (Rel.) Home Phone Work Phone Mobile Phone    Carla Dacosta (Spouse) [phone] -- [phone]    AURELIA BOX (Relative) [phone]    fiordaliza aguilar (Grandchild) -- -- [phone]               History & Physical      Ricky Xiong MD at 11/03/20 [facility]7                [facility] Medicine Admission      Date of Admission: 11/3/2020      Primary Care Physician: Terry Peterson MD      Chief Complaint: chest pain  Informant: his daughter    HPI:He has been complaining of shortness of breath " for over 2 weeks and today he presented chest pain irradiated to the left arm; since he is a patient with long history of coronary artery disease, his daughter brought him here. At this time he denies any chest pain  He denies nausea, vomiting, headache, abdominal pain    Concurrent Medical History:  has a past medical history of Abnormal ECG.   CAD  Essential hypertension  Depression  Chronic pain syndrome  Gout  Dementia  Hyperlipidemia  BPH  Bilateral hypoacustic    Past Surgical History:  has a past surgical history that includes Coronary stent placement and Hip hemiArthroplasty (Left, 2020).   CABG x 3  Coronary stents x 1  Renal artery stent x 1  Cholecystectomy      Family History: family history includes Heart failure in his father and mother.   Mom  on her 90s due to CAD complications  Dad  at 77 y/o due to CAD complications    Social History:  reports that he quit smoking about 30 years ago. He has never used smokeless tobacco. He reports that he does not drink alcohol or use drugs.   He lives with his wife and POA is his Mom and his daughter   He is DNR and he said that in front of his daughter     Allergies:   Allergies   Allergen Reactions   • Hydrochlorothiazide Swelling     TONGUE SWELLING       Medications:   Prior to Admission medications    Medication Sig Start Date End Date Taking? Authorizing Provider   acetaminophen (TYLENOL) 325 MG tablet Take 2 tablets by mouth Every 4 (Four) Hours As Needed for Mild Pain  or Fever. 20   Charly Hercules MD   albuterol sulfate  (90 Base) MCG/ACT inhaler INHALE 2 PUFFS INTO THE LUNGS EVERY 6 (SIX) HOURS AS NEEDED FOR WHEEZING 19   Marcial Crain MD   amitriptyline (ELAVIL) 100 MG tablet Take 100 mg by mouth every night at bedtime. 19   Marcial Crain MD   aspirin 81 MG EC tablet Take 1 tablet by mouth Daily. 19   Júnior Licona MD   atorvastatin (LIPITOR) 20 MG tablet Take 1 tablet by mouth.    Provider,  MD Marcial   Bacitracin Zinc (MAGIC BUTT OINTMENT) Apply 1 each topically to the appropriate area as directed 2 (Two) Times a Day. 8/6/20   Charly Hercules MD   COLCRYS 0.6 MG tablet Take 0.6 mg by mouth Daily. 7/5/19   Marcial Crain MD   cyanocobalamin (VITAMIN B-12) 1000 MCG tablet Take 1 tablet by mouth Daily. 8/7/20   Charly Hercules MD   guaiFENesin (MUCINEX) 600 MG 12 hr tablet Take 2 tablets by mouth Every 12 (Twelve) Hours. 8/6/20   Charly Hercules MD   losartan (COZAAR) 25 MG tablet TAKE 1 TABLET BY MOUTH EVERY DAY 12/30/19   Bessy Duval MD   meloxicam (Mobic) 7.5 MG tablet Take 1 tablet by mouth Daily. 10/28/20   Jitendra Solis MD   metoprolol succinate XL (TOPROL-XL) 25 MG 24 hr tablet TAKE 1 TABLET BY MOUTH EVERY DAY 12/16/19   Júnior Licona MD   omeprazole (priLOSEC) 40 MG capsule Take 40 mg by mouth Every Morning. 6/27/19   Marcial Crain MD   rOPINIRole (REQUIP) 0.5 MG tablet Take 0.5 mg by mouth Daily. 5/3/19   Marcial Crain MD   terazosin (HYTRIN) 1 MG capsule Take 1 capsule by mouth Every Night. 8/6/20   Charly Hercules MD   tolterodine (DETROL) 2 MG tablet Take 2 mg by mouth 2 (Two) Times a Day.    Marcial Crain MD   traMADol (ULTRAM) 50 MG tablet Take 1 tablet by mouth Every 8 (Eight) Hours As Needed for Moderate Pain  (take 1/2 pill every 8 hours as needed). 9/30/20   Jitendra Solis MD       methylPREDNISolone acetate, 80 mg, Intramuscular, Once        Review of Systems:  Review of Systems   Constitutional:        Chronically ill appearing   HENT: Negative.    Eyes: Negative.    Respiratory: Positive for shortness of breath.    Cardiovascular: Positive for chest pain.   Gastrointestinal: Negative.    Endocrine: Negative.    Genitourinary: Negative.    Musculoskeletal: Negative.    Skin: Negative.    Neurological: Negative.    Hematological: Negative.    Psychiatric/Behavioral: Negative.       Otherwise complete ROS is negative  except as mentioned above.    Physical Exam:   Temp:  [97.5 °F (36.4 °C)] 97.5 °F (36.4 °C)  Heart Rate:  [60-68] 68  Resp:  [16] 16  BP: (144-154)/(77-79) 154/77  Physical Exam  Constitutional:       Comments: Chronically ill appearing   HENT:      Nose: Nose normal.      Mouth/Throat:      Mouth: Mucous membranes are moist.   Eyes:      Extraocular Movements: Extraocular movements intact.   Neck:      Musculoskeletal: Normal range of motion and neck supple.   Cardiovascular:      Rate and Rhythm: Normal rate.      Pulses: Normal pulses.   Pulmonary:      Effort: Pulmonary effort is normal.      Breath sounds: Normal breath sounds.   Abdominal:      General: Bowel sounds are normal.   Musculoskeletal: Normal range of motion.      Comments: Minimal swelling bilateral lower extremities   Skin:     General: Skin is warm.   Neurological:      General: No focal deficit present.      Mental Status: He is alert. Mental status is at baseline.   Psychiatric:         Mood and Affect: Mood normal.           Results Reviewed:  I have personally reviewed current lab, radiology, and data and agree with results.  Lab Results (last 24 hours)     Procedure Component Value Units Date/Time    [place] Draw [[id number]] Collected: 11/03/20 1203    Specimen: Blood Updated: 11/03/20 1315    Narrative:      The following orders were created for panel order [place] Draw.  Procedure                               Abnormality         Status                     ---------                               -----------         ------                     Light Blue Top[[id number]]                                   Final result               Green Top (Gel)[[id number]]                                  Final result               Lavender Top[[id number]]                                     Final result               Gold Top - SST[[id number]]                                   Final result                 Please view results for these tests on the individual  orders.    Light Blue Top [[id number]] Collected: 11/03/20 1203    Specimen: Blood Updated: 11/03/20 1315     Extra Tube hold for add-on     Comment: Auto resulted       Green Top (Gel) [[id number]] Collected: 11/03/20 1203    Specimen: Blood Updated: 11/03/20 1315     Extra Tube Hold for add-ons.     Comment: Auto resulted.       Lavender Top [[id number]] Collected: 11/03/20 1203    Specimen: Blood Updated: 11/03/20 1315     Extra Tube hold for add-on     Comment: Auto resulted       Gold Top - SST [[id number]] Collected: 11/03/20 1203    Specimen: Blood Updated: 11/03/20 1315     Extra Tube Hold for add-ons.     Comment: Auto resulted.       COVID PRE-OP / PRE-PROCEDURE SCREENING ORDER (NO ISOLATION) - Swab, Nasopharynx [[id number]] Collected: 11/03/20 1302    Specimen: Swab from Nasopharynx Updated: 11/03/20 1308    Narrative:      The following orders were created for panel order COVID PRE-OP / PRE-PROCEDURE SCREENING ORDER (NO ISOLATION) - Swab, Nasopharynx.  Procedure                               Abnormality         Status                     ---------                               -----------         ------                     COVID-19, RUBIA MCDANIEL IN-HOUS...[[id number]]                      In process                   Please view results for these tests on the individual orders.    COVID-19RUBIA IN-HOUSE, NP SWAB IN TRANSPORT MEDIA 8-10 HR TAT - Swab, Nasopharynx [[id number]] Collected: 11/03/20 1302    Specimen: Swab from Nasopharynx Updated: 11/03/20 1308    CK [[id number]]  (Normal) Collected: 11/03/20 1203    Specimen: Blood Updated: 11/03/20 1258     Creatine Kinase 57 U/L     Magnesium [[id number]]  (Normal) Collected: 11/03/20 1203    Specimen: Blood Updated: 11/03/20 1258     Magnesium 1.7 mg/dL     Lipase [[id number]]  (Normal) Collected: 11/03/20 1203    Specimen: Blood Updated: 11/03/20 1258     Lipase 32 U/L     D-dimer, Quantitative [[id number]]  (Abnormal) Collected: 11/03/20 1203    Specimen: Blood  Updated: 11/03/20 1254     D-Dimer, Quantitative 3,010 ng/mL (FEU)     Narrative:      Dimer values <500 ng/ml FEU are FDA approved as aid in diagnosis of deep venous thrombosis and pulmonary embolism.  This test should not be used in an exclusion strategy with pretest probability alone.    A recent guideline regarding diagnosis for pulmonary thromboembolism recommends an adjusted exclusion criterion of age x 10 ng/ml FEU for patients >50 years of age (Marifer Intern Med 2015; 163: 701-711).      Troponin [[id number]]  (Abnormal) Collected: 11/03/20 1203    Specimen: Blood Updated: 11/03/20 1241     Troponin T 0.044 ng/mL     Narrative:      Troponin T Reference Range:  <= 0.03 ng/mL-   Negative for AMI  >0.03 ng/mL-     Abnormal for myocardial necrosis.  Clinicians would have to utilize clinical acumen, EKG, Troponin and serial changes to determine if it is an Acute Myocardial Infarction or myocardial injury due to an underlying chronic condition.       Results may be falsely decreased if patient taking Biotin.      Comprehensive Metabolic Panel [[id number]]  (Abnormal) Collected: 11/03/20 1203    Specimen: Blood Updated: 11/03/20 1241     Glucose 99 mg/dL      BUN 16 mg/dL      Creatinine 0.99 mg/dL      Sodium 141 mmol/L      Potassium 3.8 mmol/L      Chloride 108 mmol/L      CO2 25.0 mmol/L      Calcium 9.2 mg/dL      Total Protein 6.5 g/dL      Albumin 3.90 g/dL      ALT (SGPT) 13 U/L      AST (SGOT) 17 U/L      Alkaline Phosphatase 92 U/L      Total Bilirubin 0.3 mg/dL      eGFR Non African Amer 72 mL/min/1.73      Globulin 2.6 gm/dL      A/G Ratio 1.5 g/dL      BUN/Creatinine Ratio 16.2     Anion Gap 8.0 mmol/L     Narrative:      GFR Normal >60  Chronic Kidney Disease <60  Kidney Failure <15      BNP [[id number]]  (Abnormal) Collected: 11/03/20 1203    Specimen: Blood Updated: 11/03/20 1238     proBNP 4,259.0 pg/mL     Narrative:      Among patients with dyspnea, NT-proBNP is highly sensitive for the detection of  acute congestive heart failure. In addition NT-proBNP of <300 pg/ml effectively rules out acute congestive heart failure with 99% negative predictive value.    Results may be falsely decreased if patient taking Biotin.      CBC & Differential [[id number]]  (Abnormal) Collected: 11/03/20 1203    Specimen: Blood Updated: 11/03/20 1218    Narrative:      The following orders were created for panel order CBC & Differential.  Procedure                               Abnormality         Status                     ---------                               -----------         ------                     CBC Auto Differential[[id number]]        Abnormal            Final result                 Please view results for these tests on the individual orders.    CBC Auto Differential [[id number]]  (Abnormal) Collected: 11/03/20 1203    Specimen: Blood Updated: 11/03/20 1218     WBC 7.27 10*3/mm3      RBC 3.99 10*6/mm3      Hemoglobin 11.7 g/dL      Hematocrit 35.8 %      MCV 89.7 fL      MCH 29.3 pg      MCHC 32.7 g/dL      RDW 16.5 %      RDW-SD 54.7 fl      MPV 10.0 fL      Platelets 175 10*3/mm3      Neutrophil % 66.5 %      Lymphocyte % 18.2 %      Monocyte % 10.2 %      Eosinophil % 4.3 %      Basophil % 0.7 %      Immature Grans % 0.1 %      Neutrophils, Absolute 4.84 10*3/mm3      Lymphocytes, Absolute 1.32 10*3/mm3      Monocytes, Absolute 0.74 10*3/mm3      Eosinophils, Absolute 0.31 10*3/mm3      Basophils, Absolute 0.05 10*3/mm3      Immature Grans, Absolute 0.01 10*3/mm3      nRBC 0.0 /100 WBC         Imaging Results (Last 24 Hours)     Procedure Component Value Units Date/Time    XR Chest 1 View [[id number]] Collected: 11/03/20 1215     Updated: 11/03/20 1235    Narrative:      Chest x-ray single view.         CLINICAL INDICATION: Shortness of breath    COMPARISON: Chest July 29, 2020.    FINDINGS: Cardiac silhouette is normal in size. Pulmonary  vascularity is unremarkable.     There are midline sternal sutures from prior  cardiac surgery.  Subtle fibrotic changes left lung base.    Lungs otherwise clear.      Impression:      As above.    Electronically signed by:  Esdras Paul MD  11/3/2020 12:34 PM CST  Workstation: [id number]      Assessment and Plan    Chest pain     With EKG negative; mild elevation of troponin, chest x ray negative for an acute event; no chest pain at this time     Trend troponin; pending CTA chest to rule out PE;      Discussed with Dr Duval    Chronic medical problems     CAD     Essential hypertension     Chronic recurrent depression     Chronic pain syndrome     Gout     Dementia     Hyperlipidemia     BPH     Bilateral hypoacustic    I discussed the patient's findings and my recommendations with patient and his daughter    Ricky Xiong MD                Electronically signed by Ricky Xiong MD at 11/03/20 1507          Emergency Department Notes      Julian Harris MD at 11/03/20 1241      Procedure Orders    1. ECG 12 Lead [[id number]] ordered by Julian Harris MD               Subjective   SOA and chest pain with h/o CABG.       Chest Pain  Pain location:  L chest  Pain quality: aching and pressure    Pain radiates to:  L arm  Pain severity:  Mild  Onset quality:  Unable to specify  Timing:  Constant  Progression:  Waxing and waning  Chronicity:  New  Relieved by:  Nothing  Worsened by:  Exertion, movement and certain positions  Associated symptoms: shortness of breath    Associated symptoms: no abdominal pain, no cough, no diaphoresis, no dizziness, no dysphagia, no fatigue, no fever, no headache, no nausea, no vomiting and no weakness    Risk factors: coronary artery disease, high cholesterol, hypertension and male sex    Risk factors: no smoking (quit smoking 40 years ago)    Shortness of Breath  Associated symptoms: chest pain    Associated symptoms: no abdominal pain, no cough, no diaphoresis, no ear pain, no fever, no headaches, no neck pain, no rash, no sore throat, no  vomiting and no wheezing        Review of Systems   Constitutional: Positive for activity change. Negative for appetite change, chills, diaphoresis, fatigue and fever.   HENT: Negative for congestion, ear discharge, ear pain, nosebleeds, rhinorrhea, sinus pressure, sore throat and trouble swallowing.    Eyes: Negative for discharge and redness.   Respiratory: Positive for shortness of breath. Negative for apnea, cough, chest tightness and wheezing.    Cardiovascular: Positive for chest pain.   Gastrointestinal: Negative for abdominal pain, diarrhea, nausea and vomiting.   Endocrine: Negative for polyuria.   Genitourinary: Negative for dysuria, frequency and urgency.   Musculoskeletal: Negative for myalgias and neck pain.   Skin: Negative for color change and rash.   Allergic/Immunologic: Negative for immunocompromised state.   Neurological: Negative for dizziness, seizures, syncope, weakness, light-headedness and headaches.   Hematological: Negative for adenopathy. Does not bruise/bleed easily.   Psychiatric/Behavioral: Negative for behavioral problems and confusion.   All other systems reviewed and are negative.      Past Medical History:   Diagnosis Date   • Abnormal ECG        Allergies   Allergen Reactions   • Hydrochlorothiazide Swelling     TONGUE SWELLING       Past Surgical History:   Procedure Laterality Date   • CORONARY STENT PLACEMENT     • HIP HEMIARTHROPLASTY Left 7/9/2020    Procedure: LEFT HIP HEMIARTHROPLASTY;  Surgeon: Jitendra Solis MD;  Location: [facility];  Service: Orthopedics;  Laterality: Left;       Family History   Problem Relation Age of Onset   • Heart failure Mother    • Heart failure Father        Social History     Socioeconomic History   • Marital status:      Spouse name: Not on file   • Number of children: Not on file   • Years of education: Not on file   • Highest education level: Not on file   Tobacco Use   • Smoking status: Former Smoker     Quit date: 1990      Years since quittin.8   • Smokeless tobacco: Never Used   Substance and Sexual Activity   • Alcohol use: No     Frequency: Never   • Drug use: No   • Sexual activity: Defer           Objective   Physical Exam  Vitals signs and nursing note reviewed.   Constitutional:       Appearance: He is well-developed.   HENT:      Head: Normocephalic and atraumatic.      Nose: Nose normal.   Eyes:      General: No scleral icterus.        Right eye: No discharge.         Left eye: No discharge.      Conjunctiva/sclera: Conjunctivae normal.      Pupils: Pupils are equal, round, and reactive to light.   Neck:      Musculoskeletal: Normal range of motion and neck supple.      Trachea: No tracheal deviation.   Cardiovascular:      Rate and Rhythm: Normal rate and regular rhythm.      Heart sounds: Normal heart sounds. No murmur.   Pulmonary:      Effort: Pulmonary effort is normal. No respiratory distress.      Breath sounds: Normal breath sounds. No stridor. No wheezing or rales.   Abdominal:      General: Bowel sounds are normal. There is no distension.      Palpations: Abdomen is soft. There is no mass.      Tenderness: There is no abdominal tenderness. There is no guarding or rebound.   Skin:     General: Skin is warm and dry.      Findings: No erythema or rash.   Neurological:      Mental Status: He is alert and oriented to person, place, and time.      Coordination: Coordination normal.   Psychiatric:         Behavior: Behavior normal.         Thought Content: Thought content normal.         ECG 12 Lead      Date/Time: 11/3/2020 2:15 PM  Performed by: Julian Harris MD  Authorized by: Julian Harris MD   Interpreted by physician  Rhythm: sinus rhythm  Rate: normal  BPM: 68  Conduction: 1st degree  ST Segments: ST segments normal                  ED Course             Labs Reviewed   TROPONIN (IN-HOUSE) - Abnormal; Notable for the following components:       Result Value    Troponin T 0.044 (*)     All other  components within normal limits    Narrative:     Troponin T Reference Range:  <= 0.03 ng/mL-   Negative for AMI  >0.03 ng/mL-     Abnormal for myocardial necrosis.  Clinicians would have to utilize clinical acumen, EKG, Troponin and serial changes to determine if it is an Acute Myocardial Infarction or myocardial injury due to an underlying chronic condition.       Results may be falsely decreased if patient taking Biotin.     COMPREHENSIVE METABOLIC PANEL - Abnormal; Notable for the following components:    Chloride 108 (*)     All other components within normal limits    Narrative:     GFR Normal >60  Chronic Kidney Disease <60  Kidney Failure <15     BNP (IN-HOUSE) - Abnormal; Notable for the following components:    proBNP 4,259.0 (*)     All other components within normal limits    Narrative:     Among patients with dyspnea, NT-proBNP is highly sensitive for the detection of acute congestive heart failure. In addition NT-proBNP of <300 pg/ml effectively rules out acute congestive heart failure with 99% negative predictive value.    Results may be falsely decreased if patient taking Biotin.     CBC WITH AUTO DIFFERENTIAL - Abnormal; Notable for the following components:    RBC 3.99 (*)     Hemoglobin 11.7 (*)     Hematocrit 35.8 (*)     RDW 16.5 (*)     RDW-SD 54.7 (*)     Lymphocyte % 18.2 (*)     All other components within normal limits   D-DIMER, QUANTITATIVE - Abnormal; Notable for the following components:    D-Dimer, Quantitative 3,010 (*)     All other components within normal limits    Narrative:     Dimer values <500 ng/ml FEU are FDA approved as aid in diagnosis of deep venous thrombosis and pulmonary embolism.  This test should not be used in an exclusion strategy with pretest probability alone.    A recent guideline regarding diagnosis for pulmonary thromboembolism recommends an adjusted exclusion criterion of age x 10 ng/ml FEU for patients >50 years of age (Marifer Intern Med 2015; 163: 701-711).      CK - Normal   MAGNESIUM - Normal   LIPASE - Normal   COVID PRE-OP / PRE-PROCEDURE SCREENING ORDER (NO ISOLATION)    Narrative:     The following orders were created for panel order COVID PRE-OP / PRE-PROCEDURE SCREENING ORDER (NO ISOLATION) - Swab, Nasopharynx.  Procedure                               Abnormality         Status                     ---------                               -----------         ------                     COVID-19,  MAD IN-HOUS...[[id number]]                      In process                   Please view results for these tests on the individual orders.   COVID-19, MAD IN-HOUSE, NP SWAB IN TRANSPORT MEDIA 8-10 HR TAT   RAINB[place] DRAW    Narrative:     The following orders were created for panel order [place] Draw.  Procedure                               Abnormality         Status                     ---------                               -----------         ------                     Light Blue Top[[id number]]                                   Final result               Green Top (Gel)[[id number]]                                  Final result               Lavender Top[[id number]]                                     Final result               Gold Top - SST[[id number]]                                   Final result                 Please view results for these tests on the individual orders.   TROPONIN (IN-HOUSE)   CBC AND DIFFERENTIAL    Narrative:     The following orders were created for panel order CBC & Differential.  Procedure                               Abnormality         Status                     ---------                               -----------         ------                     CBC Auto Differential[[id number]]        Abnormal            Final result                 Please view results for these tests on the individual orders.   LIGHT BLUE TOP   GREEN TOP   LAVENDER TOP   GOLD TOP - SST       XR Chest 1 View   Final Result   As above.      Electronically signed by:  Esdras  Humberto JERNIGAN  11/3/2020 12:34 PM CST   Workstation: [id number]      CT Angiogram Chest    (Results Pending)                                       MDM    Final diagnoses:   Precordial pain            Julian Harris MD  11/03/20 1416      Electronically signed by Julian Harris MD at 11/03/20 1416     Jose Pagan RN at 11/03/20 1345        Pt states he does not want to be admitted to the hospital and that he will unhook himself from everything and walk out, Dr. Harris made aware and he spoke with patient and daughter.     Jose Pagan RN  11/03/20 1359      Electronically signed by Jose Pagan RN at 11/03/20 1359     Jose Pagan RN at 11/03/20 1355        Daughter at bedside with patient at this time     Jose Pagan RN  11/03/20 1359      Electronically signed by Jose Pagan RN at 11/03/20 1359     Jose Pagan RN at 11/03/20 1401        Dr. Harris at bedside speaking with patient and daughter     Jose Pagan RN  11/03/20 1402      Electronically signed by Jose Pagan RN at 11/03/20 1402     Jose Pagan RN at 11/03/20 1418        Dr. Xiong at bedside     Jose Pagan RN  11/03/20 1418      Electronically signed by Jose Pagan RN at 11/03/20 1418         Facility-Administered Medications as of 11/4/2020   Medication Dose Route Frequency Provider Last Rate Last Dose   • acetaminophen (TYLENOL) tablet 650 mg  650 mg Oral Q4H PRN Ricky Xiong MD        Or   • acetaminophen (TYLENOL) 160 MG/5ML solution 650 mg  650 mg Oral Q4H PRN Ricky Xiong MD        Or   • acetaminophen (TYLENOL) suppository 650 mg  650 mg Rectal Q4H PRN Ricky Xiong MD       • albuterol (PROVENTIL) nebulizer solution 0.083% 2.5 mg/3mL  2.5 mg Nebulization Q4H PRN Ricky Xiong MD   2.5 mg at 11/03/20 2222   • amiodarone (PACERONE) tablet 200 mg  200 mg Oral Q24H Júnior Licona MD       • amitriptyline (ELAVIL) tablet 100 mg  100 mg Oral Nightly Ricky Xiong,  MD       • aspirin EC tablet 81 mg  81 mg Oral Daily Ricky Xiong MD   81 mg at 11/04/20 0850   • atorvastatin (LIPITOR) tablet 20 mg  20 mg Oral Daily Ricky Xiong MD   20 mg at 11/04/20 0849   • enoxaparin (LOVENOX) syringe 70 mg  1 mg/kg Subcutaneous Q12H Ricky Xiong MD   70 mg at 11/04/20 0604   • [COMPLETED] iopamidol (ISOVUE-370) 76 % injection 100 mL  100 mL Intravenous Once in imaging Julian Harris MD   68 mL at 11/03/20 1500   • losartan (COZAAR) tablet 25 mg  25 mg Oral Daily Ricky Xiong MD   25 mg at 11/04/20 0850   • metoprolol succinate XL (TOPROL-XL) 24 hr tablet 25 mg  25 mg Oral Daily Ricky Xiong MD   25 mg at 11/04/20 0849   • nitroglycerin (NITROSTAT) SL tablet 0.4 mg  0.4 mg Sublingual Q5 Min PRN Ricky Xiong MD       • ondansetron (ZOFRAN) tablet 4 mg  4 mg Oral Q6H PRN Ricky Xiong MD       • pantoprazole (PROTONIX) EC tablet 40 mg  40 mg Oral QAM Ricky Xiong MD       • rOPINIRole (REQUIP) tablet 0.5 mg  0.5 mg Oral Daily Ricky Xiong MD   0.5 mg at 11/04/20 0850   • sodium chloride 0.9 % flush 10 mL  10 mL Intravenous PRN Julian Harris MD       • sodium chloride 0.9 % flush 10 mL  10 mL Intravenous Q12H Ricky Xiong MD   10 mL at 11/04/20 0852   • sodium chloride 0.9 % flush 10 mL  10 mL Intravenous PRN Ricky Xiong MD       • terazosin (HYTRIN) capsule 1 mg  1 mg Oral Nightly Ricky Xiong MD   1 mg at 11/03/20 2119     Lab Results (last 48 hours)     Procedure Component Value Units Date/Time    Lipid Panel [[id number]]  (Abnormal) Collected: 11/04/20 0656    Specimen: Blood Updated: 11/04/20 0742     Total Cholesterol 151 mg/dL      Triglycerides 70 mg/dL      HDL Cholesterol 71 mg/dL      LDL Cholesterol  66 mg/dL      VLDL Cholesterol 14 mg/dL      LDL/HDL Ratio 0.93    Narrative:      Cholesterol Reference Ranges  (U.S. Department of Health and Human Services ATP III  Classifications)    Desirable          <200 mg/dL  Borderline High    200-239 mg/dL  High Risk          >240 mg/dL      Triglyceride Reference Ranges  (U.S. Department of Health and Human Services ATP III Classifications)    Normal           <150 mg/dL  Borderline High  150-199 mg/dL  High             200-499 mg/dL  Very High        >500 mg/dL    HDL Reference Ranges  (U.S. Department of Health and Human Services ATP III Classifcations)    Low     <40 mg/dl (major risk factor for CHD)  High    >60 mg/dl ('negative' risk factor for CHD)        LDL Reference Ranges  (U.S. Department of Health and Human Services ATP III Classifcations)    Optimal          <100 mg/dL  Near Optimal     100-129 mg/dL  Borderline High  130-159 mg/dL  High             160-189 mg/dL  Very High        >189 mg/dL    Comprehensive Metabolic Panel [[id number]] Collected: 11/04/20 0656    Specimen: Blood Updated: 11/04/20 0742     Glucose 96 mg/dL      BUN 12 mg/dL      Creatinine 0.91 mg/dL      Sodium 138 mmol/L      Potassium 4.0 mmol/L      Chloride 104 mmol/L      CO2 25.0 mmol/L      Calcium 9.7 mg/dL      Total Protein 7.0 g/dL      Albumin 4.10 g/dL      ALT (SGPT) 13 U/L      AST (SGOT) 20 U/L      Alkaline Phosphatase 90 U/L      Total Bilirubin 0.5 mg/dL      eGFR Non African Amer 79 mL/min/1.73      Globulin 2.9 gm/dL      A/G Ratio 1.4 g/dL      BUN/Creatinine Ratio 13.2     Anion Gap 9.0 mmol/L     Narrative:      GFR Normal >60  Chronic Kidney Disease <60  Kidney Failure <15      Hemoglobin A1c [[id number]]  (Normal) Collected: 11/04/20 0656    Specimen: Blood Updated: 11/04/20 0739     Hemoglobin A1C 5.50 %     Narrative:      Hemoglobin A1C Ranges:    Increased Risk for Diabetes  5.7% to 6.4%  Diabetes                     >= 6.5%  Diabetic Goal                < 7.0%    CBC Auto Differential [[id number]]  (Abnormal) Collected: 11/04/20 0656    Specimen: Blood Updated: 11/04/20 0723     WBC 7.48 10*3/mm3      RBC 4.30 10*6/mm3       Hemoglobin 12.5 g/dL      Hematocrit 38.5 %      MCV 89.5 fL      MCH 29.1 pg      MCHC 32.5 g/dL      RDW 16.8 %      RDW-SD 55.2 fl      MPV 10.1 fL      Platelets 181 10*3/mm3      Neutrophil % 64.4 %      Lymphocyte % 22.3 %      Monocyte % 8.2 %      Eosinophil % 3.7 %      Basophil % 1.3 %      Immature Grans % 0.1 %      Neutrophils, Absolute 4.81 10*3/mm3      Lymphocytes, Absolute 1.67 10*3/mm3      Monocytes, Absolute 0.61 10*3/mm3      Eosinophils, Absolute 0.28 10*3/mm3      Basophils, Absolute 0.10 10*3/mm3      Immature Grans, Absolute 0.01 10*3/mm3      nRBC 0.0 /100 WBC     Troponin [[id number]]  (Abnormal) Collected: 11/03/20 2344    Specimen: Blood Updated: 11/04/20 0030     Troponin T 0.041 ng/mL     Narrative:      Troponin T Reference Range:  <= 0.03 ng/mL-   Negative for AMI  >0.03 ng/mL-     Abnormal for myocardial necrosis.  Clinicians would have to utilize clinical acumen, EKG, Troponin and serial changes to determine if it is an Acute Myocardial Infarction or myocardial injury due to an underlying chronic condition.       Results may be falsely decreased if patient taking Biotin.      Troponin [[id number]]  (Abnormal) Collected: 11/03/20 2123    Specimen: Blood Updated: 11/03/20 2157     Troponin T 0.046 ng/mL     Narrative:      Troponin T Reference Range:  <= 0.03 ng/mL-   Negative for AMI  >0.03 ng/mL-     Abnormal for myocardial necrosis.  Clinicians would have to utilize clinical acumen, EKG, Troponin and serial changes to determine if it is an Acute Myocardial Infarction or myocardial injury due to an underlying chronic condition.       Results may be falsely decreased if patient taking Biotin.      Troponin [[id number]]  (Abnormal) Collected: 11/03/20 1823    Specimen: Blood Updated: 11/03/20 1848     Troponin T 0.042 ng/mL     Narrative:      Troponin T Reference Range:  <= 0.03 ng/mL-   Negative for AMI  >0.03 ng/mL-     Abnormal for myocardial necrosis.  Clinicians would have to  utilize clinical acumen, EKG, Troponin and serial changes to determine if it is an Acute Myocardial Infarction or myocardial injury due to an underlying chronic condition.       Results may be falsely decreased if patient taking Biotin.      COVID PRE-OP / PRE-PROCEDURE SCREENING ORDER (NO ISOLATION) - Swab, Nasopharynx [[id number]]  (Normal) Collected: 11/03/20 1302    Specimen: Swab from Nasopharynx Updated: 11/03/20 1634    Narrative:      The following orders were created for panel order COVID PRE-OP / PRE-PROCEDURE SCREENING ORDER (NO ISOLATION) - Swab, Nasopharynx.  Procedure                               Abnormality         Status                     ---------                               -----------         ------                     COVID-19, [facility] IN-HOUS...[[id number]]  Normal              Final result                 Please view results for these tests on the individual orders.    COVID-19, [facility] IN-HOUSE, NP SWAB IN TRANSPORT MEDIA 8-10 HR TAT - Swab, Nasopharynx [[id number]]  (Normal) Collected: 11/03/20 1302    Specimen: Swab from Nasopharynx Updated: 11/03/20 1634     COVID19 Not Detected    Narrative:      Testing performed by Real Time RT-PCR  This test has not been approved by the [facility] but is authorized under the Emergency Use Act (EUA)    Fact sheet for providers: https://www.fda.gov/media/499079/download    Fact sheet for patients: https://www.fda.gov/media/363625/download        Troponin [[id number]]  (Abnormal) Collected: 11/03/20 1433    Specimen: Blood Updated: 11/03/20 1503     Troponin T 0.039 ng/mL     Narrative:      Troponin T Reference Range:  <= 0.03 ng/mL-   Negative for AMI  >0.03 ng/mL-     Abnormal for myocardial necrosis.  Clinicians would have to utilize clinical acumen, EKG, Troponin and serial changes to determine if it is an Acute Myocardial Infarction or myocardial injury due to an underlying chronic condition.       Results may be falsely decreased if patient taking  Biotin.      [place] Draw [[id number]] Collected: 11/03/20 1203    Specimen: Blood Updated: 11/03/20 1315    Narrative:      The following orders were created for panel order [place] Draw.  Procedure                               Abnormality         Status                     ---------                               -----------         ------                     Light Blue Top[[id number]]                                   Final result               Green Top (Gel)[[id number]]                                  Final result               Lavender Top[[id number]]                                     Final result               Gold Top - SST[[id number]]                                   Final result                 Please view results for these tests on the individual orders.    Light Blue Top [[id number]] Collected: 11/03/20 1203    Specimen: Blood Updated: 11/03/20 1315     Extra Tube hold for add-on     Comment: Auto resulted       Green Top (Gel) [[id number]] Collected: 11/03/20 1203    Specimen: Blood Updated: 11/03/20 1315     Extra Tube Hold for add-ons.     Comment: Auto resulted.       Lavender Top [[id number]] Collected: 11/03/20 1203    Specimen: Blood Updated: 11/03/20 1315     Extra Tube hold for add-on     Comment: Auto resulted       Gold Top - SST [[id number]] Collected: 11/03/20 1203    Specimen: Blood Updated: 11/03/20 1315     Extra Tube Hold for add-ons.     Comment: Auto resulted.       CK [[id number]]  (Normal) Collected: 11/03/20 1203    Specimen: Blood Updated: 11/03/20 1258     Creatine Kinase 57 U/L     Magnesium [[id number]]  (Normal) Collected: 11/03/20 1203    Specimen: Blood Updated: 11/03/20 1258     Magnesium 1.7 mg/dL     Lipase [[id number]]  (Normal) Collected: 11/03/20 1203    Specimen: Blood Updated: 11/03/20 1258     Lipase 32 U/L     D-dimer, Quantitative [[id number]]  (Abnormal) Collected: 11/03/20 1203    Specimen: Blood Updated: 11/03/20 1254     D-Dimer, Quantitative 3,010 ng/mL (FEU)      Narrative:      Dimer values <500 ng/ml FEU are FDA approved as aid in diagnosis of deep venous thrombosis and pulmonary embolism.  This test should not be used in an exclusion strategy with pretest probability alone.    A recent guideline regarding diagnosis for pulmonary thromboembolism recommends an adjusted exclusion criterion of age x 10 ng/ml FEU for patients >50 years of age ([employer] Intern Med 2015; 163: 701-711).      Troponin [[id number]]  (Abnormal) Collected: 11/03/20 1203    Specimen: Blood Updated: 11/03/20 1241     Troponin T 0.044 ng/mL     Narrative:      Troponin T Reference Range:  <= 0.03 ng/mL-   Negative for AMI  >0.03 ng/mL-     Abnormal for myocardial necrosis.  Clinicians would have to utilize clinical acumen, EKG, Troponin and serial changes to determine if it is an Acute Myocardial Infarction or myocardial injury due to an underlying chronic condition.       Results may be falsely decreased if patient taking Biotin.      Comprehensive Metabolic Panel [[id number]]  (Abnormal) Collected: 11/03/20 1203    Specimen: Blood Updated: 11/03/20 1241     Glucose 99 mg/dL      BUN 16 mg/dL      Creatinine 0.99 mg/dL      Sodium 141 mmol/L      Potassium 3.8 mmol/L      Chloride 108 mmol/L      CO2 25.0 mmol/L      Calcium 9.2 mg/dL      Total Protein 6.5 g/dL      Albumin 3.90 g/dL      ALT (SGPT) 13 U/L      AST (SGOT) 17 U/L      Alkaline Phosphatase 92 U/L      Total Bilirubin 0.3 mg/dL      eGFR Non African Amer 72 mL/min/1.73      Globulin 2.6 gm/dL      A/G Ratio 1.5 g/dL      BUN/Creatinine Ratio 16.2     Anion Gap 8.0 mmol/L     Narrative:      GFR Normal >60  Chronic Kidney Disease <60  Kidney Failure <15      BNP [[id number]]  (Abnormal) Collected: 11/03/20 1203    Specimen: Blood Updated: 11/03/20 1238     proBNP 4,259.0 pg/mL     Narrative:      Among patients with dyspnea, NT-proBNP is highly sensitive for the detection of acute congestive heart failure. In addition NT-proBNP of <300 pg/ml  effectively rules out acute congestive heart failure with 99% negative predictive value.    Results may be falsely decreased if patient taking Biotin.      CBC & Differential [[id number]]  (Abnormal) Collected: 11/03/20 1203    Specimen: Blood Updated: 11/03/20 1218    Narrative:      The following orders were created for panel order CBC & Differential.  Procedure                               Abnormality         Status                     ---------                               -----------         ------                     CBC Auto Differential[[id number]]        Abnormal            Final result                 Please view results for these tests on the individual orders.    CBC Auto Differential [[id number]]  (Abnormal) Collected: 11/03/20 1203    Specimen: Blood Updated: 11/03/20 1218     WBC 7.27 10*3/mm3      RBC 3.99 10*6/mm3      Hemoglobin 11.7 g/dL      Hematocrit 35.8 %      MCV 89.7 fL      MCH 29.3 pg      MCHC 32.7 g/dL      RDW 16.5 %      RDW-SD 54.7 fl      MPV 10.0 fL      Platelets 175 10*3/mm3      Neutrophil % 66.5 %      Lymphocyte % 18.2 %      Monocyte % 10.2 %      Eosinophil % 4.3 %      Basophil % 0.7 %      Immature Grans % 0.1 %      Neutrophils, Absolute 4.84 10*3/mm3      Lymphocytes, Absolute 1.32 10*3/mm3      Monocytes, Absolute 0.74 10*3/mm3      Eosinophils, Absolute 0.31 10*3/mm3      Basophils, Absolute 0.05 10*3/mm3      Immature Grans, Absolute 0.01 10*3/mm3      nRBC 0.0 /100 WBC           Imaging Results (Last 48 Hours)     Procedure Component Value Units Date/Time    US Venous Doppler Lower Extremity Bilateral (duplex) [[id number]] Collected: 11/03/20 1652     Updated: 11/03/20 1733    Narrative:      Doppler duplex venous examination bilateral lower extremities.         CLINICAL INDICATION: Pulmonary emboli.      COMPARISON: CTA thorax November 3, 2020.     FINDINGS:    The common femoral,  femoral, and popliteal veins of the  bilateral     lower extremity are well  identified.    There are noncompressible echogenic acute appearing thrombi left  popliteal vein. Venous examination is otherwise unremarkable.      Impression:      Acute noncompressible echogenic thrombi left  popliteal vein.    Doppler venous examination is otherwise unremarkable.    Electronically signed by:  Esdras Paul MD  11/3/2020 5:32 PM CST  Workstation: [id number]    CT Angiogram Chest [[id number]] Collected: 11/03/20 1448     Updated: 11/03/20 1529    Narrative:      [facility] [place]    Patient Name: SYLVIA PACK    ORDERING: ALLISON LÓPEZ    ATTENDING: ALLISON LÓPEZ     REFERRING: ALLISON LÓPEZ    -----------------------    EXAM DESCRIPTION: CT ANGIOGRAM CHEST    CLINICAL HISTORY:  PE suspected, low/intermediate prob, positive  D-dimer, R07.2 Precordial pain       COMPARISON: CT chest 7/31/2020    TECHNIQUE:   Axial imaging obtained utilizing intravenous contrast at CT PE  protocol with reformatted images obtained. Computer generated 3-D  reconstructions/MIPS were performed.  This exam was performed according to our departmental dose  optimization program, which includes automated exposure control,  adjustment of the mA and/or KV according to patient size and/or  use of iterative reconstruction technique.  Dose Length Product 184.8  CONTRAST:   68 mL intravenous Isovue 370    FINDINGS:  Diagnostic quality: Adequate .  Pulmonary embolism: Multifocal acute embolic event contributing  to luminal filling defects within the right upper, middle, and  lower lobe segmental and subsegmental branches. No embolic  changes on the left confirmed. No large main pulmonary trunk  thrombus or saddle embolus identified.  Pulmonary arteries:  Normal in caliber.  Lung parenchyma: Scattered linear type scarring and atelectasis  present bilaterally involving multiple lobes. There is no dense  parenchymal consolidation, pleural effusion, or pneumothorax. No  pulmonary mass or suspicious  nodule.  Central airways: Patent.  Adenopathy: Redemonstration of mildly enlarged lymph nodes  positioned at or just below the level of the james measuring  approximately 1.35 and 1.7 cm, similar to the prior exam. No  development of conglomerate or necrotic adenopathy. No thyroid  mass or enlargement.  Heart and great vessels: No significant cardiac enlargement or  pericardial effusion. Coronary vascular calcification is present.  Upper abdomen: No acute findings within the included upper  abdomen. No adrenal mass. There is mild adrenal thickening..    Bones: Demineralization and degenerative changes within the  spine. Sternotomy wires are present. No compression fracture  within the spine seen..    Additional findings: None.      Impression:      Showering of emboli on the right involving upper, mid, and lower  lobe branches.    Scattered linear scarring and/or atelectasis. No consolidation,  effusion or pneumothorax.    Stable appearance of the mediastinal lymph nodes described on the  prior examination. No development of necrotic or conglomerate  lymph node. No pulmonary mass or suspicious nodule.    Findings reported to Dr. Harris at the time of interpretation on  11/3/2020.    Electronically signed by:  Lakhwinder Chavarria MD  11/3/2020 3:28 PM  CST Workstation: [id number]    XR Chest 1 View [[id number]] Collected: 11/03/20 1215     Updated: 11/03/20 1235    Narrative:      Chest x-ray single view.         CLINICAL INDICATION: Shortness of breath    COMPARISON: Chest July 29, 2020.    FINDINGS: Cardiac silhouette is normal in size. Pulmonary  vascularity is unremarkable.     There are midline sternal sutures from prior cardiac surgery.  Subtle fibrotic changes left lung base.    Lungs otherwise clear.      Impression:      As above.    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       Chest Pain  Pain location:  L chest  Pain quality: aching and pressure    Pain radiates to:  L arm  Pain severity:  Mild  Onset quality:  Unable to specify  Timing:  Constant  Progression:  Waxing and waning  Chronicity:  New  Relieved by:  Nothing  Worsened by:  Exertion, movement and certain positions  Associated symptoms: shortness of breath    Associated symptoms: no abdominal pain, no cough, no diaphoresis, no   dizziness, no dysphagia, no fatigue, no fever, no headache, no nausea, no   vomiting and no weakness    Risk factors: coronary artery disease, high cholesterol, hypertension and   male sex    Risk factors: no smoking (quit smoking 40 years ago)    Shortness of Breath  Associated symptoms: chest pain    Associated symptoms: no abdominal pain, no cough, no diaphoresis, no ear   pain, no fever, no headaches, no neck pain, no rash, no sore throat, no   vomiting and no wheezing        Review of Systems   Constitutional: Positive for activity change. Negative for appetite   change, chills, diaphoresis, fatigue and fever.   HENT: Negative for congestion, ear discharge, ear pain, nosebleeds,   rhinorrhea, sinus pressure, sore throat and trouble swallowing.    Eyes: Negative for discharge and redness.   Respiratory: Positive for shortness of breath. Negative for apnea, cough,   chest tightness and wheezing.    Cardiovascular: Positive for chest pain.   Gastrointestinal: Negative for abdominal pain, diarrhea, nausea and   vomiting.   Endocrine: Negative for polyuria.   Genitourinary: Negative for dysuria, frequency and urgency.   Musculoskeletal: Negative for myalgias and neck pain.   Skin: Negative for color change and rash.   Allergic/Immunologic: Negative for immunocompromised state.    Neurological: Negative for dizziness, seizures, syncope, weakness,   light-headedness and headaches.   Hematological: Negative for adenopathy. Does not bruise/bleed easily.   Psychiatric/Behavioral: Negative for behavioral problems and confusion.   All other systems reviewed and are negative.      Past Medical History:   Diagnosis Date   • Abnormal ECG        Allergies   Allergen Reactions   • Hydrochlorothiazide Swelling     TONGUE SWELLING       Past Surgical History:   Procedure Laterality Date   • CORONARY STENT PLACEMENT     • HIP HEMIARTHROPLASTY Left 2020    Procedure: LEFT HIP HEMIARTHROPLASTY;  Surgeon: Jitendra Solis MD;    Location: [facility];  Service: Orthopedics;  Laterality: Left;       Family History   Problem Relation Age of Onset   • Heart failure Mother    • Heart failure Father        Social History     Socioeconomic History   • Marital status:      Spouse name: Not on file   • Number of children: Not on file   • Years of education: Not on file   • Highest education level: Not on file   Tobacco Use   • Smoking status: Former Smoker     Quit date:      Years since quittin.8   • Smokeless tobacco: Never Used   Substance and Sexual Activity   • Alcohol use: No     Frequency: Never   • Drug use: No   • Sexual activity: Defer           Objective   Physical Exam  Vitals signs and nursing note reviewed.   Constitutional:       Appearance: He is well-developed.   HENT:      Head: Normocephalic and atraumatic.      Nose: Nose normal.   Eyes:      General: No scleral icterus.        Right eye: No discharge.         Left eye: No discharge.      Conjunctiva/sclera: Conjunctivae normal.      Pupils: Pupils are equal, round, and reactive to light.   Neck:      Musculoskeletal: Normal range of motion and neck supple.      Trachea: No tracheal deviation.   Cardiovascular:      Rate and Rhythm: Normal rate and regular rhythm.      Heart sounds: Normal heart sounds. No murmur.    Pulmonary:      Effort: Pulmonary effort is normal. No respiratory distress.      Breath sounds: Normal breath sounds. No stridor. No wheezing or rales.   Abdominal:      General: Bowel sounds are normal. There is no distension.      Palpations: Abdomen is soft. There is no mass.      Tenderness: There is no abdominal tenderness. There is no guarding or   rebound.   Skin:     General: Skin is warm and dry.      Findings: No erythema or rash.   Neurological:      Mental Status: He is alert and oriented to person, place, and time.      Coordination: Coordination normal.   Psychiatric:         Behavior: Behavior normal.         Thought Content: Thought content normal.         ECG 12 Lead      Date/Time: 11/3/2020 2:15 PM  Performed by: Julian Harris MD  Authorized by: Julian Harris MD   Interpreted by physician  Rhythm: sinus rhythm  Rate: normal  BPM: 68  Conduction: 1st degree  ST Segments: ST segments normal                   ED Course             Labs Reviewed   TROPONIN (IN-HOUSE) - Abnormal; Notable for the following components:       Result Value    Troponin T 0.044 (*)     All other components within normal limits    Narrative:     Troponin T Reference Range:  <= 0.03 ng/mL-   Negative for AMI  >0.03 ng/mL-     Abnormal for myocardial necrosis.  Clinicians would have   to utilize clinical acumen, EKG, Troponin and serial changes to determine   if it is an Acute Myocardial Infarction or myocardial injury due to an   underlying chronic condition.       Results may be falsely decreased if patient taking Biotin.     COMPREHENSIVE METABOLIC PANEL - Abnormal; Notable for the following   components:    Chloride 108 (*)     All other components within normal limits    Narrative:     GFR Normal >60  Chronic Kidney Disease <60  Kidney Failure <15     BNP (IN-HOUSE) - Abnormal; Notable for the following components:    proBNP 4,259.0 (*)     All other components within normal limits    Narrative:      Among patients with dyspnea, NT-proBNP is highly sensitive for the   detection of acute congestive heart failure. In addition NT-proBNP of <300   pg/ml effectively rules out acute congestive heart failure with 99%   negative predictive value.    Results may be falsely decreased if patient taking Biotin.     CBC WITH AUTO DIFFERENTIAL - Abnormal; Notable for the following   components:    RBC 3.99 (*)     Hemoglobin 11.7 (*)     Hematocrit 35.8 (*)     RDW 16.5 (*)     RDW-SD 54.7 (*)     Lymphocyte % 18.2 (*)     All other components within normal limits   D-DIMER, QUANTITATIVE - Abnormal; Notable for the following components:    D-Dimer, Quantitative 3,010 (*)     All other components within normal limits    Narrative:     Dimer values <500 ng/ml FEU are FDA approved as aid in diagnosis of deep   venous thrombosis and pulmonary embolism.  This test should not be used in   an exclusion strategy with pretest probability alone.    A recent guideline regarding diagnosis for pulmonary thromboembolism   recommends an adjusted exclusion criterion of age x 10 ng/ml FEU for   patients >50 years of age ([employer] Intern Med 2015; 163: 701-711).     CK - Normal   MAGNESIUM - Normal   LIPASE - Normal   COVID PRE-OP / PRE-PROCEDURE SCREENING ORDER (NO ISOLATION)    Narrative:     The following orders were created for panel order COVID PRE-OP /   PRE-PROCEDURE SCREENING ORDER (NO ISOLATION) - Swab, Nasopharynx.  Procedure                               Abnormality         Status                       ---------                               -----------         ------                       COVID-19RUBIA IN-HOUS...[[id number]]                      In process                     Please view results for these tests on the individual orders.   COVID-RUBIA [facility]ria IN-HOUSE, NP SWAB IN TRANSPORT MEDIA 8-10 HR TAT   RAINBOW DRAW    Narrative:     The following orders were created for panel order Elizabeth Draw.  Procedure                                Abnormality         Status                       ---------                               -----------         ------                       Light Blue Top[[id number]]                                   Final result                 Green Top (Gel)[[id number]]                                  Final result                 Lavender Top[[id number]]                                     Final result                 Gold Top - SST[[id number]]                                   Final result                   Please view results for these tests on the individual orders.   TROPONIN (IN-HOUSE)   CBC AND DIFFERENTIAL    Narrative:     The following orders were created for panel order CBC & Differential.  Procedure                               Abnormality         Status                       ---------                               -----------         ------                       CBC Auto Differential[[id number]]        Abnormal            Final result                   Please view results for these tests on the individual orders.   LIGHT BLUE TOP   GREEN TOP   LAVENDER TOP   GOLD TOP - SST       XR Chest 1 View   Final Result   As above.      Electronically signed by:  Esdras Paul MD  11/3/2020 12:34 PM CST   Workstation: [id number]      CT Angiogram Chest    (Results Pending)                                       MDM    Final diagnoses:   Precordial pain            Julian Harris MD  11/03/20 1416      Impression:                Physician Progress Notes (last 48 hours) (Notes from 11/02/20 1045 through 11/04/20 1045)    No notes of this type exist for this encounter.       Consult Notes (last 48 hours) (Notes from 11/02/20 1045 through 11/04/20 1045)    No notes of this type exist for this encounter.

## 2020-11-05 LAB
MAGNESIUM SERPL-MCNC: 1.7 MG/DL (ref 1.6–2.4)
POTASSIUM SERPL-SCNC: 3.7 MMOL/L (ref 3.5–5.2)
WHOLE BLOOD HOLD SPECIMEN: NORMAL

## 2020-11-05 PROCEDURE — 97535 SELF CARE MNGMENT TRAINING: CPT

## 2020-11-05 PROCEDURE — 99232 SBSQ HOSP IP/OBS MODERATE 35: CPT | Performed by: INTERNAL MEDICINE

## 2020-11-05 PROCEDURE — 83735 ASSAY OF MAGNESIUM: CPT | Performed by: INTERNAL MEDICINE

## 2020-11-05 PROCEDURE — 84132 ASSAY OF SERUM POTASSIUM: CPT | Performed by: INTERNAL MEDICINE

## 2020-11-05 PROCEDURE — 25010000002 ENOXAPARIN PER 10 MG: Performed by: INTERNAL MEDICINE

## 2020-11-05 PROCEDURE — 97110 THERAPEUTIC EXERCISES: CPT

## 2020-11-05 PROCEDURE — 97116 GAIT TRAINING THERAPY: CPT

## 2020-11-05 PROCEDURE — 25010000002 MAGNESIUM SULFATE 2 GM/50ML SOLUTION: Performed by: INTERNAL MEDICINE

## 2020-11-05 RX ORDER — MAGNESIUM SULFATE HEPTAHYDRATE 40 MG/ML
2 INJECTION, SOLUTION INTRAVENOUS ONCE
Status: COMPLETED | OUTPATIENT
Start: 2020-11-05 | End: 2020-11-05

## 2020-11-05 RX ORDER — POTASSIUM CHLORIDE 750 MG/1
40 CAPSULE, EXTENDED RELEASE ORAL ONCE
Status: COMPLETED | OUTPATIENT
Start: 2020-11-05 | End: 2020-11-05

## 2020-11-05 RX ADMIN — AMITRIPTYLINE HYDROCHLORIDE 100 MG: 50 TABLET, FILM COATED ORAL at 22:41

## 2020-11-05 RX ADMIN — LOSARTAN POTASSIUM 25 MG: 25 TABLET, FILM COATED ORAL at 08:15

## 2020-11-05 RX ADMIN — ACETAMINOPHEN 650 MG: 325 TABLET, FILM COATED ORAL at 07:30

## 2020-11-05 RX ADMIN — TERAZOSIN HYDROCHLORIDE 1 MG: 1 CAPSULE ORAL at 22:40

## 2020-11-05 RX ADMIN — PANTOPRAZOLE SODIUM 40 MG: 40 TABLET, DELAYED RELEASE ORAL at 06:08

## 2020-11-05 RX ADMIN — ENOXAPARIN SODIUM 70 MG: 80 INJECTION SUBCUTANEOUS at 19:25

## 2020-11-05 RX ADMIN — SODIUM CHLORIDE, PRESERVATIVE FREE 10 ML: 5 INJECTION INTRAVENOUS at 22:41

## 2020-11-05 RX ADMIN — AMIODARONE HYDROCHLORIDE 200 MG: 200 TABLET ORAL at 08:15

## 2020-11-05 RX ADMIN — ATORVASTATIN CALCIUM 20 MG: 20 TABLET, FILM COATED ORAL at 08:15

## 2020-11-05 RX ADMIN — POTASSIUM CHLORIDE 40 MEQ: 10 CAPSULE, COATED, EXTENDED RELEASE ORAL at 12:19

## 2020-11-05 RX ADMIN — ASPIRIN 81 MG: 81 TABLET, FILM COATED ORAL at 08:15

## 2020-11-05 RX ADMIN — MAGNESIUM SULFATE IN WATER 2 G: 40 INJECTION, SOLUTION INTRAVENOUS at 12:19

## 2020-11-05 RX ADMIN — ROPINIROLE HYDROCHLORIDE 0.5 MG: 0.5 TABLET, FILM COATED ORAL at 08:16

## 2020-11-05 RX ADMIN — SODIUM CHLORIDE, PRESERVATIVE FREE 10 ML: 5 INJECTION INTRAVENOUS at 08:15

## 2020-11-05 RX ADMIN — ENOXAPARIN SODIUM 70 MG: 80 INJECTION SUBCUTANEOUS at 06:13

## 2020-11-05 RX ADMIN — METOPROLOL SUCCINATE 25 MG: 25 TABLET, FILM COATED, EXTENDED RELEASE ORAL at 08:15

## 2020-11-05 NOTE — NURSING NOTE
Upon entering room pt had a container of snuff in hand. Educated pt that this is a tobacco free facility. Pt was in agreement for snuff to be sent to security until he is dcd.

## 2020-11-05 NOTE — PROGRESS NOTES
LOS: 1 day   Patient Care Team:  Terry Peterson MD as PCP - General    Chief Complaint: Admitted for evaluation of chest pain with persistent mildly abnormal troponin and history of documented nonsustained ventricular tachycardia refused invasive evaluation and on ICD when he was evaluated in the office.  On the monitor of nonsustained ventricular tachycardia and patient does not want any invasive management.  Started on amiodarone 200 mg a day and no further recurrence of ventricular tachyarrhythmia       Review of Systems:   ROS    Constitutional: Positive for activity change.        Hard of hearing, elderly fragile appearing   HENT: Negative.    Eyes: Negative.    Respiratory: Negative.    Cardiovascular: Negative.    Gastrointestinal: Negative.    Endocrine: Negative.    Genitourinary: Negative.    Musculoskeletal: Negative.    Neurological: Negative.    Objective     Current Facility-Administered Medications   Medication Dose Route Frequency Provider Last Rate Last Dose   • acetaminophen (TYLENOL) tablet 650 mg  650 mg Oral Q4H PRN Ricky Xiong MD   650 mg at 11/05/20 0730    Or   • acetaminophen (TYLENOL) 160 MG/5ML solution 650 mg  650 mg Oral Q4H PRN Ricky Xiong MD        Or   • acetaminophen (TYLENOL) suppository 650 mg  650 mg Rectal Q4H PRN Ricky Xiong MD       • albuterol (PROVENTIL) nebulizer solution 0.083% 2.5 mg/3mL  2.5 mg Nebulization Q4H PRN Ricky Xiong MD   2.5 mg at 11/03/20 2222   • amiodarone (PACERONE) tablet 200 mg  200 mg Oral Q24H Júnior Licona MD   200 mg at 11/05/20 0815   • amitriptyline (ELAVIL) tablet 100 mg  100 mg Oral Nightly Ricky Xiong MD   100 mg at 11/04/20 2111   • aspirin EC tablet 81 mg  81 mg Oral Daily Ricky Xiong MD   81 mg at 11/05/20 0815   • atorvastatin (LIPITOR) tablet 20 mg  20 mg Oral Daily Ricky Xiong MD   20 mg at 11/05/20 0815   • enoxaparin (LOVENOX) syringe 70 mg  1 mg/kg Subcutaneous  "Q12H Ricky Xiong MD   70 mg at 11/05/20 0613   • losartan (COZAAR) tablet 25 mg  25 mg Oral Daily Ricky Xiong MD   25 mg at 11/05/20 0815   • metoprolol succinate XL (TOPROL-XL) 24 hr tablet 25 mg  25 mg Oral Daily Ricky Xiong MD   25 mg at 11/05/20 0815   • nitroglycerin (NITROSTAT) SL tablet 0.4 mg  0.4 mg Sublingual Q5 Min PRN Ricky Xiong MD       • ondansetron (ZOFRAN) tablet 4 mg  4 mg Oral Q6H PRN Ricky Xiong MD       • pantoprazole (PROTONIX) EC tablet 40 mg  40 mg Oral QAM Ricky Xiong MD   40 mg at 11/05/20 0608   • rOPINIRole (REQUIP) tablet 0.5 mg  0.5 mg Oral Daily Ricky Xiong MD   0.5 mg at 11/05/20 0816   • sodium chloride 0.9 % flush 10 mL  10 mL Intravenous PRN Julian Harris MD       • sodium chloride 0.9 % flush 10 mL  10 mL Intravenous Q12H Ricky Xiong MD   10 mL at 11/05/20 0815   • sodium chloride 0.9 % flush 10 mL  10 mL Intravenous PRN Ricky Xiong MD       • terazosin (HYTRIN) capsule 1 mg  1 mg Oral Nightly Ricky Xiong MD   1 mg at 11/04/20 2111       Vital Sign Min/Max for last 24 hours  Temp  Min: 97.5 °F (36.4 °C)  Max: 98 °F (36.7 °C)   BP  Min: 138/76  Max: 152/81   Pulse  Min: 60  Max: 92   Resp  Min: 18  Max: 18   SpO2  Min: 97 %  Max: 99 %   No data recorded   Weight  Min: 65.2 kg (143 lb 12.8 oz)  Max: 65.2 kg (143 lb 12.8 oz)     Flowsheet Rows      First Filed Value   Admission Height  182.9 cm (72\") Documented at 11/03/2020 1134   Admission Weight  74.8 kg (165 lb) Documented at 11/03/2020 1134            Intake/Output Summary (Last 24 hours) at 11/5/2020 1001  Last data filed at 11/4/2020 2214  Gross per 24 hour   Intake --   Output 450 ml   Net -450 ml       Physical Exam:     General Appearance:   Alert, cooperative, in no acute distress   Lungs:    Clear to auscultation, respirations regular, even and               unlabored    Heart:   Regular rhythm and normal rate, normal S1 and " S2, no         murmur, no gallop, no rub, no click   Chest Wall:   No abnormalities observed   Abdomen:    Normal bowel sounds, no masses, no organomegaly, soft     nontender, nondistended, no guarding, no rebound                tenderness   Extremities:  Moves all extremities well, no edema, no cyanosis, no           redness   Pulses:  Pulses palpable and equal bilaterally   Skin:  No bleeding, bruising or rash        Results Review:   I reviewed the patient's new clinical results.  Lab Results   Component Value Date    WBC 7.48 11/04/2020    HGB 12.5 (L) 11/04/2020    HCT 38.5 11/04/2020    MCV 89.5 11/04/2020     11/04/2020     Lab Results   Component Value Date    GLUCOSE 96 11/04/2020    BUN 12 11/04/2020    CREATININE 0.91 11/04/2020    EGFRIFNONA 79 11/04/2020    BCR 13.2 11/04/2020    CO2 25.0 11/04/2020    CALCIUM 9.7 11/04/2020    ALBUMIN 4.10 11/04/2020    AST 20 11/04/2020    ALT 13 11/04/2020     No results found for: TSH  No results found for: INR, PROTIME  No results found for: PTT    EKG:     Telemetry:    Ejection Fraction  Lab Results   Component Value Date    EF 26 07/08/2019       Echo EF Estimated  Lab Results   Component Value Date    ECHOEFEST 27 07/09/2020         Present on Admission:  • Precordial pain  • Pulmonary emboli (CMS/HCC)    Assessment/Plan   #1  nonsustained ventricular tachycardia patient does have history of nonsustained ventricular arrhythmia refused ICD  #2 CAD status post CABG more than 10 years ago had persistent mildly abnormal troponin not interested in further risk stratification   #3 peripheral vascular disease status post carotid endarterectomy   #4 hyperlipidemia   #5 hypertension  Patient started on amiodarone and monitor was reviewed and no further recurrence of nonsustained ventricular arrhythmia.  He is tolerating amiodarone very well.  We will continue low-dose losartan and low-dose beta-blocker  With aspirin and statin.  Clinically is not in heart failure  resting in the bed on 1 pillow without symptom suggesting of cardiac decompensation  Júnior Licona MD  11/05/20  10:01 CST      Part of this note may be an electronic transcription/translation of spoken language to printed text using the Dragon Dictation system.

## 2020-11-05 NOTE — THERAPY TREATMENT NOTE
Acute Care - Physical Therapy Treatment Note  [facility]     Patient Name: Jose Dacosta  : 1935  MRN: [id number]  Today's Date: 2020   Onset of Illness/Injury or Date of Surgery: 20       PT Assessment (last 12 hours)      PT Evaluation and Treatment     Row Name 20 0915          Physical Therapy Time and Intention    Subjective Information  complains of;pain  -     Document Type  therapy note (daily note)  -     Mode of Treatment  individual therapy;physical therapy  -     Patient Effort  good  -     Comment  nsg oks tx, states no alarms needed as pt signed papers to not have them  -     Row Name 20          General Information    Patient Profile Reviewed  yes  -     Existing Precautions/Restrictions  fall  -     Row Name 20          Cognition    Orientation Status (Cognition)  oriented x 4  -     Row Name 20          Pain Scale: Numbers Pre/Post-Treatment    Pretreatment Pain Rating  --  -     Posttreatment Pain Rating  --  -     Pre/Posttreatment Pain Comment  pt c/o B leg and L foot pain - did not give ratings  -     Row Name 20          Range of Motion Comprehensive    General Range of Motion  no range of motion deficits identified  -     Row Name 20          Strength Comprehensive (MMT)    General Manual Muscle Testing (MMT) Assessment  lower extremity strength deficits identified  -     Row Name 20          Bed Mobility    Bed Mobility  bed mobility (all) activities  -     Supine-Sit [place] (Bed Mobility)  --  -     Sit-Supine [place] (Bed Mobility)  independent  -     Assistive Device (Bed Mobility)  --  -     Row Name 20          Transfers    Transfers  toilet transfer  -     Bed-Chair [place] (Transfers)  --  -     Sit-Stand [place] (Transfers)  supervision  -     Stand-Sit [place] (Transfers)  supervision  -     [place]  Level (Toilet Transfer)  supervision;contact guard  -     Assistive Device (Toilet Transfer)  walker, front-wheeled pt stood at toilet 2 times (1 at start of tx and 1 at end)  -     Row Name 11/05/20 0915          Sit-Stand Transfer    Assistive Device (Sit-Stand Transfers)  walker, front-wheeled  -JW     Row Name 11/05/20 0915          Stand-Sit Transfer    Assistive Device (Stand-Sit Transfers)  walker, front-wheeled  -JW     Row Name 11/05/20 0915          Gait/Stairs (Locomotion)    [place] Level (Gait)  standby assist;contact guard  -     Assistive Device (Gait)  walker, front-wheeled  -     Distance in Feet (Gait)  220', 16' x 2  -     Deviations/Abnormal Patterns (Gait)  --  -     [place] Level (Stairs)  --  -     Comment (Gait/Stairs)  no LOB w/ gt  -     Row Name 11/05/20 0915          Safety Issues, Functional Mobility    Impairments Affecting Function (Mobility)  endurance/activity tolerance;strength  -     Row Name 11/05/20 0915          Motor Skills    Therapeutic Exercise  hip;knee;ankle  -     Row Name 11/05/20 0915          Hip (Therapeutic Exercise)    Hip (Therapeutic Exercise)  isometric exercises;strengthening exercise  -     Hip Isometrics (Therapeutic Exercise)  bilateral;aDduction  -     Hip Strengthening (Therapeutic Exercise)  bilateral;marching while seated  -     Row Name 11/05/20 0915          Knee (Therapeutic Exercise)    Knee (Therapeutic Exercise)  strengthening exercise  -     Knee Strengthening (Therapeutic Exercise)  bilateral;LAQ (long arc quad)  -     Row Name 11/05/20 0915          Ankle (Therapeutic Exercise)    Ankle (Therapeutic Exercise)  AROM (active range of motion)  -     Ankle AROM (Therapeutic Exercise)  bilateral;dorsiflexion;plantarflexion  -     Row Name             Wound 08/01/20 1749 Left heel    Wound - Properties Group Placement Date: 08/01/20  -TC Placement Time: 1749  -TC Present on Hospital Admission: N  -TC Side:  Left  -TC Location: heel  -TC    Retired Wound - Properties Group Date first assessed: 08/01/20  -TC Time first assessed: 1749  -TC Present on Hospital Admission: N  -TC Side: Left  -TC Location: heel  -TC    Row Name 11/05/20 0915          Vital Signs    Pretreatment Heart Rate (beats/min)  72  -JW     Pre SpO2 (%)  92  -JW     O2 Delivery Pre Treatment  room air  -JW     Pre Patient Position  Sitting  -JW     Intra Patient Position  Standing  -JW     Post Patient Position  Supine  -JW     Row Name 11/05/20 0915          Transfer Goal 1 (PT)    Activity/Assistive Device (Transfer Goal 1, PT)  sit-to-stand/stand-to-sit;bed-to-chair/chair-to-bed;walker, rolling  -JW     [place] Level/Cues Needed (Transfer Goal 1, PT)  modified independence  -JW     Time Frame (Transfer Goal 1, PT)  by discharge  -JW     Progress/Outcome (Transfer Goal 1, PT)  goal not met  -     Row Name 11/05/20 0915          Gait Training Goal 1 (PT)    Activity/Assistive Device (Gait Training Goal 1, PT)  gait (walking locomotion);assistive device use;walker, rolling;increase energy conservation;increase endurance/gait distance;improve balance and speed  -     [place] Level (Gait Training Goal 1, PT)  modified independence  -JW     Distance (Gait Training Goal 1, PT)  300' x 1  -JW     Time Frame (Gait Training Goal 1, PT)  by discharge  -JW     Progress/Outcome (Gait Training Goal 1, PT)  goal not met  -     Row Name 11/05/20 0915          Therapy Assessment/Plan (PT)    Rehab Potential (PT)  good, to achieve stated therapy goals  -     Criteria for Skilled Interventions Met (PT)  yes;meets criteria;skilled treatment is necessary  -       User Key  (r) = Recorded By, (t) = Taken By, (c) = Cosigned By    Initials Name Provider Type    JW La Hankins PTA Physical Therapy Assistant    Kandis Hudson, RN Registered Nurse          PT Recommendation and Plan  Anticipated Discharge Disposition (PT): home with  assist, home with home health  Therapy Frequency (PT): (5-14x/week)  Plan of Care Reviewed With: patient  Outcome Summary: pt t/fers sit<>stand w/ SBA, sit to sup w/ Ind, ambulates ~220', 16' x 2 w/ RW w/ CGA/SBA, pt w/ no LOB w/ gt, performs 10 reps of B LE seated ther ex, pt does c/o B leg and L foot pain throughout tx, no new goals met  Outcome Measures     Row Name 11/05/20 0915 11/05/20 0745 11/04/20 1715       How much help from another person do you currently need...    Turning from your back to your side while in flat bed without using bedrails?  4  -JW  --  --    Moving from lying on back to sitting on the side of a flat bed without bedrails?  4  -JW  --  --    Moving to and from a bed to a chair (including a wheelchair)?  3  -JW  --  --    Standing up from a chair using your arms (e.g., wheelchair, bedside chair)?  3  -JW  --  --    Climbing 3-5 steps with a railing?  3  -JW  --  --    To walk in hospital room?  3  -JW  --  --    AM-PAC 6 Clicks Score (PT)  20  -JW  --  --       How much help from another is currently needed...    Putting on and taking off regular lower body clothing?  --  3  -RC  3  -ME    Bathing (including washing, rinsing, and drying)  --  3  -RC  3  -ME    Toileting (which includes using toilet bed pan or urinal)  --  3  -RC  3  -ME    Putting on and taking off regular upper body clothing  --  3  -RC  3  -ME    Taking care of personal grooming (such as brushing teeth)  --  4  -RC  4  -ME    Eating meals  --  4  -RC  4  -ME    AM-PAC 6 Clicks Score (OT)  --  20  -RC  20  -ME       Functional Assessment    Outcome Measure Options  AM-PAC 6 Clicks Basic Mobility (PT)  -  --  AM-PAC 6 Clicks Daily Activity (OT)  -ME      User Key  (r) = Recorded By, (t) = Taken By, (c) = Cosigned By    Initials Name Provider Type    JW Whitledge, La M, PTA Physical Therapy Assistant    Evelyn Farley, AGUILAR/L Occupational Therapy Assistant    Eitan Bobo, OTR/L Occupational Therapist            Time Calculation:   PT Charges     Row Name 11/05/20 0915             Time Calculation    Start Time  0915  -      Stop Time  0940  -      Time Calculation (min)  25 min  -JW      PT Received On  11/05/20  -         Time Calculation- PT    Total Timed Code Minutes- PT  25 minute(s)  -        User Key  (r) = Recorded By, (t) = Taken By, (c) = Cosigned By    Initials Name Provider Type    La Mendiola, DANIE Physical Therapy Assistant        Therapy Charges for Today     Code Description Service Date Service Provider Modifiers Qty    26924304407 HC GAIT TRAINING EA 15 MIN 11/5/2020 La Hankins, PTA GP 1    74860741646 HC PT THER PROC EA 15 MIN 11/5/2020 La Hankins, PTA GP 1          PT G-Codes  Outcome Measure Options: AM-PAC 6 Clicks Basic Mobility (PT)  AM-PAC 6 Clicks Score (PT): 20  AM-PAC 6 Clicks Score (OT): 20    La Hankins PTA  11/5/2020

## 2020-11-05 NOTE — NURSING NOTE
Pt's family brought in pt's glasses and inhalers. Instructed family that she could take the inhalers back home with her as pt has to be on medications ordered by MD. Pt has neb tx ordered as needed.

## 2020-11-05 NOTE — PLAN OF CARE
Pt reports no pain at this time. Mag 1.7; k+ 3.7; Dr. Licona made aware and ordered 40meq KCL and 2g mag IV. Will continue to monitor.         Goal Outcome Evaluation:  Plan of Care Reviewed With: patient  Progress: improving

## 2020-11-05 NOTE — PLAN OF CARE
Problem: Adult Inpatient Plan of Care  Goal: Plan of Care Review  Outcome: Ongoing, Progressing  Flowsheets  Taken 11/5/2020 0938  Progress: improving  Plan of Care Reviewed With: patient  Taken 11/5/2020 0745  Progress: improving  Plan of Care Reviewed With: patient  Outcome Summary: Pt in recliner upon entry, he was agreeable to grooming act and ue ex.  amb to bathroomw/ cga completed toileting w/ sba .   no goals met this tx cont poc.

## 2020-11-05 NOTE — PROGRESS NOTES
[facility]  INPATIENT PROGRESS NOTE    Length of Stay: 1  Date of Admission: 11/3/2020  Primary Care Physician: Terry Peterson MD    Subjective   HPI:He has been complaining of shortness of breath for over 2 weeks and today he presented chest pain irradiated to the left arm; since he is a patient with long history of coronary artery disease, his daughter brought him here. At this time he denies any chest pain  He denies nausea, vomiting, headache, abdominal pain    (S) no complains    Review of Systems   Constitutional: Positive for activity change.        Hard of hearing, elderly fragile appearing   HENT: Negative.    Eyes: Negative.    Respiratory: Negative.    Cardiovascular: Negative.    Gastrointestinal: Negative.    Endocrine: Negative.    Genitourinary: Negative.    Musculoskeletal: Negative.    Neurological: Negative.    Psychiatric/Behavioral: Negative for agitation and behavioral problems.     All pertinent negatives and positives are as above. All other systems have been reviewed and are negative unless otherwise stated.     Prior to Admission medications    Medication Sig Start Date End Date Taking? Authorizing Provider   albuterol sulfate  (90 Base) MCG/ACT inhaler INHALE 2 PUFFS INTO THE LUNGS EVERY 6 (SIX) HOURS AS NEEDED FOR WHEEZING 4/8/19   Marcial Crain MD   amitriptyline (ELAVIL) 100 MG tablet Take 100 mg by mouth every night at bedtime. 4/8/19   Marcial Crain MD   aspirin 81 MG EC tablet Take 1 tablet by mouth Daily. 7/5/19   Júnior Licona MD   atorvastatin (LIPITOR) 20 MG tablet Take 1 tablet by mouth.    Marcial Carin MD   losartan (COZAAR) 25 MG tablet TAKE 1 TABLET BY MOUTH EVERY DAY 12/30/19   Bessy Duval MD   metoprolol succinate XL (TOPROL-XL) 25 MG 24 hr tablet TAKE 1 TABLET BY MOUTH EVERY DAY 12/16/19   Júnior Licona MD   omeprazole (priLOSEC) 40 MG capsule Take 40  mg by mouth Every Morning. 6/27/19   Marcial Crain MD   rOPINIRole (REQUIP) 0.5 MG tablet Take 0.5 mg by mouth Daily. 5/3/19   Marcial Crain MD   terazosin (HYTRIN) 1 MG capsule Take 1 capsule by mouth Every Night. 8/6/20   Charly Hercules MD   tolterodine (DETROL) 2 MG tablet Take 2 mg by mouth 2 (Two) Times a Day.    Marcial Crain MD       amiodarone, 200 mg, Oral, Q24H  amitriptyline, 100 mg, Oral, Nightly  aspirin, 81 mg, Oral, Daily  atorvastatin, 20 mg, Oral, Daily  enoxaparin, 1 mg/kg, Subcutaneous, Q12H  losartan, 25 mg, Oral, Daily  metoprolol succinate XL, 25 mg, Oral, Daily  pantoprazole, 40 mg, Oral, QAM  rOPINIRole, 0.5 mg, Oral, Daily  sodium chloride, 10 mL, Intravenous, Q12H  terazosin, 1 mg, Oral, Nightly           Objective    Temp:  [97.5 °F (36.4 °C)-98.3 °F (36.8 °C)] 98.3 °F (36.8 °C)  Heart Rate:  [60-92] 66  Resp:  [18] 18  BP: (121-152)/(54-86) 121/54    Physical Exam  Constitutional:       Comments: Chronically ill appearing    HENT:      Head: Normocephalic.      Nose: Nose normal.   Eyes:      Extraocular Movements: Extraocular movements intact.   Neck:      Musculoskeletal: Normal range of motion and neck supple.   Cardiovascular:      Rate and Rhythm: Normal rate and regular rhythm.      Pulses: Normal pulses.   Pulmonary:      Effort: Pulmonary effort is normal.      Breath sounds: Normal breath sounds.   Abdominal:      General: Bowel sounds are normal.      Palpations: Abdomen is soft.   Musculoskeletal: Normal range of motion.   Skin:     General: Skin is warm.   Neurological:      General: No focal deficit present.      Mental Status: He is alert. Mental status is at baseline.   Psychiatric:         Mood and Affect: Mood normal.           Results Review:  I have reviewed the labs, radiology results, and diagnostic studies.    Laboratory Data:   Results from last 7 days   Lab Units 11/05/20  1018 11/04/20  0656 11/03/20  1203   SODIUM mmol/L  --  138 141    POTASSIUM mmol/L 3.7 4.0 3.8   CHLORIDE mmol/L  --  104 108*   CO2 mmol/L  --  25.0 25.0   BUN mg/dL  --  12 16   CREATININE mg/dL  --  0.91 0.99   GLUCOSE mg/dL  --  96 99   CALCIUM mg/dL  --  9.7 9.2   BILIRUBIN mg/dL  --  0.5 0.3   ALK PHOS U/L  --  90 92   ALT (SGPT) U/L  --  13 13   AST (SGOT) U/L  --  20 17   ANION GAP mmol/L  --  9.0 8.0     Estimated Creatinine Clearance: 55.7 mL/min (by C-G formula based on SCr of 0.91 mg/dL).  Results from last 7 days   Lab Units 11/05/20  1018 11/03/20  1203   MAGNESIUM mg/dL 1.7 1.7         Results from last 7 days   Lab Units 11/04/20  0656 11/03/20  1203   WBC 10*3/mm3 7.48 7.27   HEMOGLOBIN g/dL 12.5* 11.7*   HEMATOCRIT % 38.5 35.8*   PLATELETS 10*3/mm3 181 175           Culture Data:   No results found for: BLOODCX  No results found for: URINECX  No results found for: RESPCX  No results found for: WOUNDCX  No results found for: STOOLCX  No components found for: BODYFLD    Radiology Data:   Imaging Results (Last 24 Hours)     ** No results found for the last 24 hours. **          I have reviewed the patient's current medications.     Assessment/Plan   Assessment and Plan    Chest pain     With EKG negative; mild elevation of troponin, chest x ray negative for an acute event; no chest pain at this time     troponin mildly elevated; likely due to below;      resolved    Right lung emboli     He is keeping good O2 sat levels     Continue with lovenox full treatment for today and tomorrow will switch to OACs and discharge home tomorrow     Left popliteal vein DVT     Continue with full dose lovenox treatment    Asymptomatic run of v tach     Cardiology recommended to take amiodarone and has been stable for the last 24 hours     Appreciated cardiology evaluation and recommendations    Physical deconditioning     PT and OT on the case    Chronic medical problems     CAD     Essential hypertension     Chronic recurrent depression     Chronic pain syndrome     Gout      Dementia     Hyperlipidemia     Recent left hip fracture on 7/2020      BPH     Bilateral hypoacustic       Discharge Planning: I expect patient to be discharged to home in one day    Ricky Xiong MD

## 2020-11-05 NOTE — PLAN OF CARE
Goal Outcome Evaluation:  Plan of Care Reviewed With: patient  Progress: no change  VS stable, will continue to monitor.

## 2020-11-06 VITALS
WEIGHT: 143.9 LBS | BODY MASS INDEX: 19.49 KG/M2 | SYSTOLIC BLOOD PRESSURE: 111 MMHG | HEART RATE: 86 BPM | HEIGHT: 72 IN | OXYGEN SATURATION: 96 % | RESPIRATION RATE: 18 BRPM | DIASTOLIC BLOOD PRESSURE: 68 MMHG | TEMPERATURE: 97.5 F

## 2020-11-06 LAB
ANION GAP SERPL CALCULATED.3IONS-SCNC: 9 MMOL/L (ref 5–15)
BASOPHILS # BLD AUTO: 0.1 10*3/MM3 (ref 0–0.2)
BASOPHILS NFR BLD AUTO: 1 % (ref 0–1.5)
BUN SERPL-MCNC: 21 MG/DL (ref 8–23)
BUN/CREAT SERPL: 20 (ref 7–25)
CALCIUM SPEC-SCNC: 10.2 MG/DL (ref 8.6–10.5)
CHLORIDE SERPL-SCNC: 102 MMOL/L (ref 98–107)
CO2 SERPL-SCNC: 24 MMOL/L (ref 22–29)
CREAT SERPL-MCNC: 1.05 MG/DL (ref 0.76–1.27)
DEPRECATED RDW RBC AUTO: 53.5 FL (ref 37–54)
EOSINOPHIL # BLD AUTO: 0.37 10*3/MM3 (ref 0–0.4)
EOSINOPHIL NFR BLD AUTO: 3.7 % (ref 0.3–6.2)
ERYTHROCYTE [DISTWIDTH] IN BLOOD BY AUTOMATED COUNT: 16.5 % (ref 12.3–15.4)
GFR SERPL CREATININE-BSD FRML MDRD: 67 ML/MIN/1.73
GLUCOSE SERPL-MCNC: 101 MG/DL (ref 65–99)
HCT VFR BLD AUTO: 39.2 % (ref 37.5–51)
HGB BLD-MCNC: 13.2 G/DL (ref 13–17.7)
IMM GRANULOCYTES # BLD AUTO: 0.02 10*3/MM3 (ref 0–0.05)
IMM GRANULOCYTES NFR BLD AUTO: 0.2 % (ref 0–0.5)
LYMPHOCYTES # BLD AUTO: 2.59 10*3/MM3 (ref 0.7–3.1)
LYMPHOCYTES NFR BLD AUTO: 25.7 % (ref 19.6–45.3)
MAGNESIUM SERPL-MCNC: 2.1 MG/DL (ref 1.6–2.4)
MCH RBC QN AUTO: 29.5 PG (ref 26.6–33)
MCHC RBC AUTO-ENTMCNC: 33.7 G/DL (ref 31.5–35.7)
MCV RBC AUTO: 87.5 FL (ref 79–97)
MONOCYTES # BLD AUTO: 0.78 10*3/MM3 (ref 0.1–0.9)
MONOCYTES NFR BLD AUTO: 7.8 % (ref 5–12)
NEUTROPHILS NFR BLD AUTO: 6.2 10*3/MM3 (ref 1.7–7)
NEUTROPHILS NFR BLD AUTO: 61.6 % (ref 42.7–76)
NRBC BLD AUTO-RTO: 0 /100 WBC (ref 0–0.2)
PLATELET # BLD AUTO: 173 10*3/MM3 (ref 140–450)
PMV BLD AUTO: 9.7 FL (ref 6–12)
POTASSIUM SERPL-SCNC: 4.3 MMOL/L (ref 3.5–5.2)
RBC # BLD AUTO: 4.48 10*6/MM3 (ref 4.14–5.8)
SODIUM SERPL-SCNC: 135 MMOL/L (ref 136–145)
WBC # BLD AUTO: 10.06 10*3/MM3 (ref 3.4–10.8)

## 2020-11-06 PROCEDURE — 97110 THERAPEUTIC EXERCISES: CPT

## 2020-11-06 PROCEDURE — 97535 SELF CARE MNGMENT TRAINING: CPT

## 2020-11-06 PROCEDURE — 80048 BASIC METABOLIC PNL TOTAL CA: CPT | Performed by: INTERNAL MEDICINE

## 2020-11-06 PROCEDURE — 83735 ASSAY OF MAGNESIUM: CPT | Performed by: INTERNAL MEDICINE

## 2020-11-06 PROCEDURE — 85025 COMPLETE CBC W/AUTO DIFF WBC: CPT | Performed by: INTERNAL MEDICINE

## 2020-11-06 PROCEDURE — 25010000002 ENOXAPARIN PER 10 MG: Performed by: INTERNAL MEDICINE

## 2020-11-06 RX ORDER — AMIODARONE HYDROCHLORIDE 200 MG/1
200 TABLET ORAL
Qty: 30 TABLET | Refills: 0 | Status: SHIPPED | OUTPATIENT
Start: 2020-11-07 | End: 2020-12-07

## 2020-11-06 RX ADMIN — ENOXAPARIN SODIUM 70 MG: 80 INJECTION SUBCUTANEOUS at 05:44

## 2020-11-06 RX ADMIN — ROPINIROLE HYDROCHLORIDE 0.5 MG: 0.5 TABLET, FILM COATED ORAL at 08:24

## 2020-11-06 RX ADMIN — ATORVASTATIN CALCIUM 20 MG: 20 TABLET, FILM COATED ORAL at 08:24

## 2020-11-06 RX ADMIN — SODIUM CHLORIDE, PRESERVATIVE FREE 10 ML: 5 INJECTION INTRAVENOUS at 08:25

## 2020-11-06 RX ADMIN — ASPIRIN 81 MG: 81 TABLET, FILM COATED ORAL at 08:25

## 2020-11-06 RX ADMIN — PANTOPRAZOLE SODIUM 40 MG: 40 TABLET, DELAYED RELEASE ORAL at 05:44

## 2020-11-06 RX ADMIN — AMIODARONE HYDROCHLORIDE 200 MG: 200 TABLET ORAL at 08:25

## 2020-11-06 RX ADMIN — LOSARTAN POTASSIUM 25 MG: 25 TABLET, FILM COATED ORAL at 08:25

## 2020-11-06 RX ADMIN — METOPROLOL SUCCINATE 25 MG: 25 TABLET, FILM COATED, EXTENDED RELEASE ORAL at 08:25

## 2020-11-06 NOTE — PLAN OF CARE
Goal Outcome Evaluation:  Plan of Care Reviewed With: patient  Progress: improving   VS stable, will continue to monitor

## 2020-11-06 NOTE — DISCHARGE SUMMARY
[facility] Medicine Services  DISCHARGE SUMMARY       Date of Admission: 11/3/2020  Date of Discharge:  11/6/2020  Primary Care Physician: Terry Peterson MD    Presenting Problem/History of Present Illness:  Precordial pain [R07.2]  Pulmonary emboli (CMS/HCC) [I26.99]       Final Discharge Diagnoses:    Chest pain     With EKG negative; mild elevation of troponin, chest x ray negative for an acute event; no chest pain at this time     troponin mildly elevated; likely due to below;      resolved     Right lung emboli     He is keeping good O2 sat levels     Continue with lovenox full treatment for today and tomorrow will switch to OACs and discharge home tomorrow     Left popliteal vein DVT     Continue with full dose lovenox treatment     Asymptomatic run of v tach     Cardiology recommended to take amiodarone and has been stable for the last 24 hours     Appreciated cardiology evaluation and recommendations     Physical deconditioning     PT and OT on the case     Chronic medical problems     CAD     Essential hypertension     Chronic recurrent depression     Chronic pain syndrome     Gout     Dementia     Hyperlipidemia     Recent left hip fracture on 7/2020      BPH     Systolic HF     Bilateral hypoacoustic    Discussed with his daughterRut    Consults:   Consults     Date and Time Order Name Status Description    11/3/2020 1449 Inpatient Cardiology Consult Completed     11/3/2020 1414 Hospitalist (on-call MD unless specified)      11/3/2020 1347 Hospitalist (on-call MD unless specified)            Procedures Performed:  none                Pertinent Test Results:   Lab Results (most recent)     Procedure Component Value Units Date/Time    Basic Metabolic Panel [[id number]]  (Abnormal) Collected: 11/06/20 0639    Specimen: Blood Updated: 11/06/20 0735     Glucose 101 mg/dL      BUN 21 mg/dL      Creatinine 1.05 mg/dL      Sodium 135 mmol/L      Potassium  4.3 mmol/L      Chloride 102 mmol/L      CO2 24.0 mmol/L      Calcium 10.2 mg/dL      eGFR Non African Amer 67 mL/min/1.73      BUN/Creatinine Ratio 20.0     Anion Gap 9.0 mmol/L     Narrative:      GFR Normal >60  Chronic Kidney Disease <60  Kidney Failure <15      Magnesium [[id number]]  (Normal) Collected: 11/06/20 0639    Specimen: Blood Updated: 11/06/20 0731     Magnesium 2.1 mg/dL     CBC & Differential [[id number]]  (Abnormal) Collected: 11/06/20 0639    Specimen: Blood Updated: 11/06/20 0708    Narrative:      The following orders were created for panel order CBC & Differential.  Procedure                               Abnormality         Status                     ---------                               -----------         ------                     CBC Auto Differential[[id number]]        Abnormal            Final result                 Please view results for these tests on the individual orders.    CBC Auto Differential [[id number]]  (Abnormal) Collected: 11/06/20 0639    Specimen: Blood Updated: 11/06/20 0708     WBC 10.06 10*3/mm3      RBC 4.48 10*6/mm3      Hemoglobin 13.2 g/dL      Hematocrit 39.2 %      MCV 87.5 fL      MCH 29.5 pg      MCHC 33.7 g/dL      RDW 16.5 %      RDW-SD 53.5 fl      MPV 9.7 fL      Platelets 173 10*3/mm3      Neutrophil % 61.6 %      Lymphocyte % 25.7 %      Monocyte % 7.8 %      Eosinophil % 3.7 %      Basophil % 1.0 %      Immature Grans % 0.2 %      Neutrophils, Absolute 6.20 10*3/mm3      Lymphocytes, Absolute 2.59 10*3/mm3      Monocytes, Absolute 0.78 10*3/mm3      Eosinophils, Absolute 0.37 10*3/mm3      Basophils, Absolute 0.10 10*3/mm3      Immature Grans, Absolute 0.02 10*3/mm3      nRBC 0.0 /100 WBC     Extra Tubes [[id number]] Collected: 11/05/20 1018    Specimen: Blood, Venous Line Updated: 11/05/20 1130    Narrative:      The following orders were created for panel order Extra Tubes.  Procedure                               Abnormality         Status                      ---------                               -----------         ------                     Delaney Top[[id number]]                                     Final result                 Please view results for these tests on the individual orders.    Delaney Top [[id number]] Collected: 11/05/20 1018    Specimen: Blood Updated: 11/05/20 1130     Extra Tube hold for add-on     Comment: Auto resulted       Magnesium [[id number]]  (Normal) Collected: 11/05/20 1018    Specimen: Blood Updated: 11/05/20 1111     Magnesium 1.7 mg/dL     Potassium [[id number]]  (Normal) Collected: 11/05/20 1018    Specimen: Blood Updated: 11/05/20 1111     Potassium 3.7 mmol/L     Lipid Panel [[id number]]  (Abnormal) Collected: 11/04/20 0656    Specimen: Blood Updated: 11/04/20 0742     Total Cholesterol 151 mg/dL      Triglycerides 70 mg/dL      HDL Cholesterol 71 mg/dL      LDL Cholesterol  66 mg/dL      VLDL Cholesterol 14 mg/dL      LDL/HDL Ratio 0.93    Narrative:      Cholesterol Reference Ranges  (U.S. Department of Health and Human Services ATP III Classifications)    Desirable          <200 mg/dL  Borderline High    200-239 mg/dL  High Risk          >240 mg/dL      Triglyceride Reference Ranges  (U.S. Department of Health and Human Services ATP III Classifications)    Normal           <150 mg/dL  Borderline High  150-199 mg/dL  High             200-499 mg/dL  Very High        >500 mg/dL    HDL Reference Ranges  (U.S. Department of Health and Human Services ATP III Classifcations)    Low     <40 mg/dl (major risk factor for CHD)  High    >60 mg/dl ('negative' risk factor for CHD)        LDL Reference Ranges  (U.S. Department of Health and Human Services ATP III Classifcations)    Optimal          <100 mg/dL  Near Optimal     100-129 mg/dL  Borderline High  130-159 mg/dL  High             160-189 mg/dL  Very High        >189 mg/dL    Comprehensive Metabolic Panel [[id number]] Collected: 11/04/20 0656    Specimen: Blood Updated: 11/04/20 0742      Glucose 96 mg/dL      BUN 12 mg/dL      Creatinine 0.91 mg/dL      Sodium 138 mmol/L      Potassium 4.0 mmol/L      Chloride 104 mmol/L      CO2 25.0 mmol/L      Calcium 9.7 mg/dL      Total Protein 7.0 g/dL      Albumin 4.10 g/dL      ALT (SGPT) 13 U/L      AST (SGOT) 20 U/L      Alkaline Phosphatase 90 U/L      Total Bilirubin 0.5 mg/dL      eGFR Non African Amer 79 mL/min/1.73      Globulin 2.9 gm/dL      A/G Ratio 1.4 g/dL      BUN/Creatinine Ratio 13.2     Anion Gap 9.0 mmol/L     Narrative:      GFR Normal >60  Chronic Kidney Disease <60  Kidney Failure <15      Hemoglobin A1c [[id number]]  (Normal) Collected: 11/04/20 0656    Specimen: Blood Updated: 11/04/20 0739     Hemoglobin A1C 5.50 %     Narrative:      Hemoglobin A1C Ranges:    Increased Risk for Diabetes  5.7% to 6.4%  Diabetes                     >= 6.5%  Diabetic Goal                < 7.0%    CBC Auto Differential [[id number]]  (Abnormal) Collected: 11/04/20 0656    Specimen: Blood Updated: 11/04/20 0723     WBC 7.48 10*3/mm3      RBC 4.30 10*6/mm3      Hemoglobin 12.5 g/dL      Hematocrit 38.5 %      MCV 89.5 fL      MCH 29.1 pg      MCHC 32.5 g/dL      RDW 16.8 %      RDW-SD 55.2 fl      MPV 10.1 fL      Platelets 181 10*3/mm3      Neutrophil % 64.4 %      Lymphocyte % 22.3 %      Monocyte % 8.2 %      Eosinophil % 3.7 %      Basophil % 1.3 %      Immature Grans % 0.1 %      Neutrophils, Absolute 4.81 10*3/mm3      Lymphocytes, Absolute 1.67 10*3/mm3      Monocytes, Absolute 0.61 10*3/mm3      Eosinophils, Absolute 0.28 10*3/mm3      Basophils, Absolute 0.10 10*3/mm3      Immature Grans, Absolute 0.01 10*3/mm3      nRBC 0.0 /100 WBC     Troponin [[id number]]  (Abnormal) Collected: 11/03/20 2344    Specimen: Blood Updated: 11/04/20 0030     Troponin T 0.041 ng/mL     Narrative:      Troponin T Reference Range:  <= 0.03 ng/mL-   Negative for AMI  >0.03 ng/mL-     Abnormal for myocardial necrosis.  Clinicians would have to utilize clinical acumen,  EKG, Troponin and serial changes to determine if it is an Acute Myocardial Infarction or myocardial injury due to an underlying chronic condition.       Results may be falsely decreased if patient taking Biotin.      Troponin [[id number]]  (Abnormal) Collected: 11/03/20 2123    Specimen: Blood Updated: 11/03/20 2157     Troponin T 0.046 ng/mL     Narrative:      Troponin T Reference Range:  <= 0.03 ng/mL-   Negative for AMI  >0.03 ng/mL-     Abnormal for myocardial necrosis.  Clinicians would have to utilize clinical acumen, EKG, Troponin and serial changes to determine if it is an Acute Myocardial Infarction or myocardial injury due to an underlying chronic condition.       Results may be falsely decreased if patient taking Biotin.      COVID PRE-OP / PRE-PROCEDURE SCREENING ORDER (NO ISOLATION) - Swab, Nasopharynx [[id number]]  (Normal) Collected: 11/03/20 1302    Specimen: Swab from Nasopharynx Updated: 11/03/20 1634    Narrative:      The following orders were created for panel order COVID PRE-OP / PRE-PROCEDURE SCREENING ORDER (NO ISOLATION) - Swab, Nasopharynx.  Procedure                               Abnormality         Status                     ---------                               -----------         ------                     COVID-19, RUBIA MAD IN-HOUS...[[id number]]  Normal              Final result                 Please view results for these tests on the individual orders.    COVID-19, RUBIA MAD IN-HOUSE, NP SWAB IN TRANSPORT MEDIA 8-10 HR TAT - Swab, Nasopharynx [[id number]]  (Normal) Collected: 11/03/20 1302    Specimen: Swab from Nasopharynx Updated: 11/03/20 1634     COVID19 Not Detected    Narrative:      Testing performed by Real Time RT-PCR  This test has not been approved by the [employer]) but is authorized under the Emergency Use Act (EUA)    Fact sheet for providers: https://www.fda.gov/media/545264/download    Fact sheet for patients: https://www.fda.gov/media/919361/download        [place] Draw  [[id number]] Collected: 11/03/20 1203    Specimen: Blood Updated: 11/03/20 1315    Narrative:      The following orders were created for panel order [place] Draw.  Procedure                               Abnormality         Status                     ---------                               -----------         ------                     Light Blue Top[[id number]]                                   Final result               Green Top (Gel)[[id number]]                                  Final result               Lavender Top[[id number]]                                     Final result               Gold Top - SST[[id number]]                                   Final result                 Please view results for these tests on the individual orders.    Light Blue Top [[id number]] Collected: 11/03/20 1203    Specimen: Blood Updated: 11/03/20 1315     Extra Tube hold for add-on     Comment: Auto resulted       Green Top (Gel) [[id number]] Collected: 11/03/20 1203    Specimen: Blood Updated: 11/03/20 1315     Extra Tube Hold for add-ons.     Comment: Auto resulted.       Lavender Top [[id number]] Collected: 11/03/20 1203    Specimen: Blood Updated: 11/03/20 1315     Extra Tube hold for add-on     Comment: Auto resulted       Gold Top - SST [[id number]] Collected: 11/03/20 1203    Specimen: Blood Updated: 11/03/20 1315     Extra Tube Hold for add-ons.     Comment: Auto resulted.       CK [[id number]]  (Normal) Collected: 11/03/20 1203    Specimen: Blood Updated: 11/03/20 1258     Creatine Kinase 57 U/L     Lipase [[id number]]  (Normal) Collected: 11/03/20 1203    Specimen: Blood Updated: 11/03/20 1258     Lipase 32 U/L     D-dimer, Quantitative [[id number]]  (Abnormal) Collected: 11/03/20 1203    Specimen: Blood Updated: 11/03/20 1254     D-Dimer, Quantitative 3,010 ng/mL (FEU)     Narrative:      Dimer values <500 ng/ml FEU are FDA approved as aid in diagnosis of deep venous thrombosis and pulmonary embolism.  This test should not be  used in an exclusion strategy with pretest probability alone.    A recent guideline regarding diagnosis for pulmonary thromboembolism recommends an adjusted exclusion criterion of age x 10 ng/ml FEU for patients >50 years of age ([employer]ifer Intern Med 2015; 163: 701-711).      Comprehensive Metabolic Panel [[id number]]  (Abnormal) Collected: 11/03/20 1203    Specimen: Blood Updated: 11/03/20 1241     Glucose 99 mg/dL      BUN 16 mg/dL      Creatinine 0.99 mg/dL      Sodium 141 mmol/L      Potassium 3.8 mmol/L      Chloride 108 mmol/L      CO2 25.0 mmol/L      Calcium 9.2 mg/dL      Total Protein 6.5 g/dL      Albumin 3.90 g/dL      ALT (SGPT) 13 U/L      AST (SGOT) 17 U/L      Alkaline Phosphatase 92 U/L      Total Bilirubin 0.3 mg/dL      eGFR Non African Amer 72 mL/min/1.73      Globulin 2.6 gm/dL      A/G Ratio 1.5 g/dL      BUN/Creatinine Ratio 16.2     Anion Gap 8.0 mmol/L     Narrative:      GFR Normal >60  Chronic Kidney Disease <60  Kidney Failure <15      BNP [[id number]]  (Abnormal) Collected: 11/03/20 1203    Specimen: Blood Updated: 11/03/20 1238     proBNP 4,259.0 pg/mL     Narrative:      Among patients with dyspnea, NT-proBNP is highly sensitive for the detection of acute congestive heart failure. In addition NT-proBNP of <300 pg/ml effectively rules out acute congestive heart failure with 99% negative predictive value.    Results may be falsely decreased if patient taking Biotin.      CBC & Differential [[id number]]  (Abnormal) Collected: 11/03/20 1203    Specimen: Blood Updated: 11/03/20 1218    Narrative:      The following orders were created for panel order CBC & Differential.  Procedure                               Abnormality         Status                     ---------                               -----------         ------                     CBC Auto Differential[[id number]]        Abnormal            Final result                 Please view results for these tests on the individual orders.         Imaging Results (Most Recent)     Procedure Component Value Units Date/Time    US Venous Doppler Lower Extremity Bilateral (duplex) [[id number]] Collected: 11/03/20 1652     Updated: 11/03/20 1733    Narrative:      Doppler duplex venous examination bilateral lower extremities.         CLINICAL INDICATION: Pulmonary emboli.      COMPARISON: CTA thorax November 3, 2020.     FINDINGS:    The common femoral,  femoral, and popliteal veins of the  bilateral     lower extremity are well identified.    There are noncompressible echogenic acute appearing thrombi left  popliteal vein. Venous examination is otherwise unremarkable.      Impression:      Acute noncompressible echogenic thrombi left  popliteal vein.    Doppler venous examination is otherwise unremarkable.    Electronically signed by:  Esdras Paul MD  11/3/2020 5:32 PM CST  Workstation: [id number]    CT Angiogram Chest [[id number]] Collected: 11/03/20 1448     Updated: 11/03/20 1529    Narrative:      [facility] [place]    Patient Name: SYLVIA PACK    ORDERING: ALLISON LÓPEZ    ATTENDING: ALLISON LÓPEZ     REFERRING: ALLISON LÓPEZ    -----------------------    EXAM DESCRIPTION: CT ANGIOGRAM CHEST    CLINICAL HISTORY:  PE suspected, low/intermediate prob, positive  D-dimer, R07.2 Precordial pain       COMPARISON: CT chest 7/31/2020    TECHNIQUE:   Axial imaging obtained utilizing intravenous contrast at CT PE  protocol with reformatted images obtained. Computer generated 3-D  reconstructions/MIPS were performed.  This exam was performed according to our departmental dose  optimization program, which includes automated exposure control,  adjustment of the mA and/or KV according to patient size and/or  use of iterative reconstruction technique.  Dose Length Product 184.8  CONTRAST:   68 mL intravenous Isovue 370    FINDINGS:  Diagnostic quality: Adequate .  Pulmonary embolism: Multifocal acute embolic event contributing  to  luminal filling defects within the right upper, middle, and  lower lobe segmental and subsegmental branches. No embolic  changes on the left confirmed. No large main pulmonary trunk  thrombus or saddle embolus identified.  Pulmonary arteries:  Normal in caliber.  Lung parenchyma: Scattered linear type scarring and atelectasis  present bilaterally involving multiple lobes. There is no dense  parenchymal consolidation, pleural effusion, or pneumothorax. No  pulmonary mass or suspicious nodule.  Central airways: Patent.  Adenopathy: Redemonstration of mildly enlarged lymph nodes  positioned at or just below the level of the james measuring  approximately 1.35 and 1.7 cm, similar to the prior exam. No  development of conglomerate or necrotic adenopathy. No thyroid  mass or enlargement.  Heart and great vessels: No significant cardiac enlargement or  pericardial effusion. Coronary vascular calcification is present.  Upper abdomen: No acute findings within the included upper  abdomen. No adrenal mass. There is mild adrenal thickening..    Bones: Demineralization and degenerative changes within the  spine. Sternotomy wires are present. No compression fracture  within the spine seen..    Additional findings: None.      Impression:      Showering of emboli on the right involving upper, mid, and lower  lobe branches.    Scattered linear scarring and/or atelectasis. No consolidation,  effusion or pneumothorax.    Stable appearance of the mediastinal lymph nodes described on the  prior examination. No development of necrotic or conglomerate  lymph node. No pulmonary mass or suspicious nodule.    Findings reported to Dr. Harris at the time of interpretation on  11/3/2020.    Electronically signed by:  Lakhwinder Chavarria MD  11/3/2020 3:28 PM  CST Workstation: [id number]    XR Chest 1 View [[id number]] Collected: 11/03/20 1215     Updated: 11/03/20 1235    Narrative:      Chest x-ray single view.         CLINICAL INDICATION: Shortness of  "breath    COMPARISON: Chest July 29, 2020.    FINDINGS: Cardiac silhouette is normal in size. Pulmonary  vascularity is unremarkable.     There are midline sternal sutures from prior cardiac surgery.  Subtle fibrotic changes left lung base.    Lungs otherwise clear.      Impression:      As above.    Electronically signed by:  Esdras Paul MD  11/3/2020 12:34 PM CST  Workstation: [id number]          Chief Complaint on Day of Discharge: None    Hospital Course:  The patient is a 85 y.o. male who presented to [facility] with chest pain and mild shortness of breath; CTA revealed pulmonary embolism on the right lung and US showed DVT on left popliteal. He has received lovenox bid and will go home with eliquis for the next 3 to 6 months. Discussed this with his daughter Rut    Condition on Discharge:  Stable    Physical Exam on Discharge:  /74 (BP Location: Left arm, Patient Position: Sitting)   Pulse 77   Temp 97.3 °F (36.3 °C) (Oral)   Resp 18   Ht 182.9 cm (72\")   Wt 65.3 kg (143 lb 14.4 oz)   SpO2 96%   BMI 19.52 kg/m²      Physical Exam  Constitutional:       Appearance: Normal appearance.   HENT:      Head: Normocephalic.      Nose: Nose normal.      Mouth/Throat:      Mouth: Mucous membranes are moist.   Eyes:      Extraocular Movements: Extraocular movements intact.   Neck:      Musculoskeletal: Normal range of motion and neck supple.   Cardiovascular:      Rate and Rhythm: Normal rate and regular rhythm.   Pulmonary:      Effort: Pulmonary effort is normal.      Breath sounds: Normal breath sounds.   Abdominal:      General: Bowel sounds are normal.      Palpations: Abdomen is soft.   Musculoskeletal: Normal range of motion.   Skin:     General: Skin is warm.   Neurological:      General: No focal deficit present.      Mental Status: He is alert. Mental status is at baseline.      Comments: Bilateral hypoacusia   Psychiatric:         Mood and Affect: Mood normal.         Behavior: " Behavior normal.         Discharge Disposition:  [place] [facility] [place]charge Medications:     Your medication list      START taking these medications      Instructions Last Dose Given Next Dose Due   amiodarone 200 MG tablet  Commonly known as: PACERONE  Start taking on: November 7, 2020      Take 1 tablet by mouth Daily for 30 days.       apixaban 5 MG tablet tablet  Commonly known as: ELIQUIS      Take 2 tablets by mouth Every 12 (Twelve) Hours for 14 doses. Indications: DVT/PE (active thrombosis)       apixaban 5 MG tablet tablet  Commonly known as: ELIQUIS  Start taking on: November 13, 2020      Take 1 tablet by mouth Every 12 (Twelve) Hours for 30 days. Indications: DVT/PE (active thrombosis)          CONTINUE taking these medications      Instructions Last Dose Given Next Dose Due   albuterol sulfate  (90 Base) MCG/ACT inhaler  Commonly known as: PROVENTIL HFA;VENTOLIN HFA;PROAIR HFA      INHALE 2 PUFFS INTO THE LUNGS EVERY 6 (SIX) HOURS AS NEEDED FOR WHEEZING       amitriptyline 100 MG tablet  Commonly known as: ELAVIL      Take 100 mg by mouth every night at bedtime.       aspirin 81 MG EC tablet      Take 1 tablet by mouth Daily.       Lipitor 20 MG tablet  Generic drug: atorvastatin      Take 1 tablet by mouth.       losartan 25 MG tablet  Commonly known as: COZAAR      TAKE 1 TABLET BY MOUTH EVERY DAY       metoprolol succinate XL 25 MG 24 hr tablet  Commonly known as: TOPROL-XL      TAKE 1 TABLET BY MOUTH EVERY DAY       omeprazole 40 MG capsule  Commonly known as: priLOSEC      Take 40 mg by mouth Every Morning.       rOPINIRole 0.5 MG tablet  Commonly known as: REQUIP      Take 0.5 mg by mouth Daily.       terazosin 1 MG capsule  Commonly known as: HYTRIN      Take 1 capsule by mouth Every Night.       tolterodine 2 MG tablet  Commonly known as: DETROL      Take 2 mg by mouth 2 (Two) Times a Day.             Where to Get Your Medications      These medications were sent to [facility]/pharmacy  #[id number] - [place], [place] - [address] [place] [address] - [phone]  - [phone] [address] [address]    Phone: [phone]   · amiodarone 200 MG tablet  · apixaban 5 MG tablet tablet  · apixaban 5 MG tablet tablet          Discharge Diet: Cardiac    Activity at Discharge: as tolerated    Discharge Care Plan/Instructions: see chart    Follow-up Appointments:   No future appointments.   With Dr Licona in one month    Test Results Pending at Discharge:     Ricky Xiong MD    Time: 41

## 2020-11-06 NOTE — CONSULTS
Adult Nutrition  Assessment    Patient Name:  Jose Dacosta  YOB: 1935  MRN: [id number]  Admit Date:  11/3/2020    Assessment Date:  11/6/2020    Comments:  Pt reported good appetite.  Pt reported wt loss since 7/2020.  Review of Wt Reports shows wt fluctuations which are probably fluid.  Pt with hx Heart Failure.  Lasix prescribed labs reviewed.  BMI 19 with pt at 80% IBW.  Milk, supplements encouraged for home.  Not appropriate for Low Sodium diet ed due to low BMI, low body wt.  Pt discharged.    Reason for Assessment     Row Name 11/06/20 1156          Reason for Assessment    Reason For Assessment  per organizational policy wound, underweight     Identified At Risk by Screening Criteria  other (see comments) wound left heel - 96 days           Anthropometrics     Row Name 11/06/20 0547          Anthropometrics    Weight  65.3 kg (143 lb 14.4 oz)         Labs/Tests/Procedures/Meds     Row Name 11/06/20 1158          Medications    Pertinent Medications Reviewed  reviewed         Physical Findings     Row Name 11/06/20 1159          Physical Findings    Skin  other (see comments) wound left heel         Estimated/Assessed Needs     Row Name 11/06/20 1159          Calculation Measurements    Weight Used For Calculations  80.9 kg (178 lb 5.6 oz)        Estimated/Assessed Needs    Additional Documentation  Calorie Requirements (Group);Fluid Requirements (Group);Craighead-St. Jeor Equation (Group);Protein Requirements (Group)        Calorie Requirements    Estimated Calorie Requirement (kcal/day)  1998     Estimated Calorie Need Method  Craighead-St Jeor        Craighead-St. Jeor Equation    RMR (Craighead-St. Jeor Equation)  1537        Protein Requirements    Weight Used For Protein Calculations  80.9 kg (178 lb 5.6 oz)     Est Protein Requirement Amount (gms/kg)  1.2 gm protein     Estimated Protein Requirements (gms/day)  97.08        Fluid Requirements    Fluid Requirements (mL/day)  2022     RDA  Method (mL)  2022         Nutrition Prescription Ordered     Row Name 11/06/20 1201          Nutrition Prescription PO    Current PO Diet  Regular     Common Modifiers  Cardiac         Evaluation of Received Nutrient/Fluid Intake     Row Name 11/06/20 1202          PO Evaluation    Number of Meals  3     % PO Intake  100% - 2x, 75% - 1x               Electronically signed by:  Khalida Collins RD  11/06/20 12:02 CST

## 2020-11-06 NOTE — THERAPY TREATMENT NOTE
Acute Care - Occupational Therapy Treatment Note  [facility]     Patient Name: Jose Dacosta  : 1935  MRN: [id number]  Today's Date: 2020  Onset of Illness/Injury or Date of Surgery: 20  Date of Referral to OT: 20  Referring Physician: SHANTELL Xiong MD     Admit Date: 11/3/2020       ICD-10-CM ICD-9-CM   1. Precordial pain  R07.2 786.51   2. Impaired mobility and ADLs  Z74.09 V49.89    Z78.9    3. Impaired functional mobility, balance, gait, and endurance  Z74.09 V49.89   4. Single subsegmental pulmonary embolism without acute cor pulmonale (CMS/HCC)  I26.93 415.19     Patient Active Problem List   Diagnosis   • Coronary artery disease involving native coronary artery of native heart without angina pectoris   • Vasovagal syncope   • RBBB (right bundle branch block with left anterior fascicular block)   • Essential hypertension   • Ischemic cardiomyopathy   • Syncope and collapse   • Closed fracture of neck of left femur (CMS/HCC)   • Coronary artery disease with history of myocardial infarction without history of CABG   • Facial laceration   • HFrEF (heart failure with reduced ejection fraction) (CMS/HCC)   • Postoperative anemia due to acute blood loss   • Pneumonia of both lower lobes due to infectious organism   • Acute urinary retention   • Metabolic encephalopathy   • Lumbar radiculopathy   • Precordial pain   • Pulmonary emboli (CMS/HCC)     Past Medical History:   Diagnosis Date   • Abnormal ECG      Past Surgical History:   Procedure Laterality Date   • CORONARY STENT PLACEMENT     • HIP HEMIARTHROPLASTY Left 2020    Procedure: LEFT HIP HEMIARTHROPLASTY;  Surgeon: Jitendra Solis MD;  Location: [facility];  Service: Orthopedics;  Laterality: Left;          OT ASSESSMENT FLOWSHEET (last 12 hours)      OT Evaluation and Treatment     Row Name 20 1025                   OT Time and Intention    Subjective Information  no complaints  -KD        Document Type   therapy note (daily note)  -KD        Mode of Treatment  individual therapy;occupational therapy  -KD        Patient Effort  good  -KD           General Information    Patient/Family/Caregiver Comments/Observations  no family present  -KD        General Observations of Patient  sitting EOB  -KD        Existing Precautions/Restrictions  fall  -KD        Equipment Issued to Patient  gait belt  -KD           Cognition    Affect/Mental Status (Cognitive)  WNL  -KD        Orientation Status (Cognition)  oriented x 4  -KD        Follows Commands (Cognition)  follows one-step commands  -KD           Pain Scale: Numbers Pre/Post-Treatment    Pretreatment Pain Rating  0/10 - no pain  -KD        Posttreatment Pain Rating  0/10 - no pain  -KD        Pain Location - Side  Right  -KD           Safety Issues, Functional Mobility    Comment, Safety Issues/Impairments (Mobility)  Pt educated on Home Safety  -KD           Shoulder (Therapeutic Exercise)    Shoulder (Therapeutic Exercise)  AROM (active range of motion);strengthening exercise  -KD        Shoulder AROM (Therapeutic Exercise)  bilateral  -KD           Elbow/Forearm (Therapeutic Exercise)    Elbow/Forearm (Therapeutic Exercise)  AROM (active range of motion);strengthening exercise  -KD        Elbow/Forearm AROM (Therapeutic Exercise)  bilateral  -KD           Wrist (Therapeutic Exercise)    Wrist (Therapeutic Exercise)  AROM (active range of motion);strengthening exercise  -KD        Wrist AROM (Therapeutic Exercise)  bilateral  -KD           Hand (Therapeutic Exercise)    Hand (Therapeutic Exercise)  AROM (active range of motion);strengthening exercise  -KD        Hand AROM/AAROM (Therapeutic Exercise)  bilateral  -KD           Wound 08/01/20 1749 Left heel    Wound - Properties Group Placement Date: 08/01/20  -TC Placement Time: 1749  -TC Present on Hospital Admission: N  -TC Side: Left  -TC Location: heel  -TC    Retired Wound - Properties Group Date first assessed:  08/01/20  -TC Time first assessed: 1749  -TC Present on Hospital Admission: N  -TC Side: Left  -TC Location: heel  -TC       Coping    Observed Emotional State  calm;cooperative  -KD           Plan of Care Review    Progress  improving  -KD        Outcome Summary  Pt completed BUE ther ex in all planes sitting EOB w/ good tolerance . Pt given HEP and eduated on Home Safety.  -KD           Vital Signs    Pre Systolic BP Rehab  111  -KD        Pre Treatment Diastolic BP  65  -KD        Pretreatment Heart Rate (beats/min)  72  -KD        Posttreatment Heart Rate (beats/min)  80  -KD        Pre SpO2 (%)  92  -KD        O2 Delivery Pre Treatment  room air  -KD        Post SpO2 (%)  96  -KD        O2 Delivery Post Treatment  room air  -KD        Pre Patient Position  Sitting  -KD        Intra Patient Position  Sitting  -KD        Post Patient Position  Sitting  -KD           Positioning and Restraints    Pre-Treatment Position  in bed  -KD        Post Treatment Position  bed  -KD        In Bed  sitting EOB;call light within reach;encouraged to call for assist;exit alarm on  -KD          User Key  (r) = Recorded By, (t) = Taken By, (c) = Cosigned By    Initials Name Effective Dates    TC Kandis Mercedes RN 10/17/16 -     KD Ciarra Finley COTA/L 03/07/18 -          Occupational Therapy Education                 Title: PT OT SLP Therapies (Resolved)     Topic: Occupational Therapy (Resolved)     Point: ADL training (Resolved)     Description:   Instruct learner(s) on proper safety adaptation and remediation techniques during self care or transfers.   Instruct in proper use of assistive devices.              Learner Progress:  Not documented in this visit.          Point: Home exercise program (Resolved)     Description:   Instruct learner(s) on appropriate technique for monitoring, assisting and/or progressing therapeutic exercises/activities.              Learner Progress:  Not documented in this visit.           Point: Precautions (Resolved)     Description:   Instruct learner(s) on prescribed precautions during self-care and functional transfers.              Learning Progress Summary           Patient Acceptance, E, NR by  at 11/5/2020 0938    Acceptance, E, VU,NR by ME at 11/4/2020 1602    Comment: Educated on OT And POC. Educated to call for assistance. Educated on safety precautions.                   Point: Body mechanics (Resolved)     Description:   Instruct learner(s) on proper positioning and spine alignment during self-care, functional mobility activities and/or exercises.              Learning Progress Summary           Patient Acceptance, E, NR by  at 11/5/2020 0938    Acceptance, E, VU,NR by ME at 11/4/2020 1602    Comment: Educated on OT And POC. Educated to call for assistance. Educated on safety precautions.                               User Key     Initials Effective Dates Name Provider Type Discipline     03/07/18 -  Evelyn Dodson AGUILAR/L Occupational Therapy Assistant OT    ME 08/24/20 -  Eitan Haynes OTR/L Occupational Therapist OT                  OT Recommendation and Plan     Plan of Care Review  Progress: improving  Outcome Summary: Pt completed BUE ther ex in all planes sitting EOB w/ good tolerance . Pt given HEP and eduated on Home Safety.  Outcome Summary: Pt completed BUE ther ex in all planes sitting EOB w/ good tolerance . Pt given HEP and eduated on Home Safety.    Outcome Measures     Row Name 11/05/20 0915 11/05/20 0745 11/04/20 8715       How much help from another person do you currently need...    Turning from your back to your side while in flat bed without using bedrails?  4  -JW  --  --    Moving from lying on back to sitting on the side of a flat bed without bedrails?  4  -JW  --  --    Moving to and from a bed to a chair (including a wheelchair)?  3  -JW  --  --    Standing up from a chair using your arms (e.g., wheelchair, bedside chair)?  3  -JW  --  --    Climbing  3-5 steps with a railing?  3  -JW  --  --    To walk in hospital room?  3  -JW  --  --    AM-PAC 6 Clicks Score (PT)  20  -JW  --  --       How much help from another is currently needed...    Putting on and taking off regular lower body clothing?  --  3  -RC  3  -ME    Bathing (including washing, rinsing, and drying)  --  3  -RC  3  -ME    Toileting (which includes using toilet bed pan or urinal)  --  3  -RC  3  -ME    Putting on and taking off regular upper body clothing  --  3  -RC  3  -ME    Taking care of personal grooming (such as brushing teeth)  --  4  -RC  4  -ME    Eating meals  --  4  -RC  4  -ME    AM-PAC 6 Clicks Score (OT)  --  20  -RC  20  -ME       Functional Assessment    Outcome Measure Options  AM-PAC 6 Clicks Basic Mobility (PT)  -JW  --  AM-[facility] 6 Clicks Daily Activity (OT)  -ME      User Key  (r) = Recorded By, (t) = Taken By, (c) = Cosigned By    Initials Name Provider Type     La Hankins, PTA Physical Therapy Assistant     Evelyn Dodson AGUILAR/L Occupational Therapy Assistant    ME Eitan Haynes OTR/L Occupational Therapist          Time Calculation:   Time Calculation- OT     Row Name 11/06/20 1302             Time Calculation- OT    OT Start Time  1025  -KD      OT Stop Time  1048  -KD      OT Time Calculation (min)  23 min  -KD      Total Timed Code Minutes- OT  23 minute(s)  -KD      OT Received On  11/06/20  -KD        User Key  (r) = Recorded By, (t) = Taken By, (c) = Cosigned By    Initials Name Provider Type     Ciarra Finley COTA/L Occupational Therapy Assistant        Therapy Charges for Today     Code Description Service Date Service Provider Modifiers Qty    34435419377 HC OT THER PROC EA 15 MIN 11/6/2020 Ciarra Finley COTA/L GO 1    97472574557 HC OT SELF CARE/MGMT/TRAIN EA 15 MIN 11/6/2020 Ciarra Finley COTA/L GO 1               JEFF Buchanan/JAZZY  11/6/2020

## 2020-11-06 NOTE — PAYOR COMM NOTE
"    Updated clinicals for ref # [id number].      Sylvia Dacosta (85 y.o. Male)     Date of Birth Social Security Number Address Home Phone MRN    [id number]  [id number] [facility] [phone] [id number]    Jainism Marital Status          Latter-day        Admission Date Admission Type Admitting Provider Attending Provider Department, Room/Bed    11/3/20 Emergency Emile aSntoro MD Popescu, Tudor, MD [address], 322/1    Discharge Date Discharge Disposition Discharge Destination                       Attending Provider: Emile Santoro MD    Allergies: Hydrochlorothiazide    Isolation: None   Infection: None   Code Status: No CPR    Ht: 182.9 cm (72\")   Wt: 65.3 kg (143 lb 14.4 oz)    Admission Cmt: None   Principal Problem: None                Active Insurance as of 11/3/2020     Primary Coverage     Payor Plan Insurance Group Employer/Plan Group    AETNA MEDICARE REPLACEMENT AETNA MEDICARE REPLACEMENT [id number]     Payor Plan Address Payor Plan Phone Number Payor Plan Fax Number Effective Dates    [address] [phone]  4/1/2019 - None Entered    [facility] [id number]       Subscriber Name Subscriber Birth Date Member ID       SYLVIA DACOSTA 1935 [id number]                 Emergency Contacts      (Rel.) Home Phone Work Phone Mobile Phone    Carla Dacosta (Spouse) [phone] -- [phone]    AURELIA BOX (Relative) [phone]    jefffiordaliza abrams (Grandchild) -- -- [phone]            Insurance Information                AETNA MEDICARE REPLACEMENT/AETNA MEDICARE REPLACEMENT Phone: [phone]    Subscriber: Sylvia Dacosta Subscriber#: [id number]    Group#: [id number] Precert#:              Physician Progress Notes (last 72 hours) (Notes from 11/03/20 0808 through 11/06/20 0808)      Ricky Xiong MD at 11/05/20 1315              [facility] " [facility] Medicine Services  INPATIENT PROGRESS NOTE    Length of Stay: 1  Date of Admission: 11/3/2020  Primary Care Physician: Terry Peterson MD    Subjective   HPI:He has been complaining of shortness of breath for over 2 weeks and today he presented chest pain irradiated to the left arm; since he is a patient with long history of coronary artery disease, his daughter brought him here. At this time he denies any chest pain  He denies nausea, vomiting, headache, abdominal pain    (S) no complains    Review of Systems   Constitutional: Positive for activity change.        Hard of hearing, elderly fragile appearing   HENT: Negative.    Eyes: Negative.    Respiratory: Negative.    Cardiovascular: Negative.    Gastrointestinal: Negative.    Endocrine: Negative.    Genitourinary: Negative.    Musculoskeletal: Negative.    Neurological: Negative.    Psychiatric/Behavioral: Negative for agitation and behavioral problems.     All pertinent negatives and positives are as above. All other systems have been reviewed and are negative unless otherwise stated.     Prior to Admission medications    Medication Sig Start Date End Date Taking? Authorizing Provider   albuterol sulfate  (90 Base) MCG/ACT inhaler INHALE 2 PUFFS INTO THE LUNGS EVERY 6 (SIX) HOURS AS NEEDED FOR WHEEZING 4/8/19   Marcial Crain MD   amitriptyline (ELAVIL) 100 MG tablet Take 100 mg by mouth every night at bedtime. 4/8/19   Marcial Crain MD   aspirin 81 MG EC tablet Take 1 tablet by mouth Daily. 7/5/19   Júnior Licona MD   atorvastatin (LIPITOR) 20 MG tablet Take 1 tablet by mouth.    Marcial Crain MD   losartan (COZAAR) 25 MG tablet TAKE 1 TABLET BY MOUTH EVERY DAY 12/30/19   Bessy Duval MD   metoprolol succinate XL (TOPROL-XL) 25 MG 24 hr tablet TAKE 1 TABLET BY MOUTH EVERY DAY 12/16/19   Júnior Licona MD   omeprazole (priLOSEC) 40 MG capsule Take 40 mg by mouth Every Morning.  6/27/19   Marcial Crain MD   rOPINIRole (REQUIP) 0.5 MG tablet Take 0.5 mg by mouth Daily. 5/3/19   Marcial Crain MD   terazosin (HYTRIN) 1 MG capsule Take 1 capsule by mouth Every Night. 8/6/20   Charly Hercules MD   tolterodine (DETROL) 2 MG tablet Take 2 mg by mouth 2 (Two) Times a Day.    ProviderMarcial MD       amiodarone, 200 mg, Oral, Q24H  amitriptyline, 100 mg, Oral, Nightly  aspirin, 81 mg, Oral, Daily  atorvastatin, 20 mg, Oral, Daily  enoxaparin, 1 mg/kg, Subcutaneous, Q12H  losartan, 25 mg, Oral, Daily  metoprolol succinate XL, 25 mg, Oral, Daily  pantoprazole, 40 mg, Oral, QAM  rOPINIRole, 0.5 mg, Oral, Daily  sodium chloride, 10 mL, Intravenous, Q12H  terazosin, 1 mg, Oral, Nightly           Objective    Temp:  [97.5 °F (36.4 °C)-98.3 °F (36.8 °C)] 98.3 °F (36.8 °C)  Heart Rate:  [60-92] 66  Resp:  [18] 18  BP: (121-152)/(54-86) 121/54    Physical Exam  Constitutional:       Comments: Chronically ill appearing    HENT:      Head: Normocephalic.      Nose: Nose normal.   Eyes:      Extraocular Movements: Extraocular movements intact.   Neck:      Musculoskeletal: Normal range of motion and neck supple.   Cardiovascular:      Rate and Rhythm: Normal rate and regular rhythm.      Pulses: Normal pulses.   Pulmonary:      Effort: Pulmonary effort is normal.      Breath sounds: Normal breath sounds.   Abdominal:      General: Bowel sounds are normal.      Palpations: Abdomen is soft.   Musculoskeletal: Normal range of motion.   Skin:     General: Skin is warm.   Neurological:      General: No focal deficit present.      Mental Status: He is alert. Mental status is at baseline.   Psychiatric:         Mood and Affect: Mood normal.           Results Review:  I have reviewed the labs, radiology results, and diagnostic studies.    Laboratory Data:   Results from last 7 days   Lab Units 11/05/20  1018 11/04/20  0656 11/03/20  1203   SODIUM mmol/L  --  138 141   POTASSIUM mmol/L 3.7 4.0 3.8    CHLORIDE mmol/L  --  104 108*   CO2 mmol/L  --  25.0 25.0   BUN mg/dL  --  12 16   CREATININE mg/dL  --  0.91 0.99   GLUCOSE mg/dL  --  96 99   CALCIUM mg/dL  --  9.7 9.2   BILIRUBIN mg/dL  --  0.5 0.3   ALK PHOS U/L  --  90 92   ALT (SGPT) U/L  --  13 13   AST (SGOT) U/L  --  20 17   ANION GAP mmol/L  --  9.0 8.0     Estimated Creatinine Clearance: 55.7 mL/min (by C-G formula based on SCr of 0.91 mg/dL).  Results from last 7 days   Lab Units 11/05/20  1018 11/03/20  1203   MAGNESIUM mg/dL 1.7 1.7         Results from last 7 days   Lab Units 11/04/20  0656 11/03/20  1203   WBC 10*3/mm3 7.48 7.27   HEMOGLOBIN g/dL 12.5* 11.7*   HEMATOCRIT % 38.5 35.8*   PLATELETS 10*3/mm3 181 175           Culture Data:   No results found for: BLOODCX  No results found for: URINECX  No results found for: RESPCX  No results found for: WOUNDCX  No results found for: STOOLCX  No components found for: BODYFLD    Radiology Data:   Imaging Results (Last 24 Hours)     ** No results found for the last 24 hours. **          I have reviewed the patient's current medications.     Assessment/Plan   Assessment and Plan    Chest pain     With EKG negative; mild elevation of troponin, chest x ray negative for an acute event; no chest pain at this time     troponin mildly elevated; likely due to below;      resolved    Right lung emboli     He is keeping good O2 sat levels     Continue with lovenox full treatment for today and tomorrow will switch to OACs and discharge home tomorrow     Left popliteal vein DVT     Continue with full dose lovenox treatment    Asymptomatic run of v tach     Cardiology recommended to take amiodarone and has been stable for the last 24 hours     Appreciated cardiology evaluation and recommendations    Physical deconditioning     PT and OT on the case    Chronic medical problems     CAD     Essential hypertension     Chronic recurrent depression     Chronic pain syndrome     Gout     Dementia     Hyperlipidemia      Recent left hip fracture on 7/2020      BPH     Bilateral hypoacustic       Discharge Planning: I expect patient to be discharged to home in one day    Ricky Xiong MD      Electronically signed by Ricky Xiong MD at 11/05/20 1409     Júnior Licona MD at 11/05/20 1001           LOS: 1 day   Patient Care Team:  Terry Peterson MD as PCP - General    Chief Complaint: Admitted for evaluation of chest pain with persistent mildly abnormal troponin and history of documented nonsustained ventricular tachycardia refused invasive evaluation and on ICD when he was evaluated in the office.  On the monitor of nonsustained ventricular tachycardia and patient does not want any invasive management.  Started on amiodarone 200 mg a day and no further recurrence of ventricular tachyarrhythmia       Review of Systems:   ROS    Constitutional: Positive for activity change.        Hard of hearing, elderly fragile appearing   HENT: Negative.    Eyes: Negative.    Respiratory: Negative.    Cardiovascular: Negative.    Gastrointestinal: Negative.    Endocrine: Negative.    Genitourinary: Negative.    Musculoskeletal: Negative.    Neurological: Negative.    Objective     Current Facility-Administered Medications   Medication Dose Route Frequency Provider Last Rate Last Dose   • acetaminophen (TYLENOL) tablet 650 mg  650 mg Oral Q4H PRN Ricky Xiong MD   650 mg at 11/05/20 0730    Or   • acetaminophen (TYLENOL) 160 MG/5ML solution 650 mg  650 mg Oral Q4H PRN Ricky Xiong MD        Or   • acetaminophen (TYLENOL) suppository 650 mg  650 mg Rectal Q4H PRN Ricky Xiong MD       • albuterol (PROVENTIL) nebulizer solution 0.083% 2.5 mg/3mL  2.5 mg Nebulization Q4H PRN Ricky Xiong MD   2.5 mg at 11/03/20 2222   • amiodarone (PACERONE) tablet 200 mg  200 mg Oral Q24H Júnior Licona MD   200 mg at 11/05/20 0815   • amitriptyline (ELAVIL) tablet 100 mg  100 mg Oral Nightly Ricky Xiong  "MD   100 mg at 11/04/20 2111   • aspirin EC tablet 81 mg  81 mg Oral Daily Ricky Xiong MD   81 mg at 11/05/20 0815   • atorvastatin (LIPITOR) tablet 20 mg  20 mg Oral Daily Ricky Xiong MD   20 mg at 11/05/20 0815   • enoxaparin (LOVENOX) syringe 70 mg  1 mg/kg Subcutaneous Q12H Ricky Xiogn MD   70 mg at 11/05/20 0613   • losartan (COZAAR) tablet 25 mg  25 mg Oral Daily Ricky Xiong MD   25 mg at 11/05/20 0815   • metoprolol succinate XL (TOPROL-XL) 24 hr tablet 25 mg  25 mg Oral Daily Ricky Xiong MD   25 mg at 11/05/20 0815   • nitroglycerin (NITROSTAT) SL tablet 0.4 mg  0.4 mg Sublingual Q5 Min PRN Ricky Xiong MD       • ondansetron (ZOFRAN) tablet 4 mg  4 mg Oral Q6H PRN Ricky Xiong MD       • pantoprazole (PROTONIX) EC tablet 40 mg  40 mg Oral QAM Ricky Xiong MD   40 mg at 11/05/20 0608   • rOPINIRole (REQUIP) tablet 0.5 mg  0.5 mg Oral Daily Ricky Xiong MD   0.5 mg at 11/05/20 0816   • sodium chloride 0.9 % flush 10 mL  10 mL Intravenous PRN Julian Harris MD       • sodium chloride 0.9 % flush 10 mL  10 mL Intravenous Q12H Ricky Xiong MD   10 mL at 11/05/20 0815   • sodium chloride 0.9 % flush 10 mL  10 mL Intravenous PRN Ricky Xiong MD       • terazosin (HYTRIN) capsule 1 mg  1 mg Oral Nightly Ricky Xiong MD   1 mg at 11/04/20 2111       Vital Sign Min/Max for last 24 hours  Temp  Min: 97.5 °F (36.4 °C)  Max: 98 °F (36.7 °C)   BP  Min: 138/76  Max: 152/81   Pulse  Min: 60  Max: 92   Resp  Min: 18  Max: 18   SpO2  Min: 97 %  Max: 99 %   No data recorded   Weight  Min: 65.2 kg (143 lb 12.8 oz)  Max: 65.2 kg (143 lb 12.8 oz)     Flowsheet Rows      First Filed Value   Admission Height  182.9 cm (72\") Documented at 11/03/2020 1134   Admission Weight  74.8 kg (165 lb) Documented at 11/03/2020 1134            Intake/Output Summary (Last 24 hours) at 11/5/2020 1001  Last data filed at 11/4/2020 " 2214  Gross per 24 hour   Intake --   Output 450 ml   Net -450 ml       Physical Exam:     General Appearance:   Alert, cooperative, in no acute distress   Lungs:    Clear to auscultation, respirations regular, even and               unlabored    Heart:   Regular rhythm and normal rate, normal S1 and S2, no         murmur, no gallop, no rub, no click   Chest Wall:   No abnormalities observed   Abdomen:    Normal bowel sounds, no masses, no organomegaly, soft     nontender, nondistended, no guarding, no rebound                tenderness   Extremities:  Moves all extremities well, no edema, no cyanosis, no           redness   Pulses:  Pulses palpable and equal bilaterally   Skin:  No bleeding, bruising or rash        Results Review:   I reviewed the patient's new clinical results.  Lab Results   Component Value Date    WBC 7.48 11/04/2020    HGB 12.5 (L) 11/04/2020    HCT 38.5 11/04/2020    MCV 89.5 11/04/2020     11/04/2020     Lab Results   Component Value Date    GLUCOSE 96 11/04/2020    BUN 12 11/04/2020    CREATININE 0.91 11/04/2020    EGFRIFNONA 79 11/04/2020    BCR 13.2 11/04/2020    CO2 25.0 11/04/2020    CALCIUM 9.7 11/04/2020    ALBUMIN 4.10 11/04/2020    AST 20 11/04/2020    ALT 13 11/04/2020     No results found for: TSH  No results found for: INR, PROTIME  No results found for: PTT    EKG:     Telemetry:    Ejection Fraction  Lab Results   Component Value Date    EF 26 07/08/2019       Echo EF Estimated  Lab Results   Component Value Date    ECHOEFEST 27 07/09/2020         Present on Admission:  • Precordial pain  • Pulmonary emboli (CMS/[facility])    Assessment/Plan   #1  nonsustained ventricular tachycardia patient does have history of nonsustained ventricular arrhythmia refused ICD  #2 CAD status post CABG more than 10 years ago had persistent mildly abnormal troponin not interested in further risk stratification   #3 peripheral vascular disease status post carotid endarterectomy   #4 hyperlipidemia    #5 hypertension  Patient started on amiodarone and monitor was reviewed and no further recurrence of nonsustained ventricular arrhythmia.  He is tolerating amiodarone very well.  We will continue low-dose losartan and low-dose beta-blocker  With aspirin and statin.  Clinically is not in heart failure resting in the bed on 1 pillow without symptom suggesting of cardiac decompensation  Júnior Licona MD  11/05/20  10:01 CST      Part of this note may be an electronic transcription/translation of spoken language to printed text using the Dragon Dictation system.        Electronically signed by Júnior Licona MD at 11/05/20 1004     Ricky Xiong MD at 11/04/20 0917              [facility] Medicine Services  INPATIENT PROGRESS NOTE    Length of Stay: 0  Date of Admission: 11/3/2020  Primary Care Physician: Terry Peterson MD    Subjective   HPI:He has been complaining of shortness of breath for over 2 weeks and today he presented chest pain irradiated to the left arm; since he is a patient with long history of coronary artery disease, his daughter brought him here. At this time he denies any chest pain  He denies nausea, vomiting, headache, abdominal pain  Review of Systems   Constitutional: Positive for activity change.        Hard of hearing, elderly fragile appearing   HENT: Negative.    Eyes: Negative.    Respiratory: Negative.    Cardiovascular: Negative.    Gastrointestinal: Negative.    Endocrine: Negative.    Genitourinary: Negative.    Musculoskeletal: Negative.    Neurological: Negative.    Psychiatric/Behavioral: Negative for agitation and behavioral problems.      (S) stated that he would stay for at least 3 days; no fever and no shortness of breath;     All pertinent negatives and positives are as above. All other systems have been reviewed and are negative unless otherwise stated.     Prior to Admission medications    Medication Sig Start Date End  Date Taking? Authorizing Provider   albuterol sulfate  (90 Base) MCG/ACT inhaler INHALE 2 PUFFS INTO THE LUNGS EVERY 6 (SIX) HOURS AS NEEDED FOR WHEEZING 4/8/19   Marcial Crain MD   amitriptyline (ELAVIL) 100 MG tablet Take 100 mg by mouth every night at bedtime. 4/8/19   Marcial Crain MD   aspirin 81 MG EC tablet Take 1 tablet by mouth Daily. 7/5/19   Júnior Licona MD   atorvastatin (LIPITOR) 20 MG tablet Take 1 tablet by mouth.    Marcial Crain MD   losartan (COZAAR) 25 MG tablet TAKE 1 TABLET BY MOUTH EVERY DAY 12/30/19   Bessy Duval MD   metoprolol succinate XL (TOPROL-XL) 25 MG 24 hr tablet TAKE 1 TABLET BY MOUTH EVERY DAY 12/16/19   Júnior Licona MD   omeprazole (priLOSEC) 40 MG capsule Take 40 mg by mouth Every Morning. 6/27/19   Marcial Crain MD   rOPINIRole (REQUIP) 0.5 MG tablet Take 0.5 mg by mouth Daily. 5/3/19   Marcial Crain MD   terazosin (HYTRIN) 1 MG capsule Take 1 capsule by mouth Every Night. 8/6/20   Charly Hercules MD   tolterodine (DETROL) 2 MG tablet Take 2 mg by mouth 2 (Two) Times a Day.    Marcial Crain MD       amitriptyline, 100 mg, Oral, Nightly  aspirin, 81 mg, Oral, Daily  atorvastatin, 20 mg, Oral, Daily  enoxaparin, 1 mg/kg, Subcutaneous, Q12H  losartan, 25 mg, Oral, Daily  metoprolol succinate XL, 25 mg, Oral, Daily  pantoprazole, 40 mg, Oral, QAM  rOPINIRole, 0.5 mg, Oral, Daily  sodium chloride, 10 mL, Intravenous, Q12H  terazosin, 1 mg, Oral, Nightly           Objective    Temp:  [97 °F (36.1 °C)-97.8 °F (36.6 °C)] 97.8 °F (36.6 °C)  Heart Rate:  [60-83] 64  Resp:  [16-18] 18  BP: (118-166)/(58-90) 142/77    Physical Exam  Constitutional:       Comments: Chronically ill appearing    HENT:      Head: Normocephalic.      Nose: Nose normal.   Eyes:      Extraocular Movements: Extraocular movements intact.   Neck:      Musculoskeletal: Normal range of motion and neck supple.   Cardiovascular:      Rate and  Rhythm: Normal rate and regular rhythm.      Pulses: Normal pulses.   Pulmonary:      Effort: Pulmonary effort is normal.      Breath sounds: Normal breath sounds.   Abdominal:      General: Bowel sounds are normal.      Palpations: Abdomen is soft.   Musculoskeletal: Normal range of motion.   Skin:     General: Skin is warm.   Neurological:      General: No focal deficit present.      Mental Status: He is alert. Mental status is at baseline.   Psychiatric:         Mood and Affect: Mood normal.           Results Review:  I have reviewed the labs, radiology results, and diagnostic studies.    Laboratory Data:   Results from last 7 days   Lab Units 11/04/20  0656 11/03/20  1203   SODIUM mmol/L 138 141   POTASSIUM mmol/L 4.0 3.8   CHLORIDE mmol/L 104 108*   CO2 mmol/L 25.0 25.0   BUN mg/dL 12 16   CREATININE mg/dL 0.91 0.99   GLUCOSE mg/dL 96 99   CALCIUM mg/dL 9.7 9.2   BILIRUBIN mg/dL 0.5 0.3   ALK PHOS U/L 90 92   ALT (SGPT) U/L 13 13   AST (SGOT) U/L 20 17   ANION GAP mmol/L 9.0 8.0     Estimated Creatinine Clearance: 55.8 mL/min (by C-G formula based on SCr of 0.91 mg/dL).  Results from last 7 days   Lab Units 11/03/20  1203   MAGNESIUM mg/dL 1.7         Results from last 7 days   Lab Units 11/04/20  0656 11/03/20  1203   WBC 10*3/mm3 7.48 7.27   HEMOGLOBIN g/dL 12.5* 11.7*   HEMATOCRIT % 38.5 35.8*   PLATELETS 10*3/mm3 181 175           Culture Data:   No results found for: BLOODCX  No results found for: URINECX  No results found for: RESPCX  No results found for: WOUNDCX  No results found for: STOOLCX  No components found for: BODYFLD    Radiology Data:   Imaging Results (Last 24 Hours)     Procedure Component Value Units Date/Time    US Venous Doppler Lower Extremity Bilateral (duplex) [[id number]] Collected: 11/03/20 1652     Updated: 11/03/20 1733    Narrative:      Doppler duplex venous examination bilateral lower extremities.         CLINICAL INDICATION: Pulmonary emboli.      COMPARISON: CTA thorax November  3, 2020.     FINDINGS:    The common femoral,  femoral, and popliteal veins of the  bilateral     lower extremity are well identified.    There are noncompressible echogenic acute appearing thrombi left  popliteal vein. Venous examination is otherwise unremarkable.      Impression:      Acute noncompressible echogenic thrombi left  popliteal vein.    Doppler venous examination is otherwise unremarkable.    Electronically signed by:  Esdras Paul MD  11/3/2020 5:32 PM CST  Workstation: [id number]    CT Angiogram Chest [[id number]] Collected: 11/03/20 1448     Updated: 11/03/20 1529    Narrative:      [facility] [place]    Patient Name: SYLVIA PACK    ORDERING: ALLISON LÓPEZ    ATTENDING: ALLISON LÓPEZ     REFERRING: ALLISON LÓPEZ    -----------------------    EXAM DESCRIPTION: CT ANGIOGRAM CHEST    CLINICAL HISTORY:  PE suspected, low/intermediate prob, positive  D-dimer, R07.2 Precordial pain       COMPARISON: CT chest 7/31/2020    TECHNIQUE:   Axial imaging obtained utilizing intravenous contrast at CT PE  protocol with reformatted images obtained. Computer generated 3-D  reconstructions/MIPS were performed.  This exam was performed according to our departmental dose  optimization program, which includes automated exposure control,  adjustment of the mA and/or KV according to patient size and/or  use of iterative reconstruction technique.  Dose Length Product 184.8  CONTRAST:   68 mL intravenous Isovue 370    FINDINGS:  Diagnostic quality: Adequate .  Pulmonary embolism: Multifocal acute embolic event contributing  to luminal filling defects within the right upper, middle, and  lower lobe segmental and subsegmental branches. No embolic  changes on the left confirmed. No large main pulmonary trunk  thrombus or saddle embolus identified.  Pulmonary arteries:  Normal in caliber.  Lung parenchyma: Scattered linear type scarring and atelectasis  present bilaterally involving multiple  lobes. There is no dense  parenchymal consolidation, pleural effusion, or pneumothorax. No  pulmonary mass or suspicious nodule.  Central airways: Patent.  Adenopathy: Redemonstration of mildly enlarged lymph nodes  positioned at or just below the level of the james measuring  approximately 1.35 and 1.7 cm, similar to the prior exam. No  development of conglomerate or necrotic adenopathy. No thyroid  mass or enlargement.  Heart and great vessels: No significant cardiac enlargement or  pericardial effusion. Coronary vascular calcification is present.  Upper abdomen: No acute findings within the included upper  abdomen. No adrenal mass. There is mild adrenal thickening..    Bones: Demineralization and degenerative changes within the  spine. Sternotomy wires are present. No compression fracture  within the spine seen..    Additional findings: None.      Impression:      Showering of emboli on the right involving upper, mid, and lower  lobe branches.    Scattered linear scarring and/or atelectasis. No consolidation,  effusion or pneumothorax.    Stable appearance of the mediastinal lymph nodes described on the  prior examination. No development of necrotic or conglomerate  lymph node. No pulmonary mass or suspicious nodule.    Findings reported to Dr. Harris at the time of interpretation on  11/3/2020.    Electronically signed by:  Lakhwinder Chavarria MD  11/3/2020 3:28 PM  CST Workstation: [id number]    XR Chest 1 View [[id number]] Collected: 11/03/20 1215     Updated: 11/03/20 1235    Narrative:      Chest x-ray single view.         CLINICAL INDICATION: Shortness of breath    COMPARISON: Chest July 29, 2020.    FINDINGS: Cardiac silhouette is normal in size. Pulmonary  vascularity is unremarkable.     There are midline sternal sutures from prior cardiac surgery.  Subtle fibrotic changes left lung base.    Lungs otherwise clear.      Impression:      As above.    Electronically signed by:  Esdras Paul MD  11/3/2020 12:34 PM  CST  Workstation: [id number]          I have reviewed the patient's current medications.     Assessment/Plan   Assessment and Plan    Chest pain     With EKG negative; mild elevation of troponin, chest x ray negative for an acute event; no chest pain at this time     troponin mildly elevated; likely due to below    Right lung emboli     He is keeping good O2 sat levels     Continue with lovenox full treatment for at least 3 days and then will switch to OACs     Left popliteal vein DVT     Continue with full dose lovenox treatment    Asymptomatic run of v tach     Cardiology recommended to take amiodarone     Physical deconditioning     PT and OT consulted    Chronic medical problems     CAD     Essential hypertension     Chronic recurrent depression     Chronic pain syndrome     Gout     Dementia     Hyperlipidemia     Recent left hip fracture on 7/2020      BPH     Bilateral hypoacustic       Discharge Planning: I expect patient to be discharged to home in 2-3 days    Ricky Xiong MD      Electronically signed by Ricky Xiong MD at 11/04/20 1134          Consult Notes (last 72 hours) (Notes from 11/03/20 0808 through 11/06/20 0808)      Júnior Licona MD at 11/04/20 1050      Consult Orders    1. Inpatient Cardiology Consult [[id number]] ordered by Ricky Xiong MD at 11/03/20 1449                Cardiology at [facility]  Cardiovascular Consultation Note      Jose Sandersonston  [address]/1  [id number]  1935    DATE OF ADMISSION: 11/3/2020  DATE OF CONSULTATION:  11/4/2020    Terry Peterson MD  Treatment Team:   Attending Provider: Emile Santoro MD  Consulting Physician: Emile Santoro MD  Consulting Physician: Júnior Licona MD  Consulting Physician: Ricky Xiong MD  Admitting Provider: Emile Santoro MD    Chief Complaint   Patient presents with   • Chest Pain   • Shortness of Breath       Chief complaint/ Reason for Consultation chest pain with  persistent mildly abnormal troponin and history of nonsustained ventricular tachycardia refused cardiac evaluation and ICD documented in my previous evaluation      History of Present Illness  Patient's Body mass index is 19.53 kg/m². BMI is within normal parameters. No follow-up required.     85 years old patient with a background history of CAD, CABG more than 10 years ago, history of right bundle branch block morphology documented back in 2013, history of LV dysfunction and documented nonsustained ventricular tachycardia, normal stress test no evidence of ischemia with impaired left and systolic function echocardiogram ejection fractions 25% who previously evaluated in the office.  His family was present at that time and have discussed with the patient given the ischemic cardiomyopathy LV dysfunction and documented ventricular tachycardia to consider ICD implantation pros and cons explained patient flatly refused and not interested in.  Then admitted to [facility] following fall second to postural syncope first he does not want to come to the hospital secondary he was forced and noted to have hip fracture underwent hip surgery and subsequent discharge.  He had mildly persistent abnormal troponin and admitted for evaluation of chest pain.  To me he denies any chest pain and to any question the onset he does not want anything to be done and want to go home.  On the monitor patient with nonsustained ventricular tachycardia and mainly symptomatic.  Baseline EKG sinus rhythm with right bundle branch block morphology and prolonged MS interval.  He is on low-dose beta-blocker.  He does have history of orthostasis not a candidate for aggressive antiremodeling medications.  Someone from his family works in the patient has a history of vasovagal syncope Cath Lab and I will discuss the case again not interested in any invasive cardiac evaluation he does have history of vasovagal syncope, postural dizziness,  history of carotid endarterectomy and mild abdominal aortic aneurysm  Clinically not in congestive heart failure even his BNP is mildly abnormal    .  Component   Ref Range & Units 1d ago  (11/3/20) 1d ago  (11/3/20) 1d ago  (11/3/20) 1d ago  (11/3/20) 1d ago  (11/3/20)   Troponin T   0.000 - 0.030 ng/mL 0.041High Critical   0.046High Critical   0.042High Critical   0.039High Critical   0.044High Critical         Total Cholesterol   0 - 200 mg/dL 151    Triglycerides   0 - 150 mg/dL 70    HDL Cholesterol   40 - 60 mg/dL 71High     LDL Cholesterol    0 - 100 mg/dL 66    VLDL Cholesterol   5 - 40 mg/dL 14    LDL/HDL Ratio  0.93    HOLTER  #1 sinus rhythm with average heart rate of 75 minimum 55 and maximum 110 bpm with underlying intraventricular conduction delay  2 frequent premature ventricular complex with full runs of nonsustained wide QRS complex tachycardia longest is 3 beat with heart rate range from 130 to 150 bpm  #3 rare premature supraventricular ectopic beat with rare nonsustained supraventricular tachycardia longest is 4 beat with maximum heart rate 160 but beats per minute   #4 no significant bradyarrhythmia or pause noted     Echo  7/2019  · The left ventricular cavity is moderately dilated.  · Estimated EF = 25%.  · Left ventricular diastolic dysfunction (grade I) consistent with impaired relaxation.  · Mild mitral valve regurgitation is present  · Mild tricuspid valve regurgitation is present.  · Mild pulmonic valve regurgitation is present.  · Estimated EF appears to be in the range of 21 - 25%. Estimated EF = 25%. Normal left ventricular wall thickness noted. There is left ventricular global hypokinesis noted. The left ventricular cavity is moderately dilated. Left ventricular diastolic dysfunction is noted (grade I) consistent with impaired relaxation.     STRESS TEST 7/2019  · Findings consistent with an equivocal ECG stress test.  · Left ventricular ejection fraction is moderately reduced  (Calculated EF = 26%).  Moderately fixed inferolateral defect with no reversibility, LV dilated  Medical History         Past Medical History:   Diagnosis Date   • Abnormal ECG        Past Surgical History:   Procedure Laterality Date   • CORONARY STENT PLACEMENT     • HIP HEMIARTHROPLASTY Left 2020    Procedure: LEFT HIP HEMIARTHROPLASTY;  Surgeon: Jitendra Solis MD;  Location: [facility];  Service: Orthopedics;  Laterality: Left;       Allergies   Allergen Reactions   • Hydrochlorothiazide Swelling     TONGUE SWELLING       No current facility-administered medications on file prior to encounter.      Current Outpatient Medications on File Prior to Encounter   Medication Sig Dispense Refill   • albuterol sulfate  (90 Base) MCG/ACT inhaler INHALE 2 PUFFS INTO THE LUNGS EVERY 6 (SIX) HOURS AS NEEDED FOR WHEEZING  3   • amitriptyline (ELAVIL) 100 MG tablet Take 100 mg by mouth every night at bedtime.  3   • aspirin 81 MG EC tablet Take 1 tablet by mouth Daily. 30 tablet 3   • atorvastatin (LIPITOR) 20 MG tablet Take 1 tablet by mouth.     • losartan (COZAAR) 25 MG tablet TAKE 1 TABLET BY MOUTH EVERY DAY 30 tablet 5   • metoprolol succinate XL (TOPROL-XL) 25 MG 24 hr tablet TAKE 1 TABLET BY MOUTH EVERY DAY 30 tablet 5   • omeprazole (priLOSEC) 40 MG capsule Take 40 mg by mouth Every Morning.  2   • rOPINIRole (REQUIP) 0.5 MG tablet Take 0.5 mg by mouth Daily.  2   • terazosin (HYTRIN) 1 MG capsule Take 1 capsule by mouth Every Night.     • tolterodine (DETROL) 2 MG tablet Take 2 mg by mouth 2 (Two) Times a Day.         Social History     Socioeconomic History   • Marital status:      Spouse name: Not on file   • Number of children: Not on file   • Years of education: Not on file   • Highest education level: Not on file   Tobacco Use   • Smoking status: Former Smoker     Quit date:      Years since quittin.8   • Smokeless tobacco: Never Used   Substance and Sexual Activity   • Alcohol  "use: No     Frequency: Never   • Drug use: No   • Sexual activity: Defer           REVIEW OF SYSTEMS:   ROS  Review of Systems   Constitutional:  Generalized weakness.  Patient denies fever cough or chills       Chronically ill appearing   HENT: Negative.    Eyes: Negative.    Respiratory: Positive for shortness of breath no orthopnea PND reported..    Cardiovascular: Positive for chest pain.  Pain-free at the time of evaluation no palpitation fluttering.  Gastrointestinal: Negative.    Endocrine: Negative for polydipsia polyuria.    Genitourinary: Negative negative for dysuria or hematuria   Musculoskeletal:  Positive history of hip fracture status post surgery   Skin: Negative.    Neurological: Positive for history of vasovagal syncope.    Hematological: Negative.    Psychiatric/Behavioral: Negative.       Objective:     Vitals:    11/04/20 0346 11/04/20 0509 11/04/20 0745 11/04/20 0806   BP:    142/77   BP Location:    Left arm   Patient Position:    Sitting   Pulse: 81  65 64   Resp:    18   Temp:    97.8 °F (36.6 °C)   TempSrc:    Temporal   SpO2:    96%   Weight:  65.3 kg (144 lb)     Height:         Body mass index is 19.53 kg/m².  Flowsheet Rows      First Filed Value   Admission Height  182.9 cm (72\") Documented at 11/03/2020 1134   Admission Weight  74.8 kg (165 lb) Documented at 11/03/2020 1134          Intake/Output Summary (Last 24 hours) at 11/4/2020 1052  Last data filed at 11/3/2020 2205  Gross per 24 hour   Intake --   Output 75 ml   Net -75 ml         Physical Exam   Physical Exam  Constitutional:       Comments: Chronically ill appearing   HENT:      Nose: Nose normal.      Mouth/Throat:      Mouth: Mucous membranes are moist.   Eyes:      Extraocular Movements: Extraocular movements intact.   Neck:      Musculoskeletal: Normal range of motion and neck supple.   Cardiovascular:      Rate and Rhythm: Normal rate.      Pulses: Normal pulses.   Pulmonary:      Effort: Pulmonary effort is normal.      " Breath sounds: Normal breath sounds.   Abdominal:      General: Bowel sounds are normal.   Musculoskeletal: Normal range of motion.      Comments: Minimal swelling bilateral lower extremities   Skin:     General: Skin is warm.   Neurological:      General: No focal deficit present.      Mental Status: He is alert. Mental status is at baseline.   Psychiatric:         Mood and Affect: Mood normal.   Radiology Review    US Venous Doppler Lower Extremity Bilateral (duplex)   Final Result   Acute noncompressible echogenic thrombi left   popliteal vein.      Doppler venous examination is otherwise unremarkable.      Electronically signed by:  Esdras Paul MD  11/3/2020 5:32 PM CST   Workstation: [id number]      CT Angiogram Chest   Final Result   Showering of emboli on the right involving upper, mid, and lower   lobe branches.      Scattered linear scarring and/or atelectasis. No consolidation,   effusion or pneumothorax.      Stable appearance of the mediastinal lymph nodes described on the   prior examination. No development of necrotic or conglomerate   lymph node. No pulmonary mass or suspicious nodule.      Findings reported to Dr. Harris at the time of interpretation on   11/3/2020.      Electronically signed by:  Lakhwinder Chavarria MD  11/3/2020 3:28 PM   CST Workstation: [id number]      XR Chest 1 View   Final Result   As above.      Electronically signed by:  Esdras Paul MD  11/3/2020 12:34 PM CST   Workstation: [id number]          Lab Results:  Lab Results (last 24 hours)     Procedure Component Value Units Date/Time    Lipid Panel [[id number]]  (Abnormal) Collected: 11/04/20 0656    Specimen: Blood Updated: 11/04/20 0742     Total Cholesterol 151 mg/dL      Triglycerides 70 mg/dL      HDL Cholesterol 71 mg/dL      LDL Cholesterol  66 mg/dL      VLDL Cholesterol 14 mg/dL      LDL/HDL Ratio 0.93    Narrative:      Cholesterol Reference Ranges  (U.S. Department of Health and Human Services ATP III Classifications)    Desirable           <200 mg/dL  Borderline High    200-239 mg/dL  High Risk          >240 mg/dL      Triglyceride Reference Ranges  (U.S. Department of Health and Human Services ATP III Classifications)    Normal           <150 mg/dL  Borderline High  150-199 mg/dL  High             200-499 mg/dL  Very High        >500 mg/dL    HDL Reference Ranges  (U.S. Department of Health and Human Services ATP III Classifcations)    Low     <40 mg/dl (major risk factor for CHD)  High    >60 mg/dl ('negative' risk factor for CHD)        LDL Reference Ranges  (U.S. Department of Health and Human Services ATP III Classifcations)    Optimal          <100 mg/dL  Near Optimal     100-129 mg/dL  Borderline High  130-159 mg/dL  High             160-189 mg/dL  Very High        >189 mg/dL    Comprehensive Metabolic Panel [[id number]] Collected: 11/04/20 0656    Specimen: Blood Updated: 11/04/20 0742     Glucose 96 mg/dL      BUN 12 mg/dL      Creatinine 0.91 mg/dL      Sodium 138 mmol/L      Potassium 4.0 mmol/L      Chloride 104 mmol/L      CO2 25.0 mmol/L      Calcium 9.7 mg/dL      Total Protein 7.0 g/dL      Albumin 4.10 g/dL      ALT (SGPT) 13 U/L      AST (SGOT) 20 U/L      Alkaline Phosphatase 90 U/L      Total Bilirubin 0.5 mg/dL      eGFR Non African Amer 79 mL/min/1.73      Globulin 2.9 gm/dL      A/G Ratio 1.4 g/dL      BUN/Creatinine Ratio 13.2     Anion Gap 9.0 mmol/L     Narrative:      GFR Normal >60  Chronic Kidney Disease <60  Kidney Failure <15      Hemoglobin A1c [[id number]]  (Normal) Collected: 11/04/20 0656    Specimen: Blood Updated: 11/04/20 0739     Hemoglobin A1C 5.50 %     Narrative:      Hemoglobin A1C Ranges:    Increased Risk for Diabetes  5.7% to 6.4%  Diabetes                     >= 6.5%  Diabetic Goal                < 7.0%    CBC Auto Differential [[id number]]  (Abnormal) Collected: 11/04/20 0656    Specimen: Blood Updated: 11/04/20 0723     WBC 7.48 10*3/mm3      RBC 4.30 10*6/mm3      Hemoglobin 12.5 g/dL       Hematocrit 38.5 %      MCV 89.5 fL      MCH 29.1 pg      MCHC 32.5 g/dL      RDW 16.8 %      RDW-SD 55.2 fl      MPV 10.1 fL      Platelets 181 10*3/mm3      Neutrophil % 64.4 %      Lymphocyte % 22.3 %      Monocyte % 8.2 %      Eosinophil % 3.7 %      Basophil % 1.3 %      Immature Grans % 0.1 %      Neutrophils, Absolute 4.81 10*3/mm3      Lymphocytes, Absolute 1.67 10*3/mm3      Monocytes, Absolute 0.61 10*3/mm3      Eosinophils, Absolute 0.28 10*3/mm3      Basophils, Absolute 0.10 10*3/mm3      Immature Grans, Absolute 0.01 10*3/mm3      nRBC 0.0 /100 WBC     Troponin [[id number]]  (Abnormal) Collected: 11/03/20 2344    Specimen: Blood Updated: 11/04/20 0030     Troponin T 0.041 ng/mL     Narrative:      Troponin T Reference Range:  <= 0.03 ng/mL-   Negative for AMI  >0.03 ng/mL-     Abnormal for myocardial necrosis.  Clinicians would have to utilize clinical acumen, EKG, Troponin and serial changes to determine if it is an Acute Myocardial Infarction or myocardial injury due to an underlying chronic condition.       Results may be falsely decreased if patient taking Biotin.      Troponin [[id number]]  (Abnormal) Collected: 11/03/20 2123    Specimen: Blood Updated: 11/03/20 2157     Troponin T 0.046 ng/mL     Narrative:      Troponin T Reference Range:  <= 0.03 ng/mL-   Negative for AMI  >0.03 ng/mL-     Abnormal for myocardial necrosis.  Clinicians would have to utilize clinical acumen, EKG, Troponin and serial changes to determine if it is an Acute Myocardial Infarction or myocardial injury due to an underlying chronic condition.       Results may be falsely decreased if patient taking Biotin.      Troponin [[id number]]  (Abnormal) Collected: 11/03/20 1823    Specimen: Blood Updated: 11/03/20 1848     Troponin T 0.042 ng/mL     Narrative:      Troponin T Reference Range:  <= 0.03 ng/mL-   Negative for AMI  >0.03 ng/mL-     Abnormal for myocardial necrosis.  Clinicians would have to utilize clinical acumen, EKG,  Troponin and serial changes to determine if it is an Acute Myocardial Infarction or myocardial injury due to an underlying chronic condition.       Results may be falsely decreased if patient taking Biotin.      COVID PRE-OP / PRE-PROCEDURE SCREENING ORDER (NO ISOLATION) - Swab, Nasopharynx [[id number]]  (Normal) Collected: 11/03/20 1302    Specimen: Swab from Nasopharynx Updated: 11/03/20 1634    Narrative:      The following orders were created for panel order COVID PRE-OP / PRE-PROCEDURE SCREENING ORDER (NO ISOLATION) - Swab, Nasopharynx.  Procedure                               Abnormality         Status                     ---------                               -----------         ------                     COVID-19, [facility] IN-HOUS...[[id number]]  Normal              Final result                 Please view results for these tests on the individual orders.    COVID-19, [facility] IN-HOUSE, NP SWAB IN TRANSPORT MEDIA 8-10 HR TAT - Swab, Nasopharynx [[id number]]  (Normal) Collected: 11/03/20 1302    Specimen: Swab from Nasopharynx Updated: 11/03/20 1634     COVID19 Not Detected    Narrative:      Testing performed by Real Time RT-PCR  This test has not been approved by the [facility] but is authorized under the Emergency Use Act (EUA)    Fact sheet for providers: https://www.fda.gov/media/406543/download    Fact sheet for patients: https://www.fda.gov/media/062730/download        Troponin [[id number]]  (Abnormal) Collected: 11/03/20 1433    Specimen: Blood Updated: 11/03/20 1503     Troponin T 0.039 ng/mL     Narrative:      Troponin T Reference Range:  <= 0.03 ng/mL-   Negative for AMI  >0.03 ng/mL-     Abnormal for myocardial necrosis.  Clinicians would have to utilize clinical acumen, EKG, Troponin and serial changes to determine if it is an Acute Myocardial Infarction or myocardial injury due to an underlying chronic condition.       Results may be falsely decreased if patient taking Biotin.      [place] Draw [[id number]]  Collected: 11/03/20 1203    Specimen: Blood Updated: 11/03/20 1315    Narrative:      The following orders were created for panel order [place] Draw.  Procedure                               Abnormality         Status                     ---------                               -----------         ------                     Light Blue Top[[id number]]                                   Final result               Green Top (Gel)[[id number]]                                  Final result               Lavender Top[[id number]]                                     Final result               Gold Top - SST[[id number]]                                   Final result                 Please view results for these tests on the individual orders.    Light Blue Top [[id number]] Collected: 11/03/20 1203    Specimen: Blood Updated: 11/03/20 1315     Extra Tube hold for add-on     Comment: Auto resulted       Green Top (Gel) [[id number]] Collected: 11/03/20 1203    Specimen: Blood Updated: 11/03/20 1315     Extra Tube Hold for add-ons.     Comment: Auto resulted.       Lavender Top [[id number]] Collected: 11/03/20 1203    Specimen: Blood Updated: 11/03/20 1315     Extra Tube hold for add-on     Comment: Auto resulted       Gold Top - SST [[id number]] Collected: 11/03/20 1203    Specimen: Blood Updated: 11/03/20 1315     Extra Tube Hold for add-ons.     Comment: Auto resulted.       CK [[id number]]  (Normal) Collected: 11/03/20 1203    Specimen: Blood Updated: 11/03/20 1258     Creatine Kinase 57 U/L     Magnesium [[id number]]  (Normal) Collected: 11/03/20 1203    Specimen: Blood Updated: 11/03/20 1258     Magnesium 1.7 mg/dL     Lipase [[id number]]  (Normal) Collected: 11/03/20 1203    Specimen: Blood Updated: 11/03/20 1258     Lipase 32 U/L     D-dimer, Quantitative [[id number]]  (Abnormal) Collected: 11/03/20 1203    Specimen: Blood Updated: 11/03/20 1254     D-Dimer, Quantitative 3,010 ng/mL (FEU)     Narrative:      Dimer values <500 ng/ml FEU  are FDA approved as aid in diagnosis of deep venous thrombosis and pulmonary embolism.  This test should not be used in an exclusion strategy with pretest probability alone.    A recent guideline regarding diagnosis for pulmonary thromboembolism recommends an adjusted exclusion criterion of age x 10 ng/ml FEU for patients >50 years of age ([employer] Intern Med 2015; 163: 701-711).      Troponin [[id number]]  (Abnormal) Collected: 11/03/20 1203    Specimen: Blood Updated: 11/03/20 1241     Troponin T 0.044 ng/mL     Narrative:      Troponin T Reference Range:  <= 0.03 ng/mL-   Negative for AMI  >0.03 ng/mL-     Abnormal for myocardial necrosis.  Clinicians would have to utilize clinical acumen, EKG, Troponin and serial changes to determine if it is an Acute Myocardial Infarction or myocardial injury due to an underlying chronic condition.       Results may be falsely decreased if patient taking Biotin.      Comprehensive Metabolic Panel [[id number]]  (Abnormal) Collected: 11/03/20 1203    Specimen: Blood Updated: 11/03/20 1241     Glucose 99 mg/dL      BUN 16 mg/dL      Creatinine 0.99 mg/dL      Sodium 141 mmol/L      Potassium 3.8 mmol/L      Chloride 108 mmol/L      CO2 25.0 mmol/L      Calcium 9.2 mg/dL      Total Protein 6.5 g/dL      Albumin 3.90 g/dL      ALT (SGPT) 13 U/L      AST (SGOT) 17 U/L      Alkaline Phosphatase 92 U/L      Total Bilirubin 0.3 mg/dL      eGFR Non African Amer 72 mL/min/1.73      Globulin 2.6 gm/dL      A/G Ratio 1.5 g/dL      BUN/Creatinine Ratio 16.2     Anion Gap 8.0 mmol/L     Narrative:      GFR Normal >60  Chronic Kidney Disease <60  Kidney Failure <15      BNP [[id number]]  (Abnormal) Collected: 11/03/20 1203    Specimen: Blood Updated: 11/03/20 1238     proBNP 4,259.0 pg/mL     Narrative:      Among patients with dyspnea, NT-proBNP is highly sensitive for the detection of acute congestive heart failure. In addition NT-proBNP of <300 pg/ml effectively rules out acute congestive heart  failure with 99% negative predictive value.    Results may be falsely decreased if patient taking Biotin.      CBC & Differential [[id number]]  (Abnormal) Collected: 11/03/20 1203    Specimen: Blood Updated: 11/03/20 1218    Narrative:      The following orders were created for panel order CBC & Differential.  Procedure                               Abnormality         Status                     ---------                               -----------         ------                     CBC Auto Differential[[id number]]        Abnormal            Final result                 Please view results for these tests on the individual orders.    CBC Auto Differential [[id number]]  (Abnormal) Collected: 11/03/20 1203    Specimen: Blood Updated: 11/03/20 1218     WBC 7.27 10*3/mm3      RBC 3.99 10*6/mm3      Hemoglobin 11.7 g/dL      Hematocrit 35.8 %      MCV 89.7 fL      MCH 29.3 pg      MCHC 32.7 g/dL      RDW 16.5 %      RDW-SD 54.7 fl      MPV 10.0 fL      Platelets 175 10*3/mm3      Neutrophil % 66.5 %      Lymphocyte % 18.2 %      Monocyte % 10.2 %      Eosinophil % 4.3 %      Basophil % 0.7 %      Immature Grans % 0.1 %      Neutrophils, Absolute 4.84 10*3/mm3      Lymphocytes, Absolute 1.32 10*3/mm3      Monocytes, Absolute 0.74 10*3/mm3      Eosinophils, Absolute 0.31 10*3/mm3      Basophils, Absolute 0.05 10*3/mm3      Immature Grans, Absolute 0.01 10*3/mm3      nRBC 0.0 /100 WBC           I personally viewed and interpreted the patient's EKG/Telemetry data       Assessment/Plan:        #1  nonsustained ventricular tachycardia patient does have history of nonsustained ventricular arrhythmia refused ICD  #2 CAD status post CABG more than 10 years ago had persistent mildly abnormal troponin not interested in further risk stratification   #3 peripheral vascular disease status post carotid endarterectomy   #4 hyperlipidemia   #5 hypertension  History of postural syncope with orthostasis  85 years old patient appears older than  his stated age with a background history of ischemic cardiomyopathy, CABG more than 10 years ago, hypertension with hypertensive heart disease, history of postural syncope, right bundle branch block morphology, documented nonsustained ventricular tachycardia refused ICD implantation admitted for evaluations of chest discomfort he denied pain at the time of evaluation and on the monitor have nonsustained ventricular tachycardia.  Patient not interested in any invasive cardiac evaluation including ICD implantation he want to go home.  We will start the patient on amiodarone 200 mg a day given the LV dysfunction and nonsustained ventricular tachycardia along with continuation of low-dose beta-blocker and losartan.  Not consider Imdur given history of orthostasis.  Recommend to continue baby aspirin and atorvastatin.     Thank you for allowing me to participate in the care of Jose Dacosta. Feel free to contact me directly with any further questions or concerns.    Júnior Licona MD  11/04/20  10:52 CST.      Part of this note may be an electronic transcription/translation of spoken language to printed text using the Dragon Dictation system.        Electronically signed by Júnior Licona MD at 11/04/20 7846

## 2020-11-06 NOTE — DISCHARGE PLACEMENT REQUEST
"Sylvia Dacosta (85 y.o. Male)     Date of Birth Social Security Number Address Home Phone MRN    1935  [address] [facility] [address] [facility] [address] [phone] [id number]    [place] Marital Status          Roman Catholic        Admission Date Admission Type Admitting Provider Attending Provider Department, Room/Bed    11/3/20 Emergency Emile Santoro MD Popescu, Tudor, MD [address], 322/1    Discharge Date Discharge Disposition Discharge Destination         [place]              Attending Provider: Emile Santoro MD    Allergies: Hydrochlorothiazide    Isolation: None   Infection: None   Code Status: No CPR    Ht: 182.9 cm (72\")   Wt: 65.3 kg (143 lb 14.4 oz)    Admission Cmt: None   Principal Problem: None                Active Insurance as of 11/3/2020     Primary Coverage     Payor Plan Insurance Group Employer/Plan Group    AETNA MEDICARE REPLACEMENT AETNA MEDICARE REPLACEMENT [id number]     Payor Plan Address Payor Plan Phone Number Payor Plan Fax Number Effective Dates    [address] [phone]  4/1/2019 - None Entered    [facility] [id number]       Subscriber Name Subscriber Birth Date Member ID       SYLVIA DACOSTA 1935 [id number]                 Emergency Contacts      (Rel.) Home Phone Work Phone Mobile Phone    Carla Dacosta (Spouse) [phone] -- [phone]    AURELIA BOX (Relative) [phone]    fiordaliza aguilar (Grandchild) -- -- [phone]            Insurance Information                AETNA MEDICARE REPLACEMENT/AETNA MEDICARE REPLACEMENT Phone: [phone]    Subscriber: Sylvia Dacosta Subscriber#: [id number]    Group#: [id number] Precert#:              History & Physical      Ricky Xiong MD at 11/03/20 1347                [facility] Medicine Admission      Date of Admission: 11/3/2020      Primary Care Physician: " Terry Peterson MD      Chief Complaint: chest pain  Informant: his daughter    HPI:He has been complaining of shortness of breath for over 2 weeks and today he presented chest pain irradiated to the left arm; since he is a patient with long history of coronary artery disease, his daughter brought him here. At this time he denies any chest pain  He denies nausea, vomiting, headache, abdominal pain    Concurrent Medical History:  has a past medical history of Abnormal ECG.   CAD  Essential hypertension  Depression  Chronic pain syndrome  Gout  Dementia  Hyperlipidemia  BPH  Bilateral hypoacustic    Past Surgical History:  has a past surgical history that includes Coronary stent placement and Hip hemiArthroplasty (Left, 2020).   CABG x 3  Coronary stents x 1  Renal artery stent x 1  Cholecystectomy      Family History: family history includes Heart failure in his father and mother.   Mom  on her 90s due to CAD complications  Dad  at 77 y/o due to CAD complications    Social History:  reports that he quit smoking about 30 years ago. He has never used smokeless tobacco. He reports that he does not drink alcohol or use drugs.   He lives with his wife and POA is his Mom and his daughter   He is DNR and he said that in front of his daughter     Allergies:   Allergies   Allergen Reactions   • Hydrochlorothiazide Swelling     TONGUE SWELLING       Medications:   Prior to Admission medications    Medication Sig Start Date End Date Taking? Authorizing Provider   acetaminophen (TYLENOL) 325 MG tablet Take 2 tablets by mouth Every 4 (Four) Hours As Needed for Mild Pain  or Fever. 20   Charly Hercules MD   albuterol sulfate  (90 Base) MCG/ACT inhaler INHALE 2 PUFFS INTO THE LUNGS EVERY 6 (SIX) HOURS AS NEEDED FOR WHEEZING 19   ProviderMarcial MD   amitriptyline (ELAVIL) 100 MG tablet Take 100 mg by mouth every night at bedtime. 19   Marcial Crain MD   aspirin 81 MG EC tablet  Take 1 tablet by mouth Daily. 7/5/19   Júnior Licona MD   atorvastatin (LIPITOR) 20 MG tablet Take 1 tablet by mouth.    Marcial Crain MD   Bacitracin Zinc (MAGIC BUTT OINTMENT) Apply 1 each topically to the appropriate area as directed 2 (Two) Times a Day. 8/6/20   Charly Hercules MD   COLCRYS 0.6 MG tablet Take 0.6 mg by mouth Daily. 7/5/19   Marcial Crain MD   cyanocobalamin (VITAMIN B-12) 1000 MCG tablet Take 1 tablet by mouth Daily. 8/7/20   Charly Hercules MD   guaiFENesin (MUCINEX) 600 MG 12 hr tablet Take 2 tablets by mouth Every 12 (Twelve) Hours. 8/6/20   Charly Hercules MD   losartan (COZAAR) 25 MG tablet TAKE 1 TABLET BY MOUTH EVERY DAY 12/30/19   Bessy Duval MD   meloxicam (Mobic) 7.5 MG tablet Take 1 tablet by mouth Daily. 10/28/20   Jitendra Solis MD   metoprolol succinate XL (TOPROL-XL) 25 MG 24 hr tablet TAKE 1 TABLET BY MOUTH EVERY DAY 12/16/19   Júnior Licona MD   omeprazole (priLOSEC) 40 MG capsule Take 40 mg by mouth Every Morning. 6/27/19   Marcial Crain MD   rOPINIRole (REQUIP) 0.5 MG tablet Take 0.5 mg by mouth Daily. 5/3/19   Marcial Crain MD   terazosin (HYTRIN) 1 MG capsule Take 1 capsule by mouth Every Night. 8/6/20   Charly Hercules MD   tolterodine (DETROL) 2 MG tablet Take 2 mg by mouth 2 (Two) Times a Day.    Marcial Crain MD   traMADol (ULTRAM) 50 MG tablet Take 1 tablet by mouth Every 8 (Eight) Hours As Needed for Moderate Pain  (take 1/2 pill every 8 hours as needed). 9/30/20   Jitendra Solis MD       methylPREDNISolone acetate, 80 mg, Intramuscular, Once        Review of Systems:  Review of Systems   Constitutional:        Chronically ill appearing   HENT: Negative.    Eyes: Negative.    Respiratory: Positive for shortness of breath.    Cardiovascular: Positive for chest pain.   Gastrointestinal: Negative.    Endocrine: Negative.    Genitourinary: Negative.    Musculoskeletal: Negative.    Skin: Negative.     Neurological: Negative.    Hematological: Negative.    Psychiatric/Behavioral: Negative.       Otherwise complete ROS is negative except as mentioned above.    Physical Exam:   Temp:  [97.5 °F (36.4 °C)] 97.5 °F (36.4 °C)  Heart Rate:  [60-68] 68  Resp:  [16] 16  BP: (144-154)/(77-79) 154/77  Physical Exam  Constitutional:       Comments: Chronically ill appearing   HENT:      Nose: Nose normal.      Mouth/Throat:      Mouth: Mucous membranes are moist.   Eyes:      Extraocular Movements: Extraocular movements intact.   Neck:      Musculoskeletal: Normal range of motion and neck supple.   Cardiovascular:      Rate and Rhythm: Normal rate.      Pulses: Normal pulses.   Pulmonary:      Effort: Pulmonary effort is normal.      Breath sounds: Normal breath sounds.   Abdominal:      General: Bowel sounds are normal.   Musculoskeletal: Normal range of motion.      Comments: Minimal swelling bilateral lower extremities   Skin:     General: Skin is warm.   Neurological:      General: No focal deficit present.      Mental Status: He is alert. Mental status is at baseline.   Psychiatric:         Mood and Affect: Mood normal.           Results Reviewed:  I have personally reviewed current lab, radiology, and data and agree with results.  Lab Results (last 24 hours)     Procedure Component Value Units Date/Time    [place] Draw [[id number]] Collected: 11/03/20 1203    Specimen: Blood Updated: 11/03/20 1315    Narrative:      The following orders were created for panel order [place] Draw.  Procedure                               Abnormality         Status                     ---------                               -----------         ------                     Light Blue Top[[id number]]                                   Final result               Green Top (Gel)[[id number]]                                  Final result               Lavender Top[[id number]]                                     Final result               Gold Top -  SST[[id number]]                                   Final result                 Please view results for these tests on the individual orders.    Light Blue Top [[id number]] Collected: 11/03/20 1203    Specimen: Blood Updated: 11/03/20 1315     Extra Tube hold for add-on     Comment: Auto resulted       Green Top (Gel) [[id number]] Collected: 11/03/20 1203    Specimen: Blood Updated: 11/03/20 1315     Extra Tube Hold for add-ons.     Comment: Auto resulted.       Lavender Top [[id number]] Collected: 11/03/20 1203    Specimen: Blood Updated: 11/03/20 1315     Extra Tube hold for add-on     Comment: Auto resulted       Gold Top - SST [[id number]] Collected: 11/03/20 1203    Specimen: Blood Updated: 11/03/20 1315     Extra Tube Hold for add-ons.     Comment: Auto resulted.       COVID PRE-OP / PRE-PROCEDURE SCREENING ORDER (NO ISOLATION) - Swab, Nasopharynx [[id number]] Collected: 11/03/20 1302    Specimen: Swab from Nasopharynx Updated: 11/03/20 1308    Narrative:      The following orders were created for panel order COVID PRE-OP / PRE-PROCEDURE SCREENING ORDER (NO ISOLATION) - Swab, Nasopharynx.  Procedure                               Abnormality         Status                     ---------                               -----------         ------                     COVID-19RUBIA IN-HOUS...[[id number]]                      In process                   Please view results for these tests on the individual orders.    COVID-RUBIA [facility] IN-HOUSE, NP SWAB IN TRANSPORT MEDIA 8-10 HR TAT - Swab, Nasopharynx [[id number]] Collected: 11/03/20 1302    Specimen: Swab from Nasopharynx Updated: 11/03/20 1308    CK [[id number]]  (Normal) Collected: 11/03/20 1203    Specimen: Blood Updated: 11/03/20 1258     Creatine Kinase 57 U/L     Magnesium [[id number]]  (Normal) Collected: 11/03/20 1203    Specimen: Blood Updated: 11/03/20 1258     Magnesium 1.7 mg/dL     Lipase [[id number]]  (Normal) Collected: 11/03/20 1203    Specimen: Blood Updated:  11/03/20 1258     Lipase 32 U/L     D-dimer, Quantitative [[id number]]  (Abnormal) Collected: 11/03/20 1203    Specimen: Blood Updated: 11/03/20 1254     D-Dimer, Quantitative 3,010 ng/mL (FEU)     Narrative:      Dimer values <500 ng/ml FEU are FDA approved as aid in diagnosis of deep venous thrombosis and pulmonary embolism.  This test should not be used in an exclusion strategy with pretest probability alone.    A recent guideline regarding diagnosis for pulmonary thromboembolism recommends an adjusted exclusion criterion of age x 10 ng/ml FEU for patients >50 years of age (Marifer Intern Med 2015; 163: 701-711).      Troponin [[id number]]  (Abnormal) Collected: 11/03/20 1203    Specimen: Blood Updated: 11/03/20 1241     Troponin T 0.044 ng/mL     Narrative:      Troponin T Reference Range:  <= 0.03 ng/mL-   Negative for AMI  >0.03 ng/mL-     Abnormal for myocardial necrosis.  Clinicians would have to utilize clinical acumen, EKG, Troponin and serial changes to determine if it is an Acute Myocardial Infarction or myocardial injury due to an underlying chronic condition.       Results may be falsely decreased if patient taking Biotin.      Comprehensive Metabolic Panel [[id number]]  (Abnormal) Collected: 11/03/20 1203    Specimen: Blood Updated: 11/03/20 1241     Glucose 99 mg/dL      BUN 16 mg/dL      Creatinine 0.99 mg/dL      Sodium 141 mmol/L      Potassium 3.8 mmol/L      Chloride 108 mmol/L      CO2 25.0 mmol/L      Calcium 9.2 mg/dL      Total Protein 6.5 g/dL      Albumin 3.90 g/dL      ALT (SGPT) 13 U/L      AST (SGOT) 17 U/L      Alkaline Phosphatase 92 U/L      Total Bilirubin 0.3 mg/dL      eGFR Non African Amer 72 mL/min/1.73      Globulin 2.6 gm/dL      A/G Ratio 1.5 g/dL      BUN/Creatinine Ratio 16.2     Anion Gap 8.0 mmol/L     Narrative:      GFR Normal >60  Chronic Kidney Disease <60  Kidney Failure <15      BNP [[id number]]  (Abnormal) Collected: 11/03/20 1203    Specimen: Blood Updated: 11/03/20 1238      proBNP 4,259.0 pg/mL     Narrative:      Among patients with dyspnea, NT-proBNP is highly sensitive for the detection of acute congestive heart failure. In addition NT-proBNP of <300 pg/ml effectively rules out acute congestive heart failure with 99% negative predictive value.    Results may be falsely decreased if patient taking Biotin.      CBC & Differential [[id number]]  (Abnormal) Collected: 11/03/20 1203    Specimen: Blood Updated: 11/03/20 1218    Narrative:      The following orders were created for panel order CBC & Differential.  Procedure                               Abnormality         Status                     ---------                               -----------         ------                     CBC Auto Differential[[id number]]        Abnormal            Final result                 Please view results for these tests on the individual orders.    CBC Auto Differential [[id number]]  (Abnormal) Collected: 11/03/20 1203    Specimen: Blood Updated: 11/03/20 1218     WBC 7.27 10*3/mm3      RBC 3.99 10*6/mm3      Hemoglobin 11.7 g/dL      Hematocrit 35.8 %      MCV 89.7 fL      MCH 29.3 pg      MCHC 32.7 g/dL      RDW 16.5 %      RDW-SD 54.7 fl      MPV 10.0 fL      Platelets 175 10*3/mm3      Neutrophil % 66.5 %      Lymphocyte % 18.2 %      Monocyte % 10.2 %      Eosinophil % 4.3 %      Basophil % 0.7 %      Immature Grans % 0.1 %      Neutrophils, Absolute 4.84 10*3/mm3      Lymphocytes, Absolute 1.32 10*3/mm3      Monocytes, Absolute 0.74 10*3/mm3      Eosinophils, Absolute 0.31 10*3/mm3      Basophils, Absolute 0.05 10*3/mm3      Immature Grans, Absolute 0.01 10*3/mm3      nRBC 0.0 /100 WBC         Imaging Results (Last 24 Hours)     Procedure Component Value Units Date/Time    XR Chest 1 View [[id number]] Collected: 11/03/20 1215     Updated: 11/03/20 1235    Narrative:      Chest x-ray single view.         CLINICAL INDICATION: Shortness of breath    COMPARISON: Chest July 29, 2020.    FINDINGS:  Cardiac silhouette is normal in size. Pulmonary  vascularity is unremarkable.     There are midline sternal sutures from prior cardiac surgery.  Subtle fibrotic changes left lung base.    Lungs otherwise clear.      Impression:      As above.    Electronically signed by:  Esdras Paul MD  11/3/2020 12:34 PM [facility]  Workstation: [id number]      Assessment and Plan    Chest pain     With EKG negative; mild elevation of troponin, chest x ray negative for an acute event; no chest pain at this time     Trend troponin; pending CTA chest to rule out PE;      Discussed with Dr Duval    Chronic medical problems     CAD     Essential hypertension     Chronic recurrent depression     Chronic pain syndrome     Gout     Dementia     Hyperlipidemia     BPH     Bilateral hypoacustic    I discussed the patient's findings and my recommendations with patient and his daughter    Ricky Xiong MD                Electronically signed by Ricky Xiong MD at 11/03/20 1507         Current Facility-Administered Medications   Medication Dose Route Frequency Provider Last Rate Last Dose   • acetaminophen (TYLENOL) tablet 650 mg  650 mg Oral Q4H PRN Ricky Xiong MD   650 mg at 11/05/20 0730    Or   • acetaminophen (TYLENOL) 160 MG/5ML solution 650 mg  650 mg Oral Q4H PRN Ricky Xiong MD        Or   • acetaminophen (TYLENOL) suppository 650 mg  650 mg Rectal Q4H PRN Ricky Xiong MD       • albuterol (PROVENTIL) nebulizer solution 0.083% 2.5 mg/3mL  2.5 mg Nebulization Q4H PRN Ricky Xiong MD   2.5 mg at 11/03/20 2222   • amiodarone (PACERONE) tablet 200 mg  200 mg Oral Q24H Júnior Licona MD   200 mg at 11/06/20 0825   • amitriptyline (ELAVIL) tablet 100 mg  100 mg Oral Nightly Ricky Xiong MD   100 mg at 11/05/20 2241   • apixaban (ELIQUIS) tablet 10 mg  10 mg Oral Q12H Ricky Xiong MD        Followed by   • [START ON 11/13/2020] apixaban (ELIQUIS) tablet 5 mg  5 mg Oral Q12H Inga  Ricky Cervantes MD       • aspirin EC tablet 81 mg  81 mg Oral Daily Ricky Xiong MD   81 mg at 20 0825   • atorvastatin (LIPITOR) tablet 20 mg  20 mg Oral Daily Ricky Xiong MD   20 mg at 20 0824   • losartan (COZAAR) tablet 25 mg  25 mg Oral Daily Ricky Xiong MD   25 mg at 20 0825   • metoprolol succinate XL (TOPROL-XL) 24 hr tablet 25 mg  25 mg Oral Daily Ricky Xiong MD   25 mg at 20 0825   • nitroglycerin (NITROSTAT) SL tablet 0.4 mg  0.4 mg Sublingual Q5 Min PRN Ricky Xiong MD       • ondansetron (ZOFRAN) tablet 4 mg  4 mg Oral Q6H PRN Ricky Xiong MD       • pantoprazole (PROTONIX) EC tablet 40 mg  40 mg Oral QAM Ricky Xiong MD   40 mg at 20 0544   • rOPINIRole (REQUIP) tablet 0.5 mg  0.5 mg Oral Daily Ricky Xiong MD   0.5 mg at 20 0824   • sodium chloride 0.9 % flush 10 mL  10 mL Intravenous PRN Julian Harris MD       • sodium chloride 0.9 % flush 10 mL  10 mL Intravenous Q12H Ricky Xiong MD   10 mL at 20 0825   • sodium chloride 0.9 % flush 10 mL  10 mL Intravenous PRN Ricky Xiong MD       • terazosin (HYTRIN) capsule 1 mg  1 mg Oral Nightly Ricky Xiong MD   1 mg at 20 2240     [address] [address]  Phone:  [phone]  Fax:  [phone] Date: 2020      Ambulatory Referral to Home Health     Patient:  Jose Dacosta MRN:  [id number]   [address] [facility] [address] :  1935  SSN:    Phone: [phone] Sex:  M      INSURANCE PAYOR PLAN GROUP # SUBSCRIBER ID   Primary:    AETNA MEDICARE REPLACEMENT [id number]      Referring Provider Information:  RICKY XIONG Phone: [phone] Fax: [phone]      Referral Information:   # Visits:  1 Referral Type: Home Health [42]   Urgency:  Routine Referral Reason: Specialty Services  Required   Start Date: Nov 6, 2020 End Date:  To be determined by Insurer   Diagnosis: Single subsegmental pulmonary embolism without acute cor pulmonale (CMS/HCC) (I26.93 [ICD-10-CM] 415.19 [ICD-9-CM])      Refer to Dept: [facility] HOME CARE  Refer to Provider:   Refer to Facility:    Use [facility] as he has been using before  Face to Face Visit Date: 11/6/2020  Follow-up provider for Plan of Care? I treated the patient in an acute care facility and will not continue treatment after discharge.  Follow-up provider: IRENE GONSALVES [[id number]]  Reason/Clinical Findings: PE and DVT  Describe mobility limitations that make leaving home difficult: weakness and heart failure  Nursing/Therapeutic Services Requested: Physical Therapy  Nursing/Therapeutic Services Requested: Occupational Therapy  Nursing/Therapeutic Services Requested: Home Monitoring  Home Monitoring Type: Continuous Monitoring via [facility] ([facility])  Frequency: 1 Week 1     This document serves as a request of services and does not constitute Insurance authorization or approval of services.  To determine eligibility, please contact the members Insurance carrier to verify and review coverage.     If you have medical questions regarding this request for services. Please contact [address] at [phone] during normal business hours.       Authorizing Provider:Ricky Xiong MD  Authorizing Provider's NPI: [id number]  Order Entered By: Ricky Xiong MD 11/6/2020  9:55 AM     Electronically signed by: Ricky Xiong MD 11/6/2020  9:55 AM

## 2020-11-07 ENCOUNTER — READMISSION MANAGEMENT (OUTPATIENT)
Dept: CALL CENTER | Facility: HOSPITAL | Age: 85
End: 2020-11-07

## 2020-11-07 NOTE — OUTREACH NOTE
Prep Survey      Responses   Religious facility patient discharged from?  [facility]   Is LACE score < 7 ?  No   Eligibility  Readm Mgmt   Discharge diagnosis  CP,  right lung emboli,  DVT   Does the patient have one of the following disease processes/diagnoses(primary or secondary)?  Other   Does the patient have Home health ordered?  Yes   What is the Home health agency?   [facility]    Is there a DME ordered?  No   Comments regarding appointments  f/u apmts listed   Medication alerts for this patient  amiodarone eliquis   Prep survey completed?  Yes          Belinda Mckeon RN

## 2020-11-10 ENCOUNTER — READMISSION MANAGEMENT (OUTPATIENT)
Dept: CALL CENTER | Facility: HOSPITAL | Age: 85
End: 2020-11-10

## 2020-11-10 NOTE — OUTREACH NOTE
Medical Week 1 Survey      Responses   [facility] patient discharged from?  [place]   Does the patient have one of the following disease processes/diagnoses(primary or secondary)?  Other   Week 1 attempt successful?  Yes   Call start time  1712   Call end time  1718   Discharge diagnosis  CP,  right lung emboli,  DVT   Is patient permission given to speak with other caregiver?  Yes   List who call center can speak with  Rut Quinteros    Person spoke with today (if not patient) and relationship  DaughterRut   Medication alerts for this patient  amiodarone eliquis   Meds reviewed with patient/caregiver?  Yes   Is the patient having any side effects they believe may be caused by any medication additions or changes?  No   Does the patient have all medications ordered at discharge?  Yes   Is the patient taking all medications as directed (includes completed medication regime)?  Yes   Does the patient have an appointment with their PCP within 7 days of discharge?  Yes   Comments regarding PCP  Has appointment with Dr. Peterson on Thursday at 11 AM     Has the patient kept scheduled appointments due by today?  N/A   What is the Home health agency?   [facility]    Has home health visited the patient within 72 hours of discharge?  Yes   Psychosocial issues?  Yes [Very Hard of Hearing.  ]   Did the patient receive a copy of their discharge instructions?  Yes   Nursing interventions  Reviewed instructions with patient   What is the patient's perception of their health status since discharge?  Improving   If the patient is a current smoker, are they able to teach back resources for cessation?  Not a smoker   Additional teach back comments  He does dip snuff.     Week 1 call completed?  Yes          Jessica Patel RN

## 2020-11-15 LAB
QT INTERVAL: 440 MS
QTC INTERVAL: 467 MS

## 2020-11-18 ENCOUNTER — READMISSION MANAGEMENT (OUTPATIENT)
Dept: CALL CENTER | Facility: HOSPITAL | Age: 85
End: 2020-11-18

## 2020-11-19 NOTE — OUTREACH NOTE
Medical Week 2 Survey      Responses   [facility] patient discharged from?  [place]   Does the patient have one of the following disease processes/diagnoses(primary or secondary)?  Other   Week 2 attempt successful?  Yes   Call start time  1941   Discharge diagnosis  CP,  right lung emboli,  DVT   Call end time  1943   Is patient permission given to speak with other caregiver?  Yes   List who call center can speak with  Rut Quinteros    Person spoke with today (if not patient) and relationship  DaughterRut   Medication alerts for this patient  amiodarone eliquis   Meds reviewed with patient/caregiver?  Yes   Is the patient having any side effects they believe may be caused by any medication additions or changes?  No   Does the patient have all medications ordered at discharge?  Yes   Is the patient taking all medications as directed (includes completed medication regime)?  Yes   Does the patient have a primary care provider?   Yes   Does the patient have an appointment with their PCP within 7 days of discharge?  Yes   Has the patient kept scheduled appointments due by today?  Yes   What is the Home health agency?   [facility]   Has home health visited the patient within 72 hours of discharge?  Yes   Home health comments  He is getting OT, PT and ST   Did the patient receive a copy of their discharge instructions?  Yes   Nursing interventions  Reviewed instructions with patient   What is the patient's perception of their health status since discharge?  Improving   Is the patient/caregiver able to teach back signs and symptoms related to disease process for when to call PCP?  Yes   Is the patient/caregiver able to teach back signs and symptoms related to disease process for when to call 911?  Yes   Is the patient/caregiver able to teach back the hierarchy of who to call/visit for symptoms/problems? PCP, Specialist, Home health nurse, Urgent Care, ED, 911  Yes   If the patient is a current smoker,  are they able to teach back resources for cessation?  Not a smoker   Additional teach back comments  Daughter states he is doing well and she is encouraged him to be more active.  States his breathing has been a little better.     Week 2 Call Completed?  Yes   Graduated  Yes   Did the patient feel the follow up calls were helpful during their recovery period?  Yes   Was the number of calls appropriate?  Yes   Graduated/Revoked comments  Denies questions or needs at this time          Susan Hernandez LPN

## 2021-01-26 ENCOUNTER — IMMUNIZATION (OUTPATIENT)
Dept: VACCINE CLINIC | Facility: HOSPITAL | Age: 86
End: 2021-01-26

## 2021-01-26 PROCEDURE — 0001A: CPT | Performed by: THORACIC SURGERY (CARDIOTHORACIC VASCULAR SURGERY)

## 2021-01-26 PROCEDURE — 91300 HC SARSCOV02 VAC 30MCG/0.3ML IM: CPT | Performed by: THORACIC SURGERY (CARDIOTHORACIC VASCULAR SURGERY)

## 2021-02-16 ENCOUNTER — APPOINTMENT (OUTPATIENT)
Dept: VACCINE CLINIC | Facility: HOSPITAL | Age: 86
End: 2021-02-16

## 2021-02-24 ENCOUNTER — IMMUNIZATION (OUTPATIENT)
Dept: VACCINE CLINIC | Facility: HOSPITAL | Age: 86
End: 2021-02-24

## 2021-02-24 PROCEDURE — 91300 HC SARSCOV02 VAC 30MCG/0.3ML IM: CPT | Performed by: NURSE PRACTITIONER

## 2021-02-24 PROCEDURE — 0002A: CPT | Performed by: NURSE PRACTITIONER

## 2021-03-29 DIAGNOSIS — S72.002D CLOSED FRACTURE OF NECK OF LEFT FEMUR WITH ROUTINE HEALING, SUBSEQUENT ENCOUNTER: Primary | ICD-10-CM

## 2021-03-30 ENCOUNTER — OFFICE VISIT (OUTPATIENT)
Dept: ORTHOPEDIC SURGERY | Facility: CLINIC | Age: 86
End: 2021-03-30

## 2021-03-30 VITALS — BODY MASS INDEX: 19.37 KG/M2 | HEIGHT: 72 IN | WEIGHT: 143 LBS

## 2021-03-30 DIAGNOSIS — R20.0 NUMBNESS AND TINGLING OF BOTH FEET: ICD-10-CM

## 2021-03-30 DIAGNOSIS — R20.2 NUMBNESS AND TINGLING OF BOTH FEET: ICD-10-CM

## 2021-03-30 DIAGNOSIS — S72.002D CLOSED FRACTURE OF NECK OF LEFT FEMUR WITH ROUTINE HEALING, SUBSEQUENT ENCOUNTER: Primary | ICD-10-CM

## 2021-03-30 DIAGNOSIS — M54.16 LEFT LUMBAR RADICULOPATHY: ICD-10-CM

## 2021-03-30 PROCEDURE — 99213 OFFICE O/P EST LOW 20 MIN: CPT | Performed by: ORTHOPAEDIC SURGERY

## 2021-03-30 RX ORDER — MELOXICAM 7.5 MG/1
7.5 TABLET ORAL DAILY
COMMUNITY

## 2021-03-30 RX ORDER — DOXEPIN HYDROCHLORIDE 10 MG/1
10 CAPSULE ORAL NIGHTLY
COMMUNITY

## 2021-03-30 RX ORDER — ALLOPURINOL 100 MG/1
100 TABLET ORAL DAILY
COMMUNITY

## 2021-03-31 ENCOUNTER — TELEPHONE (OUTPATIENT)
Dept: ORTHOPEDIC SURGERY | Facility: CLINIC | Age: 86
End: 2021-03-31

## 2022-04-25 ENCOUNTER — TRANSCRIBE ORDERS (OUTPATIENT)
Dept: ADMINISTRATIVE | Facility: HOSPITAL | Age: 87
End: 2022-04-25

## 2022-04-25 DIAGNOSIS — R09.89 DECREASED PULSE: Primary | ICD-10-CM

## 2022-05-24 ENCOUNTER — APPOINTMENT (OUTPATIENT)
Dept: GENERAL RADIOLOGY | Facility: HOSPITAL | Age: 87
End: 2022-05-24

## 2022-05-24 ENCOUNTER — HOSPITAL ENCOUNTER (OUTPATIENT)
Facility: HOSPITAL | Age: 87
Setting detail: OBSERVATION
Discharge: HOME | End: 2022-05-26
Attending: EMERGENCY MEDICINE | Admitting: HOSPITALIST

## 2022-05-24 ENCOUNTER — APPOINTMENT (OUTPATIENT)
Dept: ULTRASOUND IMAGING | Facility: HOSPITAL | Age: 87
End: 2022-05-24

## 2022-05-24 ENCOUNTER — APPOINTMENT (OUTPATIENT)
Dept: CT IMAGING | Facility: HOSPITAL | Age: 87
End: 2022-05-24

## 2022-05-24 ENCOUNTER — APPOINTMENT (OUTPATIENT)
Dept: INTERVENTIONAL RADIOLOGY/VASCULAR | Facility: HOSPITAL | Age: 87
End: 2022-05-24

## 2022-05-24 DIAGNOSIS — Z78.9 IMPAIRED MOBILITY AND ADLS: ICD-10-CM

## 2022-05-24 DIAGNOSIS — I50.9 CHRONIC CONGESTIVE HEART FAILURE, UNSPECIFIED HEART FAILURE TYPE: ICD-10-CM

## 2022-05-24 DIAGNOSIS — F03.90 DEMENTIA WITHOUT BEHAVIORAL DISTURBANCE, UNSPECIFIED DEMENTIA TYPE: ICD-10-CM

## 2022-05-24 DIAGNOSIS — Z74.09 IMPAIRED MOBILITY AND ADLS: ICD-10-CM

## 2022-05-24 DIAGNOSIS — Z74.09 IMPAIRED FUNCTIONAL MOBILITY, BALANCE, GAIT, AND ENDURANCE: ICD-10-CM

## 2022-05-24 DIAGNOSIS — R55 SYNCOPE AND COLLAPSE: Primary | ICD-10-CM

## 2022-05-24 LAB
ALBUMIN SERPL-MCNC: 3.6 G/DL (ref 3.5–5.2)
ALBUMIN/GLOB SERPL: 1.4 G/DL
ALP SERPL-CCNC: 76 U/L (ref 39–117)
ALT SERPL W P-5'-P-CCNC: 13 U/L (ref 1–41)
ANION GAP SERPL CALCULATED.3IONS-SCNC: 9 MMOL/L (ref 5–15)
APTT PPP: 31.6 SECONDS (ref 20–40.3)
AST SERPL-CCNC: 18 U/L (ref 1–40)
BASOPHILS # BLD AUTO: 0.04 10*3/MM3 (ref 0–0.2)
BASOPHILS NFR BLD AUTO: 0.5 % (ref 0–1.5)
BILIRUB SERPL-MCNC: 0.5 MG/DL (ref 0–1.2)
BILIRUB UR QL STRIP: NEGATIVE
BUN SERPL-MCNC: 17 MG/DL (ref 8–23)
BUN/CREAT SERPL: 15 (ref 7–25)
CALCIUM SPEC-SCNC: 9.2 MG/DL (ref 8.6–10.5)
CHLORIDE SERPL-SCNC: 105 MMOL/L (ref 98–107)
CLARITY UR: CLEAR
CO2 SERPL-SCNC: 26 MMOL/L (ref 22–29)
COLOR UR: YELLOW
CREAT SERPL-MCNC: 1.13 MG/DL (ref 0.76–1.27)
D-DIMER, QUANTITATIVE (MAD,POW, STR): 1624 NG/ML (FEU) (ref 0–470)
DEPRECATED RDW RBC AUTO: 57.1 FL (ref 37–54)
EGFRCR SERPLBLD CKD-EPI 2021: 63.3 ML/MIN/1.73
EOSINOPHIL # BLD AUTO: 0.13 10*3/MM3 (ref 0–0.4)
EOSINOPHIL NFR BLD AUTO: 1.7 % (ref 0.3–6.2)
ERYTHROCYTE [DISTWIDTH] IN BLOOD BY AUTOMATED COUNT: 16.9 % (ref 12.3–15.4)
FLUAV SUBTYP SPEC NAA+PROBE: NOT DETECTED
FLUBV RNA ISLT QL NAA+PROBE: NOT DETECTED
GLOBULIN UR ELPH-MCNC: 2.6 GM/DL
GLUCOSE SERPL-MCNC: 95 MG/DL (ref 65–99)
GLUCOSE UR STRIP-MCNC: NEGATIVE MG/DL
HCT VFR BLD AUTO: 33 % (ref 37.5–51)
HGB BLD-MCNC: 11.3 G/DL (ref 13–17.7)
HGB UR QL STRIP.AUTO: NEGATIVE
IMM GRANULOCYTES # BLD AUTO: 0.02 10*3/MM3 (ref 0–0.05)
IMM GRANULOCYTES NFR BLD AUTO: 0.3 % (ref 0–0.5)
INR PPP: 1.17 (ref 0.8–1.2)
KETONES UR QL STRIP: NEGATIVE
LEUKOCYTE ESTERASE UR QL STRIP.AUTO: NEGATIVE
LYMPHOCYTES # BLD AUTO: 1.04 10*3/MM3 (ref 0.7–3.1)
LYMPHOCYTES NFR BLD AUTO: 13.4 % (ref 19.6–45.3)
MAGNESIUM SERPL-MCNC: 2 MG/DL (ref 1.6–2.4)
MCH RBC QN AUTO: 31.9 PG (ref 26.6–33)
MCHC RBC AUTO-ENTMCNC: 34.2 G/DL (ref 31.5–35.7)
MCV RBC AUTO: 93.2 FL (ref 79–97)
MONOCYTES # BLD AUTO: 0.6 10*3/MM3 (ref 0.1–0.9)
MONOCYTES NFR BLD AUTO: 7.7 % (ref 5–12)
NEUTROPHILS NFR BLD AUTO: 5.93 10*3/MM3 (ref 1.7–7)
NEUTROPHILS NFR BLD AUTO: 76.4 % (ref 42.7–76)
NITRITE UR QL STRIP: NEGATIVE
NRBC BLD AUTO-RTO: 0 /100 WBC (ref 0–0.2)
NT-PROBNP SERPL-MCNC: 7363 PG/ML (ref 0–1800)
PH UR STRIP.AUTO: 7 [PH] (ref 5–9)
PLATELET # BLD AUTO: 172 10*3/MM3 (ref 140–450)
PMV BLD AUTO: 10.3 FL (ref 6–12)
POTASSIUM SERPL-SCNC: 4 MMOL/L (ref 3.5–5.2)
PROT SERPL-MCNC: 6.2 G/DL (ref 6–8.5)
PROT UR QL STRIP: NEGATIVE
PROTHROMBIN TIME: 14.7 SECONDS (ref 11.1–15.3)
RBC # BLD AUTO: 3.54 10*6/MM3 (ref 4.14–5.8)
SARS-COV-2 RNA PNL SPEC NAA+PROBE: NOT DETECTED
SODIUM SERPL-SCNC: 140 MMOL/L (ref 136–145)
SP GR UR STRIP: 1.01 (ref 1–1.03)
TROPONIN T SERPL-MCNC: 0.01 NG/ML (ref 0–0.03)
TROPONIN T SERPL-MCNC: <0.01 NG/ML (ref 0–0.03)
UROBILINOGEN UR QL STRIP: NORMAL
WBC NRBC COR # BLD: 7.76 10*3/MM3 (ref 3.4–10.8)

## 2022-05-24 PROCEDURE — P9612 CATHETERIZE FOR URINE SPEC: HCPCS

## 2022-05-24 PROCEDURE — G0378 HOSPITAL OBSERVATION PER HR: HCPCS

## 2022-05-24 PROCEDURE — 87636 SARSCOV2 & INF A&B AMP PRB: CPT | Performed by: EMERGENCY MEDICINE

## 2022-05-24 PROCEDURE — 70450 CT HEAD/BRAIN W/O DYE: CPT

## 2022-05-24 PROCEDURE — 96374 THER/PROPH/DIAG INJ IV PUSH: CPT

## 2022-05-24 PROCEDURE — C1751 CATH, INF, PER/CENT/MIDLINE: HCPCS

## 2022-05-24 PROCEDURE — 81003 URINALYSIS AUTO W/O SCOPE: CPT | Performed by: EMERGENCY MEDICINE

## 2022-05-24 PROCEDURE — 83735 ASSAY OF MAGNESIUM: CPT | Performed by: INTERNAL MEDICINE

## 2022-05-24 PROCEDURE — 93005 ELECTROCARDIOGRAM TRACING: CPT | Performed by: EMERGENCY MEDICINE

## 2022-05-24 PROCEDURE — 83880 ASSAY OF NATRIURETIC PEPTIDE: CPT | Performed by: EMERGENCY MEDICINE

## 2022-05-24 PROCEDURE — 96361 HYDRATE IV INFUSION ADD-ON: CPT

## 2022-05-24 PROCEDURE — 80053 COMPREHEN METABOLIC PANEL: CPT | Performed by: EMERGENCY MEDICINE

## 2022-05-24 PROCEDURE — 93010 ELECTROCARDIOGRAM REPORT: CPT | Performed by: INTERNAL MEDICINE

## 2022-05-24 PROCEDURE — 85379 FIBRIN DEGRADATION QUANT: CPT | Performed by: EMERGENCY MEDICINE

## 2022-05-24 PROCEDURE — 25010000002 HYDRALAZINE PER 20 MG: Performed by: INTERNAL MEDICINE

## 2022-05-24 PROCEDURE — 96375 TX/PRO/DX INJ NEW DRUG ADDON: CPT

## 2022-05-24 PROCEDURE — 84484 ASSAY OF TROPONIN QUANT: CPT | Performed by: EMERGENCY MEDICINE

## 2022-05-24 PROCEDURE — 99285 EMERGENCY DEPT VISIT HI MDM: CPT

## 2022-05-24 PROCEDURE — 25010000002 FUROSEMIDE PER 20 MG: Performed by: EMERGENCY MEDICINE

## 2022-05-24 PROCEDURE — 71260 CT THORAX DX C+: CPT

## 2022-05-24 PROCEDURE — 85730 THROMBOPLASTIN TIME PARTIAL: CPT | Performed by: EMERGENCY MEDICINE

## 2022-05-24 PROCEDURE — C9803 HOPD COVID-19 SPEC COLLECT: HCPCS

## 2022-05-24 PROCEDURE — 85025 COMPLETE CBC W/AUTO DIFF WBC: CPT | Performed by: EMERGENCY MEDICINE

## 2022-05-24 PROCEDURE — 71045 X-RAY EXAM CHEST 1 VIEW: CPT

## 2022-05-24 PROCEDURE — 0 IOPAMIDOL PER 1 ML: Performed by: EMERGENCY MEDICINE

## 2022-05-24 PROCEDURE — 36410 VNPNXR 3YR/> PHY/QHP DX/THER: CPT

## 2022-05-24 PROCEDURE — 85610 PROTHROMBIN TIME: CPT | Performed by: EMERGENCY MEDICINE

## 2022-05-24 PROCEDURE — 93880 EXTRACRANIAL BILAT STUDY: CPT

## 2022-05-24 PROCEDURE — 76937 US GUIDE VASCULAR ACCESS: CPT

## 2022-05-24 RX ORDER — SODIUM CHLORIDE 0.9 % (FLUSH) 0.9 %
10 SYRINGE (ML) INJECTION AS NEEDED
Status: DISCONTINUED | OUTPATIENT
Start: 2022-05-24 | End: 2022-05-26 | Stop reason: HOSPADM

## 2022-05-24 RX ORDER — ROPINIROLE 0.5 MG/1
0.5 TABLET, FILM COATED ORAL DAILY
Status: DISCONTINUED | OUTPATIENT
Start: 2022-05-24 | End: 2022-05-26 | Stop reason: HOSPADM

## 2022-05-24 RX ORDER — ATORVASTATIN CALCIUM 20 MG/1
20 TABLET, FILM COATED ORAL DAILY
Status: DISCONTINUED | OUTPATIENT
Start: 2022-05-24 | End: 2022-05-26 | Stop reason: HOSPADM

## 2022-05-24 RX ORDER — LANOLIN ALCOHOL/MO/W.PET/CERES
3 CREAM (GRAM) TOPICAL NIGHTLY PRN
Status: DISCONTINUED | OUTPATIENT
Start: 2022-05-24 | End: 2022-05-26 | Stop reason: HOSPADM

## 2022-05-24 RX ORDER — ONDANSETRON 2 MG/ML
4 INJECTION INTRAMUSCULAR; INTRAVENOUS EVERY 6 HOURS PRN
Status: DISCONTINUED | OUTPATIENT
Start: 2022-05-24 | End: 2022-05-26 | Stop reason: HOSPADM

## 2022-05-24 RX ORDER — SODIUM CHLORIDE 0.9 % (FLUSH) 0.9 %
10 SYRINGE (ML) INJECTION EVERY 12 HOURS SCHEDULED
Status: DISCONTINUED | OUTPATIENT
Start: 2022-05-24 | End: 2022-05-26 | Stop reason: HOSPADM

## 2022-05-24 RX ORDER — LOSARTAN POTASSIUM 25 MG/1
25 TABLET ORAL DAILY
Status: DISCONTINUED | OUTPATIENT
Start: 2022-05-24 | End: 2022-05-26 | Stop reason: HOSPADM

## 2022-05-24 RX ORDER — PANTOPRAZOLE SODIUM 40 MG/1
40 TABLET, DELAYED RELEASE ORAL
Status: DISCONTINUED | OUTPATIENT
Start: 2022-05-25 | End: 2022-05-26 | Stop reason: HOSPADM

## 2022-05-24 RX ORDER — FUROSEMIDE 10 MG/ML
40 INJECTION INTRAMUSCULAR; INTRAVENOUS ONCE
Status: COMPLETED | OUTPATIENT
Start: 2022-05-24 | End: 2022-05-24

## 2022-05-24 RX ORDER — ALLOPURINOL 100 MG/1
100 TABLET ORAL DAILY
Status: DISCONTINUED | OUTPATIENT
Start: 2022-05-24 | End: 2022-05-26 | Stop reason: HOSPADM

## 2022-05-24 RX ORDER — ASPIRIN 81 MG/1
81 TABLET ORAL DAILY
Status: DISCONTINUED | OUTPATIENT
Start: 2022-05-24 | End: 2022-05-26 | Stop reason: HOSPADM

## 2022-05-24 RX ORDER — HYDRALAZINE HYDROCHLORIDE 20 MG/ML
10 INJECTION INTRAMUSCULAR; INTRAVENOUS EVERY 8 HOURS PRN
Status: DISCONTINUED | OUTPATIENT
Start: 2022-05-24 | End: 2022-05-26 | Stop reason: HOSPADM

## 2022-05-24 RX ORDER — FUROSEMIDE 10 MG/ML
40 INJECTION INTRAMUSCULAR; INTRAVENOUS DAILY
Status: DISCONTINUED | OUTPATIENT
Start: 2022-05-25 | End: 2022-05-26 | Stop reason: HOSPADM

## 2022-05-24 RX ORDER — DOXEPIN HYDROCHLORIDE 10 MG/1
10 CAPSULE ORAL NIGHTLY
Status: DISCONTINUED | OUTPATIENT
Start: 2022-05-24 | End: 2022-05-26 | Stop reason: HOSPADM

## 2022-05-24 RX ORDER — SODIUM CHLORIDE 9 MG/ML
75 INJECTION, SOLUTION INTRAVENOUS CONTINUOUS
Status: DISCONTINUED | OUTPATIENT
Start: 2022-05-24 | End: 2022-05-26 | Stop reason: HOSPADM

## 2022-05-24 RX ORDER — PANTOPRAZOLE SODIUM 40 MG/1
40 TABLET, DELAYED RELEASE ORAL EVERY MORNING
Refills: 2 | Status: CANCELLED | OUTPATIENT
Start: 2022-05-25

## 2022-05-24 RX ORDER — MELOXICAM 15 MG/1
7.5 TABLET ORAL DAILY
Status: DISCONTINUED | OUTPATIENT
Start: 2022-05-24 | End: 2022-05-26 | Stop reason: HOSPADM

## 2022-05-24 RX ORDER — METOPROLOL SUCCINATE 25 MG/1
25 TABLET, EXTENDED RELEASE ORAL DAILY
Status: DISCONTINUED | OUTPATIENT
Start: 2022-05-24 | End: 2022-05-26 | Stop reason: HOSPADM

## 2022-05-24 RX ADMIN — FUROSEMIDE 40 MG: 10 INJECTION, SOLUTION INTRAMUSCULAR; INTRAVENOUS at 11:13

## 2022-05-24 RX ADMIN — ASPIRIN 81 MG: 81 TABLET, FILM COATED ORAL at 16:20

## 2022-05-24 RX ADMIN — METOPROLOL SUCCINATE 25 MG: 25 TABLET, FILM COATED, EXTENDED RELEASE ORAL at 16:20

## 2022-05-24 RX ADMIN — IOPAMIDOL 64 ML: 755 INJECTION, SOLUTION INTRAVENOUS at 12:10

## 2022-05-24 RX ADMIN — HYDRALAZINE HYDROCHLORIDE 10 MG: 20 INJECTION INTRAMUSCULAR; INTRAVENOUS at 16:50

## 2022-05-24 RX ADMIN — DOXEPIN HYDROCHLORIDE 10 MG: 10 CAPSULE ORAL at 21:37

## 2022-05-24 RX ADMIN — LOSARTAN POTASSIUM 25 MG: 25 TABLET, FILM COATED ORAL at 16:20

## 2022-05-24 RX ADMIN — ALLOPURINOL 100 MG: 100 TABLET ORAL at 16:52

## 2022-05-24 RX ADMIN — Medication 10 ML: at 21:38

## 2022-05-24 RX ADMIN — APIXABAN 5 MG: 5 TABLET, FILM COATED ORAL at 21:37

## 2022-05-24 RX ADMIN — ROPINIROLE HYDROCHLORIDE 0.5 MG: 0.5 TABLET, FILM COATED ORAL at 16:50

## 2022-05-24 RX ADMIN — SODIUM CHLORIDE 75 ML/HR: 9 INJECTION, SOLUTION INTRAVENOUS at 09:00

## 2022-05-24 RX ADMIN — MELOXICAM 7.5 MG: 15 TABLET ORAL at 16:50

## 2022-05-24 RX ADMIN — MELATONIN 3 MG: 3 TAB ORAL at 21:37

## 2022-05-24 RX ADMIN — ATORVASTATIN CALCIUM 20 MG: 20 TABLET, FILM COATED ORAL at 16:20

## 2022-05-24 NOTE — H&P
"    [facility] Medicine  HISTORY AND PHYSICAL      Date of Admission: 5/24/2022  Primary Care Physician: Terry Peterson MD    Subjective     Chief Complaint: syncope, fall    History of Present Illness  86 y/o male was admitted to the ED with complaints of syncope and a fall.  The patient is a very poor historian, so all information is obtained from other sources.  He states he came to the ED because his face and head were hurting.  He also pointed to his right elbow, which is bruised.  He states he fell, but is unable to tell me when or what he was doing.  He denies any recent illness, fever, chills, lightheadedness, cough, congestion, chest pain, SOB, palpitations, nausea, vomiting, abdominal pain, diarrhea, constipation or urinary symptoms.  He states that his head hurts \"all over\".  He denies any other complaints.     In the ED the patient was hypertensive.  His labs showed a BNP of 7363 and a d-dimer of 1624.  His imaging showed no acute abnormalities.  He was admitted for further treatment and observation.        Review of Systems   12 point ROS reviewed and negative except as mentioned in the HPI.    Past Medical History:   Past Medical History:   Diagnosis Date   • Abnormal ECG      Past Surgical History:  Past Surgical History:   Procedure Laterality Date   • CORONARY STENT PLACEMENT     • HIP HEMIARTHROPLASTY Left 7/9/2020    Procedure: LEFT HIP HEMIARTHROPLASTY;  Surgeon: Jitendra Solis MD;  Location: [facility];  Service: Orthopedics;  Laterality: Left;     Social History:  reports that he quit smoking about 32 years ago. He has never used smokeless tobacco. He reports that he does not drink alcohol and does not use drugs.    Family History: family history includes Heart failure in his father and mother.       Allergies:  Allergies   Allergen Reactions   • Hydrochlorothiazide Swelling     TONGUE SWELLING       Medications:  Prior to " "Admission medications    Medication Sig Start Date End Date Taking? Authorizing Provider   albuterol sulfate  (90 Base) MCG/ACT inhaler INHALE 2 PUFFS INTO THE LUNGS EVERY 6 (SIX) HOURS AS NEEDED FOR WHEEZING 4/8/19   Marcial Crani MD   allopurinol (ZYLOPRIM) 100 MG tablet Take 100 mg by mouth Daily.    Marcial Crain MD   apixaban (ELIQUIS) 5 MG tablet tablet Take 5 mg by mouth 2 (Two) Times a Day.    Marcial Crain MD   aspirin 81 MG EC tablet Take 1 tablet by mouth Daily. 7/5/19   Júnior Licona MD   atorvastatin (LIPITOR) 20 MG tablet Take 1 tablet by mouth.    Marcial Crain MD   doxepin (SINEquan) 10 MG capsule Take 10 mg by mouth Every Night.    Marcial Crain MD   losartan (COZAAR) 25 MG tablet TAKE 1 TABLET BY MOUTH EVERY DAY 12/30/19   Bessy Duval MD   meloxicam (MOBIC) 7.5 MG tablet Take 7.5 mg by mouth Daily.    Marcial Crain MD   metoprolol succinate XL (TOPROL-XL) 25 MG 24 hr tablet TAKE 1 TABLET BY MOUTH EVERY DAY 12/16/19   Júnior Licona MD   omeprazole (priLOSEC) 40 MG capsule Take 40 mg by mouth Every Morning. 6/27/19   Marcial Crain MD   rOPINIRole (REQUIP) 0.5 MG tablet Take 0.5 mg by mouth Daily. 5/3/19   Marcial Crain MD   tolterodine (DETROL) 2 MG tablet Take 2 mg by mouth 2 (Two) Times a Day.    Marcial Crain MD     I have utilized all available immediate resources to obtain, update, and review the patient's current medications.    Objective     Vital Signs: BP (!) 193/105   Pulse 66   Temp 97.4 °F (36.3 °C) (Oral)   Resp 14   Ht 172.7 cm (68\")   Wt 67 kg (147 lb 12.8 oz)   SpO2 97%   BMI 22.47 kg/m²   Physical Exam   General: NAD  HEENT: AT NC, EOMI, periorbital edema bilaterally, tenderness to palpation of forehead  Neck: No JVD  CVS: S1/S2 present, irregular rhythm, no M/R/G  Lungs: CTA B/L  Abdomen: soft, NT, ND, BS+  Ext: bilateral pitting edema 1+  Skin: no rashes, bruises or " discolorations  Neuro: no focal deficits  Psych: Not agitated         Results Reviewed:  Lab Results (last 24 hours)     Procedure Component Value Units Date/Time    Troponin [[id number]]  (Normal) Collected: 05/24/22 1119    Specimen: Blood Updated: 05/24/22 1145     Troponin T 0.015 ng/mL     Narrative:      Troponin T Reference Range:  <= 0.03 ng/mL-   Negative for AMI  >0.03 ng/mL-     Abnormal for myocardial necrosis.  Clinicians would have to utilize clinical acumen, EKG, Troponin and serial changes to determine if it is an Acute Myocardial Infarction or myocardial injury due to an underlying chronic condition.       Results may be falsely decreased if patient taking Biotin.      Urinalysis With Microscopic If Indicated (No Culture) - Urine, Catheter [[id number]]  (Normal) Collected: 05/24/22 0954    Specimen: Urine, Catheter Updated: 05/24/22 1020     Color, UA Yellow     Appearance, UA Clear     pH, UA 7.0     Specific Gravity, UA 1.008     Glucose, UA Negative     Ketones, UA Negative     Bilirubin, UA Negative     Blood, UA Negative     Protein, UA Negative     Leuk Esterase, UA Negative     Nitrite, UA Negative     Urobilinogen, UA 0.2 E.U./dL    Narrative:      Urine microscopic not indicated.    D-dimer, Quantitative [[id number]]  (Abnormal) Collected: 05/24/22 0847    Specimen: Blood Updated: 05/24/22 0931     D-Dimer, Quantitative 1,624 ng/mL (FEU)     Narrative:      Dimer values <500 ng/ml FEU are FDA approved as aid in diagnosis of deep venous thrombosis and pulmonary embolism.  This test should not be used in an exclusion strategy with pretest probability alone.    A recent guideline regarding diagnosis for pulmonary thromboembolism recommends an adjusted exclusion criterion of age x 10 ng/ml FEU for patients >50 years of age ([employer]ifer Intern Med 2015; 163: 701-711).      aPTT [[id number]]  (Normal) Collected: 05/24/22 0847    Specimen: Blood Updated: 05/24/22 0930     PTT 31.6 seconds     Narrative:       The recommended Heparin therapeutic range is 68-97 seconds.    Comprehensive Metabolic Panel [[id number]] Collected: 05/24/22 0847    Specimen: Blood Updated: 05/24/22 0930     Glucose 95 mg/dL      BUN 17 mg/dL      Creatinine 1.13 mg/dL      Sodium 140 mmol/L      Potassium 4.0 mmol/L      Chloride 105 mmol/L      CO2 26.0 mmol/L      Calcium 9.2 mg/dL      Total Protein 6.2 g/dL      Albumin 3.60 g/dL      ALT (SGPT) 13 U/L      AST (SGOT) 18 U/L      Alkaline Phosphatase 76 U/L      Total Bilirubin 0.5 mg/dL      Globulin 2.6 gm/dL      A/G Ratio 1.4 g/dL      BUN/Creatinine Ratio 15.0     Anion Gap 9.0 mmol/L      eGFR 63.3 mL/min/1.73      Comment: National Kidney Foundation and American Society of Nephrology (ASN) Task Force recommended calculation based on the Chronic Kidney Disease Epidemiology Collaboration (CKD-EPI) equation refit without adjustment for race.       Narrative:      GFR Normal >60  Chronic Kidney Disease <60  Kidney Failure <15      Protime-INR [[id number]]  (Normal) Collected: 05/24/22 0847    Specimen: Blood Updated: 05/24/22 0930     Protime 14.7 Seconds      INR 1.17    Narrative:      Therapeutic range for most indications is 2.0-3.0 INR,  or 2.5-3.5 for mechanical heart valves.    Troponin [[id number]]  (Normal) Collected: 05/24/22 0847    Specimen: Blood Updated: 05/24/22 0929     Troponin T <0.010 ng/mL     Narrative:      Troponin T Reference Range:  <= 0.03 ng/mL-   Negative for AMI  >0.03 ng/mL-     Abnormal for myocardial necrosis.  Clinicians would have to utilize clinical acumen, EKG, Troponin and serial changes to determine if it is an Acute Myocardial Infarction or myocardial injury due to an underlying chronic condition.       Results may be falsely decreased if patient taking Biotin.      COVID-19 and FLU A/B PCR - Swab, Nasopharynx [[id number]]  (Normal) Collected: 05/24/22 0847    Specimen: Swab from Nasopharynx Updated: 05/24/22 0928     COVID19 Not Detected     Influenza A  PCR Not Detected     Influenza B PCR Not Detected    Narrative:      Fact sheet for providers: https://www.fda.gov/media/082018/download    Fact sheet for patients: https://www.fda.gov/media/066291/download    Test performed by PCR.    BNP [[id number]]  (Abnormal) Collected: 05/24/22 0847    Specimen: Blood Updated: 05/24/22 0928     proBNP 7,363.0 pg/mL     Narrative:      Among patients with dyspnea, NT-proBNP is highly sensitive for the detection of acute congestive heart failure. In addition NT-proBNP of <300 pg/ml effectively rules out acute congestive heart failure with 99% negative predictive value.    Results may be falsely decreased if patient taking Biotin.      CBC & Differential [[id number]]  (Abnormal) Collected: 05/24/22 0847    Specimen: Blood Updated: 05/24/22 0914    Narrative:      The following orders were created for panel order CBC & Differential.  Procedure                               Abnormality         Status                     ---------                               -----------         ------                     CBC Auto Differential[[id number]]        Abnormal            Final result                 Please view results for these tests on the individual orders.    CBC Auto Differential [[id number]]  (Abnormal) Collected: 05/24/22 0847    Specimen: Blood Updated: 05/24/22 0914     WBC 7.76 10*3/mm3      RBC 3.54 10*6/mm3      Hemoglobin 11.3 g/dL      Hematocrit 33.0 %      MCV 93.2 fL      MCH 31.9 pg      MCHC 34.2 g/dL      RDW 16.9 %      RDW-SD 57.1 fl      MPV 10.3 fL      Platelets 172 10*3/mm3      Neutrophil % 76.4 %      Lymphocyte % 13.4 %      Monocyte % 7.7 %      Eosinophil % 1.7 %      Basophil % 0.5 %      Immature Grans % 0.3 %      Neutrophils, Absolute 5.93 10*3/mm3      Lymphocytes, Absolute 1.04 10*3/mm3      Monocytes, Absolute 0.60 10*3/mm3      Eosinophils, Absolute 0.13 10*3/mm3      Basophils, Absolute 0.04 10*3/mm3      Immature Grans, Absolute 0.02 10*3/mm3       nRBC 0.0 /100 WBC         Imaging Results (Last 24 Hours)     Procedure Component Value Units Date/Time    US Guided Vascular Access [[id number]] Collected: 05/24/22 1312     Updated: 05/24/22 1428    Narrative:      PROCEDURE: Ultrasound Guidance Vascular Access      Ordering physician(s): CHHAYA CAMERON    Clinical Indication: Venous access      Findings:    The vessel was sonographically evaluated and determined to be  patent. Concurrent realtime ultrasound was used to visualize  needle entry into theRight     Basilic vein and a permanent image was stored for permanent  recording and reporting.         Impression:      Impression:     Successful uneventful US guided placement RightBasilic vein, for  venous access.    Electronically signed by:  Esdras Paul MD  5/24/2022 2:26 PM CDT  Workstation: [id number]    IR Insert Midline Without Port Pump 5 Plus [[id number]] Resulted: 05/24/22 1333     Updated: 05/24/22 1333    Narrative:      This procedure was auto-finalized with no dictation required.    CT Chest With Contrast Diagnostic [[id number]] Collected: 05/24/22 1159     Updated: 05/24/22 1258    Narrative:      CT chest with contrast. CTA thorax. Pulmonary embolism study.       CLINICAL INDICATION: Shortness of breath, chest pain       COMPARISON: Chest x-ray May 24, 2022.       TECHNIQUE: Nonionic IV contrast. 64 cc Isovue-370. Helical  scanning with axial and coronal reformations. Soft tissue, lung,  liver, and bone windows reviewed. Computer-generated 3-D images,  CT angiography including MIP images are obtained.    This exam was performed according to our departmental  dose-optimization program, which includes automated exposure  control, adjustment of the mA and/or kV according to patient size  and/or use of iterative reconstruction technique.    CHEST CT FINDINGS:  No evidence for pulmonary emboli.    No  evidence of pathologically enlarged nodes. Midline sternal  sutures from prior cardiac surgery.    Discoid  changes right middle lobe inferiorly probably fibrosis.  The lungs are otherwise clear.     No evidence of pleural fluid.     The adrenal glands are normal size. Remainder of visualized upper  abdomen is grossly unremarkable. Degenerative changes of the  spine. Bones unremarkable for age.      Impression:      No evidence for pulmonary emboli.           Electronically signed by:  Esdras Paul MD  5/24/2022 12:56 PM CDT  Workstation: [id number]    CT Head Without Contrast [[id number]] Collected: 05/24/22 0930     Updated: 05/24/22 0958    Narrative:        CT Head Without Contrast    History: Syncope    Axial scans of the brain were obtained without intravenous  contrast.  Coronal and sagital reconstructions were preformed.    This exam was performed according to our departmental  dose-optimization program, which includes automated exposure  control, adjustment of the mA and/or kV according to patient size  and/or use of iterative reconstruction technique.    DLP: 1029.10    Comparison: July 8, 2020    Findings:  Bone windows are unremarkable.  Normal mucosal thickening right maxillary sinus.  Minimal opacification left mastoid air cells.    No acute process.  Cerebral and cerebellar atrophy.  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  Old ischemic changes in the gary and midbrain.  Moderate small vessel disease.  No hemorrhage.  No mass.  No abnormal areas of increased attenuation.  No midline shift.  No abnormal extra-axial fluid collections.      Impression:      CONCLUSION:  No acute process.  Cerebral and cerebellar atrophy.  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  Old ischemic changes in the gary and midbrain.  Moderate small vessel disease.    [id number]    Electronically signed by:  Emmanuel Burris MD  5/24/2022 9:55 AM CDT  Workstation: [id number]    XR Chest 1 View [[id number]] Collected: 05/24/22 0846     Updated: 05/24/22 0932    Narrative:        PORTABLE CHEST    HISTORY: Syncope    Portable AP  upright film of the chest was obtained at 8:25 AM.  COMPARISON: November 3, 2020    FINDINGS:   The lungs are clear of an acute process.  Linear scarring lung bases.  Old granulomatous disease is present.  Sternotomy.  The heart is not enlarged.  The pulmonary vasculature is not increased.  No pleural effusion.  No pneumothorax.  No acute osseous abnormality.  Degenerative changes are present in the thoracic spine.  Hypertrophic change acromioclavicular joints.      Impression:      CONCLUSION:  No Acute Disease.  Sternotomy.    [id number]    Electronically signed by:  Emmanuel Burris MD  5/24/2022 9:30 AM CDT  Workstation: [id number]        I have personally reviewed and interpreted the radiology studies and ECG obtained at time of admission.     Assessment / Plan     1) syncope/fall  - unclear etiology, or even if this is true syncope  - imaging negative so far  - monitor on telemetry  - TTE and US carotid pending  - PT/OT consulted  - monitor    2) elevated d-dimer  - CTA negative for PE    3) systolic CHF exacerbation  - lasix  - monitor on telemetry  - TTE pending  - strict I/O and daily weight    4) accelerated HTN  - while there is some concern that this may be related to a stroke, the patient is not showing any signs or symptoms of focal or lateralizing neurological deficits, so the risk seems to be low at this time.  - home meds  - cardene drip    5) A-fib  - questionable history of A-fib  - continue metoprolol, eliquis    6) CAD  - home meds    7) GI/DVT ppx  - protonix/eliquis    Electronically signed by Gayatri Quevedo MD, 05/24/22, 14:44 CDT.

## 2022-05-24 NOTE — ED PROVIDER NOTES
Subjective   86yo male pmh significant htn/hyperlipidemia/cad/chf/dementia/mdd/pe/gout/bph presents ED c/o syncopal episode 2d previously upon standing, awakening on floor.  Pt c/o persistent headache/dizziness.  ROS (+) pedal edema.  Denies fever/cough/chest pain/soa/abd pain/neck pain/back pain.      History provided by:  Patient and relative  Syncope  Most recent episode:  2 days ago  Associated symptoms: dizziness and headaches    Associated symptoms: no chest pain        Review of Systems   Constitutional: Negative.    Respiratory: Negative.    Cardiovascular: Positive for syncope. Negative for chest pain.   Gastrointestinal: Negative.    Genitourinary: Negative.    Allergic/Immunologic: Negative for immunocompromised state.   Neurological: Positive for dizziness, syncope, light-headedness and headaches.   All other systems reviewed and are negative.      Past Medical History:   Diagnosis Date   • Abnormal ECG        Allergies   Allergen Reactions   • Hydrochlorothiazide Swelling     TONGUE SWELLING       Past Surgical History:   Procedure Laterality Date   • CORONARY STENT PLACEMENT     • HIP HEMIARTHROPLASTY Left 2020    Procedure: LEFT HIP HEMIARTHROPLASTY;  Surgeon: Jitendra Solis MD;  Location: [facility];  Service: Orthopedics;  Laterality: Left;       Family History   Problem Relation Age of Onset   • Heart failure Mother    • Heart failure Father        Social History     Socioeconomic History   • Marital status:    Tobacco Use   • Smoking status: Former Smoker     Quit date:      Years since quittin.4   • Smokeless tobacco: Never Used   Substance and Sexual Activity   • Alcohol use: No   • Drug use: No   • Sexual activity: Defer           Objective   Physical Exam  Vitals and nursing note reviewed.   Constitutional:       Appearance: Normal appearance.   HENT:      Head: Normocephalic and atraumatic.      Right Ear: Tympanic membrane, ear canal and external ear normal.       Left Ear: Tympanic membrane, ear canal and external ear normal.      Mouth/Throat:      Mouth: Mucous membranes are dry.   Eyes:      Pupils: Pupils are equal, round, and reactive to light.      Comments: OD: CN VI palsy (chronic)   Cardiovascular:      Rate and Rhythm: Bradycardia present. Rhythm irregular.      Pulses: Normal pulses.      Heart sounds: Normal heart sounds. No murmur heard.    No friction rub. No gallop.   Pulmonary:      Effort: Pulmonary effort is normal. No respiratory distress.      Breath sounds: Normal breath sounds. No wheezing, rhonchi or rales.   Abdominal:      General: Abdomen is flat. Bowel sounds are normal. There is no distension.      Palpations: Abdomen is soft.      Tenderness: There is no abdominal tenderness. There is no guarding or rebound.   Musculoskeletal:      Cervical back: Normal range of motion and neck supple. No rigidity or tenderness.      Right lower leg: Edema present.      Left lower leg: Edema present.   Lymphadenopathy:      Cervical: No cervical adenopathy.   Skin:     General: Skin is warm and dry.   Neurological:      General: No focal deficit present.      Mental Status: He is alert and oriented to person, place, and time.      GCS: GCS eye subscore is 4. GCS verbal subscore is 5. GCS motor subscore is 6.      Cranial Nerves: Cranial nerve deficit present.      Sensory: Sensation is intact.      Motor: Motor function is intact.      Coordination: Coordination is intact. Finger-Nose-Finger Test normal.         ECG 12 Lead      Date/Time: 5/24/2022 8:57 AM  Performed by: Matias Saravia MD  Authorized by: Matias Saravia MD   Interpreted by physician  Rhythm: sinus rhythm  Rate: normal  BPM: 63  QRS axis: left  Conduction: right bundle branch block  ST Segments: ST segments normal  T depression: V1, V2 and aVL  Other findings: LVH  Clinical impression: abnormal ECG                 ED Course      Labs Reviewed   D-DIMER, QUANTITATIVE - Abnormal; Notable for the  following components:       Result Value    D-Dimer, Quantitative 1,624 (*)     All other components within normal limits    Narrative:     Dimer values <500 ng/ml FEU are FDA approved as aid in diagnosis of deep venous thrombosis and pulmonary embolism.  This test should not be used in an exclusion strategy with pretest probability alone.    A recent guideline regarding diagnosis for pulmonary thromboembolism recommends an adjusted exclusion criterion of age x 10 ng/ml FEU for patients >50 years of age ([employer]ifer Intern Med 2015; 163: 701-711).     BNP (IN-HOUSE) - Abnormal; Notable for the following components:    proBNP 7,363.0 (*)     All other components within normal limits    Narrative:     Among patients with dyspnea, NT-proBNP is highly sensitive for the detection of acute congestive heart failure. In addition NT-proBNP of <300 pg/ml effectively rules out acute congestive heart failure with 99% negative predictive value.    Results may be falsely decreased if patient taking Biotin.     CBC WITH AUTO DIFFERENTIAL - Abnormal; Notable for the following components:    RBC 3.54 (*)     Hemoglobin 11.3 (*)     Hematocrit 33.0 (*)     RDW 16.9 (*)     RDW-SD 57.1 (*)     Neutrophil % 76.4 (*)     Lymphocyte % 13.4 (*)     All other components within normal limits   COVID-19 AND FLU A/B, NP SWAB IN TRANSPORT MEDIA 8-12 HR TAT - Normal    Narrative:     Fact sheet for providers: https://www.fda.gov/media/097031/download    Fact sheet for patients: https://www.fda.gov/media/952525/download    Test performed by PCR.   PROTIME-INR - Normal    Narrative:     Therapeutic range for most indications is 2.0-3.0 INR,  or 2.5-3.5 for mechanical heart valves.   APTT - Normal    Narrative:     The recommended Heparin therapeutic range is 68-97 seconds.   TROPONIN (IN-HOUSE) - Normal    Narrative:     Troponin T Reference Range:  <= 0.03 ng/mL-   Negative for AMI  >0.03 ng/mL-     Abnormal for myocardial necrosis.  Clinicians would  have to utilize clinical acumen, EKG, Troponin and serial changes to determine if it is an Acute Myocardial Infarction or myocardial injury due to an underlying chronic condition.       Results may be falsely decreased if patient taking Biotin.     TROPONIN (IN-HOUSE) - Normal    Narrative:     Troponin T Reference Range:  <= 0.03 ng/mL-   Negative for AMI  >0.03 ng/mL-     Abnormal for myocardial necrosis.  Clinicians would have to utilize clinical acumen, EKG, Troponin and serial changes to determine if it is an Acute Myocardial Infarction or myocardial injury due to an underlying chronic condition.       Results may be falsely decreased if patient taking Biotin.     URINALYSIS W/ MICROSCOPIC IF INDICATED (NO CULTURE) - Normal    Narrative:     Urine microscopic not indicated.   COMPREHENSIVE METABOLIC PANEL    Narrative:     GFR Normal >60  Chronic Kidney Disease <60  Kidney Failure <15     CBC AND DIFFERENTIAL    Narrative:     The following orders were created for panel order CBC & Differential.  Procedure                               Abnormality         Status                     ---------                               -----------         ------                     CBC Auto Differential[[id number]]        Abnormal            Final result                 Please view results for these tests on the individual orders.     CT Head Without Contrast    Result Date: 5/24/2022  Narrative: CT Head Without Contrast History: Syncope Axial scans of the brain were obtained without intravenous contrast.  Coronal and sagital reconstructions were preformed. This exam was performed according to our departmental dose-optimization program, which includes automated exposure control, adjustment of the mA and/or kV according to patient size and/or use of iterative reconstruction technique. DLP: 1029.10 Comparison: July 8, 2020 Findings: Bone windows are unremarkable. Normal mucosal thickening right maxillary sinus. Minimal  opacification left mastoid air cells. No acute process. Cerebral and cerebellar atrophy. 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 Old ischemic changes in the gary and midbrain. Moderate small vessel disease. No hemorrhage. No mass. No abnormal areas of increased attenuation. No midline shift. No abnormal extra-axial fluid collections.     Impression: CONCLUSION: No acute process. Cerebral and cerebellar atrophy. 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 Old ischemic changes in the gary and midbrain. Moderate small vessel disease. [id number] Electronically signed by:  Emmanuel Burris MD  5/24/2022 9:55 AM CDT Workstation: [id number]    CT Chest With Contrast Diagnostic    Result Date: 5/24/2022  Narrative: CT chest with contrast. CTA thorax. Pulmonary embolism study. CLINICAL INDICATION: Shortness of breath, chest pain   COMPARISON: Chest x-ray May 24, 2022.   TECHNIQUE: Nonionic IV contrast. 64 cc Isovue-370. Helical scanning with axial and coronal reformations. Soft tissue, lung, liver, and bone windows reviewed. Computer-generated 3-D images, CT angiography including MIP images are obtained. This exam was performed according to our departmental dose-optimization program, which includes automated exposure control, adjustment of the mA and/or kV according to patient size and/or use of iterative reconstruction technique. CHEST CT FINDINGS:  No evidence for pulmonary emboli.    No evidence of pathologically enlarged nodes. Midline sternal sutures from prior cardiac surgery. Discoid changes right middle lobe inferiorly probably fibrosis. The lungs are otherwise clear.  No evidence of pleural fluid. The adrenal glands are normal size. Remainder of visualized upper abdomen is grossly unremarkable. Degenerative changes of the spine. Bones unremarkable for age.     Impression: No evidence for pulmonary emboli.     Electronically signed by:  Esdras Paul MD  5/24/2022 12:56 PM CDT Workstation: [id number]    XR Chest 1  View    Result Date: 5/24/2022  Narrative: PORTABLE CHEST HISTORY: Syncope Portable AP upright film of the chest was obtained at 8:25 AM. COMPARISON: November 3, 2020 FINDINGS: The lungs are clear of an acute process. Linear scarring lung bases. Old granulomatous disease is present. Sternotomy. The heart is not enlarged. The pulmonary vasculature is not increased. No pleural effusion. No pneumothorax. No acute osseous abnormality. Degenerative changes are present in the thoracic spine. Hypertrophic change acromioclavicular joints.     Impression: CONCLUSION: No Acute Disease. Sternotomy. [id number] Electronically signed by:  Emmanuel Burris MD  5/24/2022 9:30 AM CDT Workstation: [id number]    IR Insert Midline Without Port Pump 5 Plus    Result Date: 5/24/2022  Narrative: This procedure was auto-finalized with no dictation required.    Doppler ankle brachial index single level CAR    Result Date: 5/17/2022  Narrative: · Right Conclusion: The right FRED is normal. · Left Conclusion: The left FRED is normal. · Arrhythmia noted                                                 MDM    Final diagnoses:   Syncope and collapse   Chronic congestive heart failure, unspecified heart failure type (HCC)   Dementia without behavioral disturbance, unspecified dementia type (HCC)       ED Disposition  ED Disposition     ED Disposition   Decision to Admit    Condition   --    Comment   Level of Care: Telemetry [5]   Admitting Physician: RAY LEMUS [[id number]]   Attending Physician: RAY LEMUS [[id number]]   Patient Class: Observation [104]               No follow-up provider specified.       Medication List      No changes were made to your prescriptions during this visit.          Matias Saravia MD  05/24/22 2624

## 2022-05-24 NOTE — PROGRESS NOTES
TWO PATIENT IDENTIFIERS WERE USED. THE PATIENT WAS DRAPED WITH A FULL BODY DRAPE AND THE PATIENT'S RIGHT ARM WAS PREPPED WITH CHLORA PREP. ULTRASOUND WAS USED TO LOCALIZE THERIGHT BASILIC VEIN. SUBCUTANEOUS TISSUE AT THE CATHETER SITE WAS INFILTRATED WITH 2% LIDOCAINE. UNDER ULTRASOUND GUIDANCE, THE VEIN WAS ACCESSED WITH A 21 GAUGE  NEEDLE. AN 0.018 WIRE WAS THEN THREADED THROUGH THE NEEDLE. THE 21 GAUGE NEEDLE WAS REMOVED AND A 4 Japanese SHEATH WAS PLACED OVER THE WIRE INTO THE VEIN.THE MIDLINE CATHETER WAS TRIMMED TO 20CM. THE MIDLINE CATHETER WAS THEN PLACED OVER THE WIRE INTO THE VEIN, THE SHEATH WAS PEELED AWAY, WIRE WAS REMOVED. CATHETER WAS FLUSHED WITH NORMAL SALINE AND CATHETER TIP APPLIED. BIOPATCH PLACED. CATHETER SECURED WITH STAT LOCK AND TEGADERM. PATIENT TOLERATED PROCEDURE WELL. THIS WAS DONE AT BEDSIDE      IMPRESSION:SUCCESSFUL PLACEMENT OF DUAL LUMEN MIDLINE.           [place]  5/24/2022  13:33 CDT

## 2022-05-25 ENCOUNTER — APPOINTMENT (OUTPATIENT)
Dept: CT IMAGING | Facility: HOSPITAL | Age: 87
End: 2022-05-25

## 2022-05-25 ENCOUNTER — APPOINTMENT (OUTPATIENT)
Dept: CARDIOLOGY | Facility: HOSPITAL | Age: 87
End: 2022-05-25

## 2022-05-25 LAB
ANION GAP SERPL CALCULATED.3IONS-SCNC: 10 MMOL/L (ref 5–15)
BASOPHILS # BLD AUTO: 0.05 10*3/MM3 (ref 0–0.2)
BASOPHILS NFR BLD AUTO: 0.5 % (ref 0–1.5)
BUN SERPL-MCNC: 17 MG/DL (ref 8–23)
BUN/CREAT SERPL: 16.8 (ref 7–25)
CALCIUM SPEC-SCNC: 9.8 MG/DL (ref 8.6–10.5)
CHLORIDE SERPL-SCNC: 103 MMOL/L (ref 98–107)
CO2 SERPL-SCNC: 26 MMOL/L (ref 22–29)
CREAT SERPL-MCNC: 1.01 MG/DL (ref 0.76–1.27)
DEPRECATED RDW RBC AUTO: 57.7 FL (ref 37–54)
EGFRCR SERPLBLD CKD-EPI 2021: 72.4 ML/MIN/1.73
EOSINOPHIL # BLD AUTO: 0.17 10*3/MM3 (ref 0–0.4)
EOSINOPHIL NFR BLD AUTO: 1.8 % (ref 0.3–6.2)
ERYTHROCYTE [DISTWIDTH] IN BLOOD BY AUTOMATED COUNT: 16.8 % (ref 12.3–15.4)
GLUCOSE SERPL-MCNC: 97 MG/DL (ref 65–99)
HCT VFR BLD AUTO: 34.1 % (ref 37.5–51)
HGB BLD-MCNC: 11.4 G/DL (ref 13–17.7)
IMM GRANULOCYTES # BLD AUTO: 0.05 10*3/MM3 (ref 0–0.05)
IMM GRANULOCYTES NFR BLD AUTO: 0.5 % (ref 0–0.5)
LYMPHOCYTES # BLD AUTO: 1.76 10*3/MM3 (ref 0.7–3.1)
LYMPHOCYTES NFR BLD AUTO: 18.2 % (ref 19.6–45.3)
MAGNESIUM SERPL-MCNC: 1.9 MG/DL (ref 1.6–2.4)
MCH RBC QN AUTO: 31.5 PG (ref 26.6–33)
MCHC RBC AUTO-ENTMCNC: 33.4 G/DL (ref 31.5–35.7)
MCV RBC AUTO: 94.2 FL (ref 79–97)
MONOCYTES # BLD AUTO: 0.81 10*3/MM3 (ref 0.1–0.9)
MONOCYTES NFR BLD AUTO: 8.4 % (ref 5–12)
NEUTROPHILS NFR BLD AUTO: 6.81 10*3/MM3 (ref 1.7–7)
NEUTROPHILS NFR BLD AUTO: 70.6 % (ref 42.7–76)
NRBC BLD AUTO-RTO: 0 /100 WBC (ref 0–0.2)
PLATELET # BLD AUTO: 178 10*3/MM3 (ref 140–450)
PMV BLD AUTO: 10.9 FL (ref 6–12)
POTASSIUM SERPL-SCNC: 3.7 MMOL/L (ref 3.5–5.2)
RBC # BLD AUTO: 3.62 10*6/MM3 (ref 4.14–5.8)
SODIUM SERPL-SCNC: 139 MMOL/L (ref 136–145)
WBC NRBC COR # BLD: 9.65 10*3/MM3 (ref 3.4–10.8)

## 2022-05-25 PROCEDURE — G0378 HOSPITAL OBSERVATION PER HR: HCPCS

## 2022-05-25 PROCEDURE — 80048 BASIC METABOLIC PNL TOTAL CA: CPT | Performed by: INTERNAL MEDICINE

## 2022-05-25 PROCEDURE — 97166 OT EVAL MOD COMPLEX 45 MIN: CPT

## 2022-05-25 PROCEDURE — 83735 ASSAY OF MAGNESIUM: CPT | Performed by: INTERNAL MEDICINE

## 2022-05-25 PROCEDURE — 25010000002 HYDRALAZINE PER 20 MG: Performed by: PHYSICIAN ASSISTANT

## 2022-05-25 PROCEDURE — 93306 TTE W/DOPPLER COMPLETE: CPT

## 2022-05-25 PROCEDURE — 93306 TTE W/DOPPLER COMPLETE: CPT | Performed by: INTERNAL MEDICINE

## 2022-05-25 PROCEDURE — 36415 COLL VENOUS BLD VENIPUNCTURE: CPT | Performed by: INTERNAL MEDICINE

## 2022-05-25 PROCEDURE — 96376 TX/PRO/DX INJ SAME DRUG ADON: CPT

## 2022-05-25 PROCEDURE — 85025 COMPLETE CBC W/AUTO DIFF WBC: CPT | Performed by: INTERNAL MEDICINE

## 2022-05-25 PROCEDURE — 25010000002 FUROSEMIDE PER 20 MG: Performed by: INTERNAL MEDICINE

## 2022-05-25 PROCEDURE — 70486 CT MAXILLOFACIAL W/O DYE: CPT

## 2022-05-25 PROCEDURE — 97162 PT EVAL MOD COMPLEX 30 MIN: CPT

## 2022-05-25 RX ADMIN — METOPROLOL SUCCINATE 25 MG: 25 TABLET, FILM COATED, EXTENDED RELEASE ORAL at 08:57

## 2022-05-25 RX ADMIN — HYDRALAZINE HYDROCHLORIDE 10 MG: 20 INJECTION INTRAMUSCULAR; INTRAVENOUS at 15:51

## 2022-05-25 RX ADMIN — APIXABAN 5 MG: 5 TABLET, FILM COATED ORAL at 20:07

## 2022-05-25 RX ADMIN — SODIUM CHLORIDE 75 ML/HR: 9 INJECTION, SOLUTION INTRAVENOUS at 01:42

## 2022-05-25 RX ADMIN — Medication 10 ML: at 08:58

## 2022-05-25 RX ADMIN — MELATONIN 3 MG: 3 TAB ORAL at 20:07

## 2022-05-25 RX ADMIN — DOXEPIN HYDROCHLORIDE 10 MG: 10 CAPSULE ORAL at 20:07

## 2022-05-25 RX ADMIN — APIXABAN 5 MG: 5 TABLET, FILM COATED ORAL at 08:57

## 2022-05-25 RX ADMIN — ALLOPURINOL 100 MG: 100 TABLET ORAL at 12:05

## 2022-05-25 RX ADMIN — FUROSEMIDE 40 MG: 10 INJECTION, SOLUTION INTRAMUSCULAR; INTRAVENOUS at 08:57

## 2022-05-25 RX ADMIN — ROPINIROLE HYDROCHLORIDE 0.5 MG: 0.5 TABLET, FILM COATED ORAL at 08:56

## 2022-05-25 RX ADMIN — PANTOPRAZOLE SODIUM 40 MG: 40 TABLET, DELAYED RELEASE ORAL at 05:31

## 2022-05-25 RX ADMIN — ASPIRIN 81 MG: 81 TABLET, FILM COATED ORAL at 08:56

## 2022-05-25 RX ADMIN — ATORVASTATIN CALCIUM 20 MG: 20 TABLET, FILM COATED ORAL at 08:57

## 2022-05-25 RX ADMIN — SODIUM CHLORIDE 75 ML/HR: 9 INJECTION, SOLUTION INTRAVENOUS at 16:34

## 2022-05-25 RX ADMIN — MELOXICAM 7.5 MG: 15 TABLET ORAL at 08:56

## 2022-05-25 RX ADMIN — LOSARTAN POTASSIUM 25 MG: 25 TABLET, FILM COATED ORAL at 08:57

## 2022-05-25 RX ADMIN — Medication 10 ML: at 20:07

## 2022-05-25 NOTE — PROGRESS NOTES
[facility] Medicine   INPATIENT PROGRESS NOTE      Patient Name: Jose Dacosta  Date of Admission: 5/24/2022  Today's Date: 05/25/22  Length of Stay: 0  Primary Care Physician: Terry Peterson MD    Subjective   Chief Complaint: Syncope, fall 2 days prior to admission  HPI   Patient is a generally poor historian, extremely hard of hearing and not really great at reading lips.  Still complaining of forehead and face pain today but oriented to person, place, time.  Anxious to get home to his wife who has dementia although he does state that there are sitters with her 24/7.    Review of Systems   Review of Systems   Constitutional: Negative.    HENT: Complaint of forehead pain  Eyes: Negative.    Respiratory: Negative.    Cardiovascular: Negative.    Gastrointestinal: Negative.    Endocrine: Negative.    Genitourinary: Negative.    Musculoskeletal: Negative.    Skin: Negative.    Neurological: Negative.    Psychiatric/Behavioral: Negative.      All pertinent negatives and positives are as above. All other systems have been reviewed and are negative unless otherwise stated.     Objective    Temp:  [97 °F (36.1 °C)-97.2 °F (36.2 °C)] 97 °F (36.1 °C)  Heart Rate:  [60-71] 61  Resp:  [14-20] 18  BP: ()/() 161/82  Physical Exam  Physical Exam  Vitals and nursing note reviewed.   Constitutional:       General: He is not in acute distress.     Appearance: Normal appearance.   HENT:      Head: Normocephalic and atraumatic.      Right Ear: External ear normal.      Left Ear: External ear normal.      Nose: Nose normal.      Mouth/Throat:       Mouth: Mucous membranes are moist.   Eyes:      Conjunctiva/sclera: Conjunctivae normal.      Pupils: Pupils are equal, round, and reactive to light.   Cardiovascular:      Rate and Rhythm: Normal rate and regular rhythm.   Pulmonary:      Effort: Pulmonary effort is normal.      Breath sounds: Normal breath  sounds.   Abdominal:      General: Abdomen is flat.      Palpations: Abdomen is soft.      Tenderness: There is no abdominal tenderness.   Musculoskeletal:         General: No signs of injury. Normal range of motion.      Cervical back: No tenderness.   Skin:     General: Skin is warm and dry.   Neurological:      General: No focal deficit present.      Mental Status: She is alert and oriented to person, place, and time.   Psychiatric:         Mood and Affect: Mood normal.         Behavior: Behavior normal.           Results Review:  I have reviewed the labs, radiology results, and diagnostic studies.    Laboratory Data:   Results from last 7 days   Lab Units 05/25/22  0608 05/24/22  0847   WBC 10*3/mm3 9.65 7.76   HEMOGLOBIN g/dL 11.4* 11.3*   HEMATOCRIT % 34.1* 33.0*   PLATELETS 10*3/mm3 178 172        Results from last 7 days   Lab Units 05/25/22  0608 05/24/22  0847   SODIUM mmol/L 139 140   POTASSIUM mmol/L 3.7 4.0   CHLORIDE mmol/L 103 105   CO2 mmol/L 26.0 26.0   BUN mg/dL 17 17   CREATININE mg/dL 1.01 1.13   CALCIUM mg/dL 9.8 9.2   BILIRUBIN mg/dL  --  0.5   ALK PHOS U/L  --  76   ALT (SGPT) U/L  --  13   AST (SGOT) U/L  --  18   GLUCOSE mg/dL 97 95       Culture Data:   No results found for: BLOODCX  No results found for: URINECX  No results found for: RESPCX  No results found for: WOUNDCX  No results found for: STOOLCX  No components found for: BODYFLD    Radiology Data:   Imaging Results (Last 24 Hours)     Procedure Component Value Units Date/Time    US Carotid Bilateral [[id number]] Collected: 05/24/22 1726     Updated: 05/25/22 0143    Narrative:        ULTRASOUND CAROTID DUPLEX SCAN    HISTORY: Syncope    COMPARISON: None.    Duplex ultrasound of the carotid bifurcations was performed.    FINDINGS:  Real time and color flow images demonstrate bilateral plaque  formation.  The peak systolic velocity in the right internal carotid artery  is 99.1 cm/s.  End-diastolic velocity 25.4 cm/s.  Ratio peak  systolic velocity right internal carotid artery to  right common carotid artery 1.0.  Peak systolic velocity right external carotid artery 133.2 cm/s.  The peak systolic velocity in left internal carotid artery is  elevated to 224.7 cm/s.  End-diastolic velocity increased to 40.7 cm/s.  Ratio peak systolic velocity left internal carotid artery to left  common artery 1.9.  Peak systolic velocity left external carotid artery at 150.8  cm/s.  Antegrade flow is present in each vertebral artery.      Impression:      CONCLUSION:  Less than 50% diameter reduction stenosis right internal carotid  artery.  50-69% diameter reduction stenosis left internal carotid artery.  Elevated velocities in each external carotid artery as above.  Antegrade flow in each vertebral artery.    [id number]    Electronically signed by:  Emmanuel Burris MD  5/25/2022 1:41 AM CDT  Workstation: [id number]    US Guided Vascular Access [[id number]] Collected: 05/24/22 1312     Updated: 05/24/22 1428    Narrative:      PROCEDURE: Ultrasound Guidance Vascular Access      Ordering physician(s): CHHAYA CAMERON    Clinical Indication: Venous access      Findings:    The vessel was sonographically evaluated and determined to be  patent. Concurrent realtime ultrasound was used to visualize  needle entry into theRight     Basilic vein and a permanent image was stored for permanent  recording and reporting.         Impression:      Impression:     Successful uneventful US guided placement RightBasilic vein, for  venous access.    Electronically signed by:  Esdras Paul MD  5/24/2022 2:26 PM CDT  Workstation: [id number]    IR Insert Midline Without Port Pump 5 Plus [[id number]] Resulted: 05/24/22 1333     Updated: 05/24/22 1333    Narrative:      This procedure was auto-finalized with no dictation required.    CT Chest With Contrast Diagnostic [[id number]] Collected: 05/24/22 1159     Updated: 05/24/22 1258    Narrative:      CT chest with contrast. CTA thorax. Pulmonary  embolism study.       CLINICAL INDICATION: Shortness of breath, chest pain       COMPARISON: Chest x-ray May 24, 2022.       TECHNIQUE: Nonionic IV contrast. 64 cc Isovue-370. Helical  scanning with axial and coronal reformations. Soft tissue, lung,  liver, and bone windows reviewed. Computer-generated 3-D images,  CT angiography including MIP images are obtained.    This exam was performed according to our departmental  dose-optimization program, which includes automated exposure  control, adjustment of the mA and/or kV according to patient size  and/or use of iterative reconstruction technique.    CHEST CT FINDINGS:  No evidence for pulmonary emboli.    No  evidence of pathologically enlarged nodes. Midline sternal  sutures from prior cardiac surgery.    Discoid changes right middle lobe inferiorly probably fibrosis.  The lungs are otherwise clear.     No evidence of pleural fluid.     The adrenal glands are normal size. Remainder of visualized upper  abdomen is grossly unremarkable. Degenerative changes of the  spine. Bones unremarkable for age.      Impression:      No evidence for pulmonary emboli.           Electronically signed by:  Esdras Paul MD  5/24/2022 12:56 PM CDT  Workstation: [id number]          I have reviewed the patient's current medications.     Assessment/Plan     Active Hospital Problems    Diagnosis    • Syncope and collapse        1) syncope/fall  - unclear etiology, or even if this is true syncope  - imaging negative so far, added CT facial bones 5/25  -Echo completed, not read yet as of 5/25  - monitor on telemetry  -Ultrasound carotid shows 50% right ICA, 50 to 69% left ICA    - PT/OT consulted  - monitor     2) elevated d-dimer  - CTA negative for PE     3) systolic CHF exacerbation  - lasix  - monitor on telemetry  - TTE pending  - strict I/O and daily weight     4) accelerated HTN  - while there is some concern that this may be related to a stroke, the patient is not showing any signs or  symptoms of focal or lateralizing neurological deficits, so the risk seems to be low at this time.  - home meds  - cardene drip has been stopped, changed hydralazine orders from 180 to 160 max systolic PRN orders     5) A-fib  - questionable history of A-fib  - continue metoprolol, eliquis, monitor on telemetry     6) CAD  - home meds     7) GI/DVT ppx  - protonix/eliquis        Discharge Planning: I expect the patient to be discharged to pending further imaging    Electronically signed by Maggie Rubin PA-C, 05/25/22, 12:34 CDT.

## 2022-05-25 NOTE — PLAN OF CARE
Goal Outcome Evaluation:  Plan of Care Reviewed With: patient           Outcome Evaluation: initial OT evaluation complete. co-eval with PT. pt was mostly cooperative throughout, but requires encouragement. reports that he just wants to go home so that he can take care of his wife that has dementia. worried that something is going to happen to her while he is gone. very hard of hearing. decreased safety awareness throughout. BUE ROM WFL grossly. BUE strength and bilateral  strength are grossly 4- to 4/5. pt was CGA for all bed mobility, functional mobility, and transfers. use of RW when out of bed. min A for donning pants while in sitting EOB and in standing. BP elevated. RN notified. recommend further skilled OT services to address functional mobility and ADL deficits, as well as balance, strength, and endurance. recommend home with assistance and home health OT at discharge. goals established.

## 2022-05-25 NOTE — THERAPY EVALUATION
Patient Name: Jose Dacosta  : 1935    MRN: [id number]                              Today's Date: 2022       Admit Date: 2022    Visit Dx:     ICD-10-CM ICD-9-CM   1. Syncope and collapse  R55 780.2   2. Chronic congestive heart failure, unspecified heart failure type ([facility])  I50.9 428.0   3. Dementia without behavioral disturbance, unspecified dementia type ([facility])  F03.90 294.20   4. Impaired mobility and ADLs  Z74.09 V49.89    Z78.9    5. Impaired functional mobility, balance, gait, and endurance  Z74.09 V49.89     Patient Active Problem List   Diagnosis   • Coronary artery disease involving native coronary artery of native heart without angina pectoris   • Vasovagal syncope   • RBBB (right bundle branch block with left anterior fascicular block)   • Essential hypertension   • Ischemic cardiomyopathy   • Syncope and collapse   • Closed fracture of neck of left femur ([facility])   • Coronary artery disease with history of myocardial infarction without history of CABG   • Facial laceration   • HFrEF (heart failure with reduced ejection fraction) ([facility])   • Postoperative anemia due to acute blood loss   • Pneumonia of both lower lobes due to infectious organism   • Acute urinary retention   • Metabolic encephalopathy   • Lumbar radiculopathy   • Precordial pain   • Pulmonary emboli ([facility])     Past Medical History:   Diagnosis Date   • Abnormal ECG      Past Surgical History:   Procedure Laterality Date   • CORONARY STENT PLACEMENT     • HIP HEMIARTHROPLASTY Left 2020    Procedure: LEFT HIP HEMIARTHROPLASTY;  Surgeon: Jitendra Solis MD;  Location: [facility];  Service: Orthopedics;  Laterality: Left;      General Information     Row Name 22 0820          Physical Therapy Time and Intention    Document Type evaluation  -CZ     Mode of Treatment co-treatment;physical therapy;occupational therapy  -CZ     Row Name 22          General Information    Patient Profile Reviewed yes   -CZ     Prior Level of Function independent:;all household mobility  -CZ     Barriers to Rehab cognitive status;visual deficit;hearing deficit  -CZ     Row Name 05/25/22 0820          Living Environment    People in Home spouse  -CZ     Row Name 05/25/22 0820          Home Main Entrance    Number of Stairs, Main Entrance none  -CZ     Row Name 05/25/22 0820          Stairs Within Home, Primary    Stairs, Within Home, Primary Ambulates with FWW, 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  -CZ     Number of Stairs, Within Home, Primary none  -CZ     Row Name 05/25/22 0820          Cognition    Orientation Status (Cognition) verbal cues/prompts needed for orientation;oriented x 3  -CZ     Row Name 05/25/22 0820          Safety Issues, Functional Mobility    Safety Issues Affecting Function (Mobility) awareness of need for assistance;impulsivity;insight into deficits/self-awareness  -     Impairments Affecting Function (Mobility) balance;cognition;endurance/activity tolerance;strength  -CZ           User Key  (r) = Recorded By, (t) = Taken By, (c) = Cosigned By    Initials Name Provider Type    CZ Merlin Gonzalez, PT Physical Therapist               Mobility     Row Name 05/25/22 0820          Bed Mobility    Bed Mobility supine-sit  -CZ     Supine-Sit [place] (Bed Mobility) contact guard  -CZ     Sit-Supine [place] (Bed Mobility) contact guard  -CZ     Assistive Device (Bed Mobility) head of bed elevated;bed rails  -     Row Name 05/25/22 0820          Sit-Stand Transfer    Sit-Stand [place] (Transfers) contact guard  -CZ     Assistive Device (Sit-Stand Transfers) walker, front-wheeled  -CZ     Row Name 05/25/22 0820          Gait/Stairs (Locomotion)    [place] Level (Gait) contact guard  -CZ     Assistive Device (Gait) walker, front-wheeled  -CZ     Distance in Feet (Gait) 15'x1.  -CZ     Comment, (Gait/Stairs) Stooped posture and leans R d/t scoliosis, decreased step length and foot clearance.   -CZ           User Key  (r) = Recorded By, (t) = Taken By, (c) = Cosigned By    Initials Name Provider Type    CZ Merlin Gonzalez, PT Physical Therapist               Obj/Interventions     Row Name 05/25/22 0820          Range of Motion Comprehensive    General Range of Motion bilateral lower extremity ROM WFL  -CZ     Row Name 05/25/22 0820          Strength Comprehensive (MMT)    General Manual Muscle Testing (MMT) Assessment other (see comments)  -CZ     Comment, General Manual Muscle Testing (MMT) Assessment BLEs: 3+/5 grossly.  -CZ     Row Name 05/25/22 0820          Sensory Assessment (Somatosensory)    Sensory Assessment (Somatosensory) LE sensation intact  -CZ           User Key  (r) = Recorded By, (t) = Taken By, (c) = Cosigned By    Initials Name Provider Type    CZ Merlin Gonzalez, PT Physical Therapist               Goals/Plan     Row Name 05/25/22 0820          Bed Mobility Goal 1 (PT)    Activity/Assistive Device (Bed Mobility Goal 1, PT) sit to supine/supine to sit  -CZ     [place] Level/Cues Needed (Bed Mobility Goal 1, PT) independent  -CZ     Time Frame (Bed Mobility Goal 1, PT) by discharge  -CZ     Strategies/Barriers (Bed Mobility Goal 1, PT) HOB flat, no bed rails.  -CZ     Row Name 05/25/22 0820          Transfer Goal 1 (PT)    Activity/Assistive Device (Transfer Goal 1, PT) sit-to-stand/stand-to-sit;bed-to-chair/chair-to-bed;walker, rolling  -CZ     [place] Level/Cues Needed (Transfer Goal 1, PT) modified independence  -CZ     Time Frame (Transfer Goal 1, PT) by discharge  -CZ     Progress/Outcome (Transfer Goal 1, PT) goal not met  -CZ     Row Name 05/25/22 0820          Gait Training Goal 1 (PT)    Activity/Assistive Device (Gait Training Goal 1, PT) walker, rolling  -CZ     [place] Level (Gait Training Goal 1, PT) modified independence  -CZ     Distance (Gait Training Goal 1, PT) 150'x1.  -CZ     Time Frame (Gait Training Goal 1, PT) by discharge  -CZ      Strategies/Barriers (Gait Training Goal 1, PT) Scoliosis.  -CZ     Progress/Outcome (Gait Training Goal 1, PT) goal not met  -CZ     Row Name 05/25/22 0820          Problem Specific Goal 1 (PT)    Problem Specific Goal 1 (PT) Score 24/28 on [place]etti fall risk assessment.  -CZ     Time Frame (Problem Specific Goal 1, PT) by discharge  -CZ     Progress/Outcome (Problem Specific Goal 1, PT) goal not met  -CZ     Row Name 05/25/22 0820          Therapy Assessment/Plan (PT)    Planned Therapy Interventions (PT) balance training;bed mobility training;gait training;patient/family education;transfer training;ROM (range of motion);strengthening;stretching  -CZ           User Key  (r) = Recorded By, (t) = Taken By, (c) = Cosigned By    Initials Name Provider Type    CZ Merlin Gonzalez, PT Physical Therapist               Clinical Impression     Row Name 05/25/22 0820          Pain    Pretreatment Pain Rating 5/10  -CZ     Posttreatment Pain Rating 5/10  -CZ     Pain Location - Side/Orientation Left  -CZ     Pain Location - hip  -CZ     Pain Intervention(s) Repositioned;Ambulation/increased activity;Distraction  -CZ     Row Name 05/25/22 0820          Plan of Care Review    Plan of Care Reviewed With patient  -CZ     Outcome Evaluation Initial PT evaluation complete, co-evaluation with OT.  Patient is alert, requires encouragement to participate, is quite [place] and has poor vision.  He requires CGA with bed mobility, transfers and gait, ambulating 15'x1 with FWW, stooped posture and leans R d/t scoliosis.  Patient has FWW at home, reports he is the primary caregiver for his wife.  He would benefit from HHPT and is encouraged to utilize his FWW.  Goals established, continue skilled I/P PT.  -CZ     Row Name 05/25/22 0820          Therapy Assessment/Plan (PT)    Rehab Potential (PT) fair, will monitor progress closely  -CZ     Criteria for Skilled Interventions Met (PT) yes;skilled treatment is necessary  -CZ     Therapy Frequency  (PT) 5 times/wk  -CZ     Row Name 05/25/22 0820          Vital Signs    Pre Systolic BP Rehab 165  -CZ     Pre Treatment Diastolic BP 97  -CZ     Intra Systolic BP Rehab 184  -CZ     Intra Treatment Diastolic   -CZ     Post Systolic BP Rehab 165  -CZ     Post Treatment Diastolic BP 85  -CZ     Pretreatment Heart Rate (beats/min) 62  -CZ     Posttreatment Heart Rate (beats/min) 83  -CZ     Pre SpO2 (%) 94  -CZ     O2 Delivery Pre Treatment room air  -CZ     Post SpO2 (%) 97  -CZ     O2 Delivery Post Treatment room air  -CZ     Pre Patient Position Side Lying  -CZ     Intra Patient Position Sitting  -CZ     Post Patient Position Standing  -CZ     Row Name 05/25/22 0820          Positioning and Restraints    Pre-Treatment Position in bed  -CZ     Post Treatment Position bed  -CZ     In Bed supine;call light within reach;encouraged to call for assist;exit alarm on;with nsg  -CZ           User Key  (r) = Recorded By, (t) = Taken By, (c) = Cosigned By    Initials Name Provider Type    CZ Merlin Gonzalez, PT Physical Therapist               Outcome Measures     Row Name 05/25/22 0820          How much help from another person do you currently need...    Turning from your back to your side while in flat bed without using bedrails? 3  -CZ     Moving from lying on back to sitting on the side of a flat bed without bedrails? 3  -CZ     Moving to and from a bed to a chair (including a wheelchair)? 3  -CZ     Standing up from a chair using your arms (e.g., wheelchair, bedside chair)? 3  -CZ     Climbing 3-5 steps with a railing? 3  -CZ     To walk in hospital room? 3  -CZ     AM-PAC 6 Clicks Score (PT) 18  -CZ     Highest level of mobility 6 --> Walked 10 steps or more  -     Row Name 05/25/22 0820 05/25/22 0815       Functional Assessment    Outcome Measure Options AM-PAC 6 Clicks Basic Mobility (PT)  -CZ AM-PAC 6 Clicks Daily Activity (OT)  -ME          User Key  (r) = Recorded By, (t) = Taken By, (c) = Cosigned By     Initials Name Provider Type    CZ Merlin Gonzalez, PT Physical Therapist    Eitan Bobo, OTR/L Occupational Therapist                             Physical Therapy Education                 Title: PT OT SLP Therapies (In Progress)     Topic: Physical Therapy (In Progress)     Point: Mobility training (In Progress)     Learning Progress Summary           Patient Acceptance, E, NR by  at 5/25/2022 3075    Comment: PT POC, use of walker, proper gait mechanics, HHPT.                   Point: Home exercise program (Not Started)     Learner Progress:  Not documented in this visit.          Point: Body mechanics (Not Started)     Learner Progress:  Not documented in this visit.          Point: Precautions (Not Started)     Learner Progress:  Not documented in this visit.                      User Key     Initials Effective Dates Name Provider Type Discipline     06/16/21 -  Merlin Gonzalez, PT Physical Therapist PT              PT Recommendation and Plan  Planned Therapy Interventions (PT): balance training, bed mobility training, gait training, patient/family education, transfer training, ROM (range of motion), strengthening, stretching  Plan of Care Reviewed With: patient  Outcome Evaluation: Initial PT evaluation complete, co-evaluation with OT.  Patient is alert, requires encouragement to participate, is quite [place] and has poor vision.  He requires CGA with bed mobility, transfers and gait, ambulating 15'x1 with FWW, stooped posture and leans R d/t scoliosis.  Patient has FWW at home, reports he is the primary caregiver for his wife.  He would benefit from HHPT and is encouraged to utilize his FWW.  Goals established, continue skilled I/P PT.     Time Calculation:    PT Charges     Row Name 05/25/22 1221             Time Calculation    Start Time 0815  -      Stop Time 0855  -      Time Calculation (min) 40 min  -      PT Received On 05/25/22  -      PT Goal Re-Cert Due Date 06/07/22  -               Untimed Charges    PT Eval/Re-eval Minutes 40  -CZ              Total Minutes    Untimed Charges Total Minutes 40  -CZ       Total Minutes 40  -CZ            User Key  (r) = Recorded By, (t) = Taken By, (c) = Cosigned By    Initials Name Provider Type    CZ Merlin Gonzalez, PT Physical Therapist              Therapy Charges for Today     Code Description Service Date Service Provider Modifiers Qty    [id number] HC PT EVAL MOD COMPLEXITY 3 5/25/2022 Merlin Gonzalez, PT GP 1          PT G-Codes  Outcome Measure Options: AM-PAC 6 Clicks Basic Mobility (PT)  AM-PAC 6 Clicks Score (PT): 18  AM-PAC 6 Clicks Score (OT): 20    Merlin Gonzalez PT  5/25/2022

## 2022-05-25 NOTE — PLAN OF CARE
Goal Outcome Evaluation:  Plan of Care Reviewed With: patient        Progress: no change  Outcome Evaluation: A&OX1 able to make needs known, VSS, has [place].

## 2022-05-25 NOTE — THERAPY EVALUATION
Patient Name: Jose Dacosta  : 1935    MRN: [id number]                              Today's Date: 2022       Admit Date: 2022    Visit Dx:     ICD-10-CM ICD-9-CM   1. Syncope and collapse  R55 780.2   2. Chronic congestive heart failure, unspecified heart failure type ([facility])  I50.9 428.0   3. Dementia without behavioral disturbance, unspecified dementia type ([facility])  F03.90 294.20   4. Impaired mobility and ADLs  Z74.09 V49.89    Z78.9      Patient Active Problem List   Diagnosis   • Coronary artery disease involving native coronary artery of native heart without angina pectoris   • Vasovagal syncope   • RBBB (right bundle branch block with left anterior fascicular block)   • Essential hypertension   • Ischemic cardiomyopathy   • Syncope and collapse   • Closed fracture of neck of left femur ([facility])   • Coronary artery disease with history of myocardial infarction without history of CABG   • Facial laceration   • HFrEF (heart failure with reduced ejection fraction) ([facility])   • Postoperative anemia due to acute blood loss   • Pneumonia of both lower lobes due to infectious organism   • Acute urinary retention   • Metabolic encephalopathy   • Lumbar radiculopathy   • Precordial pain   • Pulmonary emboli ([facility])     Past Medical History:   Diagnosis Date   • Abnormal ECG      Past Surgical History:   Procedure Laterality Date   • CORONARY STENT PLACEMENT     • HIP HEMIARTHROPLASTY Left 2020    Procedure: LEFT HIP HEMIARTHROPLASTY;  Surgeon: Jitendra Solis MD;  Location: [facility];  Service: Orthopedics;  Laterality: Left;      General Information     Row Name 22 0815          OT Time and Intention    Document Type evaluation  -ME     Mode of Treatment co-treatment;occupational therapy;physical therapy  -ME     Row Name 22 0815          General Information    Patient Profile Reviewed yes  -ME     Prior Level of Function independent:;all household mobility;community mobility;ADL's   -ME     Existing Precautions/Restrictions fall;oxygen therapy device and L/min  -ME     Barriers to Rehab medically complex;previous functional deficit;cognitive status;visual deficit;hearing deficit  -ME     Row Name 05/25/22 0815          Living Environment    People in Home spouse  -ME     Row Name 05/25/22 0815          Home Main Entrance    Number of Stairs, Main Entrance none  -ME     Row Name 05/25/22 0815          Stairs Within Home, Primary    Stairs, Within Home, Primary ambulates with a FWW; caregivere for spouse with Alzheimers  -ME     Number of Stairs, Within Home, Primary none  -ME     Row Name 05/25/22 0815          Cognition    Orientation Status (Cognition) verbal cues/prompts needed for orientation;oriented x 3  -ME     Row Name 05/25/22 0815          Safety Issues, Functional Mobility    Safety Issues Affecting Function (Mobility) ability to follow commands;awareness of need for assistance;insight into deficits/self-awareness;judgment;impulsivity;problem-solving;safety precaution awareness;safety precautions follow-through/compliance;at risk behavior observed  -ME     Impairments Affecting Function (Mobility) balance;cognition;coordination;endurance/activity tolerance;strength  -ME     Comment, Safety Issues/Impairments (Mobility) correct year, incorrect month; pt is very hard of hearing, and is irritated throughout; wanting to go home, states that his wife is dying and that he does not want her to die before he gets there  -ME           User Key  (r) = Recorded By, (t) = Taken By, (c) = Cosigned By    Initials Name Provider Type    ME Eitan Haynes, OTR/L Occupational Therapist                 Mobility/ADL's     Row Name 05/25/22 0815          Bed Mobility    Bed Mobility supine-sit;sit-supine  -ME     Supine-Sit [place] (Bed Mobility) contact guard  -ME     Sit-Supine [place] (Bed Mobility) contact guard  -ME     Assistive Device (Bed Mobility) bed rails;head of bed elevated  -ME      Row Name 05/25/22 0815          Transfers    Transfers sit-stand transfer  -ME     Sit-Stand [place] (Transfers) contact guard  -ME     Row Name 05/25/22 0815          Sit-Stand Transfer    Assistive Device (Sit-Stand Transfers) walker, front-wheeled  -ME     Row Name 05/25/22 0815          Functional Mobility    Functional Mobility- Ind. Level contact guard assist  -ME     Functional Mobility- Device walker, front-wheeled  -ME     Row Name 05/25/22 0815          Activities of Daily Living    BADL Assessment/Intervention lower body dressing  -ME     Row Name 05/25/22 0815          Lower Body Dressing Assessment/Training    [place] Level (Lower Body Dressing) don;pants/bottoms;minimum assist (75% patient effort)  -ME           User Key  (r) = Recorded By, (t) = Taken By, (c) = Cosigned By    Initials Name Provider Type    ME Eitan Haynes, OTR/L Occupational Therapist               Obj/Interventions     Row Name 05/25/22 0815          Sensory Assessment (Somatosensory)    Sensory Assessment (Somatosensory) UE sensation intact  -ME     Row Name 05/25/22 0815          Range of Motion Comprehensive    General Range of Motion no range of motion deficits identified;bilateral upper extremity ROM WFL  -ME     Row Name 05/25/22 0815          Strength Comprehensive (MMT)    General Manual Muscle Testing (MMT) Assessment other (see comments)  -ME     Comment, General Manual Muscle Testing (MMT) Assessment BUE strength and bilateral  strength are grossly 4- to 4/5  -ME           User Key  (r) = Recorded By, (t) = Taken By, (c) = Cosigned By    Initials Name Provider Type    ME Eitan Haynes, OTR/L Occupational Therapist               Goals/Plan     Row Name 05/25/22 0815          Transfer Goal 1 (OT)    Activity/Assistive Device (Transfer Goal 1, OT) toilet  -ME     [place] Level/Cues Needed (Transfer Goal 1, OT) supervision required  -ME     Time Frame (Transfer Goal 1, OT) long term goal (LTG);by  discharge  -ME     Progress/Outcome (Transfer Goal 1, OT) goal not met  -ME     Row Name 05/25/22 0815          Bathing Goal 1 (OT)    Activity/Device (Bathing Goal 1, OT) lower body bathing  -ME     [place] Level/Cues Needed (Bathing Goal 1, OT) supervision required  -ME     Time Frame (Bathing Goal 1, OT) long term goal (LTG);by discharge  -ME     Progress/Outcomes (Bathing Goal 1, OT) goal not met  -ME     Row Name 05/25/22 0815          Dressing Goal 1 (OT)    Activity/Device (Dressing Goal 1, OT) lower body dressing  -ME     [place]/Cues Needed (Dressing Goal 1, OT) supervision required  -ME     Time Frame (Dressing Goal 1, OT) by discharge;long term goal (LTG)  -ME     Progress/Outcome (Dressing Goal 1, OT) goal not met  -ME     Row Name 05/25/22 0815          Therapy Assessment/Plan (OT)    Planned Therapy Interventions (OT) activity tolerance training;adaptive equipment training;BADL retraining;functional balance retraining;IADL retraining;occupation/activity based interventions;cognitive/visual perception retraining;patient/caregiver education/training;ROM/therapeutic exercise;strengthening exercise;transfer/mobility retraining  -ME           User Key  (r) = Recorded By, (t) = Taken By, (c) = Cosigned By    Initials Name Provider Type    ME Eitan Haynes OTR/L Occupational Therapist               Clinical Impression     Row Name 05/25/22 0815          Pain Assessment    Pretreatment Pain Rating 5/10  -ME     Posttreatment Pain Rating 5/10  -ME     Pain Location - Side/Orientation Left  -ME     Pain Location - hip  -ME     Pain Intervention(s) Repositioned;Ambulation/increased activity;Distraction  -ME     Row Name 05/25/22 0815          Plan of Care Review    Plan of Care Reviewed With patient  -ME     Row Name 05/25/22 0815          Therapy Assessment/Plan (OT)    Patient/Family Therapy Goal Statement (OT) to return home  -ME     Rehab Potential (OT) good, to achieve stated therapy goals  -ME      Criteria for Skilled Therapeutic Interventions Met (OT) yes;meets criteria;skilled treatment is necessary  -ME     Therapy Frequency (OT) other (see comments)  3-7 days per week  -ME     Predicted Duration of Therapy Intervention (OT) until d/c or all goals met  -ME     Row Name 05/25/22 0815          Therapy Plan Review/Discharge Plan (OT)    Anticipated Discharge Disposition (OT) home with assist;home with home health  -ME     Row Name 05/25/22 0815          Vital Signs    Pre Systolic BP Rehab 165  -ME     Pre Treatment Diastolic BP 97  -ME     Intra Systolic BP Rehab 184  -ME     Intra Treatment Diastolic   -ME     Post Systolic BP Rehab 165  -ME     Post Treatment Diastolic BP 85  -ME     Pretreatment Heart Rate (beats/min) 62  -ME     Posttreatment Heart Rate (beats/min) 83  -ME     Pre SpO2 (%) 94  -ME     O2 Delivery Pre Treatment room air  -ME     Post SpO2 (%) 97  -ME     O2 Delivery Post Treatment room air  -ME     Pre Patient Position Supine  -ME     Intra Patient Position Sitting  -ME     Post Patient Position Standing  -ME     Row Name 05/25/22 0815          Positioning and Restraints    Pre-Treatment Position in bed  -ME     Post Treatment Position bed  -ME     In Bed fowlers;call light within reach;encouraged to call for assist;exit alarm on;with nsg  -ME           User Key  (r) = Recorded By, (t) = Taken By, (c) = Cosigned By    Initials Name Provider Type    Eitan Bobo, OTR/L Occupational Therapist               Outcome Measures     Row Name 05/25/22 0815          How much help from another is currently needed...    Putting on and taking off regular lower body clothing? 3  -ME     Bathing (including washing, rinsing, and drying) 3  -ME     Toileting (which includes using toilet bed pan or urinal) 3  -ME     Putting on and taking off regular upper body clothing 3  -ME     Taking care of personal grooming (such as brushing teeth) 4  -ME     Eating meals 4  -ME     AM-PAC 6 Clicks  Score (OT) 20  -ME     Row Name 05/25/22 0815          Functional Assessment    Outcome Measure Options AM-PAC 6 Clicks Daily Activity (OT)  -ME           User Key  (r) = Recorded By, (t) = Taken By, (c) = Cosigned By    Initials Name Provider Type    ME Eitan Haynes OTR/L Occupational Therapist                Occupational Therapy Education                 Title: PT OT SLP Therapies (In Progress)     Topic: Occupational Therapy (In Progress)     Point: ADL training (Not Started)     Description:   Instruct learner(s) on proper safety adaptation and remediation techniques during self care or transfers.   Instruct in proper use of assistive devices.              Learner Progress:  Not documented in this visit.          Point: Home exercise program (Not Started)     Description:   Instruct learner(s) on appropriate technique for monitoring, assisting and/or progressing therapeutic exercises/activities.              Learner Progress:  Not documented in this visit.          Point: Precautions (Done)     Description:   Instruct learner(s) on prescribed precautions during self-care and functional transfers.              Learning Progress Summary           Patient Acceptance, E, VU,NR by ME at 5/25/2022 1003    Comment: Educated on OT and POC. Educated to call for assistance. Educated on safety precautions.                   Point: Body mechanics (Done)     Description:   Instruct learner(s) on proper positioning and spine alignment during self-care, functional mobility activities and/or exercises.              Learning Progress Summary           Patient Acceptance, E, VU,NR by ME at 5/25/2022 1003    Comment: Educated on OT and POC. Educated to call for assistance. Educated on safety precautions.                               User Key     Initials Effective Dates Name Provider Type [facility] 06/16/21 -  Eitan Haynes OTR/L Occupational Therapist OT              OT Recommendation and Plan  Planned Therapy  Interventions (OT): activity tolerance training, adaptive equipment training, BADL retraining, functional balance retraining, IADL retraining, occupation/activity based interventions, cognitive/visual perception retraining, patient/caregiver education/training, ROM/therapeutic exercise, strengthening exercise, transfer/mobility retraining  Therapy Frequency (OT): other (see comments) (3-7 days per week)  Plan of Care Review  Plan of Care Reviewed With: patient  Outcome Evaluation: initial OT evaluation complete. co-eval with PT. pt was mostly cooperative throughout, but requires encouragement. reports that he just wants to go home so that he can take care of his wife that has dementia. worried that something is going to happen to her while he is gone. very hard of hearing. decreased safety awareness throughout. BUE ROM WFL grossly. BUE strength and bilateral  strength are grossly 4- to 4/5. pt was CGA for all bed mobility, functional mobility, and transfers. use of RW when out of bed. min A for donning pants while in sitting EOB and in standing. BP elevated. RN notified. recommend further skilled OT services to address functional mobility and ADL deficits, as well as balance, strength, and endurance. recommend home with assistance and home health OT at discharge. goals established.     Time Calculation:    Time Calculation- OT     Row Name 05/25/22 1006             Time Calculation- OT    OT Start Time 0815  -ME      OT Stop Time 0855  -ME      OT Time Calculation (min) 40 min  -ME      OT Received On 05/25/22  -ME      OT Goal Re-Cert Due Date 06/07/22  -ME              Untimed Charges    OT Eval/Re-eval Minutes 40  -ME              Total Minutes    Untimed Charges Total Minutes 40  -ME       Total Minutes 40  -ME            User Key  (r) = Recorded By, (t) = Taken By, (c) = Cosigned By    Initials Name Provider Type    Eitan Bobo, OTR/L Occupational Therapist              Therapy Charges for Today      Code Description Service Date Service Provider Modifiers Qty    06267680242 HC OT EVAL MOD COMPLEXITY 3 5/25/2022 Eitan Haynes, ANAR/L GO 1               Eitan Haynes OTR/JAZZY  5/25/2022

## 2022-05-25 NOTE — PROGRESS NOTES
Adult Nutrition  Assessment    Patient Name:  Jose Dacosta  YOB: 1935  MRN: [id number]  Admit Date:  5/24/2022    Assessment Date:  5/25/2022    Comments:  88yo male admit w/ CHF exacerbation, accelerated HTN and Afib w/ 1+ pedal edema per MD. Hx dementia. Labs and meds noted. Cardiac diet- no intakes available. Epic notes 3/2021 143# and # w/ BMI 21.6. Pt finishing lunch when RD visited- indicated no food allergies- noted he is edentulous and some chewing problems.He was not able to answer in depth questions, allowed NFPE to upper body- did not seem comfortable with lower body. He did state he likes milk, and past RD notes note he does ot accept Boost unless mixed as milkshake. Pt meets criteria for severe, moderate malnutrition likely r/t dementia and advanced age despite stable wt. Educate for CHF and increased calories/protein not appropriate d/t age and mental status. RD to change to soft diet/ground meat. Send whole milk every meal, magic cup at lunch and Boost plus tano/chocolate magic cup milkshake at dinner. RD to follow hospital course.       Reason for Assessment     Row Name 05/25/22 1349          Reason for Assessment    Reason For Assessment identified at risk by screening criteria     Diagnosis cardiac disease     Identified At Risk by Screening Criteria need for education;difficulty chewing/swallowing;reduced oral intake over the last month                Nutrition/Diet History     Row Name 05/25/22 1351          Nutrition/Diet History    Typical Intake (Food/Fluid/EN/PN) Pt finishing lunch when RD visited- indicated no food allergies- noted he is edentulous and some chewing problems.He was not able to answer in depth questions, allowed NFPE to upper body- did not seem comfortable with lower body     Factors Affecting Nutritional Intake chewing difficulties;cognitive status/motor function                Anthropometrics     Row Name 05/25/22 1401 05/25/22 1247        "Anthropometrics    Height -- 172.7 cm (67.99\")    Weight -- 64.7 kg (142 lb 10.2 oz)    Weight for Calculation 64.4 kg (142 lb) --               Labs/Tests/Procedures/Meds     Row Name 05/25/22 1400          Labs/Procedures/Meds    Lab Results Reviewed reviewed     Lab Results Comments Glu 97, alb 3.6            Diagnostic Tests/Procedures    Diagnostic Test/Procedure Reviewed reviewed            Medications    Pertinent Medications Reviewed reviewed     Pertinent Medications Comments IV lasix Qd, IVF 75ml/hr                  Estimated/Assessed Needs - Anthropometrics     Row Name 05/25/22 1401 05/25/22 1247       Anthropometrics    Height -- 172.7 cm (67.99\")    Weight -- 64.7 kg (142 lb 10.2 oz)    Weight for Calculation 64.4 kg (142 lb) --       Estimated/Assessed Needs    Additional Documentation Fluid Requirements (Group);Estimated Calorie Needs (Group);KCAL/KG (Group);Protein Requirements (Group) --       Estimated Calorie Needs    Estimated Calorie Requirement (kcal/day) 1800 --       KCAL/KG    KCAL/KG 25 Kcal/Kg (kcal);30 Kcal/Kg (kcal) --    25 Kcal/Kg (kcal) 1610.275 --    30 Kcal/Kg (kcal) 1932.33 --       Protein Requirements    Weight Used For Protein Calculations 64.4 kg (142 lb) --    Est Protein Requirement Amount (gms/kg) 1.0 gm protein --    Estimated Protein Requirements (gms/day) 64.41 --       Fluid Requirements    Fluid Requirements (mL/day) 1600 --    RDA Method (mL) 1600 --               Nutrition Prescription Ordered     Row Name 05/25/22 1401          Nutrition Prescription PO    Current PO Diet Regular     Common Modifiers Cardiac                    Malnutrition Severity Assessment     Row Name 05/25/22 1402          Malnutrition Severity Assessment    Malnutrition Type Chronic Disease - Related Malnutrition            Insufficient Energy Intake     Insufficient Energy Intake Findings Moderate            Unintentional Weight Loss     Unintentional Weight Loss Findings None  wt stable over " 1yr- BMI 21            Muscle Loss    Loss of Muscle Mass Findings Moderate     [place] Region Moderate - slight depression     Clavicle Bone Region Moderate - some protrusion in females, visible in males     Acromion Bone Region Moderate - acromion may slightly protrude     Scapular Bone Region Moderate - mild depression, bones may show slightly     Dorsal Hand Region Moderate - slight depression            Fat Loss    Subcutaneous Fat Loss Findings Moderate     Orbital Region  Moderate -  somewhat hollowness, slightly dark circles     Upper Arm Region Moderate - some fat tissue, not ample     Thoracic & Lumbar Region Moderate - ribs visible with mild depressions, iliac crest somewhat prominent            Fluid Accumulation (Edema)    Fluid Acumulation Findings Mild     Fluid Accumulation  --  1+ edema noted by MD w/ dx CHF exac            Criteria Met (Must meet criteria for severity in at least 2 of these categories: M Wasting, Fat Loss, Fluid, Secondary Signs, Wt. Status, Intake)    Patient meets criteria for  Moderate (non-severe) Malnutrition                 Electronically signed by:  Viri Bentley RD  05/25/22 14:12 CDT

## 2022-05-25 NOTE — PLAN OF CARE
Goal Outcome Evaluation:  Plan of Care Reviewed With: patient           Outcome Evaluation: Initial PT evaluation complete, co-evaluation with OT.  Patient is alert, requires encouragement to participate, is quite [place] and has poor vision.  He requires CGA with bed mobility, transfers and gait, ambulating 15'x1 with FWW, stooped posture and leans R d/t scoliosis.  Patient has FWW at home, reports he is the primary caregiver for his wife.  He would benefit from HHPT and is encouraged to utilize his FWW.  Goals established, continue skilled I/P PT.

## 2022-05-25 NOTE — PLAN OF CARE
Problem: Adult Inpatient Plan of Care  Goal: Plan of Care Review  Outcome: Ongoing, Progressing  Goal: Patient-Specific Goal (Individualized)  Outcome: Ongoing, Progressing  Goal: Absence of Hospital-Acquired Illness or Injury  Outcome: Ongoing, Progressing  Intervention: Identify and Manage Fall Risk  Recent Flowsheet Documentation  Taken 5/24/2022 1910 by Alexx Jacques RN  Safety Promotion/Fall Prevention: safety round/check completed  Intervention: Prevent and Manage VTE (Venous Thromboembolism) Risk  Recent Flowsheet Documentation  Taken 5/24/2022 1910 by Alexx Jacques RN  Activity Management: activity adjusted per tolerance  VTE Prevention/Management: (eliquis) --  Intervention: Prevent Infection  Recent Flowsheet Documentation  Taken 5/24/2022 1910 by Alexx Jacques RN  Infection Prevention: rest/sleep promoted  Goal: Optimal Comfort and Wellbeing  Outcome: Ongoing, Progressing  Intervention: Provide Person-Centered Care  Recent Flowsheet Documentation  Taken 5/24/2022 1910 by Alexx Jacques RN  Trust Relationship/Rapport: care explained  Goal: Readiness for Transition of Care  Outcome: Ongoing, Progressing     Problem: Fall Injury Risk  Goal: Absence of Fall and Fall-Related Injury  Outcome: Ongoing, Progressing  Intervention: Promote Injury-Free Environment  Recent Flowsheet Documentation  Taken 5/24/2022 1910 by Alexx Jacques RN  Safety Promotion/Fall Prevention: safety round/check completed     Problem: Hypertension Comorbidity  Goal: Blood Pressure in Desired Range  Outcome: Ongoing, Progressing     Problem: Syncope  Goal: Absence of Syncopal Symptoms  Outcome: Ongoing, Progressing     Problem: Skin Injury Risk Increased  Goal: Skin Health and Integrity  Outcome: Ongoing, Progressing   Goal Outcome Evaluation:

## 2022-05-25 NOTE — PLAN OF CARE
Problem: Adult Inpatient Plan of Care  Goal: Plan of Care Review  Outcome: Ongoing, Progressing  Flowsheets (Taken 5/25/2022 1417)  Plan of Care Reviewed With: patient   Goal Outcome Evaluation:  Plan of Care Reviewed With: patient            CHF exacerbation, accelerated HTN and Afib w/ 1+ pedal edema per MD. Hx dementia. Cardiac diet- no intakes available. Epic notes 3/2021 143# and # w/ BMI 21.6. Pt finishing lunch when RD visited- indicated no food allergies- noted he is edentulous and some chewing problems.He was not able to answer in depth questions, allowed NFPE to upper body- did not seem comfortable with lower body. He did state he likes milk, and past RD notes note he does ot accept Boost unless mixed as milkshake. Pt meets criteria for severe, moderate malnutrition likely r/t dementia and advanced age despite stable wt. Educate for CHF and increased calories/protein not appropriate d/t age and mental status. RD to change to soft diet/ground meat. Send whole milk every meal, magic cup at lunch and Boost plus tano/chocolate magic cup milkshake at dinner. RD following.

## 2022-05-26 ENCOUNTER — HOME HEALTH ADMISSION (OUTPATIENT)
Dept: HOME HEALTH SERVICES | Facility: HOME HEALTHCARE | Age: 87
End: 2022-05-26

## 2022-05-26 ENCOUNTER — READMISSION MANAGEMENT (OUTPATIENT)
Dept: CALL CENTER | Facility: HOSPITAL | Age: 87
End: 2022-05-26

## 2022-05-26 VITALS
WEIGHT: 140.5 LBS | SYSTOLIC BLOOD PRESSURE: 127 MMHG | HEIGHT: 68 IN | BODY MASS INDEX: 21.29 KG/M2 | DIASTOLIC BLOOD PRESSURE: 63 MMHG | OXYGEN SATURATION: 96 % | RESPIRATION RATE: 20 BRPM | HEART RATE: 75 BPM | TEMPERATURE: 96.5 F

## 2022-05-26 PROBLEM — I50.32 CHRONIC DIASTOLIC CHF (CONGESTIVE HEART FAILURE) (HCC): Status: ACTIVE | Noted: 2022-05-26

## 2022-05-26 PROBLEM — I50.32 CHRONIC DIASTOLIC CHF (CONGESTIVE HEART FAILURE) (HCC): Status: RESOLVED | Noted: 2022-05-26 | Resolved: 2022-05-26

## 2022-05-26 PROBLEM — Z78.9 IMPAIRED MOBILITY AND ADLS: Status: ACTIVE | Noted: 2022-05-26

## 2022-05-26 PROBLEM — F12.10 MILD TETRAHYDROCANNABINOL (THC) ABUSE: Status: ACTIVE | Noted: 2022-04-20

## 2022-05-26 PROBLEM — I50.22 SYSTOLIC CHF, CHRONIC: Status: ACTIVE | Noted: 2022-05-26

## 2022-05-26 PROBLEM — F41.9 ANXIETY: Status: ACTIVE | Noted: 2021-05-05

## 2022-05-26 PROBLEM — Z74.09 IMPAIRED MOBILITY AND ADLS: Status: ACTIVE | Noted: 2022-05-26

## 2022-05-26 PROBLEM — I26.99 PULMONARY EMBOLISM (HCC): Status: ACTIVE | Noted: 2020-11-12

## 2022-05-26 PROBLEM — E44.0 MODERATE MALNUTRITION: Status: ACTIVE | Noted: 2022-05-26

## 2022-05-26 PROBLEM — I48.91 ATRIAL FIBRILLATION: Status: ACTIVE | Noted: 2020-11-12

## 2022-05-26 PROBLEM — D62 POSTOPERATIVE ANEMIA DUE TO ACUTE BLOOD LOSS: Status: ACTIVE | Noted: 2020-07-10

## 2022-05-26 LAB
ANION GAP SERPL CALCULATED.3IONS-SCNC: 11 MMOL/L (ref 5–15)
BASOPHILS # BLD AUTO: 0.06 10*3/MM3 (ref 0–0.2)
BASOPHILS NFR BLD AUTO: 0.6 % (ref 0–1.5)
BH CV ECHO MEAS - ACS: 1.69 CM
BH CV ECHO MEAS - AO MAX PG: 9.1 MMHG
BH CV ECHO MEAS - AO MEAN PG: 4.8 MMHG
BH CV ECHO MEAS - AO ROOT DIAM: 3.5 CM
BH CV ECHO MEAS - AO V2 MAX: 150.7 CM/SEC
BH CV ECHO MEAS - AO V2 VTI: 27.4 CM
BH CV ECHO MEAS - AVA(I,D): 1.88 CM2
BH CV ECHO MEAS - EDV(CUBED): 269.7 ML
BH CV ECHO MEAS - EDV(MOD-SP2): 127 ML
BH CV ECHO MEAS - EDV(MOD-SP4): 147 ML
BH CV ECHO MEAS - EF(MOD-BP): 29.9 %
BH CV ECHO MEAS - EF(MOD-SP2): 15.7 %
BH CV ECHO MEAS - EF(MOD-SP4): 39.6 %
BH CV ECHO MEAS - ESV(CUBED): 173.1 ML
BH CV ECHO MEAS - ESV(MOD-SP2): 107 ML
BH CV ECHO MEAS - ESV(MOD-SP4): 88.8 ML
BH CV ECHO MEAS - FS: 13.7 %
BH CV ECHO MEAS - IVS/LVPW: 1.06 CM
BH CV ECHO MEAS - IVSD: 0.98 CM
BH CV ECHO MEAS - LA DIMENSION: 4.4 CM
BH CV ECHO MEAS - LAT PEAK E' VEL: 3.6 CM/SEC
BH CV ECHO MEAS - LV DIASTOLIC VOL/BSA (35-75): 83.2 CM2
BH CV ECHO MEAS - LV MASS(C)D: 262.8 GRAMS
BH CV ECHO MEAS - LV MAX PG: 4.5 MMHG
BH CV ECHO MEAS - LV MEAN PG: 2.7 MMHG
BH CV ECHO MEAS - LV SYSTOLIC VOL/BSA (12-30): 50.3 CM2
BH CV ECHO MEAS - LV V1 MAX: 105.9 CM/SEC
BH CV ECHO MEAS - LV V1 VTI: 17.1 CM
BH CV ECHO MEAS - LVIDD: 6.5 CM
BH CV ECHO MEAS - LVIDS: 5.6 CM
BH CV ECHO MEAS - LVOT AREA: 3 CM2
BH CV ECHO MEAS - LVOT DIAM: 1.96 CM
BH CV ECHO MEAS - LVPWD: 0.92 CM
BH CV ECHO MEAS - MED PEAK E' VEL: 3.5 CM/SEC
BH CV ECHO MEAS - MR MAX PG: 110.5 MMHG
BH CV ECHO MEAS - MR MAX VEL: 525.7 CM/SEC
BH CV ECHO MEAS - MV A MAX VEL: 103.7 CM/SEC
BH CV ECHO MEAS - MV DEC SLOPE: 177.5 CM/SEC2
BH CV ECHO MEAS - MV E MAX VEL: 72.4 CM/SEC
BH CV ECHO MEAS - MV E/A: 0.7
BH CV ECHO MEAS - MV MAX PG: 5.5 MMHG
BH CV ECHO MEAS - MV MEAN PG: 2.16 MMHG
BH CV ECHO MEAS - MV P1/2T: 110.9 MSEC
BH CV ECHO MEAS - MV V2 VTI: 41.1 CM
BH CV ECHO MEAS - MVA(P1/2T): 1.98 CM2
BH CV ECHO MEAS - MVA(VTI): 1.25 CM2
BH CV ECHO MEAS - PA V2 MAX: 76.7 CM/SEC
BH CV ECHO MEAS - RAP SYSTOLE: 5 MMHG
BH CV ECHO MEAS - RVDD: 2.44 CM
BH CV ECHO MEAS - RVSP: 12.2 MMHG
BH CV ECHO MEAS - SI(MOD-SP2): 11.3 ML/M2
BH CV ECHO MEAS - SI(MOD-SP4): 32.9 ML/M2
BH CV ECHO MEAS - SV(LVOT): 51.5 ML
BH CV ECHO MEAS - SV(MOD-SP2): 20 ML
BH CV ECHO MEAS - SV(MOD-SP4): 58.2 ML
BH CV ECHO MEAS - TAPSE (>1.6): 1.83 CM
BH CV ECHO MEAS - TR MAX PG: 7.2 MMHG
BH CV ECHO MEAS - TR MAX VEL: 133.7 CM/SEC
BH CV ECHO MEASUREMENTS AVERAGE E/E' RATIO: 20.39
BUN SERPL-MCNC: 24 MG/DL (ref 8–23)
BUN/CREAT SERPL: 22 (ref 7–25)
CALCIUM SPEC-SCNC: 10.5 MG/DL (ref 8.6–10.5)
CHLORIDE SERPL-SCNC: 104 MMOL/L (ref 98–107)
CO2 SERPL-SCNC: 25 MMOL/L (ref 22–29)
CREAT SERPL-MCNC: 1.09 MG/DL (ref 0.76–1.27)
DEPRECATED RDW RBC AUTO: 57.1 FL (ref 37–54)
EGFRCR SERPLBLD CKD-EPI 2021: 66.1 ML/MIN/1.73
EOSINOPHIL # BLD AUTO: 0.22 10*3/MM3 (ref 0–0.4)
EOSINOPHIL NFR BLD AUTO: 2.1 % (ref 0.3–6.2)
ERYTHROCYTE [DISTWIDTH] IN BLOOD BY AUTOMATED COUNT: 17.2 % (ref 12.3–15.4)
GLUCOSE SERPL-MCNC: 105 MG/DL (ref 65–99)
HCT VFR BLD AUTO: 36.3 % (ref 37.5–51)
HGB BLD-MCNC: 12.2 G/DL (ref 13–17.7)
IMM GRANULOCYTES # BLD AUTO: 0.06 10*3/MM3 (ref 0–0.05)
IMM GRANULOCYTES NFR BLD AUTO: 0.6 % (ref 0–0.5)
LEFT ATRIUM VOLUME INDEX: 33.5 ML/M2
LV EF 2D ECHO EST: 25 %
LYMPHOCYTES # BLD AUTO: 1.92 10*3/MM3 (ref 0.7–3.1)
LYMPHOCYTES NFR BLD AUTO: 18.2 % (ref 19.6–45.3)
MAGNESIUM SERPL-MCNC: 1.8 MG/DL (ref 1.6–2.4)
MAXIMAL PREDICTED HEART RATE: 134 BPM
MCH RBC QN AUTO: 31.1 PG (ref 26.6–33)
MCHC RBC AUTO-ENTMCNC: 33.6 G/DL (ref 31.5–35.7)
MCV RBC AUTO: 92.6 FL (ref 79–97)
MONOCYTES # BLD AUTO: 0.87 10*3/MM3 (ref 0.1–0.9)
MONOCYTES NFR BLD AUTO: 8.2 % (ref 5–12)
NEUTROPHILS NFR BLD AUTO: 7.44 10*3/MM3 (ref 1.7–7)
NEUTROPHILS NFR BLD AUTO: 70.3 % (ref 42.7–76)
NRBC BLD AUTO-RTO: 0 /100 WBC (ref 0–0.2)
PLATELET # BLD AUTO: 191 10*3/MM3 (ref 140–450)
PMV BLD AUTO: 10.8 FL (ref 6–12)
POTASSIUM SERPL-SCNC: 4.1 MMOL/L (ref 3.5–5.2)
QT INTERVAL: 458 MS
QTC INTERVAL: 468 MS
RBC # BLD AUTO: 3.92 10*6/MM3 (ref 4.14–5.8)
SODIUM SERPL-SCNC: 140 MMOL/L (ref 136–145)
STRESS TARGET HR: 114 BPM
WBC NRBC COR # BLD: 10.57 10*3/MM3 (ref 3.4–10.8)

## 2022-05-26 PROCEDURE — 97530 THERAPEUTIC ACTIVITIES: CPT

## 2022-05-26 PROCEDURE — 25010000002 FUROSEMIDE PER 20 MG: Performed by: INTERNAL MEDICINE

## 2022-05-26 PROCEDURE — G0378 HOSPITAL OBSERVATION PER HR: HCPCS

## 2022-05-26 PROCEDURE — 96376 TX/PRO/DX INJ SAME DRUG ADON: CPT

## 2022-05-26 PROCEDURE — 97116 GAIT TRAINING THERAPY: CPT

## 2022-05-26 PROCEDURE — 83735 ASSAY OF MAGNESIUM: CPT | Performed by: INTERNAL MEDICINE

## 2022-05-26 PROCEDURE — 85025 COMPLETE CBC W/AUTO DIFF WBC: CPT | Performed by: INTERNAL MEDICINE

## 2022-05-26 PROCEDURE — 36415 COLL VENOUS BLD VENIPUNCTURE: CPT | Performed by: INTERNAL MEDICINE

## 2022-05-26 PROCEDURE — 97535 SELF CARE MNGMENT TRAINING: CPT

## 2022-05-26 PROCEDURE — 25010000002 HYDRALAZINE PER 20 MG: Performed by: PHYSICIAN ASSISTANT

## 2022-05-26 PROCEDURE — 96361 HYDRATE IV INFUSION ADD-ON: CPT

## 2022-05-26 PROCEDURE — 80048 BASIC METABOLIC PNL TOTAL CA: CPT | Performed by: INTERNAL MEDICINE

## 2022-05-26 RX ORDER — ACETAMINOPHEN 325 MG/1
650 TABLET ORAL EVERY 6 HOURS PRN
Status: DISCONTINUED | OUTPATIENT
Start: 2022-05-26 | End: 2022-05-26 | Stop reason: HOSPADM

## 2022-05-26 RX ADMIN — APIXABAN 5 MG: 5 TABLET, FILM COATED ORAL at 09:27

## 2022-05-26 RX ADMIN — ALLOPURINOL 100 MG: 100 TABLET ORAL at 09:26

## 2022-05-26 RX ADMIN — MELOXICAM 7.5 MG: 15 TABLET ORAL at 09:26

## 2022-05-26 RX ADMIN — ATORVASTATIN CALCIUM 20 MG: 20 TABLET, FILM COATED ORAL at 09:26

## 2022-05-26 RX ADMIN — ROPINIROLE HYDROCHLORIDE 0.5 MG: 0.5 TABLET, FILM COATED ORAL at 09:26

## 2022-05-26 RX ADMIN — LOSARTAN POTASSIUM 25 MG: 25 TABLET, FILM COATED ORAL at 09:26

## 2022-05-26 RX ADMIN — ACETAMINOPHEN 650 MG: 325 TABLET ORAL at 05:29

## 2022-05-26 RX ADMIN — PANTOPRAZOLE SODIUM 40 MG: 40 TABLET, DELAYED RELEASE ORAL at 05:30

## 2022-05-26 RX ADMIN — METOPROLOL SUCCINATE 25 MG: 25 TABLET, FILM COATED, EXTENDED RELEASE ORAL at 09:27

## 2022-05-26 RX ADMIN — FUROSEMIDE 40 MG: 10 INJECTION, SOLUTION INTRAMUSCULAR; INTRAVENOUS at 09:27

## 2022-05-26 RX ADMIN — SODIUM CHLORIDE 75 ML/HR: 9 INJECTION, SOLUTION INTRAVENOUS at 09:29

## 2022-05-26 RX ADMIN — HYDRALAZINE HYDROCHLORIDE 10 MG: 20 INJECTION INTRAMUSCULAR; INTRAVENOUS at 11:31

## 2022-05-26 RX ADMIN — ASPIRIN 81 MG: 81 TABLET, FILM COATED ORAL at 09:26

## 2022-05-26 NOTE — DISCHARGE PLACEMENT REQUEST
"Sylvia Dacosta (87 y.o. Male)             Date of Birth   1935    Social Security Number       Address   [address] [place] [address]    Home Phone   [phone]    MRN   [id number]       Evangelical   Protestant    Marital Status                               Admission Date   22    Admission Type   Emergency    Admitting Provider   Gayatri Quevedo MD    Attending Provider   Gayatri Quevedo MD    Department, Room/Bed   [address], 411/1       Discharge Date       Discharge Disposition   Home or Self Care    Discharge Destination                               Attending Provider: Gayatri Quevedo MD    Allergies: Hydrochlorothiazide    Isolation: None   Infection: None   Code Status: CPR   Advance Care Planning Activity    Ht: 172.7 cm (67.99\")   Wt: 63.7 kg (140 lb 8 oz)    Admission Cmt: None   Principal Problem: None                Active Insurance as of 2022     Primary Coverage     Payor Plan Insurance Group Employer/Plan Group    AETNA MEDICARE REPLACEMENT AETNA MEDICARE REPLACEMENT [id number]     Payor Plan Address Payor Plan Phone Number Payor Plan Fax Number Effective Dates    [address] [phone]  2019 - None Entered    [place] [address]       Subscriber Name Subscriber Birth Date Member ID       SYLVIA DACOSTA 1935 [id number]                 Emergency Contacts      (Rel.) Home Phone Work Phone Mobile Phone    A[address] BOX (Relative) [phone]    fiordaliza aguilar (Grandchild) [phone] -- [phone]          [address] [address]  Phone:  [phone]  Fax:   Date: May 26, 2022      Ambulatory Referral to Home Health     Patient:  Sylvia Dacosta MRN:  [id number]   [address] [facility] [address] :  1935  SSN:    Phone: [phone] Sex:  M      INSURANCE " PAYOR PLAN GROUP # SUBSCRIBER ID   Primary:    AETNA MEDICARE REPLACEMENT [id number]      Referring Provider Information:  MAGGIE RUBIN Phone: [phone] Fax: [phone]       Referral Information:   # Visits:  999 Referral Type: Home Health [42]   Urgency:  Routine Referral Reason: Specialty Services Required   Start Date: May 26, 2022 End Date:  To be determined by Insurer   Diagnosis: Impaired functional mobility, balance, gait, and endurance (Z74.09 [ICD-10-CM] V49.89 [ICD-9-CM])      Refer to Dept:   Refer to Provider:   Refer to Provider Phone:   Refer to Facility:       Face to Face Visit Date: 5/26/2022  Follow-up provider for Plan of Care? I treated the patient in an acute care facility and will not continue treatment after discharge.  Follow-up provider: IRENE GONSALVES [[id number]]  Reason/Clinical Findings: impaired mobility/ADLs  Describe mobility limitations that make leaving home difficult: see PT/OT notes  Nursing/Therapeutic Services Requested: Skilled Nursing  Nursing/Therapeutic Services Requested: Physical Therapy  Nursing/Therapeutic Services Requested: Occupational Therapy  Skilled nursing orders: Cardiopulmonary assessments  PT orders: Strengthening  Occupational orders: Activities of daily living  Frequency: 1 Week 1     This document serves as a request of services and does not constitute Insurance authorization or approval of services.  To determine eligibility, please contact the members Insurance carrier to verify and review coverage.     If you have medical questions regarding this request for services. Please contact [address] at [phone] during normal business hours.        Authorizing Provider:Maggie Rubin PA-C  Authorizing Provider's NPI: [id number]  Order Entered By: Maggie Rubin PA-C 5/26/2022 12:47 PM     Electronically signed by: Maggie Rubin PA-C 5/26/2022 12:47 PM         Emergency Contact  Information     Name Relation Home Work Mobile    AURELIA B[address] Relative [phone]    fiordaliza aguilar [phone]          Insurance Information                AETNA MEDICARE REPLACEMENT/AETNA MEDICARE REPLACEMENT Phone: [phone]    Subscriber: Jose Dacosta Subscriber#: [id number]    Group#: [id number] Precert#: --

## 2022-05-26 NOTE — DISCHARGE PLACEMENT REQUEST
"    New referral for home health   will fax the orders when available.  Please call back [phone]    Sylvia Dacosta (87 y.o. Male)             Date of Birth   1935    Social Security Number       Address   [address] [address]    Home Phone   [phone]    MRN   [id number]       Jain   Druze    Marital Status                               Admission Date   5/24/22    Admission Type   Emergency    Admitting Provider   Gayatri Quevedo MD    Attending Provider   Gayatri Quevedo MD    Department, Room/Bed   [address], 411/1       Discharge Date       Discharge Disposition       Discharge Destination                               Attending Provider: Gayatri Quevdeo MD    Allergies: Hydrochlorothiazide    Isolation: None   Infection: None   Code Status: CPR   Advance Care Planning Activity    Ht: 172.7 cm (67.99\")   Wt: 63.7 kg (140 lb 8 oz)    Admission Cmt: None   Principal Problem: None                Active Insurance as of 5/24/2022     Primary Coverage     Payor Plan Insurance Group Employer/Plan Group    AETNA MEDICARE REPLACEMENT AETNA MEDICARE REPLACEMENT [id number]     Payor Plan Address Payor Plan Phone Number Payor Plan Fax Number Effective Dates    [address] [phone]  4/1/2019 - None Entered    [facility] [id number]       Subscriber Name Subscriber Birth Date Member ID       SYLVIA DACOSTA 1935 [id number]                 Emergency Contacts      (Rel.) Home Phone Work Phone Mobile Phone    AURELIA BOX (Relative) [phone]    fiordaliza aguilar (Grandchild) [phone] -- [phone]            Emergency Contact Information     Name Relation Home Work Mobile    AURELIA BOX Relative [phone]    fiordaliza aguilar Grandchild [phone]          Insurance Information                AETNA MEDICARE REPLACEMENT/AETNA " "MEDICARE REPLACEMENT Phone: [phone]    Subscriber: Jose Dacosta Subscriber#: [id number]    Group#: [id number] Precert#: --             History & Physical      Gayatri Quevedo MD at 05/24/22 1444              [facility] Medicine  HISTORY AND PHYSICAL      Date of Admission: 5/24/2022  Primary Care Physician: Terry Peterson MD    Subjective     Chief Complaint: syncope, fall    History of Present Illness  86 y/o male was admitted to the ED with complaints of syncope and a fall.  The patient is a very poor historian, so all information is obtained from other sources.  He states he came to the ED because his face and head were hurting.  He also pointed to his right elbow, which is bruised.  He states he fell, but is unable to tell me when or what he was doing.  He denies any recent illness, fever, chills, lightheadedness, cough, congestion, chest pain, SOB, palpitations, nausea, vomiting, abdominal pain, diarrhea, constipation or urinary symptoms.  He states that his head hurts \"all over\".  He denies any other complaints.     In the ED the patient was hypertensive.  His labs showed a BNP of 7363 and a d-dimer of 1624.  His imaging showed no acute abnormalities.  He was admitted for further treatment and observation.        Review of Systems   12 point ROS reviewed and negative except as mentioned in the HPI.    Past Medical History:   Past Medical History:   Diagnosis Date   • Abnormal ECG      Past Surgical History:  Past Surgical History:   Procedure Laterality Date   • CORONARY STENT PLACEMENT     • HIP HEMIARTHROPLASTY Left 7/9/2020    Procedure: LEFT HIP HEMIARTHROPLASTY;  Surgeon: Jitendra Solis MD;  Location: [facility];  Service: Orthopedics;  Laterality: Left;     Social History:  reports that he quit smoking about 32 years ago. He has never used smokeless tobacco. He reports that he does not drink alcohol and does not use " "drugs.    Family History: family history includes Heart failure in his father and mother.       Allergies:  Allergies   Allergen Reactions   • Hydrochlorothiazide Swelling     TONGUE SWELLING       Medications:  Prior to Admission medications    Medication Sig Start Date End Date Taking? Authorizing Provider   albuterol sulfate  (90 Base) MCG/ACT inhaler INHALE 2 PUFFS INTO THE LUNGS EVERY 6 (SIX) HOURS AS NEEDED FOR WHEEZING 4/8/19   Marcial Crain MD   allopurinol (ZYLOPRIM) 100 MG tablet Take 100 mg by mouth Daily.    Marcial Crain MD   apixaban (ELIQUIS) 5 MG tablet tablet Take 5 mg by mouth 2 (Two) Times a Day.    Marcial Crain MD   aspirin 81 MG EC tablet Take 1 tablet by mouth Daily. 7/5/19   Júnior Licona MD   atorvastatin (LIPITOR) 20 MG tablet Take 1 tablet by mouth.    Marcial Crain MD   doxepin (SINEquan) 10 MG capsule Take 10 mg by mouth Every Night.    Marcial Crain MD   losartan (COZAAR) 25 MG tablet TAKE 1 TABLET BY MOUTH EVERY DAY 12/30/19   Bessy Duval MD   meloxicam (MOBIC) 7.5 MG tablet Take 7.5 mg by mouth Daily.    Marcial Crain MD   metoprolol succinate XL (TOPROL-XL) 25 MG 24 hr tablet TAKE 1 TABLET BY MOUTH EVERY DAY 12/16/19   Júnior Licona MD   omeprazole (priLOSEC) 40 MG capsule Take 40 mg by mouth Every Morning. 6/27/19   Marcial Crain MD   rOPINIRole (REQUIP) 0.5 MG tablet Take 0.5 mg by mouth Daily. 5/3/19   Marcial Crain MD   tolterodine (DETROL) 2 MG tablet Take 2 mg by mouth 2 (Two) Times a Day.    Marcial Crain MD     I have utilized all available immediate resources to obtain, update, and review the patient's current medications.    Objective     Vital Signs: BP (!) 193/105   Pulse 66   Temp 97.4 °F (36.3 °C) (Oral)   Resp 14   Ht 172.7 cm (68\")   Wt 67 kg (147 lb 12.8 oz)   SpO2 97%   BMI 22.47 kg/m²   Physical Exam   General: NAD  HEENT: AT NC, EOMI, periorbital edema " bilaterally, tenderness to palpation of forehead  Neck: No JVD  CVS: S1/S2 present, irregular rhythm, no M/R/G  Lungs: CTA B/L  Abdomen: soft, NT, ND, BS+  Ext: bilateral pitting edema 1+  Skin: no rashes, bruises or discolorations  Neuro: no focal deficits  Psych: Not agitated         Results Reviewed:  Lab Results (last 24 hours)     Procedure Component Value Units Date/Time    Troponin [[id number]]  (Normal) Collected: 05/24/22 1119    Specimen: Blood Updated: 05/24/22 1145     Troponin T 0.015 ng/mL     Narrative:      Troponin T Reference Range:  <= 0.03 ng/mL-   Negative for AMI  >0.03 ng/mL-     Abnormal for myocardial necrosis.  Clinicians would have to utilize clinical acumen, EKG, Troponin and serial changes to determine if it is an Acute Myocardial Infarction or myocardial injury due to an underlying chronic condition.       Results may be falsely decreased if patient taking Biotin.      Urinalysis With Microscopic If Indicated (No Culture) - Urine, Catheter [[id number]]  (Normal) Collected: 05/24/22 0954    Specimen: Urine, Catheter Updated: 05/24/22 1020     Color, UA Yellow     Appearance, UA Clear     pH, UA 7.0     Specific Gravity, UA 1.008     Glucose, UA Negative     Ketones, UA Negative     Bilirubin, UA Negative     Blood, UA Negative     Protein, UA Negative     Leuk Esterase, UA Negative     Nitrite, UA Negative     Urobilinogen, UA 0.2 E.U./dL    Narrative:      Urine microscopic not indicated.    D-dimer, Quantitative [[id number]]  (Abnormal) Collected: 05/24/22 0847    Specimen: Blood Updated: 05/24/22 0931     D-Dimer, Quantitative 1,624 ng/mL (FEU)     Narrative:      Dimer values <500 ng/ml FEU are FDA approved as aid in diagnosis of deep venous thrombosis and pulmonary embolism.  This test should not be used in an exclusion strategy with pretest probability alone.    A recent guideline regarding diagnosis for pulmonary thromboembolism recommends an adjusted exclusion criterion of age x 10  ng/ml FEU for patients >50 years of age (Marifer Intern Med 2015; 163: 701-711).      aPTT [[id number]]  (Normal) Collected: 05/24/22 0847    Specimen: Blood Updated: 05/24/22 0930     PTT 31.6 seconds     Narrative:      The recommended Heparin therapeutic range is 68-97 seconds.    Comprehensive Metabolic Panel [[id number]] Collected: 05/24/22 0847    Specimen: Blood Updated: 05/24/22 0930     Glucose 95 mg/dL      BUN 17 mg/dL      Creatinine 1.13 mg/dL      Sodium 140 mmol/L      Potassium 4.0 mmol/L      Chloride 105 mmol/L      CO2 26.0 mmol/L      Calcium 9.2 mg/dL      Total Protein 6.2 g/dL      Albumin 3.60 g/dL      ALT (SGPT) 13 U/L      AST (SGOT) 18 U/L      Alkaline Phosphatase 76 U/L      Total Bilirubin 0.5 mg/dL      Globulin 2.6 gm/dL      A/G Ratio 1.4 g/dL      BUN/Creatinine Ratio 15.0     Anion Gap 9.0 mmol/L      eGFR 63.3 mL/min/1.73      Comment: National Kidney Foundation and American Society of Nephrology (ASN) Task Force recommended calculation based on the Chronic Kidney Disease Epidemiology Collaboration (CKD-EPI) equation refit without adjustment for race.       Narrative:      GFR Normal >60  Chronic Kidney Disease <60  Kidney Failure <15      Protime-INR [[id number]]  (Normal) Collected: 05/24/22 0847    Specimen: Blood Updated: 05/24/22 0930     Protime 14.7 Seconds      INR 1.17    Narrative:      Therapeutic range for most indications is 2.0-3.0 INR,  or 2.5-3.5 for mechanical heart valves.    Troponin [[id number]]  (Normal) Collected: 05/24/22 0847    Specimen: Blood Updated: 05/24/22 0929     Troponin T <0.010 ng/mL     Narrative:      Troponin T Reference Range:  <= 0.03 ng/mL-   Negative for AMI  >0.03 ng/mL-     Abnormal for myocardial necrosis.  Clinicians would have to utilize clinical acumen, EKG, Troponin and serial changes to determine if it is an Acute Myocardial Infarction or myocardial injury due to an underlying chronic condition.       Results may be falsely decreased if  patient taking Biotin.      COVID-19 and FLU A/B PCR - Swab, Nasopharynx [[id number]]  (Normal) Collected: 05/24/22 0847    Specimen: Swab from Nasopharynx Updated: 05/24/22 0928     COVID19 Not Detected     Influenza A PCR Not Detected     Influenza B PCR Not Detected    Narrative:      Fact sheet for providers: https://www.fda.gov/media/984137/download    Fact sheet for patients: https://www.fda.gov/media/887754/download    Test performed by PCR.    BNP [[id number]]  (Abnormal) Collected: 05/24/22 0847    Specimen: Blood Updated: 05/24/22 0928     proBNP 7,363.0 pg/mL     Narrative:      Among patients with dyspnea, NT-proBNP is highly sensitive for the detection of acute congestive heart failure. In addition NT-proBNP of <300 pg/ml effectively rules out acute congestive heart failure with 99% negative predictive value.    Results may be falsely decreased if patient taking Biotin.      CBC & Differential [[id number]]  (Abnormal) Collected: 05/24/22 0847    Specimen: Blood Updated: 05/24/22 0914    Narrative:      The following orders were created for panel order CBC & Differential.  Procedure                               Abnormality         Status                     ---------                               -----------         ------                     CBC Auto Differential[[id number]]        Abnormal            Final result                 Please view results for these tests on the individual orders.    CBC Auto Differential [[id number]]  (Abnormal) Collected: 05/24/22 0847    Specimen: Blood Updated: 05/24/22 0914     WBC 7.76 10*3/mm3      RBC 3.54 10*6/mm3      Hemoglobin 11.3 g/dL      Hematocrit 33.0 %      MCV 93.2 fL      MCH 31.9 pg      MCHC 34.2 g/dL      RDW 16.9 %      RDW-SD 57.1 fl      MPV 10.3 fL      Platelets 172 10*3/mm3      Neutrophil % 76.4 %      Lymphocyte % 13.4 %      Monocyte % 7.7 %      Eosinophil % 1.7 %      Basophil % 0.5 %      Immature Grans % 0.3 %      Neutrophils, Absolute 5.93  10*3/mm3      Lymphocytes, Absolute 1.04 10*3/mm3      Monocytes, Absolute 0.60 10*3/mm3      Eosinophils, Absolute 0.13 10*3/mm3      Basophils, Absolute 0.04 10*3/mm3      Immature Grans, Absolute 0.02 10*3/mm3      nRBC 0.0 /100 WBC         Imaging Results (Last 24 Hours)     Procedure Component Value Units Date/Time    US Guided Vascular Access [[id number]] Collected: 05/24/22 1312     Updated: 05/24/22 1428    Narrative:      PROCEDURE: Ultrasound Guidance Vascular Access      Ordering physician(s): CHHAYA CAMERON    Clinical Indication: Venous access      Findings:    The vessel was sonographically evaluated and determined to be  patent. Concurrent realtime ultrasound was used to visualize  needle entry into theRight     Basilic vein and a permanent image was stored for permanent  recording and reporting.         Impression:      Impression:     Successful uneventful US guided placement RightBasilic vein, for  venous access.    Electronically signed by:  Esdras Paul MD  5/24/2022 2:26 PM CDT  Workstation: [email or web address]    IR Insert Midline Without Port Pump 5 Plus [[id number]] Resulted: 05/24/22 1333     Updated: 05/24/22 1333    Narrative:      This procedure was auto-finalized with no dictation required.    CT Chest With Contrast Diagnostic [[id number]] Collected: 05/24/22 1159     Updated: 05/24/22 1258    Narrative:      CT chest with contrast. CTA thorax. Pulmonary embolism study.       CLINICAL INDICATION: Shortness of breath, chest pain       COMPARISON: Chest x-ray May 24, 2022.       TECHNIQUE: Nonionic IV contrast. 64 cc Isovue-370. Helical  scanning with axial and coronal reformations. Soft tissue, lung,  liver, and bone windows reviewed. Computer-generated 3-D images,  CT angiography including MIP images are obtained.    This exam was performed according to our departmental  dose-optimization program, which includes automated exposure  control, adjustment of the mA and/or kV according to patient  size  and/or use of iterative reconstruction technique.    CHEST CT FINDINGS:  No evidence for pulmonary emboli.    No  evidence of pathologically enlarged nodes. Midline sternal  sutures from prior cardiac surgery.    Discoid changes right middle lobe inferiorly probably fibrosis.  The lungs are otherwise clear.     No evidence of pleural fluid.     The adrenal glands are normal size. Remainder of visualized upper  abdomen is grossly unremarkable. Degenerative changes of the  spine. Bones unremarkable for age.      Impression:      No evidence for pulmonary emboli.           Electronically signed by:  sEdras Paul MD  5/24/2022 12:56 PM CDT  Workstation: [id number]    CT Head Without Contrast [[id number]] Collected: 05/24/22 0930     Updated: 05/24/22 0958    Narrative:        CT Head Without Contrast    History: Syncope    Axial scans of the brain were obtained without intravenous  contrast.  Coronal and sagital reconstructions were preformed.    This exam was performed according to our departmental  dose-optimization program, which includes automated exposure  control, adjustment of the mA and/or kV according to patient size  and/or use of iterative reconstruction technique.    DLP: 1029.10    Comparison: July 8, 2020    Findings:  Bone windows are unremarkable.  Normal mucosal thickening right maxillary sinus.  Minimal opacification left mastoid air cells.    No acute process.  Cerebral and cerebellar atrophy.  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  Old ischemic changes in the gary and midbrain.  Moderate small vessel disease.  No hemorrhage.  No mass.  No abnormal areas of increased attenuation.  No midline shift.  No abnormal extra-axial fluid collections.      Impression:      CONCLUSION:  No acute process.  Cerebral and cerebellar atrophy.  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  Old ischemic changes in the gary and midbrain.  Moderate small vessel disease.    [id number]    Electronically signed  by:  Emmanuel Burris MD  5/24/2022 9:55 AM CDT  Workstation: [id number]    XR Chest 1 View [[id number]] Collected: 05/24/22 0846     Updated: 05/24/22 0932    Narrative:        PORTABLE CHEST    HISTORY: Syncope    Portable AP upright film of the chest was obtained at 8:25 AM.  COMPARISON: November 3, 2020    FINDINGS:   The lungs are clear of an acute process.  Linear scarring lung bases.  Old granulomatous disease is present.  Sternotomy.  The heart is not enlarged.  The pulmonary vasculature is not increased.  No pleural effusion.  No pneumothorax.  No acute osseous abnormality.  Degenerative changes are present in the thoracic spine.  Hypertrophic change acromioclavicular joints.      Impression:      CONCLUSION:  No Acute Disease.  Sternotomy.    [id number]    Electronically signed by:  Emmanuel Burris MD  5/24/2022 9:30 AM CDT  Workstation: [id number]        I have personally reviewed and interpreted the radiology studies and ECG obtained at time of admission.     Assessment / Plan     1) syncope/fall  - unclear etiology, or even if this is true syncope  - imaging negative so far  - monitor on telemetry  - TTE and US carotid pending  - PT/OT consulted  - monitor    2) elevated d-dimer  - CTA negative for PE    3) systolic CHF exacerbation  - lasix  - monitor on telemetry  - TTE pending  - strict I/O and daily weight    4) accelerated HTN  - while there is some concern that this may be related to a stroke, the patient is not showing any signs or symptoms of focal or lateralizing neurological deficits, so the risk seems to be low at this time.  - home meds  - cardene drip    5) A-fib  - questionable history of A-fib  - continue metoprolol, eliquis    6) CAD  - home meds    7) GI/DVT ppx  - protonix/eliquis    Electronically signed by Gayatri Quevedo MD, 05/24/22, 14:44 CDT.                Electronically signed by Gayatri Quevedo MD at 05/24/22 1501          Physician Progress Notes (most recent note)      Maggie Rubin PA-C  at 05/25/22 1234              [facility] Medicine   INPATIENT PROGRESS NOTE      Patient Name: Jose Dacosta  Date of Admission: 5/24/2022  Today's Date: 05/25/22  Length of Stay: 0  Primary Care Physician: Terry Peterson MD    Subjective   Chief Complaint: Syncope, fall 2 days prior to admission  HPI   Patient is a generally poor historian, extremely hard of hearing and not really great at reading lips.  Still complaining of forehead and face pain today but oriented to person, place, time.  Anxious to get home to his wife who has dementia although he does state that there are sitters with her 24/7.    Review of Systems   Review of Systems   Constitutional: Negative.    HENT: Complaint of forehead pain  Eyes: Negative.    Respiratory: Negative.    Cardiovascular: Negative.    Gastrointestinal: Negative.    Endocrine: Negative.    Genitourinary: Negative.    Musculoskeletal: Negative.    Skin: Negative.    Neurological: Negative.    Psychiatric/Behavioral: Negative.      All pertinent negatives and positives are as above. All other systems have been reviewed and are negative unless otherwise stated.     Objective    Temp:  [97 °F (36.1 °C)-97.2 °F (36.2 °C)] 97 °F (36.1 °C)  Heart Rate:  [60-71] 61  Resp:  [14-20] 18  BP: ()/() 161/82  Physical Exam  Physical Exam  Vitals and nursing note reviewed.   Constitutional:       General: He is not in acute distress.     Appearance: Normal appearance.   HENT:      Head: Normocephalic and atraumatic.      Right Ear: External ear normal.      Left Ear: External ear normal.      Nose: Nose normal.      Mouth/Throat:       Mouth: Mucous membranes are moist.   Eyes:      Conjunctiva/sclera: Conjunctivae normal.      Pupils: Pupils are equal, round, and reactive to light.   Cardiovascular:      Rate and Rhythm: Normal rate and regular rhythm.   Pulmonary:      Effort: Pulmonary effort is normal.       Breath sounds: Normal breath sounds.   Abdominal:      General: Abdomen is flat.      Palpations: Abdomen is soft.      Tenderness: There is no abdominal tenderness.   Musculoskeletal:         General: No signs of injury. Normal range of motion.      Cervical back: No tenderness.   Skin:     General: Skin is warm and dry.   Neurological:      General: No focal deficit present.      Mental Status: She is alert and oriented to person, place, and time.   Psychiatric:         Mood and Affect: Mood normal.         Behavior: Behavior normal.           Results Review:  I have reviewed the labs, radiology results, and diagnostic studies.    Laboratory Data:   Results from last 7 days   Lab Units 05/25/22  0608 05/24/22  0847   WBC 10*3/mm3 9.65 7.76   HEMOGLOBIN g/dL 11.4* 11.3*   HEMATOCRIT % 34.1* 33.0*   PLATELETS 10*3/mm3 178 172        Results from last 7 days   Lab Units 05/25/22  0608 05/24/22  0847   SODIUM mmol/L 139 140   POTASSIUM mmol/L 3.7 4.0   CHLORIDE mmol/L 103 105   CO2 mmol/L 26.0 26.0   BUN mg/dL 17 17   CREATININE mg/dL 1.01 1.13   CALCIUM mg/dL 9.8 9.2   BILIRUBIN mg/dL  --  0.5   ALK PHOS U/L  --  76   ALT (SGPT) U/L  --  13   AST (SGOT) U/L  --  18   GLUCOSE mg/dL 97 95       Culture Data:   No results found for: BLOODCX  No results found for: URINECX  No results found for: RESPCX  No results found for: WOUNDCX  No results found for: STOOLCX  No components found for: BODYFLD    Radiology Data:   Imaging Results (Last 24 Hours)     Procedure Component Value Units Date/Time    US Carotid Bilateral [[id number]] Collected: 05/24/22 1726     Updated: 05/25/22 0143    Narrative:        ULTRASOUND CAROTID DUPLEX SCAN    HISTORY: Syncope    COMPARISON: None.    Duplex ultrasound of the carotid bifurcations was performed.    FINDINGS:  Real time and color flow images demonstrate bilateral plaque  formation.  The peak systolic velocity in the right internal carotid artery  is 99.1 cm/s.  End-diastolic velocity  25.4 cm/s.  Ratio peak systolic velocity right internal carotid artery to  right common carotid artery 1.0.  Peak systolic velocity right external carotid artery 133.2 cm/s.  The peak systolic velocity in left internal carotid artery is  elevated to 224.7 cm/s.  End-diastolic velocity increased to 40.7 cm/s.  Ratio peak systolic velocity left internal carotid artery to left  common artery 1.9.  Peak systolic velocity left external carotid artery at 150.8  cm/s.  Antegrade flow is present in each vertebral artery.      Impression:      CONCLUSION:  Less than 50% diameter reduction stenosis right internal carotid  artery.  50-69% diameter reduction stenosis left internal carotid artery.  Elevated velocities in each external carotid artery as above.  Antegrade flow in each vertebral artery.    [id number]    Electronically signed by:  Emmanuel Burris MD  5/25/2022 1:41 AM CDT  Workstation: [id number]    US Guided Vascular Access [[id number]] Collected: 05/24/22 1312     Updated: 05/24/22 1428    Narrative:      PROCEDURE: Ultrasound Guidance Vascular Access      Ordering physician(s): CHHAYA CAMERON    Clinical Indication: Venous access      Findings:    The vessel was sonographically evaluated and determined to be  patent. Concurrent realtime ultrasound was used to visualize  needle entry into theRight     Basilic vein and a permanent image was stored for permanent  recording and reporting.         Impression:      Impression:     Successful uneventful US guided placement RightBasilic vein, for  venous access.    Electronically signed by:  Esdras Paul MD  5/24/2022 2:26 PM CDT  Workstation: [id number]    IR Insert Midline Without Port Pump 5 Plus [[id number]] Resulted: 05/24/22 1333     Updated: 05/24/22 1333    Narrative:      This procedure was auto-finalized with no dictation required.    CT Chest With Contrast Diagnostic [[id number]] Collected: 05/24/22 1159     Updated: 05/24/22 1258    Narrative:      CT chest with contrast. CTA  thorax. Pulmonary embolism study.       CLINICAL INDICATION: Shortness of breath, chest pain       COMPARISON: Chest x-ray May 24, 2022.       TECHNIQUE: Nonionic IV contrast. 64 cc Isovue-370. Helical  scanning with axial and coronal reformations. Soft tissue, lung,  liver, and bone windows reviewed. Computer-generated 3-D images,  CT angiography including MIP images are obtained.    This exam was performed according to our departmental  dose-optimization program, which includes automated exposure  control, adjustment of the mA and/or kV according to patient size  and/or use of iterative reconstruction technique.    CHEST CT FINDINGS:  No evidence for pulmonary emboli.    No  evidence of pathologically enlarged nodes. Midline sternal  sutures from prior cardiac surgery.    Discoid changes right middle lobe inferiorly probably fibrosis.  The lungs are otherwise clear.     No evidence of pleural fluid.     The adrenal glands are normal size. Remainder of visualized upper  abdomen is grossly unremarkable. Degenerative changes of the  spine. Bones unremarkable for age.      Impression:      No evidence for pulmonary emboli.           Electronically signed by:  Esdras Paul MD  5/24/2022 12:56 PM CDT  Workstation: [id number]          I have reviewed the patient's current medications.     Assessment/Plan     Active Hospital Problems    Diagnosis    • Syncope and collapse        1) syncope/fall  - unclear etiology, or even if this is true syncope  - imaging negative so far, added CT facial bones 5/25  -Echo completed, not read yet as of 5/25  - monitor on telemetry  -Ultrasound carotid shows 50% right ICA, 50 to 69% left ICA    - PT/OT consulted  - monitor     2) elevated d-dimer  - CTA negative for PE     3) systolic CHF exacerbation  - lasix  - monitor on telemetry  - TTE pending  - strict I/O and daily weight     4) accelerated HTN  - while there is some concern that this may be related to a stroke, the patient is not  showing any signs or symptoms of focal or lateralizing neurological deficits, so the risk seems to be low at this time.  - home meds  - cardene drip has been stopped, changed hydralazine orders from 180 to 160 max systolic PRN orders     5) A-fib  - questionable history of A-fib  - continue metoprolol, eliquis, monitor on telemetry     6) CAD  - home meds     7) GI/DVT ppx  - protonix/eliquis        Discharge Planning: I expect the patient to be discharged to pending further imaging    Electronically signed by Maggie Rubin PA-C, 05/25/22, 12:34 CDT.    Electronically signed by Maggie Rubin PA-C at 05/25/22 8032

## 2022-05-26 NOTE — THERAPY TREATMENT NOTE
Patient Name: Jose Dacosta  : 1935    MRN: [id number]                              Today's Date: 2022       Admit Date: 2022    Visit Dx:     ICD-10-CM ICD-9-CM   1. Syncope and collapse  R55 780.2   2. Chronic congestive heart failure, unspecified heart failure type ([facility])  I50.9 428.0   3. Dementia without behavioral disturbance, unspecified dementia type ([facility])  F03.90 294.20   4. Impaired mobility and ADLs  Z74.09 V49.89    Z78.9    5. Impaired functional mobility, balance, gait, and endurance  Z74.09 V49.89     Patient Active Problem List   Diagnosis   • Coronary artery disease involving native coronary artery of native heart without angina pectoris   • Vasovagal syncope   • RBBB (right bundle branch block with left anterior fascicular block)   • Essential hypertension   • Ischemic cardiomyopathy   • Syncope and collapse   • Closed fracture of neck of left femur ([facility])   • Coronary artery disease with history of myocardial infarction without history of CABG   • Facial laceration   • HFrEF (heart failure with reduced ejection fraction) ([facility])   • Postoperative anemia due to acute blood loss   • Pneumonia of both lower lobes due to infectious organism   • Acute urinary retention   • Metabolic encephalopathy   • Lumbar radiculopathy   • Precordial pain   • Pulmonary emboli ([facility])   • Moderate malnutrition ([facility])     Past Medical History:   Diagnosis Date   • Abnormal ECG      Past Surgical History:   Procedure Laterality Date   • CORONARY STENT PLACEMENT     • HIP HEMIARTHROPLASTY Left 2020    Procedure: LEFT HIP HEMIARTHROPLASTY;  Surgeon: Jitendra Solis MD;  Location: [facility], [place];  Service: Orthopedics;  Laterality: Left;      General Information     Row Name 22 0835          OT Time and Intention    Document Type therapy note (daily note)  -CR     Mode of Treatment occupational therapy;individual therapy  -CR     Row Name 22 0835          General Information    Patient  Profile Reviewed yes  -CR     Existing Precautions/Restrictions fall;oxygen therapy device and L/min  -CR     Row Name 05/26/22 08          Cognition    Orientation Status (Cognition) oriented x 3;person;place;situation  -CR     Row Name 05/26/22 08          Safety Issues, Functional Mobility    Impairments Affecting Function (Mobility) balance;cognition;endurance/activity tolerance;strength  -CR           User Key  (r) = Recorded By, (t) = Taken By, (c) = Cosigned By    Initials Name Provider Type    CR Ngozi Reveles COTA Occupational Therapist Assistant                 Mobility/ADL's     Row Name 05/26/22 08          Bed Mobility    Bed Mobility supine-sit  -CR     Supine-Sit [place] (Bed Mobility) contact guard  -CR     Assistive Device (Bed Mobility) head of bed elevated;bed rails  -CR     Row Name 05/26/22 08          Transfers    Transfers sit-stand transfer;bed-chair transfer;stand-sit transfer  -CR     Bed-Chair [place] (Transfers) contact guard  -CR     Assistive Device (Bed-Chair Transfers) walker, front-wheeled  -CR     Sit-Stand [place] (Transfers) contact guard  -CR     Row Name 05/26/22 08          Sit-Stand Transfer    Assistive Device (Sit-Stand Transfers) walker, front-wheeled  -CR     Row Name 05/26/22 08          Functional Mobility    Functional Mobility- Ind. Level contact guard assist  -CR     Functional Mobility- Device walker, front-wheeled  -CR     Functional Mobility-Distance (Feet) 15  -CR     Row Name 05/26/22 08          Activities of Daily Living    BADL Assessment/Intervention lower body dressing  -CR     Row Name 05/26/22 08          Lower Body Dressing Assessment/Training    [place] Level (Lower Body Dressing) lower body dressing skills;don;pants/bottoms;minimum assist (75% patient effort)  -CR     Position (Lower Body Dressing) supported standing  -CR           User Key  (r) = Recorded By, (t) = Taken By, (c) = Cosigned By    Initials Name  Provider Type    Ngozi Joyce COTA Occupational Therapist Assistant               Obj/Interventions    No documentation.                Goals/Plan     Row Name 05/26/22 08          Transfer Goal 1 (OT)    Activity/Assistive Device (Transfer Goal 1, OT) toilet  -CR     [place] Level/Cues Needed (Transfer Goal 1, OT) supervision required  -CR     Time Frame (Transfer Goal 1, OT) long term goal (LTG);by discharge  -CR     Progress/Outcome (Transfer Goal 1, OT) goal not met  -CR     Row Name 05/26/22 0835          Bathing Goal 1 (OT)    Activity/Device (Bathing Goal 1, OT) lower body bathing  -CR     [place] Level/Cues Needed (Bathing Goal 1, OT) supervision required  -CR     Time Frame (Bathing Goal 1, OT) long term goal (LTG);by discharge  -CR     Progress/Outcomes (Bathing Goal 1, OT) goal not met  -CR     Row Name 05/26/22 08          Dressing Goal 1 (OT)    Activity/Device (Dressing Goal 1, OT) lower body dressing  -CR     [place]/Cues Needed (Dressing Goal 1, OT) supervision required  -CR     Time Frame (Dressing Goal 1, OT) by discharge;long term goal (LTG)  -CR     Progress/Outcome (Dressing Goal 1, OT) goal not met  -CR           User Key  (r) = Recorded By, (t) = Taken By, (c) = Cosigned By    Initials Name Provider Type    Ngozi Joyce COTA Occupational Therapist Assistant               Clinical Impression     Row Name 05/26/22 0835          Pain Assessment    Pretreatment Pain Rating 0/10 - no pain  -CR     Posttreatment Pain Rating 0/10 - no pain  -CR     Row Name 05/26/22 08          Plan of Care Review    Plan of Care Reviewed With patient  -CR     Progress improving  -CR     Outcome Evaluation Pt was supine in bed when AGUILAR arrived. Nsg reports pts BP has been high, however ok'd therapy. Nsg present for portion of tx session for nursing care. Pt still concerned about wife at home; AGUILAR provided posistive reinforcement. Pt t/f sup > sit EOB with CGA, LB dressing; donning  pants with Min A for standing portion of task due to loose pants. Pt completed sit > stand t/f with CGA using RW, fxnl mobility in room and to chair ~ 15 ft using RW with CGA, stand piviot t/f to recliner with CGA. Pts BP post standing was 164/84 HR 74. Pt tolerated tx well w/ no s/s of distress.  Pt seated in recliner with all needs met and in reach, alarm on w/ nsg notified. Pt would benefit from  OT/PT services and to go home with assist at D/C.  -CR     Row Name 05/26/22 0835          Therapy Assessment/Plan (OT)    Rehab Potential (OT) good, to achieve stated therapy goals  -CR     Criteria for Skilled Therapeutic Interventions Met (OT) yes;meets criteria;skilled treatment is necessary  -CR     Therapy Frequency (OT) other (see comments)  3-7 days a week  -CR     Row Name 05/26/22 0835          Therapy Plan Review/Discharge Plan (OT)    Anticipated Discharge Disposition (OT) home with assist;home with home health  -CR     Row Name 05/26/22 0835          Vital Signs    Pre Systolic BP Rehab 179  -CR     Pre Treatment Diastolic BP 85  -CR     Post Systolic BP Rehab 164  -CR     Post Treatment Diastolic BP 84  -CR     Pretreatment Heart Rate (beats/min) 78  -CR     Posttreatment Heart Rate (beats/min) 74  -CR     Pre SpO2 (%) 94  -CR     O2 Delivery Pre Treatment room air  -CR     Post SpO2 (%) 96  -CR     O2 Delivery Post Treatment room air  -CR     Pre Patient Position Supine  -CR     Intra Patient Position Standing  -CR     Post Patient Position Sitting  -CR     Row Name 05/26/22 0835          Positioning and Restraints    Pre-Treatment Position in bed  -CR     Post Treatment Position chair  -CR     In Chair notified nsg;call light within reach;encouraged to call for assist;exit alarm on;sitting  -CR           User Key  (r) = Recorded By, (t) = Taken By, (c) = Cosigned By    Initials Name Provider Type    Ngozi Joyce COTA Occupational Therapist Assistant               Outcome Measures     Row Name  05/26/22 0835          How much help from another is currently needed...    Putting on and taking off regular lower body clothing? 3  -CR     Bathing (including washing, rinsing, and drying) 3  -CR     Toileting (which includes using toilet bed pan or urinal) 3  -CR     Putting on and taking off regular upper body clothing 3  -CR     Taking care of personal grooming (such as brushing teeth) 4  -CR     Eating meals 4  -CR     AM-PAC 6 Clicks Score (OT) 20  -CR           User Key  (r) = Recorded By, (t) = Taken By, (c) = Cosigned By    Initials Name Provider Type    Ngozi Joyce COTA Occupational Therapist Assistant                Occupational Therapy Education                 Title: PT OT SLP Therapies (In Progress)     Topic: Occupational Therapy (In Progress)     Point: ADL training (Not Started)     Description:   Instruct learner(s) on proper safety adaptation and remediation techniques during self care or transfers.   Instruct in proper use of assistive devices.              Learner Progress:  Not documented in this visit.          Point: Home exercise program (Not Started)     Description:   Instruct learner(s) on appropriate technique for monitoring, assisting and/or progressing therapeutic exercises/activities.              Learner Progress:  Not documented in this visit.          Point: Precautions (Done)     Description:   Instruct learner(s) on prescribed precautions during self-care and functional transfers.              Learning Progress Summary           Patient Acceptance, E, VU,NR by ME at 5/25/2022 1003    Comment: Educated on OT and POC. Educated to call for assistance. Educated on safety precautions.                   Point: Body mechanics (Done)     Description:   Instruct learner(s) on proper positioning and spine alignment during self-care, functional mobility activities and/or exercises.              Learning Progress Summary           Patient Acceptance, E, VU,NR by ME at 5/25/2022 1003     Comment: Educated on OT and POC. Educated to call for assistance. Educated on safety precautions.                               User Key     Initials Effective Dates Name Provider Type Discipline    ME 06/16/21 -  Eitan Haynes, OTR/L Occupational Therapist OT              OT Recommendation and Plan  Therapy Frequency (OT): other (see comments) (3-7 days a week)  Plan of Care Review  Plan of Care Reviewed With: patient  Progress: improving  Outcome Evaluation: Pt was supine in bed when AGUILAR arrived. Nsg reports pts BP has been high, however ok'd therapy. Nsg present for portion of tx session for nursing care. Pt still concerned about wife at home; AGUILAR provided posistive reinforcement. Pt t/f sup > sit EOB with CGA, LB dressing; donning pants with Min A for standing portion of task due to loose pants. Pt completed sit > stand t/f with CGA using RW, fxnl mobility in room and to chair ~ 15 ft using RW with CGA, stand piviot t/f to recliner with CGA. Pts BP post standing was 164/84 HR 74. Pt tolerated tx well w/ no s/s of distress.  Pt seated in recliner with all needs met and in reach, alarm on w/ nsg notified. Pt would benefit from HH OT/PT services and to go home with assist at D/C.     Time Calculation:    Time Calculation- OT     Row Name 05/26/22 0835             Time Calculation- OT    OT Start Time 0835  -CR      OT Stop Time 0917  -CR      OT Time Calculation (min) 42 min  -CR      Total Timed Code Minutes- OT 42 minute(s)  -CR      OT Received On 05/26/22  -CR              Timed Charges    08487 - OT Self Care/Mgmt Minutes 42  -CR              Total Minutes    Timed Charges Total Minutes 42  -CR       Total Minutes 42  -CR            User Key  (r) = Recorded By, (t) = Taken By, (c) = Cosigned By    Initials Name Provider Type    CR Ngozi Reveles COTA Occupational Therapist Assistant              Therapy Charges for Today     Code Description Service Date Service Provider Modifiers Qty    [id number]   OT SELF CARE/MGMT/TRAIN EA 15 MIN 5/26/2022 Ngozi Reveles COTA GO 3               JEFF Garcia  5/26/2022

## 2022-05-26 NOTE — DISCHARGE PLACEMENT REQUEST
"Sylvia Dacosta (87 y.o. Male)             Date of Birth   1935    Social Security Number       Address   [address] [address]    Home Phone   [phone]    MRN   [id number]       Christian   [employer]    Marital Status                               Admission Date   5/24/22    Admission Type   Emergency    Admitting Provider   Gayatri Quevedo MD    Attending Provider   Gayatri Quevedo MD    Department, Room/Bed   [address], 411/1       Discharge Date       Discharge Disposition   Home or Self Care    Discharge Destination                               Attending Provider: Gayatri Quevedo MD    Allergies: Hydrochlorothiazide    Isolation: None   Infection: None   Code Status: CPR   Advance Care Planning Activity    Ht: 172.7 cm (67.99\")   Wt: 63.7 kg (140 lb 8 oz)    Admission Cmt: None   Principal Problem: None                Active Insurance as of 5/24/2022     Primary Coverage     Payor Plan Insurance Group Employer/Plan Group    [employer] MEDICARE REPLACEMENT AETNA MEDICARE REPLACEMENT [id number]     Payor Plan Address Payor Plan Phone Number Payor Plan Fax Number Effective Dates    [address] [phone]  4/1/2019 - None Entered    [facility] [id number]       Subscriber Name Subscriber Birth Date Member ID       SYLVIA DACOSTA 1935 [id number]                 Emergency Contacts      (Rel.) Home Phone Work Phone Mobile Phone    AUERLIA BOX (Relative) [phone]    fiordaliza aguilar (Grandchild) [phone] -- [phone]            Discharge Order (From admission, onward)     Start     Ordered    05/26/22 1236  Discharge patient  Once        Expected Discharge Date: 05/26/22    Discharge Disposition: Home or Self Care    Physician of Record for Attribution - Please select from Treatment Team: JD CARPENTER [[id number]]    Review needed by CMO to determine Physician of Record: No     "   Question Answer Comment   Physician of Record for Attribution - Please select from Treatment Team JD CARPENTER    Review needed by CMO to determine Physician of Record No        05/26/22 4829

## 2022-05-26 NOTE — PLAN OF CARE
Goal Outcome Evaluation:  Plan of Care Reviewed With: patient        Progress: improving  Outcome Evaluation: Pt was supine in bed when AGUILAR arrived. Nsg reports pts BP has been high, however ok'd therapy. Nsg present for portion of tx session for nursing care. Pt still concerned about wife at home; AGUILAR provided posistive reinforcement. Pt t/f sup > sit EOB with CGA, LB dressing; donning pants with Min A for standing portion of task due to loose pants. Pt completed sit > stand t/f with CGA using RW, fxnl mobility in room and to chair ~ 15 ft using RW with CGA, stand piviot t/f to recliner with CGA. Pts BP post standing was 164/84 HR 74. Pt tolerated tx well w/ no s/s of distress.  Pt seated in recliner with all needs met and in reach, alarm on w/ nsg notified. Pt would benefit from HH OT/PT services and to go home with assist at D/C.

## 2022-05-26 NOTE — DISCHARGE SUMMARY
[facility] Medicine Services  DISCHARGE SUMMARY       Date of Admission: 5/24/2022  Date of Discharge:  5/26/2022  Primary Care Physician: Terry Peterson MD    Presenting Problem/History of Present Illness:  Syncope and collapse [R55]  Dementia without behavioral disturbance, unspecified dementia type (HCC) [F03.90]  Chronic congestive heart failure, unspecified heart failure type (HCC) [I50.9]     Patient doing well today, very ready to go home to take care of his wife.  No new complaints. Forehead pain improved.     Final Discharge Diagnoses:  Active Hospital Problems    Diagnosis    • Moderate malnutrition (HCC)    • Chronic diastolic CHF (congestive heart failure) (HCC)    • Impaired mobility and ADLs    • Syncope and collapse        Consults:   Consults     Date and Time Order Name Status Description    5/24/2022  1:43 PM Hospitalist (on-call MD unless specified)            Procedures Performed:                 Pertinent Test Results:   Lab Results (most recent)     Procedure Component Value Units Date/Time    Basic Metabolic Panel [[id number]]  (Abnormal) Collected: 05/26/22 0734    Specimen: Blood Updated: 05/26/22 0838     Glucose 105 mg/dL      BUN 24 mg/dL      Creatinine 1.09 mg/dL      Sodium 140 mmol/L      Potassium 4.1 mmol/L      Chloride 104 mmol/L      CO2 25.0 mmol/L      Calcium 10.5 mg/dL      BUN/Creatinine Ratio 22.0     Anion Gap 11.0 mmol/L      eGFR 66.1 mL/min/1.73      Comment: National Kidney Foundation and American Society of Nephrology (ASN) Task Force recommended calculation based on the Chronic Kidney Disease Epidemiology Collaboration (CKD-EPI) equation refit without adjustment for race.       Narrative:      GFR Normal >60  Chronic Kidney Disease <60  Kidney Failure <15      Magnesium [[id number]]  (Normal) Collected: 05/26/22 0734    Specimen: Blood Updated: 05/26/22 0838     Magnesium 1.8 mg/dL     CBC & Differential  [[id number]]  (Abnormal) Collected: 05/26/22 0734    Specimen: Blood Updated: 05/26/22 0811    Narrative:      The following orders were created for panel order CBC & Differential.  Procedure                               Abnormality         Status                     ---------                               -----------         ------                     CBC Auto Differential[[id number]]        Abnormal            Final result                 Please view results for these tests on the individual orders.    CBC Auto Differential [[id number]]  (Abnormal) Collected: 05/26/22 0734    Specimen: Blood Updated: 05/26/22 0811     WBC 10.57 10*3/mm3      RBC 3.92 10*6/mm3      Hemoglobin 12.2 g/dL      Hematocrit 36.3 %      MCV 92.6 fL      MCH 31.1 pg      MCHC 33.6 g/dL      RDW 17.2 %      RDW-SD 57.1 fl      MPV 10.8 fL      Platelets 191 10*3/mm3      Neutrophil % 70.3 %      Lymphocyte % 18.2 %      Monocyte % 8.2 %      Eosinophil % 2.1 %      Basophil % 0.6 %      Immature Grans % 0.6 %      Neutrophils, Absolute 7.44 10*3/mm3      Lymphocytes, Absolute 1.92 10*3/mm3      Monocytes, Absolute 0.87 10*3/mm3      Eosinophils, Absolute 0.22 10*3/mm3      Basophils, Absolute 0.06 10*3/mm3      Immature Grans, Absolute 0.06 10*3/mm3      nRBC 0.0 /100 WBC     Basic Metabolic Panel [[id number]]  (Normal) Collected: 05/25/22 0608    Specimen: Blood Updated: 05/25/22 0756     Glucose 97 mg/dL      BUN 17 mg/dL      Creatinine 1.01 mg/dL      Sodium 139 mmol/L      Potassium 3.7 mmol/L      Chloride 103 mmol/L      CO2 26.0 mmol/L      Calcium 9.8 mg/dL      BUN/Creatinine Ratio 16.8     Anion Gap 10.0 mmol/L      eGFR 72.4 mL/min/1.73      Comment: National Kidney Foundation and American Society of Nephrology (ASN) Task Force recommended calculation based on the Chronic Kidney Disease Epidemiology Collaboration (CKD-EPI) equation refit without adjustment for race.       Narrative:      GFR Normal >60  Chronic Kidney Disease  <60  Kidney Failure <15      Magnesium [[id number]]  (Normal) Collected: 05/25/22 0608    Specimen: Blood Updated: 05/25/22 0756     Magnesium 1.9 mg/dL     CBC & Differential [[id number]]  (Abnormal) Collected: 05/25/22 0608    Specimen: Blood Updated: 05/25/22 0729    Narrative:      The following orders were created for panel order CBC & Differential.  Procedure                               Abnormality         Status                     ---------                               -----------         ------                     CBC Auto Differential[[id number]]        Abnormal            Final result                 Please view results for these tests on the individual orders.    CBC Auto Differential [[id number]]  (Abnormal) Collected: 05/25/22 0608    Specimen: Blood Updated: 05/25/22 0729     WBC 9.65 10*3/mm3      RBC 3.62 10*6/mm3      Hemoglobin 11.4 g/dL      Hematocrit 34.1 %      MCV 94.2 fL      MCH 31.5 pg      MCHC 33.4 g/dL      RDW 16.8 %      RDW-SD 57.7 fl      MPV 10.9 fL      Platelets 178 10*3/mm3      Neutrophil % 70.6 %      Lymphocyte % 18.2 %      Monocyte % 8.4 %      Eosinophil % 1.8 %      Basophil % 0.5 %      Immature Grans % 0.5 %      Neutrophils, Absolute 6.81 10*3/mm3      Lymphocytes, Absolute 1.76 10*3/mm3      Monocytes, Absolute 0.81 10*3/mm3      Eosinophils, Absolute 0.17 10*3/mm3      Basophils, Absolute 0.05 10*3/mm3      Immature Grans, Absolute 0.05 10*3/mm3      nRBC 0.0 /100 WBC     Troponin [[id number]]  (Normal) Collected: 05/24/22 1119    Specimen: Blood Updated: 05/24/22 1145     Troponin T 0.015 ng/mL     Narrative:      Troponin T Reference Range:  <= 0.03 ng/mL-   Negative for AMI  >0.03 ng/mL-     Abnormal for myocardial necrosis.  Clinicians would have to utilize clinical acumen, EKG, Troponin and serial changes to determine if it is an Acute Myocardial Infarction or myocardial injury due to an underlying chronic condition.       Results may be falsely decreased if  patient taking Biotin.      Urinalysis With Microscopic If Indicated (No Culture) - Urine, Catheter [[id number]]  (Normal) Collected: 05/24/22 0954    Specimen: Urine, Catheter Updated: 05/24/22 1020     Color, UA Yellow     Appearance, UA Clear     pH, UA 7.0     Specific Gravity, UA 1.008     Glucose, UA Negative     Ketones, UA Negative     Bilirubin, UA Negative     Blood, UA Negative     Protein, UA Negative     Leuk Esterase, UA Negative     Nitrite, UA Negative     Urobilinogen, UA 0.2 E.U./dL    Narrative:      Urine microscopic not indicated.    D-dimer, Quantitative [[id number]]  (Abnormal) Collected: 05/24/22 0847    Specimen: Blood Updated: 05/24/22 0931     D-Dimer, Quantitative 1,624 ng/mL (FEU)     Narrative:      Dimer values <500 ng/ml FEU are FDA approved as aid in diagnosis of deep venous thrombosis and pulmonary embolism.  This test should not be used in an exclusion strategy with pretest probability alone.    A recent guideline regarding diagnosis for pulmonary thromboembolism recommends an adjusted exclusion criterion of age x 10 ng/ml FEU for patients >50 years of age ([employer]ifer Intern Med 2015; 163: 701-711).      aPTT [[id number]]  (Normal) Collected: 05/24/22 0847    Specimen: Blood Updated: 05/24/22 0930     PTT 31.6 seconds     Narrative:      The recommended Heparin therapeutic range is 68-97 seconds.    Comprehensive Metabolic Panel [[id number]] Collected: 05/24/22 0847    Specimen: Blood Updated: 05/24/22 0930     Glucose 95 mg/dL      BUN 17 mg/dL      Creatinine 1.13 mg/dL      Sodium 140 mmol/L      Potassium 4.0 mmol/L      Chloride 105 mmol/L      CO2 26.0 mmol/L      Calcium 9.2 mg/dL      Total Protein 6.2 g/dL      Albumin 3.60 g/dL      ALT (SGPT) 13 U/L      AST (SGOT) 18 U/L      Alkaline Phosphatase 76 U/L      Total Bilirubin 0.5 mg/dL      Globulin 2.6 gm/dL      A/G Ratio 1.4 g/dL      BUN/Creatinine Ratio 15.0     Anion Gap 9.0 mmol/L      eGFR 63.3 mL/min/1.73      Comment:  National Kidney Foundation and American Society of Nephrology (ASN) Task Force recommended calculation based on the Chronic Kidney Disease Epidemiology Collaboration (CKD-EPI) equation refit without adjustment for race.       Narrative:      GFR Normal >60  Chronic Kidney Disease <60  Kidney Failure <15      Protime-INR [[id number]]  (Normal) Collected: 05/24/22 0847    Specimen: Blood Updated: 05/24/22 0930     Protime 14.7 Seconds      INR 1.17    Narrative:      Therapeutic range for most indications is 2.0-3.0 INR,  or 2.5-3.5 for mechanical heart valves.    Troponin [[id number]]  (Normal) Collected: 05/24/22 0847    Specimen: Blood Updated: 05/24/22 0929     Troponin T <0.010 ng/mL     Narrative:      Troponin T Reference Range:  <= 0.03 ng/mL-   Negative for AMI  >0.03 ng/mL-     Abnormal for myocardial necrosis.  Clinicians would have to utilize clinical acumen, EKG, Troponin and serial changes to determine if it is an Acute Myocardial Infarction or myocardial injury due to an underlying chronic condition.       Results may be falsely decreased if patient taking Biotin.      COVID-19 and FLU A/B PCR - Swab, Nasopharynx [[id number]]  (Normal) Collected: 05/24/22 0847    Specimen: Swab from Nasopharynx Updated: 05/24/22 0928     COVID19 Not Detected     Influenza A PCR Not Detected     Influenza B PCR Not Detected    Narrative:      Fact sheet for providers: https://www.fda.gov/media/612059/download    Fact sheet for patients: https://www.fda.gov/media/757478/download    Test performed by PCR.    BNP [[id number]]  (Abnormal) Collected: 05/24/22 0847    Specimen: Blood Updated: 05/24/22 0928     proBNP 7,363.0 pg/mL     Narrative:      Among patients with dyspnea, NT-proBNP is highly sensitive for the detection of acute congestive heart failure. In addition NT-proBNP of <300 pg/ml effectively rules out acute congestive heart failure with 99% negative predictive value.    Results may be falsely decreased if patient  taking Biotin.          Imaging Results (Most Recent)     Procedure Component Value Units Date/Time    CT Facial Bones Without Contrast [[id number]] Collected: 05/25/22 1418     Updated: 05/25/22 1608    Narrative:      CT maxillofacial.     CLINICAL INDICATION: Facial trauma. Evaluate for fracture.        COMPARISON: None    TECHNIQUE:  Noncontrast helical scanning through the facial bones  using bone technique. Axial, sagittal, coronal reformations. Bone  and soft tissue windows reviewed.    This exam was performed according to our departmental  dose-optimization program, which includes automated exposure  control, adjustment of the mA and/or kV according to patient size  and/or use of iterative reconstruction technique.    FINDINGS: No evidence of facial bone fracture. No evidence of  focal hematoma or air within the soft tissues. The paranasal  sinuses are clear.       Impression:      No evidence of facial bone fracture.     Electronically signed by:  Esdras Paul MD  5/25/2022 4:05 PM CDT  Workstation: [id number]    US Carotid Bilateral [[id number]] Collected: 05/24/22 1726     Updated: 05/25/22 0143    Narrative:        ULTRASOUND CAROTID DUPLEX SCAN    HISTORY: Syncope    COMPARISON: None.    Duplex ultrasound of the carotid bifurcations was performed.    FINDINGS:  Real time and color flow images demonstrate bilateral plaque  formation.  The peak systolic velocity in the right internal carotid artery  is 99.1 cm/s.  End-diastolic velocity 25.4 cm/s.  Ratio peak systolic velocity right internal carotid artery to  right common carotid artery 1.0.  Peak systolic velocity right external carotid artery 133.2 cm/s.  The peak systolic velocity in left internal carotid artery is  elevated to 224.7 cm/s.  End-diastolic velocity increased to 40.7 cm/s.  Ratio peak systolic velocity left internal carotid artery to left  common artery 1.9.  Peak systolic velocity left external carotid artery at 150.8  cm/s.  Antegrade flow  is present in each vertebral artery.      Impression:      CONCLUSION:  Less than 50% diameter reduction stenosis right internal carotid  artery.  50-69% diameter reduction stenosis left internal carotid artery.  Elevated velocities in each external carotid artery as above.  Antegrade flow in each vertebral artery.    [id number]    Electronically signed by:  Emmanuel Burris MD  5/25/2022 1:41 AM CDT  Workstation: [id number]    US Guided Vascular Access [[id number]] Collected: 05/24/22 1312     Updated: 05/24/22 1428    Narrative:      PROCEDURE: Ultrasound Guidance Vascular Access      Ordering physician(s): CHHAYA CAMERON    Clinical Indication: Venous access      Findings:    The vessel was sonographically evaluated and determined to be  patent. Concurrent realtime ultrasound was used to visualize  needle entry into theRight     Basilic vein and a permanent image was stored for permanent  recording and reporting.         Impression:      Impression:     Successful uneventful US guided placement RightBasilic vein, for  venous access.    Electronically signed by:  Esdras Paul MD  5/24/2022 2:26 PM CDT  Workstation: [id number]    IR Insert Midline Without Port Pump 5 Plus [[id number]] Resulted: 05/24/22 1333     Updated: 05/24/22 1333    Narrative:      This procedure was auto-finalized with no dictation required.    CT Chest With Contrast Diagnostic [[id number]] Collected: 05/24/22 1159     Updated: 05/24/22 1258    Narrative:      CT chest with contrast. CTA thorax. Pulmonary embolism study.       CLINICAL INDICATION: Shortness of breath, chest pain       COMPARISON: Chest x-ray May 24, 2022.       TECHNIQUE: Nonionic IV contrast. 64 cc Isovue-370. Helical  scanning with axial and coronal reformations. Soft tissue, lung,  liver, and bone windows reviewed. Computer-generated 3-D images,  CT angiography including MIP images are obtained.    This exam was performed according to our departmental  dose-optimization program, which  includes automated exposure  control, adjustment of the mA and/or kV according to patient size  and/or use of iterative reconstruction technique.    CHEST CT FINDINGS:  No evidence for pulmonary emboli.    No  evidence of pathologically enlarged nodes. Midline sternal  sutures from prior cardiac surgery.    Discoid changes right middle lobe inferiorly probably fibrosis.  The lungs are otherwise clear.     No evidence of pleural fluid.     The adrenal glands are normal size. Remainder of visualized upper  abdomen is grossly unremarkable. Degenerative changes of the  spine. Bones unremarkable for age.      Impression:      No evidence for pulmonary emboli.           Electronically signed by:  Esdras Paul MD  5/24/2022 12:56 PM CDT  Workstation: [id number]    CT Head Without Contrast [[id number]] Collected: 05/24/22 0930     Updated: 05/24/22 0958    Narrative:        CT Head Without Contrast    History: Syncope    Axial scans of the brain were obtained without intravenous  contrast.  Coronal and sagital reconstructions were preformed.    This exam was performed according to our departmental  dose-optimization program, which includes automated exposure  control, adjustment of the mA and/or kV according to patient size  and/or use of iterative reconstruction technique.    DLP: 1029.10    Comparison: July 8, 2020    Findings:  Bone windows are unremarkable.  Normal mucosal thickening right maxillary sinus.  Minimal opacification left mastoid air cells.    No acute process.  Cerebral and cerebellar atrophy.  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  Old ischemic changes in the gary and midbrain.  Moderate small vessel disease.  No hemorrhage.  No mass.  No abnormal areas of increased attenuation.  No midline shift.  No abnormal extra-axial fluid collections.      Impression:      CONCLUSION:  No acute process.  Cerebral and cerebellar atrophy.  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  Old ischemic changes in  the gary and midbrain.  Moderate small vessel disease.    [id number]    Electronically signed by:  Emmanuel Burris MD  5/24/2022 9:55 AM CDT  Workstation: [id number]    XR Chest 1 View [[id number]] Collected: 05/24/22 0846     Updated: 05/24/22 0932    Narrative:        PORTABLE CHEST    HISTORY: Syncope    Portable AP upright film of the chest was obtained at 8:25 AM.  COMPARISON: November 3, 2020    FINDINGS:   The lungs are clear of an acute process.  Linear scarring lung bases.  Old granulomatous disease is present.  Sternotomy.  The heart is not enlarged.  The pulmonary vasculature is not increased.  No pleural effusion.  No pneumothorax.  No acute osseous abnormality.  Degenerative changes are present in the thoracic spine.  Hypertrophic change acromioclavicular joints.      Impression:      CONCLUSION:  No Acute Disease.  Sternotomy.    [id number]    Electronically signed by:  Emmanuel Burris MD  5/24/2022 9:30 AM CDT  Workstation: [id number]          Chief Complaint on Day of Discharge: none    Hospital Course:  The patient is a 87 y.o. male who presented to [facility] with fall 2 days prior to ED visit.  Poor historian so fall etiology was unclear. Symptoms not suggestive of stroke, CT head negative in ED, on eliquis and ASA 81 at home.  DDimer was positive, CTA chest was negative for PE and patient denies complaints of chest pain, SOA, hemoptysis throughout stay.     Found to be on fluid overload related to chronic systolic CHF, received lasix and tolerated well.  Ultrasound carotids found to show 50% stenosis R side, 50-69% stenosis L ICA, antegrade flow to both sides. Questionable single fun of Afib on tele on Day 1 of admission, patient on eliquis and metoprolol at home. NP poorly controlled initially, improved through stay with plan to resume home meds and follow up with cardiology within 1 week.     PT and OT did evaluate him, would benefit from  skilled nursing,  PT/OT.     Condition on Discharge:  "stable    Physical Exam on Discharge:  /80 (BP Location: Right arm, Patient Position: Lying)   Pulse 84   Temp 96.1 °F (35.6 °C) (Oral)   Resp 20   Ht 172.7 cm (67.99\")   Wt 63.7 kg (140 lb 8 oz)   SpO2 96%   BMI 21.37 kg/m²   Physical Exam  Vitals and nursing note reviewed.   Constitutional:       General: He is not in acute distress.     Appearance: Normal appearance.   HENT:      Head: Normocephalic and atraumatic.      Right Ear: External ear normal. Very [place]     Left Ear: External ear normal. Very [place]     Nose: Nose normal.      Mouth/Throat:       Mouth: Mucous membranes are moist.   Eyes:      Conjunctiva/sclera: Conjunctivae normal.      Pupils: Pupils are equal, round, and reactive to light.   Cardiovascular:      Rate and Rhythm: Normal rate and regular rhythm.   Pulmonary:      Effort: Pulmonary effort is normal.      Breath sounds: Normal breath sounds.   Abdominal:      General: Abdomen is flat.      Palpations: Abdomen is soft.      Tenderness: There is no abdominal tenderness.   Musculoskeletal:         General: No signs of injury. Normal range of motion.      Cervical back: No tenderness.   Skin:     General: Skin is warm and dry.   Neurological:      General: No focal deficit present.      Mental Status: She is alert and oriented to person, place, and time.   Psychiatric:         Mood and Affect: Mood normal.         Behavior: Behavior normal.     Discharge Disposition:  Home or Self Care    Discharge Medications:     Discharge Medications      Continue These Medications      Instructions Start Date   albuterol sulfate  (90 Base) MCG/ACT inhaler  Commonly known as: PROVENTIL HFA;VENTOLIN HFA;PROAIR HFA   INHALE 2 PUFFS INTO THE LUNGS EVERY 6 (SIX) HOURS AS NEEDED FOR WHEEZING      allopurinol 100 MG tablet  Commonly known as: ZYLOPRIM   100 mg, Oral, Daily      apixaban 5 MG tablet tablet  Commonly known as: ELIQUIS   5 mg, Oral, 2 Times Daily      aspirin 81 MG EC tablet   81 " mg, Oral, Daily      doxepin 10 MG capsule  Commonly known as: SINEquan   10 mg, Oral, Nightly      Lipitor 20 MG tablet  Generic drug: atorvastatin   1 tablet, Oral      losartan 25 MG tablet  Commonly known as: COZAAR   TAKE 1 TABLET BY MOUTH EVERY DAY      meloxicam 7.5 MG tablet  Commonly known as: MOBIC   7.5 mg, Oral, Daily      metoprolol succinate XL 25 MG 24 hr tablet  Commonly known as: TOPROL-XL   TAKE 1 TABLET BY MOUTH EVERY DAY      omeprazole 40 MG capsule  Commonly known as: priLOSEC   40 mg, Oral, Every Morning      rOPINIRole 0.5 MG tablet  Commonly known as: REQUIP   0.5 mg, Oral, Daily      tolterodine 2 MG tablet  Commonly known as: DETROL   2 mg, Oral, 2 Times Daily             Discharge Diet:   Diet Instructions     Diet: Cardiac      Discharge Diet: Cardiac          Activity at Discharge:   Activity Instructions     Activity as Tolerated            Discharge Care Plan/Instructions:    syncope/fall  - unclear etiology, or even if this is true syncope  - imaging negative including CT head, Ct facial bones, CTA chest  -Echo completed, no significant change since echo in 2020  -orthostatics negative morning of discharge  -Ultrasound carotid shows 50% right ICA, 50 to 69% left ICA, to be followed by cardiology outpatient     Impaired mobility and ADLs  - PT/OT consulted during admission  - skilled nursing and  PT/OT ordered for discharge     elevated d-dimer  - CTA negative for PE     systolic CHF exacerbation  -echo shows no significant changes since 2020, outpatient cardiology follow up     accelerated HTN  - while there is some concern that this may be related to a stroke, the patient did not showing any signs or symptoms of focal or lateralizing neurological deficits, so the risk seems to be low at this time.  - home meds  - cardene drip initially on admission, changed hydralazine orders from 180 to 160 max systolic PRN orders plus home med  - resume homes meds on DC, follow up woth PCP and  cardiology     A-fib  - questionable history of A-fib  - continue metoprolol, eliquis at home     CAD  - home meds     GI/DVT ppx  - protonix/eliquis    Follow-up Appointments:   Future Appointments   Date Time Provider Department Center   6/3/2022  3:30 PM Lindsey Douglas APRN [facility] None       Test Results Pending at Discharge:     Time: 34 minutes

## 2022-05-26 NOTE — OUTREACH NOTE
Prep Survey    Flowsheet Row Responses   [facility] patient discharged from? [place]   Is LACE score < 7 ? No   Emergency Room discharge w/ pulse ox? No   Eligibility Readm Mgmt   Discharge diagnosis Syncope and collapse,  Chronic diastolic CHF     Does the patient have one of the following disease processes/diagnoses(primary or secondary)? CHF   Does the patient have Home health ordered? Yes   What is the Home health agency?  [facility] to see 05/28/2022   Is there a DME ordered? No   Comments regarding appointments See AVS   Medication alerts for this patient Eliquis, ASA    Prep survey completed? Yes          SINGH WALKER - Registered Nurse

## 2022-05-26 NOTE — PLAN OF CARE
Problem: Adult Inpatient Plan of Care  Goal: Plan of Care Review  Outcome: Ongoing, Progressing  Goal: Patient-Specific Goal (Individualized)  Outcome: Ongoing, Progressing  Goal: Absence of Hospital-Acquired Illness or Injury  Outcome: Ongoing, Progressing  Intervention: Identify and Manage Fall Risk  Recent Flowsheet Documentation  Taken 5/25/2022 1938 by Alexx Jacques RN  Safety Promotion/Fall Prevention: safety round/check completed  Intervention: Prevent Skin Injury  Recent Flowsheet Documentation  Taken 5/25/2022 1938 by Alexx Jacques RN  Body Position: position changed independently  Intervention: Prevent and Manage VTE (Venous Thromboembolism) Risk  Recent Flowsheet Documentation  Taken 5/25/2022 1938 by Alexx Jacques RN  Activity Management: activity adjusted per tolerance  VTE Prevention/Management: (eliquis) --  Intervention: Prevent Infection  Recent Flowsheet Documentation  Taken 5/25/2022 1938 by Alexx Jacques RN  Infection Prevention: rest/sleep promoted  Goal: Optimal Comfort and Wellbeing  Outcome: Ongoing, Progressing  Intervention: Monitor Pain and Promote Comfort  Recent Flowsheet Documentation  Taken 5/25/2022 1938 by Alexx Jacques RN  Pain Management Interventions:   position adjusted   pillow support provided  Intervention: Provide Person-Centered Care  Recent Flowsheet Documentation  Taken 5/25/2022 1938 by Alexx Jacques RN  Trust Relationship/Rapport: care explained  Goal: Readiness for Transition of Care  Outcome: Ongoing, Progressing     Problem: Fall Injury Risk  Goal: Absence of Fall and Fall-Related Injury  Outcome: Ongoing, Progressing  Intervention: Promote Injury-Free Environment  Recent Flowsheet Documentation  Taken 5/25/2022 1938 by Alexx Jacques RN  Safety Promotion/Fall Prevention: safety round/check completed     Problem: Hypertension Comorbidity  Goal: Blood Pressure in Desired Range  Outcome: Ongoing, Progressing     Problem: Syncope  Goal: Absence of Syncopal  Symptoms  Outcome: Ongoing, Progressing  Intervention: Manage Effect of Syncopal Symptoms  Recent Flowsheet Documentation  Taken 5/25/2022 1938 by Alexx Jacques, RN  Supportive Measures: active listening utilized     Problem: Skin Injury Risk Increased  Goal: Skin Health and Integrity  Outcome: Ongoing, Progressing  Intervention: Optimize Skin Protection  Recent Flowsheet Documentation  Taken 5/25/2022 1938 by Alexx Jacques, RN  Head of Bed (HOB) Positioning: HOB elevated

## 2022-05-26 NOTE — PLAN OF CARE
Goal Outcome Evaluation:  Plan of Care Reviewed With: patient        Progress: no change  Outcome Evaluation: A&OX3 , VSS. no issues overnight

## 2022-05-26 NOTE — THERAPY TREATMENT NOTE
Acute Care - Physical Therapy Treatment Note  [facility]     Patient Name: Jose Dacosta  : 1935  MRN: [id number]  Today's Date: 2022      Visit Dx:     ICD-10-CM ICD-9-CM   1. Syncope and collapse  R55 780.2   2. Chronic congestive heart failure, unspecified heart failure type ([facility])  I50.9 428.0   3. Dementia without behavioral disturbance, unspecified dementia type ([facility])  F03.90 294.20   4. Impaired mobility and ADLs  Z74.09 V49.89    Z78.9    5. Impaired functional mobility, balance, gait, and endurance  Z74.09 V49.89     Patient Active Problem List   Diagnosis   • Coronary artery disease involving native coronary artery of native heart without angina pectoris   • Vasovagal syncope   • RBBB (right bundle branch block with left anterior fascicular block)   • Benign essential hypertension   • Ischemic cardiomyopathy   • Syncope and collapse   • Closed fracture of neck of left femur ([facility])   • Coronary artery disease with history of myocardial infarction without history of CABG   • Facial laceration   • HFrEF (heart failure with reduced ejection fraction) ([facility])   • Postoperative anemia due to acute blood loss   • Pneumonia of both lower lobes due to infectious organism   • Acute urinary retention   • Metabolic encephalopathy   • Lumbar radiculopathy   • Precordial pain   • Pulmonary emboli ([facility])   • Moderate malnutrition ([facility])   • Acute non-recurrent frontal sinusitis   • Anxiety   • Aphthous ulcer   • Atrial fibrillation ([facility])   • Benign prostatic hyperplasia   • Carotid stenosis   • Chronic back pain   • COPD, moderate ([facility])   • Esophageal reflux   • Generalized osteoarthritis of multiple sites   • Gout   • H/O: GI bleed   • Increased frequency of urination   • Insomnia, unspecified   • Left inguinal hernia   • Mild tetrahydrocannabinol (THC) abuse   • Mixed hyperlipidemia   • Peripheral neuropathy   • Restless legs syndrome (RLS)   • Urinary incontinence, mixed   • Vertigo   • CAD (coronary  artery disease), native coronary artery   • Postoperative anemia due to acute blood loss   • Pulmonary embolism ([facility])   • Impaired mobility and ADLs   • Systolic CHF, chronic (H[facility])     Past Medical History:   Diagnosis Date   • Abnormal ECG      Past Surgical History:   Procedure Laterality Date   • CORONARY STENT PLACEMENT     • HIP HEMIARTHROPLASTY Left 7/9/2020    Procedure: LEFT HIP HEMIARTHROPLASTY;  Surgeon: Jitendra Solis MD;  Location: [facility];  Service: Orthopedics;  Laterality: Left;     PT Assessment (last 12 hours)     PT Evaluation and Treatment     Row Name 05/26/22 1356          Physical Therapy Time and Intention    Subjective Information no complaints  -     Document Type therapy note (daily note)  -     Mode of Treatment physical therapy;individual therapy  -     Row Name 05/26/22 1356          General Information    Patient Profile Reviewed yes  -     Existing Precautions/Restrictions fall  -     Row Name 05/26/22 1356          Pain    Pretreatment Pain Rating 0/10 - no pain  -     Posttreatment Pain Rating 0/10 - no pain  -     Row Name 05/26/22 1356          Cognition    Orientation Status (Cognition) oriented to;person;place  -     Row Name 05/26/22 1356          Range of Motion Comprehensive    General Range of Motion --  -     Row Name 05/26/22 1356          Strength Comprehensive (MMT)    General Manual Muscle Testing (MMT) Assessment --  -     Row Name 05/26/22 1356          Bed Mobility    Bed Mobility supine-sit  -     Supine-Sit Park (Bed Mobility) standby assist  -     Sit-Supine Park (Bed Mobility) contact guard;standby assist  -     Assistive Device (Bed Mobility) head of bed elevated;bed rails  -     Row Name 05/26/22 1356          Transfers    Transfers sit-stand transfer;stand-sit transfer  -     Sit-Stand Park (Transfers) contact guard  -     Stand-Sit Park (Transfers) standby assist  -     Row Name  05/26/22 [facility]          Sit-Stand Transfer    Assistive Device (Sit-Stand Transfers) walker, front-wheeled  -     Row Name 05/26/22 1356          Stand-Sit Transfer    Assistive Device (Stand-Sit Transfers) walker, front-wheeled  -     Row Name 05/26/22 [facility]          Gait/Stairs (Locomotion)    [place] Level (Gait) contact guard  -     Assistive Device (Gait) walker, front-wheeled  -     Distance in Feet (Gait) 200'  -JW     Comment, (Gait/Stairs) vc's to stay up in wx  -     Row Name 05/26/22 1356          Safety Issues, Functional Mobility    Impairments Affecting Function (Mobility) balance;cognition;endurance/activity tolerance;strength  -[facility] Name 05/26/22 [facility]          Plan of Care Review    Plan of Care Reviewed With patient  -     Outcome Evaluation pt t/fers sup<>sit w/ SBA, sit<>stand w/ CGA/SBA and RW, pt ambulates ~200' w/ RW w/ CGA, pt requires vc's for safety w/ gt  and t/fers, pt did become slightly dizzy towards end of gt, improved w/ sitting, pt requested to return to bed once in room  -[facility] Name 05/26/22 [facility]          Vital Signs    Intratreatment Heart Rate (beats/min) 61  -JW     Intra SpO2 (%) 97  -JW     O2 Delivery Intra Treatment room air  -     Pre Patient Position Supine  -JW     Intra Patient Position Standing  -JW     Post Patient Position Side Lying  -JW     Row Name 05/26/22 [facility]          Positioning and Restraints    Pre-Treatment Position in bed  -     Post Treatment Position bed  -JW     In Bed side lying right;call light within reach;encouraged to call for assist;exit alarm on  -[facility] Name 05/26/22 [facility]          Therapy Assessment/Plan (PT)    Rehab Potential (PT) fair, will monitor progress closely  -     Criteria for Skilled Interventions Met (PT) yes;skilled treatment is necessary  -     Therapy Frequency (PT) 5 times/wk  -[facility] Name 05/26/22 [facility]          Bed Mobility Goal 1 (PT)    Activity/Assistive Device (Bed Mobility Goal 1, PT)  sit to supine/supine to sit  -JW     [place] Level/Cues Needed (Bed Mobility Goal 1, PT) independent  -JW     Time Frame (Bed Mobility Goal 1, PT) by discharge  -JW     Strategies/Barriers (Bed Mobility Goal 1, PT) HOB flat, no bed rails.  -     Row Name 05/26/22 1356          Transfer Goal 1 (PT)    Activity/Assistive Device (Transfer Goal 1, PT) sit-to-stand/stand-to-sit;bed-to-chair/chair-to-bed;walker, rolling  -JW     [place] Level/Cues Needed (Transfer Goal 1, PT) modified independence  -JW     Time Frame (Transfer Goal 1, PT) by discharge  -JW     Progress/Outcome (Transfer Goal 1, PT) goal not met  -     Row Name 05/26/22 1356          Gait Training Goal 1 (PT)    Activity/Assistive Device (Gait Training Goal 1, PT) walker, rolling  -JW     [place] Level (Gait Training Goal 1, PT) modified independence  -JW     Distance (Gait Training Goal 1, PT) 150'x1.  -JW     Time Frame (Gait Training Goal 1, PT) by discharge  -JW     Strategies/Barriers (Gait Training Goal 1, PT) Scoliosis.  -JW     Progress/Outcome (Gait Training Goal 1, PT) goal not met  -     Row Name 05/26/22 1351          Problem Specific Goal 1 (PT)    Problem Specific Goal 1 (PT) Score 24/28 on Tinetti fall risk assessment.  -JW     Time Frame (Problem Specific Goal 1, PT) by discharge  -JW     Progress/Outcome (Problem Specific Goal 1, PT) goal not met  -           User Key  (r) = Recorded By, (t) = Taken By, (c) = Cosigned By    Initials Name Provider Type    La Mendiola, PTA Physical Therapist Assistant                Physical Therapy Education                 Title: PT OT SLP Therapies (Resolved)     Topic: Physical Therapy (Resolved)     Point: Mobility training (Resolved)     Learning Progress Summary           Patient Acceptance, E, NR by CZ at 5/25/2022 1215    Comment: PT POC, use of walker, proper gait mechanics, HHPT.                   Point: Home exercise program (Resolved)     Learner Progress:   Not documented in this visit.          Point: Body mechanics (Resolved)     Learner Progress:  Not documented in this visit.          Point: Precautions (Resolved)     Learner Progress:  Not documented in this visit.                      User Key     Initials Effective Dates Name Provider Type Discipline    CZ 06/16/21 -  Merlin Gonzalez, PT Physical Therapist PT              PT Recommendation and Plan  Anticipated Discharge Disposition (PT): home with assist, home with home health  Therapy Frequency (PT): 5 times/wk  Plan of Care Reviewed With: patient  Outcome Evaluation: pt t/fers sup<>sit w/ SBA, sit<>stand w/ CGA/SBA and RW, pt ambulates ~200' w/ RW w/ CGA, pt requires vc's for safety w/ gt  and t/fers, pt did become slightly dizzy towards end of gt, improved w/ sitting, pt requested to return to bed once in room       Time Calculation:    PT Charges     Row Name 05/26/22 1256             Time Calculation    Start Time 1256  -      Stop Time 1324  -      Time Calculation (min) 28 min  -      PT Received On 05/26/22  -              Time Calculation- PT    Total Timed Code Minutes- PT 28 minute(s)  -            User Key  (r) = Recorded By, (t) = Taken By, (c) = Cosigned By    Initials Name Provider Type    La Mendiola, PTA Physical Therapist Assistant              Therapy Charges for Today     Code Description Service Date Service Provider Modifiers Qty    08746601008 HC GAIT TRAINING EA 15 MIN 5/26/2022 La Hankins, PTA GP 1    42178480967 HC PT THERAPEUTIC ACT EA 15 MIN 5/26/2022 La Hankins, PTA GP 1          PT G-Codes  Outcome Measure Options: AM-PAC 6 Clicks Basic Mobility (PT)  AM-PAC 6 Clicks Score (PT): 18  AM-PAC 6 Clicks Score (OT): 20    La Hankins PTA  5/26/2022

## 2022-05-26 NOTE — DISCHARGE PLACEMENT REQUEST
"Sylvia Dacosta (87 y.o. Male)             Date of Birth   1935    Social Security Number       Address   [address] [address]    Home Phone   [phone]    MRN   [id number]       Sabianism   Yazidi    Marital Status                               Admission Date   5/24/22    Admission Type   Emergency    Admitting Provider   Gayatri Quevedo MD    Attending Provider   Gayatri Quevedo MD    Department, Room/Bed   [address], 411/1       Discharge Date       Discharge Disposition   Home or Self Care    Discharge Destination                               Attending Provider: Gayatri Quevedo MD    Allergies: Hydrochlorothiazide    Isolation: None   Infection: None   Code Status: CPR   Advance Care Planning Activity    Ht: 172.7 cm (67.99\")   Wt: 63.7 kg (140 lb 8 oz)    Admission Cmt: None   Principal Problem: None                Active Insurance as of 5/24/2022     Primary Coverage     Payor Plan Insurance Group Employer/Plan Group    AETNA MEDICARE REPLACEMENT AETNA MEDICARE REPLACEMENT [id number]     Payor Plan Address Payor Plan Phone Number Payor Plan Fax Number Effective Dates    [address] [phone]  4/1/2019 - None Entered    [facility] [id number]       Subscriber Name Subscriber Birth Date Member ID       SYLVIA DACOSTA 1935 [id number]                 Emergency Contacts      (Rel.) Home Phone Work Phone Mobile Phone    AUREL[employer] BOX (Relative) [phone]    fiordaliza aguilar (Grandchild) [phone] -- [phone]              "

## 2022-05-26 NOTE — PLAN OF CARE
Goal Outcome Evaluation:  Plan of Care Reviewed With: patient           Outcome Evaluation: pt t/fers sup<>sit w/ SBA, sit<>stand w/ CGA/SBA and RW, pt ambulates ~200' w/ RW w/ CGA, pt requires vc's for safety w/ gt  and t/fers, pt did become slightly dizzy towards end of gt, improved w/ sitting, pt requested to return to bed once in room

## 2022-05-27 ENCOUNTER — HOME CARE VISIT (OUTPATIENT)
Dept: HOME HEALTH SERVICES | Facility: CLINIC | Age: 87
End: 2022-05-27

## 2022-05-27 VITALS — RESPIRATION RATE: 18 BRPM | OXYGEN SATURATION: 95 % | HEART RATE: 78 BPM

## 2022-05-27 PROCEDURE — G0152 HHCP-SERV OF OT,EA 15 MIN: HCPCS

## 2022-05-27 PROCEDURE — G0299 HHS/HOSPICE OF RN EA 15 MIN: HCPCS

## 2022-05-27 NOTE — CASE COMMUNICATION
[facility]  / Case Conference Note  Medical Necessity and focus of care:  PATIENT PRESENTS WITH HIGH FALL RISK, WEAKNESS, AND LIMITED ACTIVITY TOLERANCE AFTER RECENT HOSPITALIZATION FOR  SYNCOPE/COLLAPSE, FALL, FLUID OVERLOAD, CHF EXACERBATION, ACCELERATED HTN.  HE REQUIRES SKILLED HOME BASED OT SERVICES FOR FUNCTIONAL TRANSFER TRAINING, STANDING TOLERANCE FOR ADLS, B UE STRENGTHENING, AND EDUCATION FOR SAFETY/FALL PREVENTION AND B UE STREN GTHENING HEP.  Caregiver Status: DAUGHTER, NIECE, SELF  Patient's goal(s): GET STRONGER AND GET RID OF DIZZINESS  Environmental/ Physical issues:NONE  Any additional needs: NONE    Based upon record review and collaboration conference, the recommended frequency for this patient is   SN-----  PT-----  OT---- 1 WEEK 3  ST-----  HHA---  MSW---    Please verify your eval  scores: (Answer the scores  that pertain to your area of focus remove  the others)   : 5  : 2  : 3

## 2022-05-27 NOTE — HOME HEALTH
REASON FOR REFERRAL: 88 Y/O MALE AT [facility] 5/24/22-5/26/22 WITH SYNCOPE/COLLAPSE, FALL, FLUID OVERLOAD, CHF EXACERBATION, ACCELERATED HTN.  PATIENT REPORTED THAT HE IS WEAK AND STILL HAVING DIZZINESS.  HE SAYS HE FEELS IT DUE TO A PROBLEM HE IS HAVING WITH HIS LEFT EYE.  PATIENT IS BLIND IN HIS RIGHT EYE.  HE STATED THAT HE IS ALWAYS USING HIS WALKER AND HAS HAD NO FALLS SINCE DISCHARGED TO HOME.  HE IS NOTED TO BE KYPHOTIC WITH SPINE SHIFTED LATERALLY.  PATIENT REPORTED THAT HE HAS A HISTORY OF VERTBRAL FRACTURES THAT LEFT HIS BACK DEFORMED MANY YEARS AGO WHEN HE WAS WORKING IN THE [employer].  HE DENIES PAIN IN HIS BACK AT THIS TIME.     PMH:  CHF, L ROBE, CORONARY STENT PLACEMENT, HTN, CAD, A-FIB, DEMENTIA, GOUT.  BLINDNESS RIGHT EYE PER PATIENT'S REPORT.     PLOF:  INDEPENDENT WITH SPONGE BATHING, DRESSING, FUNCTIONAL TRANSFERS/FUINCTIONAL MOBILITY WITH RW.     HOME ENVIRONMENT:  PATIENT LIVES IN ONE LEVEL HOME WITH RAMP TO ENTER WITH B HANDRAILS.  PATIENT LIVES WITH HIS SPOUSE WHO HAS SEVERE DEMENTIA.  HIS DAUGHTER WAS PRESENT FOR EVALUATION AND REPORTED THAT EITHER SHE OR ANOTHER FAMILY MEMBER ARE IN DAILY TO COOK AND ASSIST THE PATIENT AND HIS WIFE AS NEEDED.     PRECAUTIONS: HIGH FALL RISK, LOW VISION, VERY HARD OF HEARING     MD APPTS: 6/1/22 JORGE LUIS SANDOVAL, 6/3/22 SALLY SANDOVAL     DME: RW    EDEMA: NOTED 1+ PITTING EDEMA B FEET     AROM B UES: WFL     STRENGTH B UES: 4/5 GROSSLY THROUGHOUT.     HAND DOMINANCE: RIGHT HAND DOMINANT, B  STRENGTHS: 4/5.     REHAB POTENTIAL:  GOOD FOR GOALS     WISH TO ADDRESS BATH/DRESSING WITH OT: NO     PATIENT'S GOAL: GET STRONGER AND GET RID OF THE DIZZINESS    MEDICAL NECESSITY:  PATIENT PRESENTS WITH HIGH FALL RISK, WEAKNESS, AND LIMITED ACTIVITY TOLERANCE AFTER RECENT HOSPITALIZATION FOR  SYNCOPE/COLLAPSE, FALL, FLUID OVERLOAD, CHF EXACERBATION, ACCELERATED HTN.  HE REQUIRES SKILLED HOME BASED OT SERVICES FOR FUNCTIONAL TRANSFER TRAINING, STANDING  TOLERANCE FOR ADLS, B UE STRENGTHENING, AND EDUCATION FOR SAFETY/FALL PREVENTION AND B UE STRENGTHENING HEP.

## 2022-05-29 VITALS
DIASTOLIC BLOOD PRESSURE: 76 MMHG | TEMPERATURE: 97.7 F | SYSTOLIC BLOOD PRESSURE: 132 MMHG | HEART RATE: 78 BPM | RESPIRATION RATE: 18 BRPM

## 2022-06-01 ENCOUNTER — HOME CARE VISIT (OUTPATIENT)
Dept: HOME HEALTH SERVICES | Facility: CLINIC | Age: 87
End: 2022-06-01

## 2022-06-02 ENCOUNTER — HOME CARE VISIT (OUTPATIENT)
Dept: HOME HEALTH SERVICES | Facility: CLINIC | Age: 87
End: 2022-06-02

## 2022-06-02 NOTE — CASE COMMUNICATION
Patient missed a OT visit from [facility] on 6/2/22    Reason: Via phone conversation, patient declined session d/t pain and fatigue    Osiel BARTON  6/2/22      For your records only.   As per home health protocol, MD must be notified of missed/cancelled visits; therefore the prescribed frequency was not met.

## 2022-06-06 ENCOUNTER — READMISSION MANAGEMENT (OUTPATIENT)
Dept: CALL CENTER | Facility: HOSPITAL | Age: 87
End: 2022-06-06

## 2022-06-06 NOTE — OUTREACH NOTE
CHF Week 2 Survey    Flowsheet Row Responses   [facility] patient discharged from? [place]   Does the patient have one of the following disease processes/diagnoses(primary or secondary)? CHF   Week 2 attempt successful? Yes   Call start time 1412   Call end time 1428   General alerts for this patient Pt very [place] on phone, requests us to call Rut his dtr.   Discharge diagnosis Syncope and collapse,  Chronic diastolic CHF     Is patient permission given to speak with other caregiver? Yes   List who call center can speak with DTR- Rut   Person spoke with today (if not patient) and relationship Rut   Does the patient have all medications ordered at discharge? N/A   Has the patient kept scheduled appointments due by today? Yes   What is the Home health agency?  [facility] to see 05/28/2022   Has home health visited the patient within 72 hours of discharge? Yes   Psychosocial issues? Yes   Comments Per dtr the pt rests/lays down alot, not very active, encouraged pt to walk about the house several times daily.Dtr plans to purchase a scale to begin daily wts, educated on importance of them.    What is the patient's perception of their health status since discharge? Improving   Is the patient weighing daily? No   Does the patient have scales? No   Daily weight interventions Education provided on importance of daily weight   Is the patient able to teach back Heart Failure diet management? Yes   Is the patient able to teach back Heart Failure Zones? Yes   Is the patient able to teach back signs and symptoms of worsening condition? (i.e. weight gain, shortness of air, etc.) Yes   CHF Week 2 call completed? Yes          GRADY SANCHEZ - Registered Nurse

## 2022-06-07 ENCOUNTER — HOME CARE VISIT (OUTPATIENT)
Dept: HOME HEALTH SERVICES | Facility: CLINIC | Age: 87
End: 2022-06-07

## 2022-06-07 VITALS
RESPIRATION RATE: 20 BRPM | SYSTOLIC BLOOD PRESSURE: 128 MMHG | HEART RATE: 63 BPM | TEMPERATURE: 98.9 F | OXYGEN SATURATION: 97 % | DIASTOLIC BLOOD PRESSURE: 80 MMHG

## 2022-06-07 PROCEDURE — G0494 LPN CARE EA 15MIN HH/HOSPICE: HCPCS

## 2022-06-10 ENCOUNTER — HOME CARE VISIT (OUTPATIENT)
Dept: HOME HEALTH SERVICES | Facility: CLINIC | Age: 87
End: 2022-06-10

## 2022-06-10 VITALS
HEART RATE: 65 BPM | SYSTOLIC BLOOD PRESSURE: 140 MMHG | OXYGEN SATURATION: 91 % | DIASTOLIC BLOOD PRESSURE: 72 MMHG | TEMPERATURE: 98 F | RESPIRATION RATE: 18 BRPM

## 2022-06-10 PROCEDURE — G0158 HHC OT ASSISTANT EA 15: HCPCS

## 2022-06-13 NOTE — CASE COMMUNICATION
OCCUPATIONAL THERAPY DISCHARGE;  DISCHARGE STATUS     TO SELF , FAMILY CHECKS ON PATIENT DAILY  DISCHARGE CONDITION GOOD  DISCHARGE REASON    GOALS MET  SERVICES CONTINUING  SKILLED NURSING  COMMUNICATION / CARE COORDINATION OTR/POC           HHACN/MEDICARE 2 DAY NOTICE GIVEN:  YES  PATIENT AND CAREGIVER  ARE AGREEABLE WITH DISCHARGE PLAN  SUMMARY OF CARE ALL GOALS MET , PROVIDED STRENGTH TRAINING, FUNCTIONAL TRANSFER TRAINING.   PRIOR  LEVEL  Patient demonstrated decreased indpendence with functional mobiliy and transfers,  patient also demonstrated decreased safety awareness.  Patient demonstated decreased UE Strength and decreased activity tolerance.     CURRENT LEVEL Patient now able to demonstrate ability to complete functional transfers with supervision/MOD I.  Patient albe to display understanding of fall prevention and safety education.  Patient also now has HE P  in place and able to complete with good tolerance in order to continue to increased UE Strength and activity tolerance.

## 2022-06-14 ENCOUNTER — READMISSION MANAGEMENT (OUTPATIENT)
Dept: CALL CENTER | Facility: HOSPITAL | Age: 87
End: 2022-06-14

## 2022-06-14 ENCOUNTER — HOME CARE VISIT (OUTPATIENT)
Dept: HOME HEALTH SERVICES | Facility: CLINIC | Age: 87
End: 2022-06-14

## 2022-06-14 PROCEDURE — G0300 HHS/HOSPICE OF LPN EA 15 MIN: HCPCS

## 2022-06-14 NOTE — OUTREACH NOTE
CHF Week 3 Survey    Flowsheet Row Responses   [facility] in [place] facility patient discharged from? [place]   Does the patient have one of the following disease processes/diagnoses(primary or secondary)? CHF   Week 3 attempt successful? No   Unsuccessful attempts Attempt 1          GERALDINE BACON - Registered Nurse

## 2022-06-15 VITALS
TEMPERATURE: 98.6 F | DIASTOLIC BLOOD PRESSURE: 68 MMHG | OXYGEN SATURATION: 97 % | RESPIRATION RATE: 18 BRPM | HEART RATE: 70 BPM | SYSTOLIC BLOOD PRESSURE: 142 MMHG

## 2022-06-23 ENCOUNTER — HOME CARE VISIT (OUTPATIENT)
Dept: HOME HEALTH SERVICES | Facility: CLINIC | Age: 87
End: 2022-06-23

## 2022-06-23 PROCEDURE — G0299 HHS/HOSPICE OF RN EA 15 MIN: HCPCS

## 2022-06-27 ENCOUNTER — OFFICE VISIT (OUTPATIENT)
Dept: CARDIOLOGY | Facility: CLINIC | Age: 87
End: 2022-06-27

## 2022-06-27 ENCOUNTER — LAB (OUTPATIENT)
Dept: LAB | Facility: HOSPITAL | Age: 87
End: 2022-06-27

## 2022-06-27 VITALS
SYSTOLIC BLOOD PRESSURE: 132 MMHG | DIASTOLIC BLOOD PRESSURE: 80 MMHG | BODY MASS INDEX: 18.5 KG/M2 | OXYGEN SATURATION: 97 % | HEIGHT: 72 IN | HEART RATE: 68 BPM | WEIGHT: 136.6 LBS

## 2022-06-27 VITALS
HEART RATE: 70 BPM | SYSTOLIC BLOOD PRESSURE: 118 MMHG | OXYGEN SATURATION: 98 % | TEMPERATURE: 96.4 F | RESPIRATION RATE: 18 BRPM | DIASTOLIC BLOOD PRESSURE: 64 MMHG

## 2022-06-27 DIAGNOSIS — M1A.3720 CHRONIC GOUT OF LEFT ANKLE DUE TO RENAL IMPAIRMENT WITHOUT TOPHUS: ICD-10-CM

## 2022-06-27 DIAGNOSIS — I50.23 ACUTE ON CHRONIC SYSTOLIC CONGESTIVE HEART FAILURE: Primary | ICD-10-CM

## 2022-06-27 DIAGNOSIS — I50.23 ACUTE ON CHRONIC SYSTOLIC CONGESTIVE HEART FAILURE: ICD-10-CM

## 2022-06-27 DIAGNOSIS — R22.42 LOCALIZED SWELLING OF LEFT LOWER LEG: ICD-10-CM

## 2022-06-27 LAB
ANION GAP SERPL CALCULATED.3IONS-SCNC: 7 MMOL/L (ref 5–15)
BASOPHILS # BLD AUTO: 0.11 10*3/MM3 (ref 0–0.2)
BASOPHILS NFR BLD AUTO: 1.3 % (ref 0–1.5)
BUN SERPL-MCNC: 14 MG/DL (ref 8–23)
BUN/CREAT SERPL: 11.9 (ref 7–25)
CALCIUM SPEC-SCNC: 9.8 MG/DL (ref 8.6–10.5)
CHLORIDE SERPL-SCNC: 100 MMOL/L (ref 98–107)
CO2 SERPL-SCNC: 29 MMOL/L (ref 22–29)
CREAT SERPL-MCNC: 1.18 MG/DL (ref 0.76–1.27)
DEPRECATED RDW RBC AUTO: 55 FL (ref 37–54)
EGFRCR SERPLBLD CKD-EPI 2021: 60.1 ML/MIN/1.73
EOSINOPHIL # BLD AUTO: 0.19 10*3/MM3 (ref 0–0.4)
EOSINOPHIL NFR BLD AUTO: 2.2 % (ref 0.3–6.2)
ERYTHROCYTE [DISTWIDTH] IN BLOOD BY AUTOMATED COUNT: 15.5 % (ref 12.3–15.4)
GLUCOSE SERPL-MCNC: 91 MG/DL (ref 65–99)
HCT VFR BLD AUTO: 36.5 % (ref 37.5–51)
HGB BLD-MCNC: 12.2 G/DL (ref 13–17.7)
IMM GRANULOCYTES # BLD AUTO: 0.02 10*3/MM3 (ref 0–0.05)
IMM GRANULOCYTES NFR BLD AUTO: 0.2 % (ref 0–0.5)
LYMPHOCYTES # BLD AUTO: 2.03 10*3/MM3 (ref 0.7–3.1)
LYMPHOCYTES NFR BLD AUTO: 23.6 % (ref 19.6–45.3)
MCH RBC QN AUTO: 32 PG (ref 26.6–33)
MCHC RBC AUTO-ENTMCNC: 33.4 G/DL (ref 31.5–35.7)
MCV RBC AUTO: 95.8 FL (ref 79–97)
MONOCYTES # BLD AUTO: 0.81 10*3/MM3 (ref 0.1–0.9)
MONOCYTES NFR BLD AUTO: 9.4 % (ref 5–12)
NEUTROPHILS NFR BLD AUTO: 5.44 10*3/MM3 (ref 1.7–7)
NEUTROPHILS NFR BLD AUTO: 63.3 % (ref 42.7–76)
NRBC BLD AUTO-RTO: 0 /100 WBC (ref 0–0.2)
PLATELET # BLD AUTO: 202 10*3/MM3 (ref 140–450)
PMV BLD AUTO: 10.4 FL (ref 6–12)
POTASSIUM SERPL-SCNC: 4.3 MMOL/L (ref 3.5–5.2)
RBC # BLD AUTO: 3.81 10*6/MM3 (ref 4.14–5.8)
SODIUM SERPL-SCNC: 136 MMOL/L (ref 136–145)
URATE SERPL-MCNC: 7.3 MG/DL (ref 3.4–7)
WBC NRBC COR # BLD: 8.6 10*3/MM3 (ref 3.4–10.8)

## 2022-06-27 PROCEDURE — 80048 BASIC METABOLIC PNL TOTAL CA: CPT

## 2022-06-27 PROCEDURE — 36415 COLL VENOUS BLD VENIPUNCTURE: CPT

## 2022-06-27 PROCEDURE — 84550 ASSAY OF BLOOD/URIC ACID: CPT

## 2022-06-27 PROCEDURE — 99215 OFFICE O/P EST HI 40 MIN: CPT | Performed by: NURSE PRACTITIONER

## 2022-06-27 PROCEDURE — 85025 COMPLETE CBC W/AUTO DIFF WBC: CPT

## 2022-06-27 RX ORDER — FUROSEMIDE 20 MG/1
20 TABLET ORAL
COMMUNITY
Start: 2022-06-01 | End: 2022-06-27 | Stop reason: SDUPTHER

## 2022-06-27 RX ORDER — COLCHICINE 0.6 MG/1
0.6 TABLET ORAL DAILY
COMMUNITY

## 2022-06-27 RX ORDER — HYDROCODONE BITARTRATE AND ACETAMINOPHEN 5; 325 MG/1; MG/1
1 TABLET ORAL 2 TIMES DAILY
COMMUNITY
Start: 2022-04-20

## 2022-06-27 RX ORDER — FUROSEMIDE 20 MG/1
20 TABLET ORAL DAILY
Qty: 90 TABLET | Refills: 3 | Status: SHIPPED | OUTPATIENT
Start: 2022-06-27 | End: 2023-06-28

## 2022-06-27 RX ORDER — TAMSULOSIN HYDROCHLORIDE 0.4 MG/1
0.4 CAPSULE ORAL DAILY
COMMUNITY
Start: 2021-09-01 | End: 2022-09-02

## 2022-06-27 NOTE — PROGRESS NOTES
Congestive Heart Failure (Chief Complaint )    Congestive Heart Failure  Presents for initial visit. The disease course has been stable. Associated symptoms include shortness of breath. Pertinent negatives include no abdominal pain, chest pain, near-syncope or palpitations. The symptoms have been stable. Past treatments include ACE inhibitors, beta blockers and angiotensin receptor blockers. The treatment provided no relief. Compliance with prior treatments has been good. Past compliance problems: Hard of hearing may have impaired treatment. His past medical history is significant for CAD, chronic lung disease, DVT, HTN and valvular heart disease. There is no history of myocarditis or PE.       Mr. Jose Dacosta is a 66-year-old  male with extensive medical history of coronary artery disease, bifascicular block, ischemic cardiomyopathy, mild mitral valve regurgitation, trace aortic valve regurgitation, and trace tricuspid valve regurgitation.  Also has medical history of syncope, heart failure with reduced LVEF 25% since 2020, carotid stenosis, COPD, atrial fibrillation, anxiety, gout, GERD, and deconditioning.    Was sent to CHF clinic for help in managing systolic heart failure.  He was seen in the hospital and admitted on 5/24/2022 and discharged on 5/26.  Noted to have elevated D-dimer 1624, CT of chest negative for PE, proBNP 7363.  Carotid work-up while admitted shows carotid stenosis in the right 50%, left 50 to 69%.    He presents today complaining of left ankle and foot pain and swelling.  He reports that right ankle and foot swell to but not today.  Left leg pain started a couple of months ago.  It is sore to touch and he describes that foot is tingling at times with sharp shooting pain.  Lower leg edema is better when legs are raised.  Pain in LLE does not improve with leg elevation.  It is keeping him up at night.  He has had a DVT in the past in the left popliteal vein.    He complains of  shortness of air but states he always has that.  He denies orthopnea and PND.  Able to lie flat, and sleeps on 1 pillow nightly.  He has been taking 20 mg of Lasix daily since he has been in the ER and was discharged on diuretic.  Last creatinine normal but has not been checked since discharge from hospital.    I asked him if he thought his left foot pain was gout and he said he did not know.  He takes allopurinol and colchicine.  Daughter is with him today and has helped provide some history and disease progression information.  Daughter's name is Rut, cell phone is [phone].  Mr. Dacosta lives alone, reports that he is able to walk with walker and do activity of daily living.  He does get help with meals and some light housework from his daughters.      Past Medical History:   Diagnosis Date   • Abnormal ECG      Past Surgical History:   Procedure Laterality Date   • CORONARY STENT PLACEMENT     • HIP HEMIARTHROPLASTY Left 2020    Procedure: LEFT HIP HEMIARTHROPLASTY;  Surgeon: Jitendra Solis MD;  Location: [facility];  Service: Orthopedics;  Laterality: Left;     Social History     Socioeconomic History   • Marital status:    Tobacco Use   • Smoking status: Former Smoker     Packs/day: 0.50     Types: Cigarettes     Start date: 3/3/1956     Quit date:      Years since quittin.5   • Smokeless tobacco: Never Used   Substance and Sexual Activity   • Alcohol use: No   • Drug use: No   • Sexual activity: Defer     Family History   Problem Relation Age of Onset   • Heart failure Mother    • Heart failure Father        ALLERGIES:  Allergies   Allergen Reactions   • Hydrochlorothiazide Swelling     TONGUE SWELLING       Daughter is helping due to hard of hearing  Review of Systems   Constitutional: Negative for fever and night sweats.   HENT: Negative for congestion and sore throat.         Hard of hearing   Eyes: Negative for blurred vision and double vision.   Cardiovascular:  Positive for dyspnea on exertion and leg swelling. Negative for chest pain, near-syncope, orthopnea, palpitations and paroxysmal nocturnal dyspnea.   Respiratory: Positive for shortness of breath. Negative for cough and wheezing.    Endocrine: Negative for polydipsia and polyphagia.   Hematologic/Lymphatic: Negative for adenopathy and bleeding problem.   Skin: Negative for flushing and poor wound healing.   Musculoskeletal: Positive for joint swelling.        Left foot pain, normally walks with walker, today he is in wheelchair   Gastrointestinal: Negative for abdominal pain, nausea and vomiting.   Genitourinary: Negative for dysuria and hematuria.   Neurological: Negative for dizziness, focal weakness, headaches and light-headedness.   Psychiatric/Behavioral: Negative for altered mental status and depression.       Current Outpatient Medications   Medication Sig Dispense Refill   • albuterol sulfate  (90 Base) MCG/ACT inhaler INHALE 2 PUFFS INTO THE LUNGS EVERY 6 (SIX) HOURS AS NEEDED FOR WHEEZING  3   • allopurinol (ZYLOPRIM) 100 MG tablet Take 100 mg by mouth Daily.     • apixaban (ELIQUIS) 5 MG tablet tablet Take 5 mg by mouth 2 (Two) Times a Day.     • aspirin 81 MG EC tablet Take 1 tablet by mouth Daily. 30 tablet 3   • atorvastatin (LIPITOR) 20 MG tablet Take 1 tablet by mouth.     • colchicine 0.6 MG tablet Take 0.6 mg by mouth Daily.     • doxepin (SINEquan) 10 MG capsule Take 10 mg by mouth Every Night.     • furosemide (LASIX) 20 MG tablet Take 1 tablet by mouth Daily. 90 tablet 3   • HYDROcodone-acetaminophen (NORCO) 5-325 MG per tablet Take 1 tablet by mouth 2 (Two) Times a Day.     • losartan (COZAAR) 25 MG tablet TAKE 1 TABLET BY MOUTH EVERY DAY 30 tablet 5   • meloxicam (MOBIC) 7.5 MG tablet Take 7.5 mg by mouth Daily.     • metoprolol succinate XL (TOPROL-XL) 25 MG 24 hr tablet TAKE 1 TABLET BY MOUTH EVERY DAY 30 tablet 5   • omeprazole (priLOSEC) 40 MG capsule Take 40 mg by mouth Every  Morning.  2   • rOPINIRole (REQUIP) 0.5 MG tablet Take 0.5 mg by mouth Daily.  2   • tamsulosin (FLOMAX) 0.4 MG capsule 24 hr capsule Take 0.4 mg by mouth Daily.     • oxybutynin XL (DITROPAN-XL) 5 MG 24 hr tablet Take 5 mg by mouth 3 (Three) Times a Day.     • tolterodine (DETROL) 2 MG tablet Take 2 mg by mouth 2 (Two) Times a Day.       No current facility-administered medications for this visit.       OBJECTIVE:    Physical Exam:   Vitals reviewed.   Constitutional:       Appearance: Not in distress. Cachectic. Chronically ill-appearing.      Comments: Appears deconditioned.   Eyes:      General: Lids are normal.      Conjunctiva/sclera: Conjunctivae normal.      Right eye: Right conjunctiva is not injected.      Left eye: Left conjunctiva is not injected.      Pupils: Pupils are equal, round, and reactive to light.   HENT:      Head: Normocephalic and atraumatic.   Neck:      Thyroid: Thyroid normal. No thyromegaly.      Vascular: JVD normal.      Trachea: Trachea normal.   Pulmonary:      Effort: Pulmonary effort is normal.      Breath sounds: Normal breath sounds and air entry. No wheezing. No rhonchi. No rales.   Cardiovascular:      PMI at left midclavicular line. Normal rate. Regular rhythm. Normal S1. Normal S2.      Murmurs: There is no murmur.      No gallop.      Comments: Left ankle more edematous than right, left ankle and lateral side of foot reddened and tender to touch.  Edema:     Peripheral edema present.     Ankle: 1+ edema of the left ankle and trace edema of the right ankle.     Feet: bilateral trace edema of the feet.  Abdominal:      General: There is no distension.      Palpations: Abdomen is soft.   Musculoskeletal:      Cervical back: Normal range of motion. Skin:     General: Skin is warm and dry.      Capillary Refill: Capillary refill takes less than 2 seconds.      Coloration: Skin is not jaundiced or pale.   Neurological:      Mental Status: Alert, oriented to person, place, and time  "and oriented to person, place and time.      Gait: Gait is intact.   Psychiatric:         Attention and Perception: Attention normal.         Speech: Speech normal.         Behavior: Behavior is cooperative.         Thought Content: Thought content normal.       Vitals:    06/27/22 1304   BP: 132/80   BP Location: Left arm   Patient Position: Sitting   Cuff Size: Adult   Pulse: 68   SpO2: 97%   Weight: 62 kg (136 lb 9.6 oz)   Height: 182.9 cm (72\")       DATA REVIEWED:   Results for orders placed during the hospital encounter of 05/24/22    Adult Transthoracic Echo Complete W/ Cont if Necessary Per Protocol    Interpretation Summary  · Estimated right ventricular systolic pressure from tricuspid regurgitation is normal (<35 mmHg).  · The left ventricular cavity is moderately dilated.  · Estimated left ventricular EF = 25% Left ventricular ejection fraction appears to be 21 - 25%. Left ventricular systolic function is moderately decreased.  · Left ventricular diastolic function is consistent with (grade I) impaired relaxation.  · Left atrial volume is moderately increased.  · The mitral valve is grossly normal in structure. Mild mitral valve regurgitation is present.      No radiology results for the last 30 days.  CXR:   PORTABLE CHEST     HISTORY: Syncope     Portable AP upright film of the chest was obtained at 8:25 AM.  COMPARISON: November 3, 2020     FINDINGS:   The lungs are clear of an acute process.  Linear scarring lung bases.  Old granulomatous disease is present.  Sternotomy.  The heart is not enlarged.  The pulmonary vasculature is not increased.  No pleural effusion.  No pneumothorax.  No acute osseous abnormality.  Degenerative changes are present in the thoracic spine.  Hypertrophic change acromioclavicular joints.     IMPRESSION:  CONCLUSION:  No Acute Disease.  Sternotomy.   Electronically signed by:  Emmanuel Burris MD  5/24/2022 9:30 AM CDT    CT of chest:  CT chest with contrast. CTA thorax. " Pulmonary embolism study.         CLINICAL INDICATION: Shortness of breath, chest pain        COMPARISON: Chest x-ray May 24, 2022.        TECHNIQUE: Nonionic IV contrast. 64 cc [id number]. Helical  scanning with axial and coronal reformations. Soft tissue, lung,  liver, and bone windows reviewed. Computer-generated 3-D images,  CT angiography including MIP images are obtained.     This exam was performed according to our departmental  dose-optimization program, which includes automated exposure  control, adjustment of the mA and/or kV according to patient size  and/or use of iterative reconstruction technique.     CHEST CT FINDINGS:  No evidence for pulmonary emboli.    No  evidence of pathologically enlarged nodes. Midline sternal  sutures from prior cardiac surgery.     Discoid changes right middle lobe inferiorly probably fibrosis.  The lungs are otherwise clear.      No evidence of pleural fluid.      The adrenal glands are normal size. Remainder of visualized upper  abdomen is grossly unremarkable. Degenerative changes of the  spine. Bones unremarkable for age.     IMPRESSION:  No evidence for pulmonary emboli.          Electronically signed by:  Esdras Paul MD  5/24/2022 12:56 PM CDT       Imaging      Labs: BMP, CBC, LIPID, TSH  Lab Results   Component Value Date    GLUCOSE 91 06/27/2022    CALCIUM 9.8 06/27/2022     06/27/2022    K 4.3 06/27/2022    CO2 29.0 06/27/2022     06/27/2022    BUN 14 06/27/2022    CREATININE 1.18 06/27/2022    EGFRIFAFRI 64 08/21/2018    EGFRIFNONA 67 11/06/2020    BCR 11.9 06/27/2022    ANIONGAP 7.0 06/27/2022     Lab Results   Component Value Date    WBC 8.60 06/27/2022    HGB 12.2 (L) 06/27/2022    HCT 36.5 (L) 06/27/2022    MCV 95.8 06/27/2022     06/27/2022     Lab Results   Component Value Date    CHOL 151 11/04/2020     Lab Results   Component Value Date    TRIG 70 11/04/2020     Lab Results   Component Value Date    HDL 71 (H) 11/04/2020     No  components found for: LDLCALC  Lab Results   Component Value Date    LDL 66 11/04/2020     No results found for: HDLLDLRATIO  No components found for: CHOLHDL  No results found for: TSH  Lab Results   Component Value Date    PROBNP 7,363.0 (H) 05/24/2022     EKG:      TTE: 5/25/2022  Interpretation Summary    · Estimated right ventricular systolic pressure from tricuspid regurgitation is normal (<35 mmHg).  · The left ventricular cavity is moderately dilated.  · Estimated left ventricular EF = 25% Left ventricular ejection fraction appears to be 21 - 25%. Left ventricular systolic function is moderately decreased.  · Left ventricular diastolic function is consistent with (grade I) impaired relaxation.  · Left atrial volume is moderately increased.  · The mitral valve is grossly normal in structure. Mild mitral valve regurgitation is present.       Stress tests: 7/8/2019  Interpretation Summary    · Findings consistent with an equivocal ECG stress test.  · Left ventricular ejection fraction is moderately reduced (Calculated EF = 26%).  · Moderately fixed inferolateral defect with no reversibility, LV dilated     The following portions of the patient's history were reviewed and updated as appropriate: allergies, current medications, past family history, past medical history, past social history, past surgical history and problem list.  Old records reviewed and pertinent information is included in the above objective data.     ASSESSMENT/PLAN:     Diagnosis Plan   1. Acute on chronic systolic congestive heart failure (HCC)  furosemide (LASIX) 20 MG tablet    Basic Metabolic Panel    CBC & Differential   2. Localized swelling of left lower leg  US Venous Doppler Lower Extremity Bilateral (duplex)    Uric Acid   3. 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 Acute on chronic systolic heart failure.  Does complain of some shortness of breath.  Able to get around in his house using  walker and complete some activities of daily living.  Does get a lot of help from his daughters who make sure he has meals to eat and cleans house.  He lives alone.  Today he is complaining of left lower ankle and foot pain.     /ICM: EF:25%. NYHA Class IV, Stage C. Patient appears euvolemic.  and in a well perfused physiologic state. Hemodynamics are acceptable.    BETA-BLOCKER:  Toprol XL 25 mg daily  ACE/ARB: Losartan 25 mg daily  ENTRESTO: Indicated, can consider but would have to be off losartan.  Blood pressure would support, we need to get kidney function tests since he has not had assessed since discharge from hospital.  SGL2: Indicated can discuss next time.  DIURETIC: Continue Lasix 20 mg daily, refilled today  ALDOSTERONE ANTAGONIST: Indicated, awaiting BMP  IMDUR/HYDRALAZINE: N/A  DIGOXIN: N/A  Fluid restriction: 2 L  Sodium restriction:2 grams  6MWT: Not able to perform.  Unlikely that we will get this.  Cardiac Rehab: no  ICD: Per patient and daughter, he has refused this in the past.  He does have biventricular fascicular block, will discuss with Dr. Licona.  Will see if he is a candidate for BiV ICD and if he is willing  ADHF: 5/24 to 5/26/2022 CHF  LVAD: Not a candidate     -Obtain BMP and CBC to assess for kidney function, infection, and anemia.    2./3.  Lower leg swelling, possible DVT versus gout, acute flare.  He has had left popliteal venous DVT in the past.  Left leg is more swollen than right.  He is also had gout in the past and treated with allopurinol and colchicine per daughter.      -Obtain venous Doppler ultrasound to assess for DVT  -Ordered uric acid level today.  We will call him with results    I spent 45 minutes caring for Jose on this date of service. This time includes time spent by me in the following activities: He is very time-consuming with his hard of hearing and did not understand a lot about heart failure process.  Time took preparing for the visit, reviewing tests,  obtaining and/or reviewing a separately obtained history, performing a medically appropriate examination and/or evaluation, counseling and educating the patient/family/caregiver, ordering medications, tests, or procedures, referring and communicating with other health care professionals, documenting information in the medical record, independently interpreting results and communicating that information with the patient/family/caregiver and care coordination  Return in about 2 weeks (around 7/11/2022) for Recheck.          This document has been electronically signed by JAIME Nielsen on June 27, 2022 18:40 CDT   Electronically signed by JAIME Nielsen, 06/27/22, 1:21 PM CDT.

## 2022-06-29 ENCOUNTER — HOME CARE VISIT (OUTPATIENT)
Dept: HOME HEALTH SERVICES | Facility: HOME HEALTHCARE | Age: 87
End: 2022-06-29

## 2022-06-29 ENCOUNTER — HOME CARE VISIT (OUTPATIENT)
Dept: HOME HEALTH SERVICES | Facility: CLINIC | Age: 87
End: 2022-06-29

## 2022-06-29 VITALS
DIASTOLIC BLOOD PRESSURE: 78 MMHG | RESPIRATION RATE: 16 BRPM | BODY MASS INDEX: 20.04 KG/M2 | HEIGHT: 70 IN | WEIGHT: 140 LBS | SYSTOLIC BLOOD PRESSURE: 135 MMHG | OXYGEN SATURATION: 98 % | TEMPERATURE: 98.2 F | HEART RATE: 72 BPM

## 2022-06-29 PROCEDURE — G0299 HHS/HOSPICE OF RN EA 15 MIN: HCPCS

## 2022-07-06 ENCOUNTER — HOME CARE VISIT (OUTPATIENT)
Dept: HOME HEALTH SERVICES | Facility: CLINIC | Age: 87
End: 2022-07-06

## 2022-07-06 PROCEDURE — G0300 HHS/HOSPICE OF LPN EA 15 MIN: HCPCS

## 2022-07-07 VITALS
OXYGEN SATURATION: 98 % | SYSTOLIC BLOOD PRESSURE: 122 MMHG | RESPIRATION RATE: 18 BRPM | HEART RATE: 60 BPM | TEMPERATURE: 98.3 F | DIASTOLIC BLOOD PRESSURE: 62 MMHG

## 2022-07-12 ENCOUNTER — HOME CARE VISIT (OUTPATIENT)
Dept: HOME HEALTH SERVICES | Facility: CLINIC | Age: 87
End: 2022-07-12

## 2022-07-12 PROCEDURE — G0299 HHS/HOSPICE OF RN EA 15 MIN: HCPCS

## 2022-07-14 VITALS
RESPIRATION RATE: 16 BRPM | HEART RATE: 66 BPM | DIASTOLIC BLOOD PRESSURE: 72 MMHG | OXYGEN SATURATION: 97 % | TEMPERATURE: 96.4 F | SYSTOLIC BLOOD PRESSURE: 118 MMHG

## 2023-01-25 DIAGNOSIS — S72.002D CLOSED FRACTURE OF NECK OF LEFT FEMUR WITH ROUTINE HEALING, SUBSEQUENT ENCOUNTER: Primary | ICD-10-CM

## 2024-08-06 NOTE — THERAPY TREATMENT NOTE
Acute Care - Occupational Therapy Treatment Note  [facility]     Patient Name: Jose Dacosta  : 1935  MRN: [id number]  Today's Date: 2020  Onset of Illness/Injury or Date of Surgery: 20  Date of Referral to OT: 20  Referring Physician: Dr. Melendez    Admit Date: 2020       ICD-10-CM ICD-9-CM   1. Pneumonia of both lower lobes due to infectious organism J18.9 486   2. Altered mental status, unspecified altered mental status type R41.82 780.97   3. Impaired mobility and ADLs Z74.09 V49.89    Z78.9    4. Oropharyngeal dysphagia R13.12 787.22   5. Decreased functional mobility R26.89 781.99     Patient Active Problem List   Diagnosis   • Coronary artery disease involving native coronary artery of native heart without angina pectoris   • Vasovagal syncope   • RBBB (right bundle branch block with left anterior fascicular block)   • Essential hypertension   • Ischemic cardiomyopathy   • Syncope and collapse   • Closed fracture of neck of left femur (CMS/[facility])   • Coronary artery disease with history of myocardial infarction without history of CABG   • Facial laceration   • HFrEF (heart failure with reduced ejection fraction) (CMS/[facility])   • Postoperative anemia due to acute blood loss   • Pneumonia of both lower lobes due to infectious organism     Past Medical History:   Diagnosis Date   • Abnormal ECG      Past Surgical History:   Procedure Laterality Date   • CORONARY STENT PLACEMENT     • HIP HEMIARTHROPLASTY Left 2020    Procedure: LEFT HIP HEMIARTHROPLASTY;  Surgeon: Jitendra Solis MD;  Location: [facility];  Service: Orthopedics;  Laterality: Left;       Therapy Treatment    Rehabilitation Treatment Summary     Row Name 20 1520 20 1336          Treatment Time/Intention    Discipline  occupational therapist  -AS  physical therapy assistant  -LILA     Document Type  therapy note (daily note)  -AS  therapy note (daily note)  -LILA     Subjective Information  no  complaints  -AS  --     Mode of Treatment  individual therapy;occupational therapy  -AS  physical therapy;individual therapy  -LILA     Patient/Family Observations  no family present  -AS  --     Care Plan Review  evaluation/treatment results reviewed;care plan/treatment goals reviewed;risks/benefits reviewed;current/potential barriers reviewed;patient/other agree to care plan  -AS  --     Total Minutes, Occupational Therapy Treatment  23  -AS  --     Therapy Frequency (OT Eval)  other (see comments) 5-7 days/wk  -AS  --     Patient Effort  --  adequate  -LILA     Comment  Pt confused and naked and trying to exit bed upon OT arrival. PT noted with feces on hands and bedding. Pt adamant to sit EOB, after sitting approx 5 min, pt impulsively throws himself back to being supine and refuses to move further. Pt (D) for all clean up and bedding change  -AS  pt is confused, laying in the bed naked upon entry. pt has feces on sheets, blankets, and himself. pt not following comands well and constantly reaching to hold staff with soiled hands while rolling and repositioning. pt digs at his rectum after having BM and this is what causes a mess.   -JC2     Existing Precautions/Restrictions  fall  -AS  fall  -LILA     Recorded by [AS] Ligia Junior, OT 08/04/20 1601 [LILA] Vikash Walters, PTA 08/04/20 1352  [JC2] Vikash Watlers, PTA 08/04/20 1540     Row Name 08/04/20 1336             Vital Signs    Pre Systolic BP Rehab  156  -LILA      Pre Treatment Diastolic BP  84  -LILA      Post Systolic BP Rehab  144  -JC2      Post Treatment Diastolic BP  83  -JC2      Pretreatment Heart Rate (beats/min)  114  -JC2      Posttreatment Heart Rate (beats/min)  118  -JC2      Pre SpO2 (%)  96  -LILA      O2 Delivery Pre Treatment  room air  -LILA      Post SpO2 (%)  93  -JC2      O2 Delivery Post Treatment  room air  -JC2      Pre Patient Position  Supine  -JC3      Post Patient Position  Supine  -JC3      Recorded by [LILA] Vikash Walters, PTA  08/04/20 1352  [JC2] Vikash Walters, PTA 08/04/20 1410  [JC3] Vikash Walters, PTA 08/04/20 1540      Row Name 08/04/20 1520 08/04/20 1336          Cognitive Assessment/Intervention- PT/OT    Affect/Mental Status (Cognitive)  confused  -AS  confused  -LILA     Orientation Status (Cognition)  oriented to;person  -AS  oriented to;person;place  -LILA     Follows Commands (Cognition)  follows one step commands;50-74% accuracy  -AS  follows one step commands;75-90% accuracy  -LILA     Recorded by [AS] Ligia Junior, OT 08/04/20 1601 [LILA] Vikash Walters, PTA 08/04/20 1352     Row Name 08/04/20 1336             Mobility Assessment/Intervention    Extremity Weight-bearing Status  left lower extremity  -LILA      Left Lower Extremity (Weight-bearing Status)  weight-bearing as tolerated (WBAT)  -LILA      Recorded by [LILA] Vikash Walters, PTA 08/04/20 1352      Row Name 08/04/20 1520 08/04/20 1336          Bed Mobility Assessment/Treatment    Bed Mobility Assessment/Treatment  rolling left;rolling right;supine-sit;sit-supine  -AS  rolling left;rolling right;scooting/bridging  -LILA     Rolling Left [place] (Bed Mobility)  minimum assist (75% patient effort);verbal cues  -AS  maximum assist (25% patient effort)  -LILA     Rolling Right [place] (Bed Mobility)  minimum assist (75% patient effort);verbal cues  -AS  moderate assist (50% patient effort)  -LILA     Scooting/Bridging [place] (Bed Mobility)  minimum assist (75% patient effort);verbal cues  -AS  dependent (less than 25% patient effort);2 person assist  -LILA     Supine-Sit [place] (Bed Mobility)  minimum assist (75% patient effort)  -AS  --  -LILA     Sit-Supine [place] (Bed Mobility)  minimum assist (75% patient effort)  -AS  --     Bed Mobility, Safety Issues  decreased use of legs for bridging/pushing  -AS  decreased use of legs for bridging/pushing  -JC2     Assistive Device (Bed Mobility)  bed rails;head of bed elevated;draw sheet  -AS  bed  rails;head of bed elevated;draw sheet  -JC2     Comment (Bed Mobility)  --  pt resisting movement with bed mobility at times. pt rolled multiple times for pericare, changing linens, and for bed pan.   -LILA     Recorded by [AS] Ligia Junior, OT 08/04/20 1601 [LILA] Vikash Walters, PTA 08/04/20 1540  [JC2] Vikash Walters, PTA 08/04/20 1352     Row Name 08/04/20 1336             Transfer Assessment/Treatment    Transfer Assessment/Treatment  --  -LILA      Recorded by [LILA] Vikash Walters, PTA 08/04/20 1540      Row Name 08/04/20 1336             Bed-Chair Transfer    Bed-Chair [place] (Transfers)  --  -LILA      Assistive Device (Bed-Chair Transfers)  --  -LILA      Recorded by [LILA] Vikash Walters, PTA 08/04/20 1540      Row Name 08/04/20 1336             Sit-Stand Transfer    Sit-Stand [place] (Transfers)  --  -LILA      Assistive Device (Sit-Stand Transfers)  --  -LILA      Recorded by [LILA] Vikash Walters, PTA 08/04/20 1540      Row Name 08/04/20 1336             Stand-Sit Transfer    Stand-Sit [place] (Transfers)  --  -LILA      Assistive Device (Stand-Sit Transfers)  --  -LILA      Recorded by [LILA] Vikash Walters, PTA 08/04/20 1540      Row Name 08/04/20 1336             Gait/Stairs Assessment/Training    Gait/Stairs Assessment/Training  --  -LILA      [place] Level (Gait)  --  -LILA      Assistive Device (Gait)  --  -LILA      Pattern (Gait)  --  -LILA      Deviations/Abnormal Patterns (Gait)  --  -LILA      Left Sided Gait Deviations  --  -LILA      Recorded by [LILA] Vikash Walters, PTA 08/04/20 1540      Row Name 08/04/20 1520             Bathing Assessment/Intervention    Comment (Bathing)  (D) for alicia care/hygiene/cleanup  -AS      Recorded by [AS] Ligia Junior, OT 08/04/20 1601      Row Name 08/04/20 1520             Upper Body Dressing Assessment/Training    Upper Body Dressing [place] Level  don;other (see comments);maximum assist (25% patient effort) hospital gown  -AS      Upper Body  Dressing Position  edge of bed sitting  -AS      Recorded by [AS] Ligia Junior, OT 08/04/20 1601      Row Name 08/04/20 1520             BADL Safety/Performance    Impairments, BADL Safety/Performance  balance;endurance/activity tolerance;strength  -AS      Recorded by [AS] Ligia Junior, OT 08/04/20 1601      Row Name 08/04/20 1336             Lower Extremity Supine Therapeutic Exercise    Comment, Supine Lower Extremity (Therapeutic Exercise)  pt not following instructions well enough this date.   -LILA      Recorded by [LILA] Vikash Walters, PTA 08/04/20 1540      Row Name 08/04/20 1520             Positioning and Restraints    Pre-Treatment Position  in bed  -AS      Post Treatment Position  bed  -AS      In Bed  supine;encouraged to call for assist;call light within reach;exit alarm on;side rails up x3  -AS      Recorded by [AS] Ligia Junior, OT 08/04/20 1601      Row Name 08/04/20 1520 08/04/20 1336          Pain Scale: Numbers Pre/Post-Treatment    Pain Scale: Numbers, Pretreatment  0/10 - no pain  -AS  --  -LILA     Pain Scale: Numbers, Post-Treatment  0/10 - no pain  -AS  --  -LILA     Pain Location  --  --  -LILA     Recorded by [AS] Ligia Junior, OT 08/04/20 1601 [LILA] Vikash Walters, PTA 08/04/20 1540     Row Name                Wound 08/01/20 1749 Left gluteal Pressure Injury    Wound - Properties Group Date first assessed: 08/01/20 [TC] Time first assessed: 1749 [TC] Present on Hospital Admission: N [TC] Side: Left [TC] Location: gluteal [TC] Primary Wound Type: Pressure inj [TC] Stage, Pressure Injury: Stage 2 [TC] Recorded by:  [TC] Kandis Mercedes RN 08/01/20 1749    Row Name                Wound 08/01/20 1749 Left heel    Wound - Properties Group Date first assessed: 08/01/20 [TC] Time first assessed: 1749 [TC] Present on Hospital Admission: N [TC] Side: Left [TC] Location: heel [TC] Recorded by:  [TC] Kandis Mercedes RN 08/01/20 1749    Row Name                Wound 07/08/20 1810  Left upper eyebrow Laceration    Wound - Properties Group Date first assessed: 07/08/20 [TM] Time first assessed: 1810 [TM] Present on Hospital Admission: Y [TM] Side: Left [TM] Orientation: upper [TM] Location: eyebrow [TM] Primary Wound Type: Laceration [TM] Recorded by:  [TM] Josephine Elias RN 07/08/20 1811    Row Name                Wound 08/01/20 2043 Left posterior;lower leg Pressure Injury    Wound - Properties Group Date first assessed: 08/01/20 [EF] Time first assessed: 2043 [EF] Present on Hospital Admission: Y [EF] Side: Left [EF] Orientation: posterior;lower [EF] Location: leg [EF], below immobilizer  Primary Wound Type: Pressure inj [EF] Stage, Pressure Injury: deep tissue injury [EF] Recorded by:  [EF] Amy Campbell RN 08/01/20 2045    Row Name 08/04/20 1602             Plan of Care Review    Outcome Summary  OT tx completed. Upon OT arrival, pt naked with feces all over him and bedding and was trying to get OOB. Pt adamant to sit EOB. Pt min A supine>sit. Pt SBA for sitting balance EOB. Pt donned gown with max A. After approx 5 min seated EOB, pt throws himself back into supine and refuses to move further. OT encouraged pt to perform rolling to get cleaned up. Pt min A for rolling L<>R and (D) for alicia hygiene. Pt min A scooting towards HOB in supine with vc for direction. Pt left supine in bed, bedding changed, and exit alarm on. Charge RN notified of pt attempts to exit bed. Confuson is barrier to progress at this time. No goals met on this date; cont OT POC  -AS      Recorded by [AS] Ligia Junior OT 08/04/20 1606      Row Name 08/04/20 1520             Outcome Summary/Treatment Plan (OT)    Daily Summary of Progress (OT)  progress toward functional goals is gradual  -AS      Barriers to Overall Progress (OT)  confusion  -AS      Plan for Continued Treatment (OT)  Cont OT POC  -AS      Anticipated Discharge Disposition (OT)  skilled nursing facility  -AS      Recorded by [AS]  Ligia Junior, OT 08/04/20 1601      Row Name 08/04/20 1336             Outcome Summary/Treatment Plan (PT)    Daily Summary of Progress (PT)  progress toward functional goals as expected  -      Plan for Continued Treatment (PT)  continue  -      Anticipated Discharge Disposition (PT)  anticipate therapy at next level of care;skilled nursing facility  -JC2      Recorded by [LILA] Vikash Walters, PTA 08/04/20 1540  [JC2] Vikash Walters, PTA 08/04/20 1352        User Key  (r) = Recorded By, (t) = Taken By, (c) = Cosigned By    Initials Name Effective Dates Discipline    TC Kandis Mercedes, RN 10/17/16 -  Nurse    Amy Rubalcava RN 10/17/16 -  Nurse    LILA Vikash Walters, PTA 03/07/18 -  PT    TM Josephine Elias, RN 07/24/18 -  Nurse    AS Ligia Junior, OT 07/05/20 -  OT        Wound 08/01/20 1749 Left gluteal Pressure Injury (Active)   Wound Image    8/4/2020  8:00 AM   Closure Open to air 8/4/2020  7:24 AM   Periwound non-blanchable;redness 8/3/2020  9:01 PM   Periwound Care, Wound barrier ointment applied 8/4/2020  7:24 AM       Wound 08/01/20 1749 Left heel (Active)   Wound Image   8/4/2020  8:00 AM   Closure Open to air 8/4/2020  7:24 AM   Base non-blanchable;red 8/3/2020  9:01 PM       Wound 08/01/20 2043 Left posterior;lower leg Pressure Injury (Active)   Wound Image   8/4/2020  8:00 AM     Rehab Goal Summary     Row Name 08/04/20 1520 08/04/20 1334          Bed Mobility Goal 1 (PT)    Activity/Assistive Device (Bed Mobility Goal 1, PT)  --  sit to supine;supine to sit  -     [place] Level/Cues Needed (Bed Mobility Goal 1, PT)  --  contact guard assist;standby assist  -     Time Frame (Bed Mobility Goal 1, PT)  --  by discharge  -     Progress/Outcomes (Bed Mobility Goal 1, PT)  --  goal not met  -        Transfer Goal 1 (PT)    Activity/Assistive Device (Transfer Goal 1, PT)  --  bed-to-chair/chair-to-bed;sit-to-stand/stand-to-sit  -LILA     [place] Level/Cues  Needed (Transfer Goal 1, PT)  --  contact guard assist  -LILA     Time Frame (Transfer Goal 1, PT)  --  by discharge  -LILA     Progress/Outcome (Transfer Goal 1, PT)  --  goal not met  -LILA        Gait Training Goal 1 (PT)    Activity/Assistive Device (Gait Training Goal 1, PT)  --  gait (walking locomotion);assistive device use;walker, rolling  -LILA     [place] Level (Gait Training Goal 1, PT)  --  contact guard assist  -LILA     Time Frame (Gait Training Goal 1, PT)  --  5 days  -LILA     Progress/Outcome (Gait Training Goal 1, PT)  --  goal not met  -        Occupational Therapy Goals    Transfer Goal Selection (OT)  transfer, OT goal 1  -AS  --     Bathing Goal Selection (OT)  bathing, OT goal 1  -AS  --     Dressing Goal Selection (OT)  dressing, OT goal 1  -AS  --     Toileting Goal Selection (OT)  toileting, OT goal 1  -AS  --     Activity Tolerance Goal Selection (OT)  activity tolerance, OT goal 1  -AS  --        Transfer Goal 1 (OT)    Activity/Assistive Device (Transfer Goal 1, OT)  sit-to-stand/stand-to-sit;bed-to-chair/chair-to-bed;toilet  -AS  --     [place] Level/Cues Needed (Transfer Goal 1, OT)  contact guard assist  -AS  --     Time Frame (Transfer Goal 1, OT)  long term goal (LTG);by discharge  -AS  --     Progress/Outcome (Transfer Goal 1, OT)  goal not met  -AS  --        Bathing Goal 1 (OT)    Activity/Assistive Device (Bathing Goal 1, OT)  bathing skills, all  -AS  --     [place] Level/Cues Needed (Bathing Goal 1, OT)  minimum assist (75% or more patient effort)  -AS  --     Time Frame (Bathing Goal 1, OT)  long term goal (LTG);by discharge  -AS  --     Progress/Outcomes (Bathing Goal 1, OT)  goal not met  -AS  --        Dressing Goal 1 (OT)    Activity/Assistive Device (Dressing Goal 1, OT)  lower body dressing  -AS  --     [place]/Cues Needed (Dressing Goal 1, OT)  minimum assist (75% or more patient effort)  -AS  --     Time Frame (Dressing Goal 1, OT)  long term goal (LTG);by  discharge  -AS  --     Progress/Outcome (Dressing Goal 1, OT)  goal not met  -AS  --        Toileting Goal 1 (OT)    Activity/Device (Toileting Goal 1, OT)  toileting skills, all  -AS  --     [place] Level/Cues Needed (Toileting Goal 1, OT)  supervision required  -AS  --     Time Frame (Toileting Goal 1, OT)  long term goal (LTG);by discharge  -AS  --     Progress/Outcome (Toileting Goal 1, OT)  goal not met  -AS  --         Activity Tolerance Goal 1 (OT)    Activity Level (Endurance Goal 1, OT)  15 min activity functional/therapeutic activities  -AS  --     Progress/Outcome (Activity Tolerance Goal 1, OT)  goal met  -AS  --       User Key  (r) = Recorded By, (t) = Taken By, (c) = Cosigned By    Initials Name Provider Type Discipline    LILA Vikash Walters, PTA Physical Therapy Assistant PT    AS Ligia Junior, OT Occupational Therapist OT        Occupational Therapy Education                 Title: PT OT SLP Therapies (In Progress)     Topic: Occupational Therapy (In Progress)     Point: ADL training (In Progress)     Description:   Instruct learner(s) on proper safety adaptation and remediation techniques during self care or transfers.   Instruct in proper use of assistive devices.              Learning Progress Summary           Patient Acceptance, E, NR by BB at 8/3/2020 1430    Acceptance, E,D, NR by RC at 8/2/2020 1354    Acceptance, E, NR,NL by AS at 7/30/2020 1656    Comment:  role of OT, OT POC, bed mobility, transfer training                   Point: Home exercise program (Not Started)     Description:   Instruct learner(s) on appropriate technique for monitoring, assisting and/or progressing therapeutic exercises/activities.              Learner Progress:   Not documented in this visit.          Point: Precautions (In Progress)     Description:   Instruct learner(s) on prescribed precautions during self-care and functional transfers.              Learning Progress Summary           Patient  Acceptance, E,D, NR by  at 8/2/2020 1354    Acceptance, E, NR,NL by AS at 7/30/2020 1656    Comment:  role of OT, OT POC, bed mobility, transfer training                   Point: Body mechanics (In Progress)     Description:   Instruct learner(s) on proper positioning and spine alignment during self-care, functional mobility activities and/or exercises.              Learning Progress Summary           Patient Acceptance, E,D, NR by  at 8/2/2020 1354                               User Key     Initials Effective Dates Name Provider Type Discipline     03/07/18 -  Loni Noonan AGUILAR/L Occupational Therapy Assistant OT    RC 03/07/18 -  Evelyn Dodson AGUILAR/L Occupational Therapy Assistant OT    AS 07/05/20 -  Ligia Junior, OT Occupational Therapist OT                OT Recommendation and Plan  Outcome Summary/Treatment Plan (OT)  Daily Summary of Progress (OT): progress toward functional goals is gradual  Barriers to Overall Progress (OT): confusion  Plan for Continued Treatment (OT): Cont OT POC  Anticipated Discharge Disposition (OT): skilled nursing facility  Planned Therapy Interventions (OT Eval): activity tolerance training, adaptive equipment training, BADL retraining, functional balance retraining, occupation/activity based interventions, patient/caregiver education/training, strengthening exercise, transfer/mobility retraining, ROM/therapeutic exercise  Therapy Frequency (OT Eval): other (see comments)(5-7 days/wk)  Daily Summary of Progress (OT): progress toward functional goals is gradual  Plan of Care Review  Plan of Care Reviewed With: patient  Plan of Care Reviewed With: patient  Outcome Summary: OT tx completed. Upon OT arrival, pt naked with feces all over him and bedding and was trying to get OOB. Pt adamant to sit EOB. Pt min A supine>sit. Pt SBA for sitting balance EOB. Pt donned gown with max A. After approx 5 min seated EOB, pt throws himself back into supine and refuses to move  further. OT encouraged pt to perform rolling to get cleaned up. Pt min A for rolling L<>R and (D) for alicia hygiene. Pt min A scooting towards HOB in supine with vc for direction. Pt left supine in bed, bedding changed, and exit alarm on. Charge RN notified of pt attempts to exit bed. Confuson is barrier to progress at this time. No goals met on this date; cont OT POC  Outcome Measures     Row Name 08/04/20 1520 08/04/20 1334 08/03/20 1027       How much help from another person do you currently need...    Turning from your back to your side while in flat bed without using bedrails?  --  2  -LILA  --    Moving from lying on back to sitting on the side of a flat bed without bedrails?  --  2  -LILA  --    Moving to and from a bed to a chair (including a wheelchair)?  --  2  -LILA  --    Standing up from a chair using your arms (e.g., wheelchair, bedside chair)?  --  2  -LILA  --    Climbing 3-5 steps with a railing?  --  1  -LILA  --    To walk in hospital room?  --  2  -LILA  --    AM-PAC 6 Clicks Score (PT)  --  11  -LILA  --       How much help from another is currently needed...    Putting on and taking off regular lower body clothing?  2  -AS  --  2  -BB    Bathing (including washing, rinsing, and drying)  2  -AS  --  2  -BB    Toileting (which includes using toilet bed pan or urinal)  2  -AS  --  2  -BB    Putting on and taking off regular upper body clothing  2  -AS  --  3  -BB    Taking care of personal grooming (such as brushing teeth)  3  -AS  --  3  -BB    Eating meals  3  -AS  --  3  -BB    AM-PAC 6 Clicks Score (OT)  14  -AS  --  15  -BB       Functional Assessment    Outcome Measure Options  AM-PAC 6 Clicks Daily Activity (OT)  -AS  AM-PAC 6 Clicks Basic Mobility (PT)  -LILA  --    Row Name 08/03/20 0922 08/02/20 0845          How much help from another person do you currently need...    Turning from your back to your side while in flat bed without using bedrails?  3  -LILA  --     Moving from lying on back to sitting on  the side of a flat bed without bedrails?  2  -LILA  --     Moving to and from a bed to a chair (including a wheelchair)?  3  -LILA  --     Standing up from a chair using your arms (e.g., wheelchair, bedside chair)?  3  -LILA  --     Climbing 3-5 steps with a railing?  1  -LILA  --     To walk in hospital room?  3  -LILA  --     AM-PAC 6 Clicks Score (PT)  15  -LILA  --        How much help from another is currently needed...    Putting on and taking off regular lower body clothing?  --  2  -RC     Bathing (including washing, rinsing, and drying)  --  2  -RC     Toileting (which includes using toilet bed pan or urinal)  --  2  -RC     Putting on and taking off regular upper body clothing  --  3  -RC     Taking care of personal grooming (such as brushing teeth)  --  3  -RC     Eating meals  --  3  -RC     AM-PAC 6 Clicks Score (OT)  --  15  -RC        Functional Assessment    Outcome Measure Options  AM-PAC 6 Clicks Basic Mobility (PT)  -LILA  --       User Key  (r) = Recorded By, (t) = Taken By, (c) = Cosigned By    Initials Name Provider Type    LILA Vikash Walters, DANIE Physical Therapy Assistant    BB Loni Noonan, AGUILAR/L Occupational Therapy Assistant    Evelyn Farley COTA/L Occupational Therapy Assistant    AS Ligia Junior OT Occupational Therapist           Time Calculation:   Time Calculation- OT     Row Name 08/04/20 1606             Time Calculation- OT    OT Start Time  1520  -AS      OT Stop Time  1543  -AS      OT Time Calculation (min)  23 min  -AS      OT Received On  08/04/20  -AS        User Key  (r) = Recorded By, (t) = Taken By, (c) = Cosigned By    Initials Name Provider Type    AS Ligia Junior OT Occupational Therapist        Therapy Charges for Today     Code Description Service Date Service Provider Modifiers Qty    86484143094  OT SELF CARE/MGMT/TRAIN EA 15 MIN 8/4/2020 Ligia Junior OT GO 1    [id number]  OT THERAPEUTIC ACT EA 15 MIN 8/4/2020 Ligia Junior OT GO 1                Ligia Junior, OT  8/4/2020   12-Aug-2024

## 2024-11-06 NOTE — PLAN OF CARE
Problem: Adult Inpatient Plan of Care  Goal: Plan of Care Review  Outcome: Ongoing, Progressing  Flowsheets (Taken 11/5/2020 0915)  Plan of Care Reviewed With: patient  Outcome Summary: pt t/fers sit<>stand w/ SBA, sit to sup w/ Ind, ambulates ~220', 16' x 2 w/ RW w/ CGA/SBA, pt w/ no LOB w/ gt, performs 10 reps of B LE seated ther ex, pt does c/o B leg and L foot pain throughout tx, no new goals met      Old plan from 2020?   decline

## (undated) DEVICE — SUT VIC 2/0 CT1 CR8 18IN J839D

## (undated) DEVICE — DRAPE,U/ SHT,SPLIT,PLAS,STERIL: Brand: MEDLINE

## (undated) DEVICE — PK HIP LF 60

## (undated) DEVICE — SPNG GZ WOVN 4X4IN 12PLY 10/BX STRL

## (undated) DEVICE — STERILE POLYISOPRENE POWDER-FREE SURGICAL GLOVES WITH EMOLLIENT COATING: Brand: PROTEXIS

## (undated) DEVICE — HOOD, PEEL-AWAY: Brand: FLYTE

## (undated) DEVICE — GLV SURG SENSICARE PI PF LF 7 GRN STRL

## (undated) DEVICE — GLV SURG ORTHO BIOGEL OPTIFIT PF SZ9

## (undated) DEVICE — GLV SURG SENSICARE PI LF PF 8 GRN STRL

## (undated) DEVICE — SUT ETHIB 0 CT1 CR8 18IN CX21D

## (undated) DEVICE — PEEL-AWAY TOGA, 2X LARGE: Brand: FLYTE

## (undated) DEVICE — ELECTRD BLD STD SS 3/32X6.5IN

## (undated) DEVICE — IMMOB KN 3PNL DLX CANVS 22IN BLU

## (undated) DEVICE — DRSNG WND BORDR/ADHS NONADHR/GZ LF 4X10IN STRL

## (undated) DEVICE — CEMENT APPLICATION KIT WITH MEDIUM PRESSURIZER: Brand: ACM BIOPREP

## (undated) DEVICE — CONTAINER,SPECIMEN,OR STERILE,4OZ: Brand: MEDLINE

## (undated) DEVICE — GLV SURG SENSICARE POLYISPRN W/ALOE PF LF 6.5 GRN STRL

## (undated) DEVICE — TP SXN YANKR BULB STRL

## (undated) DEVICE — BONE PREPARATION KIT
Type: IMPLANTABLE DEVICE | Status: NON-FUNCTIONAL
Brand: BIOPREP

## (undated) DEVICE — DBD-PACK,SHOULDER III: Brand: MEDLINE

## (undated) DEVICE — SUT ETHIB 1 CT1 30IN  X425H

## (undated) DEVICE — SMARTSET HIGH PERFORMANCE MV MEDIUM VISCOSITY BONE CEMENT 40G
Type: IMPLANTABLE DEVICE | Status: NON-FUNCTIONAL
Brand: SMARTSET

## (undated) DEVICE — RECIPROCATING BLADE HEAVY DUTY LONG, OFFSET  (77.6 X 0.77 X 11.2MM)

## (undated) DEVICE — DRSNG GZ CURAD XEROFORM NONADHS 5X9IN STRL

## (undated) DEVICE — STPLR SKIN VISISTAT WD 35CT

## (undated) DEVICE — BNDG ELAS CO-FLEX SLF ADHR 4IN5YD LF STRL

## (undated) DEVICE — STCKNT IMPERV 12IN STRL

## (undated) DEVICE — UNDYED BRAIDED (POLYGLACTIN 910), SYNTHETIC ABSORBABLE SUTURE: Brand: COATED VICRYL